# Patient Record
Sex: MALE | Race: BLACK OR AFRICAN AMERICAN | NOT HISPANIC OR LATINO | Employment: OTHER | URBAN - METROPOLITAN AREA
[De-identification: names, ages, dates, MRNs, and addresses within clinical notes are randomized per-mention and may not be internally consistent; named-entity substitution may affect disease eponyms.]

---

## 2017-03-08 ENCOUNTER — OFFICE VISIT (OUTPATIENT)
Dept: DERMATOLOGY | Facility: CLINIC | Age: 62
End: 2017-03-08
Payer: COMMERCIAL

## 2017-03-08 DIAGNOSIS — L81.9 SKIN DEPIGMENTATION: ICD-10-CM

## 2017-03-08 DIAGNOSIS — L65.9 ALOPECIA: Primary | ICD-10-CM

## 2017-03-08 PROCEDURE — 1160F RVW MEDS BY RX/DR IN RCRD: CPT | Mod: S$GLB,,, | Performed by: DERMATOLOGY

## 2017-03-08 PROCEDURE — 99202 OFFICE O/P NEW SF 15 MIN: CPT | Mod: S$GLB,,, | Performed by: DERMATOLOGY

## 2017-03-08 PROCEDURE — 99999 PR PBB SHADOW E&M-EST. PATIENT-LVL II: CPT | Mod: PBBFAC,,, | Performed by: DERMATOLOGY

## 2017-03-08 RX ORDER — FLUOCINONIDE TOPICAL SOLUTION USP, 0.05% 0.5 MG/ML
SOLUTION TOPICAL 2 TIMES DAILY
Qty: 60 ML | Refills: 2 | Status: SHIPPED | OUTPATIENT
Start: 2017-03-08 | End: 2018-04-13

## 2017-03-08 NOTE — PROGRESS NOTES
"  Subjective:       Patient ID:  Luis Wong is a 61 y.o. male who presents for   Chief Complaint   Patient presents with    Hair/Scalp Problem     scalp, x yrs, itchy, thinning, dry, tx unk prescibed cream     Hair/Scalp Problem  - Initial  Affected locations: scalp  Duration: 10+ years.  Signs / symptoms: itching and dryness (associated hair loss)  Relieving factors/Treatments tried: nothing    Biopsy of R hand 10+ years ago "may have shown vitiligo"    Past Medical History:   Diagnosis Date    Aneurysm 10/30/2012    Fever blister     Herpes infection     Hypertension     ICH (intracerebral hemorrhage)     Mixed hyperlipidemia 9/5/2013    Nontraumatic thalamic hemorrhage 8/31/2016    JAQUAN (obstructive sleep apnea)     Right-sided lacunar stroke 9/11/2014    SDH (subdural hematoma)     Special screening for malignant neoplasms, colon     Stroke      Review of Systems   Constitutional: Negative for fever, chills, fatigue and malaise.   Skin: Positive for activity-related sunscreen use. Negative for daily sunscreen use and recent sunburn.   Hematologic/Lymphatic: Does not bruise/bleed easily.        Objective:    Physical Exam   Constitutional: He appears well-developed and well-nourished. No distress.   Neurological: He is alert and oriented to person, place, and time. He is not disoriented.   Psychiatric: He has a normal mood and affect.   Skin:   Areas Examined (abnormalities noted in diagram):   Scalp / Hair Palpated and Inspected  Head / Face Inspection Performed  Neck Inspection Performed  Chest / Axilla Inspection Performed  Back Inspection Performed  RUE Inspected  LUE Inspection Performed                   Diagram Legend     Erythematous scaling macule/papule c/w actinic keratosis       Vascular papule c/w angioma      Pigmented verrucoid papule/plaque c/w seborrheic keratosis      Yellow umbilicated papule c/w sebaceous hyperplasia      Irregularly shaped tan macule c/w lentigo     1-2 mm " smooth white papules consistent with Milia      Movable subcutaneous cyst with punctum c/w epidermal inclusion cyst      Subcutaneous movable cyst c/w pilar cyst      Firm pink to brown papule c/w dermatofibroma      Pedunculated fleshy papule(s) c/w skin tag(s)      Evenly pigmented macule c/w junctional nevus     Mildly variegated pigmented, slightly irregular-bordered macule c/w mildly atypical nevus      Flesh colored to evenly pigmented papule c/w intradermal nevus       Pink pearly papule/plaque c/w basal cell carcinoma      Erythematous hyperkeratotic cursted plaque c/w SCC      Surgical scar with no sign of skin cancer recurrence      Open and closed comedones      Inflammatory papules and pustules      Verrucoid papule consistent consistent with wart     Erythematous eczematous patches and plaques     Dystrophic onycholytic nail with subungual debris c/w onychomycosis     Umbilicated papule    Erythematous-base heme-crusted tan verrucoid plaque consistent with inflamed seborrheic keratosis     Erythematous Silvery Scaling Plaque c/w Psoriasis     See annotation      Assessment / Plan:        Skin depigmentation/Scarring Alopecia-likely DLE on scalp although there are 2 areas of depigmentation on R hand and groin where the skin is only depigmented with no scarring or atrophy present (past Bx suggestive of vitiligo)  Discussed biopsy but pt is not interested  Patient is not concerned about the hair loss but is interested in trying to gain repigmentation  Encouraged patient to get 5-10 min sun exposure to help repigment the areas of suspected vitiligo after which he should apply sunscreen q2h while outdoors    -     fluocinonide (LIDEX) 0.05 % external solution; Apply topically 2 (two) times daily.  Dispense: 60 mL; Refill: 2         Return in about 2 months (around 5/8/2017).

## 2017-03-08 NOTE — MR AVS SNAPSHOT
[facility]  [address]  [facility] [address]  Phone: [phone]  Fax: [phone]                  Luis Wong   3/8/2017 10:00 AM   Office Visit    Description:  Male : 1955   Provider:  Shanel Goodman MD   Department:  [facility]           Reason for Visit     Hair/Scalp Problem           Diagnoses this Visit        Comments    Alopecia    -  Primary     Skin depigmentation                To Do List           Future Appointments        Provider Department Dept Phone    2017 8:00 AM MD [facility] [phone]      Goals (5 Years of Data)     None      Follow-Up and Disposition     Return in about 2 months (around 2017).       These Medications        Disp Refills Start End    fluocinonide (LIDEX) 0.05 % external solution 60 mL 2 3/8/2017     Apply topically 2 (two) times daily. - Topical (Top)    Pharmacy: [facility]/pharmacy #[id number] - [place] - [address] [address] Ph #: [phone]         [facility] On [facility]     [facility] On Call Nurse Care Line -  Assistance  Registered nurses in the [facility] On Call Center provide clinical advisement, health education, appointment booking, and other advisory services.  Call for this free service at [phone].             Medications           Message regarding Medications     Verify the changes and/or additions to your medication regime listed below are the same as discussed with your clinician today.  If any of these changes or additions are incorrect, please notify your healthcare provider.        START taking these NEW medications        Refills    fluocinonide (LIDEX) 0.05 % external solution 2    Sig: Apply topically 2 (two) times daily.    Class: Normal    Route: Topical (Top)      STOP taking these medications     hydrocodone-ibuprofen 7.5-200 mg (VICOPROFEN) 7.5-200 mg per tablet Take 1 tablet by mouth every 8 (eight) hours as needed for Pain.           Verify that the  below list of medications is an accurate representation of the medications you are currently taking.  If none reported, the list may be blank. If incorrect, please contact your healthcare provider. Carry this list with you in case of emergency.           Current Medications     amlodipine (NORVASC) 5 MG tablet TAKE 1 TABLET BY MOUTH EVERY DAY    lisinopril (PRINIVIL,ZESTRIL) 20 MG tablet TAKE 1 TABLET DAILY    aspirin (ECOTRIN) 81 MG EC tablet Take 1 tablet (81 mg total) by mouth once daily.    fluocinonide (LIDEX) 0.05 % external solution Apply topically 2 (two) times daily.           Clinical Reference Information           Allergies as of 3/8/2017     No Known Allergies      Immunizations Administered on Date of Encounter - 3/8/2017     None      Language Assistance Services     ATTENTION: Language assistance services are available, free of charge. Please call [phone].      ATENCIÓN: Si courtney kate, tiene a anderson disposición servicios gratuitos de asistencia lingüística. Llame al [phone].     CHA Ý: N?u b?n nói Ti?ng Vi?t, có các d?ch v? h? tr? ngôn ng? mi?n phí dành cho b?n. G?i s? [phone].         [facility] complies with applicable Federal civil rights laws and does not discriminate on the basis of race, color, national origin, age, disability, or sex.

## 2017-03-08 NOTE — LETTER
March 8, 2017      Juan Eduardo MD  [address] [facility] [address]           [facility]  [address]  [place] [address]  Phone: [phone]  Fax: [phone]          Patient: Luis Wong   MR Number: [id number]   YOB: 1955   Date of Visit: 3/8/2017       Dear Dr. Juan Eduardo:    Thank you for referring Luis Wong to me for evaluation. Attached you will find relevant portions of my assessment and plan of care.    If you have questions, please do not hesitate to call me. I look forward to following Luis Wong along with you.    Sincerely,    Shnael Goodman MD    Enclosure  CC:  No Recipients    If you would like to receive this communication electronically, please contact [email or web address] or [phone] to request more information on [facility] Link access.    For providers and/or their staff who would like to refer a patient to [facility], please contact us through our one-stop-shop provider referral line, [facility], at [phone].    If you feel you have received this communication in error or would no longer like to receive these types of communications, please e-mail [email or web address]

## 2017-03-13 RX ORDER — AMLODIPINE BESYLATE 5 MG/1
TABLET ORAL
Qty: 30 TABLET | Refills: 2 | Status: SHIPPED | OUTPATIENT
Start: 2017-03-13 | End: 2017-06-06 | Stop reason: SDUPTHER

## 2017-03-13 RX ORDER — LISINOPRIL 20 MG/1
TABLET ORAL
Qty: 30 TABLET | Refills: 2 | Status: SHIPPED | OUTPATIENT
Start: 2017-03-13 | End: 2017-06-06 | Stop reason: SDUPTHER

## 2017-05-08 ENCOUNTER — OFFICE VISIT (OUTPATIENT)
Dept: DERMATOLOGY | Facility: CLINIC | Age: 62
End: 2017-05-08
Payer: COMMERCIAL

## 2017-05-08 DIAGNOSIS — L81.9 SKIN DEPIGMENTATION: ICD-10-CM

## 2017-05-08 DIAGNOSIS — L65.9 ALOPECIA: Primary | ICD-10-CM

## 2017-05-08 PROCEDURE — 99999 PR PBB SHADOW E&M-EST. PATIENT-LVL III: CPT | Mod: PBBFAC,,, | Performed by: DERMATOLOGY

## 2017-05-08 PROCEDURE — 1160F RVW MEDS BY RX/DR IN RCRD: CPT | Mod: S$GLB,,, | Performed by: DERMATOLOGY

## 2017-05-08 PROCEDURE — 99213 OFFICE O/P EST LOW 20 MIN: CPT | Mod: S$GLB,,, | Performed by: DERMATOLOGY

## 2017-05-08 NOTE — PROGRESS NOTES
"  Subjective:       Patient ID:  Luis Wong is a 61 y.o. male who presents for   Chief Complaint   Patient presents with    Alopecia     f/u     Alopecia  - Follow-up  Symptom course: improving (per wife)  Currently using: lidex solution daily-bid; getting sun exposure few days per week.  Affected locations: scalp  Signs / symptoms: asymptomatic    Biopsy of R hand 10+ years ago "may have shown vitiligo"  Past Medical History:   Diagnosis Date    Aneurysm 10/30/2012    Fever blister     Herpes infection     Hypertension     ICH (intracerebral hemorrhage)     Mixed hyperlipidemia 9/5/2013    Nontraumatic thalamic hemorrhage 8/31/2016    JAQUAN (obstructive sleep apnea)     Right-sided lacunar stroke 9/11/2014    SDH (subdural hematoma)     Special screening for malignant neoplasms, colon     Stroke      Review of Systems   Constitutional: Negative for fatigue.   Skin: Positive for dry skin. Negative for itching, rash, daily sunscreen use and activity-related sunscreen use.        Objective:    Physical Exam   Constitutional: He appears well-developed and well-nourished. No distress.   Neurological: He is alert and oriented to person, place, and time. He is not disoriented.   Psychiatric: He has a normal mood and affect.   Skin:   Areas Examined (abnormalities noted in diagram):   Scalp / Hair Palpated and Inspected  Head / Face Inspection Performed  Neck Inspection Performed  RUE Inspected                               Diagram Legend     Erythematous scaling macule/papule c/w actinic keratosis       Vascular papule c/w angioma      Pigmented verrucoid papule/plaque c/w seborrheic keratosis      Yellow umbilicated papule c/w sebaceous hyperplasia      Irregularly shaped tan macule c/w lentigo     1-2 mm smooth white papules consistent with Milia      Movable subcutaneous cyst with punctum c/w epidermal inclusion cyst      Subcutaneous movable cyst c/w pilar cyst      Firm pink to brown papule c/w " dermatofibroma      Pedunculated fleshy papule(s) c/w skin tag(s)      Evenly pigmented macule c/w junctional nevus     Mildly variegated pigmented, slightly irregular-bordered macule c/w mildly atypical nevus      Flesh colored to evenly pigmented papule c/w intradermal nevus       Pink pearly papule/plaque c/w basal cell carcinoma      Erythematous hyperkeratotic cursted plaque c/w SCC      Surgical scar with no sign of skin cancer recurrence      Open and closed comedones      Inflammatory papules and pustules      Verrucoid papule consistent consistent with wart     Erythematous eczematous patches and plaques     Dystrophic onycholytic nail with subungual debris c/w onychomycosis     Umbilicated papule    Erythematous-base heme-crusted tan verrucoid plaque consistent with inflamed seborrheic keratosis     Erythematous Silvery Scaling Plaque c/w Psoriasis     See annotation      Assessment / Plan:        Alopecia/Skin depigmentation:-DLE on scalp (vs vitiligo) although there are 2 areas of depigmentation on R hand and groin where the skin is only depigmented with no scarring or atrophy present (past Bx suggestive of vitiligo)  Discussed biopsy at last OV but pt is not interested  Patient is not concerned about the hair loss but is interested in trying to gain repigmentation  Encouraged patient to get 5-10 min sun exposure to help repigment the areas of suspected vitiligo after which he should apply sunscreen q2h while outdoors  Continue fluocinonide (LIDEX) 0.05 % external solution; Apply topically 2 (two) times daily. Dispense: 60 mL; Refill: 2         Return in about 3 months (around 8/8/2017).

## 2017-06-06 RX ORDER — LISINOPRIL 20 MG/1
TABLET ORAL
Qty: 30 TABLET | Refills: 2 | Status: SHIPPED | OUTPATIENT
Start: 2017-06-06 | End: 2017-09-07 | Stop reason: SDUPTHER

## 2017-06-06 RX ORDER — AMLODIPINE BESYLATE 5 MG/1
TABLET ORAL
Qty: 30 TABLET | Refills: 2 | Status: SHIPPED | OUTPATIENT
Start: 2017-06-06 | End: 2017-09-07 | Stop reason: SDUPTHER

## 2017-07-19 DIAGNOSIS — Z00.00 ROUTINE GENERAL MEDICAL EXAMINATION AT A HEALTH CARE FACILITY: Primary | ICD-10-CM

## 2017-08-08 ENCOUNTER — OFFICE VISIT (OUTPATIENT)
Dept: DERMATOLOGY | Facility: CLINIC | Age: 62
End: 2017-08-08
Payer: COMMERCIAL

## 2017-08-08 DIAGNOSIS — L81.9 SKIN DEPIGMENTATION: Primary | ICD-10-CM

## 2017-08-08 DIAGNOSIS — L65.9 ALOPECIA: ICD-10-CM

## 2017-08-08 PROCEDURE — 99213 OFFICE O/P EST LOW 20 MIN: CPT | Mod: S$GLB,,, | Performed by: DERMATOLOGY

## 2017-08-08 PROCEDURE — 99999 PR PBB SHADOW E&M-EST. PATIENT-LVL II: CPT | Mod: PBBFAC,,, | Performed by: DERMATOLOGY

## 2017-08-08 NOTE — PROGRESS NOTES
"  Subjective:       Patient ID:  Luis Wong is a 61 y.o. male who presents for   Chief Complaint   Patient presents with    Follow-up     Alopecia     HPI 60 yo AAM with a history of alopecia/skin depigmentation (DLE vs vitiligo) of the scalp here for f/u. Last seen 5/8/17 and started on lidex 0.05% solution daily and instructed to get ~5 min of sun exposure daily.  He says the solution has helped with itching but he has not noticed any improvement in pigmentation or alopecia.    Biopsy of R hand 10+ years ago "may have shown vitiligo"  Past Medical History:   Diagnosis Date    Aneurysm 10/30/2012    Fever blister     Herpes infection     Hypertension     ICH (intracerebral hemorrhage)     Mixed hyperlipidemia 9/5/2013    Nontraumatic thalamic hemorrhage 8/31/2016    JAQUAN (obstructive sleep apnea)     Right-sided lacunar stroke 9/11/2014    SDH (subdural hematoma)     Special screening for malignant neoplasms, colon     Stroke      Review of Systems   Constitutional: Negative for fever, chills, weight loss, weight gain, fatigue, night sweats and malaise.   Skin: Negative for daily sunscreen use and recent sunburn.   Hematologic/Lymphatic: Does not bruise/bleed easily.        Objective:    Physical Exam   Constitutional: He appears well-developed and well-nourished. No distress.   Neurological: He is alert and oriented to person, place, and time. He is not disoriented.   Psychiatric: He has a normal mood and affect.   Skin:   Areas Examined (abnormalities noted in diagram):   Scalp / Hair Palpated and Inspected  Head / Face Inspection Performed  Neck Inspection Performed  RUE Inspected  LUE Inspection Performed                   Diagram Legend     Erythematous scaling macule/papule c/w actinic keratosis       Vascular papule c/w angioma      Pigmented verrucoid papule/plaque c/w seborrheic keratosis      Yellow umbilicated papule c/w sebaceous hyperplasia      Irregularly shaped tan macule c/w " lentigo     1-2 mm smooth white papules consistent with Milia      Movable subcutaneous cyst with punctum c/w epidermal inclusion cyst      Subcutaneous movable cyst c/w pilar cyst      Firm pink to brown papule c/w dermatofibroma      Pedunculated fleshy papule(s) c/w skin tag(s)      Evenly pigmented macule c/w junctional nevus     Mildly variegated pigmented, slightly irregular-bordered macule c/w mildly atypical nevus      Flesh colored to evenly pigmented papule c/w intradermal nevus       Pink pearly papule/plaque c/w basal cell carcinoma      Erythematous hyperkeratotic cursted plaque c/w SCC      Surgical scar with no sign of skin cancer recurrence      Open and closed comedones      Inflammatory papules and pustules      Verrucoid papule consistent consistent with wart     Erythematous eczematous patches and plaques     Dystrophic onycholytic nail with subungual debris c/w onychomycosis     Umbilicated papule    Erythematous-base heme-crusted tan verrucoid plaque consistent with inflamed seborrheic keratosis     Erythematous Silvery Scaling Plaque c/w Psoriasis     See annotation      Assessment / Plan:        Skin depigmentation/Alopecia  DLE on scalp (vs vitiligo) although there are 2 areas of depigmentation on R hand and groin where the skin is only depigmented with no scarring or atrophy present (past Bx suggestive of vitiligo)  Discussed biopsy at last OV.  Pt is now interested in biopsy  Encouraged patient to get 5-10 min sun exposure to help repigment the areas of suspected vitiligo after which he should apply sunscreen q2h while outdoors  Continue fluocinonide (LIDEX) 0.05 % external solution; Apply topically 2 (two) times daily. Dispense: 60 mL; Refill: 2       Return for as scheduled for scalp biopsy.

## 2017-09-07 RX ORDER — AMLODIPINE BESYLATE 5 MG/1
TABLET ORAL
Qty: 30 TABLET | Refills: 2 | Status: SHIPPED | OUTPATIENT
Start: 2017-09-07 | End: 2017-10-02

## 2017-09-07 RX ORDER — LISINOPRIL 20 MG/1
TABLET ORAL
Qty: 30 TABLET | Refills: 2 | Status: SHIPPED | OUTPATIENT
Start: 2017-09-07 | End: 2017-12-06 | Stop reason: SDUPTHER

## 2017-09-08 ENCOUNTER — PROCEDURE VISIT (OUTPATIENT)
Dept: DERMATOLOGY | Facility: CLINIC | Age: 62
End: 2017-09-08
Payer: COMMERCIAL

## 2017-09-08 DIAGNOSIS — L81.9 SKIN DEPIGMENTATION: ICD-10-CM

## 2017-09-08 DIAGNOSIS — L65.9 ALOPECIA: Primary | ICD-10-CM

## 2017-09-08 PROCEDURE — 88305 TISSUE EXAM BY PATHOLOGIST: CPT | Performed by: PATHOLOGY

## 2017-09-08 PROCEDURE — 99499 UNLISTED E&M SERVICE: CPT | Mod: S$GLB,,, | Performed by: DERMATOLOGY

## 2017-09-08 PROCEDURE — 88342 IMHCHEM/IMCYTCHM 1ST ANTB: CPT | Mod: 26,,, | Performed by: PATHOLOGY

## 2017-09-08 PROCEDURE — 11100 PR BIOPSY OF SKIN LESION: CPT | Mod: S$GLB,,, | Performed by: DERMATOLOGY

## 2017-09-08 NOTE — PROGRESS NOTES
"  Subjective:       Patient ID:  Luis Wong is a 61 y.o. male who presents for   Chief Complaint   Patient presents with    Biopsy     scalp     HPI History of alopecia/depigmentation (DLE vs vitiligo) here for scalp biopsy. Using lidex solution and instructed to get 5 minutes of sun daily.     Biopsy of R hand 10 years ago "may have shown vitiligo."    Past Medical History:   Diagnosis Date    Aneurysm 10/30/2012    Fever blister     Herpes infection     Hypertension     ICH (intracerebral hemorrhage)     Mixed hyperlipidemia 9/5/2013    Nontraumatic thalamic hemorrhage 8/31/2016    JAQUAN (obstructive sleep apnea)     Right-sided lacunar stroke 9/11/2014    SDH (subdural hematoma)     Special screening for malignant neoplasms, colon     Stroke      Review of Systems   Constitutional: Negative for fever.   Skin: Negative for daily sunscreen use and recent sunburn.   Hematologic/Lymphatic: Does not bruise/bleed easily.        Objective:    Physical Exam   Constitutional: He appears well-developed and well-nourished. No distress.   Neurological: He is alert and oriented to person, place, and time. He is not disoriented.   Psychiatric: He has a normal mood and affect.   Skin:   Areas Examined (abnormalities noted in diagram):   Scalp / Hair Palpated and Inspected  Head / Face Inspection Performed              Diagram Legend     Erythematous scaling macule/papule c/w actinic keratosis       Vascular papule c/w angioma      Pigmented verrucoid papule/plaque c/w seborrheic keratosis      Yellow umbilicated papule c/w sebaceous hyperplasia      Irregularly shaped tan macule c/w lentigo     1-2 mm smooth white papules consistent with Milia      Movable subcutaneous cyst with punctum c/w epidermal inclusion cyst      Subcutaneous movable cyst c/w pilar cyst      Firm pink to brown papule c/w dermatofibroma      Pedunculated fleshy papule(s) c/w skin tag(s)      Evenly pigmented macule c/w junctional nevus     " Mildly variegated pigmented, slightly irregular-bordered macule c/w mildly atypical nevus      Flesh colored to evenly pigmented papule c/w intradermal nevus       Pink pearly papule/plaque c/w basal cell carcinoma      Erythematous hyperkeratotic cursted plaque c/w SCC      Surgical scar with no sign of skin cancer recurrence      Open and closed comedones      Inflammatory papules and pustules      Verrucoid papule consistent consistent with wart     Erythematous eczematous patches and plaques     Dystrophic onycholytic nail with subungual debris c/w onychomycosis     Umbilicated papule    Erythematous-base heme-crusted tan verrucoid plaque consistent with inflamed seborrheic keratosis     Erythematous Silvery Scaling Plaque c/w Psoriasis     See annotation      Assessment / Plan:      Pathology Orders:      Normal Orders This Visit    Tissue Specimen To Pathology, Dermatology     Questions:    Directional Terms:  Other(comment)    Clinical information:  DLE vs vitiligo    Specific Site:  Left scalp        Alopecia/Skin depigmentation  - DLE on scalp (vs vitiligo) although there are 2 areas of depigmentation on R hand and groin where the skin is only depigmented with no scarring or atrophy present (past Bx suggestive of vitiligo)  - Scalp biopsy performed as below  -Continue 5-10 min sun exposure daily to help repigment the areas of suspected vitiligo after which he should apply sunscreen q2h while outdoors  - Continue fluocinonide 0.05% solution    Punch biopsy procedure note: scalp  Punch biopsy performed after verbal consent obtained. Area marked and prepped with alcohol. Approximately 1cc of 1% lidocaine with epinephrine injected. 4 mm disposable punch used to remove lesion. Hemostasis obtained and biopsy site closed with 2 Prolene sutures. Wound care instructions reviewed with patient and handout given.         Return in about 2 weeks (around 9/22/2017).

## 2017-09-12 ENCOUNTER — OFFICE VISIT (OUTPATIENT)
Dept: INTERNAL MEDICINE | Facility: CLINIC | Age: 62
End: 2017-09-12

## 2017-09-12 ENCOUNTER — HOSPITAL ENCOUNTER (OUTPATIENT)
Dept: CARDIOLOGY | Facility: CLINIC | Age: 62
Discharge: HOME | End: 2017-09-12

## 2017-09-12 ENCOUNTER — CLINICAL SUPPORT (OUTPATIENT)
Dept: INTERNAL MEDICINE | Facility: CLINIC | Age: 62
End: 2017-09-12

## 2017-09-12 ENCOUNTER — CLINICAL SUPPORT (OUTPATIENT)
Dept: INTERNAL MEDICINE | Facility: CLINIC | Age: 62
End: 2017-09-12
Payer: COMMERCIAL

## 2017-09-12 VITALS
TEMPERATURE: 98 F | BODY MASS INDEX: 25.64 KG/M2 | WEIGHT: 173.63 LBS | HEART RATE: 64 BPM | SYSTOLIC BLOOD PRESSURE: 158 MMHG | DIASTOLIC BLOOD PRESSURE: 90 MMHG

## 2017-09-12 DIAGNOSIS — I61.9 NONTRAUMATIC THALAMIC HEMORRHAGE: ICD-10-CM

## 2017-09-12 DIAGNOSIS — Z00.00 ROUTINE GENERAL MEDICAL EXAMINATION AT A HEALTH CARE FACILITY: Primary | ICD-10-CM

## 2017-09-12 DIAGNOSIS — E78.2 MIXED HYPERLIPIDEMIA: ICD-10-CM

## 2017-09-12 DIAGNOSIS — R73.03 PRE-DIABETES: ICD-10-CM

## 2017-09-12 DIAGNOSIS — I10 ESSENTIAL HYPERTENSION: ICD-10-CM

## 2017-09-12 DIAGNOSIS — Z00.00 ROUTINE GENERAL MEDICAL EXAMINATION AT A HEALTH CARE FACILITY: ICD-10-CM

## 2017-09-12 LAB
ALBUMIN SERPL BCP-MCNC: 3.6 G/DL
ALP SERPL-CCNC: 91 U/L
ALT SERPL W/O P-5'-P-CCNC: 14 U/L
ANION GAP SERPL CALC-SCNC: 6 MMOL/L
AST SERPL-CCNC: 17 U/L
BILIRUB SERPL-MCNC: 0.8 MG/DL
BUN SERPL-MCNC: 14 MG/DL
CALCIUM SERPL-MCNC: 9.5 MG/DL
CHLORIDE SERPL-SCNC: 105 MMOL/L
CHOLEST SERPL-MCNC: 229 MG/DL
CHOLEST/HDLC SERPL: 3.1 {RATIO}
CO2 SERPL-SCNC: 30 MMOL/L
COMPLEXED PSA SERPL-MCNC: 0.55 NG/ML
CREAT SERPL-MCNC: 1.2 MG/DL
ERYTHROCYTE [DISTWIDTH] IN BLOOD BY AUTOMATED COUNT: 13.1 %
EST. GFR  (AFRICAN AMERICAN): >60 ML/MIN/1.73 M^2
EST. GFR  (NON AFRICAN AMERICAN): >60 ML/MIN/1.73 M^2
ESTIMATED AVG GLUCOSE: 148 MG/DL
GLUCOSE SERPL-MCNC: 132 MG/DL
HBA1C MFR BLD HPLC: 6.8 %
HCT VFR BLD AUTO: 39.9 %
HDLC SERPL-MCNC: 73 MG/DL
HDLC SERPL: 31.9 %
HGB BLD-MCNC: 13.7 G/DL
LDLC SERPL CALC-MCNC: 138.8 MG/DL
MCH RBC QN AUTO: 27.2 PG
MCHC RBC AUTO-ENTMCNC: 34.3 G/DL
MCV RBC AUTO: 79 FL
NONHDLC SERPL-MCNC: 156 MG/DL
PLATELET # BLD AUTO: 171 K/UL
PMV BLD AUTO: ABNORMAL FL
POTASSIUM SERPL-SCNC: 3.7 MMOL/L
PROT SERPL-MCNC: 7.1 G/DL
RBC # BLD AUTO: 5.03 M/UL
SODIUM SERPL-SCNC: 141 MMOL/L
TRIGL SERPL-MCNC: 86 MG/DL
TSH SERPL DL<=0.005 MIU/L-ACNC: 0.42 UIU/ML
WBC # BLD AUTO: 4.43 K/UL

## 2017-09-12 PROCEDURE — 80061 LIPID PANEL: CPT

## 2017-09-12 PROCEDURE — 99386 PREV VISIT NEW AGE 40-64: CPT | Mod: ,,, | Performed by: INTERNAL MEDICINE

## 2017-09-12 PROCEDURE — 93000 ELECTROCARDIOGRAM COMPLETE: CPT | Mod: S$GLB,,, | Performed by: INTERNAL MEDICINE

## 2017-09-12 PROCEDURE — 99999 PR PBB SHADOW E&M-EST. PATIENT-LVL III: CPT | Mod: PBBFAC,,, | Performed by: INTERNAL MEDICINE

## 2017-09-12 PROCEDURE — 97802 MEDICAL NUTRITION INDIV IN: CPT | Mod: S$GLB,,, | Performed by: INTERNAL MEDICINE

## 2017-09-12 PROCEDURE — 97750 PHYSICAL PERFORMANCE TEST: CPT | Mod: S$GLB,,, | Performed by: INTERNAL MEDICINE

## 2017-09-12 PROCEDURE — 84443 ASSAY THYROID STIM HORMONE: CPT

## 2017-09-12 PROCEDURE — 85027 COMPLETE CBC AUTOMATED: CPT

## 2017-09-12 PROCEDURE — 80053 COMPREHEN METABOLIC PANEL: CPT

## 2017-09-12 PROCEDURE — 83036 HEMOGLOBIN GLYCOSYLATED A1C: CPT

## 2017-09-12 PROCEDURE — 84153 ASSAY OF PSA TOTAL: CPT

## 2017-09-14 NOTE — PROGRESS NOTES
Subjective:       Patient ID: Luis Wong is a 61 y.o. male.    Chief Complaint: No chief complaint on file.    HPI   Patient has reported a stroke in 2012, hypertension and hyperlipidemia.     Prescription Medication:   - Amlodipine    - Lisinopril     No physical limitations to exercise have been reported.     Current Exercise Routine:   -None    Currently trying to get back into a normal routine of exercising. He has reported being very active around the house since he has retired.    Review of Systems    Objective:      The fitness evaluation results are as follows:    D.O.S. 9/12/2017 8/26/2015   Height (in): 69 68.5   Weight (lbs): 171 164   BMI: 25.25693 24.136079   Body Fat (%): 25.70 22.90   Waist (cm): 90 83   Hip (cm): 101 94.5   WHR: 0.89 0.88   RBP (mmHg): 138/78 150/90   RHR (bpm): 60 52    Strength R (lbs)t: 101.3333 106.83776    Strength Lt (lbs): 97.24945 97.315490   Push-up Assessment: 30 31   Curl-up Assessment: 24 46   Flexibility Testing (cm): 21 20   REE (kcals): 1390 1330     Physical Exam    Assessment:      Age/Gender Stratified Assessment:    Resting BP: Within Testing Limits   Body Fat %: Good   WHR Risk Factor: Low Risk    Strength R: Average    Strength L: Above Average   Upper Body Endurance: Excellent   Abdominal Endurance: Above Average   Lower body Flexibility: Good      1. Routine general medical examination at a health care facility        Plan:    Patient should continue focusing on building a balanced exercise routine over the next year. This will help improve heart health and fitness level.      Below guidelines should be met.    Recommended Fitness Guidelines:   - 150 minutes of moderate intensity aerobic exercise each week OR 75 minutes of vigorous    - 2-4 days per week of resistance training for each muscle group   - Daily stretching

## 2017-09-18 NOTE — PROGRESS NOTES
"Nutrition Assessment  Client name:  Luis Wong  :  1955  Age:  61 y.o.  Gender:  male    Client states:  Very pleasant [employer] retiree here for his annual [facility] physical.  Suffered a stroke in  and has a strong family history of DM, including his father, paternal grandparents, and multiple siblings.  Wishes to be proactive in DM risk management as he prefers to avoid anti-DM medication if possible.  Takes various vitamin/mineral supplements as encouraged by his wife although admits that he has been consuming more soft drinks, candy, and cookies in contrast to last year.  His wife recently purchased a candy machine for the house, and he finds himself grabbing a few pieces periodically throughout the day.  Maintains a physically active lifestyle, completing household duties and responsibilities daily.  Does not stop to eat a "big meal" for lunch, frequently snacking on chips, salsa, and avocados.  Admits that although he owns home exercise equipment, including a stationary bike, treadmill, and elliptical, he does not utilize such, noting that it is hard to "get back in the routine."  Nonetheless, adheres to a vegan diet every  as his daughter is vegan and provides their meals then.  Enjoys making them "green smoothies" comprised of various fruits and vegetables.  Overall, was receptive toward nutrition education and recommendations received as he wishes to reduce his risk of DM.  Plans to recheck labs with PCP in 3-6 months.    Past Medical History:   Diagnosis Date    Aneurysm 10/30/2012    Fever blister     Herpes infection     Hypertension     ICH (intracerebral hemorrhage)     Mixed hyperlipidemia 2013    Nontraumatic thalamic hemorrhage 2016    JAQUAN (obstructive sleep apnea)     Right-sided lacunar stroke 2014    SDH (subdural hematoma)     Special screening for malignant neoplasms, colon     Stroke        Social History    Marital status:  " "  Employment:  Retired - [employer]  Social History   Substance Use Topics    Smoking status: Never Smoker    Smokeless tobacco: Never Used    Alcohol use No        Current medications:  has a current medication list which includes the following prescription(s): amlodipine, fluocinonide, and lisinopril.  Vitamins, minerals, and/or supplements:  Fish oil, Mg, Vitamin D3, ALA, reservertol, Vitamin B complex, acai berry, CoQ10, Gingko biloba, L-carnitine   Food allergies or intolerances:  Unable to assess     Food History  Breakfast:  1 cup green tea + 5-6 Ritz crackers + peanut butter  Lunch:  Avocado + salsa + chips  Dinner:  White beans + chicken + Coke  H.S. Snack:  Cookie/assorted candies    Exercise History:  None    Lab Reports   Total Cholesterol:  229    Triglycerides:  86  HDL:  73  LDL:  138.8   Glucose:  132  HbA1c:  6.8%  BP:  158/90     Weight History  Height:  5' 9"     Weight:  171#  BMI:  25.3  % Body Fat:  25.7%    Diagnosis  RMR (Method:  Body [place]):  1390 kcal  Activity Factor:  1.3  MARTIN:  1807 kcal    Altered nutrition-related laboratory values related to improper food choices and inadequate physical activity as evidenced by glucose:  132; HbA1c:  6.8%; BP:  158/90; TC:  229; LDL:  138.8.    Intervention    Goals:  1.  Refer to MD re: DM education/management  2.  HbA1c < 6.5% or per MD/PCP guidelines  3.  Optimize lipid panel and BP  4.  Begin exercising 30-45 minutes 4x/week as tolerated  5.  Replace Coke and other soft drinks with healthier beverage alternatives, such as water, Ice drink, diet green tea, etc.  6.  Incorporate a source of lean protein with every meal, specifically lunch  7.  Incorporate a salad with grilled chicken for lunch  8.  When making green smoothies, incorporate 6-8 ounces nonfat Greek yogurt   9.  Limit intake of sugary beverages and foods  10. Consider replacing white crackers with whole grain crackers, such as Trisquits or Reduced-Fat Wheat Thins  11. Recheck CMP, " lipid panel, and HbA1c in 3 months via MD/PCP    Reviewed CMP, lipid panel, and HbA1c.  Although TC and LDL remain borderline high, HDL remains > 70, resulting in TC/HDL ratio < 4.5.  However, elevated glucose and HbA1c noted, indicative of new-onset DM.  Had a lengthy discussion with patient regarding DM, including potential complications, risk factors, importance of glucose control, process of CHO digestion in relation to glucose, and ADA's reference ranges for fasting glucose and HbA1c.  Encouraged patient to modify his eating and exercise behaviors, specifically reducing intake of sugary foods and beverages.  Provided patient with examples of healthy beverage alternatives as well as healthy meal choices.  Advised patient to include a source of lean protein with all meals, discussing the role of protein in relation to satiety and glucose stabilization.  Also, encouraged him to incorporate additional non-starchy vegetables with meals, reviewing starchy versus non-starchy vegetables, as well as discussing the difference between refined and whole grains.  Reviewed a heart-healthy diet and ways to improve BP via low sodium diet and physical activity.  Stressed the overall importance of proper nutrition and physical activity so as to promote improved health maintenance and chronic disease risk management.    *To note, provided patient with detailed notes outlining goals discussed above and encouraged him to follow up with MD/PCP.    Handouts provided:  Meal Planning Guide  Restaurant Guide  Eat Fit Shopping List  Eat Fit Sheryl  Fast Food Guide  Vitamin/Mineral Guide  CHO Counting  Heart-Healthy Eating Nutrition Therapy  Individual Nutrition Notes    Monitoring/Evaluation    Monitor the following:  Weight  BMI  % Body Fat  Caloric intake  Lipid panel  Blood pressure  Glucose  HbA1c    Follow Up Plan:  Follow up with client in 1-2 years

## 2017-09-22 ENCOUNTER — CLINICAL SUPPORT (OUTPATIENT)
Dept: DERMATOLOGY | Facility: CLINIC | Age: 62
End: 2017-09-22
Payer: COMMERCIAL

## 2017-09-22 DIAGNOSIS — Z48.02 VISIT FOR SUTURE REMOVAL: Primary | ICD-10-CM

## 2017-09-22 PROCEDURE — 99024 POSTOP FOLLOW-UP VISIT: CPT | Mod: S$GLB,,, | Performed by: DERMATOLOGY

## 2017-09-22 PROCEDURE — 99999 PR PBB SHADOW E&M-EST. PATIENT-LVL I: CPT | Mod: PBBFAC,,,

## 2017-09-25 PROBLEM — R73.03 PRE-DIABETES: Status: ACTIVE | Noted: 2017-09-25

## 2017-09-25 NOTE — PROGRESS NOTES
Subjective:       Patient ID: Luis Wong is a 61 y.o. male.    Chief Complaint: Executive Health    HPIPleasant gentleman from [place], [place] here for his Executive Health exam. Overall doing well and had no specific complaints. He has a history of a hypertensive hemorrhagic stroke several years ago, but fortunately has no residual deficits except noting occassional weakness of his left leg. This has no pattern and has not impeded his activities. He states that he is compliant with his medications including blood pressure meds.  I am sending copies of his studies including blood work that showed elevated fastin glucose level at 132 with elevated A1-C at 6.8. The implications of this finding were discussed with him at the time of our visit vis a vis future health issues. Emphasized as well the importance of proper nutrition by decreasing his intake of carbohydrates/starches in his diet, exercise and weight maintenance. I will repeat this blood work in 3-4 months and treat if indicated. CBC again showed mild anemia - chronic, stable. Cholesterol again elevated at 229 with excellent HDL fraction. Nevertheless, will repeat this blood work as above and consider treatment if not improved. All other parameters were unremarkable including EKG.  Review of Systems   All other systems reviewed and are negative.      Objective:      Physical Exam   Constitutional: He is oriented to person, place, and time. He appears well-developed and well-nourished. No distress.   HENT:   Head: Normocephalic and atraumatic.   Right Ear: External ear normal.   Left Ear: External ear normal.   Mouth/Throat: Oropharynx is clear and moist. No oropharyngeal exudate.   Eyes: Conjunctivae and EOM are normal. Pupils are equal, round, and reactive to light. Left eye exhibits no discharge. No scleral icterus.   Neck: Normal range of motion. Neck supple. No JVD present. No thyromegaly present.   Cardiovascular: Normal rate, regular rhythm, normal  heart sounds and intact distal pulses.    No murmur heard.  Pulmonary/Chest: Effort normal and breath sounds normal. No respiratory distress. He has no wheezes. He exhibits no tenderness.   Abdominal: Soft. Bowel sounds are normal. He exhibits no distension and no mass. There is no tenderness.   Musculoskeletal: Normal range of motion. He exhibits no edema or tenderness.   Lymphadenopathy:     He has no cervical adenopathy.   Neurological: He is alert and oriented to person, place, and time. No cranial nerve deficit. Coordination normal.   Skin: Skin is warm and dry. He is not diaphoretic. No erythema.   Psychiatric: He has a normal mood and affect. His behavior is normal. Judgment and thought content normal.   Nursing note and vitals reviewed.      Assessment:       1. Routine general medical examination at a health care facility    2. Pre-diabetes    3. Mixed hyperlipidemia    4. Essential hypertension    5. Nontraumatic thalamic hemorrhage        Plan:    1. As above.         2. Continue with current medications.         3. Return to clinic in 3 months with blood work prior to visit.

## 2017-10-02 RX ORDER — AMLODIPINE BESYLATE 5 MG/1
TABLET ORAL
Qty: 30 TABLET | Refills: 2 | Status: SHIPPED | OUTPATIENT
Start: 2017-10-02 | End: 2018-03-10 | Stop reason: SDUPTHER

## 2017-10-19 ENCOUNTER — TELEPHONE (OUTPATIENT)
Dept: DERMATOLOGY | Facility: CLINIC | Age: 62
End: 2017-10-19

## 2017-12-06 RX ORDER — LISINOPRIL 20 MG/1
TABLET ORAL
Qty: 30 TABLET | Refills: 2 | Status: SHIPPED | OUTPATIENT
Start: 2017-12-06 | End: 2018-03-10 | Stop reason: SDUPTHER

## 2018-01-09 ENCOUNTER — LAB VISIT (OUTPATIENT)
Dept: LAB | Facility: HOSPITAL | Age: 63
End: 2018-01-09
Attending: FAMILY MEDICINE
Payer: COMMERCIAL

## 2018-01-09 ENCOUNTER — OFFICE VISIT (OUTPATIENT)
Dept: FAMILY MEDICINE | Facility: CLINIC | Age: 63
End: 2018-01-09
Payer: COMMERCIAL

## 2018-01-09 VITALS
BODY MASS INDEX: 25.48 KG/M2 | DIASTOLIC BLOOD PRESSURE: 90 MMHG | HEIGHT: 69 IN | OXYGEN SATURATION: 98 % | HEART RATE: 64 BPM | SYSTOLIC BLOOD PRESSURE: 150 MMHG | WEIGHT: 172 LBS

## 2018-01-09 DIAGNOSIS — B35.6 TINEA CRURIS: ICD-10-CM

## 2018-01-09 DIAGNOSIS — R73.03 PRE-DIABETES: ICD-10-CM

## 2018-01-09 DIAGNOSIS — Z23 NEED FOR SHINGLES VACCINE: ICD-10-CM

## 2018-01-09 DIAGNOSIS — Z00.00 ANNUAL PHYSICAL EXAM: Primary | ICD-10-CM

## 2018-01-09 DIAGNOSIS — Z23 NEED FOR DIPHTHERIA-TETANUS-PERTUSSIS (TDAP) VACCINE: ICD-10-CM

## 2018-01-09 DIAGNOSIS — I10 ESSENTIAL HYPERTENSION: ICD-10-CM

## 2018-01-09 DIAGNOSIS — Z00.00 ANNUAL PHYSICAL EXAM: ICD-10-CM

## 2018-01-09 LAB
ALBUMIN SERPL BCP-MCNC: 3.6 G/DL
ALP SERPL-CCNC: 101 U/L
ALT SERPL W/O P-5'-P-CCNC: 27 U/L
ANION GAP SERPL CALC-SCNC: 7 MMOL/L
AST SERPL-CCNC: 26 U/L
BASOPHILS # BLD AUTO: 0.03 K/UL
BASOPHILS NFR BLD: 0.7 %
BILIRUB SERPL-MCNC: 0.5 MG/DL
BUN SERPL-MCNC: 15 MG/DL
CALCIUM SERPL-MCNC: 9.2 MG/DL
CHLORIDE SERPL-SCNC: 105 MMOL/L
CHOLEST SERPL-MCNC: 225 MG/DL
CHOLEST/HDLC SERPL: 2.8 {RATIO}
CO2 SERPL-SCNC: 29 MMOL/L
CREAT SERPL-MCNC: 1 MG/DL
CREAT UR-MCNC: 206 MG/DL
DIFFERENTIAL METHOD: ABNORMAL
EOSINOPHIL # BLD AUTO: 0.3 K/UL
EOSINOPHIL NFR BLD: 6.9 %
ERYTHROCYTE [DISTWIDTH] IN BLOOD BY AUTOMATED COUNT: 13 %
EST. GFR  (AFRICAN AMERICAN): >60 ML/MIN/1.73 M^2
EST. GFR  (NON AFRICAN AMERICAN): >60 ML/MIN/1.73 M^2
ESTIMATED AVG GLUCOSE: 137 MG/DL
GLUCOSE SERPL-MCNC: 130 MG/DL
HBA1C MFR BLD HPLC: 6.4 %
HCT VFR BLD AUTO: 40.3 %
HDLC SERPL-MCNC: 80 MG/DL
HDLC SERPL: 35.6 %
HGB BLD-MCNC: 13.7 G/DL
IMM GRANULOCYTES # BLD AUTO: 0.01 K/UL
IMM GRANULOCYTES NFR BLD AUTO: 0.2 %
LDLC SERPL CALC-MCNC: 135.6 MG/DL
LYMPHOCYTES # BLD AUTO: 1.2 K/UL
LYMPHOCYTES NFR BLD: 28 %
MCH RBC QN AUTO: 27.5 PG
MCHC RBC AUTO-ENTMCNC: 34 G/DL
MCV RBC AUTO: 81 FL
MICROALBUMIN UR DL<=1MG/L-MCNC: 15 UG/ML
MICROALBUMIN/CREATININE RATIO: 7.3 UG/MG
MONOCYTES # BLD AUTO: 0.6 K/UL
MONOCYTES NFR BLD: 14.1 %
NEUTROPHILS # BLD AUTO: 2.1 K/UL
NEUTROPHILS NFR BLD: 50.1 %
NONHDLC SERPL-MCNC: 145 MG/DL
NRBC BLD-RTO: 0 /100 WBC
PLATELET # BLD AUTO: 183 K/UL
PMV BLD AUTO: 14.3 FL
POTASSIUM SERPL-SCNC: 3.8 MMOL/L
PROT SERPL-MCNC: 7.3 G/DL
RBC # BLD AUTO: 4.99 M/UL
SODIUM SERPL-SCNC: 141 MMOL/L
TRIGL SERPL-MCNC: 47 MG/DL
TSH SERPL DL<=0.005 MIU/L-ACNC: 0.42 UIU/ML
WBC # BLD AUTO: 4.18 K/UL

## 2018-01-09 PROCEDURE — 83036 HEMOGLOBIN GLYCOSYLATED A1C: CPT

## 2018-01-09 PROCEDURE — 90471 IMMUNIZATION ADMIN: CPT | Mod: S$GLB,,, | Performed by: FAMILY MEDICINE

## 2018-01-09 PROCEDURE — 85025 COMPLETE CBC W/AUTO DIFF WBC: CPT

## 2018-01-09 PROCEDURE — 82043 UR ALBUMIN QUANTITATIVE: CPT

## 2018-01-09 PROCEDURE — 99396 PREV VISIT EST AGE 40-64: CPT | Mod: 25,S$GLB,, | Performed by: FAMILY MEDICINE

## 2018-01-09 PROCEDURE — 99999 PR PBB SHADOW E&M-EST. PATIENT-LVL III: CPT | Mod: PBBFAC,,, | Performed by: FAMILY MEDICINE

## 2018-01-09 PROCEDURE — 36415 COLL VENOUS BLD VENIPUNCTURE: CPT | Mod: PO

## 2018-01-09 PROCEDURE — 80053 COMPREHEN METABOLIC PANEL: CPT

## 2018-01-09 PROCEDURE — 90715 TDAP VACCINE 7 YRS/> IM: CPT | Mod: S$GLB,,, | Performed by: FAMILY MEDICINE

## 2018-01-09 PROCEDURE — 84443 ASSAY THYROID STIM HORMONE: CPT

## 2018-01-09 PROCEDURE — 80061 LIPID PANEL: CPT

## 2018-01-09 RX ORDER — ECONAZOLE NITRATE 10 MG/G
CREAM TOPICAL 2 TIMES DAILY
Qty: 85 G | Refills: 0 | Status: SHIPPED | OUTPATIENT
Start: 2018-01-09 | End: 2018-04-13

## 2018-01-09 NOTE — PATIENT INSTRUCTIONS
Eating Heart-Healthy Foods  Eating has a big impact on your heart health. In fact, eating healthier can improve several of your heart risks at once. For instance, it helps you manage weight, cholesterol, and blood pressure. Here are ideas to help you make heart-healthy changes without giving up all the foods and flavors you love.  Getting started  · Talk with your health care provider about eating plans, such as the DASH or Mediterranean diet. You may also be referred to a dietitian.  · Change a few things at a time. Give yourself time to get used to a few eating changes before adding more.  · Work to create a tasty, healthy eating plan that you can stick to for the rest of your life.    Goals for healthy eating  Below are some tips to improve your eating habits:  · Limit saturated fats and trans fats. Saturated fats raise your levels of cholesterol, so keep these fats to a minimum. They are found in foods such as fatty meats, whole milk, cheese, and palm and coconut oils. Avoid trans fats because they lower good cholesterol as well as raise bad cholesterol. Trans fats are most often found in processed foods.  · Reduce sodium (salt) intake. Eating too much salt may increase your blood pressure. Limit your sodium intake to 2,300 milligrams (mg) per day, or less if your health care provider recommends it. Dining out less often and eating fewer processed foods are two great ways to decrease the amount of salt you consume.  · Managing calories. A calorie is a unit of energy. Your body burns calories for fuel, but if you eat more calories than your body burns, the extras are stored as fat. Your health care provider can help you create a diet plan to manage your calories. This will likely include eating healthier foods as well as exercising regularly. To help you track your progress, keep a diary to record what you eat and how often you exercise.  Choose the right foods  Aim to make these foods staples of your diet. If  you have diabetes, you may have different recommendations than what is listed here:  · Fruits and vegetable provide plenty of nutrients without a lot of calories. At meals, fill half your plate with these foods. Split the other half of your plate between whole grains and lean protein.  · Whole grains are high in fiber and rich in vitamins and nutrients. Good choices include whole-wheat bread, pasta, and brown rice.  · Lean proteins give you nutrition with less fat. Good choices include fish, skinless chicken, and beans.  · Low-fat or nonfat dairy provides nutrients without a lot of fat. Try low-fat or nonfat milk, cheese, or yogurt.  · Healthy fats can be good for you in small amounts. These are unsaturated fats, such as olive oil, nuts, and fish. Try to have at least 2 servings per week of fatty fish such as salmon, sardines, mackerel, rainbow trout, and albacore tuna. These contain omega-3 fatty acids, which are good for your heart. Flaxseed is another source of a heart-healthy fat.  More on heart healthy eating    Read food labels  Healthy eating starts at the grocery store. Be sure to pay attention to food labels on packaged foods. Look for products that are high in fiber and protein, and low in saturated fat, cholesterol, and sodium. Avoid products that contain trans fat. And pay close attention to serving size. For instance, if you plan to eat two servings, double all the numbers on the label.  Prepare food right  A key part of healthy cooking is cutting down on added fat and salt. Look on the internet for lower-fat, lower-sodium recipes. Also, try these tips:  · Remove fat from meat and skin from poultry before cooking.  · Skim fat from the surface of soups and sauces.  · Broil, boil, bake, steam, grill, and microwave food without added fats.  · Choose ingredients that spice up your food without adding calories, fat, or sodium. Try these items: horseradish, hot sauce, lemon, mustard, nonfat salad dressings,  and vinegar. For salt-free herbs and spices, try basil, cilantro, cinnamon, pepper, and rosemary.  Date Last Reviewed: 6/25/2015  © 9042-7495 [employer]. [address] [place] [address]. All rights reserved. This information is not intended as a substitute for professional medical care. Always follow your healthcare professional's instructions.        Aerobic Exercise for a Healthy Heart  Exercise is a lot more than an energy booster and a stress reliever. It also strengthens your heart muscle, lowers your blood pressure and cholesterol, and burns calories. It can also improve your resting muscle tone, and your mood.     Remember, some activity is better than none.    Choose an aerobic activity  Choose an activity that makes your heart and lungs work harder than they do when you rest or walk normally. This aerobic exercise can improve the way your heart and other muscles use oxygen. Make it fun by exercising with a friend and choosing an activity you enjoy. Here are some ideas:  · Walking  · Swimming  · Bicycling  · Stair climbing  · Dancing  · Jogging  · Gardening  Exercise regularly  If you havent been exercising regularly,  get your doctors OK first. Then start slowly.  Here are some tips:  · Begin exercising 3 times a week for 5 to 10 minutes at a time.  · When you feel comfortable, add a few minutes each session.  · Slowly build up to exercising 3 to 4 times each week. Each session should last for 40 minutes, on average, and involve moderate- to vigorous-intensity physical activity.  · If you have been given nitroglycerin, be sure to carry it when you exercise.  · If you get chest pain (angina) when youre exercising, stop what youre doing, take your nitroglycerin, and call your doctor.  Date Last Reviewed: 6/2/2016  © 8822-6074 [employer]. [address] [place] [address]. All rights reserved. This information is not intended as a substitute for professional  medical care. Always follow your healthcare professional's instructions.        Jock Itch (Tinea Cruris, General)  Jock itch (tinea cruris) is a red, itchy rash in the groin caused by a fungal infection. It occurs in skin folds where it is warm and moist. It commonly starts as a small, red, itchy patch that grows larger. The patch is usually in the shape of a round ring, 1 to 2 inches wide. It may cause the skin to flake. It may also spread to the scrotum or the skin that covers your testicles. This infection is treated with skin creams or oral medicine.  Home care  · If you were prescribed a cream, use it exactly as directed. You can buy some antifungal creams without a prescription.  · It may take a week before the fungus starts to go away. It can take about 2 to 3 weeks to completely clear. To stop the rash from coming back, keep using the medicine until the rash is all gone.  · Wash the area at least once a day with soap and water. Pat dry and apply medicine.   · Wear loose-fitting underwear to let your skin breathe. Change your underwear daily.  · Once the rash is gone, keep the area clean and dry to prevent reinfection. If recurrence is a problem, use a medicated antifungal powder daily. This is available over the counter.  Prevention  The following tips may help prevent jock itch:  · Don't share clothes, towels, or sports gear with others unless they have been washed.  · Change your underwear daily.  · Keep skin clean and dry, especially after showering or swimming.  · Lose weight.  · Do not wear tight underwear.  · Treat athlete's foot if it occurs.  Follow-up care  Follow up with your healthcare provider, or as advised. Call your provider if the rash is not starting to improve after 10 days of treatment, or if the rash continues to spread.  When to seek medical advice  Call your healthcare provider right away if any of these occur:  · Increasing pain in the rash area  · Redness that spreads around the  rash  · Fluid draining from the rash  · Rash returns soon after treatment  · Fever of 100.4°F (38°C) or higher, or as directed by your provider  Date Last Reviewed: 8/1/2016  © 5074-0866 [employer]. [address] [place] [address], [place] [address]. All rights reserved. This information is not intended as a substitute for professional medical care. Always follow your healthcare professional's instructions.

## 2018-01-09 NOTE — PROGRESS NOTES
Subjective:       Patient ID: Luis Wong is a 62 y.o. male.    Chief Complaint: No chief complaint on file.    62 years old male came to the clinic for his physical examination.  Blood pressure today is elevated.  Patient reports elevated blood pressure when he comes for medical appointments.  No chest pain palpitations orthopnea or PND.  Patient previously diagnosed with prediabetes.  No polyuria polydipsia polyphagia.  Patient with rash over the inguinal area associated with itching.  Patient with a rash on and off for the last 6 months.      Review of Systems   Constitutional: Negative.    HENT: Negative.    Eyes: Negative.    Respiratory: Negative.    Cardiovascular: Negative.  Negative for chest pain, palpitations and leg swelling.   Gastrointestinal: Negative.    Endocrine: Negative for cold intolerance, heat intolerance, polydipsia, polyphagia and polyuria.   Genitourinary: Negative.    Musculoskeletal: Negative.    Skin: Positive for rash.   Neurological: Negative.    Psychiatric/Behavioral: Negative.        Objective:      Physical Exam   Constitutional: He is oriented to person, place, and time. He appears well-developed and well-nourished. No distress.   HENT:   Head: Normocephalic and atraumatic.   Right Ear: External ear normal.   Left Ear: External ear normal.   Nose: Nose normal.   Mouth/Throat: Oropharynx is clear and moist. No oropharyngeal exudate.   Eyes: Conjunctivae and EOM are normal. Pupils are equal, round, and reactive to light. Right eye exhibits no discharge. Left eye exhibits no discharge. No scleral icterus.   Neck: Normal range of motion. Neck supple. No JVD present. No tracheal deviation present. No thyromegaly present.   Cardiovascular: Normal rate, regular rhythm, normal heart sounds and intact distal pulses.  Exam reveals no gallop and no friction rub.    No murmur heard.  Pulmonary/Chest: Effort normal and breath sounds normal. No stridor. No respiratory distress. He has no  wheezes. He has no rales. He exhibits no tenderness.   Abdominal: Soft. Bowel sounds are normal. He exhibits no distension and no mass. There is no tenderness. There is no rebound and no guarding.   Musculoskeletal: Normal range of motion. He exhibits no edema or tenderness.   Lymphadenopathy:     He has no cervical adenopathy.   Neurological: He is alert and oriented to person, place, and time. He has normal reflexes. He displays normal reflexes. No cranial nerve deficit. He exhibits normal muscle tone. Coordination normal.   Skin: Skin is warm and dry. Rash (inguinal area) noted. He is not diaphoretic. No erythema. No pallor.   Psychiatric: He has a normal mood and affect. His behavior is normal. Judgment and thought content normal.   Nursing note and vitals reviewed.      Assessment:       1. Annual physical exam    2. Essential hypertension    3. Pre-diabetes    4. Need for diphtheria-tetanus-pertussis (Tdap) vaccine    5. Need for shingles vaccine    6. Tinea cruris        Plan:         Diagnoses and all orders for this visit:    Annual physical exam  -     Comprehensive metabolic panel; Future  -     Lipid panel; Future  -     TSH; Future  -     CBC auto differential; Future  -     Hemoglobin A1c; Future  -     Microalbumin/creatinine urine ratio    Essential hypertension  -     Comprehensive metabolic panel; Future  -     Lipid panel; Future  -     TSH; Future  -     CBC auto differential; Future    Pre-diabetes  -     Hemoglobin A1c; Future  -     Microalbumin/creatinine urine ratio    Need for diphtheria-tetanus-pertussis (Tdap) vaccine  -     Tdap Vaccine    Need for shingles vaccine    Tinea cruris  -     econazole nitrate 1 % cream; Apply topically 2 (two) times daily.    Continue monitoring blood pressure at home, low sodium diet.

## 2018-03-12 RX ORDER — AMLODIPINE BESYLATE 5 MG/1
TABLET ORAL
Qty: 30 TABLET | Refills: 2 | Status: SHIPPED | OUTPATIENT
Start: 2018-03-12 | End: 2018-06-23 | Stop reason: SDUPTHER

## 2018-03-12 RX ORDER — LISINOPRIL 20 MG/1
TABLET ORAL
Qty: 30 TABLET | Refills: 2 | Status: SHIPPED | OUTPATIENT
Start: 2018-03-12 | End: 2018-06-23 | Stop reason: SDUPTHER

## 2018-04-13 ENCOUNTER — OFFICE VISIT (OUTPATIENT)
Dept: FAMILY MEDICINE | Facility: CLINIC | Age: 63
End: 2018-04-13
Payer: COMMERCIAL

## 2018-04-13 VITALS
BODY MASS INDEX: 24.69 KG/M2 | DIASTOLIC BLOOD PRESSURE: 80 MMHG | WEIGHT: 166.69 LBS | HEIGHT: 69 IN | HEART RATE: 68 BPM | SYSTOLIC BLOOD PRESSURE: 138 MMHG | OXYGEN SATURATION: 98 %

## 2018-04-13 DIAGNOSIS — M79.604 RIGHT LEG PAIN: Primary | ICD-10-CM

## 2018-04-13 DIAGNOSIS — I10 ESSENTIAL HYPERTENSION: ICD-10-CM

## 2018-04-13 PROCEDURE — 99999 PR PBB SHADOW E&M-EST. PATIENT-LVL III: CPT | Mod: PBBFAC,,, | Performed by: FAMILY MEDICINE

## 2018-04-13 PROCEDURE — 99214 OFFICE O/P EST MOD 30 MIN: CPT | Mod: S$GLB,,, | Performed by: FAMILY MEDICINE

## 2018-04-13 RX ORDER — GABAPENTIN 300 MG/1
300 CAPSULE ORAL NIGHTLY
Qty: 30 CAPSULE | Refills: 1 | Status: SHIPPED | OUTPATIENT
Start: 2018-04-13 | End: 2018-06-12 | Stop reason: SDUPTHER

## 2018-04-13 NOTE — MEDICAL/APP STUDENT
Subjective:       Patient ID: Luis Wong is a 62 y.o. male.    Chief Complaint: Leg Pain (right lower side x 2 wks on/off more when laying in bed)    HPI   Mr. Wong is a 61 yo male with hx of HTN and hemorrhagic stroke presenting today for right leg pain. He describes the pain as a shooting intermittent pain which began 2-3 weeks ago. The pain is located on the lateral knee and radiates down to mid calf. The episodes of pain usually last anywhere from 2-10 min on average and mostly occur during the night. At its worst the pain is a 7/10 and at its best a 2/10. The last time he experienced the pain was on 4/7. He denies an numbness or generalized weakness. No history of blood clots or recent immobilization. Patient usually walks 4 miles 5 days a week. He denies any history of MSK pain in the past. He denies trauma.     Review of Systems   Constitutional: Negative for appetite change, chills and fever.   HENT: Negative for congestion, rhinorrhea and sore throat.    Respiratory: Negative for cough, chest tightness and shortness of breath.    Cardiovascular: Negative for chest pain, palpitations and leg swelling.   Gastrointestinal: Negative for abdominal pain, diarrhea, nausea and vomiting.   Genitourinary: Negative for difficulty urinating, dysuria and frequency.   Musculoskeletal: Positive for arthralgias. Negative for back pain, gait problem, joint swelling and myalgias.   Skin: Negative for color change and rash.   Neurological: Positive for weakness. Negative for dizziness, numbness and headaches.        Mild weakness in right leg only during pain episode.    Psychiatric/Behavioral: Negative for agitation and behavioral problems.       Objective:      Physical Exam   Constitutional: He is oriented to person, place, and time. He appears well-developed and well-nourished.   HENT:   Head: Normocephalic and atraumatic.   Eyes: Conjunctivae and EOM are normal. Pupils are equal, round, and reactive to light.    Neck: Normal range of motion. Neck supple.   Cardiovascular: Normal rate, regular rhythm, normal heart sounds and intact distal pulses.    Pulmonary/Chest: Effort normal and breath sounds normal.   Abdominal: Soft. Bowel sounds are normal. He exhibits no distension. There is no tenderness.   Musculoskeletal: Normal range of motion. He exhibits no edema, tenderness or deformity.   Neurological: He is alert and oriented to person, place, and time. He has normal strength and normal reflexes. No sensory deficit.   Skin: Skin is warm and dry. Capillary refill takes less than 2 seconds.   Psychiatric: He has a normal mood and affect. His behavior is normal.       Assessment:         Intermittent neuropathy of right leg  Plan:     Luis was seen today for leg pain.    Diagnoses and all orders for this visit:    Right leg pain  -     gabapentin (NEURONTIN) 300 MG capsule; Take 1 capsule (300 mg total) by mouth every evening.    Essential hypertension        - controlled with current medications      Follow-up with Dr. Elam.

## 2018-04-13 NOTE — PROGRESS NOTES
"Subjective:       Patient ID: Luis Wong is a 62 y.o. male.    Chief Complaint: Leg Pain (right lower side x 2 wks on/off more when laying in bed)    HPI   63 yo male pt of Dr. Elam with HTN, h/o CVA and hyperlipidemia presents for an urgent care visit c/o leg pain x 2-3 weeks. Pain is in the right knee to the right calf. Pain is intermittent and lasts for 2-10 minutes. Pain is worse at night and wakes pt up from sleep. No history of similar pain. Rates pain as 7/10 at its worst and 2/10 at best. No recent trauma to the leg. No numbness or prolonged immobility. No h/o of DVT. Describes pain as a "shooting type pain". Walks 4 miles per day 5 days per week. Pain does not interfere with walking. Last episode 1 week ago and it lasted for 1 hour.  Review of Systems   Eyes: Negative for visual disturbance.   Respiratory: Negative for shortness of breath.    Cardiovascular: Negative for chest pain.   Gastrointestinal: Negative for abdominal pain, nausea and vomiting.   Musculoskeletal: Positive for arthralgias. Negative for back pain and myalgias.   Neurological: Negative for headaches.       Objective:      Physical Exam   Constitutional: He appears well-developed and well-nourished. No distress.   HENT:   Head: Normocephalic and atraumatic.   Neck: Normal range of motion. Neck supple.   Cardiovascular: Normal rate, regular rhythm, normal heart sounds and intact distal pulses.    Pulmonary/Chest: Effort normal and breath sounds normal. He has no wheezes. He has no rales.   Abdominal: Soft. Bowel sounds are normal.   Musculoskeletal:        Right knee: He exhibits normal range of motion and no deformity. No tenderness found.        Left knee: He exhibits normal range of motion and no deformity. No tenderness found.        Cervical back: He exhibits normal range of motion, no tenderness and no bony tenderness.        Thoracic back: He exhibits normal range of motion, no tenderness and no bony tenderness.        Lumbar " back: He exhibits normal range of motion, no tenderness and no bony tenderness.        Right upper leg: He exhibits no tenderness, no bony tenderness and no swelling.        Left upper leg: He exhibits no tenderness, no bony tenderness and no swelling.   Neurological: He has normal reflexes. No sensory deficit.   Straight leg raise negative bilaterally.   Skin: Skin is warm and dry. No rash noted. He is not diaphoretic. No erythema. No pallor.   Vitals reviewed.      Assessment:       See plan  Plan:       Luis was seen today for leg pain.    Diagnoses and all orders for this visit:    Right leg pain  -  Probable neuropathy  -     gabapentin (NEURONTIN) 300 MG capsule; Take 1 capsule (300 mg total) by mouth every evening. Pt will start this medication if pain returns.    Essential hypertension: Stable    F/U in 4 weeks with Dr. Granados.

## 2018-06-12 DIAGNOSIS — M79.604 RIGHT LEG PAIN: ICD-10-CM

## 2018-06-12 RX ORDER — GABAPENTIN 300 MG/1
300 CAPSULE ORAL NIGHTLY
Qty: 30 CAPSULE | Refills: 1 | Status: SHIPPED | OUTPATIENT
Start: 2018-06-12 | End: 2018-06-28

## 2018-06-25 RX ORDER — LISINOPRIL 20 MG/1
TABLET ORAL
Qty: 30 TABLET | Refills: 2 | Status: SHIPPED | OUTPATIENT
Start: 2018-06-25 | End: 2018-10-13 | Stop reason: SDUPTHER

## 2018-06-25 RX ORDER — AMLODIPINE BESYLATE 5 MG/1
TABLET ORAL
Qty: 30 TABLET | Refills: 2 | Status: ON HOLD | OUTPATIENT
Start: 2018-06-25 | End: 2018-06-27 | Stop reason: HOSPADM

## 2018-06-26 ENCOUNTER — HOSPITAL ENCOUNTER (INPATIENT)
Facility: HOSPITAL | Age: 63
LOS: 1 days | Discharge: HOME | DRG: 065 | End: 2018-06-27
Attending: EMERGENCY MEDICINE | Admitting: INTERNAL MEDICINE
Payer: COMMERCIAL

## 2018-06-26 DIAGNOSIS — I63.81 RIGHT-SIDED LACUNAR STROKE: ICD-10-CM

## 2018-06-26 DIAGNOSIS — I63.9 STROKE: Primary | ICD-10-CM

## 2018-06-26 DIAGNOSIS — E78.2 MIXED HYPERLIPIDEMIA: ICD-10-CM

## 2018-06-26 DIAGNOSIS — R47.1 DYSARTHRIA: ICD-10-CM

## 2018-06-26 DIAGNOSIS — I10 ESSENTIAL HYPERTENSION: ICD-10-CM

## 2018-06-26 DIAGNOSIS — D64.9 NORMOCYTIC ANEMIA: ICD-10-CM

## 2018-06-26 DIAGNOSIS — R73.03 PRE-DIABETES: ICD-10-CM

## 2018-06-26 LAB
ALBUMIN SERPL BCP-MCNC: 3.9 G/DL
ALP SERPL-CCNC: 81 U/L
ALT SERPL W/O P-5'-P-CCNC: 12 U/L
AMPHET+METHAMPHET UR QL: NEGATIVE
ANION GAP SERPL CALC-SCNC: 7 MMOL/L
APTT BLDCRRT: 28.6 SEC
AST SERPL-CCNC: 16 U/L
BARBITURATES UR QL SCN>200 NG/ML: NEGATIVE
BASOPHILS # BLD AUTO: 0.01 K/UL
BASOPHILS NFR BLD: 0.3 %
BENZODIAZ UR QL SCN>200 NG/ML: NEGATIVE
BILIRUB SERPL-MCNC: 0.9 MG/DL
BILIRUB UR QL STRIP: NEGATIVE
BUN SERPL-MCNC: 16 MG/DL
BZE UR QL SCN: NEGATIVE
CALCIUM SERPL-MCNC: 9.5 MG/DL
CANNABINOIDS UR QL SCN: NEGATIVE
CHLORIDE SERPL-SCNC: 106 MMOL/L
CHOLEST SERPL-MCNC: 246 MG/DL
CHOLEST/HDLC SERPL: 2.8 {RATIO}
CLARITY UR: CLEAR
CO2 SERPL-SCNC: 28 MMOL/L
COLOR UR: YELLOW
CREAT SERPL-MCNC: 1.1 MG/DL (ref 0.5–1.4)
CREAT SERPL-MCNC: 1.2 MG/DL
CREAT UR-MCNC: 127.5 MG/DL
DIFFERENTIAL METHOD: ABNORMAL
EOSINOPHIL # BLD AUTO: 0.2 K/UL
EOSINOPHIL NFR BLD: 5.3 %
ERYTHROCYTE [DISTWIDTH] IN BLOOD BY AUTOMATED COUNT: 12.9 %
EST. GFR  (AFRICAN AMERICAN): >60 ML/MIN/1.73 M^2
EST. GFR  (NON AFRICAN AMERICAN): >60 ML/MIN/1.73 M^2
ESTIMATED AVG GLUCOSE: 120 MG/DL
FERRITIN SERPL-MCNC: 48 NG/ML
FOLATE SERPL-MCNC: 13.6 NG/ML
GLUCOSE SERPL-MCNC: 134 MG/DL
GLUCOSE UR QL STRIP: NEGATIVE
HBA1C MFR BLD HPLC: 5.8 %
HCT VFR BLD AUTO: 38.4 %
HDLC SERPL-MCNC: 88 MG/DL
HDLC SERPL: 35.8 %
HGB BLD-MCNC: 12.7 G/DL
HGB UR QL STRIP: NEGATIVE
INR PPP: 1
INR PPP: 1
IRON SERPL-MCNC: 101 UG/DL
KETONES UR QL STRIP: NEGATIVE
LDLC SERPL CALC-MCNC: 147.6 MG/DL
LEUKOCYTE ESTERASE UR QL STRIP: NEGATIVE
LYMPHOCYTES # BLD AUTO: 1.4 K/UL
LYMPHOCYTES NFR BLD: 38.3 %
MCH RBC QN AUTO: 26.8 PG
MCHC RBC AUTO-ENTMCNC: 33.1 G/DL
MCV RBC AUTO: 81 FL
METHADONE UR QL SCN>300 NG/ML: NEGATIVE
MONOCYTES # BLD AUTO: 0.4 K/UL
MONOCYTES NFR BLD: 10.9 %
NEUTROPHILS # BLD AUTO: 1.6 K/UL
NEUTROPHILS NFR BLD: 45.2 %
NITRITE UR QL STRIP: NEGATIVE
NONHDLC SERPL-MCNC: 158 MG/DL
OPIATES UR QL SCN: NEGATIVE
PCP UR QL SCN>25 NG/ML: NEGATIVE
PH UR STRIP: 8 [PH] (ref 5–8)
PLATELET # BLD AUTO: 163 K/UL
PMV BLD AUTO: 13.2 FL
POC PTINR: 1 (ref 0.9–1.2)
POC PTWBT: 11.5 SEC (ref 9.7–14.3)
POCT GLUCOSE: 112 MG/DL (ref 70–110)
POCT GLUCOSE: 131 MG/DL (ref 70–110)
POCT GLUCOSE: 86 MG/DL (ref 70–110)
POTASSIUM SERPL-SCNC: 3.9 MMOL/L
PROT SERPL-MCNC: 7.1 G/DL
PROT UR QL STRIP: NEGATIVE
PROTHROMBIN TIME: 10.7 SEC
PROTHROMBIN TIME: 10.9 SEC
RBC # BLD AUTO: 4.74 M/UL
SAMPLE: NORMAL
SAMPLE: NORMAL
SATURATED IRON: 27 %
SODIUM SERPL-SCNC: 141 MMOL/L
SP GR UR STRIP: 1.02 (ref 1–1.03)
TOTAL IRON BINDING CAPACITY: 373 UG/DL
TOXICOLOGY INFORMATION: NORMAL
TRANSFERRIN SERPL-MCNC: 252 MG/DL
TRIGL SERPL-MCNC: 52 MG/DL
TROPONIN I SERPL DL<=0.01 NG/ML-MCNC: 0.01 NG/ML
TSH SERPL DL<=0.005 MIU/L-ACNC: 0.45 UIU/ML
URN SPEC COLLECT METH UR: NORMAL
UROBILINOGEN UR STRIP-ACNC: NEGATIVE EU/DL
VIT B12 SERPL-MCNC: >2000 PG/ML
WBC # BLD AUTO: 3.58 K/UL

## 2018-06-26 PROCEDURE — 21400001 HC TELEMETRY ROOM

## 2018-06-26 PROCEDURE — 80061 LIPID PANEL: CPT

## 2018-06-26 PROCEDURE — 99245 OFF/OP CONSLTJ NEW/EST HI 55: CPT | Mod: GT,,, | Performed by: PSYCHIATRY & NEUROLOGY

## 2018-06-26 PROCEDURE — G8997 SWALLOW GOAL STATUS: HCPCS | Mod: CH

## 2018-06-26 PROCEDURE — 93005 ELECTROCARDIOGRAM TRACING: CPT

## 2018-06-26 PROCEDURE — 82962 GLUCOSE BLOOD TEST: CPT

## 2018-06-26 PROCEDURE — 84443 ASSAY THYROID STIM HORMONE: CPT

## 2018-06-26 PROCEDURE — 83036 HEMOGLOBIN GLYCOSYLATED A1C: CPT

## 2018-06-26 PROCEDURE — 82728 ASSAY OF FERRITIN: CPT

## 2018-06-26 PROCEDURE — 82607 VITAMIN B-12: CPT

## 2018-06-26 PROCEDURE — 92610 EVALUATE SWALLOWING FUNCTION: CPT

## 2018-06-26 PROCEDURE — 85610 PROTHROMBIN TIME: CPT

## 2018-06-26 PROCEDURE — 25500020 PHARM REV CODE 255: Performed by: INTERNAL MEDICINE

## 2018-06-26 PROCEDURE — 63600175 PHARM REV CODE 636 W HCPCS: Performed by: STUDENT IN AN ORGANIZED HEALTH CARE EDUCATION/TRAINING PROGRAM

## 2018-06-26 PROCEDURE — 25000003 PHARM REV CODE 250: Performed by: STUDENT IN AN ORGANIZED HEALTH CARE EDUCATION/TRAINING PROGRAM

## 2018-06-26 PROCEDURE — 81003 URINALYSIS AUTO W/O SCOPE: CPT | Mod: 59

## 2018-06-26 PROCEDURE — 80053 COMPREHEN METABOLIC PANEL: CPT

## 2018-06-26 PROCEDURE — 93306 TTE W/DOPPLER COMPLETE: CPT | Mod: 26,,, | Performed by: INTERNAL MEDICINE

## 2018-06-26 PROCEDURE — A9585 GADOBUTROL INJECTION: HCPCS | Performed by: INTERNAL MEDICINE

## 2018-06-26 PROCEDURE — 83540 ASSAY OF IRON: CPT

## 2018-06-26 PROCEDURE — 84484 ASSAY OF TROPONIN QUANT: CPT

## 2018-06-26 PROCEDURE — 85610 PROTHROMBIN TIME: CPT | Mod: 91

## 2018-06-26 PROCEDURE — 80307 DRUG TEST PRSMV CHEM ANLYZR: CPT

## 2018-06-26 PROCEDURE — 85025 COMPLETE CBC W/AUTO DIFF WBC: CPT

## 2018-06-26 PROCEDURE — G8996 SWALLOW CURRENT STATUS: HCPCS | Mod: CI

## 2018-06-26 PROCEDURE — 99291 CRITICAL CARE FIRST HOUR: CPT | Mod: 25

## 2018-06-26 PROCEDURE — 82746 ASSAY OF FOLIC ACID SERUM: CPT

## 2018-06-26 PROCEDURE — 82565 ASSAY OF CREATININE: CPT

## 2018-06-26 PROCEDURE — 99900039 HC SLP GENERIC THERAPY SCREENING (STAT)

## 2018-06-26 PROCEDURE — 96374 THER/PROPH/DIAG INJ IV PUSH: CPT

## 2018-06-26 PROCEDURE — 85730 THROMBOPLASTIN TIME PARTIAL: CPT

## 2018-06-26 RX ORDER — ATORVASTATIN CALCIUM 40 MG/1
40 TABLET, FILM COATED ORAL DAILY
Status: DISCONTINUED | OUTPATIENT
Start: 2018-06-26 | End: 2018-06-27

## 2018-06-26 RX ORDER — GLUCAGON 1 MG
1 KIT INJECTION
Status: DISCONTINUED | OUTPATIENT
Start: 2018-06-26 | End: 2018-06-27 | Stop reason: HOSPADM

## 2018-06-26 RX ORDER — ASPIRIN 81 MG/1
81 TABLET ORAL DAILY
Status: DISCONTINUED | OUTPATIENT
Start: 2018-06-27 | End: 2018-06-27 | Stop reason: HOSPADM

## 2018-06-26 RX ORDER — INSULIN ASPART 100 [IU]/ML
0-5 INJECTION, SOLUTION INTRAVENOUS; SUBCUTANEOUS EVERY 6 HOURS PRN
Status: DISCONTINUED | OUTPATIENT
Start: 2018-06-26 | End: 2018-06-27 | Stop reason: HOSPADM

## 2018-06-26 RX ORDER — HEPARIN SODIUM 5000 [USP'U]/ML
5000 INJECTION, SOLUTION INTRAVENOUS; SUBCUTANEOUS EVERY 12 HOURS
Status: DISCONTINUED | OUTPATIENT
Start: 2018-06-26 | End: 2018-06-27 | Stop reason: HOSPADM

## 2018-06-26 RX ORDER — ASPIRIN 325 MG
325 TABLET, DELAYED RELEASE (ENTERIC COATED) ORAL ONCE
Status: COMPLETED | OUTPATIENT
Start: 2018-06-26 | End: 2018-06-26

## 2018-06-26 RX ORDER — GADOBUTROL 604.72 MG/ML
10 INJECTION INTRAVENOUS
Status: COMPLETED | OUTPATIENT
Start: 2018-06-26 | End: 2018-06-26

## 2018-06-26 RX ADMIN — HEPARIN SODIUM 5000 UNITS: 5000 INJECTION, SOLUTION INTRAVENOUS; SUBCUTANEOUS at 08:06

## 2018-06-26 RX ADMIN — ASPIRIN 325 MG: 325 TABLET, DELAYED RELEASE ORAL at 02:06

## 2018-06-26 RX ADMIN — GADOBUTROL 10 ML: 604.72 INJECTION INTRAVENOUS at 04:06

## 2018-06-26 NOTE — H&P
U Internal Medicine History and Physical - Resident Note    Admitting Team: Medicine Team A  Attending Physician: Cristina  Resident: Karen  Interns: Norberto    Date of Admit: 6/26/2018    Chief Complaint     Left leg weakness and slurred speech, L facial droop x 1 day    Subjective:      History of Present Illness:  Pt is a 63 yo male, PMH of HTN, HLD, ICH 2012, DMII presenting with Left leg weakness and slurred speech, L facial droop x 1 day. Pt reports yesterday around 6 pm he developed left leg weakness that persisted for 2 hrs. Pt reports he was dragging his leg.  Pt reports he had no LE numbness. When he stood up it was resolved. On day of admit he reports he woke up with left lower facial droop, slurred speech and numbness around his left lip. Also reported numbness in his left arm. Reports slight vertigo as well. Pt taken to [place] [facility]. [facility] chronic changes but no acute bleed. Of note, pt reports ICH 2012 though at that time had full unilateral L sided facial numbness. He reports compliance with his BP meds and does not take ASA or statin. No cp, palpitations, shortness of breath, fevers, Ur sx.     Past Medical History:  Past Medical History:   Diagnosis Date    Aneurysm 10/30/2012    Fever blister     Herpes infection     Hypertension     ICH (intracerebral hemorrhage)     Mixed hyperlipidemia 9/5/2013    Nontraumatic thalamic hemorrhage 8/31/2016    JAQUAN (obstructive sleep apnea)     Right-sided lacunar stroke 9/11/2014    SDH (subdural hematoma)     Special screening for malignant neoplasms, colon     Stroke        Past Surgical History:  Past Surgical History:   Procedure Laterality Date    Knee arthroscopic surgery         Allergies:  Review of patient's allergies indicates:  No Known Allergies    Home Medications:  Prior to Admission medications    Medication Sig Start Date End Date Taking? Authorizing Provider   amLODIPine (NORVASC) 5 MG tablet  TAKE 1 TABLET BY MOUTH EVERY DAY 18   Juan Eduardo MD   gabapentin (NEURONTIN) 300 MG capsule TAKE 1 CAPSULE (300 MG TOTAL) BY MOUTH EVERY EVENING. 18  Juan Eduardo MD   lisinopril (PRINIVIL,ZESTRIL) 20 MG tablet TAKE 1 TABLET BY MOUTH DAILY 18   Juan Eduardo MD       Family History:  Family History   Problem Relation Age of Onset    Heart disease Mother     Diabetes Father     Cancer Sister         breast    Diabetes Paternal Grandmother     Diabetes Paternal Grandfather     Melanoma Neg Hx        Social History:  Social History   Substance Use Topics    Smoking status: Never Smoker    Smokeless tobacco: Never Used    Alcohol use No       Review of Systems:  Pertinent positives and negatives are listed in HPI.  All other systems are reviewed and are negative.    Health Maintaince :   Primary Care Physician: Papa Howard  Immunizations:   Refuses flu, tdap UTD, CSC UTD     Objective:   Last 24 Hour Vital Signs:  BP  Min: 132/67  Max: 216/96  Temp  Av.4 °F (36.9 °C)  Min: 98.4 °F (36.9 °C)  Max: 98.4 °F (36.9 °C)  Pulse  Av.7  Min: 54  Max: 69  Resp  Av  Min: 10  Max: 20  SpO2  Av %  Min: 99 %  Max: 99 %  There is no height or weight on file to calculate BMI.  No intake/output data recorded.    Physical Examination:  General: Alert and awake in NAD  Head:  Normocephalic and atraumatic  Eyes:  PERRL; EOMi with anicteric sclera and clear conjunctivae  Mouth:  Oropharynx clear and without exudate; moist mucous membranes  Cardio:  Regular rate and rhythm with normal S1 and S2; no murmurs or rubs, no bruits notes  Resp:  CTAB and unlabored; no wheezes, crackles or rhonchi  Abdom: Soft, NTND with normoactive bowel sounds  Extrem: WWP with no clubbing, cyanosis or edema  Skin:  No rashes, lesions, or color changes  Pulses: 2+ and symmetric distally  Neuro:  Left lower facial droop, dysarthria present, motor 5/5 UE/LE, sensation intact all  ext/face, PERRL, EOMI, no tounge deviation, nml shoulder shrug, normal graphasteisa, sterognosis intact, no babinski, normal finger to nose, visual fields intact, no pronator drift  Laboratory:  Most Recent Data:  CBC: Lab Results   Component Value Date    WBC 3.58 (L) 06/26/2018    HGB 12.7 (L) 06/26/2018    HCT 38.4 (L) 06/26/2018     06/26/2018    MCV 81 (L) 06/26/2018    RDW 12.9 06/26/2018     BMP: Lab Results   Component Value Date     06/26/2018    K 3.9 06/26/2018     06/26/2018    CO2 28 06/26/2018    BUN 16 06/26/2018    CREATININE 1.2 06/26/2018     (H) 06/26/2018    CALCIUM 9.5 06/26/2018     LFTs: Lab Results   Component Value Date    PROT 7.1 06/26/2018    ALBUMIN 3.9 06/26/2018    BILITOT 0.9 06/26/2018    AST 16 06/26/2018    ALKPHOS 81 06/26/2018    ALT 12 06/26/2018     Coags:   Lab Results   Component Value Date    INR 1.0 06/26/2018     Urinalysis: Lab Results   Component Value Date    COLORU Yellow 08/26/2015    SPECGRAV 1.030 08/26/2015    NITRITE Negative 08/26/2015    KETONESU Negative 08/26/2015    UROBILINOGEN Negative 08/26/2015    WBCUA <1 08/28/2013       Trended Lab Data:    Recent Labs  Lab 06/26/18  0943   WBC 3.58*   HGB 12.7*   HCT 38.4*      MCV 81*   RDW 12.9      K 3.9      CO2 28   BUN 16   CREATININE 1.2   *   PROT 7.1   ALBUMIN 3.9   BILITOT 0.9   AST 16   ALKPHOS 81   ALT 12           Other Results:  EKG (my interpretation):   Sinus karen    Radiology:  Imaging Results          X-Ray Chest AP Portable (Final result)  Result time 06/26/18 10:08:53    Final result by Tim Monsivais MD (06/26/18 10:08:53)                 Impression:      No focal consolidation      Electronically signed by: Tim Monsivais MD  Date:    06/26/2018  Time:    10:08             Narrative:    EXAMINATION:  XR CHEST AP PORTABLE    CLINICAL HISTORY:  Stroke;    TECHNIQUE:  Single frontal view of the chest was performed.    COMPARISON:  Chest  radiograph 08/28/2013    FINDINGS:  The lungs are grossly clear.  The cardiac silhouette is unremarkable.  The osseous and soft tissue structures demonstrate no acute abnormality.                               CT Head Without Contrast (Final result)  Result time 06/26/18 09:58:39    Final result by Emeka Slade MD (06/26/18 09:58:39)                 Impression:      Multiple chronic lacunar infarcts.  No evidence for acute intracranial process.      Electronically signed by: Emeka Slade MD  Date:    06/26/2018  Time:    09:58             Narrative:    EXAMINATION:  CT HEAD WITHOUT CONTRAST    CLINICAL HISTORY:  Stroke;    TECHNIQUE:  Low dose axial CT images obtained throughout the head without intravenous contrast. Sagittal and coronal reconstructions were performed.    COMPARISON:  09/02/2014    FINDINGS:  Intracranial compartment:    Ventricles and sulci are normal in size for age without evidence of hydrocephalus. No extra-axial blood or fluid collections.    Periventricular white matter hypoattenuation in keeping with chronic microvascular disease.  There are multiple lacunar infarcts involving the bilateral corona radiata, right caudate head, left basal ganglia and thalamus.  No parenchymal mass, hemorrhage, edema or major vascular distribution infarct.    Skull/extracranial contents (limited evaluation): No fracture. Mucous retention cyst noted in the left maxillary antrum.                                   Assessment:     Luis Wong is a 62 y.o. male with CVA     Plan:     CVA  -1 day of LE weakness, dysarthria, facial/L UE numbness  -Initially hypertensive, 's  which resolved without meds  -CT head with chronic changes, no acute bleed, EKG sinus karen  -Vasc neuro recs include ASA 81 and load plavix if + stroke however NIHSS 5  -Will get MRI brain, MRA head/neck, TTE, PT/OT/ST, allow for permissive HTN (hold Bp meds), ASA/statin    Benign Essential HTN  -On amlodipine 5mg and lisinopril 20  mg qd  -Holding as above    Controlled DM II with no complications  -A1C 6.4 (1/9)  -Repeat A1C, SSI/accuchecks    HLD  -Per lipid profile  -Start high intensity statin    Normocytic anemia  -Get Fe, B12, folate    Leukopenia  -WBC 3.58, will monitor, consider hep/HIV if not improved    HM  -Refuses flu, Tdap UTD, CSC UTD    Diet: NPO until ST  DVT: heparin    Dispo: pending stroke workup, PT/OT/ST recs      Code Status:     Full    Arron Orbien  U Internal Medicine HO-II  [facility] Medicine Service    \[facility]\"" Medicine Hospitalist Pager numbers:   [facility] Hospitalist Medicine Team A (Ady/Cristina): [phone]  [facility] Hospitalist Medicine Team B (Padmini/Kamari):  [phone]

## 2018-06-26 NOTE — CONSULTS
[facility]  Vascular Neurology  Comprehensive Stroke Center  Tele-Consultation Note      Inpatient consult to Telemedicine-Stroke  Consult performed by: CASSIDY PHILIPPE  Consult ordered by: KAITLIN GRAY          Consulting Provider: Spoke Physician:: kaitlin gray  Current Providers  No providers found    Patient Location: [facility]  Spoke hospital nurse at bedside with patient assisting consultant.     Patient information was obtained from patient and spouse/SO.       Assessment/Plan:     STROKE DOCUMENTATION     Acute Stroke Times:   Acute Stroke Times   Stroke Team Arrival Time: 0954  CT Interpretation Time: 0957    NIH Scale:  1a. Level Of Consciousness: 0-->Alert: keenly responsive  1b. LOC Questions: 0-->Answers both questions correctly  1c. LOC Commands: 0-->Performs both tasks correctly  2. Best Gaze: 0-->Normal  3. Visual: 0-->No visual loss  4. Facial Palsy: 0-->Normal symmetrical movements  5a. Motor Arm, Left: 1-->Drift: limb holds 90 (or 45) degrees, but drifts down before full 10 seconds: does not hit bed or other support  5b. Motor Arm, Right: 0-->No drift: limb holds 90 (or 45) degrees for full 10 secs  6a. Motor Leg, Left: 0-->No drift: leg holds 30 degree position for full 5 secs  6b. Motor Leg, Right: 0-->No drift: leg holds 30 degree position for full 5 secs  7. Limb Ataxia: 2-->Present in two limbs  8. Sensory: 1-->Mild-to-moderate sensory loss: patient feels pinprick is less sharp or is dull on the affected side: or there is a loss of superficial pain with pinprick, but patient is aware of being touched  9. Best Language: 0-->No aphasia: normal  10. Dysarthria: 1-->Mild-to-moderate dysarthria: patient slurs at least some words and, at worst, can be understood with some difficulty  11. Extinction and Inattention (formerly Neglect): 0-->No abnormality  Total (NIH Stroke Scale): 5     Modified [place]    Silvano Coma Scale:     ABCD2 Score:    [id number] Score:   HAS -BLED Score:   ICH Score:   Hunt & Ferguson Classification:       Diagnoses:   Right-sided lacunar stroke    Symptoms of left hemiparesis/ataxia on exam c/w lacunar syndrome and history of significant small vessel disease with both infarct and hemorrhage in the past (2012, 2014).    Antithrombotics for secondary stroke prevention: Antiplatelets: Aspirin: 81 mg daily; if + infarct, load plavix 300 and then daily 75 mg x 30 days, then discontinue aspirin    Statins for secondary stroke prevention and hyperlipidemia, if present:   Statins: Atorvastatin- 80 mg daily    Aggressive risk factor modification: HTN, DM, HLD     Rehab efforts: PT/OT/SLP to evaluate and treat    Diagnostics ordered/pending: HgbA1C to assess blood glucose levels, Lipid Profile to assess cholesterol levels, MRA head to assess vasculature, MRA neck/arch to assess vasculature, MRI head without contrast to assess brain parenchyma    VTE prophylaxis: Heparin 5000 units SQ every 8 hours    BP parameters: Infarct: No intervention, SBP <220                Blood pressure (!) 167/105, pulse 65, temperature 98.4 °F (36.9 °C), temperature source Oral, resp. rate 10, SpO2 99 %.  Alteplase Eligible?: No  Alteplase Recommendation: Alteplase not recommended due to Outside of treatment window  and History of past ICH   Possible Interventional Revascularization Candidate? No; No significant neurological deficit and VAN-    Disposition Recommendation: admit to inpatient      Subjective:     History of Present Illness:  62M w/ left leg weakness starting yesterday, dragging his leg, thought he was just tired, but then when he woke up thi AM he had left sided arm weakness and slurred speech and facial weakness.   He had a bleeding stroke in 2012 (R thalamic hemorrhage) , occasionally he gets left sided weakness, but at baseline he does not have left sided weakness. In 2014 he had another small artery infarct in thalamus. The  aforementioned symptoms have never happened before. There are no identified triggers or modifying factors. There have been no recurrent events. There are no other associated symptoms.          Woke up with symptoms?: yes  Last known normal:    yesterday 6 pm    Recent bleeding noted: no  Does the patient take any Blood Thinners? no  Medications: No relevant medications      Past Medical History: hypertension, diabetes and hyperlipidemia    Past Surgical History: no relevant surgical history    Family History: no relevant history    Social History: no smoking, no drinking, no drugs    Allergies:   No relevant allergies    Review of Systems   Constitutional: Negative for appetite change, chills and fever.   HENT: Negative for congestion and sore throat.    Eyes: Negative for discharge and itching.   Respiratory: Negative for apnea and shortness of breath.    Cardiovascular: Negative for chest pain and palpitations.   Gastrointestinal: Negative for abdominal pain and anal bleeding.   Endocrine: Negative for cold intolerance and polydipsia.   Genitourinary: Negative for dysuria and hematuria.   Musculoskeletal: Negative for joint swelling and myalgias.   Skin: Negative for color change and rash.   Neurological: Negative for tremors.   Psychiatric/Behavioral: Negative for hallucinations and self-injury.     Objective:   Vitals: Blood pressure (!) 167/105, pulse 65, temperature 98.4 °F (36.9 °C), temperature source Oral, resp. rate 10, SpO2 99 %.     CT READ: Yes  Abnormal CT remote bilateral subcortical infarcts predominanlty lacunae. ; area of hyperdense vessel seen in R sylvian fissure although bilateral vasculature appears slightly hyperdense    Physical Exam   Constitutional: He appears well-nourished. No distress.   HENT:   Head: Atraumatic.   Right Ear: External ear normal.   Left Ear: External ear normal.   Eyes: Conjunctivae are normal. No scleral icterus.   Neck: Normal range of motion.   Pulmonary/Chest: Effort  normal.   Abdominal: He exhibits no distension. There is no guarding.   Musculoskeletal: Normal range of motion. He exhibits no deformity.   Neurological: He is alert.   Skin: Skin is warm and dry.   Psychiatric: He has a normal mood and affect.             Recommended the emergency room physician to have a brief discussion with the patient and/or family if available regarding the risks and benefits of treatment, and to briefly document the occurrence of that discussion in his clinical encounter note.     The attending portion of this evaluation, treatment, and documentation was performed per Ted Yost MD via audiovisual.    Billing code:  (non-intervention mild to moderate stroke, TIA, some mimics)    · This patient has a critical neurological condition/illness, with some potential for high morbidity and mortality.  · There is a moderate probability for acute neurological change leading to clinical and possibly life-threatening deterioration requiring highest level of physician preparedness for urgent intervention.  · Care was coordinated with other physicians involved in the patient's care.  · Radiologic studies and laboratory data were reviewed and interpreted, and plan of care was re-assessed based on the results.  · Diagnosis, treatment options and prognosis may have been discussed with the patient and/or family members or caregiver.      Consult End Time: 10:12 AM     Ted Yost MD  Comprehensive Stroke Center  Vascular Neurology   [facility]

## 2018-06-26 NOTE — PT/OT/SLP PROGRESS
Occupational Therapy  Missed Visit    Patient Name:  Luis Wong   MRN:  [id number]    Patient not seen today secondary to  JORDAN in testing. Will follow-up .    Manuel Cruz OT  6/26/2018

## 2018-06-26 NOTE — PT/OT/SLP PROGRESS
Physical Therapy      Patient Name:  Luis Wong   MRN:  [id number]    Patient not seen today secondary to pt JORDAN for testing. Will follow-up as available.    Pretty Smith, PT   6/26/2018

## 2018-06-26 NOTE — HPI
62M w/ left leg weakness starting yesterday, dragging his leg, thought he was just tired, but then when he woke up thi AM he had left sided arm weakness and slurred speech and facial weakness.   He had a bleeding stroke in 2012 (R thalamic hemorrhage) , occasionally he gets left sided weakness, but at baseline he does not have left sided weakness. In 2014 he had another small artery infarct in thalamus. The aforementioned symptoms have never happened before. There are no identified triggers or modifying factors. There have been no recurrent events. There are no other associated symptoms.

## 2018-06-26 NOTE — PLAN OF CARE
Problem: Patient Care Overview  Goal: Plan of Care Review  Outcome: Ongoing (interventions implemented as appropriate)  Plan of care reviewed with patient. Call light within reach, fall precautions maintained, bed alarm set. Pt provided with stroke packet education. Patient aware. Nurse instructed patient to call if needs assistance. Patient verbalized complete understanding. Telemetry monitor SB-SR throughout shift. Accuchecks done achs. Pt denies any pain or discomfort.  NAD noted. Will continue to monitor and continue plan of care.

## 2018-06-26 NOTE — PT/OT/SLP EVAL
Speech Language Pathology Evaluation  Bedside Swallow  Speech Screen     Patient Name:  Luis Wong   MRN:  [id number]  Admitting Diagnosis: <principal problem not specified> r/o CVA    Recommendations:                 General Recommendations:  Speech language evaluation  Diet recommendations:  Regular, Thin   Aspiration Precautions: upright for meals,small bites/sips, straws ok, whole meds   General Precautions: Standard, fall  Communication strategies:  none    History:     Past Medical History:   Diagnosis Date    Aneurysm 10/30/2012    Fever blister     Herpes infection     Hypertension     ICH (intracerebral hemorrhage)     Mixed hyperlipidemia 9/5/2013    Nontraumatic thalamic hemorrhage 8/31/2016    JAQUAN (obstructive sleep apnea)     Right-sided lacunar stroke 9/11/2014    SDH (subdural hematoma)     Special screening for malignant neoplasms, colon     Stroke        Past Surgical History:   Procedure Laterality Date    Knee arthroscopic surgery       Chief Complaint      Left leg weakness and slurred speech, L facial droop x 1 day     Subjective:      History of Present Illness:  Pt is a 61 yo male, PMH of HTN, HLD, ICH 2012, DMII presenting with Left leg weakness and slurred speech, L facial droop x 1 day. Pt reports yesterday around 6 pm he developed left leg weakness that persisted for 2 hrs. Pt reports he was dragging his leg.  Pt reports he had no LE numbness. When he stood up it was resolved. On day of admit he reports he woke up with left lower facial droop, slurred speech and numbness around his left lip. Also reported numbness in his left arm. Reports slight vertigo as well. Pt taken to [facility] ED. Stroke activated, [facility] [place] neuro consulted. CT head chronic changes but no acute bleed. Of note, pt reports ICH 2012 though at that time had full unilateral L sided facial numbness. He reports compliance with his BP meds and does not take ASA or statin. No cp, palpitations, shortness of breath,  "fevers, Ur sx.       Social History: Patient lives with spouse at home.    Prior Intubation HX:  none    Modified Barium Swallow: none on file     Chest X-Rays: The lungs are grossly clear.  The cardiac silhouette is unremarkable.  The osseous and soft tissue structures demonstrate no acute abnormality.    CT: Multiple chronic lacunar infarcts.  No evidence for acute intracranial process.    Prior diet: regular diet and thin liquids.    Subjective     Pt seen in ED stretcher.   Patient goals: "I am hungry."     Pain/Comfort:  · Pain Rating 1: 0/10  · Pain Rating Post-Intervention 2: 0/10    Objective:     Pt found in room, daughter and wife in room.   RN did clear SLP for eval.     Speech production is 90% clear, L facial droop is noted.   Pt with fair strength noted. Pt completed diadochokinetic rates with slurring noted and low volume.   Cognition/Communication: Pt is alert, oriented x4, follows commands with no difficulty.   Pt with verbal fluent expression. Pt able to name items in room with no word finding deficits.   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 Wife reports "he never stuttered."   SLP will further eval.     Oral Musculature Evaluation  · Oral Musculature: general weakness, left weakness  · Dentition: present and adequate  · Mucosal Quality: good, adequate  · Mandibular Strength and Mobility: WFL  · Oral Labial Strength and Mobility: WFL  · Lingual Strength and Mobility: WFL  · Velar Elevation: WFL  · Buccal Strength and Mobility: WFL  · Volitional Cough: elicited  · Volitional Swallow: timely swallow   · Voice Prior to PO Intake: clear voice    Bedside Swallow Eval:   Consistencies Assessed:  · Thin liquids water by tsp, cup and straw  · Puree applesauce by tsp, self fed  · Solids cracker self fed     Oral Phase:   · WFL    Pharyngeal Phase:   · timely pharyngeal swallow upon palpation  · no overt clinical signs/symptoms of aspiration  · no overt clinical signs/symptoms of pharyngeal " dysphagia   · No coughing or change in voice after multiple swallows    Treatment: pt will benefit from further SLC eval next date to r/o for cognitive-communication deficits. Pt with mild dysarthric speech upon informal observations.     Assessment:     Luis Wong is a 62 y.o. male admitted with suspected CVA, L facial droop. He presents with functional and timely oral and pharyngeal phase of the swallow. He presents with mild dysarthric speech with further assessment to be completed.     Goals:    SLP Goals        Problem: SLP Goal    Goal Priority Disciplines Outcome   SLP Goal     SLP Ongoing (interventions implemented as appropriate)   Description:  Short Term Goals:  1. Pt will participate in ongoing swallow assessment to determine least restrictive diet.   2. Pt will tolerate regular tray/thin liquids with no audible s/s of dysphagia and good oral clearance.   3. Pt will implement safe swallowing strategies 100% of the time given min assist.   4. Patient will successfully participate in [id number]-[id number] evaluation to further assess for any communication impairments s/p stroke                         Plan:     · Patient to be seen:  3 x/week   · Plan of Care expires:  07/25/18  · Plan of Care reviewed with:  patient, spouse   · SLP Follow-Up:  Yes       Discharge recommendations:  outpatient speech therapy   Barriers to Discharge:      Time Tracking:     SLP Treatment Date:   06/26/18  Speech Start Time:  1300  Speech Stop Time:  1320     Speech Total Time (min):  20 min    Billable Minutes: Eval Swallow and Oral Function 20   Speech Screen- n/c    GERDA Bobby, CCC-SLP  06/26/2018

## 2018-06-26 NOTE — ASSESSMENT & PLAN NOTE
Symptoms of left hemiparesis/ataxia on exam c/w lacunar syndrome and history of significant small vessel disease with both infarct and hemorrhage in the past (2012, 2014).    Antithrombotics for secondary stroke prevention: Antiplatelets: Aspirin: 81 mg daily; if + infarct, load plavix 300 and then daily 75 mg x 30 days, then discontinue aspirin    Statins for secondary stroke prevention and hyperlipidemia, if present:   Statins: Atorvastatin- 80 mg daily    Aggressive risk factor modification: HTN, DM, HLD     Rehab efforts: PT/OT/SLP to evaluate and treat    Diagnostics ordered/pending: HgbA1C to assess blood glucose levels, Lipid Profile to assess cholesterol levels, MRA head to assess vasculature, MRA neck/arch to assess vasculature, MRI head without contrast to assess brain parenchyma    VTE prophylaxis: Heparin 5000 units SQ every 8 hours    BP parameters: Infarct: No intervention, SBP <220

## 2018-06-26 NOTE — NURSING
Pt off unit for 2D echo and MRI transported via wheelchair by transporter Saulo. Telemetry monitor on box # 7811. NAD noted.

## 2018-06-26 NOTE — NURSING
Pt arrived to unit from ED. AAOx4. VSS. Pt admitted with stroke, Plan of care reviewed with patient. Call light within reach, fall precautions initiated, bed alarm set; telemetry monitor applied. Stroke education packet provided to pt. Patient aware. Nurse instructed patient to call if needs assistance. Patient verbalized complete understanding. NAD noted. Will continue to monitor and continue plan of care.

## 2018-06-26 NOTE — ED NOTES
APPEARANCE: Alert, oriented and in no acute distress. Noted slight left sided facial drooping  CARDIAC: Normal rate and rhythm, no murmur heard. Elevated BP  PERIPHERAL VASCULAR: peripheral pulses present. Normal cap refill. No edema. Warm to touch.    RESPIRATORY:Normal rate and effort, breath sounds clear bilaterally throughout chest. Respirations are equal and unlabored no obvious signs of distress.  GASTRO: soft, bowel sounds normal, no tenderness, no abdominal distention.  MUSC: Limited ROM due to slight left sided weakness. No bony tenderness or soft tissue tenderness.  SKIN: Skin is warm and dry, normal skin turgor, mucous membranes moist.  NEURO: 5/5 strength major flexors/extensors bilaterally. Sensory intact to light touch bilaterally. Silvano coma scale: eyes open spontaneously-4, oriented & converses-5, obeys commands-6. No neurological abnormalities.   MENTAL STATUS: awake, alert and aware of environment.  EYE: PERRL, both eyes: pupils brisk and reactive to light. Normal size.  ENT: EARS: no obvious drainage. NOSE: no active bleeding.   Pt complains of left sided weakness and left sided facial drooping.

## 2018-06-26 NOTE — PLAN OF CARE
Problem: SLP Goal  Goal: SLP Goal  Short Term Goals:  1. Pt will participate in ongoing swallow assessment to determine least restrictive diet.   2. Pt will tolerate regular tray/thin liquids with no audible s/s of dysphagia and good oral clearance.   3. Pt will implement safe swallowing strategies 100% of the time given min assist.   4. Patient will successfully participate in jjnqqy-[id number]-gdagrywzy evaluation to further assess for any communication impairments s/p stroke       Outcome: Ongoing (interventions implemented as appropriate)  Clinical swallow eval completed, recommend regular diet and thin liquids. Full report to follow.

## 2018-06-26 NOTE — ED PROVIDER NOTES
Encounter Date: 6/26/2018    SCRIBE #1 NOTE: I, Alexia Charles, am scribing for, and in the presence of,  Dr. Perez. I have scribed the entire note.     I, Dr. Carmen Perez MD, personally performed the services described in this documentation. All medical record entries made by the scribe were at my direction and in my presence.  I have reviewed the chart and agree that the record reflects my personal performance and is accurate and complete. Carmen Perez MD.    History     Chief Complaint   Patient presents with    Extremity Weakness     left sided weakness, slurred speech, and left drooping of face since this morning at 8.     CHIEF COMPLAINT: left sided weakness, facial droop       HISTORY OF PRESENT ILLNESS: Luis Wong who is a 62 y.o. presents to the emergency department today with complaint of facial droop, slurred speech, left sided sensation changes and weakness. The patient reports onset of symptoms 8 am when he woke up, but had weakness to the left LE the previous night, unknown time of onset. He notes he woke from sleep with the droop. The patient also notes he has slurred speech and left arm weakness which is new. As per spouse the patient was also dragging his left foot more than normal yesterday. The patient reports he occasionally drags his foot when he is tired but admits to dragging his foot more yesterday. Per spouse the patient was last seen normal around 11 PM last night. The patient had a stroke in 2012 with residual left leg weakness. As per spouse the patient had hemorrhagic stroke. She states the patient sees Neurology here at [place]. The spouse states the patient also had a small similar episode a few years ago.      ALLERGIES REVIEWED  MEDICATIONS REVIEWED  PMH/PSH/SOC/FH REVIEWED     The history is provided by the patient.    Nursing/Ancillary staff note reviewed.          The history is provided by the patient and the spouse.     Review of patient's allergies indicates:  No  Known Allergies  Past Medical History:   Diagnosis Date    Aneurysm 10/30/2012    Fever blister     Herpes infection     Hypertension     ICH (intracerebral hemorrhage)     Mixed hyperlipidemia 9/5/2013    Nontraumatic thalamic hemorrhage 8/31/2016    JAQUAN (obstructive sleep apnea)     Right-sided lacunar stroke 9/11/2014    SDH (subdural hematoma)     Special screening for malignant neoplasms, colon     Stroke      Past Surgical History:   Procedure Laterality Date    Knee arthroscopic surgery       Family History   Problem Relation Age of Onset    Heart disease Mother     Diabetes Father     Cancer Sister         breast    Diabetes Paternal Grandmother     Diabetes Paternal Grandfather     Melanoma Neg Hx      Social History   Substance Use Topics    Smoking status: Never Smoker    Smokeless tobacco: Never Used    Alcohol use No     Review of Systems   Constitutional: Negative for activity change, chills, diaphoresis and fever.   HENT: Negative for congestion, drooling, ear pain, rhinorrhea, sneezing, sore throat and trouble swallowing.    Eyes: Negative for pain.   Respiratory: Negative for cough, chest tightness, shortness of breath, wheezing and stridor.    Cardiovascular: Negative for chest pain, palpitations and leg swelling.   Gastrointestinal: Negative for abdominal distention, abdominal pain, constipation, diarrhea, nausea and vomiting.   Genitourinary: Negative for difficulty urinating, dysuria, frequency and urgency.   Musculoskeletal: Negative for arthralgias, back pain, myalgias, neck pain and neck stiffness.   Skin: Negative for pallor, rash and wound.   Neurological: Positive for facial asymmetry, speech difficulty and weakness (left arm). Negative for dizziness, syncope, light-headedness, numbness and headaches.   All other systems reviewed and are negative.        Physical Exam     Initial Vitals   BP Pulse Resp Temp SpO2   06/26/18 0935 06/26/18 0935 06/26/18 0948 06/26/18  0935 06/26/18 0935   (!) 216/96 64 16 98.4 °F (36.9 °C) 99 %      MAP       --                Physical Exam    Nursing note and vitals reviewed.  Constitutional: He appears well-developed and well-nourished. He is not diaphoretic. No distress.   HENT:   Head: Normocephalic and atraumatic.   Nose: Nose normal.   Mouth/Throat: Oropharynx is clear and moist.   Eyes: Conjunctivae and EOM are normal. Pupils are equal, round, and reactive to light. No scleral icterus.   Neck: Normal range of motion. Neck supple. No JVD present.   Cardiovascular: Normal rate, regular rhythm and normal heart sounds. Exam reveals no gallop and no friction rub.    No murmur heard.  Pulmonary/Chest: Breath sounds normal. No stridor. No respiratory distress. He has no wheezes. He exhibits no tenderness.   Abdominal: Soft. Bowel sounds are normal. He exhibits no distension and no mass. There is no tenderness. There is no rebound and no guarding.   Musculoskeletal: Normal range of motion. He exhibits no edema or tenderness.        Cervical back: Normal.        Thoracic back: Normal.        Lumbar back: Normal.   Lymphadenopathy:     He has no cervical adenopathy.   Neurological: He is alert and oriented to person, place, and time. No cranial nerve deficit.   Slight droop of lip on the left. Slight slurred speech. 5/5  strength, 5/5 plantar and dorsal flexion. Slight UE drift, does not hit bed. Decreased sensation to light touch on left.    Skin: Skin is warm and dry. No rash noted. No pallor.   Psychiatric: He has a normal mood and affect. Thought content normal.       Total (NIH Stroke Scale): 5         ED Course   Critical Care  Date/Time: 6/26/2018 11:01 AM  Performed by: TITUS GONZALEZ  Authorized by: TITUS GONZALEZ   Total critical care time (exclusive of procedural time) : 32 minutes  Critical care time was exclusive of separately billable procedures and treating other patients and teaching time.  Critical care was necessary to  treat or prevent imminent or life-threatening deterioration of the following conditions: CNS failure or compromise.  Critical care was time spent personally by me on the following activities: development of treatment plan with patient or surrogate, discussions with consultants, examination of patient, obtaining history from patient or surrogate, ordering and review of laboratory studies, ordering and review of radiographic studies, re-evaluation of patient's condition, review of old charts and discussions with primary provider.        Labs Reviewed   CBC W/ AUTO DIFFERENTIAL - Abnormal; Notable for the following:        Result Value    WBC 3.58 (*)     Hemoglobin 12.7 (*)     Hematocrit 38.4 (*)     MCV 81 (*)     MCH 26.8 (*)     MPV 13.2 (*)     Gran # (ANC) 1.6 (*)     All other components within normal limits   COMPREHENSIVE METABOLIC PANEL - Abnormal; Notable for the following:     Glucose 134 (*)     Anion Gap 7 (*)     All other components within normal limits   LIPID PANEL - Abnormal; Notable for the following:     Cholesterol 246 (*)     HDL 88 (*)     All other components within normal limits   HEMOGLOBIN A1C - Abnormal; Notable for the following:     Hemoglobin A1C 5.8 (*)     All other components within normal limits   POCT GLUCOSE - Abnormal; Notable for the following:     POCT Glucose 131 (*)     All other components within normal limits   PROTIME-INR   TSH   URINALYSIS, REFLEX TO URINE CULTURE    Narrative:     Preferred Collection Type->Urine, Clean Catch   DRUG SCREEN PANEL, URINE EMERGENCY    Narrative:     Preferred Collection Type->Urine, Clean Catch   FERRITIN   FOLATE   HEMOGLOBIN A1C   IRON AND TIBC   FERRITIN   POCT GLUCOSE   ISTAT PROCEDURE   ISTAT CREATININE   POCT GLUCOSE MONITORING CONTINUOUS   POCT GLUCOSE MONITORING CONTINUOUS     EKG Readings: (Independently Interpreted)   Sinus bradycardia at 57 bpm. No ST elevations. No T wave inversions. Similar to previous tracing 9/12/2017.         Imaging Results          MRA Neck with contrast (Final result)  Result time 06/26/18 16:34:14    Final result by Max Sinha MD (06/26/18 16:34:14)                 Impression:      1. Acute infarct involving the right parietal lobe/right corona radiata.  2. Sequela of multifocal infarcts with remote hemosiderin deposition, no acute blood products at this time.  3. Stable narrowing of the P1 segment of the left PCA, otherwise, no hemodynamically significant stenosis, occlusion, av malformation, or aneurysm of the anterior or posterior intracranial circulation.  4. No significant hemodynamically significant stenosis of the major arterial vasculature of the neck noting overall limited evaluation given bolus timing.  5. Bilateral maxillary mucous retention cysts or polyps.      Electronically signed by: Max Sinha MD  Date:    06/26/2018  Time:    16:34             Narrative:    EXAMINATION:  MRI BRAIN W WO CONTRAST; MRA NECK WITH CONTRAST; MRA BRAIN WITHOUT CONTRAST    CLINICAL HISTORY:  Stroke;    TECHNIQUE:  Multiplanar multisequence MR imaging of the brain was performed before and after the administration of 10 mL Gadavist intravenous contrast.  MRI arteriography was performed of the [id number] using a time-of-flight technique.  MRA of the neck was performed following contrast administration.    COMPARISON:  CT 06/26/2018, MRI brain 09/02/2014    FINDINGS:  There is a focus of restricted diffusion within the right parietal lobe, extending to involve the corona radiata consistent with acute infarct.  There are multifocal remote infarcts involving the left corona radiata, and bilateral basal ganglia.  Additional encephalomalacia is noted within the mona.  There are multifocal regions of gradient susceptibility involving several these regions of infarcts consistent with remote blood product deposition.  No findings to suggest acute hemorrhage.  There are multiple punctate foci of T2/flair signal  abnormality within the white matter likely related to chronic microvascular ischemic change.  There is no hydrocephalus. There are no significant extra-axial or extracranial abnormalities.    The globes, orbits, pituitary gland, pineal gland and craniocervical junction are normal in configuration.  There is a subcentimeter focus of extra-axial enhancement measuring 0.4 cm abutting the left parietal lobe, unchanged since the previous examination without surrounding edema, suggests meningioma.  The major vascular flow voids are patent.  There are bilateral maxillary mucous retention cysts or polyps.    There is a short focus of narrowing involving the P1 segment of the left PCA, noting no bg occlusion.  This is likely on the basis of atherosclerotic narrowing, not significantly changed since the previous examination.  Otherwise, no hemodynamically significant stenosis, occlusion, av malformation, or aneurysm of the anterior or posterior intracranial circulation.    Secondary to bolus timing, evaluation of the arterial vasculature of the neck is limited as there is significant venous contamination.  Allowing for this, the origins of the bilateral common carotid arteries and internal carotid arteries are widely patent as are the vessels themselves.  There is suboptimal evaluation of the vertebral arteries secondary to venous contamination however the vessels appear grossly patent as do there origins noting there may be mild narrowing just distal to the origin of the left vertebral artery however the vessel remains patent along its cervical and intracranial segments.                               X-Ray Chest AP Portable (Final result)  Result time 06/26/18 10:08:53    Final result by Tim Monsivais MD (06/26/18 10:08:53)                 Impression:      No focal consolidation      Electronically signed by: Tim Monsivais MD  Date:    06/26/2018  Time:    10:08             Narrative:    EXAMINATION:  XR CHEST AP  PORTABLE    CLINICAL HISTORY:  Stroke;    TECHNIQUE:  Single frontal view of the chest was performed.    COMPARISON:  Chest radiograph 08/28/2013    FINDINGS:  The lungs are grossly clear.  The cardiac silhouette is unremarkable.  The osseous and soft tissue structures demonstrate no acute abnormality.                               CT Head Without Contrast (Final result)  Result time 06/26/18 09:58:39    Final result by Emeka Slade MD (06/26/18 09:58:39)                 Impression:      Multiple chronic lacunar infarcts.  No evidence for acute intracranial process.      Electronically signed by: Emeka Slade MD  Date:    06/26/2018  Time:    09:58             Narrative:    EXAMINATION:  CT HEAD WITHOUT CONTRAST    CLINICAL HISTORY:  Stroke;    TECHNIQUE:  Low dose axial CT images obtained throughout the head without intravenous contrast. Sagittal and coronal reconstructions were performed.    COMPARISON:  09/02/2014    FINDINGS:  Intracranial compartment:    Ventricles and sulci are normal in size for age without evidence of hydrocephalus. No extra-axial blood or fluid collections.    Periventricular white matter hypoattenuation in keeping with chronic microvascular disease.  There are multiple lacunar infarcts involving the bilateral corona radiata, right caudate head, left basal ganglia and thalamus.  No parenchymal mass, hemorrhage, edema or major vascular distribution infarct.    Skull/extracranial contents (limited evaluation): No fracture. Mucous retention cyst noted in the left maxillary antrum.                                 Medical Decision Making:   History:   Old Medical Records: I decided to obtain old medical records.  Initial Assessment:   This is a 62 y.o male with history of hemorrhagic stroke who presents with facial droop, slurred speech and left arm weakness. On exam the patient has slight slurred speech and left facial droop with 5/5  strength.  Last seen normal unclear, perhaps around  11 PM last night. Will obtain head CT, labs, EKG and consult Telestroke  Differential Diagnosis:   Electrolyte abnormality, hypoglycemia, CVA, spinal cord abnormality, infectious causes, Guillain Mead, neuromuscular junction disease, muscle disease, endocrine abnormalities, sepsis.    Independently Interpreted Test(s):   I have ordered and independently interpreted EKG Reading(s) - see prior notes  Clinical Tests:   Lab Tests: Ordered and Reviewed  Radiological Study: Ordered and Reviewed  Medical Tests: Ordered and Reviewed  ED Management:  9:35 AM Stroke code activated    9:45 AM Pts BP improved without interventions.     10:10 AM Spoke with Dr. Priyank Jones,  recommends admission but states the patient is not a candidate for TPA given unclear onset and h/o hemorrhagic stroke.     10:55 AM Case discussed with , of [facility], will come to evaluate and admit the patient.                       Clinical Impression:     1. Stroke    2. Right-sided lacunar stroke    3. Dysarthria    4. Essential hypertension    5. Mixed hyperlipidemia    6. Normocytic anemia    7. Pre-diabetes                               Carmen Perez MD  06/26/18 [id number]

## 2018-06-26 NOTE — SUBJECTIVE & OBJECTIVE
Woke up with symptoms?: yes  Last known normal:    yesterday 6 pm    Recent bleeding noted: no  Does the patient take any Blood Thinners? no  Medications: No relevant medications      Past Medical History: hypertension, diabetes and hyperlipidemia    Past Surgical History: no relevant surgical history    Family History: no relevant history    Social History: no smoking, no drinking, no drugs    Allergies:   No relevant allergies    Review of Systems   Constitutional: Negative for appetite change, chills and fever.   HENT: Negative for congestion and sore throat.    Eyes: Negative for discharge and itching.   Respiratory: Negative for apnea and shortness of breath.    Cardiovascular: Negative for chest pain and palpitations.   Gastrointestinal: Negative for abdominal pain and anal bleeding.   Endocrine: Negative for cold intolerance and polydipsia.   Genitourinary: Negative for dysuria and hematuria.   Musculoskeletal: Negative for joint swelling and myalgias.   Skin: Negative for color change and rash.   Neurological: Negative for tremors.   Psychiatric/Behavioral: Negative for hallucinations and self-injury.     Objective:   Vitals: Blood pressure (!) 167/105, pulse 65, temperature 98.4 °F (36.9 °C), temperature source Oral, resp. rate 10, SpO2 99 %.     CT READ: Yes  Abnormal CT remote bilateral subcortical infarcts predominanlty lacunae. ; area of hyperdense vessel seen in R sylvian fissure although bilateral vasculature appears slightly hyperdense    Physical Exam   Constitutional: He appears well-nourished. No distress.   HENT:   Head: Atraumatic.   Right Ear: External ear normal.   Left Ear: External ear normal.   Eyes: Conjunctivae are normal. No scleral icterus.   Neck: Normal range of motion.   Pulmonary/Chest: Effort normal.   Abdominal: He exhibits no distension. There is no guarding.   Musculoskeletal: Normal range of motion. He exhibits no deformity.   Neurological: He is alert.   Skin: Skin is warm  and dry.   Psychiatric: He has a normal mood and affect.

## 2018-06-27 ENCOUNTER — TELEPHONE (OUTPATIENT)
Dept: NEUROLOGY | Facility: CLINIC | Age: 63
End: 2018-06-27

## 2018-06-27 ENCOUNTER — TELEPHONE (OUTPATIENT)
Dept: PRIMARY CARE CLINIC | Facility: CLINIC | Age: 63
End: 2018-06-27

## 2018-06-27 LAB
ALBUMIN SERPL BCP-MCNC: 3.8 G/DL
ALP SERPL-CCNC: 79 U/L
ALT SERPL W/O P-5'-P-CCNC: 12 U/L
ANION GAP SERPL CALC-SCNC: 10 MMOL/L
AST SERPL-CCNC: 16 U/L
BASOPHILS # BLD AUTO: 0.03 K/UL
BASOPHILS NFR BLD: 0.9 %
BILIRUB SERPL-MCNC: 0.5 MG/DL
BUN SERPL-MCNC: 17 MG/DL
CALCIUM SERPL-MCNC: 9.4 MG/DL
CHLORIDE SERPL-SCNC: 104 MMOL/L
CO2 SERPL-SCNC: 27 MMOL/L
CREAT SERPL-MCNC: 1.1 MG/DL
DIASTOLIC DYSFUNCTION: YES
DIFFERENTIAL METHOD: ABNORMAL
EOSINOPHIL # BLD AUTO: 0.2 K/UL
EOSINOPHIL NFR BLD: 6.9 %
ERYTHROCYTE [DISTWIDTH] IN BLOOD BY AUTOMATED COUNT: 12.9 %
EST. GFR  (AFRICAN AMERICAN): >60 ML/MIN/1.73 M^2
EST. GFR  (NON AFRICAN AMERICAN): >60 ML/MIN/1.73 M^2
ESTIMATED PA SYSTOLIC PRESSURE: 29.21
GLUCOSE SERPL-MCNC: 120 MG/DL
HCT VFR BLD AUTO: 41.8 %
HGB BLD-MCNC: 14 G/DL
LYMPHOCYTES # BLD AUTO: 1.2 K/UL
LYMPHOCYTES NFR BLD: 35.6 %
MCH RBC QN AUTO: 27.1 PG
MCHC RBC AUTO-ENTMCNC: 33.5 G/DL
MCV RBC AUTO: 81 FL
MITRAL VALVE MOBILITY: NORMAL
MONOCYTES # BLD AUTO: 0.4 K/UL
MONOCYTES NFR BLD: 10.9 %
NEUTROPHILS # BLD AUTO: 1.5 K/UL
NEUTROPHILS NFR BLD: 45.7 %
PLATELET # BLD AUTO: 181 K/UL
PLATELET BLD QL SMEAR: ABNORMAL
PMV BLD AUTO: 13.9 FL
POCT GLUCOSE: 131 MG/DL (ref 70–110)
POCT GLUCOSE: 89 MG/DL (ref 70–110)
POTASSIUM SERPL-SCNC: 3.8 MMOL/L
PROT SERPL-MCNC: 6.9 G/DL
RBC # BLD AUTO: 5.17 M/UL
RETIRED EF AND QEF - SEE NOTES: 65 (ref 55–65)
SODIUM SERPL-SCNC: 141 MMOL/L
TRICUSPID VALVE REGURGITATION: ABNORMAL
WBC # BLD AUTO: 3.31 K/UL

## 2018-06-27 PROCEDURE — 97161 PT EVAL LOW COMPLEX 20 MIN: CPT

## 2018-06-27 PROCEDURE — G8980 MOBILITY D/C STATUS: HCPCS | Mod: CH

## 2018-06-27 PROCEDURE — 97166 OT EVAL MOD COMPLEX 45 MIN: CPT

## 2018-06-27 PROCEDURE — 25000003 PHARM REV CODE 250: Performed by: STUDENT IN AN ORGANIZED HEALTH CARE EDUCATION/TRAINING PROGRAM

## 2018-06-27 PROCEDURE — 63600175 PHARM REV CODE 636 W HCPCS: Performed by: STUDENT IN AN ORGANIZED HEALTH CARE EDUCATION/TRAINING PROGRAM

## 2018-06-27 PROCEDURE — 80053 COMPREHEN METABOLIC PANEL: CPT

## 2018-06-27 PROCEDURE — 25000003 PHARM REV CODE 250: Performed by: INTERNAL MEDICINE

## 2018-06-27 PROCEDURE — G8978 MOBILITY CURRENT STATUS: HCPCS | Mod: CH

## 2018-06-27 PROCEDURE — 85025 COMPLETE CBC W/AUTO DIFF WBC: CPT

## 2018-06-27 PROCEDURE — G8979 MOBILITY GOAL STATUS: HCPCS | Mod: CH

## 2018-06-27 PROCEDURE — 97530 THERAPEUTIC ACTIVITIES: CPT

## 2018-06-27 PROCEDURE — 36415 COLL VENOUS BLD VENIPUNCTURE: CPT

## 2018-06-27 RX ORDER — ASPIRIN 81 MG/1
81 TABLET ORAL DAILY
Qty: 90 TABLET | Refills: 3 | Status: SHIPPED | OUTPATIENT
Start: 2018-06-28 | End: 2019-07-09 | Stop reason: SDUPTHER

## 2018-06-27 RX ORDER — ATORVASTATIN CALCIUM 80 MG/1
80 TABLET, FILM COATED ORAL DAILY
Qty: 90 TABLET | Refills: 3 | Status: SHIPPED | OUTPATIENT
Start: 2018-06-28 | End: 2018-08-13 | Stop reason: SDUPTHER

## 2018-06-27 RX ORDER — HYDROCHLOROTHIAZIDE 12.5 MG/1
12.5 CAPSULE ORAL DAILY
Qty: 90 CAPSULE | Refills: 3 | Status: SHIPPED | OUTPATIENT
Start: 2018-06-27 | End: 2019-07-03 | Stop reason: SDUPTHER

## 2018-06-27 RX ORDER — ATORVASTATIN CALCIUM 40 MG/1
80 TABLET, FILM COATED ORAL DAILY
Status: DISCONTINUED | OUTPATIENT
Start: 2018-06-27 | End: 2018-06-27 | Stop reason: HOSPADM

## 2018-06-27 RX ADMIN — ASPIRIN 81 MG: 81 TABLET, COATED ORAL at 08:06

## 2018-06-27 RX ADMIN — HEPARIN SODIUM 5000 UNITS: 5000 INJECTION, SOLUTION INTRAVENOUS; SUBCUTANEOUS at 08:06

## 2018-06-27 RX ADMIN — ATORVASTATIN CALCIUM 80 MG: 40 TABLET, FILM COATED ORAL at 08:06

## 2018-06-27 NOTE — NURSING
Discharge instructions and education reviewed with pt and spouse. Reviewed follow up appts, new medications, diet, and importance of medication compliance. Reviewed stroke and HF education. Successful teach back.  Allowed time for questions. Patient verbalized complete understanding.    PIV discontinued. Catheter tip intact. Secured with gauze and coban. Patient tolerated well. Telemetry monitor discontinued. No acute distress noted.

## 2018-06-27 NOTE — TELEPHONE ENCOUNTER
----- Message from Brien Morrow sent at 6/27/2018 11:10 AM CDT -----  Contact: Patient @ [phone]  Caller ( [facility] )  is calling to schedule a hospital f/u, pls contact pt

## 2018-06-27 NOTE — PT/OT/SLP EVAL
Physical Therapy Evaluation and Discharge Note    Patient Name:  Luis Wong   MRN:  [id number]    Recommendations:     Discharge Recommendations:  outpatient PT   Discharge Equipment Recommendations: none   Barriers to discharge: None    Assessment:     Luis Wong is a 62 y.o. male admitted with a medical diagnosis of Right-sided lacunar stroke. Pt reporting only facial and LUE slight numbness and that LLE feeling dragging slightly more than usual but completes mobility at mod I level.    Recent Surgery: * No surgery found *      Plan:     During this hospitalization, patient does not require further acute PT services.  Please re-consult if situation changes.     Plan of Care Reviewed with: patient, spouse    Subjective     Communicated with JOSEPH Bennett prior to session.  Patient found supine w/ HOB elevated upon PT entry to room, agreeable to evaluation.      Chief Complaint: L face and hand still feel numb  Patient comments/goals: none  Pain/Comfort:  · Pain Rating 1: 0/10  · Pain Addressed 1: Reposition, Distraction, Cessation of Activity    Patients cultural, spiritual, Confucianist conflicts given the current situation: none reported    Living Environment:  Pt lives w/ his wife and daughter in a 2SH w/ THE and bedroom upstairs. Pt has a WIS downstairs and a tub/shower upstairs w/ a tub transfer bench.  Prior to admission, patients level of function was I w/ all ADLs including driving without use of AD.  Patient has the following equipment: none.  DME owned (not currently used): transfer tub bench.  Upon discharge, patient will have assistance from his wife.    Objective:     Patient found with: telemetry     General Precautions: Standard, fall   Orthopedic Precautions:N/A   Braces: N/A     Exams:  · Cognitive Exam:  Patient is oriented to Person, Place, Time and Situation and follows 100% of 1 step commands   · Fine Motor Coordination:    · -       Intact  Right hand, finger to nose, Left hand thumb/finger  opposition skills, Right hand thumb/finger opposition skills, Left hand, diadochokinesis skill , Right hand, diadochokinesis skill , RLE heel shin, LLE heel shin and Rapid alternating ankle DF/PF  · -       Impaired  Left hand, finger to nose mildly impaired dysmetria  · Sensation:    · -       Impaired  reports numbness in L hand and forearm and L face  · L facial droop  · RLE ROM: WFL  · RLE Strength: WFL  · LLE ROM: WFL  · LLE Strength: WFL    Functional Mobility:  · Bed Mobility:     · Supine to Sit: modified independence  · Sit to Supine: modified independence  · Transfers:     · Sit to Stand:  modified independence with no AD  · Gait: 350 ft w/ no AD Mod I. Pt had no complaints of dizziness or SOB throughout session. Pt exhibited decreased L hip/knee flexion and foot flat throughout ambulation and could not correct w/ cues for proper gait pattern but present with no instability  · Stairs: 6 steps mod I without rails    AM-PAC 6 CLICK MOBILITY  Total Score:24     Therapeutic Activities and Exercises:  Pt ambulated as above w/ no complaints of pain or SOB.     Patient left HOB elevated with all lines intact, call button in reach, bed alarm on and RN notified.    GOALS:    Physical Therapy Goals        Problem: Physical Therapy Goal    Goal Priority Disciplines Outcome Goal Variances Interventions   Physical Therapy Goal     PT/OT, PT Ongoing (interventions implemented as appropriate)                     History:     Past Medical History:   Diagnosis Date    Aneurysm 10/30/2012    Fever blister     Herpes infection     Hypertension     ICH (intracerebral hemorrhage)     Mixed hyperlipidemia 9/5/2013    Nontraumatic thalamic hemorrhage 8/31/2016    JAQUAN (obstructive sleep apnea)     Right-sided lacunar stroke 9/11/2014    SDH (subdural hematoma)     Special screening for malignant neoplasms, colon     Stroke        Past Surgical History:   Procedure Laterality Date    Knee arthroscopic surgery          Clinical Decision Making:     History  Co-morbidities and personal factors that may impact the plan of care Examination  Body Structures and Functions, activity limitations and participation restrictions that may impact the plan of care Clinical Presentation   Decision Making/ Complexity Score   Co-morbidities:   [] Time since onset of injury / illness / exacerbation  [] Status of current condition  []Patient's cognitive status and safety concerns    [x] Multiple Medical Problems (see med hx)  Personal Factors:   [] Patient's age  [] Prior Level of function   [] Patient's home situation (environment and family support)  [] Patient's level of motivation  [] Expected progression of patient      HISTORY:(criteria)    [] 25756 - no personal factors/history    [x] 16486 - has 1-2 personal factor/comorbidity     [] 14350 - has >3 personal factor/comorbidity     Body Regions:  [] Objective examination findings  [] Head     []  Neck  [] Trunk   [] Upper Extremity  [] Lower Extremity    Body Systems:  [] For communication ability, affect, cognition, language, and learning style: the assessment of the ability to make needs known, consciousness, orientation (person, place, and time), expected emotional /behavioral responses, and learning preferences (eg, learning barriers, education  needs)  [x] For the neuromuscular system: a general assessment of gross coordinated movement (eg, balance, gait, locomotion, transfers, and transitions) and motor function  (motor control and motor learning)  [] For the musculoskeletal system: the assessment of gross symmetry, gross range of motion, gross strength, height, and weight  [] For the integumentary system: the assessment of pliability(texture), presence of scar formation, skin color, and skin integrity  [] For cardiovascular/pulmonary system: the assessment of heart rate, respiratory rate, blood pressure, and edema     Activity limitations:    [] Patient's cognitive status and saf  ety concerns          [] Status of current condition      [] Weight bearing restriction  [] Cardiopulmunary Restriction    Participation Restrictions:   [] Goals and goal agreement with the patient     [] Rehab potential (prognosis) and probable outcome      Examination of Body System: (criteria)    [x] 21098 - addressing 1-2 elements    [] 33167 - addressing a total of 3 or more elements     [] 11088 -  Addressing a total of 4 or more elements         Clinical Presentation: (criteria)  Stable - 82458     On examination of body system using standardized tests and measures patient presents with 1-2 elements from any of the following: body structures and functions, activity limitations, and/or participation restrictions.  Leading to a clinical presentation that is considered stable and/or uncomplicated                              Clinical Decision Making  (Eval Complexity):  Low- 53439     Time Tracking:     PT Received On: 06/27/18  PT Start Time: 0952     PT Stop Time: 1007  PT Total Time (min): 15 min     Billable Minutes: Evaluation 15      Scotty Phan, SPT  06/27/2018     I certify that I was present in the room directing the student in service delivery and guiding them using my skilled judgment. As the co-signing therapist I have reviewed the students documentation and am responsible for the treatment, assessment, and plan.   Pretty Smith, PT  6/27/2018

## 2018-06-27 NOTE — PLAN OF CARE
Problem: Physical Therapy Goal  Goal: Physical Therapy Goal  Outcome: Ongoing (interventions implemented as appropriate)  PT evaluation completed, note to follow. Pt ambulated 350 ft with no AD at mod I level and negotiated steps with supervision. Recommending OP PT/OT to address high level balance.

## 2018-06-27 NOTE — PLAN OF CARE
06/27/18 1041   Discharge Assessment   Assessment Type Discharge Planning Assessment   Confirmed/corrected address and phone number on facesheet? Yes   Assessment information obtained from? Patient   Prior to hospitilization cognitive status: Alert/Oriented   Prior to hospitalization functional status: Independent   Current cognitive status: Alert/Oriented   Current Functional Status: Independent   Lives With spouse   Able to Return to Prior Arrangements yes   Is patient able to care for self after discharge? Yes   Patient's perception of discharge disposition home or selfcare   Readmission Within The Last 30 Days no previous admission in last 30 days   Patient currently being followed by outpatient case management? No   Patient currently receives any other outside agency services? Yes   Is it the patient/care giver preference to resume care with the current outside agency? No   Equipment Currently Used at Home none   Do you have any problems affording any of your prescribed medications? No   Is the patient taking medications as prescribed? yes   Does the patient have transportation home? No   Does the patient receive services at [facility]? Yes   Discharge Plan A Home;Home with family   Discharge Plan B Home;Home with family   Patient/Family In Agreement With Plan yes     Ayah Jeffrey RN, CCM, CMSRN  RN Transition Navigator  [phone]

## 2018-06-27 NOTE — PLAN OF CARE
Called Dr. Yost office for neuro followup- they will call patient and schedule  Sent epic message to Dr. Eduardo for hospital followup    Spoke with patient and wife- they have [facility] and will check for followup appts    Discharge rounds on patient. Discussed followup appointments, blue discharge folder, discharge nurse will go over home medications and reasons for medications and final discharge instructions. All patient/caregiver questions answered. Patient verbalized understanding.    Ambulatory referral to OT placed by MD.  No dme needed.     06/27/18 1202   Final Note   Assessment Type Final Discharge Note   Discharge Disposition Home   Hospital Follow Up  Appt(s) scheduled? Yes   Discharge plans and expectations educations in teach back method with documentation complete? Yes   Right Care Referral Info   Post Acute Recommendation No Care     Ayah Jeffrey, RN, CCM, CMSRN  RN Transition Navigator  [phone]

## 2018-06-27 NOTE — TELEPHONE ENCOUNTER
----- Message from Lorna Schwartz LPN sent at 6/27/2018 11:06 AM CDT -----      ----- Message -----  From: Ayah Jeffrey RN  Sent: 6/27/2018  10:44 AM  To: Jayce LEMON Staff    Patient being discharged today from [facility]- please schedule hospital followup 1-2 weeks and place in [facility].    Thanks,  Ayah Jeffrey, RN, CCM, CMSRN  RN Transition Navigator  [phone]

## 2018-06-27 NOTE — PROGRESS NOTES
"[facility] Resident HO-II Progress Note    Subjective:      Luis Wong is a 62 y.o. AA male who is being followed by the [facility] service at [facility] for CVA.     Patient was seen and examined at the bedside. States he was feeling well this morning. Informed patient of new infarct on MRI. Wife at bedside. Patient states speech is just about at baseline. Denies any new weakness this AM.      Objective:   Last 24 Hour Vital Signs:  BP  Min: 128/71  Max: 216/96  Temp  Av.3 °F (36.8 °C)  Min: 97.7 °F (36.5 °C)  Max: 98.7 °F (37.1 °C)  Pulse  Av  Min: 47  Max: 71  Resp  Av.7  Min: 10  Max: 20  SpO2  Av.7 %  Min: 97 %  Max: 99 %  Height  Av' 9" (175.3 cm)  Min: 5' 9" (175.3 cm)  Max: 5' 9" (175.3 cm)  Weight  Av.1 kg (154 lb 8.7 oz)  Min: 70.1 kg (154 lb 8.7 oz)  Max: 70.1 kg (154 lb 8.7 oz)  I/O last 3 completed shifts:  In: 250 [P.O.:250]  Out: 550 [Urine:550]    Physical Examination:  General:          Alert and awake in NAD  Head:               Normocephalic and atraumatic  Eyes:               PERRL; EOMi with anicteric sclera and clear conjunctivae  Mouth:             Oropharynx clear and without exudate; moist mucous membranes  Cardio:             Regular rate and rhythm with normal S1 and S2; no murmurs or rubs, no bruits notes  Resp:               CTAB and unlabored; no wheezes, crackles or rhonchi  Abdom:            Soft, NTND with normoactive bowel sounds  Extrem:            WWP with no clubbing, cyanosis or edema  Skin:                No rashes, lesions, or color changes  Pulses:            2+ and symmetric distally  Neuro:             motor 5/5 UE/LE, sensation intact all ext/face, PERRL, EOMI, no tounge deviation, nml shoulder shrug    Laboratory:  Laboratory Data Reviewed: yes  Results for LUIS WONG (MRN [id number]) as of 2018 08:10   Ref. Range 2018 05:21   WBC Latest Ref Range: 3.90 - 12.70 K/uL 3.31 (L)   RBC Latest Ref Range: 4.60 - " 6.20 M/uL 5.17   Hemoglobin Latest Ref Range: 14.0 - 18.0 g/dL 14.0   Hematocrit Latest Ref Range: 40.0 - 54.0 % 41.8   MCV Latest Ref Range: 82 - 98 fL 81 (L)   MCH Latest Ref Range: 27.0 - 31.0 pg 27.1   MCHC Latest Ref Range: 32.0 - 36.0 g/dL 33.5   RDW Latest Ref Range: 11.5 - 14.5 % 12.9   Results for PRADEEP WONG (MRN [id number]) as of 6/27/2018 08:10   Ref. Range 6/27/2018 05:21   Sodium Latest Ref Range: 136 - 145 mmol/L 141   Potassium Latest Ref Range: 3.5 - 5.1 mmol/L 3.8   Chloride Latest Ref Range: 95 - 110 mmol/L 104   CO2 Latest Ref Range: 23 - 29 mmol/L 27   Anion Gap Latest Ref Range: 8 - 16 mmol/L 10   BUN, Bld Latest Ref Range: 8 - 23 mg/dL 17   Creatinine Latest Ref Range: 0.5 - 1.4 mg/dL 1.1   eGFR if non African American Latest Ref Range: >60 mL/min/1.73 m^2 >60   eGFR if  Latest Ref Range: >60 mL/min/1.73 m^2 >60   Glucose Latest Ref Range: 70 - 110 mg/dL 120 (H)   Calcium Latest Ref Range: 8.7 - 10.5 mg/dL 9.4   Alkaline Phosphatase Latest Ref Range: 55 - 135 U/L 79   Total Protein Latest Ref Range: 6.0 - 8.4 g/dL 6.9   Albumin Latest Ref Range: 3.5 - 5.2 g/dL 3.8   Total Bilirubin Latest Ref Range: 0.1 - 1.0 mg/dL 0.5   AST Latest Ref Range: 10 - 40 U/L 16   ALT Latest Ref Range: 10 - 44 U/L 12     Microbiology Data Reviewed: yes    Radiology Data Reviewed: yes    Current Medications:     Infusions:       Scheduled:   aspirin  81 mg Oral Daily    atorvastatin  40 mg Oral Daily    heparin (porcine)  5,000 Units Subcutaneous Q12H        PRN:  dextrose 50%, glucagon (human recombinant), insulin aspart U-100    Antibiotics and Day Number of Therapy:  None    Lines and Day Number of Therapy:  PIV    Assessment:     Pradeep Wong is a 62 y.o.male with  Patient Active Problem List    Diagnosis Date Noted    Stroke 06/26/2018    Dysarthria 06/26/2018    Normocytic anemia 06/26/2018    Pre-diabetes 09/25/2017    Nontraumatic thalamic hemorrhage 08/31/2016    Right-sided  lacunar stroke 09/11/2014    Left leg weakness 08/29/2014    Special screening for malignant neoplasms, colon 12/14/2013    Hyperglycemia 09/05/2013    Mixed hyperlipidemia 09/05/2013    Cataracts, bilateral 09/05/2013    Abnormal gait 11/16/2012    Nuclear sclerosis 11/13/2012    HTN (hypertension) 11/07/2012        Plan:     Acute CVA of R Parietal Lobe  -Upon admission, 1 day of LE weakness, dysarthria, facial/L UE numbness  -Initially hypertensive, 's  which resolved without meds  -CT head with chronic changes, no acute bleed, EKG sinus karen  -Vasc neuro recs include ASA 81 and load plavix if + stroke however NIHSS 5  -MRI brain revealing acute infarct of R parietal lobe/right corona radiata, sequela of multifocal infarcts with remote hemosiderin deposition.  -MRA of Head/Neck revealing stable narrowing of the P1 segment of the left PCA   -TTE in process this AM  -PT/OT recs pending  -Allowing for permissive HTN, will likely start BP meds upon discharge  -Continuing ASA 81mg qD  -Increasing atorvastatin to 80mg qD     Benign Essential HTN  -On amlodipine 5mg and lisinopril 20 mg qd  -Holding as above  -BP this /79     Controlled DM II with no complications  -A1C 6.4 (1/9)  -Repeat A1C 5.8%  -SSI/accuchecks     HLD  -Per lipid profile  -Started high intensity statin as above     Normocytic anemia  -Iron Profile WNL  -Folate WNL  -B12 >2000     Leukopenia  -WBC 3.58  -Will monitor     HM  -Refuses flu, Tdap UTD, CSC UTD     Diet: Diabetic  DVT: heparin  Dispo: Pending ECHO, PT/OT Recs, Likely Discharge home today        Code Status:      Full    Harley Tinoco DO  [facility] HO-II  [facility] Service Team    [facility] Hospitalist Pager numbers:   [facility] Hospitalist Medicine Team A (Ady/Cristina): [phone]  [facility] Hospitalist Medicine Team B (Padmini/Kamari):  [phone]

## 2018-06-27 NOTE — PT/OT/SLP EVAL
Occupational Therapy   Evaluation and Discharge Note    Name: Luis Wong  MRN: [id number]  Admitting Diagnosis:  Right-sided lacunar stroke      Recommendations:     Discharge Recommendations: outpatient OT, outpatient PT  Discharge Equipment Recommendations:  none  Barriers to discharge:  None    History:     Occupational Profile:  Living Environment: Lives w/wife & dtr in 2SH w/THE; bedroom upstairs. Pt has tub/shower up and WIS down.Has TTB  Previous level of function: indep  Roles and Routines:   Equipment Owned:  none  Assistance upon Discharge: family    Past Medical History:   Diagnosis Date    Aneurysm 10/30/2012    Fever blister     Herpes infection     Hypertension     ICH (intracerebral hemorrhage)     Mixed hyperlipidemia 9/5/2013    Nontraumatic thalamic hemorrhage 8/31/2016    JAQUAN (obstructive sleep apnea)     Right-sided lacunar stroke 9/11/2014    SDH (subdural hematoma)     Special screening for malignant neoplasms, colon     Stroke        Past Surgical History:   Procedure Laterality Date    Knee arthroscopic surgery         Subjective     Chief Complaint: L hand coordination problem  Patient/Family stated goals: rturn to PLOF  Communicated with: nurse prior to session.  Pain/Comfort:  · Pain Rating 1: 0/10  · Pain Rating Post-Intervention 1: 0/10    Patients cultural, spiritual, Buddhist conflicts given the current situation:      Objective:     Patient found with:      General Precautions: Standard, fall   Orthopedic Precautions:    Braces:       Occupational Performance:    Bed Mobility:    · indep    Functional Mobility/Transfers:  · indep  · Functional Mobility: indep no AD    Activities of Daily Living:  · Grooming: modified independence    · UB Dressing: modified independence    · LB Dressing: modified independence    · Toileting: modified independence      Cognitive/Visual Perceptual:  AO4, no deficits of cognition or  noted    Physical Exam:  BUE AROM/strength WFL,  "decreased sensation L hand-palmar surface, and min difficulty w/manipulation    Patient left seated EOB with nurse notified    [facility] 6 Click:  AMPA Total Score: 24    Treatment & Education:  Pt educated on role of OT/POC, fm coordination ex and handout given  Education:    Assessment:     Luis Wong is a 62 y.o. male with a medical diagnosis of Right-sided lacunar stroke. At this time, patient is functioning at their prior level of function and does not require further acute OT services.     Clinical Decision Makin.  OT Mod:  "Pt evaluation falls under moderate complexity for evaluation coding due to identification of 3-5 performance deficits noted as stated above. Eval required Min/Mod assistance to complete on this date and detailed assessment(s) were utilized. Moreover, an expanded review of history and occupational profile obtained with additional review of cognitive, physical and psychosocial hx."     Plan:     During this hospitalization, patient does not require further acute OT services.  Please re-consult if situation changes.    · Plan of Care Reviewed with: patient    This Plan of care has been discussed with the patient who was involved in its development and understands and is in agreement with the identified goals and treatment plan    GOALS:    Occupational Therapy Goals     Not on file          Multidisciplinary Problems (Resolved)        Problem: Occupational Therapy Goal    Goal Priority Disciplines Outcome Interventions   Occupational Therapy Goal   (Resolved)     OT, PT/OT Outcome(s) achieved                    Time Tracking:     OT Date of Treatment: 18  OT Start Time: 1136  OT Stop Time: 1200  OT Total Time (min): 24 min    Billable Minutes:Evaluation 15  Therapeutic Activity 9    Manuel Cruz OT  2018    "

## 2018-06-27 NOTE — PLAN OF CARE
Problem: Occupational Therapy Goal  Goal: Occupational Therapy Goal  Outcome: Outcome(s) achieved Date Met: 06/27/18  OT jossy performed, OP OT

## 2018-06-28 ENCOUNTER — PATIENT OUTREACH (OUTPATIENT)
Dept: ADMINISTRATIVE | Facility: CLINIC | Age: 63
End: 2018-06-28

## 2018-06-28 VITALS
HEIGHT: 69 IN | OXYGEN SATURATION: 97 % | RESPIRATION RATE: 16 BRPM | HEART RATE: 73 BPM | TEMPERATURE: 98 F | SYSTOLIC BLOOD PRESSURE: 153 MMHG | DIASTOLIC BLOOD PRESSURE: 79 MMHG | BODY MASS INDEX: 22.89 KG/M2 | WEIGHT: 154.56 LBS

## 2018-06-28 NOTE — PATIENT INSTRUCTIONS
Stroke (Completed)    You have had a mild stroke, or cerebrovascular accident (CVA). This is caused by a loss of blood flow to part of your brain. This can occur when a blood clot forms inside the carotid artery (main artery from the heart to the brain) or inside the heart. When the clot travels to the brain, it can lodge in a blood vessel and block blood flow. The other common cause of stroke is a gradual narrowing of the arteries in the brain due to buildup of fatty deposits (plaque).  Symptoms  Blocked blood flow in different areas of the brain can cause different symptoms. If you have had a stroke before, a new one may be different. A memory aid for the basic signs of a stroke is F.A.S.T.  F.A.S.T.  · F: Face drooping, or numbness on one side. This may be more noticeable when you ask the affected person to smile.  · A: Arm weakness or numbness. The affected person may have trouble using or lifting one side.  · S: Speech difficulty. Speech may be slurred or hard to understand. The affected person may also use the wrong words.  · T: Time to call 911. Time is critical in treating a stroke. Call 911 as soon as you suspect a stroke has happened--even a small one. The sooner treatment is started the better, even if the symptoms go away.  Other common symptoms of a stroke include:  · Having difficulty getting the right words to come out  · Weakness in one leg  · Numbness on one side  · Difficulty walking  · Trouble with coordination  · Trouble with vision  · Headache  · Confusion  · Dizziness  Treatment  After you have had a stroke, you are at risk of having another. Be sure to follow up with your healthcare provider for further evaluation and treatment. If problems are found, your healthcare provider will recommend treatment with medicines and/or procedures.  To reduce your chance of having another stroke, you may be prescribed medicines. These include medicines to prevent blood clots, such as antiplatelet or  anticoagulant medicines.  Home care  · Rest at home and avoid exertion for the next few days.  · If your healthcare provider has prescribed medicines, take them as directed.  Follow-up care  Follow up with your healthcare provider, or as advised. Additional tests may be needed. If you had an X-ray, CT scan, MRI, or ECG (electrocardiogram), it will be reviewed by a specialist. You will be notified of any new findings that will affect your care.  Call 911  Contact emergency services right away if any of these occur:  · Any of your stroke symptoms worsen  · New problems with speech, confusion, vision, walking, coordination, facial droop, or weakness or numbness on one side of your body  · Severe headache, fainting spell, dizziness, or seizure  · Chest pain or shortness of breath  Remember F.A.S.T. (described above). If you notice warning signs and symptoms of stroke, CALL 911 without delay.  Date Last Reviewed: 9/21/2015  © 6078-3599 [employer]. [address] [place] [address], [place] [address]. All rights reserved. This information is not intended as a substitute for professional medical care. Always follow your healthcare professional's instructions.

## 2018-07-03 ENCOUNTER — TELEPHONE (OUTPATIENT)
Dept: NEUROLOGY | Facility: CLINIC | Age: 63
End: 2018-07-03

## 2018-07-11 ENCOUNTER — CLINICAL SUPPORT (OUTPATIENT)
Dept: REHABILITATION | Facility: HOSPITAL | Age: 63
End: 2018-07-11
Payer: COMMERCIAL

## 2018-07-11 DIAGNOSIS — M62.81 MUSCLE WEAKNESS: ICD-10-CM

## 2018-07-11 DIAGNOSIS — M25.60 STIFFNESS IN JOINT: ICD-10-CM

## 2018-07-11 DIAGNOSIS — R27.8 DECREASED COORDINATION: ICD-10-CM

## 2018-07-11 DIAGNOSIS — Z74.09 DECREASED FUNCTIONAL MOBILITY AND ENDURANCE: ICD-10-CM

## 2018-07-11 PROCEDURE — 97162 PT EVAL MOD COMPLEX 30 MIN: CPT | Mod: PN

## 2018-07-11 PROCEDURE — 97530 THERAPEUTIC ACTIVITIES: CPT | Mod: PN

## 2018-07-11 PROCEDURE — 97165 OT EVAL LOW COMPLEX 30 MIN: CPT | Mod: PN

## 2018-07-11 NOTE — PLAN OF CARE
TIME RECORD    Date: 07/13/2018    Start Time:  0930  Stop Time:  1015    PROCEDURES:    TIMED  Procedure Min.                         UNTIMED  Procedure Min.   1 mod eval 45         Total Timed Minutes:  0  Total Timed Units:  0  Total Untimed Units:  1   Charges Billed/# of units:  1 mod eval      OUTPATIENT NEUROLOGICAL REHABILITATION  PHYSICAL THERAPY EVALUATION    Onset Date: 6/29/18  Primary Diagnosis:   1. Decreased functional mobility and endurance       Treatment Diagnosis:   Past Medical History:   Diagnosis Date    Aneurysm 10/30/2012    Fever blister     Herpes infection     Hypertension     ICH (intracerebral hemorrhage)     Mixed hyperlipidemia 9/5/2013    Nontraumatic thalamic hemorrhage 8/31/2016    JAQUAN (obstructive sleep apnea)     Right-sided lacunar stroke 9/11/2014    SDH (subdural hematoma)     Special screening for malignant neoplasms, colon     Stroke      Precautions: standard  Prior Therapy: 2012  Medications: Luis Wong has a current medication list which includes the following prescription(s): aspirin, atorvastatin, hydrochlorothiazide, and lisinopril.  Nutrition:  Normal  History of Present Illness: previous CVA with mild L sided weakness  Prior Level of Function: Independent  Social History: lives with wife, adult children that work  Place of Residence (Steps/Adaptations): 2 [place] home with BR on top level  Functional Deficits Leading to Referral/Nature of Injury: L sided weakness  Current functional status:  Indep   DME owned: 0   Work/Job description:  Retired from [employer]  Fall Incidence:  0 fall in the last 12 months   Patient Therapy Goals: to strengthen my L arm and leg      Subjective:      Pt stated: I had more problems after my 1st CVA but that started up a little this stroke but not as better and it is already better.    Family present/states: he is a little delayed with word finding but his thinking, memory and problem solving is not a bad as after  the 1st stroke b/c it was really bad then.    Pain: 0/10    Objective:      - Command followin%   - Speech: no deficits    Mental status: alert, oriented to person, place, and time, normal mood, behavior, speech, dress, motor activity, and thought processes  Behavior:  calm  Attention Span and Concentration:  Normal    Dominant hand:  right     Posture Alignment :slouched posture    Sensation:  Light Touch: L face impaired             Tone: 0 - No increase in muscle tone    Visual/Auditory: denies changes     Coordination:   - fine motor: mild dexterity impairments on the L, typing is difficult  - UE coordination: intact    - LE coordination:  intact    ROM:   UPPER EXTREMITY--AROM/PROM  (R) UE: WNLs  (L) UE: WNLs         RANGE OF MOTION--LOWER EXTREMITIES  (R) LE Hip: normal   Knee: normal   Ankle: normal    (L) LE: Hip: normal   Knee: normal   Ankle: normal    Upper Extremity Strength   RUE LUE   Shoulder Flexion: 5/5 4+/5   Shoulder Abduction: 5/5 4+/5   Elbow Flexion:     5/5 4+/5   Elbow Extension: 5/5 4+/5   Wrist Flexion: 5/5 4+/5   Wrist Extension: 5/5 4+/5   : 5/5 4+/5     Lower Extremity Strength   RLE LLE   Hip Flexion: 5/5 4+/5   Hip Extension:  5/5 4/5   Hip Abduction: 5/5 4+/5   Knee Extension: 5/5 4+/5   Knee Flexion: 5/5 4/5   Ankle Dorsiflexion: 5/5 4+/5   Ankle Plantarflexion: 5/5 5/5       Gait Assessment:   - AD used: none  - Assistance: none  - Distance: >1300 ft 6 minutes walk test  - Stairs:     Functional Gait Assessment:   1. Gait on level surface =  3   (3) Normal: less than 5.5 sec, no A.D., no imbalance, normal gait pattern, deviates< 6in   (2) Mild impairment: 7-5.6 sec, uses A.D., mild gait deviations, or deviates 6-10 in   (1) Moderate impairment: > 7 sec, slow speed, imbalance, deviates 10-15 in.   (0) Severe impairment: needs assist, deviates >15 in, reach/touch wall  2. Change in Gait Speed = 3   (3) Normal: smooth change w/o loss of balance or gait deviation, deviates < 6  in, significant difference between speeds   (2) Mild impairment: changes speed, but demonstrates mild gait deviations, deviates 6-10 in, OR no deviations but unable to significantly speed, OR uses A.D.   (1) Moderate impairment: minor changes to speed, OR changes speed w/ significant deviations, deviates 10-15 in, OR  Changes speed , but loses balance & recovers   (0) Severe impairment: cannot change speed, deviates >15 in, or loses balance & needs assist  3. Gait with horizontal head turns  = 2   (3) Normal: no change in gait, deviates <6 in   (2) Mild impairment: slight change in speed, deviates 6-10 in, OR uses A.D.   (1) Moderate impairment: moderate change in speed, deviates 10-15 in   (0) Severe impairment: severe disruption of gait, deviates >15in  4. Gait with vertical head turns = 3   (3) Normal: no change in gait, deviates <6 in   (2) Mild impairment: slight change in speed, deviates 6-10 in OR uses A.D.   (1) Moderate impairment: moderate change in speed, deviates 10-15 in   (0) Severe impairment: severe disruption of gait, deviates >15 in  5. Gait with pivot turns = 3   (3) Normal: performs safely in 3 sec, no LOB   (2) Mild impairment: performs in >3 sec & no LOB, OR turns safely & requires several steps to regain LOB   (1) Moderate impairment: turns slow, OR requires several small steps for balance following turn & stop   (0) Severe impairment: cannot turn safely, needs assist  6. Step over obstacle = 3   (3) Normal: steps over 2 stacked boxes w/o change in speed or LOB   (2) Mild impairment: able to step over 1 box w/o change in speed or LOB   (1) Moderate impairment: steps over 1 box but must slow down, may require VC   (0) Severe impairment: cannot perform w/o assist  7. Gait with Narrow SUMEET = 2   (3) Normal: 10 steps no staggering   (2) Mild impairment: 7-9 steps   (1) Moderate impairment: 4-7 steps   (0) Severe impairment: < 4 steps or cannot perform w/o assist  8. Gait with eyes closed = 2   (3)  Normal: < 7 sec, no A.D., no LOB, normal gait pattern, deviates <6 in   (2) Mild impairment: 7.1-9 sec, mild gait deviations, deviates 6-10 in   (1) Moderate impairment: > 9 sec, abnormal pattern, LOB, deviates 10-15 in   (0) Severe impairment: cannot perform w/o assist, LOB, deviates >15in  9. Ambulating Backwards = 2   (3) Normal: no A.D., no LOB, normal gait pattern, deviates <6in   (2) Mild impairment: uses A.D., slower speed, mild gait deviations, deviates 6-10 in   (1) Moderate impairment: slow speed, abnormal gait pattern, LOB, deviates 10-15 in   (0) Severe impairment: severe gait deviations or LOB, deviates >15in  10. Steps = 2   (3) Normal: alternating feet, no rail   (2) Mild Impairment: alternating feet, uses rail   (1) Moderate impairment: step-to, uses rail   (0) Severe impairment: cannot perform safely    Score 25/30       Gait Analysis:  Deviations noted:     Impairments contributing to deviations:  Decreased trunk extension, decreased upright trunk    Endurance Deficit: none noted but pt had increase back pain after brisk 6 minutes walk     Balance Assessment:    Sitting balance (static,dynamic):WFL  Standing balance (static, dynamic):see FGA    Functional Mobility (Bed mobility, transfers)  Bed mobility: I  Supine to sit: I  Sit to supine: I  Rolling: I  Transfers to bed: I  Sit to stand:  I  Stand pivot:  I  Stairs: I  Wheelchair mobility: NT      Patient Education/Response:   Education provided re:role of PT, goals for PT, scheduling - pt verbalized good understanding.     Assessment:   Initial Assessment (Pertinent finding, problem list and factors affecting outcome):This is a 62 y.o. male referred to outpatient physical therapy and presents with a medical diagnosis of CVA and demonstrates limitations as described in the problem list. Due to pt's deficits, he has mild L sided weakness and decreased endurance.     History  Co-morbidities and personal factors that may impact the plan of care  Examination  Body Structures and Functions, activity limitations and participation restrictions that may impact the plan of care Clinical Presentation   Decision Making/ Complexity Score   Co-morbidities:   Aneurysm   Fever blister   Herpes infection   Hypertension   ICH (intracerebral hemorrhage)   Mixed hyperlipidemia   Nontraumatic thalamic hemorrhage   JAQUAN (obstructive sleep apnea)   Right-sided lacunar stroke   SDH (subdural hematoma)   Special screening for malignant neoplasms, colon   Stroke       Personal Factors:     high Body Regions:  UE, trunk, back, LE    Body Systems: musculoskeletal - posture, ROM, strength; neuromuscular - sensation, balance, gait      Activity limitations: driving, standing long periods, walking a mile      Participation Restrictions: community, work    high     FOTO NMR Survey: 13% limitation  This is pt's 3rd CVA  mod   mod       Rehab Potiential: good  Pt will benefit from continuing skilled outpatient physical therapy to address the deficits listed below in the problem list, provide pt/family education and to maximize pt's level of independence in the home and community environment.     Medical necessity is demonstrated by the following IMPAIRMENTS/PROMBLEM LIST:   1. Fall Risk - impaired balance   2. Weakness   3. Gait deviations   4. Decreased community ambulation   5. Decreased activity tolerance   6. Difficulty to participate in daily activities   7. Continued inability to participate in vocational pursuits   8. Requires skilled supervision to complete and progress HEP     Anticipated barriers to physical therapy: none      Plans and Goals:   Short Term Goals = Long Term Goals (8 Weeks):   1.  Independent with initial HEP.  2.  Pt will perform nu-step at level 2 x 10 minutes without rests breaks going at least 50 step/min or greater.  3.  Pt will score greater than or equal to 27/30 on the DGI test/assessment without use of AD placing patient in 1-19% impaired, limited, or  restricted category demonstrating overall improved functional mobility and balance.   4.  Pt will be able to walk in neighborhood ~1/2 to 1 mile at safe and coordinated speed.  6.  Pt will be able to safely perform and tolerate high level ADL's without LOB.   7. Pt will have 0 falls from start of PT sessions.   8. Pt will have MMT score of 5/5 in all major ms groups in B .  9. Pt will ambulate on TM x 10 minutes with use of UE support with 0 LOB at 2.0 mph or greater for gym fitness safely.  10. Pt will report 10% on the FOTO Functional Assessment placing the patient in the 40-60% impaired, limited, or restricted category indicating increased functional balance and  mobility.     Certification Period: 7/13/18 to 9/13/18  Recommended Treatment Plan: 1 times per week for 8 weeks:  To recieve Education, HEP, therapeutic exercises, neuromuscular re-education, therapeutic activities, manual therapy, joint mobilizations, and modalities modalities prn, ASTYM prn, kinesiotape prn, Functional Dry Needling prn modalities, ASTYM prn, kinesiotape prn, Functional Dry Needling prn to achieve established goals. Pt may be seen by PTA as part of the rehabilitation team.     Other Recommendations: none      Therapist: Adia Diaz, PT    I CERTIFY THE NEED FOR THESE SERVICES FURNISHED UNDER THIS PLAN OF TREATMENT AND WHILE UNDER MY CARE    Physician's comments: ____________________________________________________________________________________________________________________________________________    Physician's Name: ___________________________________
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
## 2018-07-11 NOTE — PATIENT INSTRUCTIONS
"Occupational Therapy      Opposition (Active)        Touch tip of thumb to nail tip of each finger in turn, making an "O" shape.  Repeat __30__ times. Do _2-3___ sessions per day.    Copyright © I. All rights reserved.   Dexterity        Roll pen between thumb and all fingertips.  Repeat __30__ times. Do __2-3__ sessions per day.    Copyright © I. All rights reserved.   Dexterity: In-Hand Manipulation        Turn a block around in one hand. Can also use a plastic bottle cap.  For greater challenge, roll 2 blocks around in one hand.  Gather pennies, marbles, etc., and hold in hand. Transfer one at a time out of hand. Can be used in games.    Copyright © I. All rights reserved.   Handwriting: Pencil Warm-Ups        Twirl pencil like baton _10_ times. Reverse direction and twirl _10_ more times.  Roll pencil between thumb and all fingertips. Repeat _3_ times.  Hold pencil in tripod grasp. Slide fingers along to eraser by pushing with fingertips. Then slide back with same movement.    Copyright © I. All rights reserved.     Copyright © I. All rights reserved.          Electronically signed by Christian Teran OT at 4/20/2018 10:20 AM    Dexterity        Place paper towel flat on table. Use fingertips of one hand to crumple into ball.  Repeat __30__ times. Do __2-3__ sessions per day.    Copyright © [facility]. All rights reserved.   Dexterity        A. Roll a block around in one hand. B. For greater challenge, roll 2 blocks in one hand. Can also use plastic bottle caps.  Repeat __30__ times. Do __2-3__ sessions per day.    Copyright © I. All rights reserved.    Radial Finger Walk (Active to Counteract Ulnar Deviation        With palm flat on table and held steady, lift or slide fingers one by one toward thumb. Hold fingers in position and lift entire hand up from table to reposition for next repetition.  Repeat __30__ times. Do ___2-3_ sessions per day.    Copyright © I. All rights reserved.      " Strengthening (Resistive Putty)        Squeeze putty using thumb and all fingers.  Repeat __30__ times. Do ___2-3_ sessions per day.      Copyright © I. All rights reserved.       Roll putty back and forth, being sure to use all fingertips.  Repeat __10__ times. Do ___2-3_ sessions per day.    Lateral Pinch Strengthening (Resistive Putty)        Squeeze between thumb and side of each finger in turn.  Repeat __30__ times. Do __2-3 sessions per day.    Copyright © I. All rights reserved.   MP Flexion (Resistive Putty)        Bending only at large knuckles, press putty down against thumb. Keep fingertips straight.  Repeat ___30_ times. Do ___2-3_ sessions per day.    Copyright © I. All rights reserved.   Palmar Pinch Strengthening (Resistive Putty)      Pinch putty between thumb and each fingertip in turn.  Repeat __30__ times. Do __2-3__ sessions per day.  Extension (Assistive Putty)      Pinch: Three Jaw Manuel        Pull using left thumb, index and middle fingers.  Repeat __30__ times. Do __2-3__ sessions per day.  Activity: Use tongs for picking up. Spin or wind up toys.*

## 2018-07-13 PROBLEM — Z74.09 DECREASED FUNCTIONAL MOBILITY AND ENDURANCE: Status: ACTIVE | Noted: 2018-07-13

## 2018-07-16 ENCOUNTER — TELEPHONE (OUTPATIENT)
Dept: FAMILY MEDICINE | Facility: CLINIC | Age: 63
End: 2018-07-16

## 2018-07-16 ENCOUNTER — LAB VISIT (OUTPATIENT)
Dept: LAB | Facility: HOSPITAL | Age: 63
End: 2018-07-16
Attending: FAMILY MEDICINE
Payer: COMMERCIAL

## 2018-07-16 ENCOUNTER — OFFICE VISIT (OUTPATIENT)
Dept: FAMILY MEDICINE | Facility: CLINIC | Age: 63
End: 2018-07-16
Payer: COMMERCIAL

## 2018-07-16 VITALS
SYSTOLIC BLOOD PRESSURE: 120 MMHG | BODY MASS INDEX: 23.84 KG/M2 | DIASTOLIC BLOOD PRESSURE: 80 MMHG | HEART RATE: 78 BPM | HEIGHT: 69 IN | WEIGHT: 160.94 LBS

## 2018-07-16 DIAGNOSIS — I63.9 CEREBROVASCULAR ACCIDENT (CVA), UNSPECIFIED MECHANISM: ICD-10-CM

## 2018-07-16 DIAGNOSIS — I10 ESSENTIAL HYPERTENSION: Primary | ICD-10-CM

## 2018-07-16 PROBLEM — M62.81 MUSCLE WEAKNESS: Status: RESOLVED | Noted: 2018-07-11 | Resolved: 2018-07-16

## 2018-07-16 PROBLEM — M25.60 STIFFNESS IN JOINT: Status: RESOLVED | Noted: 2018-07-11 | Resolved: 2018-07-16

## 2018-07-16 PROBLEM — Z74.09 DECREASED FUNCTIONAL MOBILITY AND ENDURANCE: Status: RESOLVED | Noted: 2018-07-13 | Resolved: 2018-07-16

## 2018-07-16 PROBLEM — R27.8 DECREASED COORDINATION: Status: RESOLVED | Noted: 2018-07-11 | Resolved: 2018-07-16

## 2018-07-16 LAB — 25(OH)D3+25(OH)D2 SERPL-MCNC: 30 NG/ML

## 2018-07-16 PROCEDURE — 82306 VITAMIN D 25 HYDROXY: CPT

## 2018-07-16 PROCEDURE — 99214 OFFICE O/P EST MOD 30 MIN: CPT | Mod: S$GLB,,, | Performed by: FAMILY MEDICINE

## 2018-07-16 PROCEDURE — 99999 PR PBB SHADOW E&M-EST. PATIENT-LVL III: CPT | Mod: PBBFAC,,, | Performed by: FAMILY MEDICINE

## 2018-07-16 PROCEDURE — 36415 COLL VENOUS BLD VENIPUNCTURE: CPT | Mod: PO

## 2018-07-16 RX ORDER — POTASSIUM CHLORIDE 600 MG/1
8 CAPSULE, EXTENDED RELEASE ORAL DAILY
Qty: 90 CAPSULE | Refills: 3 | Status: SHIPPED | OUTPATIENT
Start: 2018-07-16 | End: 2019-05-07 | Stop reason: SDUPTHER

## 2018-07-16 NOTE — TELEPHONE ENCOUNTER
Please tell the patient that it is safe to continue with hydrochlorothiazide.  The recall was for valsartan .

## 2018-07-16 NOTE — PROGRESS NOTES
Subjective:       Patient ID: Luis Wong is a 62 y.o. male.    Chief Complaint: Hospital Follow Up and Hypertension    62 years old male came to the clinic for blood pressure check.  Blood pressure today stable.  No chest pain, palpitation, orthopnea or PND.  Patient after recent hospitalization secondary to stroke.  Patient is doing significantly better.  Patient doing some lifestyle changes to improve his weight and blood pressure control.      Hypertension   Pertinent negatives include no chest pain or palpitations.     Review of Systems   Constitutional: Negative.    HENT: Negative.    Eyes: Negative.    Respiratory: Negative.    Cardiovascular: Negative.  Negative for chest pain, palpitations and leg swelling.   Gastrointestinal: Negative.    Genitourinary: Negative.    Musculoskeletal: Negative.    Skin: Negative.    Neurological: Negative.    Psychiatric/Behavioral: Negative.        Objective:      Physical Exam   Constitutional: He is oriented to person, place, and time. He appears well-developed and well-nourished. No distress.   HENT:   Head: Normocephalic and atraumatic.   Right Ear: External ear normal.   Left Ear: External ear normal.   Nose: Nose normal.   Mouth/Throat: Oropharynx is clear and moist. No oropharyngeal exudate.   Eyes: Conjunctivae and EOM are normal. Pupils are equal, round, and reactive to light. Right eye exhibits no discharge. Left eye exhibits no discharge. No scleral icterus.   Neck: Normal range of motion. Neck supple. No JVD present. No tracheal deviation present. No thyromegaly present.   Cardiovascular: Normal rate, regular rhythm, normal heart sounds and intact distal pulses.  Exam reveals no gallop and no friction rub.    No murmur heard.  Pulmonary/Chest: Effort normal and breath sounds normal. No stridor. No respiratory distress. He has no wheezes. He has no rales. He exhibits no tenderness.   Abdominal: Soft. Bowel sounds are normal. He exhibits no distension and no  mass. There is no tenderness. There is no rebound and no guarding.   Musculoskeletal: Normal range of motion. He exhibits no edema or tenderness.   Lymphadenopathy:     He has no cervical adenopathy.   Neurological: He is alert and oriented to person, place, and time. He has normal reflexes. He displays normal reflexes. No cranial nerve deficit. He exhibits normal muscle tone. Coordination normal.   Skin: Skin is warm and dry. No rash noted. He is not diaphoretic. No erythema. No pallor.   Psychiatric: He has a normal mood and affect. His behavior is normal. Judgment and thought content normal.   Nursing note and vitals reviewed.      Assessment:       1. Essential hypertension    2. Cerebrovascular accident (CVA), unspecified mechanism        Plan:         Luis was seen today for hospital follow up and hypertension.    Diagnoses and all orders for this visit:    Essential hypertension  -     potassium chloride (MICRO-K) 8 mEq CpSR; Take 1 capsule (8 mEq total) by mouth once daily.  -     Comprehensive metabolic panel; Future  -     Lipid panel; Future  -     CBC auto differential; Future  -     TSH; Future    Cerebrovascular accident (CVA), unspecified mechanism  -     Comprehensive metabolic panel; Future  -     Lipid panel; Future  -     Vitamin D; Future    Continue monitoring blood pressure at home, low sodium diet.

## 2018-07-16 NOTE — TELEPHONE ENCOUNTER
Pt informed to continue with hydrochlorothiazide, the recall was for valsartan, verb understanding

## 2018-07-18 ENCOUNTER — CLINICAL SUPPORT (OUTPATIENT)
Dept: REHABILITATION | Facility: HOSPITAL | Age: 63
End: 2018-07-18
Payer: COMMERCIAL

## 2018-07-18 DIAGNOSIS — R53.1 LEFT-SIDED WEAKNESS: ICD-10-CM

## 2018-07-18 PROCEDURE — 97110 THERAPEUTIC EXERCISES: CPT | Mod: PN

## 2018-07-18 PROCEDURE — 97112 NEUROMUSCULAR REEDUCATION: CPT | Mod: PN

## 2018-07-18 PROCEDURE — 97116 GAIT TRAINING THERAPY: CPT | Mod: PN

## 2018-07-18 NOTE — PROGRESS NOTES
DAILY TREATMENT NOTE    DATE: 7/18/2018    Start Time:  0930  Stop Time:  1015    PROCEDURES:    TIMED  Procedure Min.   Gait 10   NMR 20   TE 15             UNTIMED  Procedure Min.             Total Timed Minutes:  45  Total Timed Units:  3  Total Untimed Units:  0  Charges Billed/# of units:  3 (1gait, 1TE, 1NMR)      Progress/Current Status    Subjective:     Patient ID: Luis Wong is a 62 y.o. male.  Diagnosis:   1. Left-sided weakness       Pain: 0 /10  Pt stated that he was feeling great today with no complaints    Objective:     Session initiated with  gait training on treadmill x 8 total consisting of forward gait x 8' at 2.5 mph with B UE support and 0 LOB with cues as needed and PT 1:1. Pt performed standing stretches f/b NMR balance training per log on orange track with PT 1:1 x 20' f/b TE per log x 15' total.       DATE 7/18/18   Visit 2   Ins auth exp 12/31/18   FOTO 2/5   SLS R --   SLS L --   Side shuffle  2 laps in gym w/ VB toss   Cross overs next   Karoke next   Wii fit balance --   dribbling 2 laps PB alt UE  2 laps VB alt UE   High knee march 2 laps w/ beach ball   Dev sequencing --   clamshells --   bridges --   Adduction isometric --   LAQ --   HS curls --   HS stretch with strap 3 x 30'' B on stairs   Gastroc stretch 3 x 30'' on fitter    Leg press 8.0 3 x 10 B cybex   Hip extension 2 x 10 B RTB on foam   Hip flexion 2 x 10 B RTB on foam   Hip Abduction 2 x 10 B RTB on foam   Hip adduction --   Knee flexion --   Toe raises --   Heel raises --   Nu-step --   Step ups --   Lateral step ups --   Gait See note TM   INITIALS FS       Assessment:     Pt's main deficits are in L swing and L sided coordination.    Patient Education/Response:     CONT HEP    Plans and Goals:     Short Term Goals = Long Term Goals (8 Weeks):   1.  Independent with initial HEP.  2.  Pt will perform nu-step at level 2 x 10 minutes without rests breaks going at least 50 step/min or greater.  3.  Pt will score greater  than or equal to 27/30 on the DGI test/assessment without use of AD placing patient in 1-19% impaired, limited, or restricted category demonstrating overall improved functional mobility and balance.   4.  Pt will be able to walk in neighborhood ~1/2 to 1 mile at safe and coordinated speed.  6.  Pt will be able to safely perform and tolerate high level ADL's without LOB.   7. Pt will have 0 falls from start of PT sessions.   8. Pt will have MMT score of 5/5 in all major ms groups in B LE.  9. Pt will ambulate on TM x 10 minutes with use of UE support with 0 LOB at 2.0 mph or greater for gym fitness safely.  10. Pt will report 10% on the FOTO Functional Assessment placing the patient in the 40-60% impaired, limited, or restricted category indicating increased functional balance and  mobility.

## 2018-07-19 ENCOUNTER — OFFICE VISIT (OUTPATIENT)
Dept: NEUROLOGY | Facility: CLINIC | Age: 63
End: 2018-07-19
Payer: COMMERCIAL

## 2018-07-19 VITALS
DIASTOLIC BLOOD PRESSURE: 75 MMHG | WEIGHT: 163.38 LBS | SYSTOLIC BLOOD PRESSURE: 161 MMHG | HEART RATE: 66 BPM | HEIGHT: 69 IN | BODY MASS INDEX: 24.2 KG/M2

## 2018-07-19 DIAGNOSIS — Z86.73 HISTORY OF STROKE: ICD-10-CM

## 2018-07-19 DIAGNOSIS — E78.2 MIXED HYPERLIPIDEMIA: ICD-10-CM

## 2018-07-19 DIAGNOSIS — Z86.79 HISTORY OF CEREBRAL PARENCHYMAL HEMORRHAGE: ICD-10-CM

## 2018-07-19 DIAGNOSIS — I10 ESSENTIAL HYPERTENSION: Primary | ICD-10-CM

## 2018-07-19 PROCEDURE — 99999 PR PBB SHADOW E&M-EST. PATIENT-LVL III: CPT | Mod: PBBFAC,,, | Performed by: PSYCHIATRY & NEUROLOGY

## 2018-07-19 PROCEDURE — 99214 OFFICE O/P EST MOD 30 MIN: CPT | Mod: S$GLB,,, | Performed by: PSYCHIATRY & NEUROLOGY

## 2018-07-19 NOTE — LETTER
July 20, 2018      Harley Jones, DO  [address]  [place] [address]           [facility]  [facility]  [place] [address]  Phone: [phone]          Patient: Luis Wong   MR Number: [id number]   YOB: 1955   Date of Visit: 7/19/2018       Dear Dr. Harley Jones:    Thank you for referring Luis Wong to me for evaluation. Attached you will find relevant portions of my assessment and plan of care.    If you have questions, please do not hesitate to call me. I look forward to following Luis Wong along with you.    Sincerely,    Ted Yost MD    Enclosure  CC:  No Recipients    If you would like to receive this communication electronically, please contact [email or web address] or [phone] to request more information on [employer] access.    For providers and/or their staff who would like to refer a patient to [facility], please contact us through our one-stop-shop provider referral line, Irene Craft, at [phone].    If you feel you have received this communication in error or would no longer like to receive these types of communications, please e-mail [email or web address]

## 2018-07-19 NOTE — PROGRESS NOTES
Vascular Neurology  Clinic Note    Reason For Visit (Chief Complaint): R lacunar infarct 6/26/18    HPI: 62 y.o. right handed male with sudden onset of left hemiparesis ataxia syndrome on 6/26, seen as telestroke, not given IVtPA d/t out of the window. Admitted for a day and symptoms improved, now with only some residual facial weakness and arm weakness.  The aforementioned symptoms have never happened prior to the event. There are no identified triggers or modifying factors. There have been no recurrent events. There are no other associated symptoms. States they switched from amlodipine to HCTZ and this has made a major improvement in his daily home Bp regimen, states 120's every AM. Also started on Asa 81    Patient's wife is present and informed me that he is doing well, much improved. He just started 2 day / week PT/OT.    Brain Imaging:  MRI 6/26 - R subcortical small infarct in corona radiata; The patient has imaging findings consistent with cerebrovascular small vessel disease including lacune(s) of presumed vascular origin, perivascular space, cerebral microbleeds subcortical, periventricular white matter hyperintensities of presumed vascular origin (Fazekas 3) and subcortical white matter hyperintensities of presumed vascular origin (Fazekas 3)         Vessel Imaging:  MRA -  no signifcant intra/extracranial atherosclerosis  Cardiac Evaluation:  TTE -     CONCLUSIONS     1 - Normal left ventricular systolic function (EF 60-65%).     2 - Impaired LV relaxation, normal LAP (grade 1 diastolic dysfunction).     3 - Normal right ventricular systolic function .     4 - The estimated PA systolic pressure is 29 mmHg.     5 - No evidence of intracardiac shunt.   Other:   None  Relevant Labwork:    Recent Labs  Lab 06/26/18  0943 06/26/18  1200   HEMOGLOBIN A1C  --  5.8 H   LDL CHOLESTEROL 147.6  --    HDL 88 H  --    TRIGLYCERIDES 52  --    CHOLESTEROL 246 H  --        I independently viewed the above imaging  "studies in addition to reviewing the report.  I reviewed the above labwork.    Review of Systems  Msk: negative for muscle pain  Skin: negative for pruritis  Neuro: negative for headache  All others negative    Past Medical History  Past Medical History:   Diagnosis Date    Aneurysm 10/30/2012    Fever blister     Herpes infection     Hypertension     ICH (intracerebral hemorrhage)     Mixed hyperlipidemia 9/5/2013    Nontraumatic thalamic hemorrhage 8/31/2016    JAQUAN (obstructive sleep apnea)     Right-sided lacunar stroke 9/11/2014    SDH (subdural hematoma)     Special screening for malignant neoplasms, colon     Stroke      Family History  No relevant history   Social History  former marijuana; never tobacco; recentl switched to plant based     Medication List with Changes/Refills   Current Medications    ASPIRIN (ECOTRIN) 81 MG EC TABLET    Take 1 tablet (81 mg total) by mouth once daily.    ATORVASTATIN (LIPITOR) 80 MG TABLET    Take 1 tablet (80 mg total) by mouth once daily.    HYDROCHLOROTHIAZIDE (MICROZIDE) 12.5 MG CAPSULE    Take 1 capsule (12.5 mg total) by mouth once daily.    LISINOPRIL (PRINIVIL,ZESTRIL) 20 MG TABLET    TAKE 1 TABLET BY MOUTH DAILY    POTASSIUM CHLORIDE (MICRO-K) 8 MEQ CPSR    Take 1 capsule (8 mEq total) by mouth once daily.       EXAM  Vital Signs:Blood pressure (!) 161/75, pulse 66, height 5' 9" (1.753 m), weight 74.1 kg (163 lb 5.8 oz).  General: well appearing without discomfort   Mental Status:alert, oriented to person - place - age - month   Language: able to name, repeat, comprehend commands   Cranial Nerves: EOMI, PERRL, left decreased NLF, tongue to midline, palate midline  Motor: 5/5 power in all extremities, normal tone  Sensory: intact light touch bilaterally, intact proprioception bilaterally  Coordination: L UE ftn w/ mild dysmetria  Gait & Stance: normal    NIH Stroke Scale:    Level of Consciousness: 0 - alert  LOC Questions: 0 - answers both correctly  LOC " Commands: 0 - performs both correctly  Best Gaze: 0 - normal  Visual: 0 - no visual loss  Facial Palsy: 1 - minor (left)  Motor Left Arm: 0 - no drift  Motor Right Arm: 0 - no drift  Motor Left Le - no drift  Motor Right Le - no drift  Limb Ataxia: 1 - present in one limb  Sensory: 0 - normal  Best Language: 0 - no aphasia  Dysarthria: 0 - normal articulation  Extinction and Inattention: 0 - no neglect  NIH Stroke Scale Total: 2  Modified Sallis Scale:   Timeline:  Modified Marvin Score: 1 - no significant disability          ___________________  ASSESSMENT & PLAN    Problem List Items Addressed This Visit        1 - High    History of stroke    Overview     R corona radiata, small artery infarct 2018         Current Assessment & Plan     Etiology: Small Vessel Occlusion Evident  LUCIANA Absent -- CE Absent --  Major: SSI -- Other Absent   · Diagnostic Orders: none  · Secondary stroke prevention: Continue aspirin 81 mg  · Continue current statin therapy lipitor 80  · Blood pressure goal < 130/80 mmHg   · Stroke Risk Factors Addressed: HTN, HLD, small vessel disease  · Stroke education administered              Unprioritized    HTN (hypertension) - Primary    Mixed hyperlipidemia    History of cerebral parenchymal hemorrhage    Overview     R thalamus,                MD Rashaad  Vascular Neurology  Office [phone]  Fax [phone]

## 2018-07-20 ENCOUNTER — CLINICAL SUPPORT (OUTPATIENT)
Dept: REHABILITATION | Facility: HOSPITAL | Age: 63
End: 2018-07-20
Payer: COMMERCIAL

## 2018-07-20 DIAGNOSIS — R27.8 DECREASED COORDINATION: ICD-10-CM

## 2018-07-20 DIAGNOSIS — M25.60 STIFFNESS IN JOINT: ICD-10-CM

## 2018-07-20 DIAGNOSIS — M62.81 MUSCLE WEAKNESS: ICD-10-CM

## 2018-07-20 PROCEDURE — 97110 THERAPEUTIC EXERCISES: CPT | Mod: PN

## 2018-07-20 PROCEDURE — 97112 NEUROMUSCULAR REEDUCATION: CPT | Mod: PN

## 2018-07-20 PROCEDURE — 97530 THERAPEUTIC ACTIVITIES: CPT | Mod: PN
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
## 2018-07-20 NOTE — ASSESSMENT & PLAN NOTE
Etiology: Small Vessel Occlusion Evident  LUCIANA Absent -- CE Absent --  Major: SSI -- Other Absent   · Diagnostic Orders: none  · Secondary stroke prevention: Continue aspirin 81 mg  · Continue current statin therapy lipitor 80  · Blood pressure goal < 130/80 mmHg   · Stroke Risk Factors Addressed: HTN, HLD, small vessel disease  · Stroke education administered

## 2018-07-24 ENCOUNTER — CLINICAL SUPPORT (OUTPATIENT)
Dept: REHABILITATION | Facility: HOSPITAL | Age: 63
End: 2018-07-24
Payer: COMMERCIAL

## 2018-07-24 DIAGNOSIS — M62.81 MUSCLE WEAKNESS: ICD-10-CM

## 2018-07-24 DIAGNOSIS — R27.8 DECREASED COORDINATION: ICD-10-CM

## 2018-07-24 DIAGNOSIS — M25.60 STIFFNESS IN JOINT: ICD-10-CM

## 2018-07-24 DIAGNOSIS — Z00.00 ROUTINE GENERAL MEDICAL EXAMINATION AT A HEALTH CARE FACILITY: Primary | ICD-10-CM

## 2018-07-24 PROCEDURE — 97110 THERAPEUTIC EXERCISES: CPT | Mod: PN

## 2018-07-24 PROCEDURE — 97112 NEUROMUSCULAR REEDUCATION: CPT | Mod: PN

## 2018-07-24 PROCEDURE — 97530 THERAPEUTIC ACTIVITIES: CPT | Mod: PN
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
## 2018-07-26 ENCOUNTER — TELEPHONE (OUTPATIENT)
Dept: REHABILITATION | Facility: HOSPITAL | Age: 63
End: 2018-07-26

## 2018-07-26 ENCOUNTER — CLINICAL SUPPORT (OUTPATIENT)
Dept: REHABILITATION | Facility: HOSPITAL | Age: 63
End: 2018-07-26
Payer: COMMERCIAL

## 2018-07-26 DIAGNOSIS — R53.1 LEFT-SIDED WEAKNESS: ICD-10-CM

## 2018-07-26 DIAGNOSIS — M62.81 MUSCLE WEAKNESS: ICD-10-CM

## 2018-07-26 DIAGNOSIS — M25.60 STIFFNESS IN JOINT: ICD-10-CM

## 2018-07-26 DIAGNOSIS — R27.8 DECREASED COORDINATION: ICD-10-CM

## 2018-07-26 PROCEDURE — 97530 THERAPEUTIC ACTIVITIES: CPT | Mod: PN

## 2018-07-26 PROCEDURE — 97110 THERAPEUTIC EXERCISES: CPT | Mod: PN

## 2018-07-26 PROCEDURE — 97116 GAIT TRAINING THERAPY: CPT | Mod: PN

## 2018-07-26 PROCEDURE — 97112 NEUROMUSCULAR REEDUCATION: CPT | Mod: PN
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
## 2018-07-26 NOTE — PROGRESS NOTES
DAILY TREATMENT NOTE    DATE: 7/26/2018    Start Time:  08:50  Stop Time:  9:35    PROCEDURES:    TIMED  Procedure Min.   Gait 10   NMR 20   TE 15             UNTIMED  Procedure Min.             Total Timed Minutes:  45  Total Timed Units:  3  Total Untimed Units:  0  Charges Billed/# of units:  3 (1gait, 1TE, 1NMR)      Progress/Current Status    Subjective:     Patient ID: Luis Wong is a 62 y.o. male.  Diagnosis:   1. Left-sided weakness       Pain: 0 /10  Pt reports no complaints of pain today. He is agreeable to PT session.     Objective:     Session initiated with  gait training on treadmill x 8 total consisting of forward gait x 8' at 2.5 mph with B UE support and 0 LOB with cues as needed and PTA 1:1. Pt performed standing stretches then completed NMR balance training per log on orange track with PTA 1:1, BOSU balance: 3000g med ball trunk rotations, chops and fwd reaching x1.5 min each x 2 trials, completed therex 20' 1;1 w/ PTAl.     DATE 7/26/18 7/18/18   Visit 3 2   Ins auth exp 12/31/18 12/31/18   FOTO 3/5 2/5   SLS R - --   SLS L - --   Side shuffle  2 laps in gym w/ VB toss 2 laps in gym w/ VB toss   Cross overs next next   [place]oke next next   Wii fit balance  --   dribbling 2 laps PB alt UE  2 laps VB alt UE 2 laps PB alt UE  2 laps VB alt UE   High knee march  2 laps w/ beach ball   Dev sequencing - --   clamshells - --   bridges - --   Adduction isometric - --   LAQ - --   HS curls - --   HS stretch   3 x 30'' B on stairs 3 x 30'' B on stairs   Gastroc stretch 3 x 30'' on fitter 3 x 30'' on fitter    Leg press  8.0 3 x 10 B cybex   Hip extension 2 x 10 B RTB on foam 2 x 10 B RTB on foam   Hip flexion 2 x 10 B RTB on foam 2 x 10 B RTB on foam   Hip Abduction 2 x 10 B RTB on foam 2 x 10 B RTB on foam   Hip adduction - --   Knee flexion - --   Toe raises - --   Heel raises - --   Nu-step - --   Step ups - --   Lateral step ups - --   Gait NOTE See note TM   INITIALS JA 1/6 FS       Assessment:      Pt able to completed session with balance deficits with trunk rotation and lateral challenged activities. Pt cooperative, able to follow verbal instructions with no difficulty.     Patient Education/Response:     CONT HEP    Plans and Goals:   Cont to advance PT as per POC    Short Term Goals = Long Term Goals (8 Weeks):   1.  Independent with initial HEP.  2.  Pt will perform nu-step at level 2 x 10 minutes without rests breaks going at least 50 step/min or greater.  3.  Pt will score greater than or equal to 27/30 on the DGI test/assessment without use of AD placing patient in 1-19% impaired, limited, or restricted category demonstrating overall improved functional mobility and balance.   4.  Pt will be able to walk in neighborhood ~1/2 to 1 mile at safe and coordinated speed.  6.  Pt will be able to safely perform and tolerate high level ADL's without LOB.   7. Pt will have 0 falls from start of PT sessions.   8. Pt will have MMT score of 5/5 in all major ms groups in B LE.  9. Pt will ambulate on TM x 10 minutes with use of UE support with 0 LOB at 2.0 mph or greater for gym fitness safely.  10. Pt will report 10% on the FOTO Functional Assessment placing the patient in the 40-60% impaired, limited, or restricted category indicating increased functional balance and  mobility.

## 2018-07-26 NOTE — PATIENT INSTRUCTIONS
Copyright © [employer]. All rights reserved.   Lat Pull Down        Face anchor with knees slightly flexed. Palms down, pull arms down to sides.  Repeat _15_ times per set. Do _2_ sets per session. Do _10_ sessions per week.  [place] Height: Over Head     [email or web address]     Copyright © [employer]. All rights reserved.       Copyright © [employer]. All rights reserved.  Low Row: Thumbs Up        Face anchor, medium to wide stance. Thumbs up, pull arms back, squeezing shoulder blades together.   Repeat 15 times per set. Do 2 sets per session. Do 3-5 sessions per week.  [place] Height: Waist     [email or web address]     Copyright © [employer]. All rights reserved.       ABDUCTION: Standing - Stable: Exercise Band (Active)        Stand, right arm down. Against yellow resistance band, lift arm out to side and up as high as possible, keeping elbow straight.  Complete _2__ sets of __15_ repetitions. Perform __2_ sessions per day.    Copyright © [employer]. All rights reserved.   EXTERNAL ROTATION: Standing - Stable: Exercise Band (Active)        Stand, right arm bent to 90°, elbow against side, hand forward. Against yellow resistance band, rotate forearm outward, keeping elbow at side. Rotate forearm outward as far as possible.  Complete __2_ sets of _15__ repetitions. Perform __2_ sessions per day.    Copyright © [employer]. All rights reserved.   FLEXION: Standing - Stable: Resistance Band (Active)        Stand with right arm at side. Against yellow resistance band, lift arm forward and up as high as possible, keeping elbow straight.  Complete _2__ sets of _15__ repetitions. Perform __2_ sessions per day.    Copyright © [employer]. All rights reserved.   Internal Rotation (Resistive Band)        With elbow bent at right angle, hold firmly against side. Using doorknob to anchor band, pull inward. .  Repeat ___2x 15_ times. Do __2__ sessions per day.    Copyright © [employer]. All rights reserved.

## 2018-07-30 ENCOUNTER — CLINICAL SUPPORT (OUTPATIENT)
Dept: REHABILITATION | Facility: HOSPITAL | Age: 63
End: 2018-07-30
Payer: COMMERCIAL

## 2018-07-30 DIAGNOSIS — M25.60 STIFFNESS IN JOINT: ICD-10-CM

## 2018-07-30 DIAGNOSIS — M62.81 MUSCLE WEAKNESS: ICD-10-CM

## 2018-07-30 DIAGNOSIS — R27.8 DECREASED COORDINATION: ICD-10-CM

## 2018-07-30 PROCEDURE — 97110 THERAPEUTIC EXERCISES: CPT | Mod: PN

## 2018-07-30 PROCEDURE — 97530 THERAPEUTIC ACTIVITIES: CPT | Mod: PN
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
## 2018-08-01 ENCOUNTER — CLINICAL SUPPORT (OUTPATIENT)
Dept: REHABILITATION | Facility: HOSPITAL | Age: 63
End: 2018-08-01
Payer: COMMERCIAL

## 2018-08-01 DIAGNOSIS — M25.60 STIFFNESS IN JOINT: ICD-10-CM

## 2018-08-01 DIAGNOSIS — R27.8 DECREASED COORDINATION: ICD-10-CM

## 2018-08-01 DIAGNOSIS — M62.81 MUSCLE WEAKNESS: ICD-10-CM

## 2018-08-01 DIAGNOSIS — R53.1 LEFT-SIDED WEAKNESS: ICD-10-CM

## 2018-08-01 PROCEDURE — 97112 NEUROMUSCULAR REEDUCATION: CPT | Mod: PN

## 2018-08-01 PROCEDURE — 97110 THERAPEUTIC EXERCISES: CPT | Mod: PN

## 2018-08-01 PROCEDURE — 97116 GAIT TRAINING THERAPY: CPT | Mod: PN

## 2018-08-01 PROCEDURE — 97530 THERAPEUTIC ACTIVITIES: CPT | Mod: PN
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
## 2018-08-01 NOTE — PROGRESS NOTES
DAILY TREATMENT NOTE    DATE: 8/1/2018    Start Time:  8:45 AM  Stop Time:  9:35 AM    PROCEDURES:    TIMED  Procedure Min.   Gait 10   NMR 20   TE 15             UNTIMED  Procedure Min.             Total Timed Minutes:  45  Total Timed Units:  3  Total Untimed Units:  0  Charges Billed/# of units:  3 (1gait, 1TE, 1NMR)      Progress/Current Status    Subjective:     Patient ID: Luis Wong is a 62 y.o. male.  Diagnosis:   1. Left-sided weakness       Pain: Pt agreeable to PT session and reports on complaints of pain.     Objective:     Session initiated with  gait training on treadmill x 8 total consisting of forward gait at 3.0mph with B UE support and 0 LOB with cues as needed and PTA 1:1. Pt performed standing stretches then completed NMR balance training per log on orange track with PTA 1:1, BOSU balance: mini squats 2x10 in //, 3000g med ball trunk rotations, chops and fwd reaching x1.5 min each x 2 trials.    DATE 8/1/18 7/26/18 7/18/18   Visit 4 3 2   Ins auth exp 12/31/18 12/31/18 12/31/18   FOTO 4/5 3/5 2/5   SLS R - - --   SLS L - - --   Side shuffle  2 laps in gym w/ VB toss 2 laps in gym w/ VB toss 2 laps in gym w/ VB toss   Cross overs  next next   Karoke  next next   Wii fit balance   --   dribbling 2 laps PB alt UE  2 laps VB alt UE 2 laps PB alt UE  2 laps VB alt UE 2 laps PB alt UE  2 laps VB alt UE   High knee march   2 laps w/ beach ball   Dev sequencing - - --   clamshells - - --   bridges - - --   Adduction isometric - - --   LAQ - - --   HS curls - - --   HS stretch   3 x 30'' B on stairs 3 x 30'' B on stairs 3 x 30'' B on stairs   Gastroc stretch 3 x 30'' on fitter 3 x 30'' on fitter 3 x 30'' on fitter    Leg press   8.0 3 x 10 B cybex   Hip extension 2 x 15 B RTB on foam 2 x 10 B RTB on foam 2 x 10 B RTB on foam   Hip flexion 2 x 15 B RTB on foam 2 x 10 B RTB on foam 2 x 10 B RTB on foam   Hip Abduction 2 x 15 B RTB on foam 2 x 10 B RTB on foam 2 x 10 B RTB on foam   Hip adduction - - --    Knee flexion - - --   Toe raises - - --   Heel raises - - --   Nu-step - - --   Step ups - - --   Lateral step ups - - --   Gait  NOTE See note TM   INITIALS JA 2/6 JA 1/6 FS       Assessment:     Pt able to completed session with balance deficits with trunk rotation and lateral challenged activities. Pt cooperative, able to follow verbal instructions with no difficulty.     Patient Education/Response:     CONT HEP    Plans and Goals:   Cont to advance PT as per POC    Short Term Goals = Long Term Goals (8 Weeks):   1.  Independent with initial HEP.  2.  Pt will perform nu-step at level 2 x 10 minutes without rests breaks going at least 50 step/min or greater.  3.  Pt will score greater than or equal to 27/30 on the DGI test/assessment without use of AD placing patient in 1-19% impaired, limited, or restricted category demonstrating overall improved functional mobility and balance.   4.  Pt will be able to walk in neighborhood ~1/2 to 1 mile at safe and coordinated speed.  6.  Pt will be able to safely perform and tolerate high level ADL's without LOB.   7. Pt will have 0 falls from start of PT sessions.   8. Pt will have MMT score of 5/5 in all major ms groups in B LE.  9. Pt will ambulate on TM x 10 minutes with use of UE support with 0 LOB at 2.0 mph or greater for gym fitness safely.  10. Pt will report 10% on the FOTO Functional Assessment placing the patient in the 40-60% impaired, limited, or restricted category indicating increased functional balance and  mobility.

## 2018-08-07 ENCOUNTER — CLINICAL SUPPORT (OUTPATIENT)
Dept: REHABILITATION | Facility: HOSPITAL | Age: 63
End: 2018-08-07
Payer: COMMERCIAL

## 2018-08-07 DIAGNOSIS — M25.60 STIFFNESS IN JOINT: ICD-10-CM

## 2018-08-07 DIAGNOSIS — M62.81 MUSCLE WEAKNESS: ICD-10-CM

## 2018-08-07 DIAGNOSIS — R53.1 LEFT-SIDED WEAKNESS: ICD-10-CM

## 2018-08-07 DIAGNOSIS — R27.8 DECREASED COORDINATION: ICD-10-CM

## 2018-08-07 PROCEDURE — 97530 THERAPEUTIC ACTIVITIES: CPT | Mod: PN

## 2018-08-07 PROCEDURE — 97110 THERAPEUTIC EXERCISES: CPT | Mod: PN

## 2018-08-07 PROCEDURE — 97112 NEUROMUSCULAR REEDUCATION: CPT | Mod: PN

## 2018-08-07 PROCEDURE — 97116 GAIT TRAINING THERAPY: CPT | Mod: PN
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
## 2018-08-07 NOTE — PROGRESS NOTES
DAILY TREATMENT NOTE    DATE: 8/7/2018    Start Time:  0800  Stop Time:  0845    PROCEDURES:    TIMED  Procedure Min.   gait 15   NMR 15   TE 15             UNTIMED  Procedure Min.             Total Timed Minutes:  45  Total Timed Units:  3  Total Untimed Units:  0  Charges Billed/# of units:  3 (1 gait, 1TE, 1NMR)      Progress/Current Status    Subjective:     Patient ID: Luis Wong is a 62 y.o. male.  Diagnosis:   1. Left-sided weakness       Pain: 0 /10  Pt stated that he feel stronger but he get some weakness on the L that comes and goes.    Objective:     Session initiated with gait training on treadmill x 10 total consisting of forward gait x 10' at 2.7 mph with 1 UE support and 0 LOB with cues as needed and PT 1:1. Pt performed standing TE f/b standing balance in gym per log f/b developmental sequencing consisting of tall kneel overhead med ball lifts, 1/2 kneel reverse chops B all with cely med ball 2 x 10 f/b tall to 1/2 kneel transitions holding med ball x 10 B f/b tall to stand transitions x 5 B hold red med ball all on soft floor mat.  Session ended with TE per log x 10'.       DATE 8/7/18 8/1/18 7/26/18 7/18/18   Visit 5/30 4 3 2   Ins auth exp 12/31/18 12/31/18 12/31/18 12/31/18   FOTO 5/5 done 4/5 3/5 2/5   SLS R -- - - --   SLS L -- - - --   Side shuffle  -- 2 laps in gym w/ VB toss 2 laps in gym w/ VB toss 2 laps in gym w/ VB toss   Cross overs 2 laps   next next   Karoke 2 laps  next next   Wii fit balance --   --   dribbling 2 laps PB alt UE  2 laps VB alt UE 2 laps PB alt UE  2 laps VB alt UE 2 laps PB alt UE  2 laps VB alt UE 2 laps PB alt UE  2 laps VB alt UE   High knee march --   2 laps w/ beach ball   Dev sequencing See note - - --   clamshells -- - - --   bridges -- - - --   Adduction isometric -- - - --   LAQ -- - - --   HS curls -- - - --   HS stretch   3 x 30'' B on stairs 3 x 30'' B on stairs 3 x 30'' B on stairs 3 x 30'' B on stairs   Gastroc stretch 2 x 30'' on fitter 3 x 30'' on  fitter 3 x 30'' on fitter 3 x 30'' on fitter    Leg press 8.0 3 x 10 B cybex   8.0 3 x 10 B cybex   Hip extension 2 x 15 B RTB on blue foam 2 x 15 B RTB on foam 2 x 10 B RTB on foam 2 x 10 B RTB on foam   Hip flexion 2 x 15 B RTB on blue foam 2 x 15 B RTB on foam 2 x 10 B RTB on foam 2 x 10 B RTB on foam   Hip Abduction 2 x 15 B RTB on blue  foam 2 x 15 B RTB on foam 2 x 10 B RTB on foam 2 x 10 B RTB on foam   Hip adduction -- - - --   Knee flexion -- - - --   Toe raises -- - - --   Heel raises -- - - --   Nu-step -- - - --   Step ups -- - - --   Lateral step ups -- - - --   Gait See note on TM  NOTE See note TM   INITIALS FS JA 2/6 JA 1/6 FS       Assessment:     Pt overall coordination is improved and PT will cont to progress higher level core, balance and strengthening.      Patient Education/Response:     CONT HEP    Plans and Goals:     Cont to advance PT as per POC    Short Term Goals = Long Term Goals (8 Weeks):   1.  Independent with initial HEP.  2.  Pt will perform nu-step at level 2 x 10 minutes without rests breaks going at least 50 step/min or greater.  3.  Pt will score greater than or equal to 27/30 on the DGI test/assessment without use of AD placing patient in 1-19% impaired, limited, or restricted category demonstrating overall improved functional mobility and balance.   4.  Pt will be able to walk in neighborhood ~1/2 to 1 mile at safe and coordinated speed.  6.  Pt will be able to safely perform and tolerate high level ADL's without LOB.   7. Pt will have 0 falls from start of PT sessions.   8. Pt will have MMT score of 5/5 in all major ms groups in B LE.  9. Pt will ambulate on TM x 10 minutes with use of UE support with 0 LOB at 2.0 mph or greater for gym fitness safely. MET  10. Pt will report 10% on the FOTO Functional Assessment placing the patient in the 40-60% impaired, limited, or restricted category indicating increased functional balance and  mobility.

## 2018-08-09 ENCOUNTER — CLINICAL SUPPORT (OUTPATIENT)
Dept: REHABILITATION | Facility: HOSPITAL | Age: 63
End: 2018-08-09
Payer: COMMERCIAL

## 2018-08-09 DIAGNOSIS — M62.81 MUSCLE WEAKNESS: ICD-10-CM

## 2018-08-09 DIAGNOSIS — M25.60 STIFFNESS IN JOINT: ICD-10-CM

## 2018-08-09 DIAGNOSIS — R27.8 DECREASED COORDINATION: ICD-10-CM

## 2018-08-09 PROCEDURE — 97530 THERAPEUTIC ACTIVITIES: CPT | Mod: PN

## 2018-08-09 PROCEDURE — 97110 THERAPEUTIC EXERCISES: CPT | Mod: PN
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
## 2018-08-13 ENCOUNTER — OFFICE VISIT (OUTPATIENT)
Dept: FAMILY MEDICINE | Facility: CLINIC | Age: 63
End: 2018-08-13
Payer: COMMERCIAL

## 2018-08-13 ENCOUNTER — HOSPITAL ENCOUNTER (OUTPATIENT)
Dept: RADIOLOGY | Facility: HOSPITAL | Age: 63
Discharge: HOME | End: 2018-08-13
Attending: FAMILY MEDICINE
Payer: COMMERCIAL

## 2018-08-13 VITALS
SYSTOLIC BLOOD PRESSURE: 140 MMHG | WEIGHT: 160.94 LBS | BODY MASS INDEX: 23.84 KG/M2 | HEIGHT: 69 IN | DIASTOLIC BLOOD PRESSURE: 80 MMHG | HEART RATE: 56 BPM

## 2018-08-13 DIAGNOSIS — M79.604 RIGHT LEG PAIN: ICD-10-CM

## 2018-08-13 DIAGNOSIS — I10 ESSENTIAL HYPERTENSION: Primary | ICD-10-CM

## 2018-08-13 DIAGNOSIS — E78.5 DYSLIPIDEMIA: ICD-10-CM

## 2018-08-13 PROCEDURE — 93926 LOWER EXTREMITY STUDY: CPT | Mod: TC

## 2018-08-13 PROCEDURE — 93926 LOWER EXTREMITY STUDY: CPT | Mod: 26,,, | Performed by: RADIOLOGY

## 2018-08-13 PROCEDURE — 99214 OFFICE O/P EST MOD 30 MIN: CPT | Mod: S$GLB,,, | Performed by: FAMILY MEDICINE

## 2018-08-13 PROCEDURE — 99999 PR PBB SHADOW E&M-EST. PATIENT-LVL IV: CPT | Mod: PBBFAC,,, | Performed by: FAMILY MEDICINE

## 2018-08-13 RX ORDER — ATORVASTATIN CALCIUM 10 MG/1
10 TABLET, FILM COATED ORAL DAILY
Qty: 90 TABLET | Refills: 3 | Status: SHIPPED | OUTPATIENT
Start: 2018-08-13 | End: 2019-06-19 | Stop reason: SDUPTHER

## 2018-08-13 NOTE — PATIENT INSTRUCTIONS

## 2018-08-13 NOTE — PROGRESS NOTES
Subjective:       Patient ID: Luis Wong is a 62 y.o. male.    Chief Complaint: Leg Pain    62 years old male came to the clinic with right leg pain for the last  weeks.  The pain is 11/10 of intensity on and off aggravated with activity and better with rest .  Patient is concerned about possible nerve damage or side effect of the medicine .  Patient with similar pain before.  No tingling or burning.  Blood pressure today is borderline .  No chest pain, palpitation orthopnea or PND .      Leg Pain        Review of Systems   Constitutional: Negative.    HENT: Negative.    Eyes: Negative.    Respiratory: Negative.    Cardiovascular: Negative.  Negative for chest pain, palpitations and leg swelling.   Gastrointestinal: Negative.    Genitourinary: Negative.    Musculoskeletal: Negative.    Skin: Negative.    Neurological: Negative.    Psychiatric/Behavioral: Negative.        Objective:      Physical Exam   Constitutional: He is oriented to person, place, and time. He appears well-developed and well-nourished. No distress.   HENT:   Head: Normocephalic and atraumatic.   Right Ear: External ear normal.   Left Ear: External ear normal.   Nose: Nose normal.   Mouth/Throat: Oropharynx is clear and moist. No oropharyngeal exudate.   Eyes: Conjunctivae and EOM are normal. Pupils are equal, round, and reactive to light. Right eye exhibits no discharge. Left eye exhibits no discharge. No scleral icterus.   Neck: Normal range of motion. Neck supple. No JVD present. No tracheal deviation present. No thyromegaly present.   Cardiovascular: Normal rate, regular rhythm, normal heart sounds and intact distal pulses. Exam reveals no gallop and no friction rub.   No murmur heard.  Pulmonary/Chest: Effort normal and breath sounds normal. No stridor. No respiratory distress. He has no wheezes. He has no rales. He exhibits no tenderness.   Abdominal: Soft. Bowel sounds are normal. He exhibits no distension and no mass. There is no  tenderness. There is no rebound and no guarding.   Musculoskeletal: Normal range of motion. He exhibits no edema or tenderness.   Lymphadenopathy:     He has no cervical adenopathy.   Neurological: He is alert and oriented to person, place, and time. He has normal reflexes. He displays normal reflexes. No cranial nerve deficit. He exhibits normal muscle tone. Coordination normal.   Skin: Skin is warm and dry. No rash noted. He is not diaphoretic. No erythema. No pallor.   Psychiatric: He has a normal mood and affect. His behavior is normal. Judgment and thought content normal.   Nursing note and vitals reviewed.      Assessment:       1. Essential hypertension    2. Right leg pain    3. Dyslipidemia        Plan:         Luis was seen today for leg pain.    Diagnoses and all orders for this visit:    Essential hypertension    Right leg pain  -     Ambulatory referral to Neurology  -      Lower Extremity Arteries Right; Future    Dyslipidemia  -     atorvastatin (LIPITOR) 10 MG tablet; Take 1 tablet (10 mg total) by mouth once daily.    Continue monitoring blood pressure at home, low sodium diet.

## 2018-08-14 ENCOUNTER — CLINICAL SUPPORT (OUTPATIENT)
Dept: REHABILITATION | Facility: HOSPITAL | Age: 63
End: 2018-08-14
Payer: COMMERCIAL

## 2018-08-14 DIAGNOSIS — R53.1 LEFT-SIDED WEAKNESS: ICD-10-CM

## 2018-08-14 DIAGNOSIS — R27.8 DECREASED COORDINATION: ICD-10-CM

## 2018-08-14 DIAGNOSIS — M62.81 MUSCLE WEAKNESS: ICD-10-CM

## 2018-08-14 DIAGNOSIS — M25.60 STIFFNESS IN JOINT: ICD-10-CM

## 2018-08-14 PROCEDURE — 97110 THERAPEUTIC EXERCISES: CPT | Mod: PN

## 2018-08-14 PROCEDURE — 97116 GAIT TRAINING THERAPY: CPT | Mod: PN

## 2018-08-14 PROCEDURE — 97530 THERAPEUTIC ACTIVITIES: CPT | Mod: PN

## 2018-08-14 PROCEDURE — 97112 NEUROMUSCULAR REEDUCATION: CPT | Mod: PN
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
## 2018-08-14 NOTE — PROGRESS NOTES
DAILY TREATMENT NOTE    DATE: 8/14/2018    Start Time:  0845  Stop Time:  09:35    PROCEDURES:    TIMED  Procedure Min.   gait 15   NMR 15   TE 15             UNTIMED  Procedure Min.             Total Timed Minutes:  45  Total Timed Units:  3  Total Untimed Units:  0  Charges Billed/# of units:  3 (1 gait, 1TE, 1NMR)      Progress/Current Status    Subjective:     Patient ID: Luis Wong is a 62 y.o. male.  Diagnosis:   1. Left-sided weakness       Pain: Pt reports no complaints of pain today. He states he had an ultrasound performed yesterday because of pain in R LE over weekend. Pt agreeable to PT session.     Objective:     Session initiated with gait training on treadmill x 10 total consisting of forward gait x 10' at 2.7 mph with 1 UE support and 0 LOB w/ 1:1 PTA.  Pt performed standing therex and balance in gym per log f/b developmental sequencing consisting of tall kneel overhead med ball lifts, 1/2 kneel reverse chops B all with cely med ball 2 x 10 f/b tall to 1/2 kneel transitions holding med ball x 10 B.     DATE 8/14/18 8/7/18 8/1/18 7/26/18 7/18/18   Visit 6/30 5/30 4 3 2   Ins auth exp 12/31/18 12/31/18 12/31/18 12/31/18 12/31/18   FOTO 6/10 5/5 done 4/5 3/5 2/5   SLS R - -- - - --   SLS L - -- - - --   Side shuffle  - -- 2 laps in gym w/ VB toss 2 laps in gym w/ VB toss 2 laps in gym w/ VB toss   Cross overs NT 2 laps   next next   Karoke NT 2 laps  next next   Wii fit balance - --   --   dribbling OOT 2 laps PB alt UE  2 laps VB alt UE 2 laps PB alt UE  2 laps VB alt UE 2 laps PB alt UE  2 laps VB alt UE 2 laps PB alt UE  2 laps VB alt UE   High knee march - --   2 laps w/ beach ball   Dev sequencing SEE NOTE See note - - --   clamshells - -- - - --   bridges - -- - - --   Adduction isometric - -- - - --   LAQ cybex 35# 3x10 -- - - --   HS curls - -- - - --   HS stretch   3 x 30'' B on stairs 3 x 30'' B on stairs 3 x 30'' B on stairs 3 x 30'' B on stairs 3 x 30'' B on stairs   Gastroc stretch 2 x  30'' on fitter 2 x 30'' on fitter 3 x 30'' on fitter 3 x 30'' on fitter 3 x 30'' on fitter    Leg press 8.0 3 x 10 B cybex 8.0 3 x 10 B cybex   8.0 3 x 10 B cybex   Hip extension Cybex 2.0   2x10 B 2 x 15 B RTB on blue foam 2 x 15 B RTB on foam 2 x 10 B RTB on foam 2 x 10 B RTB on foam   Hip flexion Cybex 2.0  2x10 B 2 x 15 B RTB on blue foam 2 x 15 B RTB on foam 2 x 10 B RTB on foam 2 x 10 B RTB on foam   Hip Abduction Cybex 2.0  2x10 B 2 x 15 B RTB on blue  foam 2 x 15 B RTB on foam 2 x 10 B RTB on foam 2 x 10 B RTB on foam   Hip adduction - -- - - --   Knee flexion - -- - - --   Toe raises - -- - - --   Heel raises - -- - - --   Nu-step - -- - - --   Step ups - -- - - --   Lateral step ups - -- - - --   Gait NOTE See note on TM  NOTE See note TM   INITIALS JA 1/6 FS JA 2/6 JA 1/6 FS       Assessment:     Pt able to complete session with no reports of pain. He experienced no LOB/ SOB with standing activities. Pt responded well additional activities with use of resisted weights (Cybex).     Patient Education/Response:     CONT HEP    Plans and Goals:     Cont to advance PT as per POC    Short Term Goals = Long Term Goals (8 Weeks):   1.  Independent with initial HEP.  2.  Pt will perform nu-step at level 2 x 10 minutes without rests breaks going at least 50 step/min or greater.  3.  Pt will score greater than or equal to 27/30 on the DGI test/assessment without use of AD placing patient in 1-19% impaired, limited, or restricted category demonstrating overall improved functional mobility and balance.   4.  Pt will be able to walk in neighborhood ~1/2 to 1 mile at safe and coordinated speed.  6.  Pt will be able to safely perform and tolerate high level ADL's without LOB.   7. Pt will have 0 falls from start of PT sessions.   8. Pt will have MMT score of 5/5 in all major ms groups in B LE.  9. Pt will ambulate on TM x 10 minutes with use of UE support with 0 LOB at 2.0 mph or greater for gym fitness safely. MET  10.  Pt will report 10% on the FOTO Functional Assessment placing the patient in the 40-60% impaired, limited, or restricted category indicating increased functional balance and  mobility.

## 2018-08-21 ENCOUNTER — CLINICAL SUPPORT (OUTPATIENT)
Dept: REHABILITATION | Facility: HOSPITAL | Age: 63
End: 2018-08-21
Payer: COMMERCIAL

## 2018-08-21 DIAGNOSIS — R53.1 LEFT-SIDED WEAKNESS: ICD-10-CM

## 2018-08-21 DIAGNOSIS — M25.60 STIFFNESS IN JOINT: ICD-10-CM

## 2018-08-21 DIAGNOSIS — R27.8 DECREASED COORDINATION: ICD-10-CM

## 2018-08-21 DIAGNOSIS — M62.81 MUSCLE WEAKNESS: ICD-10-CM

## 2018-08-21 PROCEDURE — 97110 THERAPEUTIC EXERCISES: CPT | Mod: PN

## 2018-08-21 PROCEDURE — 97112 NEUROMUSCULAR REEDUCATION: CPT | Mod: PN

## 2018-08-21 PROCEDURE — 97530 THERAPEUTIC ACTIVITIES: CPT | Mod: PN

## 2018-08-21 NOTE — PROGRESS NOTES
DAILY TREATMENT NOTE    DATE: 8/21/2018    Start Time:  0845  Stop Time:  0930    PROCEDURES:    TIMED  Procedure Min.   NMR 10   TE 10   Gait 10             UNTIMED  Procedure Min.             Total Timed Minutes:  10  Total Timed Units:  2  Total Untimed Units:  0  Charges Billed/# of units:  3 (1 NMR, 1TE)      Progress/Current Status    Subjective:     Patient ID: Luis Wong is a 62 y.o. male.  Diagnosis:   1. Left-sided weakness       Pain: 0 /10  Pt stated that he played golf this weekend and he was feeling a lot stronger.    Objective:     Session initiated with developmental sequencing consisting of tall kneel overhead med ball lifts 2 x 10 , 1/2 kneel reverse chops B all with cely med ball 2 x 10 f/b tall to 1/2 kneel transitions holding med ball x 10 B f/b tall to stand transitions x 5 B hold red med ball all on soft floor mat.  Session ended with TE per log x 10'. PT performed objective measures x 10'.    DATE 8/21/18 8/14/18 8/7/18 8/1/18 7/26/18 7/18/18   Visit 7/30 6/30 5/30 4 3 2   Ins auth exp 12/31/18 12/31/18 12/31/18 12/31/18 12/31/18 12/31/18   FOTO 7/10 6/10 5/5 done 4/5 3/5 2/5   SLS R -- - -- - - --   SLS L -- - -- - - --   Side shuffle  -- - -- 2 laps in gym w/ VB toss 2 laps in gym w/ VB toss 2 laps in gym w/ VB toss   Cross overs --- NT 2 laps   next next   Karoke 2 laps  NT 2 laps  next next   Wii fit balance -- - --   --   dribbling 2 laps PB alt UE  2 laps VB alt UE OOT 2 laps PB alt UE  2 laps VB alt UE 2 laps PB alt UE  2 laps VB alt UE 2 laps PB alt UE  2 laps VB alt UE 2 laps PB alt UE  2 laps VB alt UE   High knee march -- - --   2 laps w/ beach ball   Dev sequencing SEE NOTE SEE NOTE See note - - --   clarenae -- - -- - - --   bridges -- - -- - - --   Adduction isometric -- - -- - - --   ANNIE NT cybex 35# 3x10 -- - - --   HS curls -- - -- - - --   HS stretch   3 x 30'' B on stairs 3 x 30'' B on stairs 3 x 30'' B on stairs 3 x 30'' B on stairs 3 x 30'' B on stairs 3 x 30'' B on  stairs   Gastroc stretch 2 x 30'' on fitter 2 x 30'' on fitter 2 x 30'' on fitter 3 x 30'' on fitter 3 x 30'' on fitter 3 x 30'' on fitter    Leg press NT 8.0 3 x 10 B cybex 8.0 3 x 10 B cybex   8.0 3 x 10 B cybex   Hip extension Cybex 2.0  2x10 B Cybex 2.0   2x10 B 2 x 15 B RTB on blue foam 2 x 15 B RTB on foam 2 x 10 B RTB on foam 2 x 10 B RTB on foam   Hip flexion Cybex 2.0  2x10 B Cybex 2.0  2x10 B 2 x 15 B RTB on blue foam 2 x 15 B RTB on foam 2 x 10 B RTB on foam 2 x 10 B RTB on foam   Hip Abduction Cybex 2.0  2x10 B Cybex 2.0  2x10 B 2 x 15 B RTB on blue  foam 2 x 15 B RTB on foam 2 x 10 B RTB on foam 2 x 10 B RTB on foam   Hip adduction -- - -- - - --   Knee flexion -- - -- - - --   Toe raises -- - -- - - --   Heel raises -- - -- - - --   Nu-step -- - -- - - --   Step ups -- - -- - - --   Lateral step ups -- - -- - - --   Gait NOTE NOTE See note on TM  NOTE See note TM   INITIALS FS SUYAPA 1/6 FS SUYAPA 2/6 SUYAPA 1/6 FS     Lower Extremity Strength    RLE LLE   Hip Flexion: 5/5 5/5   Hip Extension:  5/5 5/5   Hip Abduction: 5/5 5/5   Knee Extension: 5/5 5/5   Knee Flexion: 5/5 5/5   Ankle Dorsiflexion: 5/5 5/5   Ankle Plantarflexion: 5/5 5/5       Functional Gait Assessment:   1. Gait on level surface =  3              (3) Normal: less than 5.5 sec, no A.D., no imbalance, normal gait pattern, deviates< 6in              (2) Mild impairment: 7-5.6 sec, uses A.D., mild gait deviations, or deviates 6-10 in              (1) Moderate impairment: > 7 sec, slow speed, imbalance, deviates 10-15 in.              (0) Severe impairment: needs assist, deviates >15 in, reach/touch wall  2. Change in Gait Speed = 3              (3) Normal: smooth change w/o loss of balance or gait deviation, deviates < 6 in, significant difference between speeds              (2) Mild impairment: changes speed, but demonstrates mild gait deviations, deviates 6-10 in, OR no deviations but unable to significantly speed, OR uses A.D.              (1)  Moderate impairment: minor changes to speed, OR changes speed w/ significant deviations, deviates 10-15 in, OR  Changes speed , but loses balance & recovers              (0) Severe impairment: cannot change speed, deviates >15 in, or loses balance & needs assist  3. Gait with horizontal head turns  = 3              (3) Normal: no change in gait, deviates <6 in              (2) Mild impairment: slight change in speed, deviates 6-10 in, OR uses A.D.              (1) Moderate impairment: moderate change in speed, deviates 10-15 in              (0) Severe impairment: severe disruption of gait, deviates >15in  4. Gait with vertical head turns = 3              (3) Normal: no change in gait, deviates <6 in              (2) Mild impairment: slight change in speed, deviates 6-10 in OR uses A.D.              (1) Moderate impairment: moderate change in speed, deviates 10-15 in              (0) Severe impairment: severe disruption of gait, deviates >15 in  5. Gait with pivot turns = 3              (3) Normal: performs safely in 3 sec, no LOB              (2) Mild impairment: performs in >3 sec & no LOB, OR turns safely & requires several steps to regain LOB              (1) Moderate impairment: turns slow, OR requires several small steps for balance following turn & stop              (0) Severe impairment: cannot turn safely, needs assist  6. Step over obstacle = 3              (3) Normal: steps over 2 stacked boxes w/o change in speed or LOB              (2) Mild impairment: able to step over 1 box w/o change in speed or LOB              (1) Moderate impairment: steps over 1 box but must slow down, may require VC              (0) Severe impairment: cannot perform w/o assist  7. Gait with Narrow SUMEET = 2              (3) Normal: 10 steps no staggering              (2) Mild impairment: 7-9 steps              (1) Moderate impairment: 4-7 steps              (0) Severe impairment: < 4 steps or cannot perform w/o assist  8. Gait with  eyes closed = 3              (3) Normal: < 7 sec, no A.D., no LOB, normal gait pattern, deviates <6 in              (2) Mild impairment: 7.1-9 sec, mild gait deviations, deviates 6-10 in              (1) Moderate impairment: > 9 sec, abnormal pattern, LOB, deviates 10-15 in              (0) Severe impairment: c3nnot perform w/o assist, LOB, deviates >15in  9. Ambulating Backwards = 2              (3) Normal: no A.D., no LOB, normal gait pattern, deviates <6in              (2) Mild impairment: uses A.D., slower speed, mild gait deviations, deviates 6-10 in              (1) Moderate impairment: slow speed, abnormal gait pattern, LOB, deviates 10-15 in              (0) Severe impairment: severe gait deviations or LOB, deviates >15in  10. Steps = 3              (3) Normal: alternating feet, no rail              (2) Mild Impairment: alternating feet, uses rail              (1) Moderate impairment: step-to, uses rail              (0) Severe impairment: cannot perform safely     Score 29/30     Assessment:     See d/c summary    Patient Education/Response:     CONT HEP    Plans and Goals:     Cont to advance PT as per POC    Short Term Goals = Long Term Goals (8 Weeks):   1.  Independent with initial HEP.  2.  Pt will perform nu-step at level 2 x 10 minutes without rests breaks going at least 50 step/min or greater. MET  3.  Pt will score greater than or equal to 27/30 on the FGA test/assessment without use of AD placing patient in 1-19% impaired, limited, or restricted category demonstrating overall improved functional mobility and balance. MET 29/30  4.  Pt will be able to walk in neighborhood ~1/2 to 1 mile at safe and coordinated speed. MET  6.  Pt will be able to safely perform and tolerate high level ADL's without LOB.  MET  7. Pt will have 0 falls from start of PT sessions.  MET  8. Pt will have MMT score of 5/5 in all major ms groups in B LE. MET  9. Pt will ambulate on TM x 10 minutes with use of UE support with 0  LOB at 2.0 mph or greater for gym fitness safely. MET  10. Pt will report 10% on the FOTO Functional Assessment placing the patient in the 40-60% impaired, limited, or restricted category indicating increased functional balance and  mobility.  7% impaired    REHAB SERVICES OUTPATIENT DISCHARGE SUMMARY  Physical Therapy      Name:  Luis Wong  Date:  8/21/18  Date of Evaluation:  6/11/18  Physician:  Karen  Total # Of Visits:  7  Cancelled:  0  No Shows:  0  Diagnosis:    1. Left-sided weakness         Physical/Functional Status:  At time of discharge, patient was able to increase overall household and community mobility as well as recreational mobility.  Pt is now back to playing golf and he has met all LTGs.     The patient is to be discharged from our Therapy service for the following reason(s):  Patient has met all of his/her goals    Degree of Goal Achievement:  Patient has met all goals, see above    Patient Education:  CONT HEP and community fitness and recreation.    Discharge Plan:  Home Program and recreational fitness and cont OT for UE impairments.
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
## 2018-08-28 ENCOUNTER — CLINICAL SUPPORT (OUTPATIENT)
Dept: REHABILITATION | Facility: HOSPITAL | Age: 63
End: 2018-08-28
Payer: COMMERCIAL

## 2018-08-28 DIAGNOSIS — M62.81 MUSCLE WEAKNESS: ICD-10-CM

## 2018-08-28 DIAGNOSIS — R27.8 DECREASED COORDINATION: ICD-10-CM

## 2018-08-28 DIAGNOSIS — M25.60 STIFFNESS IN JOINT: ICD-10-CM

## 2018-08-28 PROCEDURE — 97110 THERAPEUTIC EXERCISES: CPT | Mod: PN

## 2018-08-28 PROCEDURE — 97530 THERAPEUTIC ACTIVITIES: CPT | Mod: PN
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
## 2018-09-04 ENCOUNTER — CLINICAL SUPPORT (OUTPATIENT)
Dept: REHABILITATION | Facility: HOSPITAL | Age: 63
End: 2018-09-04
Payer: COMMERCIAL

## 2018-09-04 DIAGNOSIS — R27.8 DECREASED COORDINATION: ICD-10-CM

## 2018-09-04 DIAGNOSIS — M62.81 MUSCLE WEAKNESS: ICD-10-CM

## 2018-09-04 DIAGNOSIS — M25.60 STIFFNESS IN JOINT: ICD-10-CM

## 2018-09-04 PROCEDURE — 97110 THERAPEUTIC EXERCISES: CPT | Mod: PN

## 2018-09-04 PROCEDURE — 97530 THERAPEUTIC ACTIVITIES: CPT | Mod: PN
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
## 2018-09-04 NOTE — PATIENT INSTRUCTIONS
ROM: Flexion - Wand (Supine)        Lie on back holding wand. Raise arms over head.   Repeat 15 times per set. Do 2 sets per session. Do 3-5 sessions per day.     [email or web address]     Abduction (Side-Lying)        Lie on left side. Raise arm above head. Keep palm forward.  Repeat 15 times per set. Do 2 sets per session. Do 3-5 sessions per day.     [email or web address]     Copyright © [employer]. All rights reserved.    External Rotation: Side-Lying (Dumbbell)        Lie with neck supported, left elbow bent to 90°, forearm across stomach. Raise forearm, keeping elbow at side.  Repeat ____ times per set. Do ____ sets per session. Do ____ sessions per week. Use ____ lb weight.      Copyright © [employer]. All rights reserved.         Pull Down: Standing        Face anchor in stride stance. Grasp bar, palms down, at shoulder height. Pull bar in to chest.  Repeat 15 times per set. Do 2 sets per session. Do 3-5 sessions per week.  [place] Height: Over Head     [email or web address]     Copyright © [employer]. All rights reserved.  Low Row: Thumbs Up        Face anchor, medium to wide stance. Thumbs up, pull arms back, squeezing shoulder blades together.   Repeat 15 times per set. Do 2 sets per session. Do 3-5 sessions per week.  [place] Height: Waist     [email or web address]     Copyright © [employer]. All rights reserved.      Rotation: External (Single Arm)      Side toward anchor in shoulder width stance with elbow bent to 90°, arm across mid-section. Thumb up, pull arm away from body, keeping elbow bent.  Repeat 15 times per set. Repeat with other arm. Do 2 sets per session. Do 3-5 sessions per week.  [place] Height: Waist     [email or web address]     Copyright © [employer]. All rights reserved.   Rotation: Internal (Single Arm)        Side toward anchor in shoulder width stance with elbow bent to 90°, forearm away from body. Thumb up, pull arm across body keeping elbow bent.  Repeat 15 times per set. Repeat with other arm. Do 2 sets  per session. Do 3-5 sessions per week.  [place] Height: Waist     [email or web address]     Copyright © [employer]. All rights reserved.   Rotation: External in Abduction (Single Arm)      Face anchor in shoulder width stance with elbow bent at 90°, forearm in front. Palm down, pull forearm up.  Repeat 15 times per set. Repeat with other arm. Do 2 sets per session. Do 3-5 sessions per week.  [place] Height: Waist     [email or web address]     Copyright © [employer]. All rights reserved.   Fly: Reverse        Face anchor in stride stance, reaching forward, thumbs up. Pull arms apart and back, squeezing shoulder blades together at end position.  Repeat 15 times per set. Do 2 sets per session. Do 3-5 sessions per week.  [place] Height: Chest     [email or web address]     Copyright © [employer]. All rights reserved.            Copyright © [employer]. All rights reserved.   Abduction: Horizontal - Prone (Dumbbell)        Lie with right arm hanging down. Lift arm out to side, thumb up.  Repeat __10__ times per set. Do __3__ sets per session. Do _5___ sessions per week. Use __3-5__ lb weight.

## 2018-09-05 ENCOUNTER — OFFICE VISIT (OUTPATIENT)
Dept: INTERNAL MEDICINE | Facility: CLINIC | Age: 63
End: 2018-09-05

## 2018-09-05 ENCOUNTER — CLINICAL SUPPORT (OUTPATIENT)
Dept: INTERNAL MEDICINE | Facility: CLINIC | Age: 63
End: 2018-09-05

## 2018-09-05 ENCOUNTER — HOSPITAL ENCOUNTER (OUTPATIENT)
Dept: CARDIOLOGY | Facility: CLINIC | Age: 63
Discharge: HOME | End: 2018-09-05

## 2018-09-05 DIAGNOSIS — Z00.00 ROUTINE GENERAL MEDICAL EXAMINATION AT A HEALTH CARE FACILITY: Primary | ICD-10-CM

## 2018-09-05 DIAGNOSIS — Z00.00 ROUTINE GENERAL MEDICAL EXAMINATION AT A HEALTH CARE FACILITY: ICD-10-CM

## 2018-09-05 LAB
25(OH)D3+25(OH)D2 SERPL-MCNC: 31 NG/ML
ALBUMIN SERPL BCP-MCNC: 3.7 G/DL
ALP SERPL-CCNC: 80 U/L
ALT SERPL W/O P-5'-P-CCNC: 16 U/L
ANION GAP SERPL CALC-SCNC: 5 MMOL/L
AST SERPL-CCNC: 19 U/L
BILIRUB SERPL-MCNC: 1 MG/DL
BUN SERPL-MCNC: 16 MG/DL
CALCIUM SERPL-MCNC: 9.6 MG/DL
CHLORIDE SERPL-SCNC: 104 MMOL/L
CHOLEST SERPL-MCNC: 156 MG/DL
CHOLEST/HDLC SERPL: 1.9 {RATIO}
CO2 SERPL-SCNC: 32 MMOL/L
COMPLEXED PSA SERPL-MCNC: 0.34 NG/ML
CREAT SERPL-MCNC: 1.1 MG/DL
DIASTOLIC DYSFUNCTION: NO
ERYTHROCYTE [DISTWIDTH] IN BLOOD BY AUTOMATED COUNT: 13.2 %
EST. GFR  (AFRICAN AMERICAN): >60 ML/MIN/1.73 M^2
EST. GFR  (NON AFRICAN AMERICAN): >60 ML/MIN/1.73 M^2
ESTIMATED AVG GLUCOSE: 123 MG/DL
GLUCOSE SERPL-MCNC: 130 MG/DL
HBA1C MFR BLD HPLC: 5.9 %
HCT VFR BLD AUTO: 39.8 %
HDLC SERPL-MCNC: 82 MG/DL
HDLC SERPL: 52.6 %
HGB BLD-MCNC: 13.1 G/DL
LDLC SERPL CALC-MCNC: 63.2 MG/DL
MCH RBC QN AUTO: 27.7 PG
MCHC RBC AUTO-ENTMCNC: 32.9 G/DL
MCV RBC AUTO: 84 FL
NONHDLC SERPL-MCNC: 74 MG/DL
PLATELET # BLD AUTO: 153 K/UL
PMV BLD AUTO: 14 FL
POTASSIUM SERPL-SCNC: 4.1 MMOL/L
PROT SERPL-MCNC: 6.7 G/DL
RBC # BLD AUTO: 4.73 M/UL
SODIUM SERPL-SCNC: 141 MMOL/L
TRIGL SERPL-MCNC: 54 MG/DL
TSH SERPL DL<=0.005 MIU/L-ACNC: 0.51 UIU/ML
WBC # BLD AUTO: 3.8 K/UL

## 2018-09-05 PROCEDURE — 80053 COMPREHEN METABOLIC PANEL: CPT

## 2018-09-05 PROCEDURE — 84153 ASSAY OF PSA TOTAL: CPT

## 2018-09-05 PROCEDURE — 83036 HEMOGLOBIN GLYCOSYLATED A1C: CPT

## 2018-09-05 PROCEDURE — 82306 VITAMIN D 25 HYDROXY: CPT

## 2018-09-05 PROCEDURE — 99386 PREV VISIT NEW AGE 40-64: CPT | Mod: S$GLB,,, | Performed by: INTERNAL MEDICINE

## 2018-09-05 PROCEDURE — 85027 COMPLETE CBC AUTOMATED: CPT

## 2018-09-05 PROCEDURE — 80061 LIPID PANEL: CPT

## 2018-09-05 PROCEDURE — 93015 CV STRESS TEST SUPVJ I&R: CPT | Mod: S$GLB,,, | Performed by: INTERNAL MEDICINE

## 2018-09-05 PROCEDURE — 99999 PR PBB SHADOW E&M-EST. PATIENT-LVL III: CPT | Mod: PBBFAC,,, | Performed by: INTERNAL MEDICINE

## 2018-09-05 PROCEDURE — 84443 ASSAY THYROID STIM HORMONE: CPT

## 2018-09-10 VITALS
DIASTOLIC BLOOD PRESSURE: 84 MMHG | HEART RATE: 68 BPM | WEIGHT: 160.63 LBS | BODY MASS INDEX: 23.72 KG/M2 | SYSTOLIC BLOOD PRESSURE: 140 MMHG

## 2018-09-10 NOTE — PROGRESS NOTES
Subjective:       Patient ID: Luis Wong is a 62 y.o. male.    Chief Complaint: Executive Health    HPIPleasant gentleman from [place], [place] here for his Executive Health exam. Overall doing well, but reports having been hospitalized in June of this year after presenting to the ER with L hemiparesis ataxia. He was admitted and imaging studies showed a small vessl infarct in the R corona radiata distribution. He did not receive tPA as he was felt to be outside the window of treatment, but has done well since then. He was discharged on aspirin daily as well as blood pressure medications adjusted. He has been tolerating these well and has no complaints. He has resumed his normal activities and feels well. He has changed his diet significantly and overall doing well.  I am sending copies of his studies including blood work that showed fasting glucose level at 130 with A1-C at 5.9. CBC again showed mld, normocytic anemia - chronic and stable. Lipids had improved with cholesterol at 156 with excellent HDL fraction. All other parameters were within normal limits including negative stress test.  Review of Systems   Constitutional: Negative for activity change, appetite change, fatigue and unexpected weight change.   HENT: Negative.    Eyes: Negative for visual disturbance.   Respiratory: Negative for cough, shortness of breath and wheezing.    Cardiovascular: Negative for chest pain, palpitations and leg swelling.   Gastrointestinal: Negative for abdominal distention, abdominal pain and blood in stool.   Genitourinary: Negative.  Negative for difficulty urinating.   Musculoskeletal: Negative for arthralgias, back pain and joint swelling.   Skin: Negative.    Neurological: Negative for dizziness, facial asymmetry, weakness, light-headedness, numbness and headaches.   Hematological: Negative.        Objective:      Physical Exam   Constitutional: He is oriented to person, place, and time. He appears well-developed and  well-nourished. No distress.   HENT:   Head: Normocephalic and atraumatic.   Right Ear: External ear normal.   Left Ear: External ear normal.   Mouth/Throat: Oropharynx is clear and moist. No oropharyngeal exudate.   Eyes: Conjunctivae and EOM are normal. Pupils are equal, round, and reactive to light. Right eye exhibits no discharge. Left eye exhibits no discharge. No scleral icterus.   Neck: Normal range of motion. Neck supple. No JVD present. No thyromegaly present.   Cardiovascular: Normal rate, regular rhythm, normal heart sounds and intact distal pulses.   No murmur heard.  Pulmonary/Chest: Effort normal and breath sounds normal. No respiratory distress. He has no wheezes. He exhibits no tenderness.   Abdominal: Soft. Bowel sounds are normal. He exhibits no distension and no mass. There is no tenderness.   Musculoskeletal: Normal range of motion. He exhibits no edema or tenderness.   Lymphadenopathy:     He has no cervical adenopathy.   Neurological: He is alert and oriented to person, place, and time. A cranial nerve deficit is present. Coordination normal.   Mild L facial droop   Skin: Skin is warm and dry. No rash noted. He is not diaphoretic. No erythema.   Psychiatric: He has a normal mood and affect. His behavior is normal. Judgment and thought content normal.   Nursing note and vitals reviewed.      Assessment:       1. Routine general medical examination at a health care facility     2.    S/P R corona radiata small vessel stroke.   3.    Hypertension - on treatment.   4.    Normal lipid profile.  Plan:    1. Continue with current medications.         2. Monitor blood pressure; keep diary.         3. Continue with exercise as before.         4. Return to clinic in 1 year or sooner if needed.

## 2018-09-25 ENCOUNTER — TELEPHONE (OUTPATIENT)
Dept: FAMILY MEDICINE | Facility: CLINIC | Age: 63
End: 2018-09-25

## 2018-09-25 NOTE — TELEPHONE ENCOUNTER
----- Message from Mayra Perez sent at 9/25/2018 10:05 AM CDT -----  Contact: Sarah Bradley from Dr[facility] dental [facility]/[phone]  Patient had a stroke in June and is scheduled for a regular dental cleaning at 10:30 am so they need medical clearance ASAP to make sure if he is ok to have this done.     Please call and advise if this will be faxed to their office, just a simple note that says he is fit for this.     Fax number is [phone]

## 2018-10-01 ENCOUNTER — OFFICE VISIT (OUTPATIENT)
Dept: NEUROLOGY | Facility: CLINIC | Age: 63
End: 2018-10-01
Payer: COMMERCIAL

## 2018-10-01 VITALS
HEART RATE: 69 BPM | SYSTOLIC BLOOD PRESSURE: 149 MMHG | BODY MASS INDEX: 24.03 KG/M2 | HEIGHT: 69 IN | DIASTOLIC BLOOD PRESSURE: 75 MMHG | WEIGHT: 162.25 LBS

## 2018-10-01 DIAGNOSIS — M79.604 ACUTE LEG PAIN, RIGHT: ICD-10-CM

## 2018-10-01 DIAGNOSIS — Z86.73 HISTORY OF STROKE: ICD-10-CM

## 2018-10-01 DIAGNOSIS — R29.898 RIGHT LEG WEAKNESS: Primary | ICD-10-CM

## 2018-10-01 DIAGNOSIS — G56.01 CARPAL TUNNEL SYNDROME, RIGHT: ICD-10-CM

## 2018-10-01 PROBLEM — R53.1 LEFT-SIDED WEAKNESS: Status: RESOLVED | Noted: 2018-07-18 | Resolved: 2018-10-01

## 2018-10-01 PROCEDURE — 99214 OFFICE O/P EST MOD 30 MIN: CPT | Mod: S$GLB,,, | Performed by: PSYCHIATRY & NEUROLOGY

## 2018-10-01 PROCEDURE — 99999 PR PBB SHADOW E&M-EST. PATIENT-LVL III: CPT | Mod: PBBFAC,,, | Performed by: PSYCHIATRY & NEUROLOGY

## 2018-10-01 NOTE — LETTER
October 1, 2018      Juan Eduardo MD  [address]  [place] [address]           [facility] Neurology  [address]  [place] [address]  Phone: [phone]  Fax: [phone]          Patient: Luis Wong   MR Number: [id number]   YOB: 1955   Date of Visit: 10/1/2018       Dear Dr. Juan Eduardo:    Thank you for referring Luis Wong to me for evaluation. Attached you will find relevant portions of my assessment and plan of care.    If you have questions, please do not hesitate to call me. I look forward to following Luis Wong along with you.    Sincerely,    Brien Lopez MD    Enclosure  CC:  No Recipients    If you would like to receive this communication electronically, please contact [email or web address] or [phone] to request more information on HelpHub Link access.    For providers and/or their staff who would like to refer a patient to [facility], please contact us through our one-stop-shop provider referral line, [facility], at [phone].    If you feel you have received this communication in error or would no longer like to receive these types of communications, please e-mail [email or web address]

## 2018-10-01 NOTE — PATIENT INSTRUCTIONS
Having EMG and NCS Tests  You will be having electromyography (EMG) and nerve conduction studies (NCS) to measure muscle and nerve function. In most cases, both tests are done. NCS is most often done first. You will be asked to lie on an exam table with a blanket over you. You may have one or both of the following:    Nerve conduction study (NCS)  During NCS, mild electrical currents are used to test how fast impulses move along your nerves. The healthcare provider will put small metal disks (electrodes) on your skin on the area of your body being tested. This will be done using water-based gel or paste. A doctor or technologist will apply mild electrical currents to your skin. Your muscles will twitch, but the test wont harm you. Currents are usually applied to the same area several times. Usually the intensity of the electrical stimulation is increased on each body part. Despite some increasing discomfort that varies from person to person, the electrical shock is not dangerous. The test may continue on other parts of your body unless the reason for doing the test is limited to a small part of the body.  Electromyography (EMG)  Most of the electrodes will be removed for EMG. The doctor will clean the area being tested with alcohol. A very fine needle will be put into the muscles in this region. When the needle is inserted, you may feel as if your skin is being pinched. Try to relax and do as instructed, since you will be asked to relax and contract the muscle being tested. Following instructions will allow your doctor to interpret the test results.  Let the technologist know  For your safety and for the success of your test, tell the technologist if you:  · Have any bleeding problems.  · Take blood thinners (anticoagulants) or other medications, including aspirin.  · Have any immune system problems.  · Have had neck or back surgery.  You may also be asked questions about your overall health.  Before the  test  Prepare for your test as instructed. Shower or bathe, but don't use powder, oil, or lotion. Your skin should be clean and free of excess oil. Wear loose clothes. But know that you may be asked to change into a hospital gown. The entire test will take about 60 minutes. Be sure to allow extra time to check in.  After your test  Before you leave, all electrodes will be removed. You can then get right back to your normal routine. If you feel tired or have some discomfort, take it easy. If you were told to stop taking any medicines for your test, ask when you can start taking them again. Your doctor will let you know when your test results are ready.  Date Last Reviewed: 9/12/2015 © 2000-2017 [employer]. [address] [place] [address], [place] [address]. All rights reserved. This information is not intended as a substitute for professional medical care. Always follow your healthcare professional's instructions.

## 2018-10-01 NOTE — PROGRESS NOTES
[facility] NEUROLOGY  [facility] [place] [facility]    Date: 10/1/18  Patient Name: Luis Wong   MRN: [id number]   PCP: Juan Eduardo  Referring Provider: Juan Eduardo*    Assessment:   Luis Wong is a 62 y.o. male presenting in sedating, neuropathic right lower extremity pain.  Have reviewed patient's lower extremity Doppler which is normal. I have also personally reviewed his MRI brain which reveals right parietal lobe and corona radiata infarct.  Will evaluate lower extremity painful paresthesias with EMG.  Given upper extremity symptoms will also evaluate during this study for carpal tunnel.  Further workup pending results.  Patient states he has gabapentin home to use as needed for breakthrough pain but denies pain currently.  Plan:     Problem List Items Addressed This Visit        Neuro    History of stroke    Overview     R corona radiata, small artery infarct 6/2018         Current Assessment & Plan     -- on ASA and statin therapy           Other Visit Diagnoses     Right leg weakness    -  Primary    Relevant Orders    EMG W/ ULTRASOUND AND NERVE CONDUCTION TEST 4 Extremities    Acute leg pain, right        Relevant Orders    EMG W/ ULTRASOUND AND NERVE CONDUCTION TEST 4 Extremities    Carpal tunnel syndrome, right        Relevant Orders    EMG W/ ULTRASOUND AND NERVE CONDUCTION TEST 4 Extremities          Brien Lopez MD  [facility]   Department of Neurology    Patient note was created using MModal Dictation.  Any errors in syntax or even information may not have been identified and edited on initial review prior to signing this note.  Subjective:   Patient seen in consultation at the request of Juan Eduardo* for the evaluation of right leg pain. A copy of this note will be sent to the referring physician.        HPI:   Mr. Luis Wong is a 62 y.o. male presenting for evaluation of right arm and leg pain.  Patient presents with his wife  contributes to the history.  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  He reports over the past year he has had 2 episodes of paroxysmal, shooting, burning pain radiating from his right knee downward into his right ankle.  He denies associated sciatic pain or weakness however right lower extremity weakness is noted on exam.  He also notes concomitant right upper extremity pain radiating from his forearm into a median distribution in his right hand with associated numbness and hand weakness.  He states he often finds himself shaking his hands when he wakes due to the numbness and discomfort.  He denies any other focal neurologic deficits.      PAST MEDICAL HISTORY:  Past Medical History:   Diagnosis Date    Aneurysm 10/30/2012    Fever blister     Herpes infection     Hypertension     ICH (intracerebral hemorrhage)     Mixed hyperlipidemia 9/5/2013    Nontraumatic thalamic hemorrhage 8/31/2016    JAQUAN (obstructive sleep apnea)     Right-sided lacunar stroke 9/11/2014    SDH (subdural hematoma)     Special screening for malignant neoplasms, colon     Stroke        PAST SURGICAL HISTORY:  Past Surgical History:   Procedure Laterality Date    COLONOSCOPY N/A 12/14/2013    Performed by Beto Douglas MD at [facility] ([address] [place] [address])    Knee arthroscopic surgery         CURRENT MEDS:  Current Outpatient Medications   Medication Sig Dispense Refill    aspirin (ECOTRIN) 81 MG EC tablet Take 1 tablet (81 mg total) by mouth once daily. 90 tablet 3    atorvastatin (LIPITOR) 10 MG tablet Take 1 tablet (10 mg total) by mouth once daily. 90 tablet 3    hydroCHLOROthiazide (MICROZIDE) 12.5 mg capsule Take 1 capsule (12.5 mg total) by mouth once daily. 90 capsule 3    lisinopril (PRINIVIL,ZESTRIL) 20 MG tablet TAKE 1 TABLET BY MOUTH DAILY 30 tablet 2    potassium chloride (MICRO-K) 8 mEq CpSR Take 1 capsule (8 mEq total) by mouth once daily. 90 capsule 3     No  "current facility-administered medications for this visit.        ALLERGIES:  Review of patient's allergies indicates:  No Known Allergies    FAMILY HISTORY:  Family History   Problem Relation Age of Onset    Heart disease Mother     Diabetes Father     Cancer Sister         breast    Diabetes Paternal Grandmother     Diabetes Paternal Grandfather     Melanoma Neg Hx        SOCIAL HISTORY:  Social History     Tobacco Use    Smoking status: Never Smoker    Smokeless tobacco: Never Used   Substance Use Topics    Alcohol use: No    Drug use: No       Review of Systems:  12 review of systems is negative except for the symptoms mentioned in HPI.      Objective:     Vitals:    10/01/18 1527   BP: (!) 149/75   Pulse: 69   Weight: 73.6 kg (162 lb 4.1 oz)   Height: 5' 9" (1.753 m)     General: NAD, well nourished   Eyes: no tearing, discharge, no erythema   ENT: moist mucous membranes of the oral cavity, nares patent    Neck: Supple, full range of motion  Cardiovascular: Warm and well perfused, pulses equal and symmetrical  Lungs: Normal work of breathing, normal chest wall excursions  Skin: No rash, lesions, or breakdown on exposed skin  Psychiatry: Mood and affect are appropriate   Abdomen: soft, non tender, non distended  Extremeties: No cyanosis, clubbing or edema.    Neurological   MENTAL STATUS: Alert and oriented to person, place, and time. Attention and concentration within normal limits. Speech without dysarthria, able to name and repeat without difficulty. Recent and remote memory within normal limits   CRANIAL NERVES: Visual fields intact. PERRL. EOMI. Facial sensation intact. Face symmetrical. Hearing grossly intact. Full shoulder shrug bilaterally. Tongue protrudes midline   SENSORY: Sensation is intact to pin, light touch, vibration, proprioception and temperature throughout.    MOTOR: Normal bulk and tone.   5/5 deltoid, biceps, triceps, interosseous, hand  bilaterally. 4/5 R and 5/5 L iliopsoas, " 5/5 knee extension/flexion, foot dorsi/plantarflexion bilaterally.    REFLEXES: Symmetric and 2+ and brisk throughout.   CEREBELLAR/COORDINATION/GAIT: Gait steady .Finger to nose intact.

## 2018-10-15 RX ORDER — LISINOPRIL 20 MG/1
TABLET ORAL
Qty: 30 TABLET | Refills: 2 | Status: SHIPPED | OUTPATIENT
Start: 2018-10-15 | End: 2018-12-20

## 2018-11-12 ENCOUNTER — LAB VISIT (OUTPATIENT)
Dept: LAB | Facility: HOSPITAL | Age: 63
End: 2018-11-12
Attending: FAMILY MEDICINE
Payer: COMMERCIAL

## 2018-11-12 DIAGNOSIS — I63.9 CEREBROVASCULAR ACCIDENT (CVA), UNSPECIFIED MECHANISM: ICD-10-CM

## 2018-11-12 DIAGNOSIS — I10 ESSENTIAL HYPERTENSION: ICD-10-CM

## 2018-11-12 LAB
ALBUMIN SERPL BCP-MCNC: 3.6 G/DL
ALP SERPL-CCNC: 75 U/L
ALT SERPL W/O P-5'-P-CCNC: 14 U/L
ANION GAP SERPL CALC-SCNC: 9 MMOL/L
AST SERPL-CCNC: 18 U/L
BASOPHILS # BLD AUTO: 0.03 K/UL
BASOPHILS NFR BLD: 0.8 %
BILIRUB SERPL-MCNC: 0.4 MG/DL
BUN SERPL-MCNC: 13 MG/DL
CALCIUM SERPL-MCNC: 9.4 MG/DL
CHLORIDE SERPL-SCNC: 104 MMOL/L
CHOLEST SERPL-MCNC: 194 MG/DL
CHOLEST/HDLC SERPL: 2.7 {RATIO}
CO2 SERPL-SCNC: 29 MMOL/L
CREAT SERPL-MCNC: 1 MG/DL
DIFFERENTIAL METHOD: ABNORMAL
EOSINOPHIL # BLD AUTO: 0.3 K/UL
EOSINOPHIL NFR BLD: 6.9 %
ERYTHROCYTE [DISTWIDTH] IN BLOOD BY AUTOMATED COUNT: 12.6 %
EST. GFR  (AFRICAN AMERICAN): >60 ML/MIN/1.73 M^2
EST. GFR  (NON AFRICAN AMERICAN): >60 ML/MIN/1.73 M^2
GLUCOSE SERPL-MCNC: 133 MG/DL
HCT VFR BLD AUTO: 41.2 %
HDLC SERPL-MCNC: 72 MG/DL
HDLC SERPL: 37.1 %
HGB BLD-MCNC: 13.8 G/DL
IMM GRANULOCYTES # BLD AUTO: 0.01 K/UL
IMM GRANULOCYTES NFR BLD AUTO: 0.3 %
LDLC SERPL CALC-MCNC: 109.4 MG/DL
LYMPHOCYTES # BLD AUTO: 1.6 K/UL
LYMPHOCYTES NFR BLD: 42.4 %
MCH RBC QN AUTO: 28.2 PG
MCHC RBC AUTO-ENTMCNC: 33.5 G/DL
MCV RBC AUTO: 84 FL
MONOCYTES # BLD AUTO: 0.5 K/UL
MONOCYTES NFR BLD: 13.5 %
NEUTROPHILS # BLD AUTO: 1.4 K/UL
NEUTROPHILS NFR BLD: 36.1 %
NONHDLC SERPL-MCNC: 122 MG/DL
NRBC BLD-RTO: 0 /100 WBC
PLATELET # BLD AUTO: 153 K/UL
PMV BLD AUTO: ABNORMAL FL
POTASSIUM SERPL-SCNC: 4.2 MMOL/L
PROT SERPL-MCNC: 6.9 G/DL
RBC # BLD AUTO: 4.89 M/UL
SODIUM SERPL-SCNC: 142 MMOL/L
TRIGL SERPL-MCNC: 63 MG/DL
TSH SERPL DL<=0.005 MIU/L-ACNC: 0.55 UIU/ML
WBC # BLD AUTO: 3.77 K/UL

## 2018-11-12 PROCEDURE — 80053 COMPREHEN METABOLIC PANEL: CPT

## 2018-11-12 PROCEDURE — 36415 COLL VENOUS BLD VENIPUNCTURE: CPT | Mod: PO

## 2018-11-12 PROCEDURE — 84443 ASSAY THYROID STIM HORMONE: CPT

## 2018-11-12 PROCEDURE — 85025 COMPLETE CBC W/AUTO DIFF WBC: CPT

## 2018-11-12 PROCEDURE — 80061 LIPID PANEL: CPT

## 2018-12-04 ENCOUNTER — PROCEDURE VISIT (OUTPATIENT)
Dept: NEUROLOGY | Facility: CLINIC | Age: 63
End: 2018-12-04
Payer: COMMERCIAL

## 2018-12-04 DIAGNOSIS — G56.01 CARPAL TUNNEL SYNDROME, RIGHT: ICD-10-CM

## 2018-12-04 DIAGNOSIS — R29.898 RIGHT LEG WEAKNESS: ICD-10-CM

## 2018-12-04 DIAGNOSIS — M79.604 ACUTE LEG PAIN, RIGHT: ICD-10-CM

## 2018-12-04 DIAGNOSIS — G56.03 BILATERAL CARPAL TUNNEL SYNDROME: Primary | ICD-10-CM

## 2018-12-04 PROCEDURE — 95886 MUSC TEST DONE W/N TEST COMP: CPT | Mod: S$GLB,,, | Performed by: PSYCHIATRY & NEUROLOGY

## 2018-12-04 PROCEDURE — 95913 NRV CNDJ TEST 13/> STUDIES: CPT | Mod: S$GLB,,, | Performed by: PSYCHIATRY & NEUROLOGY

## 2018-12-10 ENCOUNTER — OFFICE VISIT (OUTPATIENT)
Dept: ORTHOPEDICS | Facility: CLINIC | Age: 63
End: 2018-12-10
Payer: COMMERCIAL

## 2018-12-10 VITALS — BODY MASS INDEX: 23.99 KG/M2 | WEIGHT: 162 LBS | HEIGHT: 69 IN

## 2018-12-10 DIAGNOSIS — G56.03 BILATERAL CARPAL TUNNEL SYNDROME: ICD-10-CM

## 2018-12-10 PROCEDURE — 99203 OFFICE O/P NEW LOW 30 MIN: CPT | Mod: 25,S$GLB,, | Performed by: ORTHOPAEDIC SURGERY

## 2018-12-10 PROCEDURE — 99999 PR PBB SHADOW E&M-EST. PATIENT-LVL III: CPT | Mod: PBBFAC,,, | Performed by: ORTHOPAEDIC SURGERY

## 2018-12-10 PROCEDURE — 20526 THER INJECTION CARP TUNNEL: CPT | Mod: RT,S$GLB,, | Performed by: ORTHOPAEDIC SURGERY

## 2018-12-10 RX ORDER — TRIAMCINOLONE ACETONIDE 40 MG/ML
20 INJECTION, SUSPENSION INTRA-ARTICULAR; INTRAMUSCULAR
Status: COMPLETED | OUTPATIENT
Start: 2018-12-10 | End: 2018-12-10

## 2018-12-10 RX ORDER — TRIAMCINOLONE ACETONIDE 40 MG/ML
40 INJECTION, SUSPENSION INTRA-ARTICULAR; INTRAMUSCULAR
Status: COMPLETED | OUTPATIENT
Start: 2018-12-10 | End: 2018-12-10

## 2018-12-10 RX ADMIN — TRIAMCINOLONE ACETONIDE 20 MG: 40 INJECTION, SUSPENSION INTRA-ARTICULAR; INTRAMUSCULAR at 03:12

## 2018-12-10 RX ADMIN — TRIAMCINOLONE ACETONIDE 40 MG: 40 INJECTION, SUSPENSION INTRA-ARTICULAR; INTRAMUSCULAR at 03:12

## 2018-12-10 NOTE — PROGRESS NOTES
INITIAL VISIT HISTORY:  This 63-year-old male presents for evaluation of   bilateral hand symptoms for the past several months.  He reports numbness and   tingling in both hands, right worse than left.  Symptoms are usually worse at   night and in the morning.  He has tried using some wrist braces, mainly during   the day.  It does not sound like he has really been using them at night.  He did   have a nerve test done recently, which showed evidence of bilateral carpal   tunnel syndrome, right worse than left.    PAST MEDICAL HISTORY:  Significant for aneurysm, hypertension, stroke and   subdural hematoma.    PAST SURGICAL HISTORY:  Includes knee arthroscopy.    FAMILY HISTORY:  Positive for diabetes and heart disease.    SOCIAL HISTORY:  The patient does not smoke or drink.    REVIEW OF SYSTEMS:  Negative for fever, chills and rashes.    CURRENT MEDICATIONS:  Reviewed on chart.    ALLERGIES:  NONE.    PHYSICAL EXAMINATION:  GENERAL:  A well-developed, well-nourished male, in no acute distress, alert and   oriented x3.  MUSCULOSKELETAL:  Examination of upper extremities is significant for the hands   demonstrating mild swelling, volar compartment, wrist bilaterally, right worse   than left.  Positive Tinel's sign bilaterally.  Phalen's test is positive on the   right and negative on the left.  Range of motion of wrists and fingers is full.     strength is slightly decreased on the right.  No atrophy in either hand.    IMPRESSION:  Bilateral carpal tunnel syndrome, right worse than left.    PLAN:  I explained the nature of the problem to the patient.  We discussed   injection versus surgery.  He would like to try an injection today, but only for   the right hand, which is the more symptomatic side.  After a pause for timeout,   he identified the right carpal tunnel, injected with Kenalog 20 mg, 0.5 mL   Xylocaine, sterile technique.  He tolerated the procedure well without   complication.  I have also recommended  that he go back to using his wrist   splints mainly at night for both hands and follow up in 1 month for recheck.  If   symptoms persist then we may recommend surgical treatment.      PONCE/IN  dd: 12/10/2018 15:32:43 (CST)  td: 12/11/2018 13:29:45 (CST)  Doc ID   #[id number]  Job ID #[id number]    CC:

## 2018-12-20 ENCOUNTER — OFFICE VISIT (OUTPATIENT)
Dept: FAMILY MEDICINE | Facility: CLINIC | Age: 63
End: 2018-12-20
Payer: COMMERCIAL

## 2018-12-20 VITALS
DIASTOLIC BLOOD PRESSURE: 80 MMHG | HEIGHT: 69 IN | OXYGEN SATURATION: 97 % | SYSTOLIC BLOOD PRESSURE: 150 MMHG | HEART RATE: 86 BPM | WEIGHT: 169.31 LBS | BODY MASS INDEX: 25.08 KG/M2

## 2018-12-20 DIAGNOSIS — I10 ESSENTIAL HYPERTENSION: Primary | ICD-10-CM

## 2018-12-20 DIAGNOSIS — E78.5 DYSLIPIDEMIA: ICD-10-CM

## 2018-12-20 DIAGNOSIS — R73.03 PRE-DIABETES: ICD-10-CM

## 2018-12-20 PROCEDURE — 99214 OFFICE O/P EST MOD 30 MIN: CPT | Mod: S$GLB,,, | Performed by: FAMILY MEDICINE

## 2018-12-20 PROCEDURE — 99999 PR PBB SHADOW E&M-EST. PATIENT-LVL III: CPT | Mod: PBBFAC,,, | Performed by: FAMILY MEDICINE

## 2018-12-20 RX ORDER — NIFEDIPINE 30 MG/1
30 TABLET, EXTENDED RELEASE ORAL DAILY
Qty: 90 TABLET | Refills: 3 | Status: SHIPPED | OUTPATIENT
Start: 2018-12-20 | End: 2019-12-12 | Stop reason: SDUPTHER

## 2018-12-20 NOTE — PROGRESS NOTES
Subjective:       Patient ID: Luis Wong is a 63 y.o. male.    Chief Complaint: Follow-up (on HTN) and Hypertension    63 years old male came to the clinic for blood pressure check.  Blood pressure today is slightly elevated.  No chest pain, palpitation, orthopnea or PND.  Patient previously diagnosed with prediabetes.  No polyuria, polydipsia or polyphagia.      Review of Systems   Constitutional: Negative.    HENT: Negative.    Eyes: Negative.    Respiratory: Negative.    Cardiovascular: Negative.    Gastrointestinal: Negative.    Genitourinary: Negative.    Musculoskeletal: Negative.    Skin: Negative.    Neurological: Negative.    Psychiatric/Behavioral: Negative.        Objective:      Physical Exam   Constitutional: He is oriented to person, place, and time. He appears well-developed and well-nourished. No distress.   HENT:   Head: Normocephalic and atraumatic.   Right Ear: External ear normal.   Left Ear: External ear normal.   Nose: Nose normal.   Mouth/Throat: Oropharynx is clear and moist. No oropharyngeal exudate.   Eyes: Conjunctivae and EOM are normal. Pupils are equal, round, and reactive to light. Right eye exhibits no discharge. Left eye exhibits no discharge. No scleral icterus.   Neck: Normal range of motion. Neck supple. No JVD present. No tracheal deviation present. No thyromegaly present.   Cardiovascular: Normal rate, regular rhythm, normal heart sounds and intact distal pulses. Exam reveals no gallop and no friction rub.   No murmur heard.  Pulmonary/Chest: Effort normal and breath sounds normal. No stridor. No respiratory distress. He has no wheezes. He has no rales. He exhibits no tenderness.   Abdominal: Soft. Bowel sounds are normal. He exhibits no distension and no mass. There is no tenderness. There is no rebound and no guarding.   Musculoskeletal: Normal range of motion. He exhibits no edema or tenderness.   Lymphadenopathy:     He has no cervical adenopathy.   Neurological: He is  alert and oriented to person, place, and time. He has normal reflexes. He displays normal reflexes. No cranial nerve deficit. He exhibits normal muscle tone. Coordination normal.   Skin: Skin is warm and dry. No rash noted. He is not diaphoretic. No erythema. No pallor.   Psychiatric: He has a normal mood and affect. His behavior is normal. Judgment and thought content normal.   Nursing note and vitals reviewed.      Assessment:       1. Essential hypertension    2. Dyslipidemia    3. Pre-diabetes        Plan:         Luis was seen today for follow-up and hypertension.    Diagnoses and all orders for this visit:    Essential hypertension  -     [facility] Enrollment Order  -     Hypertension Digital Medicine ([facility]): Assign Onboarding Questionnaires  -     NIFEdipine (PROCARDIA-XL) 30 MG (OSM) 24 hr tablet; Take 1 tablet (30 mg total) by mouth once daily.  -     Comprehensive metabolic panel; Future  -     Lipid panel; Future    Dyslipidemia  -     Comprehensive metabolic panel; Future  -     Lipid panel; Future    Pre-diabetes  -     Hemoglobin A1c; Future    Continue monitoring blood sugar at home,ADA diet.

## 2018-12-23 ENCOUNTER — PATIENT MESSAGE (OUTPATIENT)
Dept: ADMINISTRATIVE | Facility: OTHER | Age: 63
End: 2018-12-23

## 2019-01-08 ENCOUNTER — OFFICE VISIT (OUTPATIENT)
Dept: ORTHOPEDICS | Facility: CLINIC | Age: 64
End: 2019-01-08
Payer: COMMERCIAL

## 2019-01-08 VITALS — BODY MASS INDEX: 25.03 KG/M2 | WEIGHT: 169 LBS | HEIGHT: 69 IN

## 2019-01-08 DIAGNOSIS — G56.03 BILATERAL CARPAL TUNNEL SYNDROME: ICD-10-CM

## 2019-01-08 PROCEDURE — 99999 PR PBB SHADOW E&M-EST. PATIENT-LVL III: CPT | Mod: PBBFAC,,, | Performed by: ORTHOPAEDIC SURGERY

## 2019-01-08 PROCEDURE — 99999 PR PBB SHADOW E&M-EST. PATIENT-LVL III: ICD-10-PCS | Mod: PBBFAC,,, | Performed by: ORTHOPAEDIC SURGERY

## 2019-01-08 PROCEDURE — 99213 OFFICE O/P EST LOW 20 MIN: CPT | Mod: S$GLB,,, | Performed by: ORTHOPAEDIC SURGERY

## 2019-01-08 PROCEDURE — 99213 PR OFFICE/OUTPT VISIT, EST, LEVL III, 20-29 MIN: ICD-10-PCS | Mod: S$GLB,,, | Performed by: ORTHOPAEDIC SURGERY

## 2019-01-08 NOTE — PROGRESS NOTES
HISTORY OF PRESENT ILLNESS:  Mr. Wong is in followup of bilateral carpal   tunnel syndrome, had good results after the injection last visit.  Symptoms have   resolved about 90%.  There is occasional numbness in the right hand and   fingers.    PHYSICAL EXAMINATION:  Both hands look good.  There is a mildly positive Tinel   sign on the right, negative on the left.  Range of motion of fingers is full.    Sensation is intact.  Good  strength.    PLAN:  He is doing well, so we will just continue to observe symptoms for now,   but I would like him to make an appointment for a month or two, just in case   symptoms recur.      LOULOU  dd: 01/08/2019 08:26:12 (CST)  td: 01/08/2019 11:56:00 (CST)  Doc ID   #[id number]  Job ID #[id number]    CC:

## 2019-01-13 DIAGNOSIS — I10 ESSENTIAL HYPERTENSION: Primary | ICD-10-CM

## 2019-01-13 RX ORDER — IRBESARTAN 150 MG/1
150 TABLET ORAL NIGHTLY
Qty: 90 TABLET | Refills: 3 | Status: SHIPPED | OUTPATIENT
Start: 2019-01-13 | End: 2019-03-25

## 2019-01-13 RX ORDER — LISINOPRIL 20 MG/1
TABLET ORAL
Qty: 30 TABLET | Refills: 2 | OUTPATIENT
Start: 2019-01-13

## 2019-01-16 ENCOUNTER — PATIENT MESSAGE (OUTPATIENT)
Dept: ADMINISTRATIVE | Facility: OTHER | Age: 64
End: 2019-01-16

## 2019-01-16 ENCOUNTER — PATIENT MESSAGE (OUTPATIENT)
Dept: FAMILY MEDICINE | Facility: CLINIC | Age: 64
End: 2019-01-16

## 2019-01-30 ENCOUNTER — PATIENT OUTREACH (OUTPATIENT)
Dept: OTHER | Facility: OTHER | Age: 64
End: 2019-01-30

## 2019-01-30 NOTE — LETTER
February 7, 2019     Luis Wong  [address]  [place] [address]       Dear Luis,    Welcome to [facility]! Our goal is to make care effective, proactive and convenient by using data you send us from home to better treat your chronic conditions.          My name is Yulia Escamilla, and I am your dedicated Digital Medicine clinician. As an expert in medication management, I will help ensure that the medications you are taking continue to provide the intended benefits and help you reach your goals. You can reach me directly at [phone] or by sending me a message directly through your [facility] account.        I am Taylor Mckeon and I will be your health . My job is to help you identify lifestyle changes to improve your disease control. We will talk about nutrition, exercise, and other ways you may be able to adjust your current habits to better your health. Additionally, we will help ensure you are completing the tests and screenings that are necessary to help manage your conditions. You can reach me directly at  or by sending me a message directly through your [facility] account.    Most importantly, YOU are at the center of this team. Together, we will work to improve your overall health and encourage you to meet your goals for a healthier lifestyle.     What we expect from YOU:  · Please take frequent home blood pressure measurements. We ask that you take at least 1 blood pressure reading per week, but more information will better help us get you know you. Be sure you rest for a few minutes before taking the reading in a quiet, comfortable place.     Be available to receive phone calls or [facility] messages, when appropriate, from your care team. Please let us know if there are any specific days or times that work best for us to reach you via phone.     Complete routine tests and screenings. Dont worry, we will help keep you on track!           What you should expect from  your Digital Medicine Care Team:   We will work with you to create a personalized plan of care and provide you with encouragement and education, including regarding lifestyle changes, that could help you manage your disease states.     We will adjust your current medications, if needed, and continue to monitor your long-term progress.     We will provide you and your physician with monthly progress reports after you have been in the program for more than 30 days.     We will send you reminders through [facility] and text messages to help ensure you do not miss any testing deadlines to help manage your disease states.    You will be able to reach us by phone or through your [facility] account by clicking our names under Care Team on the right side of the home screen.    I look forward to working with you to achieve your blood pressure goals!    We look forward to working with you to help manage your health,    Sincerely,    Your Digital Medicine Team    Please visit our websites to learn more:   · Hypertension: [email or web address]      Remember, we are not available for emergencies. If you have an emergency, please contact your doctors office directly or call [facility] ([phone] or [phone]) or [phone].

## 2019-01-30 NOTE — PROGRESS NOTES
1st attempt for enrollment call. Left voicemail.         Last 5 Patient Entered Readings                                      Current 30 Day Average: 131/72     Recent Readings 1/30/2019 1/30/2019 1/28/2019 1/28/2019 1/25/2019    SBP (mmHg) 127 144 115 123 143    DBP (mmHg) 69 68 71 68 73    Pulse 69 75 106 108 82

## 2019-02-07 NOTE — PROGRESS NOTES
Digital Medicine Enrollment Call    Introduced Mr. Luis Wong to [facility].     Discussed program expectations and requirements.    Introduced digital medicine care team.     Reviewed the importance of self-monitoring for digital medicine participation.     Reviewed that the Digital Medicine team is not available for emergencies and instructed the patient to call 911 or [facility] ([phone] or [phone]) if one arises.    Pt advsied that we have not received any data from him since 1/31/19. He states that he has been taking BP readings everyday. Pt advised to sign into [facility] account to have readings transmitted. He states that he spoke with IT support last week regarding the same issue. Pt request to be transferred to IT support to further assist. Once enrollment call completed, call transferred to IT support.            Last 5 Patient Entered Readings                                      Current 30 Day Average: 132/73     Recent Readings 1/31/2019 1/30/2019 1/30/2019 1/28/2019 1/28/2019    SBP (mmHg) 132 127 144 115 123    DBP (mmHg) 74 69 68 71 68    Pulse 82 69 75 106 108

## 2019-02-11 RX ORDER — LISINOPRIL 20 MG/1
TABLET ORAL
Qty: 30 TABLET | Refills: 2 | OUTPATIENT
Start: 2019-02-11

## 2019-02-11 NOTE — TELEPHONE ENCOUNTER
Lisinopril was changed because of lung cancer risk.    Patient currently on Avapro.    Please notify the patient.

## 2019-02-12 ENCOUNTER — PATIENT OUTREACH (OUTPATIENT)
Dept: OTHER | Facility: OTHER | Age: 64
End: 2019-02-12

## 2019-02-12 NOTE — PROGRESS NOTES
Last 5 Patient Entered Readings                                      Current 30 Day Average: 130/71     Recent Readings 2/11/2019 2/8/2019 2/7/2019 2/7/2019 2/6/2019    SBP (mmHg) 118 114 128 125 138    DBP (mmHg) 68 65 69 68 74    Pulse 109 91 100 80 80          Digital Medicine: Health  Introduction    Introduced Mr. Luis Wong to Digital Medicine. Discussed health  role and recommended lifestyle modifications.    Patient reports he spoke with Kirsten to fix IT problems. No questions or concerns about BP technique.     Lifestyle Assessment:  Current Dietary Habits(i.e. low sodium, food labels, dining out): Patient reports in June of 2018 he changed his eating habits to plant based. Patient reports he eats oats, vegetables, whole wheat, grains. Patient reports he eats no meat at all. Patient will eat eggs once a week.     Exercise: Patient reports since the holidays and vacation he has currently not been working out. In the past, he was doing weight training/ walking 4-5x a week. 30 minutes for walking and 30 minutes for weight training.  Patient will set goal next encounter.      Alcohol/Tobacco: None.     Medication Adherence: has been compliant with the medicaiton regimen.      Reviewed AHA/AACE recommendations:  Limit sodium intake to <2000mg/day  Perform 150 minutes of physical activity per week    Reviewed the importance of self-monitoring, medication adherence, and that the health  can be used as a resource for lifestyle modifications to help reduce or maintain a healthy lifestyle.  Reviewed that the Digital Medicine team is not available for emergencies and instructed the patient to call 911 or [facility] ([phone] or [phone]) if one arises.

## 2019-02-19 ENCOUNTER — OFFICE VISIT (OUTPATIENT)
Dept: ORTHOPEDICS | Facility: CLINIC | Age: 64
End: 2019-02-19
Payer: COMMERCIAL

## 2019-02-19 VITALS — WEIGHT: 169 LBS | HEIGHT: 69 IN | BODY MASS INDEX: 25.03 KG/M2

## 2019-02-19 DIAGNOSIS — G56.03 BILATERAL CARPAL TUNNEL SYNDROME: Primary | ICD-10-CM

## 2019-02-19 PROCEDURE — 99999 PR PBB SHADOW E&M-EST. PATIENT-LVL III: CPT | Mod: PBBFAC,,, | Performed by: ORTHOPAEDIC SURGERY

## 2019-02-19 PROCEDURE — 20526 PR INJECT CARPAL TUNNEL: ICD-10-PCS | Mod: RT,S$GLB,, | Performed by: ORTHOPAEDIC SURGERY

## 2019-02-19 PROCEDURE — 99213 OFFICE O/P EST LOW 20 MIN: CPT | Mod: 25,S$GLB,, | Performed by: ORTHOPAEDIC SURGERY

## 2019-02-19 PROCEDURE — 99999 PR PBB SHADOW E&M-EST. PATIENT-LVL III: ICD-10-PCS | Mod: PBBFAC,,, | Performed by: ORTHOPAEDIC SURGERY

## 2019-02-19 PROCEDURE — 99213 PR OFFICE/OUTPT VISIT, EST, LEVL III, 20-29 MIN: ICD-10-PCS | Mod: 25,S$GLB,, | Performed by: ORTHOPAEDIC SURGERY

## 2019-02-19 PROCEDURE — 20526 THER INJECTION CARP TUNNEL: CPT | Mod: RT,S$GLB,, | Performed by: ORTHOPAEDIC SURGERY

## 2019-02-19 RX ORDER — TRIAMCINOLONE ACETONIDE 40 MG/ML
20 INJECTION, SUSPENSION INTRA-ARTICULAR; INTRAMUSCULAR
Status: COMPLETED | OUTPATIENT
Start: 2019-02-19 | End: 2019-02-19

## 2019-02-19 RX ADMIN — TRIAMCINOLONE ACETONIDE 20 MG: 40 INJECTION, SUSPENSION INTRA-ARTICULAR; INTRAMUSCULAR at 08:02

## 2019-02-19 NOTE — PROGRESS NOTES
HISTORY OF PRESENT ILLNESS:  Mr. Wong in followup of bilateral carpal tunnel   syndrome, having a flare-up in the right hand, but not the left.  The injection   worked well a few months back.  He would like to have this repeated.  He is   considering surgery in the future.    PHYSICAL EXAMINATION:  RIGHT HAND:  Mild swelling.  Positive Tinel sign.  Negative Phalen test.  Range   of motion in wrist and fingers full.  No atrophy.    IMPRESSION:  Right carpal tunnel syndrome.    PLAN:  After pause for timeout, he identified the right wrist, injected right   carpal tunnel with combination of Kenalog 20 mg, 0.5 mL Xylocaine, sterile   technique, tolerated the procedure well without complication.    Recommended that he continue with the wrist splint at night, keep an eye on   symptoms and followup as needed.  If symptoms persist or worsen, then I would   recommend surgical release.      LOULOU  dd: 02/19/2019 08:44:10 (CST)  td: 02/20/2019 06:33:09 (CST)  Doc ID   #[id number]  Job ID #[id number]    CC:

## 2019-02-25 ENCOUNTER — PATIENT OUTREACH (OUTPATIENT)
Dept: OTHER | Facility: OTHER | Age: 64
End: 2019-02-25

## 2019-02-25 NOTE — PROGRESS NOTES
HPI:  Mr. Luis Wong is a 63 y.o. male who is newly enrolled in the [facility]. Pertinent PMH includes. Stroke. Reviewed allergies and current list of medications on file. Patient attributes fluctuation in blood pressure readings to inconsistent use of medication. Patient often forgets evening dose of irbesartan and attributes higher readings to missed doses.     Last 5 Patient Entered Readings                                      Current 30 Day Average: 126/69     Recent Readings 2/24/2019 2/23/2019 2/22/2019 2/20/2019 2/19/2019    SBP (mmHg) 112 122 138 142 136    DBP (mmHg) 66 67 62 68 66    Pulse 87 89 86 65 84          Patient denies s/s of hypotension (lightheadedness, dizziness, nausea, fatigue) associated with low readings. Instructed patient to inform me if this occurs, patient confirms understanding.    Patient denies s/s of hypertension (SOB, CP, severe headaches, changes in vision) associated with high readings. Instructed patient to go to the ED if BP >180/110 and accompanied by hypertensive s/s, patient confirms understanding.    Assessment:  Patient's current 30-day average is at goal of <130/80 mmHg.    Plan:  Continue current regimen.  Discussed setting an alarm to increase adherence to medication, patient is open to suggestion.   Patients health , Taylor Mckeon, will be following up every 3-4 weeks.   I will continue to monitor regularly and will follow-up in 4 to 5 weeks, sooner if blood pressure begins to trend upward or downward.     Current medication regimen:  Hypertension Medications             hydroCHLOROthiazide (MICROZIDE) 12.5 mg capsule Take 1 capsule (12.5 mg total) by mouth once daily.    irbesartan (AVAPRO) 150 MG tablet Take 1 tablet (150 mg total) by mouth every evening.    NIFEdipine (PROCARDIA-XL) 30 MG (OSM) 24 hr tablet Take 1 tablet (30 mg total) by mouth once daily.        Patient has my contact information and knows to call with any  concerns or clinical changes.

## 2019-03-12 ENCOUNTER — PATIENT OUTREACH (OUTPATIENT)
Dept: OTHER | Facility: OTHER | Age: 64
End: 2019-03-12

## 2019-03-12 NOTE — PROGRESS NOTES
"Last 5 Patient Entered Readings                                      Current 30 Day Average: 123/68     Recent Readings 3/12/2019 3/11/2019 3/10/2019 3/9/2019 3/9/2019    SBP (mmHg) 136 115 125 105 98    DBP (mmHg) 71 69 62 60 61    Pulse 93 92 82 92 86          Digital Medicine: Health  Follow Up    Patient reports he wants to stay the same weight. He is not trying to lose anymore. Patient is concerned about the numbers not being consistent. Patient reports no signs or symptoms with lower readings.     Lifestyle Modifications:    1.Dietary Modifications (Sodium intake <2,000mg/day, food labels, dining out): No change in dietary habits. Patient reports he does not add salt to meals. Patient had no questions or concerns about improving dietary habits. Encouraged patient to keep up the great work.     2.Physical Activity: Working out at home. Patient reports he has been working out for about month. 4-5x/wk for 30 minutes to an hour. Patient reports he is doing cardio. Plans to add weights (dumbbells). Patient declines making a SMG.     3.Medication Therapy: Patient has been compliant with the medication regimen.    4.Patient has the following medication side effects/concerns: Patient reports he is feeling "okay". Patient reports "ever so often he feels lightheaded". Patient reports when he bends over he will experience being lightheaded.       Follow up with Mr. Luis Wong completed. No further questions or concerns. Will continue to follow up to achieve health goals.    "

## 2019-03-25 ENCOUNTER — PATIENT OUTREACH (OUTPATIENT)
Dept: OTHER | Facility: OTHER | Age: 64
End: 2019-03-25

## 2019-03-25 DIAGNOSIS — I10 ESSENTIAL HYPERTENSION: ICD-10-CM

## 2019-03-25 RX ORDER — IRBESARTAN 150 MG/1
75 TABLET ORAL NIGHTLY
Qty: 45 TABLET | Refills: 3
Start: 2019-03-25 | End: 2019-06-26

## 2019-03-25 NOTE — PROGRESS NOTES
HPI:  Called Mr. Luis Wong for hypertension follow-up. Patient reports adherence to medication regimen. He has noticed some lightheadedness since taking irbesartan nightly as discussed. Symptoms occur when DBP is < 60 mmHg. Patient admits that he does not drink enough water during the winter months and tat this may be a contributing factor as well.     Last 5 Patient Entered Readings                                      Current 30 Day Average: 121/65     Recent Readings 3/25/2019 3/24/2019 3/23/2019 3/22/2019 3/21/2019    SBP (mmHg) 116 120 122 126 115    DBP (mmHg) 57 67 62 68 60    Pulse 70 80 87 70 97          Patient denies s/s of hypotension (lightheadedness, dizziness, nausea, fatigue) associated with low readings. Instructed patient to inform me if this occurs, patient confirms understanding.    Patient denies s/s of hypertension (SOB, CP, severe headaches, changes in vision) associated with high readings. Instructed patient to go to the ED if BP >180/110 and accompanied by hypertensive s/s, patient confirms understanding.    Assessment:  Patient's current 30-day average is at goal of <130/80 mmHg.     Plan:  Reduce irbesartan to 75 mg nightly and continue other medications as prescribed.  Encouraged patient to increase intake of mainly water to at least 64 oz/day.   Patients health , Taylor Mckeon, will be following up every 3-4 weeks.   I will continue to monitor regularly and will follow-up in 3 to 4 weeks, sooner if blood pressure begins to trend upward or downward.     Current medication regimen:  Hypertension Medications             hydroCHLOROthiazide (MICROZIDE) 12.5 mg capsule Take 1 capsule (12.5 mg total) by mouth once daily.    irbesartan (AVAPRO) 150 MG tablet Take 0.5 tablets (75 mg total) by mouth every evening.    NIFEdipine (PROCARDIA-XL) 30 MG (OSM) 24 hr tablet Take 1 tablet (30 mg total) by mouth once daily.        Patient has my contact information and knows to call with any  concerns or clinical changes.

## 2019-04-11 DIAGNOSIS — M79.604 RIGHT LEG PAIN: ICD-10-CM

## 2019-04-11 RX ORDER — GABAPENTIN 300 MG/1
300 CAPSULE ORAL NIGHTLY
Qty: 30 CAPSULE | Refills: 1 | Status: SHIPPED | OUTPATIENT
Start: 2019-04-11 | End: 2019-05-07 | Stop reason: SDUPTHER

## 2019-04-16 ENCOUNTER — OFFICE VISIT (OUTPATIENT)
Dept: ORTHOPEDICS | Facility: CLINIC | Age: 64
End: 2019-04-16
Payer: COMMERCIAL

## 2019-04-16 VITALS — HEIGHT: 69 IN | BODY MASS INDEX: 25.03 KG/M2 | WEIGHT: 169 LBS

## 2019-04-16 DIAGNOSIS — G56.03 BILATERAL CARPAL TUNNEL SYNDROME: Primary | ICD-10-CM

## 2019-04-16 PROCEDURE — 20526 THER INJECTION CARP TUNNEL: CPT | Mod: RT,S$GLB,, | Performed by: ORTHOPAEDIC SURGERY

## 2019-04-16 PROCEDURE — 99213 OFFICE O/P EST LOW 20 MIN: CPT | Mod: 25,S$GLB,, | Performed by: ORTHOPAEDIC SURGERY

## 2019-04-16 PROCEDURE — 99999 PR PBB SHADOW E&M-EST. PATIENT-LVL II: ICD-10-PCS | Mod: PBBFAC,,, | Performed by: ORTHOPAEDIC SURGERY

## 2019-04-16 PROCEDURE — 99999 PR PBB SHADOW E&M-EST. PATIENT-LVL II: CPT | Mod: PBBFAC,,, | Performed by: ORTHOPAEDIC SURGERY

## 2019-04-16 PROCEDURE — 20526 PR INJECT CARPAL TUNNEL: ICD-10-PCS | Mod: RT,S$GLB,, | Performed by: ORTHOPAEDIC SURGERY

## 2019-04-16 PROCEDURE — 99213 PR OFFICE/OUTPT VISIT, EST, LEVL III, 20-29 MIN: ICD-10-PCS | Mod: 25,S$GLB,, | Performed by: ORTHOPAEDIC SURGERY

## 2019-04-16 RX ORDER — TRIAMCINOLONE ACETONIDE 40 MG/ML
20 INJECTION, SUSPENSION INTRA-ARTICULAR; INTRAMUSCULAR
Status: COMPLETED | OUTPATIENT
Start: 2019-04-16 | End: 2019-04-16

## 2019-04-16 RX ADMIN — TRIAMCINOLONE ACETONIDE 20 MG: 40 INJECTION, SUSPENSION INTRA-ARTICULAR; INTRAMUSCULAR at 09:04

## 2019-04-16 NOTE — PROGRESS NOTES
Subjective:      Patient ID: Luis Wong is a 63 y.o. male.  Chief Complaint: Pain of the Right Hand      HPI  Luis Wong is a  63 y.o. male presenting today for follow up of right carpal tunnel syndrome.  He reports that he is having a flare-up again  He does well with the injections every few months and is trying to hold off on surgery at least until this summer  He would like another injection today.    Review of patient's allergies indicates:  No Known Allergies      Current Outpatient Medications   Medication Sig Dispense Refill    aspirin (ECOTRIN) 81 MG EC tablet Take 1 tablet (81 mg total) by mouth once daily. 90 tablet 3    atorvastatin (LIPITOR) 10 MG tablet Take 1 tablet (10 mg total) by mouth once daily. 90 tablet 3    hydroCHLOROthiazide (MICROZIDE) 12.5 mg capsule Take 1 capsule (12.5 mg total) by mouth once daily. 90 capsule 3    irbesartan (AVAPRO) 150 MG tablet Take 0.5 tablets (75 mg total) by mouth every evening. 45 tablet 3    potassium chloride (MICRO-K) 8 mEq CpSR Take 1 capsule (8 mEq total) by mouth once daily. 90 capsule 3    gabapentin (NEURONTIN) 300 MG capsule TAKE 1 CAPSULE (300 MG TOTAL) BY MOUTH EVERY EVENING. 30 capsule 1    NIFEdipine (PROCARDIA-XL) 30 MG (OSM) 24 hr tablet Take 1 tablet (30 mg total) by mouth once daily. 90 tablet 3     No current facility-administered medications for this visit.        Past Medical History:   Diagnosis Date    Aneurysm 10/30/2012    Fever blister     Herpes infection     Hypertension     ICH (intracerebral hemorrhage)     Mixed hyperlipidemia 9/5/2013    Nontraumatic thalamic hemorrhage 8/31/2016    JAQUAN (obstructive sleep apnea)     Right-sided lacunar stroke 9/11/2014    SDH (subdural hematoma)     Special screening for malignant neoplasms, colon     Stroke        Past Surgical History:   Procedure Laterality Date    COLONOSCOPY N/A 12/14/2013    Performed by [facility] at [facility] (4TH F[facility])    Knee  "arthroscopic surgery         OBJECTIVE:   PHYSICAL EXAM:  Height: 5' 9" (175.3 cm) Weight: 76.7 kg (169 lb)  Vitals:    04/16/19 0940   Weight: 76.7 kg (169 lb)   Height: 5' 9" (1.753 m)   PainSc:   8     Ortho/SPM Exam  Examination right hand and wrist there is some mild swelling volar compartment wrist  Positive Tinel sign  Positive Phalen's test  Range of motion wrists fingers full no atrophy    RADIOGRAPHS:  None  Comments: I have personally reviewed the imaging and I agree with the above radiologist's report.    ASSESSMENT/PLAN:     IMPRESSION:  Right carpal tunnel syndrome    PLAN:  After pause for time-out identified the right wrist injected right carpal tunnel with combination Kenalog 20 mg 0.5 cc xylocaine sterile technique  He tolerated the procedure well without complication  Continue wrist splint at night Advil or Motrin by mouth  Follow up 2-3 months to schedule surgery    FOLLOW UP:  2-3 months    Disclaimer: This note has been generated using voice-recognition software. There may be typographical errors that have been missed during proof-reading.  "

## 2019-04-23 ENCOUNTER — PATIENT OUTREACH (OUTPATIENT)
Dept: OTHER | Facility: OTHER | Age: 64
End: 2019-04-23

## 2019-04-23 NOTE — PROGRESS NOTES
Last 5 Patient Entered Readings                                      Current 30 Day Average: 129/72     Recent Readings 4/20/2019 4/18/2019 4/17/2019 4/15/2019 4/14/2019    SBP (mmHg) 136 123 126 134 129    DBP (mmHg) 72 63 79 74 81    Pulse 69 75 96 77 84        4/23/19: LVM for follow-up. BP is trending upward since reducing irbesartan.   5/7/19: LVM for follow-up. Patient is still at goal though average is slightly higher. WCB in 3 weeks.   5/31/19: 3rd failed attempt will send message and follow-up in 6 weeks as BP average is currently at goal of <130/80 mmHg.   07/12/2019: 4th failed attempt for follow-up.

## 2019-05-07 DIAGNOSIS — I10 ESSENTIAL HYPERTENSION: ICD-10-CM

## 2019-05-07 DIAGNOSIS — M79.604 RIGHT LEG PAIN: ICD-10-CM

## 2019-05-07 RX ORDER — GABAPENTIN 300 MG/1
300 CAPSULE ORAL NIGHTLY
Qty: 30 CAPSULE | Refills: 0 | Status: SHIPPED | OUTPATIENT
Start: 2019-05-07 | End: 2019-05-30 | Stop reason: SDUPTHER

## 2019-05-07 RX ORDER — POTASSIUM CHLORIDE 600 MG/1
8 CAPSULE, EXTENDED RELEASE ORAL DAILY
Qty: 90 CAPSULE | Refills: 3 | Status: SHIPPED | OUTPATIENT
Start: 2019-05-07 | End: 2020-04-14

## 2019-05-16 ENCOUNTER — PATIENT OUTREACH (OUTPATIENT)
Dept: OTHER | Facility: OTHER | Age: 64
End: 2019-05-16

## 2019-05-16 NOTE — PROGRESS NOTES
Last 5 Patient Entered Readings                                      Current 30 Day Average: 128/69     Recent Readings 5/15/2019 5/14/2019 5/13/2019 5/12/2019 5/12/2019    SBP (mmHg) 129 131 121 132 144    DBP (mmHg) 74 64 61 67 77    Pulse 70 90 94 81 79            Digital Medicine: Health  Follow Up    Left voicemail to follow up with  Luis Wong.  Current BP average 128/69 mmHg is at goal, [130/80 mmHg].

## 2019-05-25 ENCOUNTER — PATIENT MESSAGE (OUTPATIENT)
Dept: ADMINISTRATIVE | Facility: OTHER | Age: 64
End: 2019-05-25

## 2019-05-30 DIAGNOSIS — M79.604 RIGHT LEG PAIN: ICD-10-CM

## 2019-05-30 RX ORDER — GABAPENTIN 300 MG/1
300 CAPSULE ORAL NIGHTLY
Qty: 30 CAPSULE | Refills: 0 | Status: SHIPPED | OUTPATIENT
Start: 2019-05-30 | End: 2019-06-26

## 2019-05-31 PROCEDURE — 99091 PR DIGITAL MEDICINE SERVICES, HYPERTENSION, ESTABLISHED: ICD-10-PCS | Mod: ,,,

## 2019-05-31 PROCEDURE — 99091 COLLJ & INTERPJ DATA EA 30 D: CPT | Mod: ,,,

## 2019-06-10 NOTE — PROGRESS NOTES
Last 5 Patient Entered Readings                                      Current 30 Day Average: 123/68     Recent Readings 6/9/2019 6/7/2019 6/4/2019 6/3/2019 5/31/2019    SBP (mmHg) 124 116 118 113 120    DBP (mmHg) 65 62 71 64 58    Pulse 93 92 82 87 80          Digital Medicine: Health  Follow Up    Lifestyle Modifications:    1.Dietary Modifications (Sodium intake <2,000mg/day, food labels, dining out): Patient is still doing a [employer] diet. Patient reports no changes in dietary habits. Patient had no questions or concerns about improving dietary habits. Encouraged patient to keep up the great work.     2.Physical Activity: Patient has been renovating houses. Patient is not doing a set exercise routine. Patient would like to start exercise routine again once finished with renovations. Will f/u next outreach.     3.Medication Therapy: Patient has been compliant with the medication regimen. Patient reports he stopped taking irbesartan.     4.Patient has the following medication side effects/concerns: None.     Follow up with Mr. Luis Wong completed. No further questions or concerns. Will continue to follow up to achieve health goals.

## 2019-06-15 PROCEDURE — 99091 PR DIGITAL MEDICINE SERVICES, HYPERTENSION, ESTABLISHED: ICD-10-PCS | Mod: ,,,

## 2019-06-15 PROCEDURE — 99091 COLLJ & INTERPJ DATA EA 30 D: CPT | Mod: ,,,

## 2019-06-19 DIAGNOSIS — E78.5 DYSLIPIDEMIA: ICD-10-CM

## 2019-06-19 RX ORDER — ATORVASTATIN CALCIUM 10 MG/1
TABLET, FILM COATED ORAL
Qty: 90 TABLET | Refills: 3 | Status: ON HOLD | OUTPATIENT
Start: 2019-06-19 | End: 2019-08-30 | Stop reason: HOSPADM

## 2019-06-24 ENCOUNTER — LAB VISIT (OUTPATIENT)
Dept: LAB | Facility: HOSPITAL | Age: 64
End: 2019-06-24
Attending: FAMILY MEDICINE
Payer: COMMERCIAL

## 2019-06-24 DIAGNOSIS — I10 ESSENTIAL HYPERTENSION: ICD-10-CM

## 2019-06-24 DIAGNOSIS — E78.5 DYSLIPIDEMIA: ICD-10-CM

## 2019-06-24 DIAGNOSIS — R73.03 PRE-DIABETES: ICD-10-CM

## 2019-06-24 LAB
ALBUMIN SERPL BCP-MCNC: 3.7 G/DL (ref 3.5–5.2)
ALP SERPL-CCNC: 74 U/L (ref 55–135)
ALT SERPL W/O P-5'-P-CCNC: 38 U/L (ref 10–44)
ANION GAP SERPL CALC-SCNC: 7 MMOL/L (ref 8–16)
AST SERPL-CCNC: 27 U/L (ref 10–40)
BILIRUB SERPL-MCNC: 0.5 MG/DL (ref 0.1–1)
BUN SERPL-MCNC: 12 MG/DL (ref 8–23)
CALCIUM SERPL-MCNC: 9.8 MG/DL (ref 8.7–10.5)
CHLORIDE SERPL-SCNC: 103 MMOL/L (ref 95–110)
CHOLEST SERPL-MCNC: 222 MG/DL (ref 120–199)
CHOLEST/HDLC SERPL: 2.9 {RATIO} (ref 2–5)
CO2 SERPL-SCNC: 29 MMOL/L (ref 23–29)
CREAT SERPL-MCNC: 1 MG/DL (ref 0.5–1.4)
EST. GFR  (AFRICAN AMERICAN): >60 ML/MIN/1.73 M^2
EST. GFR  (NON AFRICAN AMERICAN): >60 ML/MIN/1.73 M^2
ESTIMATED AVG GLUCOSE: 160 MG/DL (ref 68–131)
GLUCOSE SERPL-MCNC: 162 MG/DL (ref 70–110)
HBA1C MFR BLD HPLC: 7.2 % (ref 4–5.6)
HDLC SERPL-MCNC: 76 MG/DL (ref 40–75)
HDLC SERPL: 34.2 % (ref 20–50)
LDLC SERPL CALC-MCNC: 128.8 MG/DL (ref 63–159)
NONHDLC SERPL-MCNC: 146 MG/DL
POTASSIUM SERPL-SCNC: 4 MMOL/L (ref 3.5–5.1)
PROT SERPL-MCNC: 6.8 G/DL (ref 6–8.4)
SODIUM SERPL-SCNC: 139 MMOL/L (ref 136–145)
TRIGL SERPL-MCNC: 86 MG/DL (ref 30–150)

## 2019-06-24 PROCEDURE — 83036 HEMOGLOBIN GLYCOSYLATED A1C: CPT

## 2019-06-24 PROCEDURE — 80053 COMPREHEN METABOLIC PANEL: CPT

## 2019-06-24 PROCEDURE — 36415 COLL VENOUS BLD VENIPUNCTURE: CPT | Mod: PO

## 2019-06-24 PROCEDURE — 80061 LIPID PANEL: CPT

## 2019-06-26 ENCOUNTER — OFFICE VISIT (OUTPATIENT)
Dept: FAMILY MEDICINE | Facility: CLINIC | Age: 64
End: 2019-06-26
Payer: COMMERCIAL

## 2019-06-26 VITALS
SYSTOLIC BLOOD PRESSURE: 128 MMHG | OXYGEN SATURATION: 99 % | WEIGHT: 163.56 LBS | BODY MASS INDEX: 24.23 KG/M2 | DIASTOLIC BLOOD PRESSURE: 80 MMHG | HEIGHT: 69 IN | HEART RATE: 79 BPM

## 2019-06-26 DIAGNOSIS — I10 ESSENTIAL HYPERTENSION: Primary | ICD-10-CM

## 2019-06-26 DIAGNOSIS — Z12.5 SCREENING FOR PROSTATE CANCER: ICD-10-CM

## 2019-06-26 DIAGNOSIS — E11.65 TYPE 2 DIABETES MELLITUS WITH HYPERGLYCEMIA, WITHOUT LONG-TERM CURRENT USE OF INSULIN: ICD-10-CM

## 2019-06-26 PROCEDURE — 99999 PR PBB SHADOW E&M-EST. PATIENT-LVL III: ICD-10-PCS | Mod: PBBFAC,,, | Performed by: FAMILY MEDICINE

## 2019-06-26 PROCEDURE — 99214 OFFICE O/P EST MOD 30 MIN: CPT | Mod: S$GLB,,, | Performed by: FAMILY MEDICINE

## 2019-06-26 PROCEDURE — 99214 PR OFFICE/OUTPT VISIT, EST, LEVL IV, 30-39 MIN: ICD-10-PCS | Mod: S$GLB,,, | Performed by: FAMILY MEDICINE

## 2019-06-26 PROCEDURE — 99999 PR PBB SHADOW E&M-EST. PATIENT-LVL III: CPT | Mod: PBBFAC,,, | Performed by: FAMILY MEDICINE

## 2019-06-26 NOTE — PROGRESS NOTES
Subjective:       Patient ID: Luis Wong is a 63 y.o. male.    Chief Complaint: Follow-up (x 6 months); Hypertension; and Hand Pain (on and off)    63 years old came to the clinic for blood pressure check.  Blood pressure today stable.  No Chest pain, palpitation, orthopnea or PND.  Last A1c was elevated.  Patient is willing to try metformin.  Patient reports eating significant amount of carbohydrates because his diet is plant based, he is eating a lot of potatoes.    Review of Systems   Constitutional: Negative.  Negative for activity change and unexpected weight change.   HENT: Negative.  Negative for hearing loss, rhinorrhea and trouble swallowing.    Eyes: Negative.  Negative for discharge and visual disturbance.   Respiratory: Negative.  Negative for chest tightness and wheezing.    Cardiovascular: Negative.  Negative for chest pain, palpitations and leg swelling.   Gastrointestinal: Negative.  Negative for blood in stool, constipation, diarrhea and vomiting.   Endocrine: Negative for polydipsia, polyphagia and polyuria.   Genitourinary: Negative.  Negative for difficulty urinating, hematuria and urgency.   Musculoskeletal: Negative.  Negative for arthralgias, joint swelling and neck pain.   Skin: Negative.    Neurological: Negative.  Negative for weakness and headaches.   Psychiatric/Behavioral: Negative.  Negative for confusion and dysphoric mood.       Objective:      Physical Exam   Constitutional: He is oriented to person, place, and time. He appears well-developed and well-nourished. No distress.   HENT:   Head: Normocephalic and atraumatic.   Right Ear: External ear normal.   Left Ear: External ear normal.   Nose: Nose normal.   Mouth/Throat: Oropharynx is clear and moist. No oropharyngeal exudate.   Eyes: Pupils are equal, round, and reactive to light. Conjunctivae and EOM are normal. Right eye exhibits no discharge. Left eye exhibits no discharge. No scleral icterus.   Neck: Normal range of motion.  Neck supple. No JVD present. No tracheal deviation present. No thyromegaly present.   Cardiovascular: Normal rate, regular rhythm, normal heart sounds and intact distal pulses. Exam reveals no gallop and no friction rub.   No murmur heard.  Pulmonary/Chest: Effort normal and breath sounds normal. No stridor. No respiratory distress. He has no wheezes. He has no rales. He exhibits no tenderness.   Abdominal: Soft. Bowel sounds are normal. He exhibits no distension and no mass. There is no tenderness. There is no rebound and no guarding.   Musculoskeletal: Normal range of motion. He exhibits no edema or tenderness.   Feet:   Right Foot:   Protective Sensation: 10 sites tested. 10 sites sensed.   Skin Integrity: Negative for ulcer, blister, skin breakdown, erythema, warmth, callus or dry skin.   Left Foot:   Protective Sensation: 10 sites tested. 10 sites sensed.   Skin Integrity: Negative for ulcer, blister, skin breakdown, erythema, warmth, callus or dry skin.   Lymphadenopathy:     He has no cervical adenopathy.   Neurological: He is alert and oriented to person, place, and time. He has normal reflexes. He displays normal reflexes. No cranial nerve deficit. He exhibits normal muscle tone. Coordination normal.   Skin: Skin is warm and dry. No rash noted. He is not diaphoretic. No erythema. No pallor.   Psychiatric: He has a normal mood and affect. His behavior is normal. Judgment and thought content normal.   Nursing note and vitals reviewed.      Assessment:       1. Essential hypertension    2. Type 2 diabetes mellitus with hyperglycemia, without long-term current use of insulin    3. Screening for prostate cancer        Plan:         Luis was seen today for follow-up, hypertension and hand pain.    Diagnoses and all orders for this visit:    Essential hypertension  -     Comprehensive metabolic panel; Future  -     Lipid panel; Future    Type 2 diabetes mellitus with hyperglycemia, without long-term current use  of insulin  -     Hemoglobin A1c; Future  -     Microalbumin/creatinine urine ratio; Future    Screening for prostate cancer  -     PSA, Screening; Future    Continue monitoring blood pressure at home, low sodium diet.  Continue monitoring blood sugar at home,ADA diet.

## 2019-06-27 ENCOUNTER — OFFICE VISIT (OUTPATIENT)
Dept: ORTHOPEDICS | Facility: CLINIC | Age: 64
End: 2019-06-27
Payer: COMMERCIAL

## 2019-06-27 VITALS — HEIGHT: 69 IN | BODY MASS INDEX: 24.16 KG/M2

## 2019-06-27 DIAGNOSIS — G56.03 BILATERAL CARPAL TUNNEL SYNDROME: Primary | ICD-10-CM

## 2019-06-27 PROCEDURE — 99213 PR OFFICE/OUTPT VISIT, EST, LEVL III, 20-29 MIN: ICD-10-PCS | Mod: 25,S$GLB,, | Performed by: ORTHOPAEDIC SURGERY

## 2019-06-27 PROCEDURE — 20526 THER INJECTION CARP TUNNEL: CPT | Mod: RT,S$GLB,, | Performed by: ORTHOPAEDIC SURGERY

## 2019-06-27 PROCEDURE — 20526 PR INJECT CARPAL TUNNEL: ICD-10-PCS | Mod: RT,S$GLB,, | Performed by: ORTHOPAEDIC SURGERY

## 2019-06-27 PROCEDURE — 99999 PR PBB SHADOW E&M-EST. PATIENT-LVL II: ICD-10-PCS | Mod: PBBFAC,,, | Performed by: ORTHOPAEDIC SURGERY

## 2019-06-27 PROCEDURE — 99213 OFFICE O/P EST LOW 20 MIN: CPT | Mod: 25,S$GLB,, | Performed by: ORTHOPAEDIC SURGERY

## 2019-06-27 PROCEDURE — 99999 PR PBB SHADOW E&M-EST. PATIENT-LVL II: CPT | Mod: PBBFAC,,, | Performed by: ORTHOPAEDIC SURGERY

## 2019-06-27 RX ORDER — TRIAMCINOLONE ACETONIDE 40 MG/ML
20 INJECTION, SUSPENSION INTRA-ARTICULAR; INTRAMUSCULAR
Status: COMPLETED | OUTPATIENT
Start: 2019-06-27 | End: 2019-06-27

## 2019-06-27 RX ADMIN — TRIAMCINOLONE ACETONIDE 20 MG: 40 INJECTION, SUSPENSION INTRA-ARTICULAR; INTRAMUSCULAR at 09:06

## 2019-06-27 NOTE — PROGRESS NOTES
"Subjective:      Patient ID: Luis Wong is a 63 y.o. male.  Chief Complaint: No chief complaint on file.      HPI  Luis Wong is a  63 y.o. male presenting today for follow up of right carpal tunnel syndrome.  He reports that he is having a flare-up  He does well with injections which last about 4 months he is trying to avoid surgery at least until the end of the year  Symptoms are about the same including nocturnal symptoms some pain and numbness.    Review of patient's allergies indicates:  No Known Allergies      Current Outpatient Medications   Medication Sig Dispense Refill    aspirin (ECOTRIN) 81 MG EC tablet Take 1 tablet (81 mg total) by mouth once daily. 90 tablet 3    atorvastatin (LIPITOR) 10 MG tablet TAKE 1 TABLET BY MOUTH EVERY DAY 90 tablet 3    hydroCHLOROthiazide (MICROZIDE) 12.5 mg capsule Take 1 capsule (12.5 mg total) by mouth once daily. 90 capsule 3    NIFEdipine (PROCARDIA-XL) 30 MG (OSM) 24 hr tablet Take 1 tablet (30 mg total) by mouth once daily. 90 tablet 3    potassium chloride (MICRO-K) 8 mEq CpSR TAKE 1 CAPSULE (8 MEQ TOTAL) BY MOUTH ONCE DAILY. 90 capsule 3     No current facility-administered medications for this visit.        Past Medical History:   Diagnosis Date    Aneurysm 10/30/2012    Fever blister     Herpes infection     Hypertension     ICH (intracerebral hemorrhage)     Mixed hyperlipidemia 9/5/2013    Nontraumatic thalamic hemorrhage 8/31/2016    JAQUAN (obstructive sleep apnea)     Right-sided lacunar stroke 9/11/2014    SDH (subdural hematoma)     Special screening for malignant neoplasms, colon     Stroke        Past Surgical History:   Procedure Laterality Date    COLONOSCOPY N/A 12/14/2013    Performed by [facility] at [facility] (4TH FLR)    Knee arthroscopic surgery         OBJECTIVE:   PHYSICAL EXAM:  Height: 5' 9" (175.3 cm)    Vitals:    06/27/19 0849 06/27/19 0850   Height: 5' 9" (1.753 m)    PainSc:   3   3   PainLoc: Hand  "     Ortho/SPM Exam  Right hand demonstrates mild swelling positive Tinel sign positive Phalen's test range of motion fingers full  Sensation slightly decreased at the tips of all digits  No atrophy    RADIOGRAPHS:  None  Comments: I have personally reviewed the imaging and I agree with the above radiologist's report.    ASSESSMENT/PLAN:     IMPRESSION:  Right carpal tunnel syndrome    PLAN:  After pause for time-out identified the right carpal tunnel injected with Kenalog 20 mg 0.5 cc xylocaine sterile technique  Tolerated the procedure well without complication  Continue wrist splint at night      FOLLOW UP:  2-3 months    Disclaimer: This note has been generated using voice-recognition software. There may be typographical errors that have been missed during proof-reading.

## 2019-06-29 ENCOUNTER — PATIENT MESSAGE (OUTPATIENT)
Dept: FAMILY MEDICINE | Facility: CLINIC | Age: 64
End: 2019-06-29

## 2019-07-01 ENCOUNTER — PATIENT MESSAGE (OUTPATIENT)
Dept: FAMILY MEDICINE | Facility: CLINIC | Age: 64
End: 2019-07-01

## 2019-07-01 ENCOUNTER — TELEPHONE (OUTPATIENT)
Dept: FAMILY MEDICINE | Facility: CLINIC | Age: 64
End: 2019-07-01

## 2019-07-01 DIAGNOSIS — E11.65 TYPE 2 DIABETES MELLITUS WITH HYPERGLYCEMIA, WITHOUT LONG-TERM CURRENT USE OF INSULIN: Primary | ICD-10-CM

## 2019-07-01 RX ORDER — METFORMIN HYDROCHLORIDE 500 MG/1
500 TABLET ORAL
Qty: 90 TABLET | Refills: 3 | Status: SHIPPED | OUTPATIENT
Start: 2019-07-01 | End: 2020-06-09

## 2019-07-02 DIAGNOSIS — M79.604 RIGHT LEG PAIN: ICD-10-CM

## 2019-07-02 RX ORDER — GABAPENTIN 300 MG/1
300 CAPSULE ORAL NIGHTLY
Qty: 30 CAPSULE | Refills: 1 | Status: ON HOLD | OUTPATIENT
Start: 2019-07-02 | End: 2019-08-29

## 2019-07-03 ENCOUNTER — PATIENT MESSAGE (OUTPATIENT)
Dept: FAMILY MEDICINE | Facility: CLINIC | Age: 64
End: 2019-07-03

## 2019-07-03 ENCOUNTER — PATIENT OUTREACH (OUTPATIENT)
Dept: ADMINISTRATIVE | Facility: OTHER | Age: 64
End: 2019-07-03

## 2019-07-05 RX ORDER — HYDROCHLOROTHIAZIDE 12.5 MG/1
12.5 CAPSULE ORAL DAILY
Qty: 90 CAPSULE | Refills: 3 | Status: SHIPPED | OUTPATIENT
Start: 2019-07-05 | End: 2019-07-09 | Stop reason: SDUPTHER

## 2019-07-07 ENCOUNTER — PATIENT MESSAGE (OUTPATIENT)
Dept: FAMILY MEDICINE | Facility: CLINIC | Age: 64
End: 2019-07-07

## 2019-07-10 RX ORDER — ASPIRIN 81 MG/1
TABLET ORAL
Qty: 90 TABLET | Refills: 3 | Status: SHIPPED | OUTPATIENT
Start: 2019-07-10 | End: 2020-06-08

## 2019-07-10 RX ORDER — HYDROCHLOROTHIAZIDE 12.5 MG/1
CAPSULE ORAL
Qty: 90 CAPSULE | Refills: 3 | Status: SHIPPED | OUTPATIENT
Start: 2019-07-10 | End: 2020-06-10

## 2019-07-30 DIAGNOSIS — Z00.00 ROUTINE GENERAL MEDICAL EXAMINATION AT A HEALTH CARE FACILITY: Primary | ICD-10-CM

## 2019-07-30 PROCEDURE — 99091 COLLJ & INTERPJ DATA EA 30 D: CPT | Mod: ,,,

## 2019-07-30 PROCEDURE — 99091 PR DIGITAL MEDICINE SERVICES, HYPERTENSION, ESTABLISHED: ICD-10-PCS | Mod: ,,,

## 2019-08-08 NOTE — PROGRESS NOTES
HPI:  Called Mr. Luis Wong for hypertension digital medicine follow-up. Currently taking hydrochlorothiazide and nifedipine and doing well. Higher readings were the result of being out of hydrochlorothiazide however, patient has resumed taking medications daily. He has no additional questions or concerns at this time.     Last 5 Patient Entered Readings                                      Current 30 Day Average: 127/66     Recent Readings 8/6/2019 8/4/2019 7/30/2019 7/29/2019 7/24/2019    SBP (mmHg) 125 132 133 132 139    DBP (mmHg) 72 77 62 64 72    Pulse 71 79 74 89 86          Patient denies s/s of hypotension (lightheadedness, dizziness, nausea, fatigue) associated with low readings. Instructed patient to inform me if this occurs, patient confirms understanding.    Patient denies s/s of hypertension (SOB, CP, severe headaches, changes in vision) associated with high readings. Instructed patient to go to the ED if BP >180/110 and accompanied by hypertensive s/s, patient confirms understanding.    Assessment:  Patient's current 30-day average is at goal of <130/80 mmHg.    Plan:  Continue current regimen.   Patients health , Taylor Mckeon, will follow-up as scheduled.    I will continue to monitor regularly and will follow-up in 3-6 months, sooner if blood pressure begins to trend upward or downward.     Current medication regimen:  Hypertension Medications             hydroCHLOROthiazide (MICROZIDE) 12.5 mg capsule TAKE 1 CAPSULE BY MOUTH EVERY DAY    NIFEdipine (PROCARDIA-XL) 30 MG (OSM) 24 hr tablet Take 1 tablet (30 mg total) by mouth once daily.        Patient has my contact information and knows to call with any concerns or clinical changes.

## 2019-08-26 ENCOUNTER — PATIENT OUTREACH (OUTPATIENT)
Dept: ADMINISTRATIVE | Facility: OTHER | Age: 64
End: 2019-08-26

## 2019-08-26 ENCOUNTER — OFFICE VISIT (OUTPATIENT)
Dept: ORTHOPEDICS | Facility: CLINIC | Age: 64
End: 2019-08-26
Payer: COMMERCIAL

## 2019-08-26 VITALS — HEIGHT: 69 IN | BODY MASS INDEX: 24.14 KG/M2 | WEIGHT: 163 LBS

## 2019-08-26 DIAGNOSIS — G56.03 BILATERAL CARPAL TUNNEL SYNDROME: Primary | ICD-10-CM

## 2019-08-26 PROCEDURE — 20526 PR INJECT CARPAL TUNNEL: ICD-10-PCS | Mod: RT,S$GLB,, | Performed by: ORTHOPAEDIC SURGERY

## 2019-08-26 PROCEDURE — 99999 PR PBB SHADOW E&M-EST. PATIENT-LVL III: ICD-10-PCS | Mod: PBBFAC,,, | Performed by: ORTHOPAEDIC SURGERY

## 2019-08-26 PROCEDURE — 20526 THER INJECTION CARP TUNNEL: CPT | Mod: RT,S$GLB,, | Performed by: ORTHOPAEDIC SURGERY

## 2019-08-26 PROCEDURE — 99213 OFFICE O/P EST LOW 20 MIN: CPT | Mod: 25,S$GLB,, | Performed by: ORTHOPAEDIC SURGERY

## 2019-08-26 PROCEDURE — 99213 PR OFFICE/OUTPT VISIT, EST, LEVL III, 20-29 MIN: ICD-10-PCS | Mod: 25,S$GLB,, | Performed by: ORTHOPAEDIC SURGERY

## 2019-08-26 PROCEDURE — 99999 PR PBB SHADOW E&M-EST. PATIENT-LVL III: CPT | Mod: PBBFAC,,, | Performed by: ORTHOPAEDIC SURGERY

## 2019-08-26 RX ORDER — TRIAMCINOLONE ACETONIDE 40 MG/ML
20 INJECTION, SUSPENSION INTRA-ARTICULAR; INTRAMUSCULAR
Status: COMPLETED | OUTPATIENT
Start: 2019-08-26 | End: 2019-08-26

## 2019-08-26 RX ADMIN — TRIAMCINOLONE ACETONIDE 20 MG: 40 INJECTION, SUSPENSION INTRA-ARTICULAR; INTRAMUSCULAR at 03:08

## 2019-08-26 NOTE — PROGRESS NOTES
Subjective:      Patient ID: Luis Wong is a 63 y.o. male.  Chief Complaint: Pain of the Right Hand      HPI  Luis Wong is a  63 y.o. male presenting today for follow up of right  carpal tunnel syndrome.  He reports that he is having a flare-up in the right hand  Numbness and tingling is reported  Particularly worse at night with lifting  He would like to consider surgery later in the year.    Review of patient's allergies indicates:  No Known Allergies      Current Outpatient Medications   Medication Sig Dispense Refill    aspirin (ECOTRIN) 81 MG EC tablet TAKE 1 TABLET BY MOUTH EVERY DAY 90 tablet 3    atorvastatin (LIPITOR) 10 MG tablet TAKE 1 TABLET BY MOUTH EVERY DAY 90 tablet 3    hydroCHLOROthiazide (MICROZIDE) 12.5 mg capsule TAKE 1 CAPSULE BY MOUTH EVERY DAY 90 capsule 3    metFORMIN (GLUCOPHAGE) 500 MG tablet Take 1 tablet (500 mg total) by mouth daily with breakfast. 90 tablet 3    NIFEdipine (PROCARDIA-XL) 30 MG (OSM) 24 hr tablet Take 1 tablet (30 mg total) by mouth once daily. 90 tablet 3    potassium chloride (MICRO-K) 8 mEq CpSR TAKE 1 CAPSULE (8 MEQ TOTAL) BY MOUTH ONCE DAILY. 90 capsule 3    gabapentin (NEURONTIN) 300 MG capsule TAKE 1 CAPSULE (300 MG TOTAL) BY MOUTH EVERY EVENING. 30 capsule 1     No current facility-administered medications for this visit.        Past Medical History:   Diagnosis Date    Aneurysm 10/30/2012    Fever blister     Herpes infection     Hypertension     ICH (intracerebral hemorrhage)     Mixed hyperlipidemia 9/5/2013    Nontraumatic thalamic hemorrhage 8/31/2016    JAQUAN (obstructive sleep apnea)     Right-sided lacunar stroke 9/11/2014    SDH (subdural hematoma)     Special screening for malignant neoplasms, colon     Stroke        Past Surgical History:   Procedure Laterality Date    COLONOSCOPY N/A 12/14/2013    Performed by [facility] at [facility] (4TH FLR)    Knee arthroscopic surgery         OBJECTIVE:   PHYSICAL  "EXAM:  Height: 5' 9" (175.3 cm) Weight: 73.9 kg (163 lb)  Vitals:    08/26/19 1514   Weight: 73.9 kg (163 lb)   Height: 5' 9" (1.753 m)   PainSc:   4     Ortho/SPM Exam  Examination right hand wrist there is some mild swelling positive Tinel sign negative Phalen's test range of motion wrist fingers full    RADIOGRAPHS:  None  Comments: I have personally reviewed the imaging and I agree with the above radiologist's report.    ASSESSMENT/PLAN:     IMPRESSION:  Right carpal tunnel syndrome    PLAN:  Patient would like injection today.  After pause for time-out identified the right carpal tunnel injected with Kenalog 20 mg 0.5 cc xylocaine sterile technique  Tolerated the procedure well without complication  Continue bracing at night      FOLLOW UP:  2 months if symptoms persist consider surgical treatment for right carpal tunnel release    Disclaimer: This note has been generated using voice-recognition software. There may be typographical errors that have been missed during proof-reading.  "

## 2019-08-28 ENCOUNTER — HOSPITAL ENCOUNTER (EMERGENCY)
Facility: HOSPITAL | Age: 64
Discharge: HOME | End: 2019-08-28
Attending: EMERGENCY MEDICINE
Payer: COMMERCIAL

## 2019-08-28 VITALS
WEIGHT: 160 LBS | TEMPERATURE: 98 F | HEART RATE: 68 BPM | SYSTOLIC BLOOD PRESSURE: 165 MMHG | OXYGEN SATURATION: 100 % | BODY MASS INDEX: 23.63 KG/M2 | RESPIRATION RATE: 20 BRPM | DIASTOLIC BLOOD PRESSURE: 73 MMHG

## 2019-08-28 DIAGNOSIS — I69.998 WEAKNESS AS LATE EFFECT OF CEREBROVASCULAR ACCIDENT (CVA): Primary | ICD-10-CM

## 2019-08-28 DIAGNOSIS — R53.1 WEAKNESS AS LATE EFFECT OF CEREBROVASCULAR ACCIDENT (CVA): Primary | ICD-10-CM

## 2019-08-28 DIAGNOSIS — R53.1 WEAKNESS: ICD-10-CM

## 2019-08-28 LAB
ALBUMIN SERPL BCP-MCNC: 4.5 G/DL (ref 3.5–5.2)
ALP SERPL-CCNC: 71 U/L (ref 55–135)
ALT SERPL W/O P-5'-P-CCNC: 25 U/L (ref 10–44)
ANION GAP SERPL CALC-SCNC: 10 MMOL/L (ref 8–16)
AST SERPL-CCNC: 23 U/L (ref 10–40)
BILIRUB SERPL-MCNC: 0.6 MG/DL (ref 0.1–1)
BUN SERPL-MCNC: 20 MG/DL (ref 8–23)
CALCIUM SERPL-MCNC: 10.2 MG/DL (ref 8.7–10.5)
CHLORIDE SERPL-SCNC: 104 MMOL/L (ref 95–110)
CO2 SERPL-SCNC: 28 MMOL/L (ref 23–29)
CREAT SERPL-MCNC: 1.2 MG/DL (ref 0.5–1.4)
ERYTHROCYTE [DISTWIDTH] IN BLOOD BY AUTOMATED COUNT: 13 % (ref 11.5–14.5)
EST. GFR  (AFRICAN AMERICAN): >60 ML/MIN/1.73 M^2
EST. GFR  (NON AFRICAN AMERICAN): >60 ML/MIN/1.73 M^2
GLUCOSE SERPL-MCNC: 136 MG/DL (ref 70–110)
HCT VFR BLD AUTO: 40.4 % (ref 40–54)
HGB BLD-MCNC: 13.7 G/DL (ref 14–18)
MCH RBC QN AUTO: 28.2 PG (ref 27–31)
MCHC RBC AUTO-ENTMCNC: 33.9 G/DL (ref 32–36)
MCV RBC AUTO: 83 FL (ref 82–98)
PLATELET # BLD AUTO: 175 K/UL (ref 150–350)
PMV BLD AUTO: 13.6 FL (ref 9.2–12.9)
POCT GLUCOSE: 106 MG/DL (ref 70–110)
POTASSIUM SERPL-SCNC: 3.6 MMOL/L (ref 3.5–5.1)
PROT SERPL-MCNC: 7.9 G/DL (ref 6–8.4)
RBC # BLD AUTO: 4.86 M/UL (ref 4.6–6.2)
SODIUM SERPL-SCNC: 142 MMOL/L (ref 136–145)
TROPONIN I SERPL DL<=0.01 NG/ML-MCNC: <0.006 NG/ML (ref 0–0.03)
WBC # BLD AUTO: 4.7 K/UL (ref 3.9–12.7)

## 2019-08-28 PROCEDURE — 80053 COMPREHEN METABOLIC PANEL: CPT

## 2019-08-28 PROCEDURE — 93005 ELECTROCARDIOGRAM TRACING: CPT

## 2019-08-28 PROCEDURE — 93010 ELECTROCARDIOGRAM REPORT: CPT | Mod: ,,, | Performed by: STUDENT IN AN ORGANIZED HEALTH CARE EDUCATION/TRAINING PROGRAM

## 2019-08-28 PROCEDURE — 85027 COMPLETE CBC AUTOMATED: CPT

## 2019-08-28 PROCEDURE — 99285 EMERGENCY DEPT VISIT HI MDM: CPT | Mod: 25

## 2019-08-28 PROCEDURE — 93010 EKG 12-LEAD: ICD-10-PCS | Mod: ,,, | Performed by: STUDENT IN AN ORGANIZED HEALTH CARE EDUCATION/TRAINING PROGRAM

## 2019-08-28 PROCEDURE — 82962 GLUCOSE BLOOD TEST: CPT

## 2019-08-28 PROCEDURE — 84484 ASSAY OF TROPONIN QUANT: CPT

## 2019-08-28 NOTE — ED NOTES
Provider informed of pt discharge vitals. Pt with known history of HTN. Confirmed patient is compliant with BP medication. No further orders at this time. Pt to be discharged.

## 2019-08-28 NOTE — ED TRIAGE NOTES
Pt presents to the ED today via personal transport with c/o of LLE weakness that began on Monday. Pt reports multiple strokes in the past. Pt states that the weakness may be getting worse but it is hard to  because pt reports baseline of residual weakness in that LLE from previous strokes. Pt observed ambulating to exam room w/o assistance.

## 2019-08-28 NOTE — ED PROVIDER NOTES
Encounter Date: 8/28/2019    SCRIBE #1 NOTE: I, Alexia Charles, am scribing for, and in the presence of,  Dr. Fofana. I have scribed the entire note.       History     Chief Complaint   Patient presents with    Extremity Weakness     reports left leg weakness since monday. reports hx of strokes. last stroke was 2018. denies chest pain or sob. no neuro deficits noted. denies numbness or tingling in extremity      Luis Wong is a 63 y.o. male who  has a past medical history of Aneurysm (10/30/2012), Fever blister, Herpes infection, Hypertension, ICH (intracerebral hemorrhage), Mixed hyperlipidemia (9/5/2013), Nontraumatic thalamic hemorrhage (8/31/2016), JAQUAN (obstructive sleep apnea), Right-sided lacunar stroke (9/11/2014), SDH (subdural hematoma), Special screening for malignant neoplasms, colon, and Stroke.    The patient presents to the ED due to left leg weakness that he noticed 2 days ago.  He has a history of stroke with residual L-sided upper and lower extremity weakness. He states the weakness gets worse after exercise, but states the weakness will gradually improve throughout the day afterward.  He worked out on a stationary bike at home on Monday, and afterward noticed his left leg appeared more weak than normal. However, over the last 2 days, he feels the weakness has not improved, and he still feels weak in his left leg around the knee area.  He denies any associated slurred speech, facial droop, vision changes, tingling or numbness in any other extremities.   He admits multiple prior strokes, most recently 06/2018.     The history is provided by the patient.     Review of patient's allergies indicates:  No Known Allergies  Past Medical History:   Diagnosis Date    Aneurysm 10/30/2012    Fever blister     Herpes infection     Hypertension     ICH (intracerebral hemorrhage)     Mixed hyperlipidemia 9/5/2013    Nontraumatic thalamic hemorrhage 8/31/2016    JAQUAN (obstructive sleep apnea)      Right-sided lacunar stroke 9/11/2014    SDH (subdural hematoma)     Special screening for malignant neoplasms, colon     Stroke      Past Surgical History:   Procedure Laterality Date    COLONOSCOPY N/A 12/14/2013    [facility]formed [facility]    Knee arthroscopic surgery       Family History   Problem Relation Age of Onset    Heart disease Mother     Diabetes Father     Cancer Sister         breast    Diabetes Paternal Grandmother     Diabetes Paternal Grandfather     Melanoma Neg Hx      Social History     Tobacco Use    Smoking status: Never Smoker    Smokeless tobacco: Never Used   Substance Use Topics    Alcohol use: No    Drug use: No     Review of Systems   Constitutional: Negative for chills and fever.   HENT: Negative for sore throat.    Respiratory: Negative for shortness of breath.    Cardiovascular: Negative for chest pain.   Gastrointestinal: Negative for constipation, diarrhea, nausea and vomiting.   Genitourinary: Negative for dysuria, frequency and urgency.   Musculoskeletal: Negative for back pain.   Skin: Negative for rash and wound.   Neurological: Positive for weakness (left leg). Negative for facial asymmetry, speech difficulty and numbness.   Hematological: Does not bruise/bleed easily.   Psychiatric/Behavioral: Negative for agitation, behavioral problems and confusion.       Physical Exam     Initial Vitals [08/28/19 0820]   BP Pulse Resp Temp SpO2   (!) 190/88 79 20 97.8 °F (36.6 °C) 99 %      MAP       --         Physical Exam    Nursing note and vitals reviewed.  Constitutional: He appears well-developed and well-nourished. He is not diaphoretic. No distress.   HENT:   Head: Normocephalic and atraumatic.   Mouth/Throat: Oropharynx is clear and moist.   Eyes: EOM are normal. Pupils are equal, round, and reactive to light.   Neck: No tracheal deviation present.   Cardiovascular: Normal rate, regular rhythm, normal heart sounds and intact distal  pulses.   Pulmonary/Chest: Breath sounds normal. No stridor. No respiratory distress.   Abdominal: Soft. He exhibits no distension and no mass. There is no tenderness.   Musculoskeletal: Normal range of motion. He exhibits no edema.   Neurological: He is alert and oriented to person, place, and time. No cranial nerve deficit or sensory deficit. He exhibits abnormal muscle tone. Coordination and gait normal. GCS eye subscore is 4. GCS verbal subscore is 5. GCS motor subscore is 6.   Cranial nerves intact, no facial droop.  Mild weakness with 4/5 strength to LLE.   5/5 strength to BUE and RLE.   Ambulatory without issue.   Skin: Skin is warm and dry. Capillary refill takes less than 2 seconds. No rash noted.   Psychiatric: He has a normal mood and affect. His behavior is normal. Thought content normal.         ED Course   Procedures  Labs Reviewed   CBC WITHOUT DIFFERENTIAL - Abnormal; Notable for the following components:       Result Value    Hemoglobin 13.7 (*)     MPV 13.6 (*)     All other components within normal limits   COMPREHENSIVE METABOLIC PANEL - Abnormal; Notable for the following components:    Glucose 136 (*)     All other components within normal limits   TROPONIN I   POCT GLUCOSE     EKG Readings: (Independently Interpreted)   Normal sinus rhythm with sinus arrhythmia at rate of 87 bpm. Non-specific ST changes. No ST elevations or ischemia. Compared from prior EKG in 06/2018, ST changes appear slightly more pronounced.      ECG Results          EKG 12-lead (In process)  Result time 08/28/19 09:35:39    In process by Interface, Lab In [facility] (08/28/19 09:35:39)                 Narrative:    Test Reason : R53.1,    Vent. Rate : 087 BPM     Atrial Rate : 087 BPM     P-R Int : 184 ms          QRS Dur : 096 ms      QT Int : 374 ms       P-R-T Axes : 081 099 048 degrees     QTc Int : 450 ms    Normal sinus rhythm with sinus arrhythmia  Rightward axis  Nonspecific ST abnormality  Abnormal ECG  When  compared with ECG of 05-SEP-2018 08:07,  Previous ECG has undetermined rhythm, needs review    Referred By: [email or web address]   SELF           Confirmed By:                             Imaging Results          CT Head Without Contrast (Final result)  Result time 08/28/19 09:26:54    Final result by Milagro Olivera MD (08/28/19 09:26:54)                 Impression:      No definite acute process seen.      Electronically signed by: Milagro Olivera MD  Date:    08/28/2019  Time:    09:26             Narrative:    EXAMINATION:  CT HEAD WITHOUT CONTRAST    CLINICAL HISTORY:  Focal neuro deficit, new, fixed or worsening, >6 hours;    TECHNIQUE:  Low dose axial images were obtained through the head.  Coronal and sagittal reformations were also performed. Contrast was not administered.    COMPARISON:  10/06/2026    FINDINGS:  The brain is normally formed.  There are several areas of abnormal hypoattenuation seen within the periventricular white matter of the bilateral cerebral hemispheres consistent with significant periventricular white matter disease and areas of prior lacunar infarctions, much greater than expected for this patient's age.  No definite new areas of abnormal attenuation seen to strongly suggest an acute infarction.  Note that MRI has much greater sensitivity to detect small acute infarction in patients with extensive periventricular white matter disease.  No intracranial mass or hemorrhage seen.  No subdural fluid collection.  The is skull is intact.  The visualized paranasal sinuses demonstrate mild mucosal thickening of the left maxillary sinus with a mucous retention cyst at the left maxillary sinus.                                 Medical Decision Making:   Differential Diagnosis:   Differential Diagnosis includes, but is not limited to:  CVA/TIA, vertigo, anemia/blood loss, cardiogenic shock, arrhythmia, orthostatic hypotension, dehydration, medication side effect, vitamin deficiency, liver  disease, hypothyroidism, drug intoxication/withdrawal, metabolic derangement.    Independently Interpreted Test(s):   I have ordered and independently interpreted EKG Reading(s) - see prior notes  Clinical Tests:   Lab Tests: Ordered and Reviewed  Radiological Study: Ordered and Reviewed  Medical Tests: Ordered and Reviewed  ED Management:  EKG without ischemia or arrhythmia.  CT head without acute findings.  Labs unremarkable.    On reassessment, patient's blood pressure has improved significantly without acute intervention in the ED.  I fee patient's mild left lower extremity weakness is likely an exacerbation of patient's old weakness from prior stroke; I do not see any evidence of new stroke or other new deficits at this time.  Patient and family were informed of findings and reassured.  Recommend close follow-up with PCP for re-evaluation if symptoms do not improve.  Patient may also benefit from Neurology evaluation for further evaluation and treatment.  Patient was instructed to return to the ER immediately for any worsening weakness or additional symptoms or neurologic deficits.  Upon re-evaluation, the patient's status has improved.  After complete ED evaluation, clinical impression is most consistent with residual weakness from prior CVA.  PCP/neurology follow-up within 2-3 days was recommended.    After taking into careful account the patient's history, physical exam findings, as well as empirical and objective data obtained throughout ED workup, I feel no emergent medical condition has been identified. No further evaluation or admission was felt to be required, and the patient is stable for discharge from the ED. The patient and any additional family present were updated with test results, overall clinical impression, and recommended further plan of care, including discharge instructions as provided and outpatient follow-up for continued evaluation and management as needed. All questions were answered.  The patient expressed understanding and agreed with current plan for discharge and follow-up plan of care. Strict ED return precautions were provided, including return/worsening of current symptoms, new symptoms, or any other concerns.                     ED Course as of Aug 28 0950   Wed Aug 28, 2019   0840 63-year-old male with history of multiple strokes in the past, most recently June 2019, with residual left upper and left lower extremity weakness, presents to the ED due to persistent left leg weakness over the last 2 days.  He reports he usually notices weakness is more pronounced after he works out or rides a bike; he states he worked down on Monday and noticed weakness afterward.  However, he states the weakness usually resolves on its own after short time, and his weakness has lasted for the last 2 days.  He denies any associated upper extremity weakness, vision changes, slurred speech, facial droop, dizziness, or difficulty walking.  Denies any recent trauma.  On arrival, blood pressure elevated, vitals otherwise unremarkable. Exam very mild left leg weakness compared to the right, otherwise no other cranial nerve or neuro deficits.  Will obtain CT head, basic labs, and continue to monitor.    [SS]      ED Course User Index  [SS] Chandan Fofana MD     Clinical Impression:     1. Weakness as late effect of cerebrovascular accident (CVA)    2. Weakness      Disposition:   Disposition: Discharged  Condition: Stable        I, Dr. Chandan Fofana, personally performed the services described in this documentation. All medical record entries made by the scribe were at my direction and in my presence.  I have reviewed the chart and agree that the record reflects my personal performance and is accurate and complete.     Chandan Fofana MD.  9:44 AM 08/28/2019                     Chandan Fofana MD  08/28/19 0929

## 2019-08-29 ENCOUNTER — HOSPITAL ENCOUNTER (OUTPATIENT)
Facility: HOSPITAL | Age: 64
Discharge: HOME | End: 2019-08-30
Attending: FAMILY MEDICINE | Admitting: FAMILY MEDICINE
Payer: COMMERCIAL

## 2019-08-29 ENCOUNTER — TELEPHONE (OUTPATIENT)
Dept: NEUROLOGY | Facility: CLINIC | Age: 64
End: 2019-08-29

## 2019-08-29 DIAGNOSIS — I63.9 CVA (CEREBRAL VASCULAR ACCIDENT): ICD-10-CM

## 2019-08-29 LAB
ESTIMATED AVG GLUCOSE: 137 MG/DL (ref 68–131)
HBA1C MFR BLD HPLC: 6.4 % (ref 4–5.6)
POCT GLUCOSE: 129 MG/DL (ref 70–110)
TSH SERPL DL<=0.005 MIU/L-ACNC: 0.88 UIU/ML (ref 0.4–4)

## 2019-08-29 PROCEDURE — G0379 DIRECT REFER HOSPITAL OBSERV: HCPCS

## 2019-08-29 PROCEDURE — 84443 ASSAY THYROID STIM HORMONE: CPT

## 2019-08-29 PROCEDURE — 83036 HEMOGLOBIN GLYCOSYLATED A1C: CPT

## 2019-08-29 PROCEDURE — 36415 COLL VENOUS BLD VENIPUNCTURE: CPT

## 2019-08-29 PROCEDURE — G0378 HOSPITAL OBSERVATION PER HR: HCPCS

## 2019-08-29 RX ORDER — IBUPROFEN 200 MG
16 TABLET ORAL
Status: DISCONTINUED | OUTPATIENT
Start: 2019-08-29 | End: 2019-08-30 | Stop reason: HOSPADM

## 2019-08-29 RX ORDER — IBUPROFEN 200 MG
24 TABLET ORAL
Status: DISCONTINUED | OUTPATIENT
Start: 2019-08-29 | End: 2019-08-30 | Stop reason: HOSPADM

## 2019-08-29 RX ORDER — ATORVASTATIN CALCIUM 10 MG/1
10 TABLET, FILM COATED ORAL DAILY
Status: DISCONTINUED | OUTPATIENT
Start: 2019-08-30 | End: 2019-08-30

## 2019-08-29 RX ORDER — SODIUM CHLORIDE 0.9 % (FLUSH) 0.9 %
10 SYRINGE (ML) INJECTION
Status: DISCONTINUED | OUTPATIENT
Start: 2019-08-29 | End: 2019-08-30 | Stop reason: HOSPADM

## 2019-08-29 RX ORDER — ONDANSETRON 2 MG/ML
4 INJECTION INTRAMUSCULAR; INTRAVENOUS EVERY 8 HOURS PRN
Status: DISCONTINUED | OUTPATIENT
Start: 2019-08-29 | End: 2019-08-30 | Stop reason: HOSPADM

## 2019-08-29 RX ORDER — ASPIRIN 81 MG/1
81 TABLET ORAL DAILY
Status: DISCONTINUED | OUTPATIENT
Start: 2019-08-30 | End: 2019-08-30 | Stop reason: HOSPADM

## 2019-08-29 RX ORDER — POTASSIUM CHLORIDE 20 MEQ/15ML
8 SOLUTION ORAL DAILY
Status: DISCONTINUED | OUTPATIENT
Start: 2019-08-30 | End: 2019-08-30 | Stop reason: HOSPADM

## 2019-08-29 RX ORDER — GLUCAGON 1 MG
1 KIT INJECTION
Status: DISCONTINUED | OUTPATIENT
Start: 2019-08-29 | End: 2019-08-30 | Stop reason: HOSPADM

## 2019-08-29 RX ORDER — GABAPENTIN 300 MG/1
300 CAPSULE ORAL NIGHTLY
Status: DISCONTINUED | OUTPATIENT
Start: 2019-08-29 | End: 2019-08-29

## 2019-08-29 RX ORDER — INSULIN ASPART 100 [IU]/ML
0-5 INJECTION, SOLUTION INTRAVENOUS; SUBCUTANEOUS
Status: DISCONTINUED | OUTPATIENT
Start: 2019-08-29 | End: 2019-08-30 | Stop reason: HOSPADM

## 2019-08-29 NOTE — TELEPHONE ENCOUNTER
I called and spoke with the patient to discuss the symptoms he is having. He is getting worse with the weakness on the left side of the body. I instructed the patient if he is getting worse he needs to go back to the emergency department and he has also been placed on the wait list for a sooner appointment.

## 2019-08-29 NOTE — TELEPHONE ENCOUNTER
----- Message from Linda Lopes sent at 8/29/2019 11:03 AM CDT -----  No. [phone]    Patient returned your call.

## 2019-08-30 ENCOUNTER — PATIENT OUTREACH (OUTPATIENT)
Dept: OTHER | Facility: OTHER | Age: 64
End: 2019-08-30

## 2019-08-30 VITALS
RESPIRATION RATE: 18 BRPM | OXYGEN SATURATION: 98 % | HEIGHT: 69 IN | BODY MASS INDEX: 23.7 KG/M2 | TEMPERATURE: 99 F | WEIGHT: 160 LBS | SYSTOLIC BLOOD PRESSURE: 137 MMHG | DIASTOLIC BLOOD PRESSURE: 72 MMHG | HEART RATE: 73 BPM

## 2019-08-30 LAB
ALBUMIN SERPL BCP-MCNC: 3.8 G/DL (ref 3.5–5.2)
ALP SERPL-CCNC: 60 U/L (ref 55–135)
ALT SERPL W/O P-5'-P-CCNC: 18 U/L (ref 10–44)
ANION GAP SERPL CALC-SCNC: 7 MMOL/L (ref 8–16)
APTT BLDCRRT: 29.9 SEC (ref 21–32)
AST SERPL-CCNC: 16 U/L (ref 10–40)
BASOPHILS # BLD AUTO: 0.01 K/UL (ref 0–0.2)
BASOPHILS NFR BLD: 0.2 % (ref 0–1.9)
BILIRUB SERPL-MCNC: 0.4 MG/DL (ref 0.1–1)
BILIRUB UR QL STRIP: NEGATIVE
BUN SERPL-MCNC: 19 MG/DL (ref 8–23)
CALCIUM SERPL-MCNC: 9.3 MG/DL (ref 8.7–10.5)
CHLORIDE SERPL-SCNC: 103 MMOL/L (ref 95–110)
CK MB SERPL-MCNC: 0.8 NG/ML (ref 0.1–6.5)
CK MB SERPL-RTO: 1.1 % (ref 0–5)
CK SERPL-CCNC: 75 U/L (ref 20–200)
CLARITY UR: CLEAR
CO2 SERPL-SCNC: 29 MMOL/L (ref 23–29)
COLOR UR: YELLOW
CREAT SERPL-MCNC: 1 MG/DL (ref 0.5–1.4)
DIFFERENTIAL METHOD: ABNORMAL
EOSINOPHIL # BLD AUTO: 0.2 K/UL (ref 0–0.5)
EOSINOPHIL NFR BLD: 4.2 % (ref 0–8)
ERYTHROCYTE [DISTWIDTH] IN BLOOD BY AUTOMATED COUNT: 13 % (ref 11.5–14.5)
EST. GFR  (AFRICAN AMERICAN): >60 ML/MIN/1.73 M^2
EST. GFR  (NON AFRICAN AMERICAN): >60 ML/MIN/1.73 M^2
GLUCOSE SERPL-MCNC: 140 MG/DL (ref 70–110)
GLUCOSE UR QL STRIP: NEGATIVE
HCT VFR BLD AUTO: 38.6 % (ref 40–54)
HGB BLD-MCNC: 13.1 G/DL (ref 14–18)
HGB UR QL STRIP: NEGATIVE
INR PPP: 1 (ref 0.8–1.2)
KETONES UR QL STRIP: NEGATIVE
LEUKOCYTE ESTERASE UR QL STRIP: NEGATIVE
LYMPHOCYTES # BLD AUTO: 1.3 K/UL (ref 1–4.8)
LYMPHOCYTES NFR BLD: 32.3 % (ref 18–48)
MAGNESIUM SERPL-MCNC: 1.8 MG/DL (ref 1.6–2.6)
MCH RBC QN AUTO: 28.2 PG (ref 27–31)
MCHC RBC AUTO-ENTMCNC: 33.9 G/DL (ref 32–36)
MCV RBC AUTO: 83 FL (ref 82–98)
MONOCYTES # BLD AUTO: 0.5 K/UL (ref 0.3–1)
MONOCYTES NFR BLD: 11.1 % (ref 4–15)
NEUTROPHILS # BLD AUTO: 2.1 K/UL (ref 1.8–7.7)
NEUTROPHILS NFR BLD: 52.2 % (ref 38–73)
NITRITE UR QL STRIP: NEGATIVE
PH UR STRIP: 7 [PH] (ref 5–8)
PHOSPHATE SERPL-MCNC: 3 MG/DL (ref 2.7–4.5)
PLATELET # BLD AUTO: 174 K/UL (ref 150–350)
PMV BLD AUTO: 13.9 FL (ref 9.2–12.9)
POCT GLUCOSE: 127 MG/DL (ref 70–110)
POCT GLUCOSE: 127 MG/DL (ref 70–110)
POCT GLUCOSE: 187 MG/DL (ref 70–110)
POTASSIUM SERPL-SCNC: 3.6 MMOL/L (ref 3.5–5.1)
PROT SERPL-MCNC: 6.8 G/DL (ref 6–8.4)
PROT UR QL STRIP: NEGATIVE
PROTHROMBIN TIME: 10.7 SEC (ref 9–12.5)
RBC # BLD AUTO: 4.65 M/UL (ref 4.6–6.2)
SODIUM SERPL-SCNC: 139 MMOL/L (ref 136–145)
SP GR UR STRIP: 1.01 (ref 1–1.03)
TROPONIN I SERPL DL<=0.01 NG/ML-MCNC: <0.006 NG/ML (ref 0–0.03)
URN SPEC COLLECT METH UR: NORMAL
UROBILINOGEN UR STRIP-ACNC: NEGATIVE EU/DL
WBC # BLD AUTO: 4.06 K/UL (ref 3.9–12.7)

## 2019-08-30 PROCEDURE — 85610 PROTHROMBIN TIME: CPT

## 2019-08-30 PROCEDURE — 82553 CREATINE MB FRACTION: CPT

## 2019-08-30 PROCEDURE — 36415 COLL VENOUS BLD VENIPUNCTURE: CPT

## 2019-08-30 PROCEDURE — 97165 OT EVAL LOW COMPLEX 30 MIN: CPT

## 2019-08-30 PROCEDURE — 94761 N-INVAS EAR/PLS OXIMETRY MLT: CPT

## 2019-08-30 PROCEDURE — 84100 ASSAY OF PHOSPHORUS: CPT

## 2019-08-30 PROCEDURE — 84484 ASSAY OF TROPONIN QUANT: CPT

## 2019-08-30 PROCEDURE — 85730 THROMBOPLASTIN TIME PARTIAL: CPT

## 2019-08-30 PROCEDURE — 85025 COMPLETE CBC W/AUTO DIFF WBC: CPT

## 2019-08-30 PROCEDURE — 97161 PT EVAL LOW COMPLEX 20 MIN: CPT

## 2019-08-30 PROCEDURE — 83735 ASSAY OF MAGNESIUM: CPT

## 2019-08-30 PROCEDURE — 92610 EVALUATE SWALLOWING FUNCTION: CPT

## 2019-08-30 PROCEDURE — 80053 COMPREHEN METABOLIC PANEL: CPT

## 2019-08-30 PROCEDURE — 81003 URINALYSIS AUTO W/O SCOPE: CPT

## 2019-08-30 PROCEDURE — 25000003 PHARM REV CODE 250: Performed by: PHYSICIAN ASSISTANT

## 2019-08-30 PROCEDURE — G0378 HOSPITAL OBSERVATION PER HR: HCPCS

## 2019-08-30 PROCEDURE — 82550 ASSAY OF CK (CPK): CPT

## 2019-08-30 PROCEDURE — 25000003 PHARM REV CODE 250: Performed by: NURSE PRACTITIONER

## 2019-08-30 RX ORDER — ATORVASTATIN CALCIUM 40 MG/1
40 TABLET, FILM COATED ORAL DAILY
Qty: 90 TABLET | Refills: 3 | Status: SHIPPED | OUTPATIENT
Start: 2019-08-31 | End: 2020-08-20 | Stop reason: SDUPTHER

## 2019-08-30 RX ORDER — ACETAMINOPHEN 325 MG/1
650 TABLET ORAL EVERY 6 HOURS PRN
Status: DISCONTINUED | OUTPATIENT
Start: 2019-08-30 | End: 2019-08-30 | Stop reason: HOSPADM

## 2019-08-30 RX ORDER — ATORVASTATIN CALCIUM 20 MG/1
40 TABLET, FILM COATED ORAL DAILY
Status: DISCONTINUED | OUTPATIENT
Start: 2019-08-31 | End: 2019-08-30 | Stop reason: HOSPADM

## 2019-08-30 RX ORDER — NIFEDIPINE 30 MG/1
30 TABLET, EXTENDED RELEASE ORAL DAILY
Status: DISCONTINUED | OUTPATIENT
Start: 2019-08-30 | End: 2019-08-30 | Stop reason: HOSPADM

## 2019-08-30 RX ORDER — HYDROCHLOROTHIAZIDE 12.5 MG/1
12.5 TABLET ORAL DAILY
Status: DISCONTINUED | OUTPATIENT
Start: 2019-08-30 | End: 2019-08-30 | Stop reason: HOSPADM

## 2019-08-30 RX ADMIN — NIFEDIPINE 30 MG: 30 TABLET, FILM COATED, EXTENDED RELEASE ORAL at 10:08

## 2019-08-30 RX ADMIN — ATORVASTATIN CALCIUM 10 MG: 10 TABLET, FILM COATED ORAL at 10:08

## 2019-08-30 RX ADMIN — Medication 81 MG: at 10:08

## 2019-08-30 RX ADMIN — HYDROCHLOROTHIAZIDE 12.5 MG: 12.5 TABLET ORAL at 10:08

## 2019-08-30 RX ADMIN — POTASSIUM CHLORIDE 8 MEQ: 20 SOLUTION ORAL at 10:08

## 2019-08-30 NOTE — HPI
Luis Wong is a 63 year old male with a past medical history of Hypertension, Intracerebral hemorrhage, Hyperlipidemia, Obstructive sleep apnea, Right side lacunar stroke, and Subdural hematoma. He lives in [place], [place] with his wife. His PCP is Dr. Juan Eduardo.    He presented to [facility] ED 8/28/2019 and 8/29/2019 with complaints of worsening left sided weakness since 2 days. Per patient he was at the gym and felt he could not lift his left upper extremity to scratch his face; per wife his left lower extremity was dragging more. He also experienced some numbness on the same side. On ED evaluation, patient's weakness was 4/5 on left side. MRI brain showed no acute intracranial event, showed sub-acute changes in right frontal parietal region. Neurology consulted in ED. Admitted to [facility]U for further evaluation and treatment.

## 2019-08-30 NOTE — ASSESSMENT & PLAN NOTE
History of Stroke  History of cerebral parenchymal hemorrhage    MRA head/neck  Neurology following  PT/OT/ST eval and tx  Asa 81 mg po daily  Continue Atorvastatin  Continuous cardiac monitoring

## 2019-08-30 NOTE — SUBJECTIVE & OBJECTIVE
Past Medical History:   Diagnosis Date    Aneurysm 10/30/2012    Fever blister     Herpes infection     Hypertension     ICH (intracerebral hemorrhage)     Mixed hyperlipidemia 9/5/2013    Nontraumatic thalamic hemorrhage 8/31/2016    JAQUAN (obstructive sleep apnea)     Right-sided lacunar stroke 9/11/2014    SDH (subdural hematoma)     Special screening for malignant neoplasms, colon     Stroke        Past Surgical History:   Procedure Laterality Date    COLONOSCOPY N/A 12/14/2013    Performed by Beto Douglas MD at [facility] ([address] [place] [address])    Knee arthroscopic surgery         Review of patient's allergies indicates:  No Known Allergies    No current facility-administered medications on file prior to encounter.      Current Outpatient Medications on File Prior to Encounter   Medication Sig    aspirin (ECOTRIN) 81 MG EC tablet TAKE 1 TABLET BY MOUTH EVERY DAY    atorvastatin (LIPITOR) 10 MG tablet TAKE 1 TABLET BY MOUTH EVERY DAY    gabapentin (NEURONTIN) 300 MG capsule TAKE 1 CAPSULE (300 MG TOTAL) BY MOUTH EVERY EVENING.    hydroCHLOROthiazide (MICROZIDE) 12.5 mg capsule TAKE 1 CAPSULE BY MOUTH EVERY DAY    metFORMIN (GLUCOPHAGE) 500 MG tablet Take 1 tablet (500 mg total) by mouth daily with breakfast.    NIFEdipine (PROCARDIA-XL) 30 MG (OSM) 24 hr tablet Take 1 tablet (30 mg total) by mouth once daily.    potassium chloride (MICRO-K) 8 mEq CpSR TAKE 1 CAPSULE (8 MEQ TOTAL) BY MOUTH ONCE DAILY.     Family History     Problem Relation (Age of Onset)    Cancer Sister    Diabetes Father, Paternal Grandmother, Paternal Grandfather    Heart disease Mother        Tobacco Use    Smoking status: Never Smoker    Smokeless tobacco: Never Used   Substance and Sexual Activity    Alcohol use: No    Drug use: No    Sexual activity: Not on file     Review of Systems   Constitutional: Positive for activity change. Negative for fever.   HENT: Negative for congestion, dental problem, facial swelling and  trouble swallowing.    Eyes: Negative for visual disturbance.   Respiratory: Negative for cough, chest tightness and shortness of breath.    Cardiovascular: Negative for chest pain and palpitations.   Gastrointestinal: Negative for abdominal distention, abdominal pain, nausea and vomiting.   Endocrine: Negative for polydipsia and polyuria.   Genitourinary: Negative for difficulty urinating, flank pain and hematuria.   Musculoskeletal: Negative for arthralgias and gait problem.   Skin: Negative for color change and rash.   Allergic/Immunologic: Negative for food allergies.   Neurological: Positive for facial asymmetry and weakness. Negative for dizziness and speech difficulty.   Psychiatric/Behavioral: Negative for agitation.     Objective:     Vital Signs (Most Recent):  Temp: 98.6 °F (37 °C) (08/29/19 2152)  Pulse: 75 (08/29/19 2152)  Resp: 18 (08/29/19 2152)  BP: (!) 140/70 (08/29/19 2152)  SpO2: 98 % (08/29/19 2152) Vital Signs (24h Range):  Temp:  [98.1 °F (36.7 °C)-98.6 °F (37 °C)] 98.6 °F (37 °C)  Pulse:  [70-78] 75  Resp:  [18] 18  SpO2:  [97 %-100 %] 98 %  BP: (140-188)/(70-88) 140/70        There is no height or weight on file to calculate BMI.    Physical Exam   Constitutional: He is oriented to person, place, and time. He appears well-developed and well-nourished. No distress.   HENT:   Head: Normocephalic and atraumatic.   Eyes: Pupils are equal, round, and reactive to light. EOM are normal.   Neck: Normal range of motion. No JVD present.   Cardiovascular: Normal rate and regular rhythm.   Pulmonary/Chest: Breath sounds normal. He is in respiratory distress.   Abdominal: Soft. Bowel sounds are normal. He exhibits no distension.   Musculoskeletal:   4/5 strength in left lower extremity   Neurological: He is alert and oriented to person, place, and time.   Slight left facial droop.   Skin: Skin is warm and dry. Capillary refill takes less than 2 seconds. He is not diaphoretic.   Psychiatric: He has a  normal mood and affect.         CRANIAL NERVES     CN III, IV, VI   Pupils are equal, round, and reactive to light.  Extraocular motions are normal.        Significant Labs:   A1C:   Recent Labs   Lab 06/24/19  0808   HGBA1C 7.2*     CBC:   Recent Labs   Lab 08/28/19  0848 08/29/19  1339   WBC 4.70 4.56   HGB 13.7* 13.1*   HCT 40.4 38.6*    183     CMP:   Recent Labs   Lab 08/28/19  0848 08/29/19  1339    141   K 3.6 3.8    105   CO2 28 28   * 105   BUN 20 20   CREATININE 1.2 1.1   CALCIUM 10.2 9.9   PROT 7.9 7.4   ALBUMIN 4.5 4.1   BILITOT 0.6 0.4   ALKPHOS 71 66   AST 23 20   ALT 25 22   ANIONGAP 10 8   EGFRNONAA >60 >60     Coagulation:   Recent Labs   Lab 08/29/19  1339   INR 1.0     Lipid Panel:   Recent Labs   Lab 08/29/19  1339   CHOL 214*   HDL 82*   LDLCALC 113.0   TRIG 95   CHOLHDL 38.3     POCT Glucose:   Recent Labs   Lab 08/28/19  0856 08/29/19  1245   POCTGLUCOSE 106 101     Troponin:   Recent Labs   Lab 08/28/19  0848   TROPONINI <0.006     All pertinent labs within the past 24 hours have been reviewed.    Significant Imaging: I have reviewed all pertinent imaging results/findings within the past 24 hours.

## 2019-08-30 NOTE — PLAN OF CARE
Problem: Physical Therapy Goal  Goal: Physical Therapy Goal  Outcome: Outcome(s) achieved Date Met: 08/30/19  PT initial evaluation completed. Plan of care and goals established and discussed with patient/wife.  Pt does not present c/ any acute PT needs at this time. Pt requires modified indpendent for functional bed mobility and supervision for transfers; and SBA<> CGA for gait for 100ft c/ no AD; Pt demonstrated persistent L foot drag and impaired LLE  motor control c/ gait.      Discharge Recommendation: Outpatient PT  DME Recommendation: none

## 2019-08-30 NOTE — H&P
[facility] Medicine  History & Physical    Patient Name: Luis Wong  MRN: [id number]  Admission Date: 8/29/2019  Attending Physician: Dee Velazco*   Primary Care Provider: Juan Eduardo MD         Patient information was obtained from patient, relative(s) and ER records.     Subjective:     Principal Problem:CVA (cerebral vascular accident)    Chief Complaint: Left side weakness.       HPI: Luis Wong is a 63 year old male with a past medical history of Hypertension, Intracerebral hemorrhage, Hyperlipidemia, Obstructive sleep apnea, Right side lacunar stroke, and Subdural hematoma. He lives in [place], [place] with his wife. His PCP is Dr. Juan Eduardo.    He presented to [facility] ED 8/28/2019 and 8/29/2019 with complaints of worsening left sided weakness since 2 days. Per patient he was at the gym and felt he could not lift his left upper extremity to scratch his face; per wife his left lower extremity was dragging more. He also experienced some numbness on the same side. On ED evaluation, patient's weakness was 4/5 on left side. MRI brain showed no acute intracranial event, showed sub-acute changes in right frontal parietal region. Neurology consulted in ED. Admitted to CDU for further evaluation and treatment.     Past Medical History:   Diagnosis Date    Aneurysm 10/30/2012    Fever blister     Herpes infection     Hypertension     ICH (intracerebral hemorrhage)     Mixed hyperlipidemia 9/5/2013    Nontraumatic thalamic hemorrhage 8/31/2016    JAQUAN (obstructive sleep apnea)     Right-sided lacunar stroke 9/11/2014    SDH (subdural hematoma)     Special screening for malignant neoplasms, colon     Stroke        Past Surgical History:   Procedure Laterality Date    COLONOSCOPY N/A 12/14/2013    Performed by Beto Douglas MD at [facility] (4TH FLR)    Knee arthroscopic surgery         Review of patient's allergies indicates:  No  Known Allergies    No current facility-administered medications on file prior to encounter.      Current Outpatient Medications on File Prior to Encounter   Medication Sig    aspirin (ECOTRIN) 81 MG EC tablet TAKE 1 TABLET BY MOUTH EVERY DAY    atorvastatin (LIPITOR) 10 MG tablet TAKE 1 TABLET BY MOUTH EVERY DAY    gabapentin (NEURONTIN) 300 MG capsule TAKE 1 CAPSULE (300 MG TOTAL) BY MOUTH EVERY EVENING.    hydroCHLOROthiazide (MICROZIDE) 12.5 mg capsule TAKE 1 CAPSULE BY MOUTH EVERY DAY    metFORMIN (GLUCOPHAGE) 500 MG tablet Take 1 tablet (500 mg total) by mouth daily with breakfast.    NIFEdipine (PROCARDIA-XL) 30 MG (OSM) 24 hr tablet Take 1 tablet (30 mg total) by mouth once daily.    potassium chloride (MICRO-K) 8 mEq CpSR TAKE 1 CAPSULE (8 MEQ TOTAL) BY MOUTH ONCE DAILY.     Family History     Problem Relation (Age of Onset)    Cancer Sister    Diabetes Father, Paternal Grandmother, Paternal Grandfather    Heart disease Mother        Tobacco Use    Smoking status: Never Smoker    Smokeless tobacco: Never Used   Substance and Sexual Activity    Alcohol use: No    Drug use: No    Sexual activity: Not on file     Review of Systems   Constitutional: Positive for activity change. Negative for fever.   HENT: Negative for congestion, dental problem, facial swelling and trouble swallowing.    Eyes: Negative for visual disturbance.   Respiratory: Negative for cough, chest tightness and shortness of breath.    Cardiovascular: Negative for chest pain and palpitations.   Gastrointestinal: Negative for abdominal distention, abdominal pain, nausea and vomiting.   Endocrine: Negative for polydipsia and polyuria.   Genitourinary: Negative for difficulty urinating, flank pain and hematuria.   Musculoskeletal: Negative for arthralgias and gait problem.   Skin: Negative for color change and rash.   Allergic/Immunologic: Negative for food allergies.   Neurological: Positive for facial asymmetry and weakness.  Negative for dizziness and speech difficulty.   Psychiatric/Behavioral: Negative for agitation.     Objective:     Vital Signs (Most Recent):  Temp: 98.6 °F (37 °C) (08/29/19 2152)  Pulse: 75 (08/29/19 2152)  Resp: 18 (08/29/19 2152)  BP: (!) 140/70 (08/29/19 2152)  SpO2: 98 % (08/29/19 2152) Vital Signs (24h Range):  Temp:  [98.1 °F (36.7 °C)-98.6 °F (37 °C)] 98.6 °F (37 °C)  Pulse:  [70-78] 75  Resp:  [18] 18  SpO2:  [97 %-100 %] 98 %  BP: (140-188)/(70-88) 140/70        There is no height or weight on file to calculate BMI.    Physical Exam   Constitutional: He is oriented to person, place, and time. He appears well-developed and well-nourished. No distress.   HENT:   Head: Normocephalic and atraumatic.   Eyes: Pupils are equal, round, and reactive to light. EOM are normal.   Neck: Normal range of motion. No JVD present.   Cardiovascular: Normal rate and regular rhythm.   Pulmonary/Chest: Breath sounds normal. He is in respiratory distress.   Abdominal: Soft. Bowel sounds are normal. He exhibits no distension.   Musculoskeletal:   4/5 strength in left lower extremity   Neurological: He is alert and oriented to person, place, and time.   Slight left facial droop.   Skin: Skin is warm and dry. Capillary refill takes less than 2 seconds. He is not diaphoretic.   Psychiatric: He has a normal mood and affect.         CRANIAL NERVES     CN III, IV, VI   Pupils are equal, round, and reactive to light.  Extraocular motions are normal.        Significant Labs:   A1C:   Recent Labs   Lab 06/24/19  0808   HGBA1C 7.2*     CBC:   Recent Labs   Lab 08/28/19  0848 08/29/19  1339   WBC 4.70 4.56   HGB 13.7* 13.1*   HCT 40.4 38.6*    183     CMP:   Recent Labs   Lab 08/28/19  0848 08/29/19  1339    141   K 3.6 3.8    105   CO2 28 28   * 105   BUN 20 20   CREATININE 1.2 1.1   CALCIUM 10.2 9.9   PROT 7.9 7.4   ALBUMIN 4.5 4.1   BILITOT 0.6 0.4   ALKPHOS 71 66   AST 23 20   ALT 25 22   ANIONGAP 10 8    EGFRNONAA >60 >60     Coagulation:   Recent Labs   Lab 08/29/19  1339   INR 1.0     Lipid Panel:   Recent Labs   Lab 08/29/19  1339   CHOL 214*   HDL 82*   LDLCALC 113.0   TRIG 95   CHOLHDL 38.3     POCT Glucose:   Recent Labs   Lab 08/28/19  0856 08/29/19  1245   POCTGLUCOSE 106 101     Troponin:   Recent Labs   Lab 08/28/19  0848   TROPONINI <0.006     All pertinent labs within the past 24 hours have been reviewed.    Significant Imaging: I have reviewed all pertinent imaging results/findings within the past 24 hours.             Assessment/Plan:     * CVA (cerebral vascular accident)  History of Stroke  History of cerebral parenchymal hemorrhage    MRA head/neck  Neurology following  PT/OT/ST eval and tx  Asa 81 mg po daily  Continue Atorvastatin  Continuous cardiac monitoring           Cataracts, bilateral  Noted      Mixed hyperlipidemia  Continue Atorvastatin      Hyperglycemia  Hold Metformin  Low dose SSI  Accu checks AC and HS  Diabetic Diet      HTN (hypertension)  Permissive Hypertension for now, hold home meds      VTE Risk Mitigation (From admission, onward)        Ordered     IP VTE LOW RISK PATIENT  Once      08/29/19 2208     Place sequential compression device  Until discontinued      08/29/19 2208             Angela NAZARIO Chairs, APRN, FNP-C  Department of Hospital Medicine   [facility]

## 2019-08-30 NOTE — PROGRESS NOTES
Last 5 Patient Entered Readings                                      Current 30 Day Average: 126/71     Recent Readings 8/29/2019 8/29/2019 8/28/2019 8/27/2019 8/18/2019    SBP (mmHg) 133 144 127 127 134    DBP (mmHg) 75 75 71 75 73    Pulse 66 75 83 72 78          Per chart review: Patient went to E.D on 8-28 and 8-29 due to weakness. Patient is scheduled to have an EKG this morning. I will follow-up with patient next week.

## 2019-08-30 NOTE — PLAN OF CARE
[facility] Plan of Care    Chart reviewed, discussed plan with patient.     MRI with no new diffusion weighted changes concerning for acute stroke.    MRA head and neck with focal narrowing of L P2 segment, unchanged from prior study in 2018 and likely 2/2 to atherosclerosis (no LVO, so significant stenosis).     Continue home aspirin 81mg.     This likely reexpression of old stroke given no new exam findings. Patient's /88 in ED.     Discussed with patient importance of BP goal < 130 systolics as outpatient, LDL goal < 70 (LDL level 113, would switch to high intensity atorvastatin 40). Diabetic control (A1C 6.4).     Continue PT, OT, ST as needed.    [facility] signing off. Please call with questions.    Marianne Conner MD  [facility]

## 2019-08-30 NOTE — PLAN OF CARE
Problem: Occupational Therapy Goal  Goal: Occupational Therapy Goal  Outcome: Outcome(s) achieved Date Met: 08/30/19  Pt found sitting EOB & agreeable OT eval this AM. Pt lives w/ spouse in 2SH w/ 0STE; bedroom & WIS w/ GB on 2nd level w/ 17STE & 0HR. 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 Currently, pt demo's min decreased strength at L U/LE & decreased L dorsiflex w/ amb, however no other signif changes from baseline. Edu/tx re: rec stair railing/shower chair, general safety techs & HEP. Pt/spouse verbalized understanding.    Per OT eval, pt w/o signif changes in phys/fxnl status & no further acute OT svcs indicated at this time. Pt appropriate to d/c home w/ out pt OT/PT & rec shower chair.

## 2019-08-30 NOTE — PLAN OF CARE
Problem: Adult Inpatient Plan of Care  Goal: Plan of Care Review  Pt AAO x 4.  VSS.  Pt remained afebrile throughout this shift.   Pt remained free of falls this shift.   Pt no c/o pain this shift.  Plan of care reviewed. Patient verbalizes understanding.   Pt moving/turing independently. Frequent weight shifting encouraged.  Patient  SR on monitor.   Bed low, side rails up x 3, wheels locked, call light in reach.   Pt family member remained at bedside most of shift.   Bed alarm maintained for safety.   Patient instructed to call for assistance.   Hourly rounding completed.   Will continue to monitor.

## 2019-08-30 NOTE — PT/OT/SLP EVAL
"Speech Language Pathology Evaluation  Bedside Swallow/Discharge     Patient Name:  Luis Wong   MRN:  [id number]  Admitting Diagnosis: CVA (cerebral vascular accident)    Recommendations:                 General Recommendations:  Follow-up not indicated  Diet recommendations:  Regular, Thin   Aspiration Precautions: follow universal swallow precautions, upright for meals, small bites/sips, whole meds   General Precautions: Standard, fall  Communication strategies:  none    History:     Past Medical History:   Diagnosis Date    Aneurysm 10/30/2012    Diabetes mellitus     Fever blister     Herpes infection     Hypertension     ICH (intracerebral hemorrhage)     Mixed hyperlipidemia 9/5/2013    Nontraumatic thalamic hemorrhage 8/31/2016    JAQUAN (obstructive sleep apnea)     Right-sided lacunar stroke 9/11/2014    SDH (subdural hematoma)     Special screening for malignant neoplasms, colon     Stroke        Past Surgical History:   Procedure Laterality Date    COLONOSCOPY N/A 12/14/2013    [facility]formed by [facility] ([facility])    Knee arthroscopic surgery         Social History: Patient lives with wife at home.    Prior Intubation HX:  n/a    Modified Barium Swallow: no hx of dysphagia   MRI: Focal area of narrowing of the left P2 segment.  No vessel occlusion.  The basilar artery and distal vertebral artery appear normal.  No aneurysm or arteriovenous malformation.  Prior areas of lacunar infarction noted in the left and right corona radiata, right thalamus, deep white matter of the left frontal lobe, unchanged    Chest X-Rays: none in EMR    Prior diet: reg/thin liquids    Subjective     Consult received for clinical swallow eval this date, SLP did communicate with RN prior to eval/treat.    Patient goals: "You'll see I am good."    Pain/Comfort:  · Pain Rating 1: 0/10    Objective:   Pt seen in room for eval, returned from MRI. He is alert, awake, talking with wife.   Pt oriented " x4, follows commands and answers questions appropriately.  He reports no issues with swallowing and speech is baseline.  Wife agreeable as well.     Oral Musculature Evaluation  · Oral Musculature: WFL  · Dentition: present and adequate  · Mucosal Quality: good, adequate  · Mandibular Strength and Mobility: WFL  · Oral Labial Strength and Mobility: WFL  · Lingual Strength and Mobility: WFL  · Velar Elevation: WFL  · Buccal Strength and Mobility: WFL  · Volitional Cough: elicited  · Volitional Swallow: timely swallow  · Voice Prior to PO Intake: clear voice    Bedside Swallow Eval:   Consistencies Assessed:  · Thin liquids water self fed by cup/straw  · Puree self fed pudding cup  · Solids cracker self fed     Oral Phase:   · WFL    Pharyngeal Phase:   · no overt clinical signs/symptoms of aspiration  · no overt clinical signs/symptoms of pharyngeal dysphagia   · WFL timely swallow trigger  · No wet voice, NO choking or coughing noted    Treatment: No further acute speech pathology services warranted at this time. Please re-consult should patient experience a change in status.  Pt and wife educated on ST role.       Assessment:     Luis Wong is a 63 y.o. male admitted for CVA vs TIA who presents with WFL swallow mechanism and baseline communication skills.       Goals:   Multidisciplinary Problems     SLP Goals     Not on file          Multidisciplinary Problems (Resolved)        Problem: SLP Goal    Goal Priority Disciplines Outcome   SLP Goal   (Resolved)     SLP Outcome(s) achieved                   Plan:     · Patient to be seen:      · Plan of Care expires:     · Plan of Care reviewed with:  patient, spouse   · SLP Follow-Up:  No       Discharge recommendations:  (no further ST needs)     Time Tracking:     SLP Treatment Date:   08/30/19  Speech Start Time:  1104  Speech Stop Time:  1121     Speech Total Time (min):  17 min    Billable Minutes: Eval Swallow and Oral Function 17    GERDA Bobby,  CCC-SLP  08/30/2019

## 2019-08-30 NOTE — PLAN OF CARE
Pt lives with spouse and was independent at home prior to hospital admission.  PCP is Jayce.  White board updated with CM name and contact information.  Discharge brochure provided.  Pt encouraged to call with any questions or concerns.  Cm will continue to follow pt through transitions of care and assist with any discharge needs.     08/30/19 1145   Discharge Assessment   Assessment Type Discharge Planning Assessment   Confirmed/corrected address and phone number on facesheet? Yes   Assessment information obtained from? Patient   Communicated expected length of stay with patient/caregiver yes   Prior to hospitilization cognitive status: Alert/Oriented   Prior to hospitalization functional status: Independent   Current cognitive status: Alert/Oriented   Current Functional Status: Independent   Lives With spouse   Able to Return to Prior Arrangements yes   Is patient able to care for self after discharge? Yes   Patient's perception of discharge disposition home or selfcare   Readmission Within the Last 30 Days no previous admission in last 30 days   Patient currently being followed by outpatient case management? No   Patient currently receives any other outside agency services? No   Equipment Currently Used at Home none   Do you have any problems affording any of your prescribed medications? No   Is the patient taking medications as prescribed? yes   Does the patient have transportation home? Yes   Transportation Anticipated family or friend will provide   Discharge Plan A Home   Discharge Plan B Home with family   DME Needed Upon Discharge  none   Patient/Family in Agreement with Plan yes     Russel Nolasco RN,   [phone]

## 2019-08-30 NOTE — HOSPITAL COURSE
Patient was admitted for observation. MRA head and neck with focal narrowing of L P2 segment, unchanged from prior study in 2018 and likely 2/2 to atherosclerosis. , A1C 6.4, TSH 0.8. Neurology recommended increasing atorvastatin to 40 mg daily and continuing on daily aspirin. PT and OT recommended outpatient PT and OT. Patient remained hemodynamically stable and was discharged to home in good condition. He will follow up with his Neurologist on 9/12/19.

## 2019-08-30 NOTE — PLAN OF CARE
Pt notified of outpt PT/OT ordered and scheduling dept will contact him to set up date and time.  He declined shower chair from [employer] and says he will get one from [employer] or [employer].  F/u appts scheduled.  No other needs identified.  Rounds completed on pt.  All questions addressed.  Bedside nurse to discuss d/c medications.  Discussed importance to attend all f/u appts and take medications as prescribed.  Verbalized understanding.  Follow up with Juan Eduardo MD   The office will call you with appointment date and time.  [address] [facility]   [place] [address]   [phone]     Sep3 Nurse Visit Short   Tuesday Sep 3, 2019 8:45 AM   Arrive at check-in approximately 15 minutes before your scheduled appointment time. Bring all outside medical records and imaging, along with a list of your current medications and insurance card.  You are required to fast from midnight on for this appointment unless otherwise instructed. You may drink water.  1st Floor  [facility]   [address]  [place] [address]   [phone]         08/30/19 1339   Final Note   Assessment Type Final Discharge Note   Anticipated Discharge Disposition Home   Hospital Follow Up  Appt(s) scheduled? Yes   Discharge plans and expectations educations in teach back method with documentation complete? Yes   Right Care Referral Info   Post Acute Recommendation Other     Russel Nolasco RN,   [phone]

## 2019-08-30 NOTE — PLAN OF CARE
Problem: SLP Goal  Goal: SLP Goal  Outcome: Outcome(s) achieved Date Met: 08/30/19  Clinical swallow eval completed, continue regular diet and thin liquids.

## 2019-08-30 NOTE — PLAN OF CARE
Problem: Adult Inpatient Plan of Care  Goal: Plan of Care Review  Patient on RA with sats as documented.

## 2019-08-30 NOTE — NURSING
Pt AAO  VSS NAD.   Tele box removed. IV removed.   Educated pt on medications, when to take, how to take, side effects  Pt verbalized understanding  Waiting for transport

## 2019-08-30 NOTE — PT/OT/SLP EVAL
Occupational Therapy   Evaluation and Discharge Note    Name: Luis Wong  MRN: [id number]  Admitting Diagnosis:  CVA (cerebral vascular accident)      Recommendations:     Discharge Recommendations: home, outpatient OT, outpatient PT  Discharge Equipment Recommendations:  shower chair  Barriers to discharge:  None    Assessment:   Per OT eval, pt w/o signif changes in phys/fxnl status & no further acute OT svcs indicated at this time. Pt appropriate to d/c home w/ out pt OT/PT & rec shower chair.    Luis Wong is a 63 y.o. male with a medical diagnosis of CVA (cerebral vascular accident). At this time, patient is functioning at their prior level of function and does not require further acute OT services.     Plan:     During this hospitalization, patient does not require further acute OT services.  Please re-consult if situation changes.    · Plan of Care Reviewed with: patient, spouse    Subjective     Chief Complaint: r/o CVA  Patient/Family Comments/goals: return home w/ out pt OT/PT    Occupational Profile:  Living Environment: w/ spouse in 2SH w/ 0STE; bedroom/WIS w/ GB on 2nd level w/ 17STE & 0 HR  Previous level of function: (I) w/o DME  Roles and Routines: IADLs, drives, home construction  Equipment Used at home:  grab bar  Assistance upon Discharge: spouse    Pain/Comfort:  · Pain Rating 1: 0/10  · Pain Rating Post-Intervention 1: 0/10    Patients cultural, spiritual, Restoration conflicts given the current situation:      Objective:     Communicated with: nsradha prior to session.  Patient found sitting EOB with bed alarm, peripheral IV upon OT entry to room.    General Precautions: Standard, fall   Orthopedic Precautions:N/A   Braces: N/A     Occupational Performance:    Bed Mobility:    ·     Functional Mobility/Transfers:  · Patient completed Sit <> Stand Transfer with supervision  with  no assistive device   · Patient completed Bed <> Chair Transfer using Step Transfer technique with supervision with no  assistive device  · Functional Mobility: w/o DME around room w/ CGA for safety    Activities of Daily Living:  · Feeding:  supervision sitting EOB  · Lower Body Dressing: supervision doff/don socks via [employer] tech at EOB    Cognitive/Visual Perceptual:  AO4  WFL per testing    Physical Exam:  RUE WFL at 5/5  LUE WFL at 4/5 prox; 4+/5 dist    Sensation: grossly intact L U/LE    Sit balance: G  Stand balance: F+    AMPAC 6 Click ADL:  AMPAC Total Score: 24    Treatment & Education:    Education:  Pt found sitting EOB & agreeable OT eval this AM. Pt lives w/ spouse in 2SH w/ 0STE; bedroom & WIS w/ GB on 2nd level w/ 17STE & 0HR. 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 Currently, pt demo's min decreased strength at L U/LE & decreased L dorsiflex w/ amb, however no other signif changes from baseline. Edu/tx re: rec stair railing/shower chair, general safety techs & HEP. Pt/spouse verbalized understanding.    Patient left in WC with transport present    GOALS:   Multidisciplinary Problems     Occupational Therapy Goals     Not on file          Multidisciplinary Problems (Resolved)        Problem: Occupational Therapy Goal    Goal Priority Disciplines Outcome Interventions   Occupational Therapy Goal   (Resolved)     OT, PT/OT Outcome(s) achieved                    History:     Past Medical History:   Diagnosis Date    Aneurysm 10/30/2012    Diabetes mellitus     Fever blister     Herpes infection     Hypertension     ICH (intracerebral hemorrhage)     Mixed hyperlipidemia 9/5/2013    Nontraumatic thalamic hemorrhage 8/31/2016    JAQUAN (obstructive sleep apnea)     Right-sided lacunar stroke 9/11/2014    SDH (subdural hematoma)     Special screening for malignant neoplasms, colon     Stroke        Past Surgical History:   Procedure Laterality Date    COLONOSCOPY N/A 12/14/2013    Performed by [facility] at [facility] (4TH FLR)    Knee arthroscopic surgery          Time Tracking:     OT Date of Treatment: 08/30/19  OT Start Time: 0842  OT Stop Time: 0914  OT Total Time (min): 32 min    Billable Minutes:Evaluation 32  Total Time 32    EFREN Parker  8/30/2019

## 2019-08-30 NOTE — PT/OT/SLP EVAL
Physical Therapy Evaluation and Discharge    Patient Name:  Luis Wong   MRN:  [id number]    Recommendations:     Discharge Recommendations:  outpatient PT, outpatient OT   Discharge Equipment Recommendations: shower chair   Barriers to discharge: None    Assessment:     Luis Wong is a 63 y.o. male admitted with a medical diagnosis of CVA (cerebral vascular accident).  He presents with the following impairments/functional limitations:  weakness, impaired coordination, decreased lower extremity function, decreased coordination. Pt does not present c/ any acute PT needs at this time. Pt requires modified indpendent for functional bed mobility and supervision for transfers; and SBA<> CGA for gait for 100ft c/ no AD; Pt demonstrated persistent L foot drag and impaired LLE  motor control c/ gait.    Rehab Prognosis: Good; patient would benefit from acute skilled PT services to address these deficits and reach maximum level of function.    Recent Surgery: * No surgery found *      Plan:     During this hospitalization, patient to be seen (PT DC) to address the identified rehab impairments via   and progress toward the following goals:    · Plan of Care Expires:  08/30/19    Subjective     Chief Complaint: L Leg drag.  Patient/Family Comments/goals: Agreed to PT, wants to get stronger to PLOF  Pain/Comfort:  · Pain Rating 1: 0/10  · Pain Rating Post-Intervention 1: 0/10    Patients cultural, spiritual, Uatsdin conflicts given the current situation: no    Living Environment:  Pt lives c/ spouse in 2SH; 17 steps to c/ reji HR to access bed and bath  on 2nd floor.  Prior to admission, patients level of function was Modified Independent; drives.  Equipment used at home: grab bar.  DME owned (not currently used): none.  Upon discharge, patient will have assistance from spouse.    Objective:     Communicated with RNKathleen prior to session.  Patient found sitting up on EOB with bed alarm, peripheral IV  upon PT entry to  room.    General Precautions: Standard, fall   Orthopedic Precautions:N/A   Braces: N/A     Exams:  · Cognitive Exam:  Patient is oriented to Person, Place, Time and Situation  · Gross Motor Coordination:  Mildly impaired c/ functional gait; heel-to-shin: 5/5 with extra time alex  · Proprioception: alex LE hip; knee;ankle- intact  · Tone: [place]; Alex LE  · Postural Exam:  Patient presented with the following abnormalities:    · -       Rounded shoulders  · -       Posterior pelvic tilt  · Sensation:    · -       Intact alex LE  · RLE ROM: WFL  · RLE Strength: hip flex 4/5; hip add/abd/ 4+/5 knee ext 4+/5 ; ankle DF 4/5  · LLE ROM: WFL   · LLE Strength:hip flex 4+/5; hip add/abd/ 4+/5 knee ext 4+/5 ; ankle DF 4/5    Functional Mobility:  · Bed Mobility:     · Scooting: modified independence  · Transfers:     · Sit to Stand:  supervision with no AD  · Bed to Chair: stand by assistance <> contact guard assistance with  no AD  using  Stand Pivot  · Gait: SBA<> CGA for gait for 100ft c/ no AD; pt demo R foot IR and reduced dorsiflexion c/ swing phase of gait; required VC for visual dependence for improved LE motor control c/ gait.   · Balance: dynamic gait: fair      Therapeutic Activities and Exercises:  PT eval completed c/ progressive mobility as detailed above.   PT does not require any additional acute PT services at this time.      AM-PAC 6 CLICK MOBILITY  Total Score:21     Patient left up in chair with all lines intact, call button in reach, RN notified and wife present.    GOALS:   Multidisciplinary Problems     Physical Therapy Goals     Not on file          Multidisciplinary Problems (Resolved)        Problem: Physical Therapy Goal    Goal Priority Disciplines Outcome Goal Variances Interventions   Physical Therapy Goal   (Resolved)     PT, PT/OT Outcome(s) achieved                     History:     Past Medical History:   Diagnosis Date    Aneurysm 10/30/2012    Diabetes mellitus     Fever blister      Herpes infection     Hypertension     ICH (intracerebral hemorrhage)     Mixed hyperlipidemia 9/5/2013    Nontraumatic thalamic hemorrhage 8/31/2016    JAQUAN (obstructive sleep apnea)     Right-sided lacunar stroke 9/11/2014    SDH (subdural hematoma)     Special screening for malignant neoplasms, colon     Stroke        Past Surgical History:   Procedure Laterality Date    COLONOSCOPY N/A 12/14/2013    Performed by Beto Douglas [facility] at [facility] ([facility] F[facility])    Knee arthroscopic surgery         Time Tracking:     PT Received On: 08/30/19  PT Start Time: 1115     PT Stop Time: 1134  PT Total Time (min): 19 min     Billable Minutes: Evaluation 19      Daniela Braxton PT, DPT  8/30/2019

## 2019-08-31 NOTE — DISCHARGE SUMMARY
[facility] Hospital Medicine  Discharge Summary      Patient Name: Luis Wong  MRN: [id number]  Admission Date: 8/29/2019  Hospital Length of Stay: 0 days  Discharge Date and Time: 8/30/2019  4:04 PM  Attending Physician: Dee Velazco MD  Discharging Provider: Carla Altman PA-C  Primary Care Provider: Juan Eduardo MD      HPI:   Luis Wong is a 63 year old male with a past medical history of Hypertension, Intracerebral hemorrhage, Hyperlipidemia, Obstructive sleep apnea, Right side lacunar stroke, and Subdural hematoma. He lives in [place], [place] with his wife. His PCP is Dr. Juan Eduardo.    He presented to [facility] ED 8/28/2019 and 8/29/2019 with complaints of worsening left sided weakness since 2 days. Per patient he was at the gym and felt he could not lift his left upper extremity to scratch his face; per wife his left lower extremity was dragging more. He also experienced some numbness on the same side. On ED evaluation, patient's weakness was 4/5 on left side. MRI brain showed no acute intracranial event, showed sub-acute changes in right frontal parietal region. Neurology consulted in ED. Admitted to CDU for further evaluation and treatment.     * No surgery found *      Hospital Course:   Patient was admitted for observation. MRA head and neck with focal narrowing of L P2 segment, unchanged from prior study in 2018 and likely 2/2 to atherosclerosis. , A1C 6.4, TSH 0.8. Neurology recommended increasing atorvastatin to 40 mg daily and continuing on daily aspirin. PT and OT recommended outpatient PT and OT. Patient remained hemodynamically stable and was discharged to home in good condition. He will follow up with his Neurologist on 9/12/19.     Consults: Neurology    No new Assessment & Plan notes have been filed under this hospital service since the last note was generated.  Service: Hospital Medicine    Final Active Diagnoses:    Diagnosis  "Date Noted POA    PRINCIPAL PROBLEM:  CVA (cerebral vascular accident) [I63.9] 08/29/2019 Yes    Hyperglycemia [R73.9] 09/05/2013 Yes    Mixed hyperlipidemia [E78.2] 09/05/2013 Yes    Cataracts, bilateral [H26.9] 09/05/2013 Yes      Problems Resolved During this Admission:       Discharged Condition: good    Disposition: Home or Self Care    Follow Up:  Follow-up Information     Juan Eduardo MD.    Specialty:  Family Medicine  Why:  The office will call you with appointment date and time.  Contact information:  [address]  [place] [address]  [phone]             Brien Lopez MD On 9/12/2019.    Specialty:  Neurology  Contact information:  [address]  [address]  [place] [address]  [phone]                 Patient Instructions:      BATH/SHOWER CHAIR FOR HOME USE     Order Specific Question Answer Comments   Height: 5' 9" (1.753 m)    Weight: 72.6 kg (160 lb)    Does patient have medical equipment at home? grab bar    Length of need (1-99 months): 99    Type: With back      Ambulatory consult to Physical Therapy   Referral Priority: Routine Referral Type: Physical Medicine   Referral Reason: Specialty Services Required   Requested Specialty: Physical Therapy   Number of Visits Requested: 1     Ambulatory consult to Occupational Therapy   Referral Priority: Routine Referral Type: Occupational Therapy   Referral Reason: Specialty Services Required   Requested Specialty: Occupational Therapy   Number of Visits Requested: 1     Diet diabetic     Notify your health care provider if you experience any of the following:  increased confusion or weakness     Notify your health care provider if you experience any of the following:  persistent dizziness, light-headedness, or visual disturbances     Notify your health care provider if you experience any of the following:  severe persistent headache     Activity as tolerated       Significant Diagnostic Studies: Labs: All labs within the " past 24 hours have been reviewed    Pending Diagnostic Studies:     None         Medications:  Reconciled Home Medications:      Medication List      CHANGE how you take these medications    atorvastatin 40 MG tablet  Commonly known as:  LIPITOR  Take 1 tablet (40 mg total) by mouth once daily.  What changed:    · medication strength  · how much to take        CONTINUE taking these medications    aspirin 81 MG EC tablet  Commonly known as:  ECOTRIN  TAKE 1 TABLET BY MOUTH EVERY DAY     hydroCHLOROthiazide 12.5 mg capsule  Commonly known as:  MICROZIDE  TAKE 1 CAPSULE BY MOUTH EVERY DAY     metFORMIN 500 MG tablet  Commonly known as:  GLUCOPHAGE  Take 1 tablet (500 mg total) by mouth daily with breakfast.     NIFEdipine 30 MG (OSM) 24 hr tablet  Commonly known as:  PROCARDIA-XL  Take 1 tablet (30 mg total) by mouth once daily.     potassium chloride 8 mEq Cpsr  Commonly known as:  MICRO-K  TAKE 1 CAPSULE (8 MEQ TOTAL) BY MOUTH ONCE DAILY.            Indwelling Lines/Drains at time of discharge:   Lines/Drains/Airways          None          Time spent on the discharge of patient: 35 minutes  Patient was seen and examined on the date of discharge and determined to be suitable for discharge.      HALLE Steve MD  [facility]  MD Nate Briggs MD Ijeoma Innocent-Ituah, MD Fadi Hawawini, DO Elizabeth Knipp, PA-C Brittany Chatman, NP Kristin Stein, PA-C Arekeva Selmon, NP  [address] [place] [address]  Office Phone: [phone]  Office Fax: [phone]

## 2019-09-03 DIAGNOSIS — Z00.00 ROUTINE GENERAL MEDICAL EXAMINATION AT A HEALTH CARE FACILITY: Primary | ICD-10-CM

## 2019-09-03 NOTE — PROGRESS NOTES
"Last 5 Patient Entered Readings                                      Current 30 Day Average: 129/76     Recent Readings 9/3/2019 9/2/2019 9/2/2019 9/2/2019 9/2/2019    SBP (mmHg) 103 130 140 114 143    DBP (mmHg) 61 74 70 76 81    Pulse 88 81 87 110 109        Patient reports he is recovering from the recent E.D visit.     Patient reports he was was riding bike and felt his leg feeling weak and wasn't recovering like it normal does. Patient states, "I thought I was having a stroke".  Patient was released and then reports the following day his arm was weak and returned to E.D.. The doctors ran more test and everything came back fine (no new findings). Patient reports a doctor (not specific) increased cholesterol medication to 40mg instead of 10mg.     Patient reports his has support from his wife. Patient has an appointment with occupational therapy tomorrow and physical therapy next week. Patient will go in for intitial visit to complete evaluations. Patient reports it is very challenging getting around.     Patient states, "I am in a good place mentally". Patient reports higher readings are due to stress (daughters new house).                "

## 2019-09-04 ENCOUNTER — PATIENT MESSAGE (OUTPATIENT)
Dept: ADMINISTRATIVE | Facility: OTHER | Age: 64
End: 2019-09-04

## 2019-09-04 ENCOUNTER — CLINICAL SUPPORT (OUTPATIENT)
Dept: REHABILITATION | Facility: HOSPITAL | Age: 64
End: 2019-09-04
Payer: COMMERCIAL

## 2019-09-04 DIAGNOSIS — Z74.09 IMPAIRED MOBILITY AND ACTIVITIES OF DAILY LIVING: ICD-10-CM

## 2019-09-04 DIAGNOSIS — M62.81 MUSCLE WEAKNESS: ICD-10-CM

## 2019-09-04 DIAGNOSIS — R27.8 DECREASED COORDINATION: Primary | ICD-10-CM

## 2019-09-04 DIAGNOSIS — Z78.9 IMPAIRED MOBILITY AND ACTIVITIES OF DAILY LIVING: ICD-10-CM

## 2019-09-04 PROCEDURE — 97165 OT EVAL LOW COMPLEX 30 MIN: CPT | Mod: PN

## 2019-09-04 PROCEDURE — 97110 THERAPEUTIC EXERCISES: CPT | Mod: PN

## 2019-09-04 NOTE — PATIENT INSTRUCTIONS
Strengthening (Resistive Putty)        Squeeze putty using thumb and all fingers.  Repeat __30__ times. Do ___2-3_ sessions per day.      Copyright © [facility]. All rights reserved.       Roll putty back and forth, being sure to use all fingertips.  Repeat __10__ times. Do ___2-3_ sessions per day.    Lateral Pinch Strengthening (Resistive Putty)        Squeeze between thumb and side of each finger in turn.  Repeat __30__ times. Do __2-3 sessions per day.    Copyright © [facility]. All rights reserved.   MP Flexion (Resistive Putty)        Bending only at large knuckles, press putty down against thumb. Keep fingertips straight.  Repeat ___30_ times. Do ___2-3_ sessions per day.    Copyright © [facility]. All rights reserved.   Palmar Pinch Strengthening (Resistive Putty)      Pinch putty between thumb and each fingertip in turn.  Repeat __30__ times. Do __2-3__ sessions per day.  Extension (Assistive Putty)      Copyright © [facility]. All rights reserved.         COIN FLIP    Place various coins or cotton balls on a table. Try and put as many in your hand that you can.       Elevation (Active)        Shrug shoulders up, breathing in. Relax, breathing out.  Repeat __30__ times. Do __2__ sessions per day.    Copyright © [employer]. All rights reserved.   Adduction (Active)        Maintaining erect posture, draw shoulders back while bringing elbows back and inward.  Repeat ___30_ times. Do _2___ sessions per day.    Copyright © [facility]. All rights reserved.   Patient Instructions       ROM: Flexion - Wand (Supine)        Lie on back holding wand. Raise arms over head.   Repeat 15 times per set. Do 2 sets per session. Do 3-5 sessions per day.     [email or web address]     Abduction (Side-Lying)        Lie on left side. Raise arm above head. Keep palm forward.  Repeat 15 times per set. Do 2 sets per session. Do 3-5 sessions per day.     [email or web address]     Copyright © [employer]. All rights reserved.    External Rotation: Side-Lying  (Dumbbell)        Lie with neck supported, left elbow bent to 90°, forearm across stomach. Raise forearm, keeping elbow at side.  Repeat ___10_ times per set. Do __3__ sets per session. Do __2__ sessions per week. Use _0___ lb weight.      Copyright © [employer]. All rights reserved.   Elevation (Active)        Shrug shoulders up, breathing in. Relax, breathing out.  Repeat __30__ times. Do __2__ sessions per day.    Copyright © I. All rights reserved.   Adduction (Active)        Maintaining erect posture, draw shoulders back while bringing elbows back and inward.  Repeat ___30_ times. Do _2___ sessions per day.    Copyright © [employer]. All rights reserved.   Patient Instructions         Lie on back, arms at side. With assistance, raise left arm up and back over head, keeping elbow straight.  Complete __3_ sets of _10__ repetitions. Perform _2-3__ sessions per day.  ELBOW: Bend and Straighten - Supported        Copyright © [employer]. All rights reserved.   ELBOW: Bicep Curl        Begin with elbow straight and palm facing forward. Bend elbow.  _10__ reps per set, _3__ sets per day, _5__ days per week          Copyright © [employer]. All rights reserved.   Arm Sweep: [place] in [place]        Get ON TARGET. Lie on flat up roller, feet on full roller. Keeping arms on floor, sweep out and up beyond head. Stop and stretch as tightness develops.  Hold __3_ seconds. Repeat _10__ times. Do _2-3__ sessions per day.         Copyright © I. All rights reserved.

## 2019-09-04 NOTE — PLAN OF CARE
[facility]  Initial Neurological Evaluation     Date: 9/4/2019  Patient: Luis Wong  Chart Number: [id number]    Therapy Diagnosis:   Encounter Diagnoses   Name Primary?    Impaired mobility and activities of daily living     Decreased coordination Yes    Muscle weakness      Physician: Carla Altman, HALLE Lopez MD.     Physician Orders: Eval and tx  Medical Diagnosis: CVA L sided weakness Recrudecesnt  Onset Date: 8/28/19 most recent CVA  Evaluation Date: 9/4/2019  Plan of Care Expiration Period: 11/1/19  Insurance Authorization period Expiration: 12/31/19  Date of Return to MD: NA  Visit # / Visits Authortized: 1 / 30  FOTO: CVA UE hand /100% limitation     Time In:2:00 pm   Time Out: 2:45 pm   Total Billable (one on one) Time: 45 minutes    Precautions: Standard and Fall    Subjective     History of Current Condition: Lacunar infarct in June 2018 with therapy services here. Recurrent stroke like episodes with pt stating MD stating recrudescence of symptoms with no signs of new CVA with MRI.     Involved Side: R  Dominant Side: Left  Date of Onset: 8/28/19  Surgical Procedure: None  Imaging: MRI studies see scans with pt stating MD said Recrudescent stroke symptoms.   Previous Therapy: yes here for OP services.     Patient's Goals for Therapy: Increase strength and ROM.     Pain:  Pain Related Behaviors Observed: no   Functional Pain Scale Rating 0-10:   0/10 on average  0/10 at best  0/10 at worst  Location: numbness and tingling in the hand.   Description: Burning and Tingling  Aggravating Factors: none  Easing Factors: rest    Occupation:  Retired from [employer]  Working presently: retired  Duties:     Functional Limitations/Social History:    Prior Level of Function: mod I  Current Level of Function:mod A    Home/Living environment : lives with their spouse  Home Access: 2 story home with 18 steps to second floor.   DME: single point cane     Leisure:  Driving     Past Medical History/Physical Systems Review:   Luis Wong  has a past medical history of Aneurysm, Diabetes mellitus, Fever blister, Herpes infection, Hypertension, ICH (intracerebral hemorrhage), Mixed hyperlipidemia, Nontraumatic thalamic hemorrhage, JAQUAN (obstructive sleep apnea), Right-sided lacunar stroke, SDH (subdural hematoma), Special screening for malignant neoplasms, colon, and Stroke.    Luis Wong  has a past surgical history that includes Knee arthroscopic surgery.    Luis has a current medication list which includes the following prescription(s): aspirin, atorvastatin, hydrochlorothiazide, metformin, nifedipine, and potassium chloride.    Review of patient's allergies indicates:  No Known Allergies     Other:     Objective     Cognitive Exam:  Oriented: Person, Place, Time and Situation  Behaviors: normal, cooperative  Follows Commands/attention: Follows multistep  commands  Communication: clear/fluent  Memory: No Deficits noted  Safety awareness/insight to disability: aware of diagnosis, treatment, and prognosis  Coping skills/emotional control: Appropriate to situation    Visual/Perceptual:  Tracking: intact  Saccades: intact  Acuity: intact  R/L discrimination: intact  Visual field: intact  Motor Planning Praxis: intact  Comments:     Physical Exam:  Postural examination/scapula alignment: Rounded shoulder and Head forward  Joint integrity: WNL  Skin integrity: WNL  Edema: WNL   Palpation: WNL      Joint Evaluation  AROM  9/4/2019 PROM   9/4/2019 AROM  9/4/2019 PROM   9/4/2019    Right Right Left Left   Shoulder flex 0-180 WL WNL 54 130   Shoulder Abd 0-180 WNL WNWL 54 125   Shoulder ER 0-90 WNL WNL Neutral WNL   Shoulder IR 0-90 WNL WNL Trace WNL   Shoulder Extension 0-80 WNL WNL 51 65   Shoulder Horizontal adduction 0-90 WNL WNL Trace WNL   Elbow flex/ext 0-150 WNL WNL WNL WNL   Wrist flex 0-80 WNL WNL WNL WNL   Wrist ext 0-70 WNL WNL Neutral WNL   Supination 0-80 WNL WNL  Trace easier WNL   Pronation 0-80 WNL WNl trace WNl   UD WNL WNL Trace WNL   RD WNL WNL Trace WNL     Fist: loose      Strength 9/4/2019 9/4/2019   **within available ROM** Right Left   Shoulder flex 4+/5 3-/5   Shoulder abd 4+/5 3-/5   Shoulder ER 4+/5 3-/5   Shoulder IR 4+/5 3-/5   Shoulder Extension 3-/5 3-/5   Shoulder Horizontal adduction 4+/5 3-/5   Elbow flex 4+/5 3-/5   Elbow ext 4+/5 3-/5   Wrist flex 4+/5 3-/5   Wrist ext 4+/5 3-/5   Supination 4+/5 3-/5   Pronation 4+/5 3-/5   UD 4+/5 3-/5   RD 4+/5 3-/5      Strength: (ANTONIO Dynamometer in lbs.) Average 3 trials, Position II:     9/4/2019 9/4/2019    Right Left   Rung # 10#     Pinch Strength (Measured in psi)     9/4/2019 9/4/2019    Right Left   Key Pinch 26 psi 3 psi   3pt Pinch 20 psi 0 psi   2pt Pinch 11 psi 0 psi     Fine Motor Coordination: 9 Hole Peg Test  9 Peg Test Right Left   Removed 9/9 9/9    Replaced 9/9 0/9   Time NT sec NT sec       Gross motor coordination:   - EVANGELINA: Unable  - Finger to Nose: Unable  - Finger Flicks: Unable       and Pinch Strengths (in pounds): Old previous measurements.   Setting 2    Right Left Left  7/26/18 Left  8/1/18 Left  8/7/18 Left  9/4/18   Elbow bent              1 90 75 84 82 95 95   2 96 75 86 83 95 92   3 96 80 83 84 85 86   Average 94 76.6 84.6 83 91.7 91                  Lateral 26 24 25 25 25 27   Tripod 20 20 19 19 18 19   Tip 11 10 13 13 14 13      Comments:         Fine motor coordination:  9 hole peg - in hand manipulation/individual pegs    Right Left Left   7/26/18 Left  8/1/18 Left  9/4/18   Seconds 23 46 58 52 55   Dropped during removal 0 2 1 1 0   Dropped during replacement 0 2 1 1 1          Tone:  Modified Mindi Scale:   1-  Slight increase in muscle tone, manifested by a catch and release or by minimal resistance at the end of the range of motino when the affected part(s) is moved in flexion or extension    Comments:     Sensation:  Luis  reports numbness and tingling  with burning sensation.   Light touch: left intact  Sharp/Dull: left intact  Kinesthesia: leftimpaired  Proprioception: left impaired  Temperature: left intact    Balance:   Static Sit - GOOD: Takes MODERATE challenges from all directions  Dynamic sit- GOOD-: Takes MODERATE challenges from all directions but inconsistently  Static Stand - POOR+: Needs MINIMAL assist to maintain  Dynamic stand - POOR: Needs MODERATE assist to maintain    Endurance Deficit: mild                    Functional Status      Functional Mobility:  Bed mobility: Mod I  Roll to left: Mod I  Roll to right: Mod I  Supine to sit: Mod I  Sit to supine: Mod I  Transfers to bed: Mod I  Transfers to toilet: Min A  Car transfers: Min A  Wheelchair mobility: Min A    ADL's:  Feeding: Min A  Grooming: Mod I  Hygiene: Mod I  UB Dressing: Min A  LB Dressing: Min A  Toileting: Min A  Bathing: Min A    IADL's:  Homecare: Max A  Cooking: Max A  Laundry: Max A  Yard work: Max A  Use of telephone: Max A  Money management: Max A  Medication management: Max A  Handwriting:Min A  Technology Use:Min A    Comments:      CMS Impairment/Limitation/Restriction for FOTO Hand Survey    Therapist reviewed FOTO scores for Luis Wong on 9/4/2019.   FOTO documents entered into [employer] - see Media section.    Limitation Score: 100%  Category: Self Care    Current : CN = 100% impaired, limited or restricted  Goal: CK = at least 40% but < 60% impaired, limited or restricted  Discharge:         Treatment     Treatment Time In: 240  Treatment Time Out: 250  Total Treatment time separate from Evaluation time:10 min     Luis received therapeutic exercises to develop strength and ROM for 5 minutes including:  HEP established and reviewed. See media  Yellow t putty  AAROM and NMR exercises.     Home Exercises and Patient Education Provided    Education provided:   -role of OT, goals for OT, scheduling/cancellations, insurance limitations with patient.  -Additional Education  provided: handout    Written Home Exercises Provided: yes.  Exercises were reviewed and Luis was able to demonstrate them prior to the end of the session.    Luis demonstrated good  understanding of the education provided.     See EMR under Patient Instructions for exercises provided 9/4/2019.    Assessment     Luis Wong is a 63 y.o. male referred to outpatient occupational therapy and presents with a medical diagnosis of CVA with L sided weakness, resulting in decreased flexibility, decreased range of motion, decreased muscle strength, impaired function and decreased work ability and demonstrates limitations as described in the chart below. Following medical record review it is determined that pt will benefit from occupational therapy services in order to maximize pain free and/or functional use of left UE.    Pt prognosis is Good due to  Motivation and participation.   Pt will benefit from skilled outpatient Occupational Therapy to address the deficits stated above and in the chart below, provide pt/family education, and to maximize pt's level of independence.     Plan of care discussed with patient: Yes  Pt's spiritual, cultural and educational needs considered and patient is agreeable to the plan of care and goals as stated below:     Anticipated Barriers for therapy: None    Medical Necessity is demonstrated by the following  Profile and History Assessment of Occupational Performance Level of Clinical Decision Making Complexity Score   Occupational Profile:   Luis Wong is a 63 y.o. male who lives with their spouse and is currently unemployed as retired. Luis Wong has difficulty with  bathing, grooming and dressing  driving/transportation management, shopping, phone/computer use, housework/household chores and medication management  affecting his/her daily functional abilities. His/her main goal for therapy is increase use of the LUE.     Comorbidities:   history of CVA    Medical and Therapy  History Review:   Brief               Performance Deficits    Physical:  Joint Mobility  Muscle Power/Strength  Muscle Endurance  Skin Integrity/Scar Formation  Control of Voluntary Movement   Strength  Pinch Strength  Gross Motor Coordination  Fine Motor Coordination  Proprioception Functions    Cognitive:  Safety Awareness/Insight to Disability    Psychosocial:    Habits  Routines  Rituals     Clinical Decision Making:  low    Assessment Process:  Problem-Focused Assessments    Modification/Need for Assistance:  Minimal-Moderate Modifications/Assistance    Intervention Selection:  Several Treatment Options       low  Based on PMHX, co morbidities , data from assessments and functional level of assistance required with task and clinical presentation directly impacting function.       The following goals were discussed with the patient and patient is in agreement with them as to be addressed in the treatment plan.     Goals:  Short Term Goals:  1) Initiate Hep   2) Pt to increase LUE  strength by 5 # in order to A in self care task of feeding by 4 weeks.   3) Pt to increase Quick dash Score by 5 points increasing self care IND by 4 weeks.   4) Pt to increase L UE AROM by 10 degrees in order to A in UB dressing by 4 weeks.  5) Patient will be able to achieve less than or equal to 75% on the FOTO, demonstrating overall improved functional ability with upper extremity. (self-care category)    Long Term Goals:  1) Pt to be IND with HEP in order to maintain ROM and strength needed for self care IND by d.c.  2) Pt to increase L UE  strength to WNL as compared to unaffected extremity in order to open items for self feeding by d.c.  3) Pt to increase Quick dash Score by 10 points increasing self care IND at home by d.c.  4) Pt to increase L UE AROM to WFL in order to A in UB dressing by d/c.  5) Patient will be able to achieve less than or equal to 50% on the FOTO, demonstrating overall improved functional  ability with upper extremity. (self-care category)      Plan   Certification Period/Plan of care expiration: 9/4/2019 to 11/1/19.    Outpatient Occupational Therapy 2 times weekly for 8 weeks to include the following interventions: Electrical Stimulation PRN, Gait Training, Manual Therapy, Moist Heat/ Ice, Neuromuscular Re-ed, Orthotic Management and Training, Patient Education, Self Care, Therapeutic Activites and Therapeutic Exercise.    Christian Teran, OT

## 2019-09-05 ENCOUNTER — PATIENT MESSAGE (OUTPATIENT)
Dept: NEUROLOGY | Facility: CLINIC | Age: 64
End: 2019-09-05

## 2019-09-05 ENCOUNTER — TELEPHONE (OUTPATIENT)
Dept: NEUROLOGY | Facility: CLINIC | Age: 64
End: 2019-09-05

## 2019-09-05 DIAGNOSIS — I63.9 CEREBROVASCULAR ACCIDENT (CVA), UNSPECIFIED MECHANISM: Primary | ICD-10-CM

## 2019-09-05 DIAGNOSIS — I63.9 ISCHEMIC STROKE: Primary | ICD-10-CM

## 2019-09-05 NOTE — TELEPHONE ENCOUNTER
I called the patient and let him know the order for the commode and wheel chair was ready for chaim holliday, he understood.

## 2019-09-06 ENCOUNTER — CLINICAL SUPPORT (OUTPATIENT)
Dept: REHABILITATION | Facility: HOSPITAL | Age: 64
End: 2019-09-06
Payer: COMMERCIAL

## 2019-09-06 DIAGNOSIS — Z78.9 IMPAIRED MOBILITY AND ACTIVITIES OF DAILY LIVING: ICD-10-CM

## 2019-09-06 DIAGNOSIS — Z74.09 IMPAIRED MOBILITY AND ACTIVITIES OF DAILY LIVING: ICD-10-CM

## 2019-09-06 PROCEDURE — 97110 THERAPEUTIC EXERCISES: CPT | Mod: PN

## 2019-09-06 PROCEDURE — 97112 NEUROMUSCULAR REEDUCATION: CPT | Mod: PN

## 2019-09-06 NOTE — PROGRESS NOTES
"  Occupational Therapy Daily Treatment Note     Name: Luis Wong  Clinic Number: [id number]    Therapy Diagnosis:   Encounter Diagnosis   Name Primary?    Impaired mobility and activities of daily living      Physician: Carla Altman PA-C    Visit Date: 9/6/2019  Physician Orders: Franco and tx  Medical Diagnosis: CVA L sided weakness Recrudecesnt  Onset Date: 8/28/19 most recent CVA  Evaluation Date: 9/4/2019  Plan of Care Expiration Period: 11/1/19  Insurance Authorization period Expiration: 12/31/19  Date of Return to MD: NA  Visit # / Visits Authortized: 2 / 30  FOTO: CVA UE hand /100% limitation      Time In:8:35 am   Time Out: 9:30 am   Total Billable (one on one) Time: 55 minutes     Precautions: Standard and Fall    Subjective     Pt reports: "No pain today I am getting stiff but sometimes its moving better than others"  he was compliant with home exercise program given last session.   Response to previous treatment:Fair  Functional change: None yet    Pain: 0/10  Location: left arms     Objective       Luis received the following direct contact modalities after being cleared for contraindications for 10 minutes:  -MHP to L shoulder x 10 minutes in order to increase extensibility of tissues prior to MT.    Luis received therapeutic exercises for 30 minutes including:  -Pt completed the following exercises below in order to increase ROM, strength and tolerance of affected UE in order to increase IND and functional use:    Exercises Date: 9/6/2019      Visit #2   Wrist ext over roll X 30   Black gripper  3 red 2 yellow rbs  X 15   Red CP  Thumb ext 2/15  2/15   Red putty PVC punch outs  X 30   Red t bar Smileys harder  Frowns  X 30            Shoulder using mirror  Shrugs  Scap squeezes  Bicep curls X 30 each      X 10           Trunk    Seated trunk twist with t ball 3/10   Flex with BUE using yellow t ball  3/10   Chest press 3/10                                     Luis participated in " neuromuscular re-education activities to improve: Coordination, Sense, Proprioception and Posture for 25 minutes. The following activities were included:  -  Mirror therapy Finger ext  X 30  Thumb abduction x 30    Attempted cotton ball retrieval using pincer grasp and R hand A fro accuracy X 5   WB L hand with neuro ifra paddle applied X 5 min          PVC Tract exercises Seated   Horzontal Abd/add  X 10   FF unable    Supine        Home Exercises and Education Provided     Education provided:   - Previous hand out   - Progress towards goals     Written Home Exercises Provided: Patient instructed to cont prior HEP.  Exercises were reviewed and Luis was able to demonstrate them prior to the end of the session.  Luis demonstrated good  understanding of the HEP provided.   .   See EMR under Patient Instructions for exercises provided prior visit.        Assessment     Pt would continue to benefit from skilled OT. Pt tolerated session well. No pain today. Still main limitation being LUE weakness and ROM. Focus on the hand and shoulder today. Fair tolerance to hand exercises, limited ext of the fingers and thumb. He did better with mirror therapy to promote ext due to flexor synergy. Tolerated shoulder and elbow exercises fairly well with more need for this next session. Pt highly motivated and compliant.      Luis is progressing well towards his goals and there are no updates to goals at this time. Pt prognosis is Fair.     Pt will continue to benefit from skilled outpatient occupational therapy to address the deficits listed in the problem list on initial evaluation provide pt/family education and to maximize pt's level of independence in the home and community environment.     Anticipated barriers to occupational therapy:     Pt's spiritual, cultural and educational needs considered and pt agreeable to plan of care and goals.    Goals:  Goals:  Short Term Goals:  1) Initiate Hep Progressing 9/6/2019  2) Pt to  increase LUE  strength by 5 # in order to A in self care task of feeding by 4 weeks. Progressing 9/6/2019  3) Pt to increase Quick dash Score by 5 points increasing self care IND by 4 weeks. Progressing 9/6/2019  4) Pt to increase L UE AROM by 10 degrees in order to A in UB dressing by 4 weeks. Progressing 9/6/2019  5) Patient will be able to achieve less than or equal to 75% on the FOTO, demonstrating overall improved functional ability with upper extremity. (self-care category) Progressing 9/6/2019       Long Term Goals:  1) Pt to be IND with HEP in order to maintain ROM and strength needed for self care IND by d.c. Progressing 9/6/2019  2) Pt to increase L UE  strength to WNL as compared to unaffected extremity in order to open items for self feeding by d.c. Progressing 9/6/2019  3) Pt to increase Quick dash Score by 10 points increasing self care IND at home by d.c. Progressing 9/6/2019  4) Pt to increase L UE AROM to WFL in order to A in UB dressing by d/c. Progressing 9/6/2019  5) Patient will be able to achieve less than or equal to 50% on the FOTO, demonstrating overall improved functional ability with upper extremity. (self-care category) Progressing 9/6/2019       Plan   Continue per plan of care.   Updates/Grading for next session: Progress as tolerated.       Christian Teran, OT

## 2019-09-11 ENCOUNTER — CLINICAL SUPPORT (OUTPATIENT)
Dept: REHABILITATION | Facility: HOSPITAL | Age: 64
End: 2019-09-11
Payer: COMMERCIAL

## 2019-09-11 DIAGNOSIS — M62.81 MUSCLE WEAKNESS: ICD-10-CM

## 2019-09-11 DIAGNOSIS — Z74.09 IMPAIRED MOBILITY AND ACTIVITIES OF DAILY LIVING: ICD-10-CM

## 2019-09-11 DIAGNOSIS — Z78.9 IMPAIRED MOBILITY AND ACTIVITIES OF DAILY LIVING: ICD-10-CM

## 2019-09-11 DIAGNOSIS — R27.8 DECREASED COORDINATION: ICD-10-CM

## 2019-09-11 PROCEDURE — 97110 THERAPEUTIC EXERCISES: CPT | Mod: PN

## 2019-09-11 PROCEDURE — 97112 NEUROMUSCULAR REEDUCATION: CPT | Mod: PN

## 2019-09-11 NOTE — PROGRESS NOTES
"  Occupational Therapy Daily Treatment Note     Name: Luis Wong  Clinic Number: [id number]    Therapy Diagnosis:   Encounter Diagnoses   Name Primary?    Impaired mobility and activities of daily living     Muscle weakness     Decreased coordination      Physician: Carla Altman PA-C    Visit Date: 9/11/2019  Physician Orders: Eval and tx  Medical Diagnosis: CVA L sided weakness Recrudecesnt  Onset Date: 8/28/19 most recent CVA  Evaluation Date: 9/4/2019  Plan of Care Expiration Period: 11/1/19  Insurance Authorization period Expiration: 12/31/19  Date of Return to MD: DAI  Visit # / Visits Authortized: 3 / 30  FOTO: CVA UE hand /100% limitation      Time In:8:45 am   Time Out: 9:30 am   Total Billable (one on one) Time: 45 minutes     Precautions: Standard and Fall    Subjective     Pt reports: "No pain today I am getting stiff but sometimes its moving better than others"  he was compliant with home exercise program given last session.   Response to previous treatment:Fair  Functional change: None yet    Pain: 0/10  Location: left arms     Objective       Luis received therapeutic exercises for 20 minutes including:  -Pt completed the following exercises below in order to increase ROM, strength and tolerance of affected UE in order to increase IND and functional use:    Exercises Date: 9/11/2019      Visit #3   Wrist ext over roll X 30   Black gripper  3 red 2 yellow rbs  X 15   Red CP  Thumb ext 2/15  2/15   Red putty PVC punch outs  X 30   Red t bar Smileys harder  Frowns  X 30            Shoulder using mirror  Shrugs  Scap squeezes  Bicep curls X 30 each      X 10               Luis participated in neuromuscular re-education activities to improve: Coordination, Sense, Proprioception and Posture for 25 minutes. The following activities were included:  -  Mirror therapy NOT PERFORMED THIS TREATMENT     Attempted cotton ball retrieval using pincer grasp and R hand A fro accuracy NOT PERFORMED THIS " "TREATMENT     NMES estim focus on extensor bundle 400 wl 150 hz on time 15 secs off 3  Focus on holding wrist into ext.          PVC Tract exercises        Supine triceps  Supine place and holds Seated   Horzontal Abd/add  X 10   FF unable  X 10   3/15"          Home Exercises and Education Provided     Education provided:   - Previous hand out   - Progress towards goals     Written Home Exercises Provided: Patient instructed to cont prior HEP.  Exercises were reviewed and Luis was able to demonstrate them prior to the end of the session.  Luis demonstrated good  understanding of the HEP provided.   .   See EMR under Patient Instructions for exercises provided prior visit.        Assessment     Pt would continue to benefit from skilled OT. Pt tolerated session well. No pain today. States having a rough morning and that everything was harder. Also states not getting a w/c after OT recommended for safety. States it will hinder him. Educated of safety concern with pt understands. Main limitation being ext of the LUE specifically at elbow and wrist in order to control movements. Increased weakness in the wrist for ext at well. Tolerated established exercises and focus on tricep ext and place and holds well in supine with education to continue this at home.  Tolerated shoulder and elbow exercises fairly well with more need for this next session. Pt highly motivated and compliant.      Luis is progressing well towards his goals and there are no updates to goals at this time. Pt prognosis is Fair.     Pt will continue to benefit from skilled outpatient occupational therapy to address the deficits listed in the problem list on initial evaluation provide pt/family education and to maximize pt's level of independence in the home and community environment.     Anticipated barriers to occupational therapy:     Pt's spiritual, cultural and educational needs considered and pt agreeable to plan of care and " goals.    Goals:  Goals:  Short Term Goals:  1) Initiate Hep Progressing 9/11/2019  2) Pt to increase LUE  strength by 5 # in order to A in self care task of feeding by 4 weeks. Progressing 9/11/2019  3) Pt to increase Quick dash Score by 5 points increasing self care IND by 4 weeks. Progressing 9/11/2019  4) Pt to increase L UE AROM by 10 degrees in order to A in UB dressing by 4 weeks. Progressing 9/11/2019  5) Patient will be able to achieve less than or equal to 75% on the FOTO, demonstrating overall improved functional ability with upper extremity. (self-care category) Progressing 9/11/2019       Long Term Goals:  1) Pt to be IND with HEP in order to maintain ROM and strength needed for self care IND by d.c. Progressing 9/11/2019  2) Pt to increase L UE  strength to WNL as compared to unaffected extremity in order to open items for self feeding by d.c. Progressing 9/11/2019  3) Pt to increase Quick dash Score by 10 points increasing self care IND at home by d.c. Progressing 9/11/2019  4) Pt to increase L UE AROM to WFL in order to A in UB dressing by d/c. Progressing 9/11/2019  5) Patient will be able to achieve less than or equal to 50% on the FOTO, demonstrating overall improved functional ability with upper extremity. (self-care category) Progressing 9/11/2019       Plan   Continue per plan of care.   Updates/Grading for next session: Progress as tolerated.       Christian Teran, OT

## 2019-09-12 ENCOUNTER — OFFICE VISIT (OUTPATIENT)
Dept: NEUROLOGY | Facility: CLINIC | Age: 64
End: 2019-09-12
Payer: COMMERCIAL

## 2019-09-12 VITALS
HEIGHT: 69 IN | WEIGHT: 160.06 LBS | DIASTOLIC BLOOD PRESSURE: 79 MMHG | SYSTOLIC BLOOD PRESSURE: 154 MMHG | BODY MASS INDEX: 23.71 KG/M2 | HEART RATE: 99 BPM

## 2019-09-12 DIAGNOSIS — I63.9 ISCHEMIC STROKE: Primary | ICD-10-CM

## 2019-09-12 PROCEDURE — 99214 OFFICE O/P EST MOD 30 MIN: CPT | Mod: S$GLB,,, | Performed by: PSYCHIATRY & NEUROLOGY

## 2019-09-12 PROCEDURE — 99214 PR OFFICE/OUTPT VISIT, EST, LEVL IV, 30-39 MIN: ICD-10-PCS | Mod: S$GLB,,, | Performed by: PSYCHIATRY & NEUROLOGY

## 2019-09-12 PROCEDURE — 99999 PR PBB SHADOW E&M-EST. PATIENT-LVL IV: CPT | Mod: PBBFAC,,, | Performed by: PSYCHIATRY & NEUROLOGY

## 2019-09-12 PROCEDURE — 99999 PR PBB SHADOW E&M-EST. PATIENT-LVL IV: ICD-10-PCS | Mod: PBBFAC,,, | Performed by: PSYCHIATRY & NEUROLOGY

## 2019-09-12 NOTE — PROGRESS NOTES
[facility] NEUROLOGY  [facility] [place]    Date: 9/12/19  Patient Name: Luis Wong   MRN: [id number]   PCP: Juan Eduardo  Referring Provider: No ref. provider found    Assessment:   Luis Wong is a 63 y.o. male Presenting in follow-up for ischemic stroke.  Continuing her aspirin turned statin therapy at this time.  Patient is awaiting initiation of physical therapy.  If concerned about patient's stroke recurrence however is notable that it did occur in the same vascular territory although MRA is unrevealing. Am concerned about dramatic progression of patient's L sided hemiplegia following his most recent admission and will re-image to eval for expansion vs. Involvement of new vascular territories.  Should patient continue to experience further TIA/infarct would favor prolonged cardiac monitoring to r/o underlying afib.   Plan:     Problem List Items Addressed This Visit     None      Visit Diagnoses     Ischemic stroke    -  Primary    Relevant Orders    MRI Brain Without Contrast        Brien Lopez MD  [facility]   Department of Neurology    Patient note was created using MModal Dictation.  Any errors in syntax or even information may not have been identified and edited on initial review prior to signing this note.  Subjective:        HPI:   Mr. Luis Wong is a 63 y.o. male presenting in follow-up for ischemic stroke.  Patient presents today with his wife who contributes to the history.  Patient was recently hospitalized after acute onset of left-sided weakness.  Patient underwent MRI brain which revealed subacute infarct in the right posterior limb of the internal capsule surrounding the patient's known prior infarct (MRI personally reviewed).  The patient was hospitalized for over 24 hr underwent MRI which did not reveal any significant stenosis.  He had risk stratification completed.  MRA neck was unremarkable, to A1c was 6.4, and LDL was 113.  The  patient was resumed on atorvastatin 40 mg he continued on aspirin.  EKG monitoring did not reveal underlying atrial fibrillation and TTE was notable for impaired LV relaxation.  Unfortunately, the patient noted progression of his left-sided hemiplegia following presentation.  He will begin working with physical therapy tomorrow.    PAST MEDICAL HISTORY:  Past Medical History:   Diagnosis Date    Aneurysm 10/30/2012    Diabetes mellitus     Fever blister     Herpes infection     Hypertension     ICH (intracerebral hemorrhage)     Mixed hyperlipidemia 9/5/2013    Nontraumatic thalamic hemorrhage 8/31/2016    JAQUAN (obstructive sleep apnea)     Right-sided lacunar stroke 9/11/2014    SDH (subdural hematoma)     Special screening for malignant neoplasms, colon     Stroke        PAST SURGICAL HISTORY:  Past Surgical History:   Procedure Laterality Date    COLONOSCOPY N/A 12/14/2013    Performed by Beto Douglas MD at [facility] ([address] [place] [address])    Knee arthroscopic surgery         CURRENT MEDS:  Current Outpatient Medications   Medication Sig Dispense Refill    aspirin (ECOTRIN) 81 MG EC tablet TAKE 1 TABLET BY MOUTH EVERY DAY 90 tablet 3    atorvastatin (LIPITOR) 40 MG tablet Take 1 tablet (40 mg total) by mouth once daily. 90 tablet 3    hydroCHLOROthiazide (MICROZIDE) 12.5 mg capsule TAKE 1 CAPSULE BY MOUTH EVERY DAY 90 capsule 3    metFORMIN (GLUCOPHAGE) 500 MG tablet Take 1 tablet (500 mg total) by mouth daily with breakfast. 90 tablet 3    NIFEdipine (PROCARDIA-XL) 30 MG (OSM) 24 hr tablet Take 1 tablet (30 mg total) by mouth once daily. 90 tablet 3    potassium chloride (MICRO-K) 8 mEq CpSR TAKE 1 CAPSULE (8 MEQ TOTAL) BY MOUTH ONCE DAILY. 90 capsule 3     No current facility-administered medications for this visit.        ALLERGIES:  Review of patient's allergies indicates:  No Known Allergies    FAMILY HISTORY:  Family History   Problem Relation Age of Onset    Heart disease Mother     Diabetes  "Father     Cancer Sister         breast    Diabetes Paternal Grandmother     Diabetes Paternal Grandfather     Melanoma Neg Hx        SOCIAL HISTORY:  Social History     Tobacco Use    Smoking status: Never Smoker    Smokeless tobacco: Never Used   Substance Use Topics    Alcohol use: No    Drug use: No       Review of Systems:  12 review of systems is negative except for the symptoms mentioned in HPI.      Objective:     Vitals:    09/12/19 0950   BP: (!) 154/79   Pulse: 99   Weight: 72.6 kg (160 lb 0.9 oz)   Height: 5' 9" (1.753 m)     General: NAD, well nourished   Eyes: no tearing, discharge, no erythema   ENT: moist mucous membranes of the oral cavity, nares patent    Neck: Supple, full range of motion  Cardiovascular: Warm and well perfused, pulses equal and symmetrical  Lungs: Normal work of breathing, normal chest wall excursions  Skin: No rash, lesions, or breakdown on exposed skin  Psychiatry: Mood and affect are appropriate   Abdomen: soft, non tender, non distended  Extremeties: No cyanosis, clubbing or edema.    Neurological   MENTAL STATUS: Alert and oriented to person, place, and time. Attention and concentration within normal limits. Speech without dysarthria, able to name and repeat without difficulty. Recent and remote memory within normal limits   CRANIAL NERVES: Visual fields intact. PERRL. EOMI. Facial sensation intact. Face symmetrical. Hearing grossly intact. Full shoulder shrug bilaterally. Tongue protrudes midline   SENSORY: Sensation is intact to pin, light touch, vibration, proprioception and temperature throughout.    MOTOR: Normal bulk and tone.   5/5 R and 3/5 L deltoid, biceps, triceps, interosseous, hand  bilaterally. 4/5 R and 4-/5  L iliopsoas, 5/5 knee extension/flexion, foot dorsi/plantarflexion bilaterally.    REFLEXES: Symmetric and 2+ and brisk throughout.   CEREBELLAR/COORDINATION/GAIT: Gait steady .Finger to nose intact on R. Limited by weakness on L.       "

## 2019-09-13 ENCOUNTER — CLINICAL SUPPORT (OUTPATIENT)
Dept: REHABILITATION | Facility: HOSPITAL | Age: 64
End: 2019-09-13
Attending: PHYSICIAN ASSISTANT
Payer: COMMERCIAL

## 2019-09-13 ENCOUNTER — CLINICAL SUPPORT (OUTPATIENT)
Dept: REHABILITATION | Facility: HOSPITAL | Age: 64
End: 2019-09-13
Payer: COMMERCIAL

## 2019-09-13 DIAGNOSIS — M62.81 MUSCLE WEAKNESS: ICD-10-CM

## 2019-09-13 DIAGNOSIS — R27.8 DECREASED COORDINATION: ICD-10-CM

## 2019-09-13 DIAGNOSIS — Z74.09 IMPAIRED FUNCTIONAL MOBILITY, BALANCE, GAIT, AND ENDURANCE: ICD-10-CM

## 2019-09-13 DIAGNOSIS — Z78.9 IMPAIRED MOBILITY AND ACTIVITIES OF DAILY LIVING: ICD-10-CM

## 2019-09-13 DIAGNOSIS — Z74.09 IMPAIRED MOBILITY AND ACTIVITIES OF DAILY LIVING: ICD-10-CM

## 2019-09-13 DIAGNOSIS — R29.898 DECREASED STRENGTH OF LOWER EXTREMITY: ICD-10-CM

## 2019-09-13 PROCEDURE — 97112 NEUROMUSCULAR REEDUCATION: CPT | Mod: PN

## 2019-09-13 PROCEDURE — 97162 PT EVAL MOD COMPLEX 30 MIN: CPT | Mod: PN

## 2019-09-13 PROCEDURE — 97110 THERAPEUTIC EXERCISES: CPT | Mod: PN

## 2019-09-13 NOTE — PROGRESS NOTES
"  Occupational Therapy Daily Treatment Note     Name: Luis Wong  Clinic Number: [id number]    Therapy Diagnosis:   Encounter Diagnoses   Name Primary?    Impaired mobility and activities of daily living     Muscle weakness     Decreased coordination      Physician: Carla Altman PA-C    Visit Date: 9/13/2019  Physician Orders: Eval and tx  Medical Diagnosis: CVA L sided weakness Recrudecesnt  Onset Date: 8/28/19 most recent CVA  Evaluation Date: 9/4/2019  Plan of Care Expiration Period: 11/1/19  Insurance Authorization period Expiration: 12/31/19  Date of Return to MD: DAI  Visit # / Visits Authortized: 4 / 30  FOTO: CVA UE hand /100% limitation      Time In:7:58 am   Time Out: 8:45 am   Total Billable (one on one) Time: 47 minutes     Precautions: Standard and Fall    Subjective     Pt reports: "Its good small gains each day"  he was compliant with home exercise program given last session.   Response to previous treatment:Fair  Functional change: None yet    Pain: 0/10  Location: left arms     Objective       Luis received therapeutic exercises for 15 minutes including:  -Pt completed the following exercises below in order to increase ROM, strength and tolerance of affected UE in order to increase IND and functional use:    Exercises Date: 9/13/2019      Visit #4   Wrist ext over roll X 30   Black gripper  3 red 2 yellow rbs  X 15   Red CP  Thumb ext NOT PERFORMED THIS TREATMENT     Red putty NOT PERFORMED THIS TREATMENT     Red t bar Smileys harder  Frowns  X 30            Shoulder using mirror  Shrugs  Scap squeezes  Bicep curls X 30 each      X 10               Luis participated in neuromuscular re-education activities to improve: Coordination, Sense, Proprioception and Posture for 30 minutes. The following activities were included:  -          NMES estim focus on extensor bundle NOT PERFORMED THIS TREATMENT           PVC Tract exercises        Supine triceps  Supine place and holds    Wrist ext in " "supine     Seated   Horzontal Abd/add  X 10   FF better with OT A  X 10   3/15"   X 30     Powder Board exercises in sidelying using airsplint for elbow ext assist    FF  Pro/retract  Gator  Abduction 2/10           Home Exercises and Education Provided     Education provided:   - Previous hand out   - Progress towards goals     Written Home Exercises Provided: Patient instructed to cont prior HEP.  Exercises were reviewed and Luis was able to demonstrate them prior to the end of the session.  Luis demonstrated good  understanding of the HEP provided.   .   See EMR under Patient Instructions for exercises provided prior visit.        Assessment     Pt would continue to benefit from skilled OT. Pt tolerated session well. No pain today. He states small gains since last session. More focus on shoulder isolated exercises with air splint use for elbow ext. He did well with these. More focus on proper muscle firing and NMR without compensatory techs with good tolerance. He did well with established HEP exercises stating he was doing them. Tolerated hand and elbow exercises fairly well with more need for this next session. Pt highly motivated and compliant.      Luis is progressing well towards his goals and there are no updates to goals at this time. Pt prognosis is Fair.     Pt will continue to benefit from skilled outpatient occupational therapy to address the deficits listed in the problem list on initial evaluation provide pt/family education and to maximize pt's level of independence in the home and community environment.     Anticipated barriers to occupational therapy:     Pt's spiritual, cultural and educational needs considered and pt agreeable to plan of care and goals.    Goals:  Goals:  Short Term Goals:  1) Initiate Hep Progressing 9/13/2019  2) Pt to increase LUE  strength by 5 # in order to A in self care task of feeding by 4 weeks. Progressing 9/13/2019  3) Pt to increase Quick dash Score by 5 " points increasing self care IND by 4 weeks. Progressing 9/13/2019  4) Pt to increase L UE AROM by 10 degrees in order to A in UB dressing by 4 weeks. Progressing 9/13/2019  5) Patient will be able to achieve less than or equal to 75% on the FOTO, demonstrating overall improved functional ability with upper extremity. (self-care category) Progressing 9/13/2019       Long Term Goals:  1) Pt to be IND with HEP in order to maintain ROM and strength needed for self care IND by d.c. Progressing 9/13/2019  2) Pt to increase L UE  strength to WNL as compared to unaffected extremity in order to open items for self feeding by d.c. Progressing 9/13/2019  3) Pt to increase Quick dash Score by 10 points increasing self care IND at home by d.cLaila Progressing 9/13/2019  4) Pt to increase L UE AROM to WFL in order to A in UB dressing by d/c. Progressing 9/13/2019  5) Patient will be able to achieve less than or equal to 50% on the FOTO, demonstrating overall improved functional ability with upper extremity. (self-care category) Progressing 9/13/2019       Plan   Continue per plan of care.   Updates/Grading for next session: Progress as tolerated.       Christian Teran, OT

## 2019-09-17 ENCOUNTER — CLINICAL SUPPORT (OUTPATIENT)
Dept: REHABILITATION | Facility: HOSPITAL | Age: 64
End: 2019-09-17
Payer: COMMERCIAL

## 2019-09-17 DIAGNOSIS — M62.81 MUSCLE WEAKNESS: ICD-10-CM

## 2019-09-17 DIAGNOSIS — Z74.09 IMPAIRED MOBILITY AND ACTIVITIES OF DAILY LIVING: ICD-10-CM

## 2019-09-17 DIAGNOSIS — R27.8 DECREASED COORDINATION: ICD-10-CM

## 2019-09-17 DIAGNOSIS — Z78.9 IMPAIRED MOBILITY AND ACTIVITIES OF DAILY LIVING: ICD-10-CM

## 2019-09-17 PROCEDURE — 97112 NEUROMUSCULAR REEDUCATION: CPT | Mod: PN

## 2019-09-17 PROCEDURE — 97110 THERAPEUTIC EXERCISES: CPT | Mod: PN

## 2019-09-17 NOTE — PROGRESS NOTES
"  Occupational Therapy Daily Treatment Note     Name: Luis Wong  Clinic Number: [id number]    Therapy Diagnosis:   Encounter Diagnoses   Name Primary?    Impaired mobility and activities of daily living     Muscle weakness     Decreased coordination      Physician: Carla Altman PA-C    Visit Date: 9/17/2019  Physician Orders: Eval and tx  Medical Diagnosis: CVA L sided weakness Recrudecesnt  Onset Date: 8/28/19 most recent CVA  Evaluation Date: 9/4/2019  Plan of Care Expiration Period: 11/1/19  Insurance Authorization period Expiration: 12/31/19  Date of Return to MD: DAI  Visit # / Visits Authortized: 5 / 30  FOTO: CVA UE hand /100% limitation      Time In:7:55 am   Time Out: 8:45 am   Total Billable (one on one) Time: 50 minutes     Precautions: Standard and Fall    Subjective     Pt reports: "I been trying to stretch it out"  he was compliant with home exercise program given last session.   Response to previous treatment:Fair  Functional change: None yet    Pain: 0/10  Location: left arms     Objective       Luis received therapeutic exercises for 15 minutes including:  -Pt completed the following exercises below in order to increase ROM, strength and tolerance of affected UE in order to increase IND and functional use:    Exercises Date: 9/17/2019      Visit #5   Wrist ext over roll NOT PERFORMED THIS TREATMENT     Black gripper  NOT PERFORMED THIS TREATMENT     Red CP  Thumb ext NOT PERFORMED THIS TREATMENT     Red putty NOT PERFORMED THIS TREATMENT     Red t bar NOT PERFORMED THIS TREATMENT             Shoulder using mirror  Shrugs  Scap squeezes  Bicep curls X 30 each      X 10   Table tslides 3#  2/15           Luis participated in neuromuscular re-education activities to improve: Coordination, Sense, Proprioception and Posture for 30 minutes. The following activities were included:  -          NMES estim focus on extensor bundle NOT PERFORMED THIS TREATMENT           PVC Tract " "exercises        Supine triceps with red band   Supine place and holds    Wrist ext in supine     Seated   Horzontal Abd/add  X 10   FF better with OT A  X 10   3/30"  X 30  7y     Supine chest press  Forward flexion 3# dowel  2/15    Powder Board exercises in sidelying using airsplint for elbow ext assist NOT PERFORMED THIS TREATMENT     FF  Pro/retract  Gator  Abduction 2/10           Home Exercises and Education Provided     Education provided:   - Previous hand out   - Progress towards goals     Written Home Exercises Provided: Patient instructed to cont prior HEP.  Exercises were reviewed and Luis was able to demonstrate them prior to the end of the session.  Luis demonstrated good  understanding of the HEP provided.   .   See EMR under Patient Instructions for exercises provided prior visit.        Assessment     Pt would continue to benefit from skilled OT. He is doing well with HEP compliance and functional ROM of the arm. He is noticeable increasing GM control and strength. Supine and gravity assisted exercises being main focus today. Introduced tricep exercises for HEP addition due to pt inability to keep the arm straight with shoulder isolated movements. More focus on shoulder isolated exercises with air splint use for elbow ext again today. He fatigued easier but states hes been doing a lot at home exercising. Concern for PT services and asked OT to check schedule for PT openings. Pt highly motivated and compliant.      Luis is progressing well towards his goals and there are no updates to goals at this time. Pt prognosis is Fair.     Pt will continue to benefit from skilled outpatient occupational therapy to address the deficits listed in the problem list on initial evaluation provide pt/family education and to maximize pt's level of independence in the home and community environment.     Anticipated barriers to occupational therapy:     Pt's spiritual, cultural and educational needs considered " and pt agreeable to plan of care and goals.    Goals:  Goals:  Short Term Goals:  1) Initiate Hep Progressing 9/17/2019  2) Pt to increase LUE  strength by 5 # in order to A in self care task of feeding by 4 weeks. Progressing 9/17/2019  3) Pt to increase Quick dash Score by 5 points increasing self care IND by 4 weeks. Progressing 9/17/2019  4) Pt to increase L UE AROM by 10 degrees in order to A in UB dressing by 4 weeks. Progressing 9/17/2019  5) Patient will be able to achieve less than or equal to 75% on the FOTO, demonstrating overall improved functional ability with upper extremity. (self-care category) Progressing 9/17/2019       Long Term Goals:  1) Pt to be IND with HEP in order to maintain ROM and strength needed for self care IND by d.c. Progressing 9/17/2019  2) Pt to increase L UE  strength to WNL as compared to unaffected extremity in order to open items for self feeding by d.c. Progressing 9/17/2019  3) Pt to increase Quick dash Score by 10 points increasing self care IND at home by d.c. Progressing 9/17/2019  4) Pt to increase L UE AROM to WFL in order to A in UB dressing by d/c. Progressing 9/17/2019  5) Patient will be able to achieve less than or equal to 50% on the FOTO, demonstrating overall improved functional ability with upper extremity. (self-care category) Progressing 9/17/2019       Plan   Continue per plan of care.   Updates/Grading for next session: Progress as tolerated.       Christian Teran, OT

## 2019-09-18 ENCOUNTER — TELEPHONE (OUTPATIENT)
Dept: FAMILY MEDICINE | Facility: CLINIC | Age: 64
End: 2019-09-18

## 2019-09-18 ENCOUNTER — OFFICE VISIT (OUTPATIENT)
Dept: FAMILY MEDICINE | Facility: CLINIC | Age: 64
End: 2019-09-18
Payer: COMMERCIAL

## 2019-09-18 ENCOUNTER — HOSPITAL ENCOUNTER (OUTPATIENT)
Dept: RADIOLOGY | Facility: HOSPITAL | Age: 64
Discharge: HOME | End: 2019-09-18
Attending: PSYCHIATRY & NEUROLOGY
Payer: COMMERCIAL

## 2019-09-18 VITALS
DIASTOLIC BLOOD PRESSURE: 60 MMHG | OXYGEN SATURATION: 98 % | WEIGHT: 158.75 LBS | HEART RATE: 98 BPM | SYSTOLIC BLOOD PRESSURE: 138 MMHG | BODY MASS INDEX: 23.51 KG/M2 | HEIGHT: 69 IN

## 2019-09-18 DIAGNOSIS — Z86.73 HISTORY OF STROKE: ICD-10-CM

## 2019-09-18 DIAGNOSIS — Z13.6 SCREENING FOR CARDIOVASCULAR CONDITION: ICD-10-CM

## 2019-09-18 DIAGNOSIS — G81.94 HEMIPLEGIA AFFECTING LEFT NONDOMINANT SIDE, UNSPECIFIED ETIOLOGY, UNSPECIFIED HEMIPLEGIA TYPE: ICD-10-CM

## 2019-09-18 DIAGNOSIS — I10 ESSENTIAL HYPERTENSION: Primary | ICD-10-CM

## 2019-09-18 DIAGNOSIS — I63.9 ISCHEMIC STROKE: ICD-10-CM

## 2019-09-18 PROCEDURE — 99214 PR OFFICE/OUTPT VISIT, EST, LEVL IV, 30-39 MIN: ICD-10-PCS | Mod: S$GLB,,, | Performed by: FAMILY MEDICINE

## 2019-09-18 PROCEDURE — 70551 MRI BRAIN STEM W/O DYE: CPT | Mod: TC

## 2019-09-18 PROCEDURE — 70551 MRI BRAIN WITHOUT CONTRAST: ICD-10-PCS | Mod: 26,,, | Performed by: RADIOLOGY

## 2019-09-18 PROCEDURE — 99999 PR PBB SHADOW E&M-EST. PATIENT-LVL IV: CPT | Mod: PBBFAC,,, | Performed by: FAMILY MEDICINE

## 2019-09-18 PROCEDURE — 99999 PR PBB SHADOW E&M-EST. PATIENT-LVL IV: ICD-10-PCS | Mod: PBBFAC,,, | Performed by: FAMILY MEDICINE

## 2019-09-18 PROCEDURE — 99214 OFFICE O/P EST MOD 30 MIN: CPT | Mod: S$GLB,,, | Performed by: FAMILY MEDICINE

## 2019-09-18 PROCEDURE — 70551 MRI BRAIN STEM W/O DYE: CPT | Mod: 26,,, | Performed by: RADIOLOGY

## 2019-09-18 NOTE — PROGRESS NOTES
Subjective:       Patient ID: Luis Wong is a 63 y.o. male.    Chief Complaint: Hospital Follow Up    [age] male came to the clinic after recent 2 hospitalizations secondary to stroke.  Patient developed left-sided hemiparesis after the last hospitalization.  Blood pressure today stable.  No chest pain, palpitation orthopnea or PND.  Neurology recommends possible cardiology evaluation to rule out underlying atrial fibrillation.    Review of Systems   Constitutional: Negative.  Negative for activity change and unexpected weight change.   HENT: Negative.  Negative for hearing loss, rhinorrhea and trouble swallowing.    Eyes: Negative.  Negative for discharge and visual disturbance.   Respiratory: Negative.  Negative for chest tightness and wheezing.    Cardiovascular: Negative.  Negative for chest pain and palpitations.   Gastrointestinal: Negative.  Negative for blood in stool, constipation, diarrhea and vomiting.   Endocrine: Negative for polydipsia and polyuria.   Genitourinary: Negative.  Negative for difficulty urinating, hematuria and urgency.   Musculoskeletal: Positive for gait problem. Negative for arthralgias, joint swelling and neck pain.   Skin: Negative.    Neurological: Positive for facial asymmetry and weakness. Negative for headaches.   Psychiatric/Behavioral: Negative.  Negative for confusion and dysphoric mood.       Objective:      Physical Exam   Constitutional: He is oriented to person, place, and time. He appears well-developed and well-nourished. No distress.   HENT:   Head: Normocephalic and atraumatic.   Right Ear: External ear normal.   Left Ear: External ear normal.   Nose: Nose normal.   Mouth/Throat: Oropharynx is clear and moist. No oropharyngeal exudate.   Eyes: Pupils are equal, round, and reactive to light. Conjunctivae and EOM are normal. Right eye exhibits no discharge. Left eye exhibits no discharge. No scleral icterus.   Neck: Normal range of motion. Neck supple. No  JVD present. No tracheal deviation present. No thyromegaly present.   Cardiovascular: Normal rate, regular rhythm, normal heart sounds and intact distal pulses. Exam reveals no gallop and no friction rub.   No murmur heard.  Pulmonary/Chest: Effort normal and breath sounds normal. No stridor. No respiratory distress. He has no wheezes. He has no rales. He exhibits no tenderness.   Abdominal: Soft. Bowel sounds are normal. He exhibits no distension and no mass. There is no tenderness. There is no rebound and no guarding.   Musculoskeletal: Normal range of motion. He exhibits no edema or tenderness.   Lymphadenopathy:     He has no cervical adenopathy.   Neurological: He is alert and oriented to person, place, and time. He has normal reflexes. He displays atrophy. He displays no tremor and normal reflexes. A sensory deficit is present. No cranial nerve deficit. He exhibits abnormal muscle tone. Coordination and gait abnormal.   Left hemiparesis   Skin: Skin is warm and dry. No rash noted. He is not diaphoretic. No erythema. No pallor.   Psychiatric: He has a normal mood and affect. His behavior is normal. Judgment and thought content normal.   Nursing note and vitals reviewed.      Assessment:       1. Essential hypertension    2. Hemiplegia affecting left nondominant side, unspecified etiology, unspecified hemiplegia type    3. History of stroke    4. Screening for cardiovascular condition        Plan:         Luis was seen today for hospital follow up.    Diagnoses and all orders for this visit:    Essential hypertension    Hemiplegia affecting left nondominant side, unspecified etiology, unspecified hemiplegia type    History of stroke  -     Ambulatory referral to Cardiology    Screening for cardiovascular condition  -     Ambulatory referral to Cardiology    Continue monitoring blood pressure at home, low sodium diet.

## 2019-09-18 NOTE — PLAN OF CARE
[facility]  Physical Therapy Initial Evaluation    Name: Luis Wong  Clinic Number: [id number]    Therapy Diagnosis:   Encounter Diagnoses   Name Primary?    Decreased strength of lower extremity     Impaired functional mobility, balance, gait, and endurance      Physician: Carla Altman PA-C    Physician Orders: PT Eval and Treat  Medical Diagnosis from Referral: I63.9 (ICD-10-CM) - CVA (cerebral vascular accident)  Evaluation Date: 9/13/2019  Authorization Period Expiration: 12/31/2019  Plan of Care Expiration: 11/8/2019  Visit # / Visits authorized: 1/ 30    Time In: 1:20 PM  Time Out: 2:05 PM  Total Billable Time: 40 minutes ( Moderate Complexity Evaluation)    Precautions: Hx of CVA's    Subjective     Date of onset: August 26th/28th    History of current condition - Luis reports: he's had multiple strokes prior to the most recent one. He has denied history of inpatient PT or homeheatlh. He states he is able to ambulate with SPC and transfer with minimal help from his wife. Currently lives in a 2-story home with 18 stairs; he is able to ascend the stairs using a step-to pattern however to descend he must sit and scoot down. The front door has a threshold to enter, however he uses the back door where there is no threshold. Uses the back door with no threshold. The 1st floor has tile priya while the second floor has wood. Luis uses  socks to walk around house to maintain balance. Previously walked with a RW, however he is now ambulating using a SPC, but is starting to ween off of it. States his balance is slowly coming back over time.     Medical History:   Past Medical History:   Diagnosis Date    Aneurysm 10/30/2012    Diabetes mellitus     Fever blister     Herpes infection     Hypertension     ICH (intracerebral hemorrhage)     Mixed hyperlipidemia 9/5/2013    Nontraumatic thalamic hemorrhage 8/31/2016    JAQUAN (obstructive sleep apnea)     Right-sided  "lacunar stroke 9/11/2014    SDH (subdural hematoma)     Special screening for malignant neoplasms, colon     Stroke        Surgical History:   Luis Wong  has a past surgical history that includes Knee arthroscopic surgery.    Medications:   Luis has a current medication list which includes the following prescription(s): aspirin, atorvastatin, hydrochlorothiazide, metformin, nifedipine, and potassium chloride.    Allergies:   Review of patient's allergies indicates:  No Known Allergies     Imaging: MRI Brain (8/30/2019): The ACAs are patent.  The MCAs are patent.  Focal area of narrowing of the left P2 segment.  No vessel occlusion.  The basilar artery and distal vertebral artery appear normal.  No aneurysm or arteriovenous malformation.  Prior areas of lacunar infarction noted in the left and right corona radiata, right thalamus, deep white matter of the left frontal lobe, unchanged.  Mucous retention cyst in the bilateral maxillary sinuses.    Prior Therapy: Previous CVA's  Social History: Lives with their spouse  Falls: None  DME: Shower bench, SPC, Quad cane   Home Environment: 2 story home with 18 stairs, bedroom on second floor  Exercise Routine / History: 3-4 days a week at home. Has rowing machine, recumbent bike, treadmill at home.  Family Present at time of Eval: Wife   Occupation: Retired  Prior Level of Function: Independent  Current Level of Function: Independent with increased time to performa ctivities.Occasionally uses wife's help for transfers or bathing, etc.    Pain:  Current 0/10, worst 0/10, best 0/10   Location: N/A  Description: N/A  Aggravating Factors: N/A  Easing Factors: N/A    Pts goals: "Strengthen leg up and function how I was able to function before."    Objective     - Command following: Intact   - Speech: no deficits    Mental status: alert, oriented to person, place, and time  Behavior:  calm and cooperative  Attention Span and Concentration:  Normal    Dominant hand:  " right     Posture Alignment :slouched posture, trunk deviated left    Sensation:  Light Touch: Intact           Proprioception:   Intact    Tone: 0 - No increase in muscle tone  Limbs/muscles affected: N/A    Coordination: Heel to shin difficult with decreased ability to flex left knee to slide upwards of shin.     Visual/Auditory: denies changes    ROM:   UPPER EXTREMITY--AROM/PROM  See OT evaluation         RANGE OF MOTION--LOWER EXTREMITIES  (R) LE Hip: full   Knee: full   Ankle: full    (L) LE: Hip: 95 degrees flexion AROM/110 degrees flexion PROM   Knee: limited   Ankle: limited    Lower Extremity Strength   RLE LLE   Hip Flexion: 4+/5 3/5   Hip Extension:  3+/5 2/5   Hip Abduction: 4+/5 3+/5   Hip Adduction: 4-/5 3/5   Knee Extension: 5/5 4/5   Knee Flexion: 5/5 2/5   Ankle Dorsiflexion: 5/5 3-/5   Ankle Plantarflexion: 5 2/5   Ankle Inversion: 5 2/5   Ankle Eversion: 5 2/5       Gait Assessment:   - AD used: SPC  - Assistance: Modified Independent with SBA  - Distance: >30 feet  - Stairs: Per pt, Mod I on ascent, Performs scooting maneuver on descent. Did not perform during evaluation due to safety.     Gait Analysis:  Deviations noted: Ambulates with SPC in right hand. Able to lift left hip with decreased knee flexion and circumduction to advance left lower extremity forward.     Impairments contributing to deviations:  Hx of CVA, weakness of left side    Endurance Deficit: Decreased, requires standing rest breaks after ambulating short distances.       Balance Assessment:    See LATHAM Balance below    5x sit to stand = 20 seconds    TU seconds with SPC    LATHAM  BALANCE ASSESSMENT TOOL  1. Sitting to Standing   3 - able to stand independely using hands  2. Standing Unsupported   4 - able to stand safely 2 minutes without hold  3. Sitting Unsupported   4 - able to sit safely and securely 2 minutes  4. Standing to Sitting   3 - controls descent by using hands  5. Pivot Transfer   3 - able to transfer  safely with definite use of hands  6. Standing with Eyes Closed   3 - able to stand 10 seconds with supervision  7. Standing with Feet Together   3 - able to place feet together independently and stand for 1 minute with supervision  8. Reaching Forward with Outstretched Arm   1 - reaches forward but needs supervision  9. Retrieving Object from Floor   3 - able to pick slipper but needs supervision  10. Turning to Look Behind   3 - looks behind one side only, other side less weight shift  11. Turning 360 Degrees   1 - needs close supervision or verbal cueing  12. Placing Alternate Foot on Step   0 - needs assist to keep from falling/unable to try  13. Standing with One Foot in Front   0 - Looses balance while stepping or standing  14. Standing on One Foot   0 - unable to try or needs assistance to prevent fall    TOTAL SCORE: 31  Maximum: 56  Score:   0-20= high fall risk   21-40 moderate fall risk   41-56 low fall risk     Fall risk cut-off scores:   Elderly and History of falls: <51/56   Elderly and No history of falls: <42/56   CVA: <45/56       Functional Mobility (Bed mobility, transfers)  Bed mobility: Mod I  Supine to sit: Mod I  Sit to supine: Mod I  Rolling: Mod I  Transfers to bed: Mod I  Sit to stand:  Mod I  Stand pivot:  Mod I  Stairs: Mod I /Scooting  Wheelchair mobility: Max A      CMS Impairment/Limitation/Restriction for FOTO CVA Survey    Therapist reviewed FOTO scores for Luis Wong on 9/13/2019.   FOTO documents entered into [employer] - see Media section.    Limitation Score: 60%  Category: Mobility    Current : CL = least 60% but < 80% impaired, limited or restricted  Goal: CJ = at least 20% but < 40% impaired, limited or restricted          TREATMENT       No treatment performed, OOT      Home Exercises and Patient Education Provided    Education provided:   - Importance and role oh physical therapy    Written Home Exercises Provided: yes.  Exercises were reviewed and Luis was able to  demonstrate them prior to the end of the session.  Luis demonstrated good  understanding of the education provided.     See EMR under Patient Instructions for exercises provided 9/13/2019.    Assessment   Luis is a 63 y.o. male referred to outpatient Physical Therapy with a medical diagnosis of Hx of CVA. Pt presents with decreased strength and range of motion of left lower extremity, no pain, decreased coordination, and decreased endurance. Functional deficits include ascending/descending stairs as patient lives in 2-story home, transfers into and out of car, bed, tub, and functional deficits with ambulation.     Pt prognosis is Good.   Pt will benefit from skilled outpatient Physical Therapy to address the deficits stated above and in the chart below, provide pt/family education, and to maximize pt's level of independence.     Plan of care discussed with patient: Yes  Pt's spiritual, cultural and educational needs considered and patient is agreeable to the plan of care and goals as stated below:     Anticipated Barriers for therapy:     Medical Necessity is demonstrated by the following  History  Co-morbidities and personal factors that may impact the plan of care Co-morbidities:   Hx of CVA's, Dysarthria, decreased coordination, Bilateral carpal tunnel syndrome, HTN, Mixed hyperlipidemia, hyperglycemia, pre-diabetes    Personal Factors:   coping style     high   Examination  Body Structures and Functions, activity limitations and participation restrictions that may impact the plan of care Body Regions:   Left Lower Extremity    Body Systems:    ROM  strength  gross coordinated movement  balance  gait  transfers  transitions    Participation Restrictions:   See co-morbidities above    Activity limitations:   Learning and applying knowledge  no deficits    General Tasks and Commands  no deficits    Communication  no deficits    Mobility  lifting and carrying objects  walking  moving around using equipment  (SPC)  driving (bike, car, motorcycle)    Self care  dressing    Domestic Life  shopping  doing house work (cleaning house, washing dishes, laundry)  assisting others    Interactions/Relationships  no deficits    Life Areas  no deficits    Community and Social Life  community life  recreation and leisure         moderate   Clinical Presentation evolving clinical presentation with changing clinical characteristics moderate   Decision Making/ Complexity Score: moderate     Goals:    Long Term Goals (8 Weeks):   1.  Independent with initial HEP.  2.  Pt will perform nu-step at level 4 x 10 minutes without rests breaks going at least 50 step/min or greater.  3. Pt will be able to perform TUG in less than or equal to 20 secs without use of AD placingdemonstrating overall improved functional mobility.   4. Pt will performed sit to stand x 5 without UE support in 15 seconds without LOB backward or posterior LE hooking for functional and safe transfers.   5.  Pt will score greater than or equal to 45/56 on the Choi Balance Assessment with use of AD demonstrating overall improved functional mobility and balance and reduce fall risk.  6. Pt will walk < than or equal to 140 ft on the 2 minute walk test indoor with AD with 0 LOB and minimal gait deviations for improved safety in home ambulation and safety.   7.  Pt will be able to safely perform and tolerate high level ADL's without LOB.   8. Pt will have 0 falls from start of PT sessions.  9. Independent with updated HEP.   10. Pt will have MMT score of 3+/5 in all major ms groups in Left LE.  11. Pt will ambulate on TM x 6 minutes with use of UE support with 0 LOB at greater than or equal to 1.0 mph.  12. Pt will begin some form of community fitness to begin regular and consistent performance of exercise to continue maintenance of gains made in PT.      Plan   Plan of care Certification: 9/13/2019 to 11/8/2019.    Outpatient Physical Therapy 3 times weekly for 8 weeks to  include the following interventions: Gait Training, Manual Therapy, Moist Heat/ Ice, Neuromuscular Re-ed, Patient Education, Therapeutic Activites and Therapeutic Exercise.     Kandy Gatica, PT, DPT

## 2019-09-19 ENCOUNTER — OFFICE VISIT (OUTPATIENT)
Dept: CARDIOLOGY | Facility: CLINIC | Age: 64
End: 2019-09-19
Payer: COMMERCIAL

## 2019-09-19 ENCOUNTER — PATIENT OUTREACH (OUTPATIENT)
Dept: ADMINISTRATIVE | Facility: OTHER | Age: 64
End: 2019-09-19

## 2019-09-19 VITALS
DIASTOLIC BLOOD PRESSURE: 78 MMHG | SYSTOLIC BLOOD PRESSURE: 136 MMHG | HEIGHT: 69 IN | BODY MASS INDEX: 23.49 KG/M2 | HEART RATE: 82 BPM | OXYGEN SATURATION: 97 % | WEIGHT: 158.63 LBS

## 2019-09-19 DIAGNOSIS — Z86.73 HISTORY OF STROKE: Primary | ICD-10-CM

## 2019-09-19 DIAGNOSIS — E78.2 MIXED HYPERLIPIDEMIA: ICD-10-CM

## 2019-09-19 DIAGNOSIS — I10 ESSENTIAL HYPERTENSION: ICD-10-CM

## 2019-09-19 PROBLEM — Z74.09 IMPAIRED FUNCTIONAL MOBILITY, BALANCE, GAIT, AND ENDURANCE: Status: ACTIVE | Noted: 2019-09-19

## 2019-09-19 PROCEDURE — 99999 PR PBB SHADOW E&M-EST. PATIENT-LVL III: ICD-10-PCS | Mod: PBBFAC,,, | Performed by: INTERNAL MEDICINE

## 2019-09-19 PROCEDURE — 99999 PR PBB SHADOW E&M-EST. PATIENT-LVL III: CPT | Mod: PBBFAC,,, | Performed by: INTERNAL MEDICINE

## 2019-09-19 PROCEDURE — 99204 OFFICE O/P NEW MOD 45 MIN: CPT | Mod: S$GLB,,, | Performed by: INTERNAL MEDICINE

## 2019-09-19 PROCEDURE — 99204 PR OFFICE/OUTPT VISIT, NEW, LEVL IV, 45-59 MIN: ICD-10-PCS | Mod: S$GLB,,, | Performed by: INTERNAL MEDICINE

## 2019-09-19 RX ORDER — CLOPIDOGREL BISULFATE 75 MG/1
75 TABLET ORAL DAILY
Qty: 90 TABLET | Refills: 3 | Status: SHIPPED | OUTPATIENT
Start: 2019-09-19 | End: 2020-09-04

## 2019-09-19 NOTE — LETTER
September 19, 2019      Juan Eduardo MD  [address] [address]           [facility]  [address] [place] [address] [address]  [facility] [address]  Phone: [phone]          Patient: Luis Wong   MR Number: [id number]   YOB: 1955   Date of Visit: 9/19/2019       Dear Dr. Juan Eduardo:    Thank you for referring Luis Wong to me for evaluation. Attached you will find relevant portions of my assessment and plan of care.    If you have questions, please do not hesitate to call me. I look forward to following Luis Wong along with you.    Sincerely,    Dixon Bahena MD    Enclosure  CC:  No Recipients    If you would like to receive this communication electronically, please contact [email or web address] or [phone] to request more information on [employer] access.    For providers and/or their staff who would like to refer a patient to [facility], please contact us through our one-stop-shop provider referral line, [facility], at [phone].    If you feel you have received this communication in error or would no longer like to receive these types of communications, please e-mail [email or web address]

## 2019-09-19 NOTE — PATIENT INSTRUCTIONS
Stroke and Heart Disease  Every part of your body, including your heart and your brain, needs oxygen to work. Oxygen is carried in the blood. Blood vessels called arteries carry oxygen-rich blood throughout the body. Both heart attack and stroke are due to problems in the arteries. The same factors that cause heart disease can make you more likely to have a stroke.  · Heart attack. A heart attack is caused by blockage in an artery that carries blood to the heart muscle. If blood is blocked, that part of the heart muscle is damaged or dies.  · Stroke. If an artery supplying the brain is blocked, a stroke may result. This is called an ischemic stroke. It is caused by a piece of plaque breaking loose from an artery (such as a carotid artery in the neck) or from the heart and lodging in the brain. A stroke caused by the rupture of a weakened blood vessel is called a hemorrhagic stroke.  Both heart attack and stroke are medical emergencies that can lead to serious health problems. They can even be fatal.      Healthy artery  A healthy artery is a tube with flexible walls and a smooth inner lining. Blood flows freely through it.  Unhealthy artery  Artery problems start when the inner lining gets damaged. This is often due to risk factors such as smoking and high blood pressure. These can make the artery walls stiff. Plaque, a fatty mix of cholesterol and other material, forms in the lining. This narrows the channel. Plaque can break, restricting blood flow further. It can also cause a blood clot to form. A blood clot may block the arterys channel completely.   Reducing your risk  Making changes that make your arteries healthier will help lower your risk for both heart attack and stroke. If you have heart disease, you may need to work on a few aspects of your lifestyle. But remember that the things that are good for your arteries, heart, and brain are also good for the rest of your body.  Your health care provider will  work with you to modify lifestyle factors as needed to help prevent progression of atherosclerotic cardiovascular disease. This can lead to heart attack or stroke. Factors you may need to work on include:  · Diet. Your health care provider will give you information on dietary changes that you may need to make based on your situation. Your provider may recommend that you see a registered dietitian for help with diet changes. Changes may include:  ¨ Reducing fat and cholesterol intake  ¨ Reducing sodium (salt) intake, especially if you have high blood pressure  ¨ Increasing your intake of fresh vegetables and fruits  ¨ Eating lean proteins, such as fish, poultry, and legumes (beans and peas) and eating less red meat and processed meats  ¨ Using low- or no-fat diary products  ¨ Using vegetable and nut oils in limited amounts  ¨ Limiting sweets and processed foods such as chips, cookies, and baked goods  · Physical activity. Your health care provider may recommend that you increase your physical activity if you have not been as active as possible. Depending on your situation, your provider may advise you to include moderate to vigorous intensity activity for at least 40 minutes each day for at least 3 to 4 days per week. Examples of moderate to vigorous activity include:  ¨ Walking at a brisk pace, about 3 to 4 miles per hour  ¨ Jogging or running  ¨ Swimming or water aerobics  ¨ Hiking  ¨ Dancing  ¨ Martial arts  ¨ Tennis  ¨ Riding a bike or a stationary bike  · Weight management. If you are overweight or obese, your health care provider will work with you to lose weight and lower your BMI (body mass image) to a normal or near-normal level. Making dietary changes and increasing physical activity can help.  · Smoking. If you smoke, break the smoking habit. Enroll in a stop-smoking program to improve your chances of success.  · Stress. Learn stress management techniques to help you deal with stress in your home and work  life.  Date Last Reviewed: 6/8/2015  © 9163-6294 [employer]. [address] [place] [address], [place] [address]. All rights reserved. This information is not intended as a substitute for professional medical care. Always follow your healthcare professional's instructions.

## 2019-09-19 NOTE — PROGRESS NOTES
Subjective:   @Patient ID:  Luis Wong is a 63 y.o. male who presents for evaluation of cardio embolic source of stroke.       HPI:   Unfortunately patient had recurrent episodes of stroke.  Last event was last month resulted in significant left side weakness. Problem started in 2012,  since there he had about 3-4 event. Last event is the strongest.  Echo done no evidence of interatrial shunt. No lV thrombus detected.     He denies any chest pain, or palpitations.       MRA 8/2019 normal carotids.  He stated that he doesn't think that he had monitor or ILR in the past.        Pertinent hx, DM, and HTN    Prior cardiovascular  Hx  --------------------------------       - ECHO 6/2018    1 - Normal left ventricular systolic function (EF 60-65%).     2 - Impaired LV relaxation, normal LAP (grade 1 diastolic dysfunction).     3 - Normal right ventricular systolic function .     4 - The estimated PA systolic pressure is 29 mmHg.     5 - No evidence of intracardiac shunt.    CAC 18 in 2014     - EKG SR, no evidence of A.fib         Patient Active Problem List    Diagnosis Date Noted    Decreased strength of lower extremity 09/13/2019    Impaired mobility and activities of daily living 09/04/2019    CVA (cerebral vascular accident) 08/29/2019    Bilateral carpal tunnel syndrome 01/08/2019    Muscle weakness 07/11/2018    Decreased coordination 07/11/2018    Dysarthria 06/26/2018    Normocytic anemia 06/26/2018    Pre-diabetes 09/25/2017    History of cerebral parenchymal hemorrhage 08/31/2016     R thalamus, 2012      History of stroke 09/11/2014     R corona radiata, small artery infarct 6/2018      Special screening for malignant neoplasms, colon 12/14/2013    Hyperglycemia 09/05/2013    Mixed hyperlipidemia 09/05/2013    Cataracts, bilateral 09/05/2013    Nuclear sclerosis 11/13/2012    HTN (hypertension) 11/07/2012        LAST HbA1c  Lab Results   Component Value Date    HGBA1C 6.4 (H) 08/29/2019        Lipid panel  Lab Results   Component Value Date    CHOL 214 (H) 08/29/2019    CHOL 222 (H) 06/24/2019    CHOL 194 11/12/2018     Lab Results   Component Value Date    HDL 82 (H) 08/29/2019    HDL 76 (H) 06/24/2019    HDL 72 11/12/2018     Lab Results   Component Value Date    LDLCALC 113.0 08/29/2019    LDLCALC 128.8 06/24/2019    LDLCALC 109.4 11/12/2018     Lab Results   Component Value Date    TRIG 95 08/29/2019    TRIG 86 06/24/2019    TRIG 63 11/12/2018     Lab Results   Component Value Date    CHOLHDL 38.3 08/29/2019    CHOLHDL 34.2 06/24/2019    CHOLHDL 37.1 11/12/2018            Review of Systems   Constitution: Negative for chills and fever.   HENT: Negative for hearing loss and nosebleeds.    Eyes: Negative for blurred vision.   Cardiovascular: Negative for chest pain and palpitations.   Respiratory: Negative for hemoptysis and shortness of breath.    Hematologic/Lymphatic: Negative for bleeding problem.   Skin: Negative for itching.   Musculoskeletal:        On wheelchair now.    Gastrointestinal: Negative for abdominal pain and hematochezia.   Genitourinary: Negative for hematuria.   Neurological: Positive for focal weakness (Left side ). Negative for dizziness.   Psychiatric/Behavioral: Negative for altered mental status and depression.       Objective:   Physical Exam   Constitutional: He is oriented to person, place, and time. He appears well-developed and well-nourished.   HENT:   Head: Normocephalic and atraumatic.   Eyes: Conjunctivae are normal.   Neck: Neck supple. No JVD present.   Cardiovascular: Normal rate, regular rhythm and normal heart sounds. Exam reveals no gallop and no friction rub.   No murmur heard.  Pulmonary/Chest: Effort normal and breath sounds normal. No stridor. No respiratory distress. He has no wheezes.   Neurological: He is alert and oriented to person, place, and time. He exhibits abnormal muscle tone (left UE and LE).   Skin: Skin is warm and dry.   Psychiatric: He  has a normal mood and affect. His behavior is normal.       Assessment:     1. History of stroke    2. Essential hypertension    3. Mixed hyperlipidemia        Plan:     - Patient with recurrent stroke, unknown source.  No evidence of PFO per echo.     - Will check 30 day EM to evaluate for arrhythmias, if EM is negative then will consider ILR.     - F/U with Neurology team.   - ASA/Statin    EKG , and ECHO reviewed independently     Continue with current medical plan and lifestyle changes.  Return sooner for concerns or questions. If symptoms persist go to the ED  I have reviewed all pertinent data including patient's medical history in detail and updated the computerized patient record.     Orders Placed This Encounter   Procedures    Cardiac event monitor     Standing Status:   Future     Standing Expiration Date:   9/19/2020     Order Specific Question:   Cardiac Event Monitor     Answer:   Auto Trigger       Follow up as scheduled.     He expressed verbal understanding and agreed with the plan    Patient's Medications   New Prescriptions    No medications on file   Previous Medications    ASPIRIN (ECOTRIN) 81 MG EC TABLET    TAKE 1 TABLET BY MOUTH EVERY DAY    ATORVASTATIN (LIPITOR) 40 MG TABLET    Take 1 tablet (40 mg total) by mouth once daily.    HYDROCHLOROTHIAZIDE (MICROZIDE) 12.5 MG CAPSULE    TAKE 1 CAPSULE BY MOUTH EVERY DAY    METFORMIN (GLUCOPHAGE) 500 MG TABLET    Take 1 tablet (500 mg total) by mouth daily with breakfast.    NIFEDIPINE (PROCARDIA-XL) 30 MG (OSM) 24 HR TABLET    Take 1 tablet (30 mg total) by mouth once daily.    POTASSIUM CHLORIDE (MICRO-K) 8 MEQ CPSR    TAKE 1 CAPSULE (8 MEQ TOTAL) BY MOUTH ONCE DAILY.   Modified Medications    No medications on file   Discontinued Medications    No medications on file

## 2019-09-19 NOTE — PROGRESS NOTES
"  Occupational Therapy Daily Treatment Note     Name: Luis Wong  Clinic Number: [id number]    Therapy Diagnosis:    Encounter Diagnoses   Name Primary?    Impaired mobility and activities of daily living     Muscle weakness     Decreased coordination      Physician: Carla Altman PA-C    Visit Date: 9/20/2019  Physician Orders: Eval and tx  Medical Diagnosis: CVA L sided weakness Recrudecesnt  Onset Date: 8/28/19 most recent CVA  Evaluation Date: 9/4/2019  Plan of Care Expiration Period: 11/1/19  Insurance Authorization period Expiration: 12/31/19  Date of Return to MD: DAI  Visit # / Visits Authortized: 6 / 30  FOTO: CVA UE hand /100% limitation      Time In:8:45 am   Time Out: 9:30 am   Total Billable (one on one) Time: 45 minutes     Precautions: Standard and Fall    Subjective     Pt reports: "I been trying to stretch it out I feel like its getting better though"  he was compliant with home exercise program given last session.   Response to previous treatment:Fair  Functional change: None yet    Pain: 0/10  Location: left arms     Objective     Mt: Pt received manual therapy consisting of PROM in all planes, joint mobilization (grades I-II) with gentle oscillations at acromioclavicular and glenohumeral joint along with myofascial release and STM to surrounding musculature (biceps, pects, deltoids, traps, triceps etc.) to decrease stiffness and pain with movements. 10 min       Luis participated in neuromuscular re-education activities to improve: Coordination, Sense, Proprioception and Posture for 35 minutes. The following activities were included:  - NU step to start session for DEANN BARRAZA coordinated movements. 8 min   Finger ext over roll x30   Wrist ext over roll X 30   Grasp release X 30   Thumb abd/ext X 30   Finger spreads X 10   Opposition all fingers X 10 difficult to IF   Grasp release pincer for cotton balls R hand A for wrist control and reaching  Synergistic grasp then wrist ext into bucket. " "  X 3/4 tub    Supine tricep exercises:   Place and holds  3/30"  Ext from chest  2/10  Ext from overhead  2/10   Short arc Circles CW/CCW  1/10         PVC Tract exercises        Supine triceps with red band   Supine place and holds    Wrist ext in supine     Seated   NOT PERFORMED THIS TREATMENT     Supine chest press  Forward flexion 3# dowel  NOT PERFORMED THIS TREATMENT     Powder Board exercises in sidelying using airsplint for elbow ext assist NOT PERFORMED THIS TREATMENT     FF  Pro/retract  Gator  Abduction NOT PERFORMED THIS TREATMENT             Home Exercises and Education Provided     Education provided:   - Previous hand out   - Progress towards goals     Written Home Exercises Provided: Patient instructed to cont prior HEP.  Exercises were reviewed and Luis was able to demonstrate them prior to the end of the session.  Luis demonstrated good  understanding of the HEP provided.   .   See EMR under Patient Instructions for exercises provided prior visit.        Assessment     Pt would continue to benefit from skilled OT. He is doing well with HEP compliance and functional ROM of the arm. Increased use of the hand and functional control specifically with finger ext and opposition which was not tolerated prior to this session. He did really well with focus on synergistic grasp and release of objects but had to have min A from RUE to control reaching. He is doing better with LUE strength at the elbow focusing on triceps and shoulder movements. More focus on grasp and release with coordinated movements of the elbow wrist and hand next session.  States MRI came back positive for small blood clot after leaving hospital which caused his deficits. Pt highly motivated and compliant.      Luis is progressing well towards his goals and there are no updates to goals at this time. Pt prognosis is Fair.     Pt will continue to benefit from skilled outpatient occupational therapy to address the deficits listed " in the problem list on initial evaluation provide pt/family education and to maximize pt's level of independence in the home and community environment.     Anticipated barriers to occupational therapy:     Pt's spiritual, cultural and educational needs considered and pt agreeable to plan of care and goals.    Goals:  Goals:  Short Term Goals:  1) Initiate Hep Progressing 9/20/2019  2) Pt to increase LUE  strength by 5 # in order to A in self care task of feeding by 4 weeks. Progressing 9/20/2019  3) Pt to increase Quick dash Score by 5 points increasing self care IND by 4 weeks. Progressing 9/20/2019  4) Pt to increase L UE AROM by 10 degrees in order to A in UB dressing by 4 weeks. Progressing 9/20/2019  5) Patient will be able to achieve less than or equal to 75% on the FOTO, demonstrating overall improved functional ability with upper extremity. (self-care category) Progressing 9/20/2019       Long Term Goals:  1) Pt to be IND with HEP in order to maintain ROM and strength needed for self care IND by d.c. Progressing 9/20/2019  2) Pt to increase L UE  strength to WNL as compared to unaffected extremity in order to open items for self feeding by d.c. Progressing 9/20/2019  3) Pt to increase Quick dash Score by 10 points increasing self care IND at home by d.c. Progressing 9/20/2019  4) Pt to increase L UE AROM to WFL in order to A in UB dressing by d/c. Progressing 9/20/2019  5) Patient will be able to achieve less than or equal to 50% on the FOTO, demonstrating overall improved functional ability with upper extremity. (self-care category) Progressing 9/20/2019       Plan   Continue per plan of care.   Updates/Grading for next session: Progress as tolerated.       Christian Teran, OT

## 2019-09-20 ENCOUNTER — CLINICAL SUPPORT (OUTPATIENT)
Dept: REHABILITATION | Facility: HOSPITAL | Age: 64
End: 2019-09-20
Payer: COMMERCIAL

## 2019-09-20 DIAGNOSIS — Z74.09 IMPAIRED MOBILITY AND ACTIVITIES OF DAILY LIVING: ICD-10-CM

## 2019-09-20 DIAGNOSIS — Z78.9 IMPAIRED MOBILITY AND ACTIVITIES OF DAILY LIVING: ICD-10-CM

## 2019-09-20 DIAGNOSIS — M62.81 MUSCLE WEAKNESS: ICD-10-CM

## 2019-09-20 DIAGNOSIS — R27.8 DECREASED COORDINATION: ICD-10-CM

## 2019-09-20 PROCEDURE — 97140 MANUAL THERAPY 1/> REGIONS: CPT | Mod: PN

## 2019-09-20 PROCEDURE — 97112 NEUROMUSCULAR REEDUCATION: CPT | Mod: PN

## 2019-09-22 NOTE — PROGRESS NOTES
"                            Physical Therapy Daily Treatment Note     Name: Luis Wong  Clinic Number: [id number]    Therapy Diagnosis:   Encounter Diagnoses   Name Primary?    Impaired functional mobility, balance, gait, and endurance     Decreased strength of lower extremity      Physician: Carla Altman PA-C    Visit Date: 9/23/2019    Physician Orders: PT Eval and Treat  Medical Diagnosis from Referral: I63.9 (ICD-10-CM) - CVA (cerebral vascular accident)  Evaluation Date: 9/13/2019  Authorization Period Expiration: 12/31/2019  Plan of Care Expiration: 11/8/2019  Visit # / Visits authorized: 2/30  FOTO: 2/10    Time In: 8:45 AM  Time Out: 9:40 AM  Total Billable Time: 55 minutes (3 TE, 1 MT)    Precautions: Hx of CVA's    Subjective     Pt reports: he is working everyday to get stronger  He received home exercise program today.  Response to previous treatment: Evaluation last session  Functional change: Evaluation last session    Pain: 0/10  Location: N/A     Objective     Luis received therapeutic exercises to develop strength, endurance, ROM and flexibility for 45 minutes including:  Supine HS Stretch:   30"x3  Quad Sets:    2x10, 5" hold  SLR:    2x10, B  Hip ABD/ADD Iso:  2x10 with ball and gait belt, 5" holds  Supine Clamshells  2x10, Red T  Sidelying ABD   2x10, LLE  Prone Hip Ext   2x10, LLE  Sit to stands:   2x10, no hands  Forward step-ups  1x10 with RLE leading, 1x10 with LLE leading, Level 1  Lateral step-up and over 1x10, Level 1    Luis received the following manual therapy techniques: Manual stretching, were applied to the: LLE for 10 minutes, including:  HS Stretching  PROM IR & ER  Dorsiflexion stretching  Splaying of left foot        Home Exercises Provided and Patient Education Provided     Education provided:   - Importance of performing HEP everyday  - Education on muscle actions, specifically quadriceps and glutes    Written Home Exercises Provided: yes.  Exercises were reviewed " "and Luiswas able to demonstrate them prior to the end of the session.  Luis demonstrated good  understanding of the education provided.     See EMR under Patient Instructions for exercises provided 9/23/2019.      Assessment     Luis presented to therapy in manual wheelchair with SPC in lap. He transferred from w/c to mat with SBA. Had excellent return of supine mat exercises with some AAROM needed for prone hip extension and assistance during supine clamshells to prevent left knee from falling. Luis was able to perform sit to stands with no hands/right hand on knee and required verbal cueing to strive for equal weight bearing when standing. He was able to ambulate from mat to single bar with SPC and SBA. During step-ups, forward and lateral, he required verbal cueing to lift left leg from hip and not circumduct leg when placing on step. Manual stretches and PROM then performed to reduce "tightness" feeling expressed by patient. HEP issued with instruction to perform twice a day. Patient has expressed that his immediate short term goal is to ambulate into therapy and not have to use manual wheelchair, as well as work on descending stairs so that he does not have to scoot down them.     Luis is progressing well towards his goals.   Pt prognosis is Good.     Pt will continue to benefit from skilled outpatient physical therapy to address the deficits listed in the problem list box on initial evaluation, provide pt/family education and to maximize pt's level of independence in the home and community environment.     Pt's spiritual, cultural and educational needs considered and pt agreeable to plan of care and goals.    Anticipated barriers to physical therapy: Co-morbidities    Goals:     Long Term Goals (8 Weeks):   1.  Independent with initial HEP.  2.  Pt will perform nu-step at level 4 x 10 minutes without rests breaks going at least 50 step/min or greater.  3. Pt will be able to perform TUG in less than " or equal to 20 secs without use of AD placingdemonstrating overall improved functional mobility.   4. Pt will performed sit to stand x 5 without UE support in 15 seconds without LOB backward or posterior LE hooking for functional and safe transfers.   5.  Pt will score greater than or equal to 45/56 on the Choi Balance Assessment with use of AD demonstrating overall improved functional mobility and balance and reduce fall risk.  6. Pt will walk < than or equal to 140 ft on the 2 minute walk test indoor with AD with 0 LOB and minimal gait deviations for improved safety in home ambulation and safety.   7.  Pt will be able to safely perform and tolerate high level ADL's without LOB.   8. Pt will have 0 falls from start of PT sessions.  9. Independent with updated HEP.   10. Pt will have MMT score of 3+/5 in all major ms groups in Left LE.  11. Pt will ambulate on TM x 6 minutes with use of UE support with 0 LOB at greater than or equal to 1.0 mph.  12. Pt will begin some form of community fitness to begin regular and consistent performance of exercise to continue maintenance of gains made in PT.    Plan     Continue mat strengthening exercises. Pre-gait and gait training.     Kandy Gatica, PT, DPT

## 2019-09-22 NOTE — PATIENT INSTRUCTIONS
Supine Hamstring Stretch    Straight right leg with belt around heel of foot. Raise leg until a stretch is felt in the back of the thigh. Keep knee straight. Hold 30 seconds.   Repeat 3 times each side per set. Do 1 sets per session. Do 2 sessions per day.      Strengthening: Quadriceps Set    Tighten muscles on top of thighs by pushing knees down into surface. Hold 5 seconds.  Repeat 10 times left leg per set. Do 2 sets per session. Do 2 sessions per day.        Straight Leg Raise     With left leg straight, other leg bent, raise straight leg until knees are even. Slowly lower. Roll on your side and repeat lifting top leg up, and on stomach kicking back (squeezing your rear end before you kick back).  Repeat 10 times each leg per set. Do 2 sets per session. Do 2 sessions per day.       Glute Squeeze, supine    Lie face up and squeeze your rear end. Do not tighten your abdominals or hold your breath. Hold 5 seconds. Relax.  Repeat 10 times per set. Do 2 sets per session. Do 2 sessions per day.      Strengthening: Hip Adduction - Isometric    Sit back or lie down with ball or folded pillow between knees, and squeeze knees together. Hold 5 seconds.  Repeat 10 times per set. Do 2 sets per session. Do 2 sessions per day.      Strengthening: Hip Abductor - Resisted    Lie flat with band looped around both legs above knees, and push thighs apart, ensuring right and left move together.  Repeat 10 times per set. Do 2 sets per session. Do 2 sessions per day.        Slow Stand to Sit    Stand up, using hands if needed. Keep chest and head upright, bend your knees, don't use hands, and slowly lower back to the chair. Move as slowly as you can.  Repeat 10 times per set. Do 2 sets per session. Do 2 sessions per day.    Step-Down / Step-Up        Stand on stair step or 4 inch stool. Slowly bend left leg, lowering other foot to floor. Return by straightening front leg.  Repeat 10 times per set. Do 2 sets per session. Do 2 sessions  per day.         Medial Step-Down     Stand with both feet on 4 inch step. Step down to the side with right foot facing forward, lightly touching heel to the floor and return. Maintain all body weight on the step the entire time.  Repeat 10 times each leg per set. Do 2 sets per session. Do 2 sessions per day.      Copyright © [employer]. All rights reserved.

## 2019-09-23 ENCOUNTER — CLINICAL SUPPORT (OUTPATIENT)
Dept: REHABILITATION | Facility: HOSPITAL | Age: 64
End: 2019-09-23
Payer: COMMERCIAL

## 2019-09-23 DIAGNOSIS — R29.898 DECREASED STRENGTH OF LOWER EXTREMITY: ICD-10-CM

## 2019-09-23 DIAGNOSIS — Z74.09 IMPAIRED FUNCTIONAL MOBILITY, BALANCE, GAIT, AND ENDURANCE: ICD-10-CM

## 2019-09-23 PROCEDURE — 97140 MANUAL THERAPY 1/> REGIONS: CPT | Mod: PN

## 2019-09-23 PROCEDURE — 97110 THERAPEUTIC EXERCISES: CPT | Mod: PN

## 2019-09-24 ENCOUNTER — DOCUMENTATION ONLY (OUTPATIENT)
Dept: REHABILITATION | Facility: HOSPITAL | Age: 64
End: 2019-09-24

## 2019-09-24 ENCOUNTER — CLINICAL SUPPORT (OUTPATIENT)
Dept: REHABILITATION | Facility: HOSPITAL | Age: 64
End: 2019-09-24
Payer: COMMERCIAL

## 2019-09-24 DIAGNOSIS — R29.898 DECREASED STRENGTH OF LOWER EXTREMITY: ICD-10-CM

## 2019-09-24 DIAGNOSIS — Z74.09 IMPAIRED FUNCTIONAL MOBILITY, BALANCE, GAIT, AND ENDURANCE: ICD-10-CM

## 2019-09-24 PROCEDURE — 97112 NEUROMUSCULAR REEDUCATION: CPT | Mod: PN

## 2019-09-24 PROCEDURE — 97110 THERAPEUTIC EXERCISES: CPT | Mod: PN

## 2019-09-24 NOTE — PROGRESS NOTES
"                            Physical Therapy Daily Treatment Note     Name: Luis Wong  Clinic Number: [id number]    Therapy Diagnosis:   Encounter Diagnoses   Name Primary?    Impaired functional mobility, balance, gait, and endurance     Decreased strength of lower extremity      Physician: Carla Altman PA-C    Visit Date: 9/24/2019    Physician Orders: PT Eval and Treat  Medical Diagnosis from Referral: I63.9 (ICD-10-CM) - CVA (cerebral vascular accident)  Evaluation Date: 9/13/2019  Authorization Period Expiration: 12/31/2019  Plan of Care Expiration: 11/8/2019  Visit # / Visits authorized: 3/30  FOTO: 3/10    Time In: 4:15 PM  Time Out: 5:00 PM  Total Billable Time:45 minutes (1NMR, 2 TE)    Precautions: Hx of CVA's    Subjective     Pt reports: Agreeable to PT session. " Im willing to do anything to get stronger".   He received home exercise program today.  Response to previous treatment: a little soreness in lower back  Functional change: none stated by Pt    Pain: 0/10  Location: N/A     Objective     Luis received therapeutic exercises to develop strength, endurance, ROM and flexibility for 25 minutes including:  Supine HS Stretch:   30"x3  Quad Sets:    2x10, 5" hold  SLR:    NOT PERFORMED TODAY  Hip ABD/ADD Iso:  2x10 with ball and gait belt, 5" holds  Supine Clamshells  NOT PERFORMED TODAY  Sidelying ABD   NOT PERFORMED TODAY  Prone Hip Ext   NOT PERFORMED TODAY  Sit to stands:   2x10, no hands  Forward step-ups  1x10 with RLE leading, 1x10 with LLE leading, Level 1  Lateral step-up and over NOT PERFORMED TODAY  -cybex Leg press  5.5 3x10 B (focused mainly on left LE)  -Cybex ham curls   1.0 pl L LE 3x10 (occasional R assist)        Luis  Not receive the following manual therapy techniques: Manual stretching, were applied to the: LLE for 0 minutes, including:      Pt received neuromuscular rehabilitation including proprioceptive and balance training. x20  -1/2 kneeling on blue step w/ foam " block x 5 trials each LE (10 sec no UE use.)  -Beep board  Foot ball chest press 2x10  -B kneeling on blue step and foam block 1 min x 2 trials   -Nu step x 7 min for B UE/ LE reciprocal AROM with no rest breaks      Home Exercises Provided and Patient Education Provided     Education provided:   - Importance of performing HEP everyday  - Education on muscle actions, specifically quadriceps and glutes    Written Home Exercises Provided: yes.  Exercises were reviewed and Luislety able to demonstrate them prior to the end of the session.  Luis demonstrated good  understanding of the education provided.     See EMR under Patient Instructions for exercises provided 9/23/2019.      Assessment     Pt advanced to balance activities in //bars includin 1/2 kneeling and beep board. Pt cooperative throughout session, he experienced minor LOB with kneeling activities today but recovered with use of B UE in //bars/     Luis is progressing well towards his goals.   Pt prognosis is Good.     Pt will continue to benefit from skilled outpatient physical therapy to address the deficits listed in the problem list box on initial evaluation, provide pt/family education and to maximize pt's level of independence in the home and community environment.     Pt's spiritual, cultural and educational needs considered and pt agreeable to plan of care and goals.    Anticipated barriers to physical therapy: Co-morbidities    Goals:     Long Term Goals (8 Weeks):   1.  Independent with initial HEP. (In progress, Not Met)  2.  Pt will perform nu-step at level 4 x 10 minutes without rests breaks going at least 50 step/min or greater.(In progress, Not Met)  3. Pt will be able to perform TUG in less than or equal to 20 secs without use of AD placingdemonstrating overall improved functional mobility. (In progress, Not Met)  4. Pt will performed sit to stand x 5 without UE support in 15 seconds without LOB backward or posterior LE hooking for  functional and safe transfers. (In progress, Not Met)  5.  Pt will score greater than or equal to 45/56 on the Choi Balance Assessment with use of AD demonstrating overall improved functional mobility and balance and reduce fall risk.(In progress, Not Met)  6. Pt will walk < than or equal to 140 ft on the 2 minute walk test indoor with AD with 0 LOB and minimal gait deviations for improved safety in home ambulation and safety. (In progress, Not Met)  7.  Pt will be able to safely perform and tolerate high level ADL's without LOB. (In progress, Not Met)  8. Pt will have 0 falls from start of PT sessions.(In progress, Not Met)  9. Independent with updated HEP. (In progress, Not Met)  10. Pt will have MMT score of 3+/5 in all major ms groups in Left LE.(In progress, Not Met)  11. Pt will ambulate on TM x 6 minutes with use of UE support with 0 LOB at greater than or equal to 1.0 mph.(In progress, Not Met)  12. Pt will begin some form of community fitness to begin regular and consistent performance of exercise to continue maintenance of gains made in PT.(In progress, Not Met)    Plan     Continue PT as per POC, progress towards established PT goals.    Robin Davis, PTA, DPT

## 2019-09-24 NOTE — PROGRESS NOTES
Face to Face PTA Conference performed with the following regarding patient's current status, overall progress, and plan of care:  Robin Davis PTA     Kandy Gatica, PT, DPT

## 2019-09-25 ENCOUNTER — CLINICAL SUPPORT (OUTPATIENT)
Dept: REHABILITATION | Facility: HOSPITAL | Age: 64
End: 2019-09-25
Payer: COMMERCIAL

## 2019-09-25 ENCOUNTER — CLINICAL SUPPORT (OUTPATIENT)
Dept: CARDIOLOGY | Facility: HOSPITAL | Age: 64
End: 2019-09-25
Attending: INTERNAL MEDICINE
Payer: COMMERCIAL

## 2019-09-25 DIAGNOSIS — Z78.9 IMPAIRED MOBILITY AND ACTIVITIES OF DAILY LIVING: ICD-10-CM

## 2019-09-25 DIAGNOSIS — Z74.09 IMPAIRED MOBILITY AND ACTIVITIES OF DAILY LIVING: ICD-10-CM

## 2019-09-25 DIAGNOSIS — Z86.73 HISTORY OF STROKE: ICD-10-CM

## 2019-09-25 DIAGNOSIS — M62.81 MUSCLE WEAKNESS: ICD-10-CM

## 2019-09-25 DIAGNOSIS — R27.8 DECREASED COORDINATION: ICD-10-CM

## 2019-09-25 PROCEDURE — 93271 ECG/MONITORING AND ANALYSIS: CPT

## 2019-09-25 PROCEDURE — 93272 ECG/REVIEW INTERPRET ONLY: CPT | Mod: ,,, | Performed by: INTERNAL MEDICINE

## 2019-09-25 PROCEDURE — 93272 CARDIAC EVENT MONITOR (CUPID ONLY): ICD-10-PCS | Mod: ,,, | Performed by: INTERNAL MEDICINE

## 2019-09-25 PROCEDURE — 97112 NEUROMUSCULAR REEDUCATION: CPT | Mod: PN

## 2019-09-25 NOTE — PROGRESS NOTES
"  Occupational Therapy Daily Treatment Note     Name: Luis Wong  Clinic Number: [id number]    Therapy Diagnosis:    Encounter Diagnoses   Name Primary?    Impaired mobility and activities of daily living     Muscle weakness     Decreased coordination      Physician: Carla Altman PA-C    Visit Date: 9/25/2019  Physician Orders: Eval and tx  Medical Diagnosis: CVA L sided weakness Recrudecesnt  Onset Date: 8/28/19 most recent CVA  Evaluation Date: 9/4/2019  Plan of Care Expiration Period: 11/1/19  Insurance Authorization period Expiration: 12/31/19  Date of Return to MD: DAI  Visit # / Visits Authortized: 7 / 30  FOTO: CVA UE hand /100% limitation      Time In:8:45 am   Time Out: 9:30 am   Total Billable (one on one) Time: 45 minutes     Precautions: Standard and Fall    Subjective     Pt reports: "I been doing good the hand still stiff but its moving "  he was compliant with home exercise program given last session.   Response to previous treatment:Fair  Functional change: None yet    Pain: 0/10  Location: left arms     Objective       Luis participated in neuromuscular re-education activities to improve: Coordination, Sense, Proprioception and Posture for 45 minutes. The following activities were included:  -  RPMs 6 min 3 forward 3 backwards   Finger ext over roll x30   Wrist ext over roll X 30   Grasp release X 30   Black Gripper 2 yellow rbs  1 red RB  X 30    Red t putty   Mold    Roll  Pinch  PVC punch out Red   X 30 each            Thumb abd/ext X 30   Finger spreads X 10   Opposition all fingers X 10 difficult to IF   Grasp release pincer for cotton balls        [place] pincer grasp R hand A for wrist control and reaching  Synergistic grasp then wrist ext into bucket.   X 3/4 tub     X 10    Supine tricep exercises:   Place and holds  3/30"  Ext from chest  2/10  Ext from overhead  2/10   Short arc Circles CW/CCW  1/10   Flexion from 90 deg overhead  Isolated full flexion L 2/10   X 5 due " to fatigue      PVC Tract exercises        Supine triceps with red band   Supine place and holds    Wrist ext in supine     Seated   NOT PERFORMED THIS TREATMENT     Supine chest press  Forward flexion 3# dowel  NOT PERFORMED THIS TREATMENT     Powder Board exercises in sidelying using airsplint for elbow ext assist NOT PERFORMED THIS TREATMENT     FF  Pro/retract  Gator  Abduction NOT PERFORMED THIS TREATMENT             Home Exercises and Education Provided     Education provided:   - Previous hand out   - Progress towards goals     Written Home Exercises Provided: Patient instructed to cont prior HEP.  Exercises were reviewed and Luis was able to demonstrate them prior to the end of the session.  Luis demonstrated good  understanding of the HEP provided.   .   See EMR under Patient Instructions for exercises provided prior visit.        Assessment     Pt would continue to benefit from skilled OT. Doing better with LUE movements and GM control. Increased FM pincer grasp today with pt able to  items like cotton balls and marbles with good accuracy. Still focusing on synergistic wrist and pinch in order to increase functional use of the hand. Still having weakness with extension of the wrist and fingers. Better control at the shoulder with less tone noted but still limited by bicpes taking over due to tricep weakness with more focus on extension exercises of the elbow. Shoulder GM movements improved and place and hold strength tolerated well. Remains highly motivated.    Luis is progressing well towards his goals and there are no updates to goals at this time. Pt prognosis is Fair.     Pt will continue to benefit from skilled outpatient occupational therapy to address the deficits listed in the problem list on initial evaluation provide pt/family education and to maximize pt's level of independence in the home and community environment.     Anticipated barriers to occupational therapy:     Pt's  spiritual, cultural and educational needs considered and pt agreeable to plan of care and goals.    Goals:  Goals:  Short Term Goals:  1) Initiate Hep Progressing 9/25/2019  2) Pt to increase LUE  strength by 5 # in order to A in self care task of feeding by 4 weeks. Progressing 9/25/2019  3) Pt to increase Quick dash Score by 5 points increasing self care IND by 4 weeks. Progressing 9/25/2019  4) Pt to increase L UE AROM by 10 degrees in order to A in UB dressing by 4 weeks. Progressing 9/25/2019  5) Patient will be able to achieve less than or equal to 75% on the FOTO, demonstrating overall improved functional ability with upper extremity. (self-care category) Progressing 9/25/2019       Long Term Goals:  1) Pt to be IND with HEP in order to maintain ROM and strength needed for self care IND by d.c. Progressing 9/25/2019  2) Pt to increase L UE  strength to WNL as compared to unaffected extremity in order to open items for self feeding by d.c. Progressing 9/25/2019  3) Pt to increase Quick dash Score by 10 points increasing self care IND at home by d.c. Progressing 9/25/2019  4) Pt to increase L UE AROM to WFL in order to A in UB dressing by d/c. Progressing 9/25/2019  5) Patient will be able to achieve less than or equal to 50% on the FOTO, demonstrating overall improved functional ability with upper extremity. (self-care category) Progressing 9/25/2019       Plan   Continue per plan of care.   Updates/Grading for next session: Progress as tolerated.       Christian Teran, OT

## 2019-09-27 ENCOUNTER — CLINICAL SUPPORT (OUTPATIENT)
Dept: REHABILITATION | Facility: HOSPITAL | Age: 64
End: 2019-09-27
Payer: COMMERCIAL

## 2019-09-27 DIAGNOSIS — Z78.9 IMPAIRED MOBILITY AND ACTIVITIES OF DAILY LIVING: ICD-10-CM

## 2019-09-27 DIAGNOSIS — M62.81 MUSCLE WEAKNESS: ICD-10-CM

## 2019-09-27 DIAGNOSIS — R29.898 DECREASED STRENGTH OF LOWER EXTREMITY: ICD-10-CM

## 2019-09-27 DIAGNOSIS — Z74.09 IMPAIRED FUNCTIONAL MOBILITY, BALANCE, GAIT, AND ENDURANCE: ICD-10-CM

## 2019-09-27 DIAGNOSIS — Z74.09 IMPAIRED MOBILITY AND ACTIVITIES OF DAILY LIVING: ICD-10-CM

## 2019-09-27 DIAGNOSIS — R27.8 DECREASED COORDINATION: ICD-10-CM

## 2019-09-27 PROCEDURE — 97140 MANUAL THERAPY 1/> REGIONS: CPT | Mod: PN

## 2019-09-27 PROCEDURE — 97112 NEUROMUSCULAR REEDUCATION: CPT | Mod: PN

## 2019-09-27 PROCEDURE — 97110 THERAPEUTIC EXERCISES: CPT | Mod: PN

## 2019-09-27 NOTE — PROGRESS NOTES
"  Occupational Therapy Daily Treatment Note     Name: Luis Wong  Clinic Number: [id number]    Therapy Diagnosis:    Encounter Diagnoses   Name Primary?    Impaired mobility and activities of daily living     Muscle weakness     Decreased coordination      Physician: Carla Altman PA-C    Visit Date: 9/27/2019  Physician Orders: Eval and tx  Medical Diagnosis: CVA L sided weakness Recrudecesnt  Onset Date: 8/28/19 most recent CVA  Evaluation Date: 9/4/2019  Plan of Care Expiration Period: 11/1/19  Insurance Authorization period Expiration: 12/31/19  Date of Return to MD: DAI  Visit # / Visits Authortized: 8 / 30  FOTO: CVA UE hand /100% limitation      Time In:11:40 am   Time Out: 12:30 am   Total Billable (one on one) Time: 50 minutes     Precautions: Standard and Fall    Subjective     Pt reports: "I been moving good but the shoulder just gets so stiff "  he was compliant with home exercise program given last session.   Response to previous treatment:Fair  Functional change: None yet    Pain: 0/10  Location: left arms     Objective     Mt: Pt received manual therapy consisting of PROM in all planes, joint mobilization (grades I-II) with gentle oscillations at acromioclavicular and glenohumeral joint along with myofascial release and STM to surrounding musculature (biceps, pects, deltoids, traps, triceps etc.) to decrease stiffness and pain with movements. 10 minutes    Luis participated in neuromuscular re-education activities to improve: Coordination, Sense, Proprioception and Posture for 35 minutes. The following activities were included:  -  RPMs 6 min 3 forward 3 backwards   LLPS using neuro ifra hand paddle 40 min        Supine shoulder exercises:     Place and holds  3/30"     Short arc Circles CW/CCW  1/10   Flexion from 90 deg overhead  Isolated full flexion L 2/10   X 5 due to fatigue          Supine chest press  Forward flexion    Supine supination and wrist ext Using yoga block for ER " cue:  2/10  2/10  2/10       Cone placement with L hand grasp and release  A with R hand @ elbow:  Placement on table 2 ways  Placement to floor stool   2/10           Home Exercises and Education Provided     Education provided:   - Previous hand out   - Progress towards goals     Written Home Exercises Provided: Patient instructed to cont prior HEP.  Exercises were reviewed and Luis was able to demonstrate them prior to the end of the session.  Luis demonstrated good  understanding of the HEP provided.   .   See EMR under Patient Instructions for exercises provided prior visit.        Assessment     Pt would continue to benefit from skilled OT. Main focus on shoulder isolated exercises with focus on ER with synergistic movements and focus on neutral position of the wrist with shoulder movements. He did well with these new exercises focusing on technique and non compensatory movements. Coordinaion at the hand and shoulder with elbow A tolerated well today with cone grasp and release. He is progressing very well. Shoulder GM movements improved and place and hold strength tolerated well. Remains highly motivated.    Luis is progressing well towards his goals and there are no updates to goals at this time. Pt prognosis is Fair.     Pt will continue to benefit from skilled outpatient occupational therapy to address the deficits listed in the problem list on initial evaluation provide pt/family education and to maximize pt's level of independence in the home and community environment.     Anticipated barriers to occupational therapy:     Pt's spiritual, cultural and educational needs considered and pt agreeable to plan of care and goals.    Goals:  Goals:  Short Term Goals:  1) Initiate Hep Progressing 9/27/2019  2) Pt to increase LUE  strength by 5 # in order to A in self care task of feeding by 4 weeks. Progressing 9/27/2019  3) Pt to increase Quick dash Score by 5 points increasing self care IND by 4 weeks.  Progressing 9/27/2019  4) Pt to increase L UE AROM by 10 degrees in order to A in UB dressing by 4 weeks. Progressing 9/27/2019  5) Patient will be able to achieve less than or equal to 75% on the FOTO, demonstrating overall improved functional ability with upper extremity. (self-care category) Progressing 9/27/2019       Long Term Goals:  1) Pt to be IND with HEP in order to maintain ROM and strength needed for self care IND by d.c. Progressing 9/27/2019  2) Pt to increase L UE  strength to WNL as compared to unaffected extremity in order to open items for self feeding by d.c. Progressing 9/27/2019  3) Pt to increase Quick dash Score by 10 points increasing self care IND at home by d.c. Progressing 9/27/2019  4) Pt to increase L UE AROM to WFL in order to A in UB dressing by d/c. Progressing 9/27/2019  5) Patient will be able to achieve less than or equal to 50% on the FOTO, demonstrating overall improved functional ability with upper extremity. (self-care category) Progressing 9/27/2019       Plan   Continue per plan of care.   Updates/Grading for next session: Progress as tolerated.       Christian Teran, OT

## 2019-09-27 NOTE — PROGRESS NOTES
"                            Physical Therapy Daily Treatment Note     Name: Luis Wong  Clinic Number: [id number]    Therapy Diagnosis:   Encounter Diagnoses   Name Primary?    Impaired functional mobility, balance, gait, and endurance     Decreased strength of lower extremity      Physician: Carla Altman PA-C    Visit Date: 9/27/2019    Physician Orders: PT Eval and Treat  Medical Diagnosis from Referral: I63.9 (ICD-10-CM) - CVA (cerebral vascular accident)  Evaluation Date: 9/13/2019  Authorization Period Expiration: 12/31/2019  Plan of Care Expiration: 11/8/2019  Visit # / Visits authorized: 4/30  FOTO: 4/10    Time In: 12:30 PM  Time Out: 1:15 PM  Total Billable Time: 45 minutes (1NMR, 2 TE)    Precautions: Hx of CVA's    Subjective     Pt reports: He'd doing well. The exercises at home are getting easier to do. Hamstring stretch is still tough at times.   He received home exercise program today.  Response to previous treatment: Slight soreness but overall felt good  Functional change: Able to descend 6 stairs without scooting    Pain: 0/10  Location: N/A     Objective     Luis received therapeutic exercises to develop strength, endurance, ROM and flexibility for 25 minutes including:  Supine HS Stretch:   30"x3 with strap  Quad Sets w/ SLR   2x10, 3" hold before SLR  Bridges w/ hip ADD  2x10, ball squeezes before bridging   Sit to stands:   2x10, no hands  -Cybex Leg press  5.5 3x10 B (focused mainly on left LE)      3.5 2x10, LLE only    -Cybex ham curls   1.0 pl L LE 3x10 (occasional R assist) - not performed - OOT  Forward step-ups  1x10 with RLE leading, 1x10 with LLE leading, Level 1 - not performed today  Lateral step-up and over NOT PERFORMED TODAY  Supine Clamshells  NOT PERFORMED TODAY  Sidelying ABD   NOT PERFORMED TODAY  Prone Hip Ext   NOT PERFORMED TODAY      Luis received neuromuscular rehabilitation including proprioceptive and balance training for 12 minutes including:  - Nu step x 8 " min for B UE/ LE reciprocal AROM with no rest breaks, Level 2  - Stair trainin stairs x 2 trials: Step-to pattern on ascent with RLE leading, step-to and reciprocal pattern practiced on descent  -1/2 kneeling on blue step w/ foam block x 5 trials each LE (10 sec no UE use.) - out of time, next session  -Beep board  Foot ball chest press 2x10, out of time, next session  -B kneeling on blue step and foam block 1 min x 2 trials - out of time, next session    Luis participated in gait training for 8 minutes including:   - Heel strike and targeted stepping practiced bilaterally outside of // bars with PT guarding stance leg, no AD used    Home Exercises Provided and Patient Education Provided     Education provided:   - Importance of performing HEP everyday  - Education on muscle actions, specifically quadriceps and glutes    Written Home Exercises Provided: yes.  Exercises were reviewed and Luiswas able to demonstrate them prior to the end of the session.  Luis demonstrated good  understanding of the education provided.     See EMR under Patient Instructions for exercises provided 2019.      Assessment     Luis performed well with gait training and implementing heel strike with LLE; continues to plant foot in slight inversion and able to correct it with verbal cueing. Performed stairs with no LOB and good control. Able to avoid hyperextending his knee when performing leg press with LLE only.     Luis is progressing well towards his goals.   Pt prognosis is Good.     Pt will continue to benefit from skilled outpatient physical therapy to address the deficits listed in the problem list box on initial evaluation, provide pt/family education and to maximize pt's level of independence in the home and community environment.     Pt's spiritual, cultural and educational needs considered and pt agreeable to plan of care and goals.    Anticipated barriers to physical therapy: Co-morbidities    Goals:     Long  Term Goals (8 Weeks):   1.  Independent with initial HEP. (In progress, Not Met)  2.  Pt will perform nu-step at level 4 x 10 minutes without rests breaks going at least 50 step/min or greater.(In progress, Not Met)  3. Pt will be able to perform TUG in less than or equal to 20 secs without use of AD placingdemonstrating overall improved functional mobility. (In progress, Not Met)  4. Pt will performed sit to stand x 5 without UE support in 15 seconds without LOB backward or posterior LE hooking for functional and safe transfers. (In progress, Not Met)  5.  Pt will score greater than or equal to 45/56 on the Choi Balance Assessment with use of AD demonstrating overall improved functional mobility and balance and reduce fall risk.(In progress, Not Met)  6. Pt will walk < than or equal to 140 ft on the 2 minute walk test indoor with AD with 0 LOB and minimal gait deviations for improved safety in home ambulation and safety. (In progress, Not Met)  7.  Pt will be able to safely perform and tolerate high level ADL's without LOB. (In progress, Not Met)  8. Pt will have 0 falls from start of PT sessions.(In progress, Not Met)  9. Independent with updated HEP. (In progress, Not Met)  10. Pt will have MMT score of 3+/5 in all major ms groups in Left LE.(In progress, Not Met)  11. Pt will ambulate on TM x 6 minutes with use of UE support with 0 LOB at greater than or equal to 1.0 mph.(In progress, Not Met)  12. Pt will begin some form of community fitness to begin regular and consistent performance of exercise to continue maintenance of gains made in PT.(In progress, Not Met)    Plan     Continue progressing towards functional goals.  Resume 1/2 kneeling, NMR and gait training next session.     Kandy Gatica, PT, DPT

## 2019-09-29 NOTE — PROGRESS NOTES
"                            Physical Therapy Daily Treatment Note     Name: Luis Wong  Clinic Number: [id number]    Therapy Diagnosis:   Encounter Diagnoses   Name Primary?    Impaired functional mobility, balance, gait, and endurance     Decreased strength of lower extremity      Physician: Carla Altman PA-C    Visit Date: 9/30/2019    Physician Orders: PT Eval and Treat  Medical Diagnosis from Referral: I63.9 (ICD-10-CM) - CVA (cerebral vascular accident)  Evaluation Date: 9/13/2019  Authorization Period Expiration: 12/31/2019  Plan of Care Expiration: 11/8/2019  Visit # / Visits authorized: 5/30  FOTO: 5/10 NEXT    Time In: 3:30 PM  Time Out: 4:15 PM  Total Billable Time: 45 minutes (1NMR, 2 TE)    Precautions: Hx of CVA's    Subjective     Pt reports: He had a calm weekend. States that Quad sets and SLR's are getting easier to perform at home and he can feel his leg getting stronger with performing those exercises.   He is compliant with home exercise program today.  Response to previous treatment: No adverse reactions  Functional change: Able to descend ~10 stairs at home without scooting    Pain: 0/10  Location: N/A     Objective     Luis received therapeutic exercises to develop strength, endurance, ROM and flexibility for 27 minutes including:    Supine HS Stretch:   30"x3 with strap  Quad Sets w/ SLR   2x10, 3" hold before SLR - not performed this session  Bridges w/ hip ADD  2x10, ball squeezes before bridging - not performed this session  Sit to stands:   2x10, no hands    -Cybex Leg press  6 plates, 3x10 B (focused mainly on left LE)      3.5 plates, 2x10, LLE only  - Cybex Leg Extension 2x10, LLE only  - Cybex Ham Curls   1.0 pl 2x10, LLE only (RLE assist as needed)     - Mini Squats:   2x10, BUE support  - Standing Hip Flex  1x15, RLE only  - Standing Hip ABD  1x15, RLE only  - Standing Hip Ext  1x15 RLE only  Forward step-ups  1x10 with RLE leading, 1x10 with LLE leading, Level 1 - not " performed today  Lateral step-up and over NOT PERFORMED TODAY      Luis received neuromuscular rehabilitation including proprioceptive and balance training for 10 minutes including:  - Nu step x 8 min for B UE/ LE reciprocal AROM with no rest breaks, Level 3    The following were not performed today, to be resumed next session:  - Stair trainin stairs x 2 trials: Step-to pattern on ascent with RLE leading, step-to and reciprocal pattern practiced on descent  -1/2 kneeling on blue step w/ foam block x 5 trials each LE (10 sec no UE use.) - out of time, next session  -Beep board  Foot ball chest press 2x10, out of time, next session  -B kneeling on blue step and foam block 1 min x 2 trials - out of time, next session    Luis participated in gait training for 8 minutes including:   - Heel strike and targeted stepping practiced bilaterally outside of // bars with PT guarding stance leg, no AD used - not performed   - Targeted heel strike practice in // bars using cones, cueing to lift from hip and not lean trunk backwards    Home Exercises Provided and Patient Education Provided     Education provided:   - Importance of performing HEP everyday  - Education on muscle actions, specifically quadriceps and glutes    Written Home Exercises Provided: yes.  Exercises were reviewed and Luis was able to demonstrate them prior to the end of the session.  Luis demonstrated good  understanding of the education provided.     See EMR under Patient Instructions for exercises provided 2019.      Assessment     Luis ambulated into clinic using SPC and performing heel strike technique learned in previous session. Displayed improved endurance with ability to perform Nu-Step and all cybex machines back to back with minimal rest in between. Targeted heel strike gait training in // bars improved from last session; required cueing not to advance leg by leaning trunk backwards.  Was able to tolerate addition of mini squats  and hip flexion, abduction, and extension on RLE only to improve left hip abductors and SLS. Pt instructed to perform supine mat exercises at home (HEP) to continue maximizing therapy benefits.     Luis is progressing well towards his goals.   Pt prognosis is Good.     Pt will continue to benefit from skilled outpatient physical therapy to address the deficits listed in the problem list box on initial evaluation, provide pt/family education and to maximize pt's level of independence in the home and community environment.     Pt's spiritual, cultural and educational needs considered and pt agreeable to plan of care and goals.    Anticipated barriers to physical therapy: Co-morbidities    Goals:     Long Term Goals (8 Weeks):   1.  Independent with initial HEP. (In progress, Not Met)  2.  Pt will perform nu-step at level 4 x 10 minutes without rests breaks going at least 50 step/min or greater.(In progress, Not Met)  3. Pt will be able to perform TUG in less than or equal to 20 secs without use of AD placingdemonstrating overall improved functional mobility. (In progress, Not Met)  4. Pt will performed sit to stand x 5 without UE support in 15 seconds without LOB backward or posterior LE hooking for functional and safe transfers. (In progress, Not Met)  5.  Pt will score greater than or equal to 45/56 on the Choi Balance Assessment with use of AD demonstrating overall improved functional mobility and balance and reduce fall risk.(In progress, Not Met)  6. Pt will walk < than or equal to 140 ft on the 2 minute walk test indoor with AD with 0 LOB and minimal gait deviations for improved safety in home ambulation and safety. (In progress, Not Met)  7.  Pt will be able to safely perform and tolerate high level ADL's without LOB. (In progress, Not Met)  8. Pt will have 0 falls from start of PT sessions.(In progress, Not Met)  9. Independent with updated HEP. (In progress, Not Met)  10. Pt will have MMT score of 3+/5 in  all major ms groups in Left LE.(In progress, Not Met)  11. Pt will ambulate on TM x 6 minutes with use of UE support with 0 LOB at greater than or equal to 1.0 mph.(In progress, Not Met)  12. Pt will begin some form of community fitness to begin regular and consistent performance of exercise to continue maintenance of gains made in PT.(In progress, Not Met)    Plan     Continue progressing towards functional goals.  Resume 1/2 kneeling, NMR and gait training next session.     Kandy Gatica, PT, DPT

## 2019-09-30 ENCOUNTER — CLINICAL SUPPORT (OUTPATIENT)
Dept: REHABILITATION | Facility: HOSPITAL | Age: 64
End: 2019-09-30
Payer: COMMERCIAL

## 2019-09-30 DIAGNOSIS — Z74.09 IMPAIRED FUNCTIONAL MOBILITY, BALANCE, GAIT, AND ENDURANCE: ICD-10-CM

## 2019-09-30 DIAGNOSIS — R29.898 DECREASED STRENGTH OF LOWER EXTREMITY: ICD-10-CM

## 2019-09-30 PROCEDURE — 97112 NEUROMUSCULAR REEDUCATION: CPT | Mod: PN

## 2019-09-30 PROCEDURE — 97110 THERAPEUTIC EXERCISES: CPT | Mod: PN

## 2019-09-30 RX ORDER — LISINOPRIL 20 MG/1
TABLET ORAL
Qty: 30 TABLET | Refills: 2 | Status: SHIPPED | OUTPATIENT
Start: 2019-09-30 | End: 2019-10-23

## 2019-10-01 ENCOUNTER — CLINICAL SUPPORT (OUTPATIENT)
Dept: REHABILITATION | Facility: HOSPITAL | Age: 64
End: 2019-10-01
Payer: MEDICARE

## 2019-10-01 ENCOUNTER — PATIENT OUTREACH (OUTPATIENT)
Dept: OTHER | Facility: OTHER | Age: 64
End: 2019-10-01

## 2019-10-01 DIAGNOSIS — R29.898 DECREASED STRENGTH OF LOWER EXTREMITY: ICD-10-CM

## 2019-10-01 DIAGNOSIS — Z74.09 IMPAIRED MOBILITY AND ACTIVITIES OF DAILY LIVING: ICD-10-CM

## 2019-10-01 DIAGNOSIS — M62.81 MUSCLE WEAKNESS: ICD-10-CM

## 2019-10-01 DIAGNOSIS — R27.8 DECREASED COORDINATION: ICD-10-CM

## 2019-10-01 DIAGNOSIS — Z74.09 IMPAIRED FUNCTIONAL MOBILITY, BALANCE, GAIT, AND ENDURANCE: ICD-10-CM

## 2019-10-01 DIAGNOSIS — Z78.9 IMPAIRED MOBILITY AND ACTIVITIES OF DAILY LIVING: ICD-10-CM

## 2019-10-01 PROCEDURE — 97112 NEUROMUSCULAR REEDUCATION: CPT | Mod: PN

## 2019-10-01 PROCEDURE — 97110 THERAPEUTIC EXERCISES: CPT | Mod: PN

## 2019-10-01 NOTE — PROGRESS NOTES
"  Occupational Therapy Daily Treatment Note     Name: Luis Wong  Clinic Number: [id number]    Therapy Diagnosis:    Encounter Diagnoses   Name Primary?    Impaired mobility and activities of daily living     Muscle weakness     Decreased coordination      Physician: Carla Altman PA-C    Visit Date: 10/1/2019  Physician Orders: Eval and tx  Medical Diagnosis: CVA L sided weakness Recrudecesnt  Onset Date: 8/28/19 most recent CVA  Evaluation Date: 9/4/2019  Plan of Care Expiration Period: 11/1/19  Insurance Authorization period Expiration: 12/31/19  Date of Return to MD: DAI  Visit # / Visits Authortized: 9 / 30  FOTO: CVA UE hand /100% limitation      Time In:8:00 am   Time Out: 8:45  am   Total Billable (one on one) Time: 45 minutes      Precautions: Standard and Fall    Subjective     Pt reports: "Its going good I feel good"  he was compliant with home exercise program given last session.   Response to previous treatment:Fair  Functional change: None yet    Pain: 0/10  Location: left arms     Objective       Luis participated in neuromuscular re-education activities to improve: Coordination, Sense, Proprioception and Posture for 35 minutes. The following activities were included:  -  RPMs 6 min 3 forward 3 backwards   LLPS using neuro ifra hand paddle 40 min    Shoulder shrugs  Scap squeezes X 30    Supine shoulder exercises:    FF isolated thumb up  Place and holds  3/30"  2/15     Short arc Circles CW/CCW  1/10   Flexion from 90 deg overhead  Isolated full flexion L 2/10   X 5 due to fatigue          Supine chest press  Forward flexion    Supine supination and wrist ext Using yoga block for ER cue:  NOT PERFORMED THIS TREATMENT     Peg removal X 8   Large pom pom retrieval  X 1 tub             Luis Wong  received therapeutic exercises to develop GM/FM coordination, balance, activity tolerance and strength in order to increase ADL/IADL independence with replicated home management/self care " "tasks, for 10 minutes including:  Wrist ext Place and holds  3/30"   Pro/sup wrist wheel 2 min                  Home Exercises and Education Provided     Education provided:   - Previous hand out   - Progress towards goals     Written Home Exercises Provided: Patient instructed to cont prior HEP.  Exercises were reviewed and Luis was able to demonstrate them prior to the end of the session.  Luis demonstrated good  understanding of the HEP provided.   .   See EMR under Patient Instructions for exercises provided prior visit.        Assessment     Pt would continue to benefit from skilled OT. He did better today with wrist ext and control of the hand. More focus on pincer grasp and strengthening today. He is doing well with this. Improved grasp and release noted. Still guiding with the unaffected R hand due to elbow and shoulder weakness. He did better with ER and shoulder place and holds today with only fatigue noted.  Continued focus on GM coordination next session. Remains highly motivated.    Luis is progressing well towards his goals and there are no updates to goals at this time. Pt prognosis is Fair.     Pt will continue to benefit from skilled outpatient occupational therapy to address the deficits listed in the problem list on initial evaluation provide pt/family education and to maximize pt's level of independence in the home and community environment.     Anticipated barriers to occupational therapy:     Pt's spiritual, cultural and educational needs considered and pt agreeable to plan of care and goals.    Goals:  Goals:  Short Term Goals:  1) Initiate Hep Progressing 10/1/2019  2) Pt to increase LUE  strength by 5 # in order to A in self care task of feeding by 4 weeks. Progressing 10/1/2019  3) Pt to increase Quick dash Score by 5 points increasing self care IND by 4 weeks. Progressing 10/1/2019  4) Pt to increase L UE AROM by 10 degrees in order to A in UB dressing by 4 weeks. Progressing " 10/1/2019  5) Patient will be able to achieve less than or equal to 75% on the FOTO, demonstrating overall improved functional ability with upper extremity. (self-care category) Progressing 10/1/2019       Long Term Goals:  1) Pt to be IND with HEP in order to maintain ROM and strength needed for self care IND by lilianecLaila Progressing 10/1/2019  2) Pt to increase L UE  strength to WNL as compared to unaffected extremity in order to open items for self feeding by barrera Progressing 10/1/2019  3) Pt to increase Quick dash Score by 10 points increasing self care IND at home by barrera Progressing 10/1/2019  4) Pt to increase L UE AROM to WFL in order to A in UB dressing by d/cLaila Progressing 10/1/2019  5) Patient will be able to achieve less than or equal to 50% on the FOTO, demonstrating overall improved functional ability with upper extremity. (self-care category) Progressing 10/1/2019       Plan   Continue per plan of care.   Updates/Grading for next session: Progress as tolerated.       Christian Teran, OT

## 2019-10-01 NOTE — PROGRESS NOTES
"                            Physical Therapy Daily Treatment Note     Name: Luis Wong  Clinic Number: [id number]    Therapy Diagnosis:   Encounter Diagnoses   Name Primary?    Impaired functional mobility, balance, gait, and endurance     Decreased strength of lower extremity      Physician: Carla Altman PA-C    Visit Date: 10/1/2019    Physician Orders: PT Eval and Treat  Medical Diagnosis from Referral: I63.9 (ICD-10-CM) - CVA (cerebral vascular accident)  Evaluation Date: 9/13/2019  Authorization Period Expiration: 12/31/2019  Plan of Care Expiration: 11/8/2019  Visit # / Visits authorized: 6/30  FOTO: 6/10 DONE    Time In: 8:4/ PM  Time Out: 9:30 PM  Total Billable Time: 43 minutes (1NMR, 2 TE)    Precautions: Hx of CVA's    Subjective     Pt reports: pt agreeable to PT session. He states he has no reports of pain upon arrival  He is compliant with home exercise program today.  Response to previous treatment: No adverse reactions  Functional change: Able to descend ~10 stairs at home without scooting    Pain: 0/10  Location: N/A     Objective     Luis received therapeutic exercises to develop strength, endurance, ROM and flexibility for 27 minutes including:    Supine HS Stretch:   30"x3 with strap  Quad Sets w/ SLR   2x10, 3" hold before SLR - not performed this session  Bridges w/ hip ADD  2x10, ball squeezes before bridging - not performed this session  Sit to stands:   2x10, no hands    -Cybex Leg press  6 plates, 3x10 B (focused mainly on left LE)      3.5 plates, 2x10, LLE only  - Cybex Leg Extension 2x10, LLE only (out of time today)  - Cybex Ham Curls   1.0 pl 2x10, LLE only (RLE assist as needed)     - Mini Squats:   2x10, BUE support  - Standing Hip Flex  2x10, B  - Standing Hip ABD  2x10, B  - Standing Hip Ext  2x10  B  Forward step-ups  1x10 with RLE leading, 1x10 with LLE leading, Level 1 - not performed today    Luis received neuromuscular rehabilitation including proprioceptive and " balance training for 10 minutes including:  - Nu step x 8 min for B UE/ LE reciprocal AROM with no rest breaks, Level 3  -1/2 kneeling on blue step w/ foam block x 5 trials each LE (10 sec no UE use.)   -Beep board  Foot ball chest press 2x10, out of time, next session  -B kneeling on blue step and foam block 1 min x 2 trials      The following were not performed today, to be resumed next session:  - Stair trainin stairs x 2 trials: Step-to pattern on ascent with RLE leading, step-to and reciprocal pattern practiced on descent  Luis to participate in gait training including:   - Heel strike and targeted stepping practiced bilaterally outside of // bars with PT guarding stance leg, no AD used - not performed   - Targeted heel strike practice in // bars using cones, cueing to lift from hip and not lean trunk backwards    Home Exercises Provided and Patient Education Provided     Education provided:   - Importance of performing HEP everyday  - safety with environment when ambulating     Written Home Exercises Provided: yes.  Exercises were reviewed and Luis was able to demonstrate them prior to the end of the session.  Luis demonstrated good  understanding of the education provided.     See EMR under Patient Instructions for exercises provided 2019.      Assessment   Pt completed all kneeling activities with no display of loss of balance. He performed standing activities with some verbal instruction on postural awareness and safety.     Luis is progressing well towards his goals.   Pt prognosis is Good.     Pt will continue to benefit from skilled outpatient physical therapy to address the deficits listed in the problem list box on initial evaluation, provide pt/family education and to maximize pt's level of independence in the home and community environment.     Pt's spiritual, cultural and educational needs considered and pt agreeable to plan of care and goals.    Anticipated barriers to physical  therapy: Co-morbidities    Goals:     Long Term Goals (8 Weeks):   1.  Independent with initial HEP. (In progress, Not Met)  2.  Pt will perform nu-step at level 4 x 10 minutes without rests breaks going at least 50 step/min or greater.(In progress, Not Met)  3. Pt will be able to perform TUG in less than or equal to 20 secs without use of AD placingdemonstrating overall improved functional mobility. (In progress, Not Met)  4. Pt will performed sit to stand x 5 without UE support in 15 seconds without LOB backward or posterior LE hooking for functional and safe transfers. (In progress, Not Met)  5.  Pt will score greater than or equal to 45/56 on the Choi Balance Assessment with use of AD demonstrating overall improved functional mobility and balance and reduce fall risk.(In progress, Not Met)  6. Pt will walk < than or equal to 140 ft on the 2 minute walk test indoor with AD with 0 LOB and minimal gait deviations for improved safety in home ambulation and safety. (In progress, Not Met)  7.  Pt will be able to safely perform and tolerate high level ADL's without LOB. (In progress, Not Met)  8. Pt will have 0 falls from start of PT sessions.(In progress, Not Met)  9. Independent with updated HEP. (In progress, Not Met)  10. Pt will have MMT score of 3+/5 in all major ms groups in Left LE.(In progress, Not Met)  11. Pt will ambulate on TM x 6 minutes with use of UE support with 0 LOB at greater than or equal to 1.0 mph.(In progress, Not Met)  12. Pt will begin some form of community fitness to begin regular and consistent performance of exercise to continue maintenance of gains made in PT.(In progress, Not Met)    Plan     Continue progressing towards functional goals.       Rboin Davis, PTA,

## 2019-10-01 NOTE — PROGRESS NOTES
[facility]: [facility]  Follow-Up    Patient reports after running more test and taking MRI's they ruled the recent E.D visit a stroke. Patient will be on a heart monitor for 1 month.     The history is provided by the patient. No  was used.     Follow Up  Follow-up reason(s): reading review and routine education      Routine Education Topics: eating patterns and physical activity          Diet:       Patient is still following [employer] diet.     Physical Activity:   When asked if exercising, patient responded: yes  Patient has limited mobility: new injury  Patient has the following chronic pain: stroke    He exercises for 90 minutes per day 3 day(s) a week.  His level of intensity when exercising is moderate.    Patient participates in the following activities: physical therapy/rehab and yoga/stretching    He identified the following barriers to physical activity: limited mobility and pain/injury/recent surgery    Patient is participating in physical and occupational therapy. Patient reports he is progressing everyday. Patient is also does stretching exercises at home.     Assigning the following patient goal(s): participate in exercise weekly    INTERVENTION(S)  encouragement/support    PLAN  patient verbalizes understanding, await resolution of current disease process and await MD intervention      There are no preventive care reminders to display for this patient.    Last 5 Patient Entered Readings                                      Current 30 Day Average: 128/74     Recent Readings 9/29/2019 9/26/2019 9/25/2019 9/24/2019 9/20/2019    SBP (mmHg) 126 139 127 125 134    DBP (mmHg) 54 74 54 71 66    Pulse 100 88 93 95 98

## 2019-10-04 ENCOUNTER — PATIENT OUTREACH (OUTPATIENT)
Dept: ADMINISTRATIVE | Facility: OTHER | Age: 64
End: 2019-10-04

## 2019-10-04 ENCOUNTER — CLINICAL SUPPORT (OUTPATIENT)
Dept: REHABILITATION | Facility: HOSPITAL | Age: 64
End: 2019-10-04
Payer: MEDICARE

## 2019-10-04 DIAGNOSIS — Z78.9 IMPAIRED MOBILITY AND ACTIVITIES OF DAILY LIVING: ICD-10-CM

## 2019-10-04 DIAGNOSIS — Z74.09 IMPAIRED FUNCTIONAL MOBILITY, BALANCE, GAIT, AND ENDURANCE: ICD-10-CM

## 2019-10-04 DIAGNOSIS — M62.81 MUSCLE WEAKNESS: ICD-10-CM

## 2019-10-04 DIAGNOSIS — R27.8 DECREASED COORDINATION: ICD-10-CM

## 2019-10-04 DIAGNOSIS — Z74.09 IMPAIRED MOBILITY AND ACTIVITIES OF DAILY LIVING: ICD-10-CM

## 2019-10-04 DIAGNOSIS — R29.898 DECREASED STRENGTH OF LOWER EXTREMITY: ICD-10-CM

## 2019-10-04 PROCEDURE — 97112 NEUROMUSCULAR REEDUCATION: CPT | Mod: PN

## 2019-10-04 PROCEDURE — 97110 THERAPEUTIC EXERCISES: CPT | Mod: PN

## 2019-10-04 NOTE — PROGRESS NOTES
"  Occupational Therapy Daily Treatment Note     Name: Luis Wong  Clinic Number: [id number]    Therapy Diagnosis:    Encounter Diagnoses   Name Primary?    Impaired mobility and activities of daily living     Muscle weakness     Decreased coordination      Physician: Carla Altman PA-C    Visit Date: 10/4/2019  Physician Orders: Eval and tx  Medical Diagnosis: CVA L sided weakness Recrudecesnt  Onset Date: 8/28/19 most recent CVA  Evaluation Date: 9/4/2019  Plan of Care Expiration Period: 11/1/19  Insurance Authorization period Expiration: 12/31/19  Date of Return to MD: DAI  Visit # / Visits Authortized: 10 / 30  FOTO: CVA UE hand /75% limitation from 100%  10/4/19       Time In:8:00 am   Time Out: 8:45  am   Total Billable (one on one) Time: 45 minutes      Precautions: Standard and Fall    Subjective     Pt reports: "I feel good I was able to turn on the light switch with my affected arm this week so that's progress"  he was compliant with home exercise program given last session.   Response to previous treatment:good   Functional change: Functional use at home to turn on light switch    Pain: 0/10  Location: left arms     Objective       Luis participated in neuromuscular re-education activities to improve: Coordination, Sense, Proprioception and Posture for 35 minutes. The following activities were included:  -  RPMs 6 min 3 forward 3 backwards       Shoulder shrugs  Scap squeezes X 30    Supine shoulder exercises:    FF isolated thumb up  Place and holds  3/30"  2/15     Short arc Circles CW/CCW  1/10   Flexion from 90 deg overhead  Isolated full flexion L 2/10   X 5 due to fatigue          Supine chest press  Forward flexion     Using yoga block for ER cue:  2/10   Large cotton ball retrieval less A from unaffected extremity at elbow   1/2 tub    Large pom pom retrieval  X 1 tub             Luis Wong  received therapeutic exercises to develop GM/FM coordination, balance, activity " "tolerance and strength in order to increase ADL/IADL independence with replicated home management/self care tasks, for 10 minutes including:  Wrist ext Place and holds  3/30"   Pro/sup wrist wheel 2 min    Thumb ext abduction X 30 each    Finger ext X 30 each        Supine    Tricep ext 2 ways overhead and from chest 2/15    Place and holds two positions 90 degrees and 60 degrees  2/30"         Home Exercises and Education Provided     Education provided:   - Previous hand out   - Progress towards goals     Written Home Exercises Provided: Patient instructed to cont prior HEP. Updated putty exercises to molding,  and pinch Red putty provided.  Exercises were reviewed and Luis was able to demonstrate them prior to the end of the session.  Luis demonstrated good  understanding of the HEP provided.   .   See EMR under Patient Instructions for exercises provided prior visit.        Assessment     Pt would continue to benefit from skilled OT. He is doing really well with controlled movements of the LUE focusing on synergistic and isolated strengthening. He is still limited with functional reach against gravity due to shoulder weakness but is progressing well. Improved hand control and functional use today tolerating better pincer grasp and finger extension with grasp and release task. Better control over wrist with extension during grasp and release tasks as well.Tricpes isolated in 2 planes today with more difficulty with exercises from chest into ext with overhead ext easier.  Continued focus on GM coordination and strengthening with NMR next session. Remains highly motivated.    Luis is progressing well towards his goals and there are no updates to goals at this time. Pt prognosis is Fair.     Pt will continue to benefit from skilled outpatient occupational therapy to address the deficits listed in the problem list on initial evaluation provide pt/family education and to maximize pt's level of independence " in the home and community environment.     Anticipated barriers to occupational therapy:     Pt's spiritual, cultural and educational needs considered and pt agreeable to plan of care and goals.    Goals:  Goals:  Short Term Goals:  1) Initiate Hep Progressing 10/4/2019  2) Pt to increase LUE  strength by 5 # in order to A in self care task of feeding by 4 weeks. Progressing 10/4/2019  3) Pt to increase Quick dash Score by 5 points increasing self care IND by 4 weeks. Progressing 10/4/2019  4) Pt to increase L UE AROM by 10 degrees in order to A in UB dressing by 4 weeks. Progressing 10/4/2019  5) Patient will be able to achieve less than or equal to 75% on the FOTO, demonstrating overall improved functional ability with upper extremity. (self-care category) Progressing 10/4/2019       Long Term Goals:  1) Pt to be IND with HEP in order to maintain ROM and strength needed for self care IND by d.c. Progressing 10/4/2019  2) Pt to increase L UE  strength to WNL as compared to unaffected extremity in order to open items for self feeding by d.c. Progressing 10/4/2019  3) Pt to increase Quick dash Score by 10 points increasing self care IND at home by d.c. Progressing 10/4/2019  4) Pt to increase L UE AROM to WFL in order to A in UB dressing by d/c. Progressing 10/4/2019  5) Patient will be able to achieve less than or equal to 50% on the FOTO, demonstrating overall improved functional ability with upper extremity. (self-care category) Progressing 10/4/2019       Plan   Continue per plan of care.   Updates/Grading for next session: Progress as tolerated.       Christian Teran, OT

## 2019-10-04 NOTE — PROGRESS NOTES
"                            Physical Therapy Daily Treatment Note     Name: Luis Wong  Clinic Number: [id number]    Therapy Diagnosis:   Encounter Diagnoses   Name Primary?    Impaired functional mobility, balance, gait, and endurance     Decreased strength of lower extremity      Physician: Carla Altman PA-C    Visit Date: 10/4/2019    Physician Orders: PT Eval and Treat  Medical Diagnosis from Referral: I63.9 (ICD-10-CM) - CVA (cerebral vascular accident)  Evaluation Date: 9/13/2019  Authorization Period Expiration: 12/31/2019  Plan of Care Expiration: 11/8/2019  Visit # / Visits authorized: 7/30  FOTO: 7/10     Time In: 12:30 PM  Time Out: 1:15 PM  Total Billable Time: 45 minutes (1NMR, 2 TE)    Precautions: Hx of CVA's    Subjective     Pt reports: pt agreeable to PT session. He states he has no reports of pain upon arrival  He is compliant with home exercise program today.  Response to previous treatment: No adverse reactions  Functional change: Able to descend ~10 stairs at home without scooting    Pain: 0/10  Location: N/A     Objective     Luis received therapeutic exercises to develop strength, endurance, ROM and flexibility for 25 minutes including:    Supine HS Stretch:   30"x3 with strap  Quad Sets w/ SLR   2x10, 3" hold before SLR - not performed this session  Bridges w/ hip ADD  2x10, ball squeezes before bridging - not performed this session  Sit to stands:   2x10, no hands    -Cybex Leg press  6 plates, 3x10 B (focused mainly on left LE)      3.5 plates, 2x10, LLE only  - Cybex Leg Extension 2x10, LLE only (out of time today)  - Cybex Ham Curls   1.0 pl 2x10, LLE only (RLE assist as needed)     - Mini Squats:   2x10, BUE support  - Standing Hip Flex  2x10, B  - Standing Hip ABD  2x10, B  - Standing Hip Ext  2x10  B  Forward step-ups  1x10 with RLE leading, 1x10 with LLE leading, Level 1 - not performed today    Luis received neuromuscular rehabilitation including proprioceptive and " balance training for 20 minutes including:  -Nu step x 8 min for B UE/ LE reciprocal AROM with no rest breaks, Level 3  -1/2 kneeling on blue step w/ foam block x 5 trials each LE (10 sec no UE use.) not performed today.   -Beep board  Foot ball trunk rotations 1 min   -Tandem stance football hold trunk rotations 1 min x2 trials alternate positioning   -B kneeling on blue step and foam block 1 min x 2 trials      The following were not performed today, to be resumed next session:  - Stair trainin stairs x 2 trials: Step-to pattern on ascent with RLE leading, step-to and reciprocal pattern practiced on descent  Luis to participate in gait training including:   - Heel strike and targeted stepping practiced bilaterally outside of // bars with PT guarding stance leg, no AD used - not performed   - Targeted heel strike practice in // bars using cones, cueing to lift from hip and not lean trunk backwards    Home Exercises Provided and Patient Education Provided     Education provided:   - Importance of performing HEP everyday  - safety with environment when ambulating     Written Home Exercises Provided: yes.  Exercises were reviewed and Luis was able to demonstrate them prior to the end of the session.  Luis demonstrated good  understanding of the education provided.     See EMR under Patient Instructions for exercises provided 2019.      Assessment   Pt able to complete session with no reports of pain, only minimal general fatigue. Pt able to follow verbal instructions on technique with balance and standing activities.  He experienced no episodes of Loss of balance that required assistance to recover.     Luis is progressing well towards his goals.   Pt prognosis is Good.     Pt will continue to benefit from skilled outpatient physical therapy to address the deficits listed in the problem list box on initial evaluation, provide pt/family education and to maximize pt's level of independence in the home  and community environment.     Pt's spiritual, cultural and educational needs considered and pt agreeable to plan of care and goals.    Anticipated barriers to physical therapy: Co-morbidities    Goals:     Long Term Goals (8 Weeks):   1.  Independent with initial HEP. (In progress, Not Met)  2.  Pt will perform nu-step at level 4 x 10 minutes without rests breaks going at least 50 step/min or greater.(In progress, Not Met)  3. Pt will be able to perform TUG in less than or equal to 20 secs without use of AD placingdemonstrating overall improved functional mobility. (In progress, Not Met)  4. Pt will performed sit to stand x 5 without UE support in 15 seconds without LOB backward or posterior LE hooking for functional and safe transfers. (In progress, Not Met)  5.  Pt will score greater than or equal to 45/56 on the Choi Balance Assessment with use of AD demonstrating overall improved functional mobility and balance and reduce fall risk.(In progress, Not Met)  6. Pt will walk < than or equal to 140 ft on the 2 minute walk test indoor with AD with 0 LOB and minimal gait deviations for improved safety in home ambulation and safety. (In progress, Not Met)  7.  Pt will be able to safely perform and tolerate high level ADL's without LOB. (In progress, Not Met)  8. Pt will have 0 falls from start of PT sessions.(In progress, Not Met)  9. Independent with updated HEP. (In progress, Not Met)  10. Pt will have MMT score of 3+/5 in all major ms groups in Left LE.(In progress, Not Met)  11. Pt will ambulate on TM x 6 minutes with use of UE support with 0 LOB at greater than or equal to 1.0 mph.(In progress, Not Met)  12. Pt will begin some form of community fitness to begin regular and consistent performance of exercise to continue maintenance of gains made in PT.(In progress, Not Met)    Plan     Continue to advance PT towards established PT goals       Robin Davis PTA,

## 2019-10-07 ENCOUNTER — CLINICAL SUPPORT (OUTPATIENT)
Dept: REHABILITATION | Facility: HOSPITAL | Age: 64
End: 2019-10-07
Payer: MEDICARE

## 2019-10-07 DIAGNOSIS — R29.898 DECREASED STRENGTH OF LOWER EXTREMITY: ICD-10-CM

## 2019-10-07 DIAGNOSIS — Z74.09 IMPAIRED FUNCTIONAL MOBILITY, BALANCE, GAIT, AND ENDURANCE: ICD-10-CM

## 2019-10-07 PROCEDURE — 97110 THERAPEUTIC EXERCISES: CPT | Mod: PN

## 2019-10-07 PROCEDURE — 97112 NEUROMUSCULAR REEDUCATION: CPT | Mod: PN

## 2019-10-07 NOTE — PROGRESS NOTES
"                            Physical Therapy Daily Treatment Note     Name: Luis Wong  Clinic Number: [id number]    Therapy Diagnosis:   Encounter Diagnoses   Name Primary?    Impaired functional mobility, balance, gait, and endurance     Decreased strength of lower extremity      Physician: Carla Altman PA-C    Visit Date: 10/7/2019    Physician Orders: PT Eval and Treat  Medical Diagnosis from Referral: I63.9 (ICD-10-CM) - CVA (cerebral vascular accident)  Evaluation Date: 9/13/2019  Authorization Period Expiration: 12/31/2019  Plan of Care Expiration: 11/8/2019  Visit # / Visits authorized: 8/30  FOTO: 8/10     Time In: 9:23 AM  Time Out: 10:17 AM  Total Billable Time: 54 minutes (2NMR, 2 TE)    Precautions: Hx of CVA's    Subjective     Pt reports: he had a quiet weekend, but was able to perform his exercises; expressed that prone hip extension is still difficult to perform  He is compliant with home exercise program today.  Response to previous treatment: No adverse reactions  Functional change: Able to descend his entire home staircase, standing and not scooting with LLE leading    Pain: 0/10  Location: N/A     Objective     Luis received therapeutic exercises to develop strength, endurance, ROM and flexibility for 20 minutes including:    HS Stretch:    30"x3 at stairs  Sit to stands:   2x10, no hands, with overpressure onto left knee by PT, slow eccentric descent into chair    -Cybex Leg press  6 plates, 3x10 B (focused mainly on left LE) - not today      3.5 plates, 2x10, LLE only  - Cybex Leg Extension 2x10, LLE only - not today  - Cybex Ham Curls   1.0 pl 2x10, LLE only (RLE assist as needed) - not today    - Mini Squats:    3x10, BUE support  - Standing Hip Flex  2x10, B  - Standing Hip ABD  2x10, B  - Standing Hip Ext  2x10  B  - Forward step-ups  1x10 with RLE leading, 1x10 with LLE leading, Level 1    Luis received neuromuscular rehabilitation including proprioceptive and balance training " for 34 minutes including:  -Nu step x 8 min for B UE/ LE reciprocal AROM with no rest breaks, [place] program, Level 4  - Beep board  Foot ball trunk rotations 1 min, Football arm raises into flexion, 1 min  - Tandem stance football hold trunk rotations 1 min x 2 trials alternate positioning   - B kneeling on blue step and foam block 1 min x 2 trials  - Hip thrusts in kneeling position on blue step with foam block 2x10  - Stair trainin stairs x 2 trials: Step-to pattern on ascent with RLE leading, step-to and reciprocal pattern practiced on descent   -Additional trial with reciprocal pattern on ascent    The following were not performed today, to be resumed next session:  Luis to participate in gait training including:   - Heel strike and targeted stepping practiced bilaterally outside of // bars with PT guarding stance leg, no AD used - not performed   - Targeted heel strike practice in // bars using cones, cueing to lift from hip and not lean trunk backwards    Home Exercises Provided and Patient Education Provided     Education provided:   - Importance of performing HEP everyday  - safety with environment when ambulating     Written Home Exercises Provided: yes.  Exercises were reviewed and Luis was able to demonstrate them prior to the end of the session.  Luis demonstrated good  understanding of the education provided.     See EMR under Patient Instructions for exercises provided 2019.      Assessment   Luis encouraged to continue performing HEP to maximize therapy benefits. He is showing increased control of left heel strike during ambulation. Able to navigate stairs with reciprocal pattern on ascent. Tolerated the addition of hip thrusts in kneeling position without adverse effects. Tandem stance with trunk rotations provided some LOB however patient was able to recover every time.     Luis is progressing well towards his goals.   Pt prognosis is Good.     Pt will continue to benefit from  skilled outpatient physical therapy to address the deficits listed in the problem list box on initial evaluation, provide pt/family education and to maximize pt's level of independence in the home and community environment.     Pt's spiritual, cultural and educational needs considered and pt agreeable to plan of care and goals.    Anticipated barriers to physical therapy: Co-morbidities    Goals:     Long Term Goals (8 Weeks):   1.  Independent with initial HEP. (In progress, Not Met)  2.  Pt will perform nu-step at level 4 x 10 minutes without rests breaks going at least 50 step/min or greater.(In progress, Not Met)  3. Pt will be able to perform TUG in less than or equal to 20 secs without use of AD placingdemonstrating overall improved functional mobility. (In progress, Not Met)  4. Pt will performed sit to stand x 5 without UE support in 15 seconds without LOB backward or posterior LE hooking for functional and safe transfers. (In progress, Not Met)  5.  Pt will score greater than or equal to 45/56 on the Choi Balance Assessment with use of AD demonstrating overall improved functional mobility and balance and reduce fall risk.(In progress, Not Met)  6. Pt will walk < than or equal to 140 ft on the 2 minute walk test indoor with AD with 0 LOB and minimal gait deviations for improved safety in home ambulation and safety. (In progress, Not Met)  7.  Pt will be able to safely perform and tolerate high level ADL's without LOB. (In progress, Not Met)  8. Pt will have 0 falls from start of PT sessions.(In progress, Not Met)  9. Independent with updated HEP. (In progress, Not Met)  10. Pt will have MMT score of 3+/5 in all major ms groups in Left LE.(In progress, Not Met)  11. Pt will ambulate on TM x 6 minutes with use of UE support with 0 LOB at greater than or equal to 1.0 mph.(In progress, Not Met)  12. Pt will begin some form of community fitness to begin regular and consistent performance of exercise to continue  maintenance of gains made in PT.(In progress, Not Met)    Plan     Continue to advance PT towards established PT goals.  Progress balance and SLS activities as able.    Kandy Gatica, PT, DPT

## 2019-10-08 ENCOUNTER — CLINICAL SUPPORT (OUTPATIENT)
Dept: REHABILITATION | Facility: HOSPITAL | Age: 64
End: 2019-10-08
Payer: MEDICARE

## 2019-10-08 DIAGNOSIS — M62.81 MUSCLE WEAKNESS: ICD-10-CM

## 2019-10-08 DIAGNOSIS — Z78.9 IMPAIRED MOBILITY AND ACTIVITIES OF DAILY LIVING: ICD-10-CM

## 2019-10-08 DIAGNOSIS — Z74.09 IMPAIRED MOBILITY AND ACTIVITIES OF DAILY LIVING: ICD-10-CM

## 2019-10-08 DIAGNOSIS — R29.898 DECREASED STRENGTH OF LOWER EXTREMITY: ICD-10-CM

## 2019-10-08 DIAGNOSIS — R27.8 DECREASED COORDINATION: ICD-10-CM

## 2019-10-08 DIAGNOSIS — Z74.09 IMPAIRED FUNCTIONAL MOBILITY, BALANCE, GAIT, AND ENDURANCE: ICD-10-CM

## 2019-10-08 PROCEDURE — 97110 THERAPEUTIC EXERCISES: CPT | Mod: PN

## 2019-10-08 PROCEDURE — 97112 NEUROMUSCULAR REEDUCATION: CPT | Mod: PN

## 2019-10-08 NOTE — PROGRESS NOTES
"                            Physical Therapy Daily Treatment Note     Name: Luis Wnog  Clinic Number: [id number]    Therapy Diagnosis:   Encounter Diagnoses   Name Primary?    Impaired functional mobility, balance, gait, and endurance     Decreased strength of lower extremity      Physician: Carla Altman PA-C    Visit Date: 10/8/2019    Physician Orders: PT Eval and Treat  Medical Diagnosis from Referral: I63.9 (ICD-10-CM) - CVA (cerebral vascular accident)  Evaluation Date: 9/13/2019  Authorization Period Expiration: 12/31/2019  Plan of Care Expiration: 11/8/2019  Visit # / Visits authorized: 9/30  FOTO: 9/10     Time In: 8:45 AM  Time Out: 9:30 AM  Total Billable Time: 54 minutes (1NMR, 2 TE)    Precautions: Hx of CVA's    Subjective     Pt reports: pt agreeable to PT session. He reports no new complaints of pain upon arrived.  He is compliant with home exercise program today.  Response to previous treatment: No adverse reactions  Functional change: ambulates with increased w/ cane indoors/ outdoors.    Pain: 0/10  Location: N/A     Objective     Luis received therapeutic exercises to develop strength, endurance, ROM and flexibility for 25 minutes including:    HS Stretch:    30"x3 at stairs  Sit to stands:   2x10, no hands,not today    -Cybex Leg press  6 plates, 3x10 B (focused mainly on left LE) -       3.5 plates, 2x10, LLE only  - Cybex Leg Extension 2x10, LLE only - 10#  - Cybex Ham Curls   1.0 pl 2x10, LLE only (RLE assist as needed) -     - Mini Squats:    3x10,no UE support  - Standing Hip Flex  2x10, B  - Standing Hip ABD  2x10, B  - Standing Hip Ext  2x10  B  - Forward step-ups  1x10 with RLE leading, 1x10 with LLE leading, Level 1    Luis received neuromuscular rehabilitation including proprioceptive and balance training for 20 minutes including:  -Nu step x 8 min for B UE/ LE reciprocal AROM with no rest breaks, [place] program, Level 4  - Beep board  Foot ball trunk rotations 1 min,not " today  - Tandem stance football hold trunk rotations 1 min x 2 trials alternate positioning not today  - B kneeling on blue step and foam block 1 min x 2 trials   - Hip thrusts in kneeling position on blue step with foam block 2x10  - Stair trainin stairs x 2 trials: Step-to pattern on ascent with RLE leading, step-to and reciprocal pattern practiced on descent   -Additional trial with reciprocal pattern on ascent    The following were not performed today, to be resumed next session:  Luis to participate in gait training including:   - Heel strike and targeted stepping practiced bilaterally outside of // bars with PT guarding stance leg, no AD used - not performed   - Targeted heel strike practice in // bars using cones, cueing to lift from hip and not lean trunk backwards  - Beep board  Foot ball trunk rotations 1 min,not today  - Tandem stance football hold trunk rotations 1 min x 2 trials alternate positioning   - B kneeling on blue step and foam block 1 min x 2 trials   - Hip thrusts in kneeling position on blue step with foam block 2x10    Home Exercises Provided and Patient Education Provided     Education provided:   - encouraged cont HEP  - safety with environment when ambulating     Written Home Exercises Provided: yes.  Exercises were reviewed and Luis was able to demonstrate them prior to the end of the session.  Luis demonstrated good  understanding of the education provided.     See EMR under Patient Instructions for exercises provided 2019.      Assessment   Pt completed session with no reports of pain. He did not experience no LOB with standing activities today.   Luis is progressing well towards his goals.   Pt prognosis is Good.     Pt will continue to benefit from skilled outpatient physical therapy to address the deficits listed in the problem list box on initial evaluation, provide pt/family education and to maximize pt's level of independence in the home and community  environment.     Pt's spiritual, cultural and educational needs considered and pt agreeable to plan of care and goals.    Anticipated barriers to physical therapy: Co-morbidities    Goals:     Long Term Goals (8 Weeks):   1.  Independent with initial HEP. (In progress, Not Met)  2.  Pt will perform nu-step at level 4 x 10 minutes without rests breaks going at least 50 step/min or greater.(In progress, Not Met)  3. Pt will be able to perform TUG in less than or equal to 20 secs without use of AD placingdemonstrating overall improved functional mobility. (In progress, Not Met)  4. Pt will performed sit to stand x 5 without UE support in 15 seconds without LOB backward or posterior LE hooking for functional and safe transfers. (In progress, Not Met)  5.  Pt will score greater than or equal to 45/56 on the Choi Balance Assessment with use of AD demonstrating overall improved functional mobility and balance and reduce fall risk.(In progress, Not Met)  6. Pt will walk < than or equal to 140 ft on the 2 minute walk test indoor with AD with 0 LOB and minimal gait deviations for improved safety in home ambulation and safety. (In progress, Not Met)  7.  Pt will be able to safely perform and tolerate high level ADL's without LOB. (In progress, Not Met)  8. Pt will have 0 falls from start of PT sessions.(In progress, Not Met)  9. Independent with updated HEP. (In progress, Not Met)  10. Pt will have MMT score of 3+/5 in all major ms groups in Left LE.(In progress, Not Met)  11. Pt will ambulate on TM x 6 minutes with use of UE support with 0 LOB at greater than or equal to 1.0 mph.(In progress, Not Met)  12. Pt will begin some form of community fitness to begin regular and consistent performance of exercise to continue maintenance of gains made in PT.(In progress, Not Met)    Plan     Continue to advance PT towards established PT goals.       Robin Davis, PTA, DPT

## 2019-10-08 NOTE — PROGRESS NOTES
"  Occupational Therapy Daily Treatment Note     Name: Luis Wong  Clinic Number: [id number]    Therapy Diagnosis:    Encounter Diagnoses   Name Primary?    Impaired mobility and activities of daily living     Muscle weakness     Decreased coordination      Physician: Carla Altman PA-C    Visit Date: 10/8/2019  Physician Orders: Eval and tx  Medical Diagnosis: CVA L sided weakness Recrudecesnt  Onset Date: 8/28/19 most recent CVA  Evaluation Date: 9/4/2019  Plan of Care Expiration Period: 11/1/19  Insurance Authorization period Expiration: 12/31/19  Date of Return to MD: DAI  Visit # / Visits Authortized: 11 / 30  FOTO: CVA UE hand /75% limitation from 100%  10/4/19       Time In:8:00 am   Time Out: 8:45  am   Total Billable (one on one) Time: 45 minutes      Precautions: Standard and Fall    Subjective     Pt reports: "I feel good I was able to turn on the light switch with my affected arm this week so that's progress"  he was compliant with home exercise program given last session.   Response to previous treatment:good   Functional change: Functional use at home to turn on light switch    Pain: 0/10  Location: left arms     Objective       Luis participated in neuromuscular re-education activities to improve: Coordination, Sense, Proprioception and Posture for 20 minutes. The following activities were included:  -  RPMs 6 min 3 forward 3 backwards           Cones on table top Tricep and grasp release retraining  X 3                  Supine chest press  Forward flexion     NOT PERFORMED THIS TREATMENT     Large cotton ball retrieval less A from unaffected extremity at elbow  NOT PERFORMED THIS TREATMENT     Large pom pom retrieval attempted 2 at a time X 1 tub             Luis Wong  received therapeutic exercises to develop GM/FM coordination, balance, activity tolerance and strength in order to increase ADL/IADL independence with replicated home management/self care tasks, for 25 minutes " "including: Measurements  Wrist ext Place and holds  3/30"   Pro/sup wrist wheel 2 min    Black hand gripper X 30   Green t putty  Mold     Roll   pinch X 30        Shoulder    Table slides 2 ways  2/10   Attempted uphill on mat Unable due to elbow tone and weakness at triceps.        Joint Evaluation  AROM  9/4/2019 PROM   9/4/2019 AROM  10/8/2019       Left Left Left   Shoulder flex 0-180 54 130 125   Shoulder Abd 0-180 54 125 115   Shoulder ER 0-90 Neutral WNL Neutral    Shoulder IR 0-90 Trace WNL S4   Shoulder Extension 0-80 51 65 52   Shoulder Horizontal adduction 0-90 Trace WNL 45   Elbow flex/ext 0-150 WNL WNL Ext -20   Wrist flex 0-80 WNL WNL WNL   Wrist ext 0-70 Neutral WNL 32   Supination 0-80 Trace easier WNL WnL   Pronation 0-80 trace WNl WNL   UD Trace WNL 32   RD Trace WNL 22           Strength: (ANTONIO Dynamometer in lbs.) Average 3 trials, Position II:       9/4/2019 9/4/2019 10/8/2019       Right Left Left   Rung # 10# 32# (+22)      Pinch Strength (Measured in psi)       9/4/2019 9/4/2019 10/8/2019       Right Left Left   Key Pinch 26 psi 3 psi 11#   3pt Pinch 20 psi 0 psi 9#   2pt Pinch 11 psi 0 psi 8#      Fine Motor Coordination: 9 Hole Peg Test  9 Peg Test Right Left Left 10/8/2019     Removed 9/9 9/9 9/9 dropped to towel   Replaced 9/9 0/9 9/9 used R hand to assist pegs into finger tips   Time NT sec NT sec 2:55             Home Exercises and Education Provided     Education provided:   - Previous hand out   - Progress towards goals     Written Home Exercises Provided: Patient instructed to cont prior HEP. Updated putty exercises to molding,  and pinch Red putty provided.  Exercises were reviewed and Luis was able to demonstrate them prior to the end of the session.  Luis demonstrated good  understanding of the HEP provided.   .   See EMR under Patient Instructions for exercises provided prior visit.        Assessment     Pt would continue to benefit from skilled OT. Pt " making great improvements thus far with LUE AROM and strength. Measurements taken today showing gains in all areas of motion. Still limited with synergistic movements and coordination. Main limiting factor being the LUE elbow control due to tricep weakness impacting functional reach. Future sessions to focus on this. Continued focus on GM coordination and strengthening with NMR next session. Remains highly motivated.    Luis is progressing well towards his goals and there are no updates to goals at this time. Pt prognosis is Fair.     Pt will continue to benefit from skilled outpatient occupational therapy to address the deficits listed in the problem list on initial evaluation provide pt/family education and to maximize pt's level of independence in the home and community environment.     Anticipated barriers to occupational therapy:     Pt's spiritual, cultural and educational needs considered and pt agreeable to plan of care and goals.    Goals:  Goals:  Short Term Goals:  1) Initiate Hep Progressing 10/8/2019  2) Pt to increase LUE  strength by 5 # in order to A in self care task of feeding by 4 weeks. Progressing 10/8/2019  3) Pt to increase Quick dash Score by 5 points increasing self care IND by 4 weeks. Progressing 10/8/2019  4) Pt to increase L UE AROM by 10 degrees in order to A in UB dressing by 4 weeks. Progressing 10/8/2019  5) Patient will be able to achieve less than or equal to 75% on the FOTO, demonstrating overall improved functional ability with upper extremity. (self-care category) Progressing 10/8/2019       Long Term Goals:  1) Pt to be IND with HEP in order to maintain ROM and strength needed for self care IND by d.c. Progressing 10/8/2019  2) Pt to increase L UE  strength to WNL as compared to unaffected extremity in order to open items for self feeding by d.c. Progressing 10/8/2019  3) Pt to increase Quick dash Score by 10 points increasing self care IND at home by d.c. Progressing  10/8/2019  4) Pt to increase L UE AROM to WFL in order to A in UB dressing by d/c. Progressing 10/8/2019  5) Patient will be able to achieve less than or equal to 50% on the FOTO, demonstrating overall improved functional ability with upper extremity. (self-care category) Progressing 10/8/2019       Plan   Continue per plan of care.   Updates/Grading for next session: Progress as tolerated.       Christian Teran, OT

## 2019-10-09 ENCOUNTER — OFFICE VISIT (OUTPATIENT)
Dept: NEUROLOGY | Facility: CLINIC | Age: 64
End: 2019-10-09
Payer: COMMERCIAL

## 2019-10-09 VITALS
DIASTOLIC BLOOD PRESSURE: 78 MMHG | HEART RATE: 86 BPM | HEIGHT: 69 IN | WEIGHT: 158.75 LBS | SYSTOLIC BLOOD PRESSURE: 142 MMHG | BODY MASS INDEX: 23.51 KG/M2

## 2019-10-09 DIAGNOSIS — I63.9 ISCHEMIC STROKE: Primary | ICD-10-CM

## 2019-10-09 PROCEDURE — 99999 PR PBB SHADOW E&M-EST. PATIENT-LVL III: CPT | Mod: PBBFAC,,, | Performed by: PSYCHIATRY & NEUROLOGY

## 2019-10-09 PROCEDURE — 99999 PR PBB SHADOW E&M-EST. PATIENT-LVL III: ICD-10-PCS | Mod: PBBFAC,,, | Performed by: PSYCHIATRY & NEUROLOGY

## 2019-10-09 PROCEDURE — 99214 OFFICE O/P EST MOD 30 MIN: CPT | Mod: S$GLB,,, | Performed by: PSYCHIATRY & NEUROLOGY

## 2019-10-09 PROCEDURE — 99214 PR OFFICE/OUTPT VISIT, EST, LEVL IV, 30-39 MIN: ICD-10-PCS | Mod: S$GLB,,, | Performed by: PSYCHIATRY & NEUROLOGY

## 2019-10-09 NOTE — PROGRESS NOTES
[facility] NEUROLOGY  [facility], [place]    Date: 10/9/19  Patient Name: Luis Wong   MRN: [id number]   PCP: Juan Eduardo  Referring Provider: Chandan Fofana MD    Assessment:   Luis Wong is a 63 y.o. male presenting in follow-up for ischemic stroke.  Will complete workup with TTE..  Event monitor pending.  Decision regarding anticoagulation will be pending these studies.  Stroke education completed today.    Plan:     Problem List Items Addressed This Visit     None      Visit Diagnoses     Ischemic stroke    -  Primary    Relevant Orders    Echo Color Flow Doppler? Yes    BATH/SHOWER CHAIR FOR HOME USE          Brien Lopez MD  [facility]   Department of Neurology    Patient note was created using MModal Dictation.  Any errors in syntax or even information may not have been identified and edited on initial review prior to signing this note.  Subjective:   Patient seen in consultation at the request of Chandan Fofana MD for the evaluation of ischemic stroke. A copy of this note will be sent to the referring physician.        HPI:   Mr. Luis Wong is a 63 y.o. male presenting in follow-up for ischemic stroke.  Patient presents today with his wife contributes to the history.  The patient's last visit he underwent repeat MRI brain due to progression of left hemiplegia following his initially diagnosed stroke.  Subsequent MRI revealed a more recent infarct on the left parietal white matter occurring between 8/29 and 9/19 (MRI personally reviewed).  The patient is effectively asymptomatic from this.  Since his last visit he has recently started wearing a 30 day event monitor.  MRA neck was unremarkable and MRI brain was notable for focal narrowing of the left P2 segment.  He states he is compliant with aspirin, Plavix, and statin therapy.  Patient was initiated on DAPT following identification of 2nd stroke.      PAST MEDICAL HISTORY:  Past Medical History:    Diagnosis Date    Aneurysm 10/30/2012    Diabetes mellitus     Fever blister     Herpes infection     Hypertension     ICH (intracerebral hemorrhage)     Mixed hyperlipidemia 9/5/2013    Nontraumatic thalamic hemorrhage 8/31/2016    JAQUAN (obstructive sleep apnea)     Right-sided lacunar stroke 9/11/2014    SDH (subdural hematoma)     Special screening for malignant neoplasms, colon     Stroke        PAST SURGICAL HISTORY:  Past Surgical History:   Procedure Laterality Date    Knee arthroscopic surgery         CURRENT MEDS:  Current Outpatient Medications   Medication Sig Dispense Refill    aspirin (ECOTRIN) 81 MG EC tablet TAKE 1 TABLET BY MOUTH EVERY DAY 90 tablet 3    atorvastatin (LIPITOR) 40 MG tablet Take 1 tablet (40 mg total) by mouth once daily. 90 tablet 3    clopidogrel (PLAVIX) 75 mg tablet Take 1 tablet (75 mg total) by mouth once daily. 90 tablet 3    hydroCHLOROthiazide (MICROZIDE) 12.5 mg capsule TAKE 1 CAPSULE BY MOUTH EVERY DAY 90 capsule 3    metFORMIN (GLUCOPHAGE) 500 MG tablet Take 1 tablet (500 mg total) by mouth daily with breakfast. 90 tablet 3    NIFEdipine (PROCARDIA-XL) 30 MG (OSM) 24 hr tablet Take 1 tablet (30 mg total) by mouth once daily. 90 tablet 3    potassium chloride (MICRO-K) 8 mEq CpSR TAKE 1 CAPSULE (8 MEQ TOTAL) BY MOUTH ONCE DAILY. 90 capsule 3    lisinopril (PRINIVIL,ZESTRIL) 20 MG tablet TAKE 1 TABLET BY MOUTH EVERY DAY (Patient not taking: Reported on 10/9/2019) 30 tablet 2     No current facility-administered medications for this visit.        ALLERGIES:  Review of patient's allergies indicates:  No Known Allergies    FAMILY HISTORY:  Family History   Problem Relation Age of Onset    Heart disease Mother     Diabetes Father     Cancer Sister         breast    Diabetes Paternal Grandmother     Diabetes Paternal Grandfather     Melanoma Neg Hx        SOCIAL HISTORY:  Social History     Tobacco Use    Smoking status: Never Smoker    Smokeless  "tobacco: Never Used   Substance Use Topics    Alcohol use: No     Frequency: Never     Drinks per session: Patient refused     Binge frequency: Never    Drug use: No       Review of Systems:  12 system review of systems is negative except for the symptoms mentioned in HPI.      Objective:     Vitals:    10/09/19 0901   BP: (!) 142/78   Pulse: 86   Weight: 72 kg (158 lb 11.7 oz)   Height: 5' 9" (1.753 m)     General: NAD, well nourished   Eyes: no tearing, discharge, no erythema   ENT: moist mucous membranes of the oral cavity, nares patent    Neck: Supple, full range of motion  Cardiovascular: Warm and well perfused, pulses equal and symmetrical  Lungs: Normal work of breathing, normal chest wall excursions  Skin: No rash, lesions, or breakdown on exposed skin  Psychiatry: Mood and affect are appropriate   Abdomen: soft, non tender, non distended  Extremeties: No cyanosis, clubbing or edema.    Neurological   MENTAL STATUS: Alert and oriented to person, place, and time. Attention and concentration within normal limits. Speech without dysarthria, able to name and repeat without difficulty. Recent and remote memory within normal limits   CRANIAL NERVES: Visual fields intact. PERRL. EOMI. Facial sensation intact. Face symmetrical. Hearing grossly intact. Full shoulder shrug bilaterally. Tongue protrudes midline   SENSORY: Sensation is intact to pin, light touch, vibration, proprioception and temperature throughout.    MOTOR: Normal bulk and tone.  4-/5L and  5/5 R deltoid, biceps, triceps, 4/5 interosseous, hand  bilaterally. 4/5 R and 5/5 L iliopsoas, 5/5 knee extension/flexion, foot dorsi/plantarflexion bilaterally.    REFLEXES: Symmetric and 2+ and brisk throughout.   CEREBELLAR/COORDINATION/GAIT: Gait steady .Finger to nose intact.     "

## 2019-10-09 NOTE — LETTER
October 9, 2019      Chandan Fofana MD  [address] [address]           [facility]  [address], [address]  [facility] [address]  Phone: [phone]  Fax: [phone]          Patient: Luis Wong   MR Number: [id number]   YOB: 1955   Date of Visit: 10/9/2019       Dear Dr. Chandan Fofana:    Thank you for referring Luis Wong to me for evaluation. Attached you will find relevant portions of my assessment and plan of care.    If you have questions, please do not hesitate to call me. I look forward to following Luis Wong along with you.    Sincerely,    Brien Lopez MD    Enclosure  CC:  No Recipients    If you would like to receive this communication electronically, please contact [email or web address] or [phone] to request more information on [employer] access.    For providers and/or their staff who would like to refer a patient to [facility], please contact us through our one-stop-shop provider referral line, [facility], at [phone].    If you feel you have received this communication in error or would no longer like to receive these types of communications, please e-mail [email or web address]

## 2019-10-11 ENCOUNTER — CLINICAL SUPPORT (OUTPATIENT)
Dept: REHABILITATION | Facility: HOSPITAL | Age: 64
End: 2019-10-11
Payer: MEDICARE

## 2019-10-11 DIAGNOSIS — Z78.9 IMPAIRED MOBILITY AND ACTIVITIES OF DAILY LIVING: ICD-10-CM

## 2019-10-11 DIAGNOSIS — R29.898 DECREASED STRENGTH OF LOWER EXTREMITY: ICD-10-CM

## 2019-10-11 DIAGNOSIS — Z74.09 IMPAIRED FUNCTIONAL MOBILITY, BALANCE, GAIT, AND ENDURANCE: ICD-10-CM

## 2019-10-11 DIAGNOSIS — R27.8 DECREASED COORDINATION: ICD-10-CM

## 2019-10-11 DIAGNOSIS — M62.81 MUSCLE WEAKNESS: ICD-10-CM

## 2019-10-11 DIAGNOSIS — Z74.09 IMPAIRED MOBILITY AND ACTIVITIES OF DAILY LIVING: ICD-10-CM

## 2019-10-11 PROCEDURE — 97012 MECHANICAL TRACTION THERAPY: CPT | Mod: PN

## 2019-10-11 PROCEDURE — 97112 NEUROMUSCULAR REEDUCATION: CPT | Mod: PN

## 2019-10-11 PROCEDURE — 97110 THERAPEUTIC EXERCISES: CPT | Mod: PN

## 2019-10-11 PROCEDURE — 97116 GAIT TRAINING THERAPY: CPT | Mod: PN

## 2019-10-11 NOTE — PROGRESS NOTES
"  Occupational Therapy Daily Treatment Note     Name: Luis Wong  Clinic Number: [id number]    Therapy Diagnosis:    Encounter Diagnoses   Name Primary?    Impaired mobility and activities of daily living     Muscle weakness     Decreased coordination      Physician: Carla Altman PA-C    Visit Date: 10/11/2019  Physician Orders: Eval and tx  Medical Diagnosis: CVA L sided weakness Recrudecesnt  Onset Date: 8/28/19 most recent CVA  Evaluation Date: 9/4/2019  Plan of Care Expiration Period: 11/1/19  Insurance Authorization period Expiration: 12/31/19  Date of Return to MD: DAI  Visit # / Visits Authortized: 12 / 30  FOTO: CVA UE hand /75% limitation from 100%  10/4/19       Time In:9:30 am   Time Out: 10:15  am   Total Billable (one on one) Time: 45 minutes      Precautions: Standard and Fall    Subjective     Pt reports: "I feel good today a little stiff though "  he was compliant with home exercise program given last session.   Response to previous treatment:good   Functional change: Functional use at home to turn on light switch    Pain: 0/10  Location: left arms     Objective       Luis participated in neuromuscular re-education activities to improve: Coordination, Sense, Proprioception and Posture for 25 minutes. The following activities were included:  -  RPMs 6 min 3 forward 3 backwards   WB red putty table top X 5 times OT A at elbow   PVC punch out X 5 min                          Peg retrieval  X 10   X 12 after placement    Peg placement used R hand to place in L then able to control into peg board X 12  Small and large pegs             Luis Wong  received therapeutic exercises to develop GM/FM coordination, balance, activity tolerance and strength in order to increase ADL/IADL independence with replicated home management/self care tasks, for 20 minutes including: Measurements  Wrist ext Place and holds  3/30"   Pro/sup wrist wheel 2 min    Tricep ext table slides 2/15          "   Shoulder and supine exercises:    Table slides 2 ways  2/10   Tricep ext in supine    Skullcrushers 2 ways chest + overhead   2#  2/10         Home Exercises and Education Provided     Education provided:   - Previous hand out   - Progress towards goals     Written Home Exercises Provided: Patient instructed to cont prior HEP. Updated putty exercises to molding,  and pinch Red putty provided.  Exercises were reviewed and Luis was able to demonstrate them prior to the end of the session.  Luis demonstrated good  understanding of the HEP provided.   .   See EMR under Patient Instructions for exercises provided prior visit.        Assessment     Pt would continue to benefit from skilled OT. Pt making great improvements thus far with LUE AROM and strength. He did well with FM challenges and progression of exercises. Able to show improved FM coordination and functional use of the L arm with peg challenge placing and removing. He did better with supine tricep ext exercises to increase ability to ext elbow for functional use. Continued focus on GM coordination and strengthening with NMR next session. Remains highly motivated.    Luis is progressing well towards his goals and there are no updates to goals at this time. Pt prognosis is Fair.     Pt will continue to benefit from skilled outpatient occupational therapy to address the deficits listed in the problem list on initial evaluation provide pt/family education and to maximize pt's level of independence in the home and community environment.     Anticipated barriers to occupational therapy:     Pt's spiritual, cultural and educational needs considered and pt agreeable to plan of care and goals.    Goals:  Goals:  Short Term Goals:  1) Initiate Hep Progressing 10/11/2019  2) Pt to increase LUE  strength by 5 # in order to A in self care task of feeding by 4 weeks. Progressing 10/11/2019  3) Pt to increase Quick dash Score by 5 points increasing self care  IND by 4 weeks. Progressing 10/11/2019  4) Pt to increase L UE AROM by 10 degrees in order to A in UB dressing by 4 weeks. Progressing 10/11/2019  5) Patient will be able to achieve less than or equal to 75% on the FOTO, demonstrating overall improved functional ability with upper extremity. (self-care category) Progressing 10/11/2019       Long Term Goals:  1) Pt to be IND with HEP in order to maintain ROM and strength needed for self care IND by d.c. Progressing 10/11/2019  2) Pt to increase L UE  strength to WNL as compared to unaffected extremity in order to open items for self feeding by d.c. Progressing 10/11/2019  3) Pt to increase Quick dash Score by 10 points increasing self care IND at home by d.c. Progressing 10/11/2019  4) Pt to increase L UE AROM to WFL in order to A in UB dressing by d/c. Progressing 10/11/2019  5) Patient will be able to achieve less than or equal to 50% on the FOTO, demonstrating overall improved functional ability with upper extremity. (self-care category) Progressing 10/11/2019       Plan   Continue per plan of care.   Updates/Grading for next session: Progress as tolerated.       Christian Teran, OT

## 2019-10-11 NOTE — PROGRESS NOTES
"                            Physical Therapy Daily Treatment Note     Name: Luis Wong  Clinic Number: [id number]    Therapy Diagnosis:   Encounter Diagnoses   Name Primary?    Impaired functional mobility, balance, gait, and endurance     Decreased strength of lower extremity      Physician: Carla Altman PA-C    Visit Date: 10/11/2019    Physician Orders: PT Eval and Treat  Medical Diagnosis from Referral: I63.9 (ICD-10-CM) - CVA (cerebral vascular accident)  Evaluation Date: 9/13/2019  Authorization Period Expiration: 12/31/2019  Plan of Care Expiration: 11/8/2019  Visit # / Visits authorized: 10/30  FOTO: 10/10 NEXT    Time In: 8:45 AM  Time Out: 9:30 AM  Total Billable Time: 45 minutes (1NMR, 1 GT, 1 MT)    Precautions: Hx of CVA's    Subjective     Pt reports: he can feel himself getting stronger and gaining more confidence when performing stairs. Was able to cut his grass on his riding  using a zero turn.   He is compliant with home exercise program today.  Response to previous treatment: No adverse reactions. "Felt good."  Functional change: Ambulating throughout the home without AD    Pain: 0/10  Location: N/A     Objective     Luis received therapeutic exercises to develop strength, endurance, ROM and flexibility for 17 minutes including:    HS Stretch:    30"x3 at stairs - not today  Sit to stands:   2x10, no hands - not today    -Cybex Leg press  6 plates, 3x10 B (focused mainly on left LE) - not today      3.5 plates, 2x10, LLE only  - Cybex Leg Extension 2x10, LLE only - 10# - not today  - Cybex Ham Curls   1.0 pl 2x10, LLE only (RLE assist as needed) - not today    - Mini Squats:    3x10,no UE support  - Standing Hip Flex  2x10, B  - Standing Hip ABD  2x10, B  - Standing Hip Ext  2x10  B  - Forward step-ups  1x10 with RLE leading, 1x10 with LLE leading, Level 1  Lateral heel taps:   2x15, Level 1    Luis received neuromuscular rehabilitation including proprioceptive and balance " training for 8 minutes including:  -Nu step x 8 min for B UE/ LE reciprocal AROM with no rest breaks, Keldron program, Level 4    Luis to participate in gait training for 10 minutes including:   - Backwards walking at single bar: 10 feet x 4  - Treadmill training 6' @ 0.5 mph forward walking    Luis received manual therapy to left foot/ankle for 10 minutes including:   - Splaying of metatarsals  - Manual gastroc stretching  - A/P & P/A glides of metatarsals  - P/A glides to talocrural joint  - IP joint distractions  - Manual overpressure into ankle eversion      Home Exercises Provided and Patient Education Provided     Education provided:   - encouraged cont HEP  - safety with environment when ambulating     Written Home Exercises Provided: yes.  Exercises were reviewed and Luis was able to demonstrate them prior to the end of the session.  Luis demonstrated good  understanding of the education provided.     See EMR under Patient Instructions for exercises provided 9/23/2019.      Assessment   Luis able to participate in gait training on treadmill and at single bar without LOB. Emphasized landing with foot in eversion to compensate for natural inversion. Also emphasized heel strike and bending knee and flexing hip during swing phase of LLE. Good motion felt between metatarsals during manual therapy; talocrural joint hypomobile.     Luis is progressing well towards his goals.   Pt prognosis is Good.     Pt will continue to benefit from skilled outpatient physical therapy to address the deficits listed in the problem list box on initial evaluation, provide pt/family education and to maximize pt's level of independence in the home and community environment.     Pt's spiritual, cultural and educational needs considered and pt agreeable to plan of care and goals.    Anticipated barriers to physical therapy: Co-morbidities    Goals:     Long Term Goals (8 Weeks):   1.  Independent with initial HEP. (In  progress, Not Met)  2.  Pt will perform nu-step at level 4 x 10 minutes without rests breaks going at least 50 step/min or greater.(In progress, Not Met)  3. Pt will be able to perform TUG in less than or equal to 20 secs without use of AD placingdemonstrating overall improved functional mobility. (In progress, Not Met)  4. Pt will performed sit to stand x 5 without UE support in 15 seconds without LOB backward or posterior LE hooking for functional and safe transfers. (In progress, Not Met)  5.  Pt will score greater than or equal to 45/56 on the Choi Balance Assessment with use of AD demonstrating overall improved functional mobility and balance and reduce fall risk.(In progress, Not Met)  6. Pt will walk < than or equal to 140 ft on the 2 minute walk test indoor with AD with 0 LOB and minimal gait deviations for improved safety in home ambulation and safety. (In progress, Not Met)  7.  Pt will be able to safely perform and tolerate high level ADL's without LOB. (In progress, Not Met)  8. Pt will have 0 falls from start of PT sessions.(In progress, Not Met)  9. Independent with updated HEP. (In progress, Not Met)  10. Pt will have MMT score of 3+/5 in all major ms groups in Left LE.(In progress, Not Met)  11. Pt will ambulate on TM x 6 minutes with use of UE support with 0 LOB at greater than or equal to 1.0 mph.(In progress, Not Met)  12. Pt will begin some form of community fitness to begin regular and consistent performance of exercise to continue maintenance of gains made in PT.(In progress, Not Met)    Plan     Continue to advance PT towards established PT goals.     Kandy Gatica, PT, DPT

## 2019-10-14 ENCOUNTER — CLINICAL SUPPORT (OUTPATIENT)
Dept: REHABILITATION | Facility: HOSPITAL | Age: 64
End: 2019-10-14
Payer: MEDICARE

## 2019-10-14 DIAGNOSIS — R29.898 DECREASED STRENGTH OF LOWER EXTREMITY: ICD-10-CM

## 2019-10-14 DIAGNOSIS — Z78.9 IMPAIRED MOBILITY AND ACTIVITIES OF DAILY LIVING: ICD-10-CM

## 2019-10-14 DIAGNOSIS — R27.8 DECREASED COORDINATION: ICD-10-CM

## 2019-10-14 DIAGNOSIS — Z74.09 IMPAIRED FUNCTIONAL MOBILITY, BALANCE, GAIT, AND ENDURANCE: ICD-10-CM

## 2019-10-14 DIAGNOSIS — M62.81 MUSCLE WEAKNESS: ICD-10-CM

## 2019-10-14 DIAGNOSIS — Z74.09 IMPAIRED MOBILITY AND ACTIVITIES OF DAILY LIVING: ICD-10-CM

## 2019-10-14 PROCEDURE — 97110 THERAPEUTIC EXERCISES: CPT | Mod: PN

## 2019-10-14 PROCEDURE — 97112 NEUROMUSCULAR REEDUCATION: CPT | Mod: PN

## 2019-10-14 NOTE — PROGRESS NOTES
"                            Physical Therapy Daily Treatment Note     Name: Luis Wong  Clinic Number: [id number]    Therapy Diagnosis:   Encounter Diagnoses   Name Primary?    Impaired functional mobility, balance, gait, and endurance     Decreased strength of lower extremity      Physician: Carla Altman PA-C    Visit Date: 10/14/2019    Physician Orders: PT Eval and Treat  Medical Diagnosis from Referral: I63.9 (ICD-10-CM) - CVA (cerebral vascular accident)  Evaluation Date: 9/13/2019  Authorization Period Expiration: 12/31/2019  Plan of Care Expiration: 11/8/2019  Visit # / Visits authorized: 11/30  FOTO: next at d/c    Time In: 3:30 PM  Time Out: 4:20 PM  Total Billable Time: 50 minutes (2 TE, 1 NMR)    Precautions: Hx of CVA's    Subjective     Pt reports: He's only used his cane once today. States he is feeling more secure with his LLE, but would like to make it stronger before he completely stops walking with his cane. He would like to work on his balance and strength and LLE more.   He is compliant with home exercise program today.  Response to previous treatment: No adverse reactions.  Functional change: Ambulating throughout the home and community without AD/limited use of AD    Pain: 0/10  Location: N/A     Objective     Luis received manual therapy to left foot/ankle for 10 minutes including:   - Splaying of metatarsals  - Manual gastroc stretching in long sitting  - A/P & P/A glides of metatarsals  - P/A glides to talocrural joint  - IP joint distractions  - Manual overpressure into ankle eversion (medial and lateral calcaneal glides)    Luis received therapeutic exercises to develop strength, endurance, ROM and flexibility for 30 minutes including:    HS Stretch:    30"x3 at stairs - not today  Sit to stands:   2x10, no hands - not today    -Cybex Leg press  6 plates, 3x10 B (focused mainly on left LE)      3.5 plates, x30, LLE only, no rest breaks   - Cybex Leg Extension 3x10, LLE only - " 15#  - Cybex Ham Curls   1.0 pl 2x10, with additional 5 reps, LLE only (PT assistance as needed)         Luis received neuromuscular rehabilitation including proprioceptive and balance training for 10 minutes including:  - Tandem stance football hold trunk rotations 1 min x 2 trials alternate positioning   - Marching on foam:   2x10, cues not to lean trunk backwards and to lift from hip and flex knee  - Forward step-ups  2x10, LLE leading  - Lateral heel taps:   2x15, Level 1        Home Exercises Provided and Patient Education Provided     Education provided:   - encouraged cont HEP  - safety with environment when ambulating     Written Home Exercises Provided: yes.  Exercises were reviewed and Luis was able to demonstrate them prior to the end of the session.  Luis demonstrated good  understanding of the education provided.     See EMR under Patient Instructions for exercises provided 9/23/2019.      Assessment   Focused on strengthening and balance this session. Exhibited several LOB's during tandem stance with trunk rotations, however Luis was able to recover each time with UE support. Pt was able to ambulate throughout clinic without AD; continued to emphasized landing with foot in the direction of eversion to compensate for natural inversion. Left LE strength is improving as pt is displaying improved ability to flex hip and knee to place foot on step as opposed to leaning trunk into extension to lift leg when performing forward step-ups.     Luis is progressing well towards his goals.   Pt prognosis is Good.     Pt will continue to benefit from skilled outpatient physical therapy to address the deficits listed in the problem list box on initial evaluation, provide pt/family education and to maximize pt's level of independence in the home and community environment.     Pt's spiritual, cultural and educational needs considered and pt agreeable to plan of care and goals.    Anticipated barriers to  physical therapy: Co-morbidities    Goals:     Long Term Goals (8 Weeks):   1.  Independent with initial HEP. (In progress, Not Met)  2.  Pt will perform nu-step at level 4 x 10 minutes without rests breaks going at least 50 step/min or greater.(In progress, Not Met)  3. Pt will be able to perform TUG in less than or equal to 20 secs without use of AD placingdemonstrating overall improved functional mobility. (In progress, Not Met)  4. Pt will performed sit to stand x 5 without UE support in 15 seconds without LOB backward or posterior LE hooking for functional and safe transfers. (In progress, Not Met)  5.  Pt will score greater than or equal to 45/56 on the Choi Balance Assessment with use of AD demonstrating overall improved functional mobility and balance and reduce fall risk.(In progress, Not Met)  6. Pt will walk < than or equal to 140 ft on the 2 minute walk test indoor with AD with 0 LOB and minimal gait deviations for improved safety in home ambulation and safety. (In progress, Not Met)  7.  Pt will be able to safely perform and tolerate high level ADL's without LOB. (In progress, Not Met)  8. Pt will have 0 falls from start of PT sessions.(In progress, Not Met)  9. Independent with updated HEP. (In progress, Not Met)  10. Pt will have MMT score of 3+/5 in all major ms groups in Left LE.(In progress, Not Met)  11. Pt will ambulate on TM x 6 minutes with use of UE support with 0 LOB at greater than or equal to 1.0 mph.(In progress, Not Met)  12. Pt will begin some form of community fitness to begin regular and consistent performance of exercise to continue maintenance of gains made in PT.(In progress, Not Met)    Plan     Continue to advance PT towards established PT goals.     Kandy Gatica, PT, DPT

## 2019-10-14 NOTE — PROGRESS NOTES
"  Occupational Therapy Daily Treatment Note     Name: Luis Wong  Clinic Number: [id number]    Therapy Diagnosis:    Encounter Diagnoses   Name Primary?    Impaired mobility and activities of daily living     Muscle weakness     Decreased coordination      Physician: Carla Altman PA-C    Visit Date: 10/14/2019  Physician Orders: Eval and tx  Medical Diagnosis: CVA L sided weakness Recrudecesnt  Onset Date: 8/28/19 most recent CVA  Evaluation Date: 9/4/2019  Plan of Care Expiration Period: 11/1/19  Insurance Authorization period Expiration: 12/31/19  Date of Return to MD: DAI  Visit # / Visits Authortized: 13 / 30  FOTO: CVA UE hand /75% limitation from 100%  10/4/19       Time In:2:45 pm   Time Out: 3:30 pm   Total Billable (one on one) Time: 45 minutes      Precautions: Standard and Fall    Subjective     Pt reports: "I ts ok I had a good weekend "  he was compliant with home exercise program given last session.   Response to previous treatment:good   Functional change: Functional use at home to turn on light switch    Pain: 0/10  Location: left arms     Objective      Luis Wong  received therapeutic exercises to develop GM/FM coordination, balance, activity tolerance and strength in order to increase ADL/IADL independence with replicated home management/self care tasks, for 45 minutes including: Measurements     Shoulder and supine exercises:    PVC Tract exercises: All planes   X 10 each    Shrugs and scap squeezes X 30    Bicep curls 3# dowel  2/15     Seated AAROM overhead Dowel  2/10   Supine chest press  Forward flexion 3#  2/10       Skullcrushers   3#  2/10   Isolated LUE shoulder Circles  CW/CCW  2/10    Supine pect stretch with 1/2 roll 3/30"           Home Exercises and Education Provided     Education provided:   - Previous hand out   - Progress towards goals     Written Home Exercises Provided: Patient instructed to cont prior HEP. Updated putty exercises to molding,  and pinch Red " putty provided.  Exercises were reviewed and Luis was able to demonstrate them prior to the end of the session.  Luis demonstrated good  understanding of the HEP provided.   .   See EMR under Patient Instructions for exercises provided prior visit.        Assessment     Pt would continue to benefit from skilled OT. Pt making great improvements thus far with LUE AROM and strength. He did better today with GM control and strength in the shoulder. Supine and AAROM progressed at this time. Better control at the elbow and increased strength at triceps noted. He had minor wrist pain but adapted exercises well. Added carpal tunnel exercises to HEP with good understanding and PROM stretching. Continued focus on GM coordination and strengthening with NMR next session. More focus on functional hand use and FM.GM movements.  Remains highly motivated.    Luis is progressing well towards his goals and there are no updates to goals at this time. Pt prognosis is Fair.     Pt will continue to benefit from skilled outpatient occupational therapy to address the deficits listed in the problem list on initial evaluation provide pt/family education and to maximize pt's level of independence in the home and community environment.     Anticipated barriers to occupational therapy:     Pt's spiritual, cultural and educational needs considered and pt agreeable to plan of care and goals.    Goals:  Goals:  Short Term Goals:  1) Initiate Hep Progressing 10/14/2019  2) Pt to increase LUE  strength by 5 # in order to A in self care task of feeding by 4 weeks. Progressing 10/14/2019  3) Pt to increase Quick dash Score by 5 points increasing self care IND by 4 weeks. Progressing 10/14/2019  4) Pt to increase L UE AROM by 10 degrees in order to A in UB dressing by 4 weeks. Progressing 10/14/2019  5) Patient will be able to achieve less than or equal to 75% on the FOTO, demonstrating overall improved functional ability with upper  extremity. (self-care category) Progressing 10/14/2019       Long Term Goals:  1) Pt to be IND with HEP in order to maintain ROM and strength needed for self care IND by barrera Progressing 10/14/2019  2) Pt to increase L UE  strength to WNL as compared to unaffected extremity in order to open items for self feeding by barrera Progressing 10/14/2019  3) Pt to increase Quick dash Score by 10 points increasing self care IND at home by barrera Progressing 10/14/2019  4) Pt to increase L UE AROM to WFL in order to A in UB dressing by d/cLaila Progressing 10/14/2019  5) Patient will be able to achieve less than or equal to 50% on the FOTO, demonstrating overall improved functional ability with upper extremity. (self-care category) Progressing 10/14/2019       Plan   Continue per plan of care.   Updates/Grading for next session: Progress as tolerated.       Christian Teran, OT

## 2019-10-15 ENCOUNTER — CLINICAL SUPPORT (OUTPATIENT)
Dept: REHABILITATION | Facility: HOSPITAL | Age: 64
End: 2019-10-15
Payer: MEDICARE

## 2019-10-15 DIAGNOSIS — Z74.09 IMPAIRED FUNCTIONAL MOBILITY, BALANCE, GAIT, AND ENDURANCE: ICD-10-CM

## 2019-10-15 DIAGNOSIS — R29.898 DECREASED STRENGTH OF LOWER EXTREMITY: ICD-10-CM

## 2019-10-15 PROCEDURE — 97140 MANUAL THERAPY 1/> REGIONS: CPT | Mod: PN

## 2019-10-15 PROCEDURE — 97116 GAIT TRAINING THERAPY: CPT | Mod: PN

## 2019-10-15 PROCEDURE — 97112 NEUROMUSCULAR REEDUCATION: CPT | Mod: PN

## 2019-10-15 NOTE — PROGRESS NOTES
Physical Therapy Daily Treatment Note     Name: Luis Wong  Clinic Number: [id number]    Therapy Diagnosis:   Encounter Diagnoses   Name Primary?    Impaired functional mobility, balance, gait, and endurance     Decreased strength of lower extremity      Physician: Carla Altman PA-C    Visit Date: 10/15/2019    Physician Orders: PT Eval and Treat  Medical Diagnosis from Referral: I63.9 (ICD-10-CM) - CVA (cerebral vascular accident)  Evaluation Date: 9/13/2019  Authorization Period Expiration: 12/31/2019  Plan of Care Expiration: 11/8/2019  Visit # / Visits authorized: 12/30  FOTO: next at d/c    Time In: 0930   Time Out: 1015   Total Billable Time: 45 minutes (1 MT, 1 gait, 1NMR)    Precautions: Hx of CVA's    Subjective     Pt reports: my left side is so tight and this never was an issue with my other strokes   Response to previous treatment: No adverse reactions.  Functional change: Ambulating throughout the home and community without AD/limited use of AD    Pain: 0/10  Location: N/A     Objective     Luis received manual therapy to left foot/ankle for 8 minutes including:   - Splaying of metatarsals  - Manual gastroc stretching seated with slant board with gentle overpressure at ankle joint x 2'  - AROM toe extension/flexion x 5     Clonus reflex (L): sustained at ankle with quick stretch  MAS: 2/4 L UE and LE (worse at shoulder, elbow, hand, wrist, hip and ankle)    Pt performed gait training on treadmill x 12 total consisting of forward gait x 5' at 0.9 speed setting with B UE support f/b sidestepping B x 2-3' each way at 0.2 speed setting with B UE support and 1 LOB with cues as needed for hip ER and DF .     IN between walking bouts pt performed standing hip IR stretch with B feet turned out with L trunk rotation 3 x 1'    Luis received neuromuscular rehabilitation including proprioceptive and balance training for 25 minutes including:  - PT donned wrist splint to  position pt's L hand in wrist/finger extension and to promote elbow extension  - forward walking 2 x 40'' without AD CGA for wt shift   - backward walking 2 x 40'' without AD CGA and verbal cues for full hip extension on the L and R  - cone weaving without AD x 2 laps over 20 ft  - side stepping in ladder x 2 laps without UE support with cues for L hip flexion    Home Exercises Provided and Patient Education Provided     Education provided:   - encouraged cont HEP along with new hip IR stretch and side stepping at counter  - safety with environment when ambulating     Written Home Exercises Provided: yes.  Exercises were reviewed and Luis was able to demonstrate them prior to the end of the session.  Luis demonstrated good  understanding of the education provided.     See EMR under Patient Instructions for exercises provided 9/23/2019.      Assessment   Pt is now weaning from AD and perform NMR and gait training well today without AD on TM and over ground.  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  He is responding well to stretches but tone is increasing weekly with MAS 2/4 on L and sustained clonus noted in the L ankle (PROM is still full).  He would benefit from possible medical management of spasticity at this point of rehab to help manage secondary symptoms of CVA to improve function toward indep ambulation without fall risk.      Luis is progressing well towards his goals.   Pt prognosis is Good.     Pt will continue to benefit from skilled outpatient physical therapy to address the deficits listed in the problem list box on initial evaluation, provide pt/family education and to maximize pt's level of independence in the home and community environment.     Pt's spiritual, cultural and educational needs considered and pt agreeable to plan of care and goals.    Anticipated barriers to physical therapy: Co-morbidities    Goals:     Long Term Goals (8 Weeks):    1.  Independent with initial HEP. (In progress, Not Met)  2.  Pt will perform nu-step at level 4 x 10 minutes without rests breaks going at least 50 step/min or greater.(In progress, Not Met)  3. Pt will be able to perform TUG in less than or equal to 20 secs without use of AD placingdemonstrating overall improved functional mobility. (In progress, Not Met)  4. Pt will performed sit to stand x 5 without UE support in 15 seconds without LOB backward or posterior LE hooking for functional and safe transfers. (In progress, Not Met)  5.  Pt will score greater than or equal to 45/56 on the Choi Balance Assessment with use of AD demonstrating overall improved functional mobility and balance and reduce fall risk.(In progress, Not Met)  6. Pt will walk < than or equal to 140 ft on the 2 minute walk test indoor with AD with 0 LOB and minimal gait deviations for improved safety in home ambulation and safety. (In progress, Not Met)  7.  Pt will be able to safely perform and tolerate high level ADL's without LOB. (In progress, Not Met)  8. Pt will have 0 falls from start of PT sessions.(In progress, Not Met)  9. Independent with updated HEP. (In progress, Not Met)  10. Pt will have MMT score of 3+/5 in all major ms groups in Left LE.(In progress, Not Met)  11. Pt will ambulate on TM x 6 minutes with use of UE support with 0 LOB at greater than or equal to 1.0 mph.(In progress, Not Met)  12. Pt will begin some form of community fitness to begin regular and consistent performance of exercise to continue maintenance of gains made in PT.(In progress, Not Met)    Plan     Continue to advance PT towards established PT goals, communicate with neurologist about medical management of spasticity)    Resume the following if time allows     Tandem stance football hold trunk rotations 1 min x 2 trials alternate positioning   - Marching on foam:   2x10, cues not to lean trunk backwards and to lift from hip and flex knee  - Forward  step-ups  2x10, LLE leading  - Lateral heel taps:   2x15, Level 1  Adia Diaz, PT, DPT

## 2019-10-17 ENCOUNTER — CLINICAL SUPPORT (OUTPATIENT)
Dept: REHABILITATION | Facility: HOSPITAL | Age: 64
End: 2019-10-17
Payer: MEDICARE

## 2019-10-17 DIAGNOSIS — Z78.9 IMPAIRED MOBILITY AND ACTIVITIES OF DAILY LIVING: ICD-10-CM

## 2019-10-17 DIAGNOSIS — R29.898 DECREASED STRENGTH OF LOWER EXTREMITY: ICD-10-CM

## 2019-10-17 DIAGNOSIS — R27.8 DECREASED COORDINATION: ICD-10-CM

## 2019-10-17 DIAGNOSIS — Z74.09 IMPAIRED FUNCTIONAL MOBILITY, BALANCE, GAIT, AND ENDURANCE: ICD-10-CM

## 2019-10-17 DIAGNOSIS — M62.81 MUSCLE WEAKNESS: ICD-10-CM

## 2019-10-17 DIAGNOSIS — Z74.09 IMPAIRED MOBILITY AND ACTIVITIES OF DAILY LIVING: ICD-10-CM

## 2019-10-17 PROCEDURE — 97530 THERAPEUTIC ACTIVITIES: CPT | Mod: PN

## 2019-10-17 PROCEDURE — 97112 NEUROMUSCULAR REEDUCATION: CPT | Mod: PN

## 2019-10-17 PROCEDURE — 97116 GAIT TRAINING THERAPY: CPT | Mod: PN

## 2019-10-17 PROCEDURE — 97110 THERAPEUTIC EXERCISES: CPT | Mod: PN

## 2019-10-17 NOTE — PROGRESS NOTES
Physical Therapy Daily Treatment Note     Name: Luis Wong  Clinic Number: [id number]    Therapy Diagnosis:   Encounter Diagnoses   Name Primary?    Impaired functional mobility, balance, gait, and endurance     Decreased strength of lower extremity      Physician: Carla Altman PA-C    Visit Date: 10/17/2019    Physician Orders: PT Eval and Treat  Medical Diagnosis from Referral: I63.9 (ICD-10-CM) - CVA (cerebral vascular accident)  Evaluation Date: 9/13/2019  Authorization Period Expiration: 12/31/2019  Plan of Care Expiration: 11/8/2019  Visit # / Visits authorized: 13/30  FOTO: next at d/c    Time In: 1:15 PM   Time Out: 2:00 PM  Total Billable Time: 45 minutes (1 gait,21NMR)    Precautions: Hx of CVA's    Subjective     Pt reports: my left side is so tight and this never was an issue with my other strokes   Response to previous treatment: No adverse reactions.  Functional change: Ambulating throughout the home and community without AD/limited use of AD    Pain: 0/10  Location: N/A     Objective     Luis DID NOT RECIEVE manual therapy to left foot/ankle for 0 minutes including:   - Splaying of metatarsals  - Manual gastroc stretching seated with slant board with gentle overpressure at ankle joint x 2'  - AROM toe extension/flexion x 5        Pt performed gait training on treadmill x 20 total consisting of forward gait 5% elevation x 8' at 0.9 speed setting with B UE support f/b sidestepping B x 2-3' each way at 0.2 speed setting with B UE support and 1 LOB with cues as needed for hip ER and DF .     IN between walking bouts pt performed standing hip IR stretch with B feet turned out with L trunk rotation 3 x 1'    Luis received neuromuscular rehabilitation including proprioceptive and balance training for 25 minutes including:  - PT donned wrist splint to position pt's L hand in wrist/finger extension and to promote elbow extension  - forward walking 2 x 40'' without AD CGA  for wt shift   - backward walking 2 x 40'' without AD CGA and verbal cues for full hip extension on the L and R  - cone weaving without AD x 2 laps over 20 ft  - side stepping in ladder x 2 laps without UE support with cues for L hip flexion    Home Exercises Provided and Patient Education Provided     Education provided:   - encouraged cont HEP along with new hip IR stretch and side stepping at counter  - safety with environment when ambulating     Written Home Exercises Provided: yes.  Exercises were reviewed and Luis was able to demonstrate them prior to the end of the session.  Luis demonstrated good  understanding of the education provided.     See EMR under Patient Instructions for exercises provided 9/23/2019.      Assessment     Pt tolerated session with no reports of pain noted. He was able to perform standing activities with no loss of balance, and followed verbal instructions on technique and safety accordingly.  Luis is progressing well towards his goals.   Pt prognosis is Good.     Pt will continue to benefit from skilled outpatient physical therapy to address the deficits listed in the problem list box on initial evaluation, provide pt/family education and to maximize pt's level of independence in the home and community environment.     Pt's spiritual, cultural and educational needs considered and pt agreeable to plan of care and goals.    Anticipated barriers to physical therapy: Co-morbidities    Goals:     Long Term Goals (8 Weeks):   1.  Independent with initial HEP. (In progress, Not Met)  2.  Pt will perform nu-step at level 4 x 10 minutes without rests breaks going at least 50 step/min or greater.(In progress, Not Met)  3. Pt will be able to perform TUG in less than or equal to 20 secs without use of AD placingdemonstrating overall improved functional mobility. (In progress, Not Met)  4. Pt will performed sit to stand x 5 without UE support in 15 seconds without LOB backward or posterior  LE hooking for functional and safe transfers. (In progress, Not Met)  5.  Pt will score greater than or equal to 45/56 on the Choi Balance Assessment with use of AD demonstrating overall improved functional mobility and balance and reduce fall risk.(In progress, Not Met)  6. Pt will walk < than or equal to 140 ft on the 2 minute walk test indoor with AD with 0 LOB and minimal gait deviations for improved safety in home ambulation and safety. (In progress, Not Met)  7.  Pt will be able to safely perform and tolerate high level ADL's without LOB. (In progress, Not Met)  8. Pt will have 0 falls from start of PT sessions.(In progress, Not Met)  9. Independent with updated HEP. (In progress, Not Met)  10. Pt will have MMT score of 3+/5 in all major ms groups in Left LE.(In progress, Not Met)  11. Pt will ambulate on TM x 6 minutes with use of UE support with 0 LOB at greater than or equal to 1.0 mph.(In progress, Not Met)  12. Pt will begin some form of community fitness to begin regular and consistent performance of exercise to continue maintenance of gains made in PT.(In progress, Not Met)    Plan     Continue to advance PT towards established PT goals,    Robin Davis, WENDY

## 2019-10-17 NOTE — PROGRESS NOTES
"  Occupational Therapy Daily Treatment Note     Name: Luis Wong  Clinic Number: [id number]    Therapy Diagnosis:    Encounter Diagnoses   Name Primary?    Impaired mobility and activities of daily living     Muscle weakness     Decreased coordination      Physician: Carla Altman PA-C    Visit Date: 10/17/2019  Physician Orders: Eval and tx  Medical Diagnosis: CVA L sided weakness Recrudecesnt  Onset Date: 8/28/19 most recent CVA  Evaluation Date: 9/4/2019  Plan of Care Expiration Period: 11/1/19  Insurance Authorization period Expiration: 12/31/19  Date of Return to MD: DAI  Visit # / Visits Authortized: 14 / 30  FOTO: CVA UE hand /75% limitation from 100%  10/4/19       Time In:2:00 pm   Time Out: 2:45 pm   Total Billable (one on one) Time: 45 minutes      Precautions: Standard and Fall    Subjective     Pt reports: "Im doing ok pretty good"  he was compliant with home exercise program given last session.   Response to previous treatment:good   Functional change: Functional use at home to turn on light switch    Pain: 0/10  Location: left arms     Objective      Luis Wong  received therapeutic exercises to develop GM/FM coordination, balance, activity tolerance and strength in order to increase ADL/IADL independence with replicated home management/self care tasks, for 30 minutes including: Measurements     Shoulder and supine exercises:    PVC track exercises All planes   X 10        Bicep curls seated 3# dowel  2/15         Seated AAROM overhead    Rows Dowel  2/10  Red t band   2/10   Supine chest press  Forward flexion 4#  2/10       Skullcrushers   3#  2/10   Isolated LUE shoulder FF  2/10    Isolated elbow flexion 2/10               Luis Wong  received therapeutic activities to develop GM/FM coordination, balance, activity tolerance and strength in order to increase ADL/IADL independence with replicated home management/self care tasks, for 15 minutes including:  Cone placement L and R  Elbow " support  X 20    Functional reaching card challenge Elbow support from unaffected arm  2 rows         Home Exercises and Education Provided     Education provided:   - Previous hand out   - Progress towards goals     Written Home Exercises Provided: Patient instructed to cont prior HEP. Updated putty exercises to molding,  and pinch Red putty provided.  Exercises were reviewed and Luis was able to demonstrate them prior to the end of the session.  Luis demonstrated good  understanding of the HEP provided.   .   See EMR under Patient Instructions for exercises provided prior visit.        Assessment     Pt would continue to benefit from skilled OT. Shoulder AAROM and isolated exercises main focus on session today. He did well with there ex and functional reach but is still limited due to tricep strength and inability to straighten the arm when reaching. He did well wih more resistance added and new exercises introduced.Continued focus on GM coordination and strengthening with NMR next session. More focus on functional hand use and FM.GM movements as well.  Remains highly motivated.    Luis is progressing well towards his goals and there are no updates to goals at this time. Pt prognosis is Fair.     Pt will continue to benefit from skilled outpatient occupational therapy to address the deficits listed in the problem list on initial evaluation provide pt/family education and to maximize pt's level of independence in the home and community environment.     Anticipated barriers to occupational therapy:     Pt's spiritual, cultural and educational needs considered and pt agreeable to plan of care and goals.    Goals:  Goals:  Short Term Goals:  1) Initiate Hep Progressing 10/17/2019  2) Pt to increase LUE  strength by 5 # in order to A in self care task of feeding by 4 weeks. Progressing 10/17/2019  3) Pt to increase Quick dash Score by 5 points increasing self care IND by 4 weeks. Progressing  10/17/2019  4) Pt to increase L UE AROM by 10 degrees in order to A in UB dressing by 4 weeks. Progressing 10/17/2019  5) Patient will be able to achieve less than or equal to 75% on the FOTO, demonstrating overall improved functional ability with upper extremity. (self-care category) Progressing 10/17/2019       Long Term Goals:  1) Pt to be IND with HEP in order to maintain ROM and strength needed for self care IND by d.c. Progressing 10/17/2019  2) Pt to increase L UE  strength to WNL as compared to unaffected extremity in order to open items for self feeding by d.c. Progressing 10/17/2019  3) Pt to increase Quick dash Score by 10 points increasing self care IND at home by d.c. Progressing 10/17/2019  4) Pt to increase L UE AROM to WFL in order to A in UB dressing by d/c. Progressing 10/17/2019  5) Patient will be able to achieve less than or equal to 50% on the FOTO, demonstrating overall improved functional ability with upper extremity. (self-care category) Progressing 10/17/2019       Plan   Continue per plan of care.   Updates/Grading for next session: Progress as tolerated.       Christian Teran, OT

## 2019-10-18 ENCOUNTER — HOSPITAL ENCOUNTER (OUTPATIENT)
Dept: CARDIOLOGY | Facility: HOSPITAL | Age: 64
Discharge: HOME | End: 2019-10-18
Attending: PSYCHIATRY & NEUROLOGY
Payer: COMMERCIAL

## 2019-10-18 ENCOUNTER — TELEPHONE (OUTPATIENT)
Dept: UROLOGY | Facility: CLINIC | Age: 64
End: 2019-10-18

## 2019-10-18 VITALS — WEIGHT: 158 LBS | BODY MASS INDEX: 23.4 KG/M2 | HEIGHT: 69 IN

## 2019-10-18 DIAGNOSIS — I63.9 ISCHEMIC STROKE: ICD-10-CM

## 2019-10-18 LAB
AORTIC ROOT ANNULUS: 2.64 CM
ASCENDING AORTA: 2.63 CM
AV INDEX (PROSTH): 0.9
AV MEAN GRADIENT: 4 MMHG
AV PEAK GRADIENT: 6 MMHG
AV VALVE AREA: 3.75 CM2
AV VELOCITY RATIO: 0.86
BSA FOR ECHO PROCEDURE: 1.87 M2
CV ECHO LV RWT: 0.71 CM
DOP CALC AO PEAK VEL: 1.27 M/S
DOP CALC AO VTI: 25.52 CM
DOP CALC LVOT AREA: 4.2 CM2
DOP CALC LVOT DIAMETER: 2.31 CM
DOP CALC LVOT PEAK VEL: 1.09 M/S
DOP CALC LVOT STROKE VOLUME: 95.8 CM3
DOP CALCLVOT PEAK VEL VTI: 22.87 CM
E WAVE DECELERATION TIME: 213.19 MSEC
E/A RATIO: 0.97
ECHO LV POSTERIOR WALL: 1.2 CM (ref 0.6–1.1)
FRACTIONAL SHORTENING: 40 % (ref 28–44)
INTERVENTRICULAR SEPTUM: 1.3 CM (ref 0.6–1.1)
IVRT: 0.11 MSEC
LA MAJOR: 4.95 CM
LA MINOR: 4.73 CM
LA WIDTH: 4.37 CM
LEFT ATRIUM SIZE: 3.16 CM
LEFT ATRIUM VOLUME INDEX: 30.4 ML/M2
LEFT ATRIUM VOLUME: 56.78 CM3
LEFT INTERNAL DIMENSION IN SYSTOLE: 2.05 CM (ref 2.1–4)
LEFT VENTRICLE DIASTOLIC VOLUME INDEX: 33.68 ML/M2
LEFT VENTRICLE DIASTOLIC VOLUME: 62.93 ML
LEFT VENTRICLE MASS INDEX: 74 G/M2
LEFT VENTRICLE SYSTOLIC VOLUME INDEX: 7.3 ML/M2
LEFT VENTRICLE SYSTOLIC VOLUME: 13.6 ML
LEFT VENTRICULAR INTERNAL DIMENSION IN DIASTOLE: 3.4 CM (ref 3.5–6)
LEFT VENTRICULAR MASS: 138.77 G
MV PEAK A VEL: 0.62 M/S
MV PEAK E VEL: 0.6 M/S
PISA TR MAX VEL: 2.55 M/S
PULM VEIN S/D RATIO: 0.98
PV PEAK D VEL: 0.43 M/S
PV PEAK S VEL: 0.42 M/S
RA MAJOR: 4.53 CM
RA PRESSURE: 3 MMHG
RA WIDTH: 4.28 CM
RIGHT VENTRICULAR END-DIASTOLIC DIMENSION: 3.39 CM
STJ: 2.41 CM
TR MAX PG: 26 MMHG
TRICUSPID ANNULAR PLANE SYSTOLIC EXCURSION: 2.12 CM
TV REST PULMONARY ARTERY PRESSURE: 29 MMHG

## 2019-10-18 PROCEDURE — 93306 TTE W/DOPPLER COMPLETE: CPT

## 2019-10-18 PROCEDURE — 93306 ECHO (CUPID ONLY): ICD-10-PCS | Mod: 26,,, | Performed by: INTERNAL MEDICINE

## 2019-10-18 PROCEDURE — 93306 TTE W/DOPPLER COMPLETE: CPT | Mod: 26,,, | Performed by: INTERNAL MEDICINE

## 2019-10-18 NOTE — TELEPHONE ENCOUNTER
----- Message from Vicenta Sung sent at 10/18/2019  3:19 PM CDT -----  Pt a few days ago met with PT and OT and they feel the pt needs muscle relaxer. Pt stated this is his second time call regarding this matter. Pt asked for a call back to inform if it's going to be called in or not.    Pt contact # [phone].      Thanks

## 2019-10-18 NOTE — TELEPHONE ENCOUNTER
"Returned call, spoke with patient/wife.  Questioning conversation between PT/Dr Lopez.   "will Dr Lopez be sending the muscle relaxant to [facility]"  Patient stated this to be prescribed to help with his "tone"  Please advise.  "

## 2019-10-21 ENCOUNTER — CLINICAL SUPPORT (OUTPATIENT)
Dept: REHABILITATION | Facility: HOSPITAL | Age: 64
End: 2019-10-21
Payer: MEDICARE

## 2019-10-21 ENCOUNTER — LAB VISIT (OUTPATIENT)
Dept: LAB | Facility: HOSPITAL | Age: 64
End: 2019-10-21
Attending: FAMILY MEDICINE
Payer: COMMERCIAL

## 2019-10-21 DIAGNOSIS — R27.8 DECREASED COORDINATION: ICD-10-CM

## 2019-10-21 DIAGNOSIS — Z78.9 IMPAIRED MOBILITY AND ACTIVITIES OF DAILY LIVING: ICD-10-CM

## 2019-10-21 DIAGNOSIS — Z74.09 IMPAIRED FUNCTIONAL MOBILITY, BALANCE, GAIT, AND ENDURANCE: ICD-10-CM

## 2019-10-21 DIAGNOSIS — I10 ESSENTIAL HYPERTENSION: ICD-10-CM

## 2019-10-21 DIAGNOSIS — R29.898 DECREASED STRENGTH OF LOWER EXTREMITY: ICD-10-CM

## 2019-10-21 DIAGNOSIS — M62.81 MUSCLE WEAKNESS: ICD-10-CM

## 2019-10-21 DIAGNOSIS — Z12.5 SCREENING FOR PROSTATE CANCER: ICD-10-CM

## 2019-10-21 DIAGNOSIS — E11.65 TYPE 2 DIABETES MELLITUS WITH HYPERGLYCEMIA, WITHOUT LONG-TERM CURRENT USE OF INSULIN: ICD-10-CM

## 2019-10-21 DIAGNOSIS — Z74.09 IMPAIRED MOBILITY AND ACTIVITIES OF DAILY LIVING: ICD-10-CM

## 2019-10-21 LAB
ALBUMIN SERPL BCP-MCNC: 4.1 G/DL (ref 3.5–5.2)
ALP SERPL-CCNC: 75 U/L (ref 55–135)
ALT SERPL W/O P-5'-P-CCNC: 17 U/L (ref 10–44)
ANION GAP SERPL CALC-SCNC: 9 MMOL/L (ref 8–16)
AST SERPL-CCNC: 20 U/L (ref 10–40)
BILIRUB SERPL-MCNC: 0.6 MG/DL (ref 0.1–1)
BUN SERPL-MCNC: 14 MG/DL (ref 8–23)
CALCIUM SERPL-MCNC: 9.7 MG/DL (ref 8.7–10.5)
CHLORIDE SERPL-SCNC: 103 MMOL/L (ref 95–110)
CHOLEST SERPL-MCNC: 170 MG/DL (ref 120–199)
CHOLEST/HDLC SERPL: 2.5 {RATIO} (ref 2–5)
CO2 SERPL-SCNC: 28 MMOL/L (ref 23–29)
COMPLEXED PSA SERPL-MCNC: 0.29 NG/ML (ref 0–4)
CREAT SERPL-MCNC: 1.1 MG/DL (ref 0.5–1.4)
EST. GFR  (AFRICAN AMERICAN): >60 ML/MIN/1.73 M^2
EST. GFR  (NON AFRICAN AMERICAN): >60 ML/MIN/1.73 M^2
ESTIMATED AVG GLUCOSE: 134 MG/DL (ref 68–131)
GLUCOSE SERPL-MCNC: 129 MG/DL (ref 70–110)
HBA1C MFR BLD HPLC: 6.3 % (ref 4–5.6)
HDLC SERPL-MCNC: 69 MG/DL (ref 40–75)
HDLC SERPL: 40.6 % (ref 20–50)
LDLC SERPL CALC-MCNC: 80.4 MG/DL (ref 63–159)
NONHDLC SERPL-MCNC: 101 MG/DL
POTASSIUM SERPL-SCNC: 3.7 MMOL/L (ref 3.5–5.1)
PROT SERPL-MCNC: 7.3 G/DL (ref 6–8.4)
SODIUM SERPL-SCNC: 140 MMOL/L (ref 136–145)
TRIGL SERPL-MCNC: 103 MG/DL (ref 30–150)

## 2019-10-21 PROCEDURE — 80053 COMPREHEN METABOLIC PANEL: CPT

## 2019-10-21 PROCEDURE — 97110 THERAPEUTIC EXERCISES: CPT | Mod: PN

## 2019-10-21 PROCEDURE — 83036 HEMOGLOBIN GLYCOSYLATED A1C: CPT

## 2019-10-21 PROCEDURE — 84153 ASSAY OF PSA TOTAL: CPT

## 2019-10-21 PROCEDURE — 36415 COLL VENOUS BLD VENIPUNCTURE: CPT | Mod: PO

## 2019-10-21 PROCEDURE — 97116 GAIT TRAINING THERAPY: CPT | Mod: PN

## 2019-10-21 PROCEDURE — 80061 LIPID PANEL: CPT

## 2019-10-21 RX ORDER — BACLOFEN 10 MG/1
5 TABLET ORAL 3 TIMES DAILY
Qty: 135 TABLET | Refills: 3 | Status: SHIPPED | OUTPATIENT
Start: 2019-10-21 | End: 2020-02-19 | Stop reason: CLARIF

## 2019-10-21 NOTE — PROGRESS NOTES
"  Occupational Therapy Daily Treatment Note     Name: Luis Wong  Clinic Number: [id number]    Therapy Diagnosis:    Encounter Diagnoses   Name Primary?    Impaired mobility and activities of daily living     Muscle weakness     Decreased coordination      Physician: Carla Altman, HALLE Lopez MD    Visit Date: 10/21/2019  Physician Orders: Eval and tx  Medical Diagnosis: CVA L sided weakness Recrudecesnt  Onset Date: 8/28/19 most recent CVA  Evaluation Date: 9/4/2019  Plan of Care Expiration Period: 11/1/19  Insurance Authorization period Expiration: 12/31/19  Date of Return to MD: NA  Visit # / Visits Authortized: 15 / 30  FOTO: CVA UE hand /75% limitation from 100%  10/4/19       Time In:8:40 am   Time Out: 9:30 am   Total Billable (one on one) Time: 45 minutes      Precautions: Standard and Fall    Subjective     Pt reports: "Im doing ok pretty good"  he was compliant with home exercise program given last session.   Response to previous treatment:good   Functional change: Functional use at home to turn on light switch    Pain: 0/10  Location: left arms     Objective      Luis Wong  received therapeutic exercises to develop GM/FM coordination, balance, activity tolerance and strength in order to increase ADL/IADL independence with replicated home management/self care tasks, for 45 minutes including: Measurements     Shoulder and supine exercises:            Bicep curls seated 3# dowel  2/15         Seated AAROM overhead    Rows Dowel 2#  2/10  Red t band   2/10   Supine chest press  Forward flexion 4#  2/10       Skullcrushers   4#  2/10   Isolated LUE shoulder FF 1#  2/10    Isolated elbow flexion supine  1#  2/10    Black gripper      Wrist ext 2 yellow rb  2 red RB  x30   X 30    Red CP X 30          Home Exercises and Education Provided     Education provided:   - Previous hand out   - Progress towards goals     Written Home Exercises Provided: Patient instructed to cont prior HEP. Updated " putty exercises to molding,  and pinch Red putty provided.  Exercises were reviewed and Luis was able to demonstrate them prior to the end of the session.  Luis demonstrated good  understanding of the HEP provided.   .   See EMR under Patient Instructions for exercises provided prior visit.        Assessment     Pt would continue to benefit from skilled OT. He is progressing well with skilled services but has had some increase in tone the past few weeks specifically at the elbow preventing ext and hindering shoulder use. He would benefit from a dynasplint at the elbow and wrist to prevent further tone that is restricting movement or contracture.  Continued focus on tricep strengthening and AROM/AAROM in the LUE for future sessions.  Increased resistance added to exercises today in supine with good tolerance.  Remains highly motivated.    Luis is progressing well towards his goals and there are no updates to goals at this time. Pt prognosis is Fair.     Pt will continue to benefit from skilled outpatient occupational therapy to address the deficits listed in the problem list on initial evaluation provide pt/family education and to maximize pt's level of independence in the home and community environment.     Anticipated barriers to occupational therapy:     Pt's spiritual, cultural and educational needs considered and pt agreeable to plan of care and goals.    Goals:    Short Term Goals:  1) Initiate Hep Progressing 10/21/2019  2) Pt to increase LUE  strength by 5 # in order to A in self care task of feeding by 4 weeks. Progressing 10/21/2019  3) Pt to increase Quick dash Score by 5 points increasing self care IND by 4 weeks. Progressing 10/21/2019  4) Pt to increase L UE AROM by 10 degrees in order to A in UB dressing by 4 weeks. Progressing 10/21/2019  5) Patient will be able to achieve less than or equal to 75% on the FOTO, demonstrating overall improved functional ability with upper extremity.  (self-care category) Progressing 10/21/2019       Long Term Goals:  1) Pt to be IND with HEP in order to maintain ROM and strength needed for self care IND by barrera Progressing 10/21/2019  2) Pt to increase L UE  strength to WNL as compared to unaffected extremity in order to open items for self feeding by barrera Progressing 10/21/2019  3) Pt to increase Quick dash Score by 10 points increasing self care IND at home by barrera Progressing 10/21/2019  4) Pt to increase L UE AROM to WFL in order to A in UB dressing by d/cLaila Progressing 10/21/2019  5) Patient will be able to achieve less than or equal to 50% on the FOTO, demonstrating overall improved functional ability with upper extremity. (self-care category) Progressing 10/21/2019       Plan   Continue per plan of care. Pt would benefit from dynasplint use at the elbow and wrist in order to prevent further restrictions and decreased ROM impacting IND.   Updates/Grading for next session: Progress as tolerated.       Christian Teran, OT

## 2019-10-21 NOTE — PROGRESS NOTES
Physical Therapy Daily Treatment Note     Name: Luis Wong  Clinic Number: [id number]    Therapy Diagnosis:   Encounter Diagnoses   Name Primary?    Impaired functional mobility, balance, gait, and endurance     Decreased strength of lower extremity      Physician: Carla Altman PA-C    Visit Date: 10/21/2019    Physician Orders: PT Eval and Treat  Medical Diagnosis from Referral: I63.9 (ICD-10-CM) - CVA (cerebral vascular accident)  Evaluation Date: 9/13/2019  Authorization Period Expiration: 12/31/2019  Plan of Care Expiration: 11/8/2019  Visit # / Visits authorized: 14/30  FOTO: next at d/c    Time In: 9:33 AM   Time Out: 10:15 M  Total Billable Time: 45 minutes (1 gait, 2 TE)    Precautions: Hx of CVA's    Subjective     Pt reports: no new complaints. Cut the grass over the weekend.    Response to previous treatment: No adverse reactions.  Functional change: Continues to ambulate throughout the home and community without AD/limited use of AD    Pain: 0/10  Location: N/A     Objective     Luis DID NOT RECIEVE manual therapy to left foot/ankle for 0 minutes including:   - Splaying of metatarsals  - Manual gastroc stretching seated with slant board with gentle overpressure at ankle joint x 2'  - AROM toe extension/flexion x 5        Pt performed gait training on treadmill x 20 minutes total consisting of forward gait 5% elevation x 8' at 0.9 speed setting with B UE support f/b sidestepping B x 2-3' each way at 0.2 speed setting with B UE support and 1 LOB with cues as needed for hip ER and DF .     IN between walking bouts pt performed standing hip IR stretch with B feet turned out with L trunk rotation 3 x 1'    Luis did NOT receive neuromuscular rehabilitation including proprioceptive and balance training for including:  - PT donned wrist splint to position pt's L hand in wrist/finger extension and to promote elbow extension  - forward walking 2 x 40'' without AD CGA for wt  shift   - backward walking 2 x 40'' without AD CGA and verbal cues for full hip extension on the L and R  - cone weaving without AD x 2 laps over 20 ft  - side stepping in ladder x 2 laps without UE support with cues for L hip flexion    Luis received therapeutic exercises to develop strength, endurance, ROM and flexibility for 25 minutes including:    -Cybex Leg press                    6 plates, 3x10 B (focused mainly on left LE)                                                  3.5 plates, x30, LLE only, no rest breaks   - Cybex Leg Extension           3x10, LLE only - 20#  - Cybex Ham Curls                 1.0 pl 3x10 LLE only (instructions not to lean trunk forward for assistance)       Home Exercises Provided and Patient Education Provided     Education provided:   - encouraged cont HEP along with new hip IR stretch and side stepping at counter  - safety with environment when ambulating     Written Home Exercises Provided: yes.  Exercises were reviewed and Luis was able to demonstrate them prior to the end of the session.  Luis demonstrated good  understanding of the education provided.     See EMR under Patient Instructions for exercises provided 9/23/2019.      Assessment     Luis presented to therapy with no pain or discomfort. He was compliant in his HEP as he performed several minutes on his home treadmill and continues to perform stretches on a regular basis. He is showing increased strength as he was able to perform hamstring curls without assistance from PT. Gait is improving as patient is able to lift from hips more during ambulation on treadmill.     Luis is progressing well towards his goals.   Pt prognosis is Good.     Pt will continue to benefit from skilled outpatient physical therapy to address the deficits listed in the problem list box on initial evaluation, provide pt/family education and to maximize pt's level of independence in the home and community environment.     Pt's  spiritual, cultural and educational needs considered and pt agreeable to plan of care and goals.    Anticipated barriers to physical therapy: Co-morbidities    Goals:     Long Term Goals (8 Weeks):   1.  Independent with initial HEP. (In progress, Not Met)  2.  Pt will perform nu-step at level 4 x 10 minutes without rests breaks going at least 50 step/min or greater.(In progress, Not Met)  3. Pt will be able to perform TUG in less than or equal to 20 secs without use of AD placingdemonstrating overall improved functional mobility. (In progress, Not Met)  4. Pt will performed sit to stand x 5 without UE support in 15 seconds without LOB backward or posterior LE hooking for functional and safe transfers. (In progress, Not Met)  5.  Pt will score greater than or equal to 45/56 on the Choi Balance Assessment with use of AD demonstrating overall improved functional mobility and balance and reduce fall risk.(In progress, Not Met)  6. Pt will walk < than or equal to 140 ft on the 2 minute walk test indoor with AD with 0 LOB and minimal gait deviations for improved safety in home ambulation and safety. (In progress, Not Met)  7.  Pt will be able to safely perform and tolerate high level ADL's without LOB. (In progress, Not Met)  8. Pt will have 0 falls from start of PT sessions.(In progress, Not Met)  9. Independent with updated HEP. (In progress, Not Met)  10. Pt will have MMT score of 3+/5 in all major ms groups in Left LE.(In progress, Not Met)  11. Pt will ambulate on TM x 6 minutes with use of UE support with 0 LOB at greater than or equal to 1.0 mph.(In progress, Not Met)  12. Pt will begin some form of community fitness to begin regular and consistent performance of exercise to continue maintenance of gains made in PT.(In progress, Not Met)    Plan     Continue to advance PT towards established PT goals,    Kandy Gatica, PT

## 2019-10-23 ENCOUNTER — OFFICE VISIT (OUTPATIENT)
Dept: FAMILY MEDICINE | Facility: CLINIC | Age: 64
End: 2019-10-23
Payer: COMMERCIAL

## 2019-10-23 ENCOUNTER — CLINICAL SUPPORT (OUTPATIENT)
Dept: REHABILITATION | Facility: HOSPITAL | Age: 64
End: 2019-10-23
Payer: MEDICARE

## 2019-10-23 VITALS
DIASTOLIC BLOOD PRESSURE: 80 MMHG | OXYGEN SATURATION: 98 % | SYSTOLIC BLOOD PRESSURE: 130 MMHG | HEIGHT: 69 IN | BODY MASS INDEX: 23.38 KG/M2 | WEIGHT: 157.88 LBS | HEART RATE: 75 BPM

## 2019-10-23 DIAGNOSIS — M62.81 MUSCLE WEAKNESS: ICD-10-CM

## 2019-10-23 DIAGNOSIS — I10 ESSENTIAL HYPERTENSION: Primary | ICD-10-CM

## 2019-10-23 DIAGNOSIS — E11.65 TYPE 2 DIABETES MELLITUS WITH HYPERGLYCEMIA, WITHOUT LONG-TERM CURRENT USE OF INSULIN: ICD-10-CM

## 2019-10-23 DIAGNOSIS — Z74.09 IMPAIRED FUNCTIONAL MOBILITY, BALANCE, GAIT, AND ENDURANCE: ICD-10-CM

## 2019-10-23 DIAGNOSIS — R29.898 DECREASED STRENGTH OF LOWER EXTREMITY: ICD-10-CM

## 2019-10-23 DIAGNOSIS — Z78.9 IMPAIRED MOBILITY AND ACTIVITIES OF DAILY LIVING: ICD-10-CM

## 2019-10-23 DIAGNOSIS — Z74.09 IMPAIRED MOBILITY AND ACTIVITIES OF DAILY LIVING: ICD-10-CM

## 2019-10-23 DIAGNOSIS — R27.8 DECREASED COORDINATION: ICD-10-CM

## 2019-10-23 PROCEDURE — 97110 THERAPEUTIC EXERCISES: CPT | Mod: PN

## 2019-10-23 PROCEDURE — 99999 PR PBB SHADOW E&M-EST. PATIENT-LVL III: CPT | Mod: PBBFAC,,, | Performed by: FAMILY MEDICINE

## 2019-10-23 PROCEDURE — 97116 GAIT TRAINING THERAPY: CPT | Mod: PN

## 2019-10-23 PROCEDURE — 99214 OFFICE O/P EST MOD 30 MIN: CPT | Mod: S$GLB,,, | Performed by: FAMILY MEDICINE

## 2019-10-23 PROCEDURE — 99999 PR PBB SHADOW E&M-EST. PATIENT-LVL III: ICD-10-PCS | Mod: PBBFAC,,, | Performed by: FAMILY MEDICINE

## 2019-10-23 PROCEDURE — 99214 PR OFFICE/OUTPT VISIT, EST, LEVL IV, 30-39 MIN: ICD-10-PCS | Mod: S$GLB,,, | Performed by: FAMILY MEDICINE

## 2019-10-23 NOTE — PROGRESS NOTES
Physical Therapy Daily Treatment Note     Name: Luis Wong  Clinic Number: [id number]    Therapy Diagnosis:   Encounter Diagnoses   Name Primary?    Impaired functional mobility, balance, gait, and endurance     Decreased strength of lower extremity      Physician: Carla Altman PA-C    Visit Date: 10/23/2019    Physician Orders: PT Eval and Treat  Medical Diagnosis from Referral: I63.9 (ICD-10-CM) - CVA (cerebral vascular accident)  Evaluation Date: 9/13/2019  Authorization Period Expiration: 12/31/2019  Plan of Care Expiration: 11/8/2019  Visit # / Visits authorized: 15/30  FOTO: next at d/c    Time In: 10:15 AM   Time Out: 11:00 AM  Total Billable Time: 45 minutes (1 gait, 2 TE)    Precautions: Hx of CVA's    Subjective     Pt reports: no new complaints of pain. He is agreeable to PT session   Response to previous treatment: No adverse reactions.  Functional change: Continues to ambulate throughout the home and community without AD/limited use of AD    Pain: 0/10  Location: N/A     Objective     Luis DID NOT RECIEVE manual therapy to left foot/ankle for 0 minutes including:   - Splaying of metatarsals  - Manual gastroc stretching seated with slant board with gentle overpressure at ankle joint x 2'  - AROM toe extension/flexion x 5        Pt performed gait training on treadmill x 20 minutes total consisting of forward gait 5% elevation x 8' at 0.9 speed setting with B UE support f/b sidestepping B x 2-3' each way at 0.2 speed setting with B UE support and 1 LOB with cues as needed for hip ER and DF .     IN between walking bouts pt performed standing hip IR stretch with B feet turned out with L trunk rotation 3 x 1'    Luis did NOT receive neuromuscular rehabilitation including proprioceptive and balance training for including:    - forward walking 2 x 40'' without AD CGA for wt shift   - backward walking 2 x 40'' without AD CGA and verbal cues for full hip extension on the L  and R  - cone weaving without AD x 2 laps over 20 ft  - side stepping in ladder x 2 laps without UE support with cues for L hip flexion    Luis received therapeutic exercises to develop strength, endurance, ROM and flexibility for 25 minutes including:    - Cybex Leg press                    6 plates, 3x10 B (focused mainly on left LE)                                                  3.5 plates, x30, LLE only, no rest breaks   - Cybex Leg Extension           3x10, LLE only - 20#  - Cybex Ham Curls                 1.0 pl 3x10 LLE only (instructions not to lean trunk forward for assistance)       Home Exercises Provided and Patient Education Provided     Education provided:   - encouraged cont HEP along with new hip IR stretch and side stepping at counter  - safety with environment when ambulating     Written Home Exercises Provided: yes.  Exercises were reviewed and Luis was able to demonstrate them prior to the end of the session.  Luis demonstrated good  understanding of the education provided.     See EMR under Patient Instructions for exercises provided 9/23/2019.      Assessment     Pt tolerated session with no reports of pain. He was able to follow verbal instructions on technique and safety with standing balance and gait training.   Luis is progressing well towards his goals.   Pt prognosis is Good.     Pt will continue to benefit from skilled outpatient physical therapy to address the deficits listed in the problem list box on initial evaluation, provide pt/family education and to maximize pt's level of independence in the home and community environment.     Pt's spiritual, cultural and educational needs considered and pt agreeable to plan of care and goals.    Anticipated barriers to physical therapy: Co-morbidities    Goals:     Long Term Goals (8 Weeks):   1.  Independent with initial HEP. (In progress, Not Met)  2.  Pt will perform nu-step at level 4 x 10 minutes without rests breaks going at  least 50 step/min or greater.(In progress, Not Met)  3. Pt will be able to perform TUG in less than or equal to 20 secs without use of AD placingdemonstrating overall improved functional mobility. (In progress, Not Met)  4. Pt will performed sit to stand x 5 without UE support in 15 seconds without LOB backward or posterior LE hooking for functional and safe transfers. (In progress, Not Met)  5.  Pt will score greater than or equal to 45/56 on the Choi Balance Assessment with use of AD demonstrating overall improved functional mobility and balance and reduce fall risk.(In progress, Not Met)  6. Pt will walk < than or equal to 140 ft on the 2 minute walk test indoor with AD with 0 LOB and minimal gait deviations for improved safety in home ambulation and safety. (In progress, Not Met)  7.  Pt will be able to safely perform and tolerate high level ADL's without LOB. (In progress, Not Met)  8. Pt will have 0 falls from start of PT sessions.(In progress, Not Met)  9. Independent with updated HEP. (In progress, Not Met)  10. Pt will have MMT score of 3+/5 in all major ms groups in Left LE.(In progress, Not Met)  11. Pt will ambulate on TM x 6 minutes with use of UE support with 0 LOB at greater than or equal to 1.0 mph.(In progress, Not Met)  12. Pt will begin some form of community fitness to begin regular and consistent performance of exercise to continue maintenance of gains made in PT.(In progress, Not Met)    Plan     Continue to advance PT towards established PT goals,    Robin Davis, WENDY

## 2019-10-23 NOTE — PROGRESS NOTES
"  Occupational Therapy Daily Treatment Note     Name: Luis Wong  Clinic Number: [id number]    Therapy Diagnosis:    Encounter Diagnoses   Name Primary?    Impaired mobility and activities of daily living     Muscle weakness     Decreased coordination      Physician: Carla Altman, HALLE Lopez MD    Visit Date: 10/23/2019  Physician Orders: Eval and tx  Medical Diagnosis: CVA L sided weakness Recrudecesnt  Onset Date: 8/28/19 most recent CVA  Evaluation Date: 9/4/2019  Plan of Care Expiration Period: 11/1/19  Insurance Authorization period Expiration: 12/31/19  Date of Return to MD: NA  Visit # / Visits Authortized: 16 / 30  FOTO: CVA UE hand /75% limitation from 100%  10/4/19       Time In:9:30 am   Time Out:10:15 am   Total Billable (one on one) Time: 45 minutes      Precautions: Standard and Fall    Subjective     Pt reports: "its going ok I feel good "  he was compliant with home exercise program given last session.   Response to previous treatment:good   Functional change: Functional use at home to turn on light switch    Pain: 0/10  Location: left arms     Objective      Luis Wong  received therapeutic exercises to develop GM/FM coordination, balance, activity tolerance and strength in order to increase ADL/IADL independence with replicated home management/self care tasks, for 40 minutes including: Measurements     Shoulder and supine exercises:            Bicep curls seated 4# dowel  2/15         Seated AAROM overhead    Rows Dowel 2#  2/10  Red t band   2/10   Supine chest press  Forward flexion 4#  2/10   Triceps 2 ways supine 1#  X 10    Isolated LUE shoulder FF 1#  2/10    Isolated elbow flexion supine  1#  2/10    Black gripper      Wrist ext with 1#  Without 1 # 2 yellow rb  2 red RB  X 30   X 30   X 30   Wrist ext with finger ext at top Holds 30"         Luis Wong  received therapeutic activities to develop GM/FM coordination, balance, activity tolerance and strength in order " to increase ADL/IADL independence with replicated home management/self care tasks, for 5 minutes including:  Pom pom retrieval  All fingers x 2 tubs    Finger opposition All fingers x 10         Home Exercises and Education Provided     Education provided:   - Previous hand out   - Progress towards goals     Written Home Exercises Provided: Patient instructed to cont prior HEP. Updated putty exercises to molding,  and pinch Red putty provided.  Exercises were reviewed and Luis was able to demonstrate them prior to the end of the session.  Luis demonstrated good  understanding of the HEP provided.   .   See EMR under Patient Instructions for exercises provided prior visit.        Assessment     Pt would continue to benefit from skilled OT.  He is doing really well with progression of exercises, added resistance and non compensatory use of muscle groups. Isolated shoulder elbow and hand exercises tolerated better today. No pain. He states he started taking the baclofen orally which he hasnt noticed has helped yet. Educated of medication process. Continued focus on tricep strengthening and AROM/AAROM in the LUE for future sessions.   Remains highly motivated.    Luis is progressing well towards his goals and there are no updates to goals at this time. Pt prognosis is Fair.     Pt will continue to benefit from skilled outpatient occupational therapy to address the deficits listed in the problem list on initial evaluation provide pt/family education and to maximize pt's level of independence in the home and community environment.     Anticipated barriers to occupational therapy:     Pt's spiritual, cultural and educational needs considered and pt agreeable to plan of care and goals.    Goals:    Short Term Goals:  1) Initiate Hep Progressing 10/23/2019  2) Pt to increase LUE  strength by 5 # in order to A in self care task of feeding by 4 weeks. Progressing 10/23/2019  3) Pt to increase Quick dash Score by  5 points increasing self care IND by 4 weeks. Progressing 10/23/2019  4) Pt to increase L UE AROM by 10 degrees in order to A in UB dressing by 4 weeks. Progressing 10/23/2019  5) Patient will be able to achieve less than or equal to 75% on the FOTO, demonstrating overall improved functional ability with upper extremity. (self-care category) Progressing 10/23/2019       Long Term Goals:  1) Pt to be IND with HEP in order to maintain ROM and strength needed for self care IND by d.c. Progressing 10/23/2019  2) Pt to increase L UE  strength to WNL as compared to unaffected extremity in order to open items for self feeding by d.c. Progressing 10/23/2019  3) Pt to increase Quick dash Score by 10 points increasing self care IND at home by d.c. Progressing 10/23/2019  4) Pt to increase L UE AROM to WFL in order to A in UB dressing by d/c. Progressing 10/23/2019  5) Patient will be able to achieve less than or equal to 50% on the FOTO, demonstrating overall improved functional ability with upper extremity. (self-care category) Progressing 10/23/2019       Plan   Continue per plan of care. Pt would benefit from dynasplint use at the elbow and wrist in order to prevent further restrictions and decreased ROM impacting IND.   Updates/Grading for next session: Progress as tolerated.       Christian Teran, OT

## 2019-10-23 NOTE — PROGRESS NOTES
Subjective:       Patient ID: Luis Wong is a 63 y.o. male.    Chief Complaint: Follow-up and Hypertension    63 years old male came to the clinic for blood pressure check.  Pressure today stable.  No Chest pain, palpitation, orthopnea or PND.  Patient did want to take ACE inhibitor or ARB for renal protection.  Kidney function was normal.  Microalbumin was negative.  Last A1c was stable.  No polyuria polydipsia or polyphagia.    Review of Systems   Constitutional: Negative.  Negative for activity change and unexpected weight change.   HENT: Negative.  Negative for hearing loss, rhinorrhea and trouble swallowing.    Eyes: Negative.  Negative for discharge and visual disturbance.   Respiratory: Negative.  Negative for chest tightness and wheezing.    Cardiovascular: Negative.  Negative for chest pain, palpitations and leg swelling.   Gastrointestinal: Negative.  Negative for blood in stool, constipation, diarrhea and vomiting.   Endocrine: Negative for polydipsia, polyphagia and polyuria.   Genitourinary: Negative.  Negative for difficulty urinating, hematuria and urgency.   Musculoskeletal: Negative.  Negative for arthralgias, joint swelling and neck pain.   Skin: Negative.    Neurological: Negative.  Negative for weakness and headaches.   Psychiatric/Behavioral: Negative.  Negative for confusion and dysphoric mood.       Objective:      Physical Exam   Constitutional: He is oriented to person, place, and time. He appears well-developed and well-nourished. No distress.   HENT:   Head: Normocephalic and atraumatic.   Right Ear: External ear normal.   Left Ear: External ear normal.   Nose: Nose normal.   Mouth/Throat: Oropharynx is clear and moist. No oropharyngeal exudate.   Eyes: Pupils are equal, round, and reactive to light. Conjunctivae and EOM are normal. Right eye exhibits no discharge. Left eye exhibits no discharge. No scleral icterus.   Neck: Normal range of motion. Neck supple. No JVD present. No  tracheal deviation present. No thyromegaly present.   Cardiovascular: Normal rate, regular rhythm, normal heart sounds and intact distal pulses. Exam reveals no gallop and no friction rub.   No murmur heard.  Pulmonary/Chest: Effort normal and breath sounds normal. No stridor. No respiratory distress. He has no wheezes. He has no rales. He exhibits no tenderness.   Abdominal: Soft. Bowel sounds are normal. He exhibits no distension and no mass. There is no tenderness. There is no rebound and no guarding.   Musculoskeletal: Normal range of motion. He exhibits no edema or tenderness.   Lymphadenopathy:     He has no cervical adenopathy.   Neurological: He is alert and oriented to person, place, and time. He has normal reflexes. He displays atrophy. He displays no tremor and normal reflexes. A sensory deficit is present. No cranial nerve deficit. He exhibits normal muscle tone. He displays no seizure activity. Coordination and gait abnormal.   Left hemiparesis.   Skin: Skin is warm and dry. No rash noted. He is not diaphoretic. No erythema. No pallor.   Psychiatric: He has a normal mood and affect. His behavior is normal. Judgment and thought content normal.   Nursing note and vitals reviewed.      Assessment:       1. Essential hypertension    2. Type 2 diabetes mellitus with hyperglycemia, without long-term current use of insulin        Plan:         Luis was seen today for follow-up and hypertension.    Diagnoses and all orders for this visit:    Essential hypertension  -     Comprehensive metabolic panel; Future  -     Lipid panel; Future    Type 2 diabetes mellitus with hyperglycemia, without long-term current use of insulin  -     Comprehensive metabolic panel; Future  -     Lipid panel; Future  -     Hemoglobin A1c; Future

## 2019-10-24 NOTE — PROGRESS NOTES
Physical Therapy Daily Treatment Note     Name: Luis Wong  Clinic Number: [id number]    Therapy Diagnosis:   Encounter Diagnoses   Name Primary?    Impaired functional mobility, balance, gait, and endurance     Decreased strength of lower extremity      Physician: Carla Altman PA-C    Visit Date: 10/25/2019    Physician Orders: PT Eval and Treat  Medical Diagnosis from Referral: I63.9 (ICD-10-CM) - CVA (cerebral vascular accident)  Evaluation Date: 9/13/2019  Authorization Period Expiration: 12/31/2019  Plan of Care Expiration: 11/8/2019  Visit # / Visits authorized: 16/30  FOTO: 6/10    Time In: 11:30 AM   Time Out: 12:33 PM  Total Billable Time: 63 minutes (2 NMR, 1 GT, 1 MT)    Precautions: Hx of CVA's    Subjective     Pt reports: No new complaints. He is steadily seeing improvement in his lower extremity strength  Response to previous treatment: No adverse reactions.  Functional change: Displays a reciprocal pattern when both ascending and descending the stairs at home.     Pain: 0/10  Location: N/A     Objective     Luis received manual therapy to left foot/ankle for 15 minutes including:   - Splaying of metatarsals  - Manual gastroc stretching seated with slant board with gentle overpressure at ankle joint x 2'  - AROM toe extension/flexion x 5     Pt performed gait training on treadmill x 18 minutes total consisting of forward gait 5% elevation x 8' at 0.9 speed setting with B UE support f/b sidestepping B x 2-3' each way at 0.3 - 0.4 speed setting with B UE support and 1 LOB with cues as needed for hip ER and DF .     IN between walking bouts pt performed standing hip IR stretch with B feet turned out with L trunk rotation 3 x 1'    Luis receive neuromuscular rehabilitation including proprioceptive and balance training for 20 minutes including:  - Nu-Step: 8 minutes, Level 6 for reciprocal BUE and BLE motion  - Forward walking 2 x 40'' without AD CGA for wt shift -  OOT  - Backward walking 2 x 40'' without AD CGA and verbal cues for full hip extension on the L and R - OOT  - Cone weaving without AD x 2 laps over 20 feet  - Side stepping in ladder x 2 laps x 20 feet without UE support with cues for L hip flexion    Luis received therapeutic exercises to develop strength, endurance, ROM and flexibility for 10 minutes including:    - Cybex Leg press                  6 plates, 3x10 B (focused mainly on left LE)                                                  3.5 plates, x30, LLE only, no rest breaks   - Cybex Leg Extension           3x10, LLE only - 20# - OOT  - Cybex Ham Curls                 1.0 pl 3x10 LLE only (instructions not to lean trunk forward for assistance) - OOT       Home Exercises Provided and Patient Education Provided     Education provided:   - encouraged cont HEP along with new hip IR stretch and side stepping at counter  - safety with environment when ambulating     Written Home Exercises Provided: yes.  Exercises were reviewed and Luis was able to demonstrate them prior to the end of the session.  Luis demonstrated good  understanding of the education provided.     See EMR under Patient Instructions for exercises provided 9/23/2019.      Assessment     Luis continues to show progress with increases in lower extremity strength and control when ambulating. During side-stepping in the ladder, he was able to clear the ladder for one full lap by striving to lift his leg from his hip; he did however display 1 LOB when moving laterally to the right with Celi from PT to correct balance.   Luis is progressing well towards his goals.   Pt prognosis is Good.     Pt will continue to benefit from skilled outpatient physical therapy to address the deficits listed in the problem list box on initial evaluation, provide pt/family education and to maximize pt's level of independence in the home and community environment.     Pt's spiritual, cultural and educational  needs considered and pt agreeable to plan of care and goals.    Anticipated barriers to physical therapy: Co-morbidities    Goals:     Long Term Goals (8 Weeks):   1.  Independent with initial HEP. (In progress, Not Met)  2.  Pt will perform nu-step at level 4 x 10 minutes without rests breaks going at least 50 step/min or greater.(In progress, Not Met)  3. Pt will be able to perform TUG in less than or equal to 20 secs without use of AD placingdemonstrating overall improved functional mobility. (In progress, Not Met)  4. Pt will performed sit to stand x 5 without UE support in 15 seconds without LOB backward or posterior LE hooking for functional and safe transfers. (In progress, Not Met)  5.  Pt will score greater than or equal to 45/56 on the Choi Balance Assessment with use of AD demonstrating overall improved functional mobility and balance and reduce fall risk.(In progress, Not Met)  6. Pt will walk < than or equal to 140 ft on the 2 minute walk test indoor with AD with 0 LOB and minimal gait deviations for improved safety in home ambulation and safety. (In progress, Not Met)  7.  Pt will be able to safely perform and tolerate high level ADL's without LOB. (In progress, Not Met)  8. Pt will have 0 falls from start of PT sessions.(In progress, Not Met)  9. Independent with updated HEP. (In progress, Not Met)  10. Pt will have MMT score of 3+/5 in all major ms groups in Left LE.(In progress, Not Met)  11. Pt will ambulate on TM x 6 minutes with use of UE support with 0 LOB at greater than or equal to 1.0 mph.(In progress, Not Met)  12. Pt will begin some form of community fitness to begin regular and consistent performance of exercise to continue maintenance of gains made in PT.(In progress, Not Met)    Plan     Continue to advance PT towards established PT goals.   Kandy Gatica, PT

## 2019-10-25 ENCOUNTER — CLINICAL SUPPORT (OUTPATIENT)
Dept: REHABILITATION | Facility: HOSPITAL | Age: 64
End: 2019-10-25
Payer: MEDICARE

## 2019-10-25 DIAGNOSIS — Z74.09 IMPAIRED FUNCTIONAL MOBILITY, BALANCE, GAIT, AND ENDURANCE: ICD-10-CM

## 2019-10-25 DIAGNOSIS — R29.898 DECREASED STRENGTH OF LOWER EXTREMITY: ICD-10-CM

## 2019-10-25 PROCEDURE — 97116 GAIT TRAINING THERAPY: CPT | Mod: PN

## 2019-10-25 PROCEDURE — 97140 MANUAL THERAPY 1/> REGIONS: CPT | Mod: PN

## 2019-10-25 PROCEDURE — 97112 NEUROMUSCULAR REEDUCATION: CPT | Mod: PN

## 2019-10-28 ENCOUNTER — PATIENT OUTREACH (OUTPATIENT)
Dept: ADMINISTRATIVE | Facility: OTHER | Age: 64
End: 2019-10-28

## 2019-10-28 ENCOUNTER — CLINICAL SUPPORT (OUTPATIENT)
Dept: REHABILITATION | Facility: HOSPITAL | Age: 64
End: 2019-10-28
Payer: MEDICARE

## 2019-10-28 DIAGNOSIS — Z78.9 IMPAIRED MOBILITY AND ACTIVITIES OF DAILY LIVING: ICD-10-CM

## 2019-10-28 DIAGNOSIS — Z74.09 IMPAIRED MOBILITY AND ACTIVITIES OF DAILY LIVING: ICD-10-CM

## 2019-10-28 DIAGNOSIS — R29.898 DECREASED STRENGTH OF LOWER EXTREMITY: ICD-10-CM

## 2019-10-28 DIAGNOSIS — Z74.09 IMPAIRED FUNCTIONAL MOBILITY, BALANCE, GAIT, AND ENDURANCE: ICD-10-CM

## 2019-10-28 DIAGNOSIS — R27.8 DECREASED COORDINATION: ICD-10-CM

## 2019-10-28 DIAGNOSIS — M62.81 MUSCLE WEAKNESS: ICD-10-CM

## 2019-10-28 PROCEDURE — 97110 THERAPEUTIC EXERCISES: CPT | Mod: PN

## 2019-10-28 PROCEDURE — 97530 THERAPEUTIC ACTIVITIES: CPT | Mod: PN

## 2019-10-28 PROCEDURE — 97116 GAIT TRAINING THERAPY: CPT | Mod: PN,59

## 2019-10-28 PROCEDURE — 97112 NEUROMUSCULAR REEDUCATION: CPT | Mod: PN

## 2019-10-28 NOTE — PROGRESS NOTES
Physical Therapy Daily Treatment Note     Name: Luis Wong  Clinic Number: [id number]    Therapy Diagnosis:   Encounter Diagnoses   Name Primary?    Impaired functional mobility, balance, gait, and endurance     Decreased strength of lower extremity      Physician: Carla Altman PA-C    Visit Date: 10/28/2019    Physician Orders: PT Eval and Treat  Medical Diagnosis from Referral: I63.9 (ICD-10-CM) - CVA (cerebral vascular accident)  Evaluation Date: 9/13/2019  Authorization Period Expiration: 12/31/2019  Plan of Care Expiration: 11/8/2019  Visit # / Visits authorized: 17/30  FOTO: 7/10    Time In: 9:30 AM   Time Out: 10:15 AM  Total Billable Time: 45 minutes (1 NMR, 1 TE, 1 GT)    Precautions: Hx of CVA's    Subjective     Pt reports: He had an eventful weekend, practiced on the treadmill and performed his mat exercises over the weekend.   Response to previous treatment: No adverse reactions.  Functional change: None stated     Pain: 0/10  Location: N/A     Objective     Luis did NOT receive manual therapy to left foot/ankle:   - Splaying of metatarsals  - Manual gastroc stretching seated with slant board with gentle overpressure at ankle joint x 2'  - AROM toe extension/flexion x 5     Pt performed gait training on treadmill x 16 minutes total consisting of forward gait 5% elevation x 6' at 1.0 speed setting with B UE support f/b sidestepping B x 3' each way at 0.3 - 0.4 speed setting with B UE support and 1 LOB with cues as needed for hip ER and DF .     IN between walking bouts pt performed standing hip IR stretch with B feet turned out with L trunk rotation 3 x 1'    Luis receive neuromuscular rehabilitation including proprioceptive and balance training for 10 minutes including:  - Nu-Step: 10 minutes, Level 5 for reciprocal BUE and BLE motion  - Forward walking 2 x 40'' without AD CGA for wt shift - OOT  - Backward walking 2 x 40'' without AD CGA and verbal cues for full  hip extension on the L and R - OOT  - Cone weaving without AD x 2 laps over 20 feet - OOT  - Side stepping in ladder x 2 laps x 20 feet without UE support with cues for L hip flexion - OOT    Luis received therapeutic exercises to develop strength, endurance, ROM and flexibility for 19 minutes including:    - Cybex Leg press                  6 plates, 3x10 B (focused mainly on left LE) - OOT                                                  3.5 plates, x30, LLE only, no rest breaks - OOT  - Cybex Leg Extension           3x10, LLE only - 20#, 40# BLE 3x10  - Cybex Ham Curls                 1.0 pl 3x10 LLE only  - Review of ankle exercises     Home Exercises Provided and Patient Education Provided     Education provided:   - encouraged cont HEP along with new hip IR stretch and side stepping at counter  - safety with environment when ambulating     Written Home Exercises Provided: yes.  Exercises were reviewed and Luis was able to demonstrate them prior to the end of the session.  Luis demonstrated good  understanding of the education provided.     See EMR under Patient Instructions for exercises provided 9/23/2019.      Assessment     Luis participated in skilled physical therapy with no LOB throughout session. He has met 2 LTG's of performing the Nu-Step 10 minutes without a rest break, and 6 minutes on the treadmill at 1.0 mph. During treadmill training he did exhibit slight fatigue as his leg began to turn into increased internal rotation. [place] with hamstring curls has improved as he has increased his range of motion without assistance from PT or compensation with leaning trunk forward. Ankle exercises issued and reviewed today to begin assisting with regaining strength and ROM.      Luis is progressing well towards his goals.   Pt prognosis is Good.     Pt will continue to benefit from skilled outpatient physical therapy to address the deficits listed in the problem list box on initial  evaluation, provide pt/family education and to maximize pt's level of independence in the home and community environment.     Pt's spiritual, cultural and educational needs considered and pt agreeable to plan of care and goals.    Anticipated barriers to physical therapy: Co-morbidities    Goals:     Long Term Goals (8 Weeks):   1.  Independent with initial HEP. (In progress, Not Met)  2.  Pt will perform nu-step at level 4 x 10 minutes without rests breaks going at least 50 step/min or greater. MET 10/28/2019 (Level 5)  3. Pt will be able to perform TUG in less than or equal to 20 secs without use of AD placingdemonstrating overall improved functional mobility. (In progress, Not Met)  4. Pt will performed sit to stand x 5 without UE support in 15 seconds without LOB backward or posterior LE hooking for functional and safe transfers. (In progress, Not Met)  5.  Pt will score greater than or equal to 45/56 on the Choi Balance Assessment with use of AD demonstrating overall improved functional mobility and balance and reduce fall risk.(In progress, Not Met)  6. Pt will walk < than or equal to 140 ft on the 2 minute walk test indoor with AD with 0 LOB and minimal gait deviations for improved safety in home ambulation and safety. (In progress, Not Met)  7.  Pt will be able to safely perform and tolerate high level ADL's without LOB. (In progress, Not Met)  8. Pt will have 0 falls from start of PT sessions.(In progress, Not Met)  9. Independent with updated HEP. (In progress, Not Met)  10. Pt will have MMT score of 3+/5 in all major ms groups in Left LE.(In progress, Not Met)  11. Pt will ambulate on TM x 6 minutes with use of UE support with 0 LOB at greater than or equal to 1.0 mph. MET 10/28/2019  12. Pt will begin some form of community fitness to begin regular and consistent performance of exercise to continue maintenance of gains made in PT.(In progress, Not Met)    Plan     Continue to advance PT towards  established PT goals.   Review and perform ankle exercises next visit.   Kandy Gatica, PT

## 2019-10-28 NOTE — PROGRESS NOTES
"  Occupational Therapy Daily Treatment Note     Name: Luis Wong  Clinic Number: [id number]    Therapy Diagnosis:    Encounter Diagnoses   Name Primary?    Impaired mobility and activities of daily living     Muscle weakness     Decreased coordination      Physician: Carla Altman, HALLE Lopez MD    Visit Date: 10/28/2019  Physician Orders: Eval and tx  Medical Diagnosis: CVA L sided weakness Recrudecesnt  Onset Date: 8/28/19 most recent CVA  Evaluation Date: 9/4/2019  Plan of Care Expiration Period: 11/1/19  Insurance Authorization period Expiration: 12/31/19  Date of Return to MD: NA  Visit # / Visits Authortized: 16 / 30  FOTO: CVA UE hand /75% limitation from 100%  10/4/19       Time In:8:45 am   Time Out:9:30 am   Total Billable (one on one) Time: 45 minutes      Precautions: Standard and Fall    Subjective     Pt reports: "Im doing ok my insurance changes the first so maybe I can get the splint "  he was compliant with home exercise program given last session.   Response to previous treatment:good   Functional change: Functional use at home to turn on light switch    Pain: 0/10  Location: left arms     Objective      Luis Wong  received therapeutic exercises to develop GM/FM coordination, balance, activity tolerance and strength in order to increase ADL/IADL independence with replicated home management/self care tasks, for 35 minutes including: Measurements     Shoulder and supine exercises:    UBE 90 RPM 3 min forward 2 min backward   Hand    Wrist 3 ways 0#  2/10    Red t putty  Mold    Roll  Pinch   PVC punch out Red  X 30            Bicep curls seated 4# dowel  2/15         Shoulder    UE ranger L and R  F and backwards  X 30 each                             Luis Wong  received therapeutic activities to develop GM/FM coordination, balance, activity tolerance and strength in order to increase ADL/IADL independence with replicated home management/self care tasks, for 10 minutes " including:  Pom pom retrieval  All fingers x 2 tubs    Finger opposition All fingers x 10   Tolar retrieval  X 1/2 tub    Isopheres 2 min          Home Exercises and Education Provided     Education provided:   - Previous hand out   - Progress towards goals     Written Home Exercises Provided: Patient instructed to cont prior HEP. Updated putty exercises to molding,  and pinch Red putty provided.  Exercises were reviewed and Luis was able to demonstrate them prior to the end of the session.  Luis demonstrated good  understanding of the HEP provided.   .   See EMR under Patient Instructions for exercises provided prior visit.        Assessment     Pt would continue to benefit from skilled OT. Main focus today being hand and wrist with FM coordination and pincer grasp function. He did well with new challenges but is still limited with forearm and wrist control. Improved wrist ext but is still limited due to tone. He states insurance change this month and would def benefit from dynasplint use to prevent further inhibited ROM and tone.  Next session to focus on measurements and UPOC.  Remains highly motivated.    Luis is progressing well towards his goals and there are no updates to goals at this time. Pt prognosis is Fair.     Pt will continue to benefit from skilled outpatient occupational therapy to address the deficits listed in the problem list on initial evaluation provide pt/family education and to maximize pt's level of independence in the home and community environment.     Anticipated barriers to occupational therapy:     Pt's spiritual, cultural and educational needs considered and pt agreeable to plan of care and goals.    Goals:    Short Term Goals:  1) Initiate Hep Progressing 10/28/2019  2) Pt to increase LUE  strength by 5 # in order to A in self care task of feeding by 4 weeks. Progressing 10/28/2019  3) Pt to increase Quick dash Score by 5 points increasing self care IND by 4 weeks.  Progressing 10/28/2019  4) Pt to increase L UE AROM by 10 degrees in order to A in UB dressing by 4 weeks. Progressing 10/28/2019  5) Patient will be able to achieve less than or equal to 75% on the FOTO, demonstrating overall improved functional ability with upper extremity. (self-care category) Progressing 10/28/2019       Long Term Goals:  1) Pt to be IND with HEP in order to maintain ROM and strength needed for self care IND by d.c. Progressing 10/28/2019  2) Pt to increase L UE  strength to WNL as compared to unaffected extremity in order to open items for self feeding by d.c. Progressing 10/28/2019  3) Pt to increase Quick dash Score by 10 points increasing self care IND at home by d.c. Progressing 10/28/2019  4) Pt to increase L UE AROM to WFL in order to A in UB dressing by d/c. Progressing 10/28/2019  5) Patient will be able to achieve less than or equal to 50% on the FOTO, demonstrating overall improved functional ability with upper extremity. (self-care category) Progressing 10/28/2019       Plan   Continue per plan of care. Pt would benefit from dynasplint use at the elbow and wrist in order to prevent further restrictions and decreased ROM impacting IND.   Updates/Grading for next session: Progress as tolerated.       Christian Teran, OT

## 2019-10-28 NOTE — PATIENT INSTRUCTIONS
Ankle Pump    With left leg elevated, gently flex and extend ankle. Move through full range of motion. Avoid pain. Perform more frequently if having increased swelling.  Repeat 10 times left side per set. Do 2 sets per session. Do 2 sessions per day.      Ankle Alphabet    Sit with leg straight out in front of you and heel off edge of bed or propped up on towel roll. Using ankle and foot only, trace the letters of the alphabet. Perform A to Z. Do not let the knee move too much side to side.  Repeat 3 times left side per set. Do 2 sessions per day.    Towel Scrunches    Place left foot flat on towel, knee pointed forward. Use forefoot and toes to pull towel backward.  Do not allow heel or knee to move. Repetitions should be slow and controlled.  Repeat 1 minute, 3 times left side per set. Do 1 sets per session. Do 1 sessions per day.      Gastroc, Sitting (Passive)    Sit with leg straight out in front of you and a towel under your heel and around ball of foot. Gently pull toward body. Hold 30 seconds.   Repeat 3 times left side per set. Do 2 sets per session. Do 2 sessions per day.    Toe Lift    Sit with your foot flat on the floor and your finger under your big toe. Without rolling in/out or lifting your heel/toes, push down into your finger with your big toe joint. Maintain the pressure of the ball of your foot on your finger, keep the four little toes relaxed and in contact with the ground, then slowly lift the big toe up and down again. Do slowly and take breaks as needed.  Repeat 10 times left side per set. Do 2 sets per session. Do 2 sessions per day.    Resistance Band Ankle Movements, 4 ways    1. Wrap band around foot and attach below the foot. Pull foot up against resistance band. Slowly release for 3-5 seconds.  2. Wrap band around foot and hold band in your hand. Push foot down against resistance band. Slowly release for 3-5 seconds.  3. Wrap band around foot and loop around other foot and hold the end  of the band. Pull foot out to side against resistance band. Slowly release for 3-5 seconds.   4. Cross one leg over the other, wrap band around bottom foot, step top of foot on band and hold the end of the band. Pull foot to the inside against resistance band. Slowly release for 3-5 seconds.     Use Yellow resistance band.  Repeat 10 times left side per set. Do 2 sets per session. Do 2 sessions per day.      Tandem Stand    Stand with right foot in front of the other. Hold 30 seconds. Repeat with other leg forward.  Repeat 3 times each side per session. Do 2 sessions per day.    Copyright © [employer]. All rights reserved.

## 2019-10-29 ENCOUNTER — DOCUMENTATION ONLY (OUTPATIENT)
Dept: REHABILITATION | Facility: HOSPITAL | Age: 64
End: 2019-10-29

## 2019-10-29 ENCOUNTER — OFFICE VISIT (OUTPATIENT)
Dept: ORTHOPEDICS | Facility: CLINIC | Age: 64
End: 2019-10-29
Payer: COMMERCIAL

## 2019-10-29 DIAGNOSIS — G56.03 BILATERAL CARPAL TUNNEL SYNDROME: Primary | ICD-10-CM

## 2019-10-29 DIAGNOSIS — Z74.09 IMPAIRED FUNCTIONAL MOBILITY, BALANCE, GAIT, AND ENDURANCE: ICD-10-CM

## 2019-10-29 DIAGNOSIS — R29.898 DECREASED STRENGTH OF LOWER EXTREMITY: ICD-10-CM

## 2019-10-29 PROCEDURE — 20526 PR INJECT CARPAL TUNNEL: ICD-10-PCS | Mod: 50,S$GLB,, | Performed by: ORTHOPAEDIC SURGERY

## 2019-10-29 PROCEDURE — 99999 PR PBB SHADOW E&M-EST. PATIENT-LVL II: CPT | Mod: PBBFAC,,, | Performed by: ORTHOPAEDIC SURGERY

## 2019-10-29 PROCEDURE — 99213 PR OFFICE/OUTPT VISIT, EST, LEVL III, 20-29 MIN: ICD-10-PCS | Mod: 25,S$GLB,, | Performed by: ORTHOPAEDIC SURGERY

## 2019-10-29 PROCEDURE — 99999 PR PBB SHADOW E&M-EST. PATIENT-LVL II: ICD-10-PCS | Mod: PBBFAC,,, | Performed by: ORTHOPAEDIC SURGERY

## 2019-10-29 PROCEDURE — 99213 OFFICE O/P EST LOW 20 MIN: CPT | Mod: 25,S$GLB,, | Performed by: ORTHOPAEDIC SURGERY

## 2019-10-29 PROCEDURE — 20526 THER INJECTION CARP TUNNEL: CPT | Mod: 50,S$GLB,, | Performed by: ORTHOPAEDIC SURGERY

## 2019-10-29 RX ORDER — TRIAMCINOLONE ACETONIDE 40 MG/ML
40 INJECTION, SUSPENSION INTRA-ARTICULAR; INTRAMUSCULAR
Status: COMPLETED | OUTPATIENT
Start: 2019-10-29 | End: 2019-10-29

## 2019-10-29 RX ADMIN — TRIAMCINOLONE ACETONIDE 40 MG: 40 INJECTION, SUSPENSION INTRA-ARTICULAR; INTRAMUSCULAR at 11:10

## 2019-10-29 NOTE — PROGRESS NOTES
Subjective:      Patient ID: Luis Wong is a 63 y.o. male.  Chief Complaint: Pain and Numbness of the Right Hand      HPI  Luis Wong is a  63 y.o. male presenting today for follow up of bilateral carpal tunnel syndrome.  He reports that he is having a flare-up again in both hands  Right worse than left  We had discussed surgery previously but he recently had had a stroke and is still recovering from the stroke she wants to hold off on any surgery for now  He does have some hemiparesis in the left arm related to stroke.    Review of patient's allergies indicates:  No Known Allergies      Current Outpatient Medications   Medication Sig Dispense Refill    aspirin (ECOTRIN) 81 MG EC tablet TAKE 1 TABLET BY MOUTH EVERY DAY 90 tablet 3    atorvastatin (LIPITOR) 40 MG tablet Take 1 tablet (40 mg total) by mouth once daily. 90 tablet 3    baclofen (LIORESAL) 10 MG tablet Take 0.5 tablets (5 mg total) by mouth 3 (three) times daily. 135 tablet 3    clopidogrel (PLAVIX) 75 mg tablet Take 1 tablet (75 mg total) by mouth once daily. 90 tablet 3    hydroCHLOROthiazide (MICROZIDE) 12.5 mg capsule TAKE 1 CAPSULE BY MOUTH EVERY DAY 90 capsule 3    metFORMIN (GLUCOPHAGE) 500 MG tablet Take 1 tablet (500 mg total) by mouth daily with breakfast. 90 tablet 3    NIFEdipine (PROCARDIA-XL) 30 MG (OSM) 24 hr tablet Take 1 tablet (30 mg total) by mouth once daily. 90 tablet 3    potassium chloride (MICRO-K) 8 mEq CpSR TAKE 1 CAPSULE (8 MEQ TOTAL) BY MOUTH ONCE DAILY. 90 capsule 3     No current facility-administered medications for this visit.        Past Medical History:   Diagnosis Date    Aneurysm 10/30/2012    Diabetes mellitus     Fever blister     Herpes infection     Hypertension     ICH (intracerebral hemorrhage)     Mixed hyperlipidemia 9/5/2013    Nontraumatic thalamic hemorrhage 8/31/2016    JAQUAN (obstructive sleep apnea)     Right-sided lacunar stroke 9/11/2014    SDH (subdural hematoma)     Special  screening for malignant neoplasms, colon     Stroke        Past Surgical History:   Procedure Laterality Date    Knee arthroscopic surgery         OBJECTIVE:   PHYSICAL EXAM:       Vitals:    10/29/19 1056   PainSc:   1     Ortho/SPM Exam  Examination both hands demonstrate a positive Tinel sign at the wrist slight swelling in the volar compartment bilaterally  Left hand has contracture limited motion in spasticity of the elbow and hand    RADIOGRAPHS:  None  Comments: I have personally reviewed the imaging and I agree with the above radiologist's report.    ASSESSMENT/PLAN:     IMPRESSION:  1.  Bilateral carpal tunnel syndrome.  2.  Hemiparesis left arm after recent stroke    PLAN:  For the left arm I would recommend that he continue therapy and a wrist brace to prevent contracture  Also injection performed each wrist as follows.  After pause for time-out identified each carpal tunnel injected bilaterally with combination Kenalog 20 mg 0.5 cc xylocaine sterile technique  Tolerated the procedure well without complication  Follow-up 3 months    FOLLOW UP:  3 months    Disclaimer: This note has been generated using voice-recognition software. There may be typographical errors that have been missed during proof-reading.

## 2019-10-30 ENCOUNTER — CLINICAL SUPPORT (OUTPATIENT)
Dept: REHABILITATION | Facility: HOSPITAL | Age: 64
End: 2019-10-30
Payer: MEDICARE

## 2019-10-30 DIAGNOSIS — Z78.9 IMPAIRED MOBILITY AND ACTIVITIES OF DAILY LIVING: ICD-10-CM

## 2019-10-30 DIAGNOSIS — R29.898 DECREASED STRENGTH OF LOWER EXTREMITY: ICD-10-CM

## 2019-10-30 DIAGNOSIS — R27.8 DECREASED COORDINATION: ICD-10-CM

## 2019-10-30 DIAGNOSIS — M62.81 MUSCLE WEAKNESS: ICD-10-CM

## 2019-10-30 DIAGNOSIS — Z74.09 IMPAIRED MOBILITY AND ACTIVITIES OF DAILY LIVING: ICD-10-CM

## 2019-10-30 DIAGNOSIS — Z74.09 IMPAIRED FUNCTIONAL MOBILITY, BALANCE, GAIT, AND ENDURANCE: ICD-10-CM

## 2019-10-30 PROCEDURE — 97530 THERAPEUTIC ACTIVITIES: CPT | Mod: PN

## 2019-10-30 PROCEDURE — 97116 GAIT TRAINING THERAPY: CPT | Mod: PN

## 2019-10-30 PROCEDURE — 97110 THERAPEUTIC EXERCISES: CPT | Mod: PN

## 2019-10-30 PROCEDURE — 97112 NEUROMUSCULAR REEDUCATION: CPT | Mod: PN

## 2019-10-30 NOTE — PROGRESS NOTES
"  Occupational Therapy Daily Treatment Note     Name: Luis Wong  Clinic Number: [id number]    Therapy Diagnosis:    Encounter Diagnoses   Name Primary?    Impaired mobility and activities of daily living     Muscle weakness     Decreased coordination      Physician: Carla Altman, HALLE Lopez MD    Visit Date: 10/30/2019  Physician Orders: Eval and tx  Medical Diagnosis: CVA L sided weakness Recrudecesnt  Onset Date: 8/28/19 most recent CVA  Evaluation Date: 9/4/2019  Plan of Care Expiration Period: 11/1/19  Insurance Authorization period Expiration: 12/31/19  Date of Return to MD: NA  Visit # / Visits Authortized: 17 / 30  FOTO: CVA UE hand /75% limitation from 100%  10/4/19       Time In:9:30 am   Time Out:10:15 am   Total Billable (one on one) Time: 45 minutes      Precautions: Standard and Fall    Subjective     Pt reports: "Im doing good today I hope the cold weather doesn't make me stiff"  he was compliant with home exercise program given last session.   Response to previous treatment:good   Functional change: Functional use at home to turn on light switch    Pain: 0/10  Location: left arms     Objective      Luis Wong  received therapeutic exercises to develop GM/FM coordination, balance, activity tolerance and strength in order to increase ADL/IADL independence with replicated home management/self care tasks, for 35 minutes including: Measurements included     Shoulder and supine exercises:    UBE 90 RPM 3 min forward 2 min backward   Hand    Finger spreads  Opposition  Isolated flexion 1st 2 fingers  Last 2 fingers  Thumb opposition X 30   X 30    Circles  X 30   Green t putty  Mold    Roll  Pinch   PVC punch out Red  X 30            Bicep curls seated 4# dowel  2/15         Shoulder    AAROM flexion overhead    Attempted yoga block chest press unable due to pain 0#  2/10    Supine chest press  Forward flexion 4#  2/10                        Luis Wong  received therapeutic " activities to develop GM/FM coordination, balance, activity tolerance and strength in order to increase ADL/IADL independence with replicated home management/self care tasks, for 10 minutes including:  Cotton ball in hand manipulation 4 at a time  X 1 tub                        Home Exercises and Education Provided     Education provided:   - Previous hand out   - Progress towards goals     Written Home Exercises Provided: Patient instructed to cont prior HEP. Updated putty exercises to molding,  and pinch Red putty provided.  Exercises were reviewed and Luis was able to demonstrate them prior to the end of the session.  Luis demonstrated good  understanding of the HEP provided.   .   See EMR under Patient Instructions for exercises provided prior visit.        Assessment   See tx section for UPOC.

## 2019-10-30 NOTE — PLAN OF CARE
"  Outpatient Therapy Updated Plan of Care     Visit Date: 10/30/2019  Name: Luis Wong  Clinic Number: [id number]    Therapy Diagnosis:   Encounter Diagnoses   Name Primary?    Impaired mobility and activities of daily living     Muscle weakness     Decreased coordination      Physician: Carla Altman, HALLE Lopez MD    Physician Orders: Eval and tx  Medical Diagnosis: CVA L sided weakness  Evaluation Date: 9/4/2019    Total Visits Received: 17  Cancelled Visits: 0  No Show Visits: 0    Current Certification Period:  11/1/19 to 12/27/19  Precautions:  Universal , fall  Visits from Evaluation Date:  17  Functional Level Prior to Evaluation:  IND    Subjective     Update: " I feel ok I hope the cold doesn't make me stiff"    Objective     Update:      Joint Evaluation  AROM  9/4/2019 PROM   9/4/2019 AROM  10/8/2019    AROM  10/30/2019       Left Left Left Left   Shoulder flex 0-180 54 130 125 122   Shoulder Abd 0-180 54 125 115 110   Shoulder ER 0-90 Neutral WNL Neutral  Neutral    Shoulder IR 0-90 Trace WNL S4 S4   Shoulder Extension 0-80 51 65 52 60   Shoulder Horizontal adduction 0-90 Trace WNL 45 WNl   Elbow flex/ext 0-150 WNL WNL Ext -20 WNL/Ext -28   Wrist flex 0-80 WNL WNL WNL    Wrist ext 0-70 Neutral WNL 32 35   Supination 0-80 Trace easier WNL WnL WNL   Pronation 0-80 trace WNl WNL WNL   UD Trace WNL 32 32   RD Trace WNL 22 22             Strength: (ANTONIO Dynamometer in lbs.) Average 3 trials, Position II:       9/4/2019 9/4/2019 10/8/2019    10/30/2019       Right Left Left Left   Rung # 10# 32# (+22) 35# (+3)      Pinch Strength (Measured in psi)       9/4/2019 9/4/2019 10/8/2019    10/30/2019       Right Left Left Left   Key Pinch 26 psi 3 psi 11# 11   3pt Pinch 20 psi 0 psi 9# 9   2pt Pinch 11 psi 0 psi 8# 8      Fine Motor Coordination: 9 Hole Peg Test  9 Peg Test Right Left Left 10/8/2019    Left  10/30/2019     Removed 9/9 9/9 9/9 dropped to towel 9/9 dropped to towel "   Replaced 9/9 0/9 9/9 used R hand to assist pegs into finger tips 9/9 using R hand to A pegs into finger tips almost able to use L only   Time NT sec NT sec 2:55  1:35             Assessment     Update:   Pt would continue to benefit from skilled OT. Main focus today being hand and wrist with FM coordination and pincer grasp function. He did well with new challenges but is still limited with forearm and wrist control. Improved wrist ext but is still limited due to tone. He states insurance change this month and would def benefit from dynasplint use to prevent further inhibited ROM and tone.  Next session to focus on measurements and UPOC.  Remains highly motivated.    Luis is progressing well towards his goals and there are no updates to goals at this time. Pt prognosis is Fair.     Pt will continue to benefit from skilled outpatient occupational therapy to address the deficits listed in the problem list on initial evaluation provide pt/family education and to maximize pt's level of independence in the home and community environment.     Anticipated barriers to occupational therapy:     Pt's spiritual, cultural and educational needs considered and pt agreeable to plan of care and goals.    Previous Short Term Goals Status:   Goals:  Short Term Goals:  1) Initiate Hep Progressing 10/30/2019  2) Pt to increase LUE  strength by 5 # in order to A in self care task of feeding by 4 weeks. MET 10/30/2019  3) Pt to increase Quick dash Score by 5 points increasing self care IND by 4 weeks. Progressing 10/30/2019  4) Pt to increase L UE AROM by 10 degrees in order to A in UB dressing by 4 weeks.MET10/30/2019  5) Patient will be able to achieve less than or equal to 75% on the FOTO, demonstrating overall improved functional ability with upper extremity. (self-care category) Progressing 10/30/2019       New Short Term Goals Status:     Long Term Goals:  1) Pt to be IND with HEP in order to maintain ROM and strength  needed for self care IND by barrera Progressing 10/30/2019  2) Pt to increase L UE  strength to WNL as compared to unaffected extremity in order to open items for self feeding by barrera Progressing 10/30/2019  3) Pt to increase Quick dash Score by 10 points increasing self care IND at home by barrera Progressing 10/30/2019  4) Pt to increase L UE AROM to WFL in order to A in UB dressing by grant/david Progressing 10/30/2019  5) Patient will be able to achieve less than or equal to 50% on the FOTO, demonstrating overall improved functional ability with upper extremity. (self-care category) Progressing 10/30/2019        Long Term Goal Status:   continue per initial plan of care.  Reasons for Recertification of Therapy:   Pt continues to benefit from skilled services and has made good progress thus far with good future rehab potential. Pt remains limited with LUE strength, ROM and coordination at this time which all still impact their performance of ADLs , IADLs affecting her habits, roles and routines.      Plan     Updated Certification Period: 10/30/2019 to 12/27/19  Recommended Treatment Plan: 2 times per week for 8 weeks: Electrical Stimulation PRN, Fluidotherapy, Manual Therapy, Moist Heat/ Ice, Neuromuscular Re-ed, Orthotic Management and Training, Paraffin, Patient Education, Self Care, Therapeutic Activites and Therapeutic Exercise  Other Recommendations: K tape, Cupping, UPOC, Orthotic training PRN. Pt would benefit from dynasplint use at the elbow and wrist in order to prevent further restrictions and decreased ROM impacting IND.       Christian Teran, OT  10/30/2019      I CERTIFY THE NEED FOR THESE SERVICES FURNISHED UNDER THIS PLAN OF TREATMENT AND WHILE UNDER MY CARE    Physician's comments:        Physician's Signature: ___________________________________________________

## 2019-10-30 NOTE — PROGRESS NOTES
Physical Therapy Daily Treatment Note     Name: Luis Wong  Clinic Number: [id number]    Therapy Diagnosis:   Encounter Diagnoses   Name Primary?    Impaired functional mobility, balance, gait, and endurance     Decreased strength of lower extremity      Physician: Carla Altman PA-C    Visit Date: 10/30/2019    Physician Orders: PT Eval and Treat  Medical Diagnosis from Referral: I63.9 (ICD-10-CM) - CVA (cerebral vascular accident)  Evaluation Date: 9/13/2019  Authorization Period Expiration: 12/31/2019  Plan of Care Expiration: 11/8/2019  Visit # / Visits authorized: 18/30  FOTO: 8/10    Time In:10:15 AM   Time Out: 11:00 AM  Total Billable Time: 42 minutes (1 NMR, 1 TE, 1 GT)    Precautions: Hx of CVA's    Subjective     Pt reports:no reports of pain, pt agreeable to PT session   Response to previous treatment: No adverse reactions.  Functional change: None stated     Pain: 0/10  Location: N/A     Objective     Luis did NOT receive manual therapy to left foot/ankle:   - Splaying of metatarsals  - Manual gastroc stretching seated with slant board with gentle overpressure at ankle joint x 2'  - AROM toe extension/flexion x 5     Pt performed gait training on treadmill x 12 minutes total consisting of forward gait 5% elevation x 6' at 1.0 speed setting with B UE support f/b sidestepping B x 3' each way at 0.3 - 0.4 speed setting with B UE support    Inbetween walking bouts pt performed standing hip IR stretch with B feet turned out with L trunk rotation 3 x 1'    Luis receive neuromuscular rehabilitation including proprioceptive and balance training for 10 minutes including:  - Nu-Step: 10 minutes, Level 5 for reciprocal BUE and BLE motion  - Forward walking 2 x 40'' without AD CGA for wt shift - OOT  - Backward walking 2 x 40'' without AD CGA and verbal cues for full hip extension on the L and R - OOT  - [facility] weaving without AD x 2 laps over 20 feet - OOT  - Side stepping in  ladder x 2 laps x 20 feet without UE support with cues for L hip flexion - OOT    Luis received therapeutic exercises to develop strength, endurance, ROM and flexibility for 20 minutes including:    - Cybex Leg press                  6 plates, 3x10 B (focused mainly on left LE) -                                                   3.5 plates, x30, LLE only, no rest breaks -  - Cybex Leg Extension           3x10, LLE only - 20#, 40# BLE 3x10  - Cybex Ham Curls                 1.0 pl 3x10 LLE only  - Review of ankle exercises     Home Exercises Provided and Patient Education Provided     Education provided:   - encouraged cont HEP along with new hip IR stretch and side stepping at counter  - safety with environment when ambulating     Written Home Exercises Provided: yes.  Exercises were reviewed and Luis was able to demonstrate them prior to the end of the session.  Luis demonstrated good  understanding of the education provided.     See EMR under Patient Instructions for exercises provided 9/23/2019.      Assessment     Pt performed all therx with no episodes of Loss of balance noted. Verbal instructions provided for heel strike with R LE  On treadmill today.   Luis is progressing well towards his goals.   Pt prognosis is Good.     Pt will continue to benefit from skilled outpatient physical therapy to address the deficits listed in the problem list box on initial evaluation, provide pt/family education and to maximize pt's level of independence in the home and community environment.     Pt's spiritual, cultural and educational needs considered and pt agreeable to plan of care and goals.    Anticipated barriers to physical therapy: Co-morbidities    Goals:     Long Term Goals (8 Weeks):   1.  Independent with initial HEP. (In progress, Not Met)  2.  Pt will perform nu-step at level 4 x 10 minutes without rests breaks going at least 50 step/min or greater. MET 10/28/2019 (Level 5)  3. Pt will be able to perform  TUG in less than or equal to 20 secs without use of AD placingdemonstrating overall improved functional mobility. (In progress, Not Met)  4. Pt will performed sit to stand x 5 without UE support in 15 seconds without LOB backward or posterior LE hooking for functional and safe transfers. (In progress, Not Met)  5.  Pt will score greater than or equal to 45/56 on the Choi Balance Assessment with use of AD demonstrating overall improved functional mobility and balance and reduce fall risk.(In progress, Not Met)  6. Pt will walk < than or equal to 140 ft on the 2 minute walk test indoor with AD with 0 LOB and minimal gait deviations for improved safety in home ambulation and safety. (In progress, Not Met)  7.  Pt will be able to safely perform and tolerate high level ADL's without LOB. (In progress, Not Met)  8. Pt will have 0 falls from start of PT sessions.(In progress, Not Met)  9. Independent with updated HEP. (In progress, Not Met)  10. Pt will have MMT score of 3+/5 in all major ms groups in Left LE.(In progress, Not Met)  11. Pt will ambulate on TM x 6 minutes with use of UE support with 0 LOB at greater than or equal to 1.0 mph. MET 10/28/2019  12. Pt will begin some form of community fitness to begin regular and consistent performance of exercise to continue maintenance of gains made in PT.(In progress, Not Met)    Plan     Continue to advance PT towards established PT goals.      Robin Davis, PTA

## 2019-11-01 ENCOUNTER — CLINICAL SUPPORT (OUTPATIENT)
Dept: REHABILITATION | Facility: HOSPITAL | Age: 64
End: 2019-11-01
Payer: MEDICARE

## 2019-11-01 DIAGNOSIS — R29.898 DECREASED STRENGTH OF LOWER EXTREMITY: ICD-10-CM

## 2019-11-01 DIAGNOSIS — Z74.09 IMPAIRED FUNCTIONAL MOBILITY, BALANCE, GAIT, AND ENDURANCE: ICD-10-CM

## 2019-11-01 PROCEDURE — 97116 GAIT TRAINING THERAPY: CPT | Mod: PN

## 2019-11-01 PROCEDURE — 97110 THERAPEUTIC EXERCISES: CPT | Mod: PN

## 2019-11-01 PROCEDURE — 97112 NEUROMUSCULAR REEDUCATION: CPT | Mod: PN

## 2019-11-01 NOTE — PROGRESS NOTES
Physical Therapy Daily Treatment Note     Name: Luis Wong  Clinic Number: [id number]    Therapy Diagnosis:   Encounter Diagnoses   Name Primary?    Impaired functional mobility, balance, gait, and endurance     Decreased strength of lower extremity      Physician: Carla Altman PA-C     Visit Date: 11/1/2019    Physician Orders: PT Eval and Treat  Medical Diagnosis from Referral: I63.9 (ICD-10-CM) - CVA (cerebral vascular accident)  Evaluation Date: 9/13/2019  Authorization Period Expiration: 12/31/2019  Plan of Care Expiration: 11/8/2019  Visit # / Visits authorized: 19/30  FOTO: 9/10    Time In:10:15 AM   Time Out: 11:10 AM  Total Billable Time: 55 minutes (1 NMR, 2 TE, 1 GT)    Precautions: Hx of CVA's    Subjective     Pt reports: No new complaints. States he's been practicing walking on the treadmill at home and feels as though he is walking around the house easier.   Response to previous treatment: No adverse reactions.  Functional change: Ambulation within the home feels easier as well as navigating stairs.     Pain: 0/10  Location: N/A     Objective     Luis received manual therapy to left foot/ankle for 5 minutes including:   - Splaying of metatarsals  - Manual gastroc stretching seated with slant board with gentle overpressure at ankle joint x 2' - not today  - AROM toe extension/flexion x 5     Pt performed gait training on treadmill x 16 minutes total consisting of:  -Forward gait 5% elevation x 6' at 1.0 speed setting with B UE support f/b  -Sidestepping 5% elevation x 3' each way at 0.3 - 0.4 speed setting with B UE support  -Backward gait 5% elevation x 4' at 0.4 speed setting with RUE support    Inbetween walking bouts pt performed standing hip IR stretch with B feet turned out with L trunk rotation 3 x 1'    Luis receive neuromuscular rehabilitation including proprioceptive and balance training for 8 minutes including:  - Nu-Step: 8 minutes, Level 6 for  reciprocal BUE and BLE motion  - Forward walking 2 x 40'' without AD CGA for wt shift - OOT  - Backward walking 2 x 40'' without AD CGA and verbal cues for full hip extension on the L and R - OOT  - [employer] weaving without AD x 2 laps over 20 feet - OOT  - Side stepping in ladder x 2 laps x 20 feet without UE support with cues for L hip flexion - OOT    Luis received therapeutic exercises to develop strength, endurance, ROM and flexibility for 25 minutes including:    - Cybex Leg press                  6 plates, 3x10 B (focused mainly on left LE) - OOT                                                  3.5 plates, x30, LLE only, no rest breaks -OOT  - Cybex Leg Extension           3x10, LLE only - 20#, 40# BLE 3x10 - OOT  - Cybex Ham Curls                 1.0 pl 3x10 LLE only  - Towel Scrunches  1' x 2  - Dorsiflexion/PF  x10 each direction, AAROM for DF  - Inversion/Eversion x10 each direction, AAROM for Eversion    Home Exercises Provided and Patient Education Provided     Education provided:   - encouraged cont HEP along with new hip IR stretch and side stepping at counter  - safety with environment when ambulating     Written Home Exercises Provided: yes.  Exercises were reviewed and Luis was able to demonstrate them prior to the end of the session.  Luis demonstrated good  understanding of the education provided.     See EMR under Patient Instructions for exercises provided 9/23/2019.      Assessment     Luis tolerated the addition of backward walking on treadmill without LOB. Focused on hamstring curls today to continue building hamstring strength to assist with countering quadriceps strength to ultimately prevent knee hyperextension. Ankle and toe exercises reviewed and performed today with AAROM needed for DF and eversion. Patient instructed to add ankle exercises to daily HEP on a regular basis.     Luis is progressing well towards his goals.   Pt prognosis is Good.     Pt will continue to benefit from  skilled outpatient physical therapy to address the deficits listed in the problem list box on initial evaluation, provide pt/family education and to maximize pt's level of independence in the home and community environment.     Pt's spiritual, cultural and educational needs considered and pt agreeable to plan of care and goals.    Anticipated barriers to physical therapy: Co-morbidities    Goals:     Long Term Goals (8 Weeks):   1.  Independent with initial HEP. (In progress, Not Met)  2.  Pt will perform nu-step at level 4 x 10 minutes without rests breaks going at least 50 step/min or greater. MET 10/28/2019 (Level 5)  3. Pt will be able to perform TUG in less than or equal to 20 secs without use of AD placingdemonstrating overall improved functional mobility. (In progress, Not Met)  4. Pt will performed sit to stand x 5 without UE support in 15 seconds without LOB backward or posterior LE hooking for functional and safe transfers. (In progress, Not Met)  5.  Pt will score greater than or equal to 45/56 on the Choi Balance Assessment with use of AD demonstrating overall improved functional mobility and balance and reduce fall risk.(In progress, Not Met)  6. Pt will walk < than or equal to 140 ft on the 2 minute walk test indoor with AD with 0 LOB and minimal gait deviations for improved safety in home ambulation and safety. (In progress, Not Met)  7.  Pt will be able to safely perform and tolerate high level ADL's without LOB. (In progress, Not Met)  8. Pt will have 0 falls from start of PT sessions.(In progress, Not Met)  9. Independent with updated HEP. (In progress, Not Met)  10. Pt will have MMT score of 3+/5 in all major ms groups in Left LE.(In progress, Not Met)  11. Pt will ambulate on TM x 6 minutes with use of UE support with 0 LOB at greater than or equal to 1.0 mph. MET 10/28/2019  12. Pt will begin some form of community fitness to begin regular and consistent performance of exercise to continue  maintenance of gains made in PT.(In progress, Not Met)    Plan     Continue to advance towards functional goals.  Focus on strength, gait, and balance as able.      Kandy Gatica, PT

## 2019-11-03 NOTE — PROGRESS NOTES
"                            Physical Therapy Daily Treatment Note     Name: Luis Wong  Clinic Number: [id number]    Therapy Diagnosis:   Encounter Diagnoses   Name Primary?    Impaired functional mobility, balance, gait, and endurance     Decreased strength of lower extremity      Physician: Carla Altman PA-C     Visit Date: 11/4/2019    Physician Orders: PT Eval and Treat  Medical Diagnosis from Referral: I63.9 (ICD-10-CM) - CVA (cerebral vascular accident)  Evaluation Date: 9/13/2019  Authorization Period Expiration: 12/31/2019  Plan of Care Expiration: 11/8/2019  Visit # / Visits authorized: 2/50   FOTO: 10/10 DONE    Time In: 10:15 AM   Time Out: 11:05 AM  Total Billable Time: 50 minutes (2 GT, 1 NMR)    Precautions: Hx of CVA's    Subjective     Pt reports: No new complaints. He was able to perform his ankle exercises this past weekend with some challenges, but states "they weren't bad."   Response to previous treatment: No adverse reactions.  Functional change: None stated    Pain: 0/10  Location: N/A     Objective     Luis did NOT receive manual therapy to left foot/ankle: NOT TODAY   - Splaying of metatarsals - not today  - Manual gastroc stretching seated with slant board with gentle overpressure at ankle joint x 2' - not today  - AROM toe extension/flexion x 5 - not today    Pt performed gait training on treadmill x 20 minutes total consisting of:  -Forward gait 5% elevation x 8' at 1.0 speed setting with B UE support f/b  -Sidestepping 5% elevation x 3' each way at 0.4 speed setting with B UE support  -Backward gait 5% elevation x 4' at 0.4 speed setting with RUE support    Gait training continued to include the following for 10 minutes:  - Forward walking 2 x 40'' without AD CGA for wt shift  - Backward walking 2 x 40'' without AD CGA and verbal cues for full hip extension on the L and R    Inbetween walking bouts pt performed standing hip IR stretch with B feet turned out with L trunk " rotation 3 x 1'    Luis receive neuromuscular rehabilitation including proprioceptive and balance training for 20 minutes including:  - Nu-Step: 8 minutes, Level 6 for reciprocal BUE and BLE motion  - Side stepping in ladder x 2 laps x 20 feet without UE support with cues for L hip flexion  - Forward walking in ladder x 2 laps x 20 feet without AD or UE support  - Cone weaving without AD x 2 laps over 20 feet - OOT    Luis received therapeutic exercises to develop strength, endurance, ROM and flexibility for 5 minutes including:    - Cybex Ham Curls                 1.0 pl 3x10 LLE only  - Cybex Leg Extension           3x10, LLE only - 20#, 40# BLE 3x10 - OOT  - Cybex Leg press                  6 plates, 3x10 B (focused mainly on left LE) - OOT                                                  3.5 plates, x30, LLE only, no rest breaks -OOT  - Towel Scrunches  1' x 2 - OOT  - Dorsiflexion/PF  x10 each direction, AAROM for DF - OOT  - Inversion/Eversion  x10 each direction, AAROM for Eversion - OOT    Home Exercises Provided and Patient Education Provided     Education provided:   - encouraged cont HEP along with new hip IR stretch and side stepping at counter  - safety with environment when ambulating     Written Home Exercises Provided: yes.  Exercises were reviewed and Luis was able to demonstrate them prior to the end of the session.  Luis demonstrated good  understanding of the education provided.     See EMR under Patient Instructions for exercises provided 9/23/2019.      Assessment     Luis presented to therapy with SPC, however ambulated throughout the clinic without AD or LOB. He was able to increase time in overall gait training on treadmill to 20 minutes by increasing time with side stepping and backward gait. He continues to require minimal to moderate verbal cueing to lift from hip and bend knee to clear the floor. 2 LOB noted during side-stepping in ladder going towards the right; pt required PT  intervention to prevent major LOB. Hamstring strength continues to improve as patient is near 80 degrees of knee flexion when performing resisted hamstring curls; pt previously required AAROM assist to complete exercise.     Luis is progressing well towards his goals.   Pt prognosis is Good.     Pt will continue to benefit from skilled outpatient physical therapy to address the deficits listed in the problem list box on initial evaluation, provide pt/family education and to maximize pt's level of independence in the home and community environment.     Pt's spiritual, cultural and educational needs considered and pt agreeable to plan of care and goals.    Anticipated barriers to physical therapy: Co-morbidities    Goals:     Long Term Goals (8 Weeks):   1.  Independent with initial HEP. (In progress, Not Met)  2.  Pt will perform nu-step at level 4 x 10 minutes without rests breaks going at least 50 step/min or greater. MET 10/28/2019 (Level 5)  3. Pt will be able to perform TUG in less than or equal to 20 secs without use of AD placingdemonstrating overall improved functional mobility. (In progress, Not Met)  4. Pt will performed sit to stand x 5 without UE support in 15 seconds without LOB backward or posterior LE hooking for functional and safe transfers. (In progress, Not Met)  5.  Pt will score greater than or equal to 45/56 on the Choi Balance Assessment with use of AD demonstrating overall improved functional mobility and balance and reduce fall risk.(In progress, Not Met)  6. Pt will walk < than or equal to 140 ft on the 2 minute walk test indoor with AD with 0 LOB and minimal gait deviations for improved safety in home ambulation and safety. (In progress, Not Met)  7.  Pt will be able to safely perform and tolerate high level ADL's without LOB. (In progress, Not Met)  8. Pt will have 0 falls from start of PT sessions.(In progress, Not Met)  9. Independent with updated HEP. (In progress, Not Met)  10. Pt  will have MMT score of 3+/5 in all major ms groups in Left LE.(In progress, Not Met)  11. Pt will ambulate on TM x 6 minutes with use of UE support with 0 LOB at greater than or equal to 1.0 mph. MET 10/28/2019  12. Pt will begin some form of community fitness to begin regular and consistent performance of exercise to continue maintenance of gains made in PT.(In progress, Not Met)    Plan     Continue to advance towards functional goals.  Focus on strength, gait, and balance as able.      Kandy Gatica, PT

## 2019-11-04 ENCOUNTER — CLINICAL SUPPORT (OUTPATIENT)
Dept: REHABILITATION | Facility: HOSPITAL | Age: 64
End: 2019-11-04
Payer: MEDICARE

## 2019-11-04 DIAGNOSIS — Z74.09 IMPAIRED MOBILITY AND ACTIVITIES OF DAILY LIVING: ICD-10-CM

## 2019-11-04 DIAGNOSIS — R27.8 DECREASED COORDINATION: ICD-10-CM

## 2019-11-04 DIAGNOSIS — M62.81 MUSCLE WEAKNESS: ICD-10-CM

## 2019-11-04 DIAGNOSIS — Z78.9 IMPAIRED MOBILITY AND ACTIVITIES OF DAILY LIVING: ICD-10-CM

## 2019-11-04 DIAGNOSIS — Z74.09 IMPAIRED FUNCTIONAL MOBILITY, BALANCE, GAIT, AND ENDURANCE: ICD-10-CM

## 2019-11-04 DIAGNOSIS — R29.898 DECREASED STRENGTH OF LOWER EXTREMITY: ICD-10-CM

## 2019-11-04 PROCEDURE — 97530 THERAPEUTIC ACTIVITIES: CPT | Mod: PN

## 2019-11-04 PROCEDURE — 97116 GAIT TRAINING THERAPY: CPT | Mod: PN

## 2019-11-04 PROCEDURE — 97110 THERAPEUTIC EXERCISES: CPT | Mod: PN

## 2019-11-04 PROCEDURE — 97112 NEUROMUSCULAR REEDUCATION: CPT | Mod: PN

## 2019-11-04 NOTE — PROGRESS NOTES
"  Occupational Therapy Daily Treatment Note     Name: Luis Wong  Clinic Number: [id number]    Therapy Diagnosis:    Encounter Diagnoses   Name Primary?    Impaired mobility and activities of daily living     Muscle weakness     Decreased coordination      Physician: Carla Altman, HALLE Lopez MD    Visit Date: 11/4/2019  Physician Orders: Eval and tx  Medical Diagnosis: CVA L sided weakness Recrudecesnt  Onset Date: 8/28/19 most recent CVA  Evaluation Date: 9/4/2019  Plan of Care Expiration Period: 12/27/19  Insurance Authorization period Expiration: 12/31/19  Date of Return to MD: NA  Visit # / Visits Authortized: 18 / 30  FOTO: CVA UE hand /75% limitation from 100%  10/4/19       Time In:9:30 am   Time Out:10:15 am   Total Billable (one on one) Time: 45 minutes      Precautions: Standard and Fall    Subjective     Pt reports: "I know why my wrist was hurting I slipping getting something under the sofa and bumped it so It got sore and stiff"  he was compliant with home exercise program given last session.   Response to previous treatment:good   Functional change: Functional use at home to turn on light switch    Pain: 0/10  Location: left arms     Objective      Luis Wong  received therapeutic exercises to develop GM/FM coordination, balance, activity tolerance and strength in order to increase ADL/IADL independence with replicated home management/self care tasks, for 35 minutes including: Measurements included     Shoulder and supine exercises:    UBE 90 RPM 3 min forward 2 min backward   Hand    Finger spreads  Opposition  Isolated flexion 1st 2 fingers  Last 2 fingers  Thumb opposition X 30   X 30    Circles  X 30   Green t putty  Mold    Roll  Pinch   PVC punch out Red  X 30    Wrist ext  Pro/sup wheel  X 30                Shoulder    AAROM overhead      Bicep curls 2#  2/10  4#  2/10                            Luis Wong  received therapeutic activities to develop GM/FM " coordination, balance, activity tolerance and strength in order to increase ADL/IADL independence with replicated home management/self care tasks, for 10 minutes including:  Cotton ball in hand manipulation 4 at a time  X 1 tub    Pom pom retrieval Large plus in hand manip x 3 pom poms  x 2 tubs                    Home Exercises and Education Provided     Education provided:   - Previous hand out   - Progress towards goals     Written Home Exercises Provided: Patient instructed to cont prior HEP. Updated putty exercises to molding,  and pinch Red putty provided.  Exercises were reviewed and Luis was able to demonstrate them prior to the end of the session.  Luis demonstrated good  understanding of the HEP provided.   .   See EMR under Patient Instructions for exercises provided prior visit.        Assessment   Pt would continue to benefit from skilled OT. He is still limited by tone in the L arm especially at the elbow preventing ext. He is doing well and progressing well with therapy. Recent change in copay may limit future attendance via pt report. He is progressing well with the wrist and hand with minor limitations with coordination and strength. Still limited in the L arm with elbow control for synergistic an coordinated movements.   Luis is progressing well towards his goals and there are no updates to goals at this time. Pt prognosis is Fair.      Pt will continue to benefit from skilled outpatient occupational therapy to address the deficits listed in the problem list on initial evaluation provide pt/family education and to maximize pt's level of independence in the home and community environment.      Anticipated barriers to occupational therapy:      Pt's spiritual, cultural and educational needs considered and pt agreeable to plan of care and goals.     Previous Short Term Goals Status:   Goals:  Short Term Goals:  1) Initiate Hep Met 10/30/2019  2) Pt to increase LUE  strength by 5 # in  order to A in self care task of feeding by 4 weeks. MET 10/30/2019  3) Pt to increase Quick dash Score by 5 points increasing self care IND by 4 weeks. Progressing 11/4/2019  4) Pt to increase L UE AROM by 10 degrees in order to A in UB dressing by 4 weeks.MET 11/4/2019  5) Patient will be able to achieve less than or equal to 75% on the FOTO, demonstrating overall improved functional ability with upper extremity. (self-care category) Progressing 11/4/2019          New Short Term Goals Status:     Long Term Goals:  1) Pt to be IND with HEP in order to maintain ROM and strength needed for self care IND by [email or web address] Progressing 11/4/2019  2) Pt to increase L UE  strength to WNL as compared to unaffected extremity in order to open items for self feeding by [email or web address]. Progressing 11/4/2019  3) Pt to increase Quick dash Score by 10 points increasing self care IND at home by [email or web address] Progressing 11/4/2019  4) Pt to increase L UE AROM to WFL in order to A in UB dressing by d/c. Progressing 11/4/2019  5) Patient will be able to achieve less than or equal to 50% on the FOTO, demonstrating overall improved functional ability with upper extremity. (self-care category) Progressing 11/4/2019          Long Term Goal Status:   continue per initial plan of care.  Reasons for Recertification of Therapy:   Pt continues to benefit from skilled services and has made good progress thus far with good future rehab potential. Pt remains limited with LUE strength, ROM and coordination at this time which all still impact their performance of ADLs , IADLs affecting her habits, roles and routines.        Plan      Continue per [facility]. Need for future splinting and dynasplint to prevent contracture while increasing arm swing for functional ambulation and balance.   Progress as tolerated.     Christian Teran  OTR/L

## 2019-11-05 ENCOUNTER — PATIENT OUTREACH (OUTPATIENT)
Dept: OTHER | Facility: OTHER | Age: 64
End: 2019-11-05

## 2019-11-06 ENCOUNTER — CLINICAL SUPPORT (OUTPATIENT)
Dept: REHABILITATION | Facility: HOSPITAL | Age: 64
End: 2019-11-06
Payer: MEDICARE

## 2019-11-06 DIAGNOSIS — Z74.09 IMPAIRED MOBILITY AND ACTIVITIES OF DAILY LIVING: ICD-10-CM

## 2019-11-06 DIAGNOSIS — Z78.9 IMPAIRED MOBILITY AND ACTIVITIES OF DAILY LIVING: ICD-10-CM

## 2019-11-06 DIAGNOSIS — R27.8 DECREASED COORDINATION: ICD-10-CM

## 2019-11-06 DIAGNOSIS — M62.81 MUSCLE WEAKNESS: ICD-10-CM

## 2019-11-06 DIAGNOSIS — Z74.09 IMPAIRED FUNCTIONAL MOBILITY, BALANCE, GAIT, AND ENDURANCE: ICD-10-CM

## 2019-11-06 DIAGNOSIS — R29.898 DECREASED STRENGTH OF LOWER EXTREMITY: ICD-10-CM

## 2019-11-06 PROCEDURE — 97112 NEUROMUSCULAR REEDUCATION: CPT | Mod: PN

## 2019-11-06 PROCEDURE — 97140 MANUAL THERAPY 1/> REGIONS: CPT | Mod: PN

## 2019-11-06 PROCEDURE — 97116 GAIT TRAINING THERAPY: CPT | Mod: PN

## 2019-11-06 PROCEDURE — 97110 THERAPEUTIC EXERCISES: CPT | Mod: PN

## 2019-11-06 NOTE — PROGRESS NOTES
"  Occupational Therapy Daily Treatment Note     Name: Luis Wong  Clinic Number: [id number]    Therapy Diagnosis:    Encounter Diagnoses   Name Primary?    Impaired mobility and activities of daily living     Muscle weakness     Decreased coordination      Physician: Carla Altman, HALLE Lopez MD    Visit Date: 11/6/2019  Physician Orders: Eval and tx  Medical Diagnosis: CVA L sided weakness Recrudecesnt  Onset Date: 8/28/19 most recent CVA  Evaluation Date: 9/4/2019  Plan of Care Expiration Period: 12/27/19  Insurance Authorization period Expiration: 12/31/19  Date of Return to MD: NA  Visit # / Visits Authortized: 2/50 change in insurance  FOTO: CVA UE hand /75% limitation from 100%  10/4/19       Time In:9:30 am   Time Out:10:15 am   Total Billable (one on one) Time: 45 minutes      Precautions: Standard and Fall    Subjective     Pt reports: "I have more pain in the wrist today than before I just hope nothing got messed up"  he was compliant with home exercise program given last session.   Response to previous treatment:good   Functional change: Functional use at home to turn on light switch    Pain: 0/10  Location: left arms     Objective   Manual Therapy:  Pt received retrograde massage and PROM with joint mobs to wrist to decrease edema and stiffness for increased ROM. STM performed to decreased stiffness in surrounding musculature. 8 min       Luis Wong  received therapeutic exercises to develop GM/FM coordination, balance, activity tolerance and strength in order to increase ADL/IADL independence with replicated home management/self care tasks, for 35 minutes including: Measurements included     Shoulder and supine exercises:    UBE 90 RPM 3 min forward 2 min backward   Hand    Finger spreads  Opposition  Isolated flexion 1st 2 fingers  Last 2 fingers  Thumb opposition X 30   X 30    Circles  X 30   Red t putty  Mold    Roll  Pinch   PVC punch out Red  X 30                  "   Shoulder    AAROM overhead      Bicep curls 3#  2/10  3#  2/10                            Luis Wong  received therapeutic activities to develop GM/FM coordination, balance, activity tolerance and strength in order to increase ADL/IADL independence with replicated home management/self care tasks, for 2 minutes including:  Green CP pom pom retrieval small  X 2 tubs                      Home Exercises and Education Provided     Education provided:   - Previous hand out   - Progress towards goals     Written Home Exercises Provided: Patient instructed to cont prior HEP. Updated putty exercises to molding,  and pinch Red putty provided.  Exercises were reviewed and Luis was able to demonstrate them prior to the end of the session.  Luis demonstrated good  understanding of the HEP provided.   .   See EMR under Patient Instructions for exercises provided prior visit.        Assessment   Pt would continue to benefit from skilled OT. He is still limited by tone in the L arm especially at the elbow preventing ext. He is doing well and progressing well with therapy. Recent change in copay may limit future attendance via pt report. He is progressing well with the wrist and hand with minor limitations with coordination and strength. Still limited in the L arm with elbow control for synergistic an coordinated movements.   Luis is progressing well towards his goals and there are no updates to goals at this time. Pt prognosis is Fair.      Pt will continue to benefit from skilled outpatient occupational therapy to address the deficits listed in the problem list on initial evaluation provide pt/family education and to maximize pt's level of independence in the home and community environment.      Anticipated barriers to occupational therapy:      Pt's spiritual, cultural and educational needs considered and pt agreeable to plan of care and goals.     Previous Short Term Goals Status:   Goals:  Short Term Goals:  1)  Initiate Hep Met 10/30/2019  2) Pt to increase LUE  strength by 5 # in order to A in self care task of feeding by 4 weeks. MET 10/30/2019  3) Pt to increase Quick dash Score by 5 points increasing self care IND by 4 weeks. Progressing 11/6/2019  4) Pt to increase L UE AROM by 10 degrees in order to A in UB dressing by 4 weeks.MET 11/6/2019  5) Patient will be able to achieve less than or equal to 75% on the FOTO, demonstrating overall improved functional ability with upper extremity. (self-care category) Progressing 11/6/2019          New Short Term Goals Status:     Long Term Goals:  1) Pt to be IND with HEP in order to maintain ROM and strength needed for self care IND by d.c. Progressing 11/6/2019  2) Pt to increase L UE  strength to WNL as compared to unaffected extremity in order to open items for self feeding by d.c. Progressing 11/6/2019  3) Pt to increase Quick dash Score by 10 points increasing self care IND at home by d.c. Progressing 11/6/2019  4) Pt to increase L UE AROM to WFL in order to A in UB dressing by d/c. Progressing 11/6/2019  5) Patient will be able to achieve less than or equal to 50% on the FOTO, demonstrating overall improved functional ability with upper extremity. (self-care category) Progressing 11/6/2019          Long Term Goal Status:   continue per initial plan of care.  Reasons for Recertification of Therapy:   Pt continues to benefit from skilled services and has made good progress thus far with good future rehab potential. Pt remains limited with LUE strength, ROM and coordination at this time which all still impact their performance of ADLs , IADLs affecting her habits, roles and routines.        Plan      Continue per [facility]. Need for future splinting and dynasplint to prevent contracture while increasing arm swing for functional ambulation and balance.   Progress as tolerated.     Christian Teran  OTR/L

## 2019-11-06 NOTE — PROGRESS NOTES
Physical Therapy Daily Treatment Note     Name: Luis Wong  Clinic Number: [id number]    Therapy Diagnosis:   Encounter Diagnoses   Name Primary?    Impaired functional mobility, balance, gait, and endurance     Decreased strength of lower extremity      Physician: Carla Altman PA-C     Visit Date: 11/6/2019    Physician Orders: PT Eval and Treat  Medical Diagnosis from Referral: I63.9 (ICD-10-CM) - CVA (cerebral vascular accident)  Evaluation Date: 9/13/2019  Authorization Period Expiration: 12/31/2019  Plan of Care Expiration: 11/8/2019  Visit # / Visits authorized: 3/50   FOTO: 1/10    Time In: 10:15 AM   Time Out: 11:05 AM  Total Billable Time: 50 minutes (2 GT, 1 NMR)    Precautions: Hx of CVA's    Subjective     Pt reports: Pt reported LOB resulting in a fall on his left wrist and arm. He did not go to the ER  Response to previous treatment: No adverse reactions.  Functional change: None stated    Pain: 0/10  Location: N/A     Objective     Luis did NOT receive manual therapy to left foot/ankle: NOT TODAY   - Splaying of metatarsals - not today  - Manual gastroc stretching seated with slant board with gentle overpressure at ankle joint x 2' - not today  - AROM toe extension/flexion x 5 - not today    Pt performed gait training on treadmill x 20 minutes total consisting of:  -Forward gait 5% elevation x 8' at 1.0 speed setting with B UE support f/b  -Sidestepping 5% elevation x 3' each way at 0.4 speed setting with B UE support  -Backward gait 5% elevation x 4' at 0.4 speed setting with RUE support    Gait training continued to include the following for 10 minutes:  - Forward walking 2 x 40'' without AD SBA for wt shift  - Lateral and Backward walking 2 x 40'' without AD SBA     Inbetween walking bouts pt performed standing hip IR stretch with B feet turned out with L trunk rotation 3 x 1'    Luis receive neuromuscular rehabilitation including proprioceptive and balance  training for 10 minutes including:  - Nu-Step: 8 minutes, Level 6 for reciprocal BUE and BLE motion  - Side stepping in ladder x 2 laps x 20 feet without UE support with cues for L hip flexion NOT PERFORMED TODAY  - Forward walking in ladder x 2 laps x 20 feet without AD or UE support  NOT PERFORMED TODAY  - Cone weaving without AD x 2 laps over 20 feet - OOT    Luis received therapeutic exercises to develop strength, endurance, ROM and flexibility for 5 minutes including:    - Cybex Ham Curls                 1.0 pl 3x10 LLE only  - Cybex Leg Extension           3x10, LLE only - 20#, 40# BLE 3x10 - OOT  - Cybex Leg press                  6 plates, 3x10 B (focused mainly on left LE)                                                   3.5 plates, x30, LLE only, no rest breaks   - Towel Scrunches  1' x 2 - OOT  - Dorsiflexion/PF  x10 each direction, AAROM for DF - OOT  - Inversion/Eversion  x10 each direction, AAROM for Eversion - OOT    Home Exercises Provided and Patient Education Provided     Education provided:   - encouraged cont HEP along with new hip IR stretch and side stepping at counter  - safety with environment when ambulating     Written Home Exercises Provided: yes.  Exercises were reviewed and Luis was able to demonstrate them prior to the end of the session.  Luis demonstrated good  understanding of the education provided.     See EMR under Patient Instructions for exercises provided 9/23/2019.      Assessment     Pt presented to PT with a SPC, but ambulated throughout the session without the cane showing no signs of LOB. Pt did exhibit one episode of  LOB during cone weaving but was able to recover without assistance. Pt performed exercises with no adverse reactions.   Luis is progressing well towards his goals.   Pt prognosis is Good.     Pt will continue to benefit from skilled outpatient physical therapy to address the deficits listed in the problem list box on initial evaluation, provide  pt/family education and to maximize pt's level of independence in the home and community environment.     Pt's spiritual, cultural and educational needs considered and pt agreeable to plan of care and goals.    Anticipated barriers to physical therapy: Co-morbidities    Goals:     Long Term Goals (8 Weeks):   1.  Independent with initial HEP. (In progress, Not Met)  2.  Pt will perform nu-step at level 4 x 10 minutes without rests breaks going at least 50 step/min or greater. MET 10/28/2019 (Level 5)  3. Pt will be able to perform TUG in less than or equal to 20 secs without use of AD placingdemonstrating overall improved functional mobility. (In progress, Not Met)  4. Pt will performed sit to stand x 5 without UE support in 15 seconds without LOB backward or posterior LE hooking for functional and safe transfers. (In progress, Not Met)  5.  Pt will score greater than or equal to 45/56 on the Choi Balance Assessment with use of AD demonstrating overall improved functional mobility and balance and reduce fall risk.(In progress, Not Met)  6. Pt will walk < than or equal to 140 ft on the 2 minute walk test indoor with AD with 0 LOB and minimal gait deviations for improved safety in home ambulation and safety. (In progress, Not Met)  7.  Pt will be able to safely perform and tolerate high level ADL's without LOB. (In progress, Not Met)  8. Pt will have 0 falls from start of PT sessions.(In progress, Not Met)  9. Independent with updated HEP. (In progress, Not Met)  10. Pt will have MMT score of 3+/5 in all major ms groups in Left LE.(In progress, Not Met)  11. Pt will ambulate on TM x 6 minutes with use of UE support with 0 LOB at greater than or equal to 1.0 mph. MET 10/28/2019  12. Pt will begin some form of community fitness to begin regular and consistent performance of exercise to continue maintenance of gains made in PT.(In progress, Not Met)    Plan     Continue to advance towards functional goals.  Focus on  strength, gait, and balance as able.      Robin Davis, WENDY

## 2019-11-07 ENCOUNTER — PATIENT OUTREACH (OUTPATIENT)
Dept: OTHER | Facility: OTHER | Age: 64
End: 2019-11-07

## 2019-11-07 NOTE — PROGRESS NOTES
"Digital Medicine: Clinician Follow-Up    Called patient for digital medicine follow-up. Overall, he is doing well. Recently admitted for CVA and has restarted therapy and feels "great" today.  Stopped irbesartan and is currently taking nifedipine and hydrochlorothiazide with no complaints.     The history is provided by the patient. No  was used.     Follow Up  Follow-up reason(s): reading review              PLAN  continue monitoring    Current 30-day average is at goal of <130/80 mmHg.  Continue current medication regimen as prescribed.  Follow-up in 3-6 months, sooner if BP begins to trend up or down.           Topic    Eye Exam        Last 5 Patient Entered Readings                                      Current 30 Day Average: 120/74     Recent Readings 11/7/2019 11/5/2019 11/3/2019 10/28/2019 10/27/2019    SBP (mmHg) 119 116 115 129 122    DBP (mmHg) 63 71 76 77 85    Pulse 90 64 80 80 64             Hypertension Medications             hydroCHLOROthiazide (MICROZIDE) 12.5 mg capsule TAKE 1 CAPSULE BY MOUTH EVERY DAY    NIFEdipine (PROCARDIA-XL) 30 MG (OSM) 24 hr tablet Take 1 tablet (30 mg total) by mouth once daily.                 Screenings  "

## 2019-11-08 ENCOUNTER — CLINICAL SUPPORT (OUTPATIENT)
Dept: REHABILITATION | Facility: HOSPITAL | Age: 64
End: 2019-11-08
Payer: MEDICARE

## 2019-11-08 DIAGNOSIS — Z74.09 IMPAIRED FUNCTIONAL MOBILITY, BALANCE, GAIT, AND ENDURANCE: ICD-10-CM

## 2019-11-08 DIAGNOSIS — R29.898 DECREASED STRENGTH OF LOWER EXTREMITY: ICD-10-CM

## 2019-11-08 PROCEDURE — 97116 GAIT TRAINING THERAPY: CPT | Mod: PN

## 2019-11-08 PROCEDURE — 97112 NEUROMUSCULAR REEDUCATION: CPT | Mod: PN

## 2019-11-08 NOTE — PROGRESS NOTES
"                            Physical Therapy Daily Treatment Note     Name: Luis Wong  Clinic Number: [id number]    Therapy Diagnosis:   Encounter Diagnoses   Name Primary?    Impaired functional mobility, balance, gait, and endurance     Decreased strength of lower extremity      Physician: Carla Altman PA-C     Visit Date: 11/8/2019    Physician Orders: PT Eval and Treat  Medical Diagnosis from Referral: I63.9 (ICD-10-CM) - CVA (cerebral vascular accident)  Evaluation Date: 9/13/2019  Authorization Period Expiration: 12/31/2019  Plan of Care Expiration: 11/8/2019  Visit # / Visits authorized: 4/50   FOTO: 2/10    Time In: 9:20 AM   Time Out: 10:15 AM  Total Billable Time: 50 minutes (2 GT, 2 NMR)    Precautions: Hx of CVA's    Subjective     Pt reports: He's having a pretty good morning, no complaints of pain, discomfort, or LOB  Response to previous treatment: No adverse reactions.  Functional change: None stated    Pain: 0/10  Location: N/A     Objective     Luis performed gait training on treadmill x 20 minutes total consisting of:  -Forward gait at grade 2 elevation x 6' at 1.0 speed setting with B UE support f/b  -Sidestepping grade 2 elevation x 4' each way at 0.4 speed setting to the left and 0.5 speed setting to the right with B UE support  -Backward gait grade 2 elevation x 4' at 0.5 speed setting with RUE support    - Forward walking 2 x 40'' without AD SBA for wt shift - OOT  - Lateral and Backward walking 2 x 40'' without AD SBA - OOT    Inbetween walking bouts pt performed standing hip IR stretch with B feet turned out with L trunk rotation 3 x 1'      Luis receive neuromuscular rehabilitation including proprioceptive and balance training for 25 minutes including:    - Nu-Step: 8 minutes, Level 6 for reciprocal BUE and BLE motion  - Half-Tandem on 6" step: 30"x2, B  - Marching on foam:                30"x2, cues not to lean trunk backwards and to lift from hip and flex knee  - Forward " "step-ups                  2x10, LLE leading  - Toe taps:   2x10, with RLE only, to increase SLS on LLE    - Side stepping in ladder x 2 laps x 20 feet without UE support with cues for L hip flexion - OOT  - Forward walking in ladder x 2 laps x 20 feet without AD or UE support  - OOT  - Cone weaving without AD x 2 laps over 20 feet - OOT      Luis received therapeutic exercises to develop strength, endurance, ROM and flexibility for 10 minutes including:    - Cybex Ham Curls                 1.0 pl 3x10 LLE only  - Cybex Leg Extension           3x10, LLE only - 20#, 40# BLE 3x10 - OOT  - Cybex Leg press                  6 plates, 3x10 B (focused mainly on left LE) - OOT                                                  3.5 plates, x30, LLE only, no rest breaks  - OOT      Home Exercises Provided and Patient Education Provided     Education provided:   - encouraged cont HEP along with new hip IR stretch and side stepping at counter  - safety with environment when ambulating     Written Home Exercises Provided: yes.  Exercises were reviewed and Luis was able to demonstrate them prior to the end of the session.  Luis demonstrated good  understanding of the education provided.     See EMR under Patient Instructions for exercises provided 9/23/2019.      Assessment     Luis presented to therapy ready to work and ambulated throughout clinic with no AD. He tolerated the increase in elevation on the treadmill for forward, sideways, and backwards stepping without LOB. Balance is improving as patient is able to perform half-tandem stance with minimal swaying. Progressed to performing forward step-ups on 6" inch step with 1 UE support.    Luis is progressing well towards his goals.   Pt prognosis is Good.     Pt will continue to benefit from skilled outpatient physical therapy to address the deficits listed in the problem list box on initial evaluation, provide pt/family education and to maximize pt's level of " independence in the home and community environment.     Pt's spiritual, cultural and educational needs considered and pt agreeable to plan of care and goals.    Anticipated barriers to physical therapy: Co-morbidities    Goals:     Long Term Goals (8 Weeks):   1.  Independent with initial HEP. (In progress, Not Met)  2.  Pt will perform nu-step at level 4 x 10 minutes without rests breaks going at least 50 step/min or greater. MET 10/28/2019 (Level 5)  3. Pt will be able to perform TUG in less than or equal to 20 secs without use of AD placingdemonstrating overall improved functional mobility. (In progress, Not Met)  4. Pt will performed sit to stand x 5 without UE support in 15 seconds without LOB backward or posterior LE hooking for functional and safe transfers. (In progress, Not Met)  5.  Pt will score greater than or equal to 45/56 on the Choi Balance Assessment with use of AD demonstrating overall improved functional mobility and balance and reduce fall risk.(In progress, Not Met)  6. Pt will walk < than or equal to 140 ft on the 2 minute walk test indoor with AD with 0 LOB and minimal gait deviations for improved safety in home ambulation and safety. (In progress, Not Met)  7.  Pt will be able to safely perform and tolerate high level ADL's without LOB. (In progress, Not Met)  8. Pt will have 0 falls from start of PT sessions.(In progress, Not Met)  9. Independent with updated HEP. (In progress, Not Met)  10. Pt will have MMT score of 3+/5 in all major ms groups in Left LE.(In progress, Not Met)  11. Pt will ambulate on TM x 6 minutes with use of UE support with 0 LOB at greater than or equal to 1.0 mph. MET 10/28/2019  12. Pt will begin some form of community fitness to begin regular and consistent performance of exercise to continue maintenance of gains made in PT.(In progress, Not Met)    Plan     Continue to advance towards functional goals.  Focus on strength, gait, and balance as able.      Kandy  Gavi, PT

## 2019-11-09 NOTE — PROGRESS NOTES
"                            Physical Therapy Daily Treatment Note     Name: Luis Wong  Clinic Number: [id number]    Therapy Diagnosis:   Encounter Diagnoses   Name Primary?    Impaired functional mobility, balance, gait, and endurance     Decreased strength of lower extremity      Physician: Carla Altman PA-C     Visit Date: 11/11/2019    Physician Orders: PT Eval and Treat  Medical Diagnosis from Referral: I63.9 (ICD-10-CM) - CVA (cerebral vascular accident)  Evaluation Date: 9/13/2019  Authorization Period Expiration: 12/31/2019  Plan of Care Expiration: 11/8/2019  Visit # / Visits authorized: 5/50 (18 prior visits)  FOTO: 3/10    Time In: 10:15 AM   Time Out: 11:10 AM  Total Billable Time: 55 minutes (3 TE, 1 MT)    Precautions: Hx of CVA's    Subjective     Pt reports: No new complaints today. States that his ankle exercises are getting better as he is able to lift his big toe and perform towel crunches with more ease than last week.   Response to previous treatment: No adverse reactions.  Functional change: None stated    Pain: 0/10  Location: N/A     Objective     Luis received therapeutic exercises for 35 minutes including objective measurements.     Luis received manual therapy to left foot/ankle for 10 minutes including:   - Splaying and Grade III A/P and P/A glides of metatarsals   - Manual gastroc stretching seated with slant board with gentle overpressure at ankle joint x 30"x3  - AROM toe extension/flexion x 10      Luis receive neuromuscular rehabilitation including proprioceptive and balance training for 10 minutes including:    - Side stepping in ladder x 2 laps x 20 feet without UE support with cues for L hip flexion  - Forward walking in ladder x 2 laps x 20 feet without AD or UE support           Home Exercises Provided and Patient Education Provided     Education provided:   - Importance of performing daily HEP to maximize therapy benefits  - safety with environment when ambulating "     Written Home Exercises Provided: yes.  Exercises were reviewed and Luis was able to demonstrate them prior to the end of the session.  Luis demonstrated good  understanding of the education provided.     See EMR under Patient Instructions for exercises provided 9/23/2019 and 10/28/2019.      Assessment     See updated POC.       Luis is progressing well towards his goals.   Pt prognosis is Good.     Pt will continue to benefit from skilled outpatient physical therapy to address the deficits listed in the problem list box on initial evaluation, provide pt/family education and to maximize pt's level of independence in the home and community environment.     Pt's spiritual, cultural and educational needs considered and pt agreeable to plan of care and goals.    Anticipated barriers to physical therapy: Co-morbidities    Goals:     See updated POC.     Plan     See updated POC.      Kandy Gatica, PT

## 2019-11-11 ENCOUNTER — CLINICAL SUPPORT (OUTPATIENT)
Dept: REHABILITATION | Facility: HOSPITAL | Age: 64
End: 2019-11-11
Payer: MEDICARE

## 2019-11-11 DIAGNOSIS — R27.8 DECREASED COORDINATION: ICD-10-CM

## 2019-11-11 DIAGNOSIS — M62.81 MUSCLE WEAKNESS: ICD-10-CM

## 2019-11-11 DIAGNOSIS — Z74.09 IMPAIRED FUNCTIONAL MOBILITY, BALANCE, GAIT, AND ENDURANCE: ICD-10-CM

## 2019-11-11 DIAGNOSIS — Z74.09 IMPAIRED MOBILITY AND ACTIVITIES OF DAILY LIVING: ICD-10-CM

## 2019-11-11 DIAGNOSIS — Z78.9 IMPAIRED MOBILITY AND ACTIVITIES OF DAILY LIVING: ICD-10-CM

## 2019-11-11 DIAGNOSIS — R29.898 DECREASED STRENGTH OF LOWER EXTREMITY: ICD-10-CM

## 2019-11-11 PROCEDURE — 97110 THERAPEUTIC EXERCISES: CPT | Mod: PN

## 2019-11-11 PROCEDURE — 97112 NEUROMUSCULAR REEDUCATION: CPT | Mod: PN

## 2019-11-11 PROCEDURE — 97140 MANUAL THERAPY 1/> REGIONS: CPT | Mod: PN

## 2019-11-11 PROCEDURE — G8979 MOBILITY GOAL STATUS: HCPCS | Mod: CJ,PN

## 2019-11-11 PROCEDURE — G8978 MOBILITY CURRENT STATUS: HCPCS | Mod: CK,PN

## 2019-11-11 NOTE — PROGRESS NOTES
"  Occupational Therapy Daily Treatment Note     Name: Luis Wong  Clinic Number: [id number]    Therapy Diagnosis:    Encounter Diagnoses   Name Primary?    Impaired mobility and activities of daily living     Muscle weakness     Decreased coordination      Physician: Carla Altman, HALLE Lopez MD    Visit Date: 11/11/2019  Physician Orders: Eval and tx  Medical Diagnosis: CVA L sided weakness Recrudecesnt  Onset Date: 8/28/19 most recent CVA  Evaluation Date: 9/4/2019  Plan of Care Expiration Period: 12/27/19  Insurance Authorization period Expiration: 12/31/19  Date of Return to MD: NA  Visit # / Visits Authortized: 3/50 change in insurance  FOTO: CVA UE hand /75% limitation from 100%  10/4/19       Time In:9:30 am   Time Out:10:15 am   Total Billable (one on one) Time: 45 minutes      Precautions: Standard and Fall    Subjective     Pt reports: "I been taking the medicine for 3 weeks now and I dont know if its working all the way yet can you see improvements yet?"  he was compliant with home exercise program given last session.   Response to previous treatment:good   Functional change: Functional use at home to turn on light switch    Pain: 0/10  Location: left arms     Objective   Manual Therapy: Mt: Pt received manual therapy consisting of PROM in all planes, joint mobilization (grades I-II) with gentle oscillations at acromioclavicular and glenohumeral joint along with myofascial release and STM to surrounding musculature (biceps, pects, deltoids, traps, triceps etc.) to decrease stiffness and pain with movements. 10 min      Luis Wong  received therapeutic exercises to develop GM/FM coordination, balance, activity tolerance and strength in order to increase ADL/IADL independence with replicated home management/self care tasks, for 30 minutes including: Measurements included     Shoulder and supine exercises:    UBE 90 RPM 3 min forward 2 min backward                   Shoulder    AAROM " overhead      Bicep curls 4#  2/10  4#  2/10   Supine chest press  Forward flexion 4#  2/10   Skull crushers 2# DB  2/10    AAROM yoga block 2/10   Isolated FF supine 2# DB  2/10             Luis participated in neuromuscular re-education activities to improve: Coordination and Proprioception for 5 minutes. The following activities were included:  Seated accuracy reaching with L hand frontal plane towards mirror X 20 difficult            Home Exercises and Education Provided     Education provided:   - Previous hand out   - Progress towards goals     Written Home Exercises Provided: Patient instructed to cont prior HEP. Updated putty exercises to molding,  and pinch Red putty provided.  Exercises were reviewed and Luis was able to demonstrate them prior to the end of the session.  Luis demonstrated good  understanding of the HEP provided.   .   See EMR under Patient Instructions for exercises provided prior visit.        Assessment   Pt would continue to benefit from skilled OT. Main focus being tricep ext and NMR at elbow to prevent elbow bending with functional reach. He did well with this. Still limited by tone in the biceps and weakness in the triceps with flexor tone limiting reach. He did well with ext exercises today. Added resistance to AAROM and AROM isolated shoulder movements. Less pain in the wrist today tolerating MT and LLPS better. He is progressing well with more need for isolated shoulder movements and strengthening in future sessions.   Luis is progressing well towards his goals and there are no updates to goals at this time. Pt prognosis is Fair.      Pt will continue to benefit from skilled outpatient occupational therapy to address the deficits listed in the problem list on initial evaluation provide pt/family education and to maximize pt's level of independence in the home and community environment.      Anticipated barriers to occupational therapy:      Pt's spiritual, cultural and  educational needs considered and pt agreeable to plan of care and goals.     Previous Short Term Goals Status:   Goals:  Short Term Goals:  1) Initiate Hep Met 10/30/2019  2) Pt to increase LUE  strength by 5 # in order to A in self care task of feeding by 4 weeks. MET 10/30/2019  3) Pt to increase Quick dash Score by 5 points increasing self care IND by 4 weeks. Progressing 11/11/2019  4) Pt to increase L UE AROM by 10 degrees in order to A in UB dressing by 4 weeks.MET 11/11/2019  5) Patient will be able to achieve less than or equal to 75% on the FOTO, demonstrating overall improved functional ability with upper extremity. (self-care category) Progressing 11/11/2019          New Short Term Goals Status:     Long Term Goals:  1) Pt to be IND with HEP in order to maintain ROM and strength needed for self care IND by d.c. Progressing 11/11/2019  2) Pt to increase L UE  strength to WNL as compared to unaffected extremity in order to open items for self feeding by d.c. Progressing 11/11/2019  3) Pt to increase Quick dash Score by 10 points increasing self care IND at home by d.c. Progressing 11/11/2019  4) Pt to increase L UE AROM to WFL in order to A in UB dressing by d/c. Progressing 11/11/2019  5) Patient will be able to achieve less than or equal to 50% on the FOTO, demonstrating overall improved functional ability with upper extremity. (self-care category) Progressing 11/11/2019          Long Term Goal Status:   continue per initial plan of care.  Reasons for Recertification of Therapy:   Pt continues to benefit from skilled services and has made good progress thus far with good future rehab potential. Pt remains limited with LUE strength, ROM and coordination at this time which all still impact their performance of ADLs , IADLs affecting her habits, roles and routines.        Plan      Continue per UPOC. Need for future splinting and dynasplint to prevent contracture while increasing arm swing for  functional ambulation and balance.   Progress as tolerated.     Christian Teran  OTR/L

## 2019-11-11 NOTE — PLAN OF CARE
Outpatient Therapy Updated Plan of Care     Visit Date: 2019    Name: Luis Wong  Clinic Number: [id number]    Therapy Diagnosis:   Encounter Diagnoses   Name Primary?    Impaired functional mobility, balance, gait, and endurance     Decreased strength of lower extremity      Physician: Carla Altman PA-C    Physician Orders: PT Eval and Treat  Medical Diagnosis from Referral: I63.9 (ICD-10-CM) - CVA (cerebral vascular accident)  Evaluation Date: 2019  Current Certification Period:  2019 to 2019  Authorization Period Expiration: 2019  Plan of Care Expiration: 2019  Visit # / Visits authorized:      G-Code/FOTO: 5/10    Precautions: Hx of CVA's  Functional Level Prior to Evaluation:  Independent    Subjective     Update:     Pt reports: No new complaints today. States that his ankle exercises are getting better as he is able to lift his big toe and perform towel crunches with more ease than last week.   Response to previous treatment: No adverse reactions.  Functional change: None stated    Pain: 0/10  Location: N/A    Objective     Update: Changes/improvements are bolded below:    Lower Extremity Strength    RLE LLE   Hip Flexion: 5/5 3+/5   Hip Extension:  4+/5 3-/5   Hip Abduction: 5/5 4+/5   Hip Adduction: 4+/5 4+/5   Knee Extension: 5/5 5/5   Knee Flexion: 5/5 3+/5 in sitting   Ankle Dorsiflexion: 5/5 3/5   Ankle Plantarflexion: 5/5 3-/5   Ankle Inversion: 5/5 3/5   Ankle Eversion: 5/5 3-/5        5x sit to stand = 13 seconds    TU seconds without AD    2 minute walk test: 180 feet without AD    LATHAM  BALANCE ASSESSMENT TOOL  1. Sitting to Standing   4 - able to stand without using hands and stabilize independently  2. Standing Unsupported   4 - able to stand safely 2 minutes without hold  3. Sitting Unsupported   4 - able to sit safely and securely 2 minutes  4. Standing to Sitting   3 - controls descent by using hands  5. Pivot Transfer   3 - able to transfer  safely with definite use of hands  6. Standing with Eyes Closed   4 - albe to stand 10 seconds safely  7. Standing with Feet Together   3 - able to place feet together independently and stand for 1 minute with supervision  8. Reaching Forward with Outstretched Arm   2 - can reach forward 5 cm/2 inches safely  9. Retrieving Object from Floor   3 - able to pick slipper but needs supervision  10. Turning to Look Behind   3 - looks behind one side only, other side less weight shift  11. Turning 360 Degrees   2 - able to turn 360 safely but slowly  12. Placing Alternate Foot on Step   3 - able to stand independently and completely 8 steps > 20 seconds  13. Standing with One Foot in Front   4 - able to tandem stand independently and hold 30 seconds  14. Standing on One Foot   2 - able to lift leg indepentdently and hold = or < 3 seconds    TOTAL SCORE: 44  Maximum: 56  Score:   0-20= high fall risk   21-40 moderate fall risk   41-56 low fall risk     Fall risk cut-off scores:   Elderly and History of falls: <51/56   Elderly and No history of falls: <42/56   CVA: <45/56       Assessment     Update: Luis is progressing excellently through skilled physical therapy as evident by improvements in all objective measurements since his initial evaluation. He has improved x5 sit to stand time from 20 seconds to 13 seconds without UE support, improved Choi Balance score from 31/56 to 44/56, able to participate in 2 minute walk test and ambulate 180 feet, ambulate on the treadmill for greater than 6 minutes at 1.0 mph, and has improved bilateral lower extremity strength significantly. He would benefit from continuing skilled physical therapy in order to continue building strength in BLE as well as improving functional gait mechanics. Luis has been able to return to some functional activities at home since the start of therapy including mowing his own lawn and performing reciprocal gait when navigating stairs. Patient may benefit  from AFO in the future to assist with DF and eversion.     Long Term Goals (8 Weeks):   1.  Independent with initial HEP. MET 11/11/2019  2.  Pt will perform nu-step at level 4 x 10 minutes without rests breaks going at least 50 step/min or greater. MET 10/28/2019 (Level 5)  3. Pt will be able to perform TUG in less than or equal to 20 secs without use of AD demonstrating overall improved functional mobility. PROGRESSING, NOT MET 11/11/2019  4. Pt will performed sit to stand x 5 without UE support in 15 seconds without LOB backward or posterior LE hooking for functional and safe transfers. MET 11/11/2019  5.  Pt will score greater than or equal to 45/56 on the Choi Balance Assessment with use of AD demonstrating overall improved functional mobility and balance and reduce fall risk. PROGRESSING, NOT MET 11/11/2019, 44/56  6. Pt will walk > than or equal to 140 ft on the 2 minute walk test indoor with AD with 0 LOB and minimal gait deviations for improved safety in home ambulation and safety.  MET 11/11/2019, 180 feet  7.  Pt will be able to safely perform and tolerate high level ADL's without LOB.  PROGRESSING, NOT MET 11/11/2019  8. Pt will have 0 falls from start of PT sessions.  PROGRESSING, NOT MET 11/11/2019  9. Independent with updated HEP.  PROGRESSING, NOT MET 11/11/2019  10. Pt will have MMT score of 3+/5 in all major ms groups in Left LE.  PROGRESSING, NOT MET 11/11/2019  11. Pt will ambulate on TM x 6 minutes with use of UE support with 0 LOB at greater than or equal to 1.0 mph. MET 10/28/2019  12. Pt will begin some form of community fitness to begin regular and consistent performance of exercise to continue maintenance of gains made in PT.  PROGRESSING, NOT MET 11/11/2019    Long Term Goal Status:  d/c LTG's 1 and 2. Continue goals 5, 7-10 and 12. Modify goals 3, 4, 6, and 11 as changed below:     3. Pt will be able to perform TUG in less than or equal to 25 secs without use of AD demonstrating overall  improved functional mobility.  4. Pt will performed sit to stand x 5 without UE support in 10 seconds without LOB backward or posterior LE hooking for functional and safe transfers.  6. Pt will walk > than or equal to 210 ft on the 2 minute walk test indoor with AD with 0 LOB and minimal gait deviations for improved safety in home ambulation and safety.   11. Pt will ambulate on TM x 6 minutes with use of UE support with 0 LOB at greater than or equal to 1.3 mph    Reasons for Recertification of Therapy:   Expiration of current POC.     Plan     Updated Certification Period: 11/11/2019 to 1/10/2020  Recommended Treatment Plan: 3 times per week for 8 weeks: Gait Training, Manual Therapy, Moist Heat/ Ice, Neuromuscular Re-ed, Patient Education, Therapeutic Activites and Therapeutic Exercise  Other Recommendations: Possible referral for an AFO with DF assist    Kandy Gatica, PT, DPT  11/11/2019      I CERTIFY THE NEED FOR THESE SERVICES FURNISHED UNDER THIS PLAN OF TREATMENT AND WHILE UNDER MY CARE    Physician's comments:        Physician's Signature: ___________________________________________________

## 2019-11-13 ENCOUNTER — CLINICAL SUPPORT (OUTPATIENT)
Dept: REHABILITATION | Facility: HOSPITAL | Age: 64
End: 2019-11-13
Payer: MEDICARE

## 2019-11-13 DIAGNOSIS — R29.898 DECREASED STRENGTH OF LOWER EXTREMITY: ICD-10-CM

## 2019-11-13 DIAGNOSIS — Z74.09 IMPAIRED FUNCTIONAL MOBILITY, BALANCE, GAIT, AND ENDURANCE: ICD-10-CM

## 2019-11-13 DIAGNOSIS — M62.81 MUSCLE WEAKNESS: ICD-10-CM

## 2019-11-13 DIAGNOSIS — Z78.9 IMPAIRED MOBILITY AND ACTIVITIES OF DAILY LIVING: ICD-10-CM

## 2019-11-13 DIAGNOSIS — R27.8 DECREASED COORDINATION: ICD-10-CM

## 2019-11-13 DIAGNOSIS — Z74.09 IMPAIRED MOBILITY AND ACTIVITIES OF DAILY LIVING: ICD-10-CM

## 2019-11-13 PROCEDURE — 97140 MANUAL THERAPY 1/> REGIONS: CPT | Mod: PN

## 2019-11-13 PROCEDURE — 97112 NEUROMUSCULAR REEDUCATION: CPT | Mod: PN

## 2019-11-13 PROCEDURE — 97116 GAIT TRAINING THERAPY: CPT | Mod: PN

## 2019-11-13 NOTE — PROGRESS NOTES
"  Occupational Therapy Daily Treatment Note     Name: Luis Wong  Clinic Number: [id number]    Therapy Diagnosis:    Encounter Diagnoses   Name Primary?    Impaired mobility and activities of daily living     Muscle weakness     Decreased coordination      Physician: Carla Altman, HALLE Lopez MD    Visit Date: 11/13/2019  Physician Orders: Eval and tx  Medical Diagnosis: CVA L sided weakness Recrudecesnt  Onset Date: 8/28/19 most recent CVA  Evaluation Date: 9/4/2019  Plan of Care Expiration Period: 12/27/19  Insurance Authorization period Expiration: 12/31/19  Date of Return to MD: NA  Visit # / Visits Authortized: 4/50 change in insurance  FOTO: CVA UE hand /75% limitation from 100%  10/4/19       Time In:9:30 am   Time Out:10:15 am   Total Billable (one on one) Time: 45 minutes      Precautions: Standard and Fall    Subjective     Pt reports: "Douglas came to see me yesterday I got all the braces its going pretty good so far.. I can put my glasses on with both hands now?"  he was compliant with home exercise program given last session.   Response to previous treatment:good   Functional change: Sable to put glasses on with BUEs    Pain: 0/10  Location: left arms     Objective      Luis Wong  received NMR to develop GM/FM coordination, balance, activity tolerance and strength in order to increase ADL/IADL independence with replicated home management/self care tasks, for 45 minutes including:      Shoulder and supine exercises:        Shoulder    AAROM   Chest press  overhead       Yoga block OT A  2/10  2/10   Supine chest press  Forward flexion  Isolated FF in supine Yoga block  2/10  2/10   Sidelying  Ext  abduction Arm bent  2/10  2/10   Gator    Sidelying FF OT A arm straight  2/10  Yellow ball  2/10                 Home Exercises and Education Provided     Education provided:   - Previous hand out   - Progress towards goals     Written Home Exercises Provided: Patient instructed to cont " prior HEP. Updated putty exercises to molding,  and pinch Red putty provided.  Exercises were reviewed and Luis was able to demonstrate them prior to the end of the session.  Luis demonstrated good  understanding of the HEP provided.   .   See EMR under Patient Instructions for exercises provided prior visit.        Assessment   Pt would continue to benefit from skilled OT.Main focus being AROM and AAROM in gravity assisted and resistive planes. Mor focus on periscap and posterior movements today due to weakness. He states the dynasplints are helping so far. Educated of updates to HEP and new exercise at home to try sidelying with understanding. He is progressing well. States no pain only fatigue with exercises. Still having minor wrist pain with WB refrained today. Educated of dynasplint wear schedule and use for exercises with pt verbalized understanding.   Luis is progressing well towards his goals and there are no updates to goals at this time. Pt prognosis is Fair.      Pt will continue to benefit from skilled outpatient occupational therapy to address the deficits listed in the problem list on initial evaluation provide pt/family education and to maximize pt's level of independence in the home and community environment.      Anticipated barriers to occupational therapy:      Pt's spiritual, cultural and educational needs considered and pt agreeable to plan of care and goals.     Previous Short Term Goals Status:   Goals:  Short Term Goals:  1) Initiate Hep Met 10/30/2019  2) Pt to increase LUE  strength by 5 # in order to A in self care task of feeding by 4 weeks. MET 10/30/2019  3) Pt to increase Quick dash Score by 5 points increasing self care IND by 4 weeks. Progressing 11/13/2019  4) Pt to increase L UE AROM by 10 degrees in order to A in UB dressing by 4 weeks.MET 11/13/2019  5) Patient will be able to achieve less than or equal to 75% on the FOTO, demonstrating overall improved functional  ability with upper extremity. (self-care category) Progressing 11/13/2019          New Short Term Goals Status:     Long Term Goals:  1) Pt to be IND with HEP in order to maintain ROM and strength needed for self care IND by barrera Progressing 11/13/2019  2) Pt to increase L UE  strength to WNL as compared to unaffected extremity in order to open items for self feeding by barrera Progressing 11/13/2019  3) Pt to increase Quick dash Score by 10 points increasing self care IND at home by barrera Progressing 11/13/2019  4) Pt to increase L UE AROM to WFL in order to A in UB dressing by grant/cLaila Progressing 11/13/2019  5) Patient will be able to achieve less than or equal to 50% on the FOTO, demonstrating overall improved functional ability with upper extremity. (self-care category) Progressing 11/13/2019          Long Term Goal Status:   continue per initial plan of care.  Reasons for Recertification of Therapy:   Pt continues to benefit from skilled services and has made good progress thus far with good future rehab potential. Pt remains limited with LUE strength, ROM and coordination at this time which all still impact their performance of ADLs , IADLs affecting her habits, roles and routines.        Plan      Continue per [facility]. Need for future splinting and dynasplint to prevent contracture while increasing arm swing for functional ambulation and balance.   Progress as tolerated.     Christian Teran  OTR/L

## 2019-11-13 NOTE — PROGRESS NOTES
"                            Physical Therapy Daily Treatment Note     Name: Luis Wong  Clinic Number: [id number]    Therapy Diagnosis:   Encounter Diagnoses   Name Primary?    Impaired functional mobility, balance, gait, and endurance     Decreased strength of lower extremity      Physician: Carla Altman PA-C     Visit Date: 11/13/2019    Physician Orders: PT Eval and Treat  Medical Diagnosis from Referral: I63.9 (ICD-10-CM) - CVA (cerebral vascular accident)  Evaluation Date: 9/13/2019  Authorization Period Expiration: 12/31/2019  Plan of Care Expiration: 11/8/2019  Visit # / Visits authorized: 6/50 (19 prior visits)  FOTO: 4/10    Time In: 10:15 AM   Time Out: 11:00 AM  Total Billable Time: 45 minutes (1 GT, 1 MT, 1NMR)    Precautions: Hx of CVA's    Subjective     Pt reports: Pt had no complaints of pain. Pt agreeable to session.  Response to previous treatment: No adverse reactions.  Functional change: None stated    Pain: 0/10  Location: N/A     Objective     Luis received therapeutic exercises for 0 minutes including objective measurements.     Luis received manual therapy to left foot/ankle for 15 minutes including:   - Splaying and Grade III A/P and P/A glides of metatarsals   - Manual gastroc stretching seated with slant board with gentle overpressure at ankle joint x 30"x3  - AROM toe extension/flexion x 10  - gastroc splaying    Luis receive neuromuscular rehabilitation including proprioceptive and balance training for 10 minutes including:  - Side stepping over orange floor tiles x 2 laps x 40 feet without AD or UE support    - Forward walking over orange floor tiles x 2 laps x 40 feet without AD or UE support         Pt performed gait training on treadmill x 20 minutes total consisting of:  -Forward gait 5% elevation x 8' at 1.0 speed setting with B UE support f/b  -Sidestepping 5% elevation x 3' each way at 0.4 speed setting with B UE support  -Backward gait 5% elevation x 4' at 0.4 " speed setting with RUE support     Home Exercises Provided and Patient Education Provided     Education provided:   -cont HEP     Written Home Exercises Provided: yes.  Exercises were reviewed and Luis was able to demonstrate them prior to the end of the session.  Luis demonstrated good  understanding of the education provided.     See EMR under Patient Instructions for exercises provided 9/23/2019 and 10/28/2019.      Assessment     Pt had no complaints of increased pain following session. Pt presented to session with SPC, but was able to ambulate through the gym with out it with no episodes of loss of balance. Pt was encouraged to increase step length during forward and lateral step overs. Pt performed all exercises with no adverse reactions.     Luis is progressing well towards his goals.   Pt prognosis is Good.     Pt will continue to benefit from skilled outpatient physical therapy to address the deficits listed in the problem list box on initial evaluation, provide pt/family education and to maximize pt's level of independence in the home and community environment.     Pt's spiritual, cultural and educational needs considered and pt agreeable to plan of care and goals.    Anticipated barriers to physical therapy: Co-morbidities    Long Term Goals (8 Weeks):   1.  Independent with initial HEP. MET 11/11/2019  2.  Pt will perform nu-step at level 4 x 10 minutes without rests breaks going at least 50 step/min or greater. MET 10/28/2019 (Level 5)  3. Pt will be able to perform TUG in less than or equal to 20 secs without use of AD demonstrating overall improved functional mobility. PROGRESSING, NOT MET 11/11/2019  4. Pt will performed sit to stand x 5 without UE support in 15 seconds without LOB backward or posterior LE hooking for functional and safe transfers. MET 11/11/2019  5.  Pt will score greater than or equal to 45/56 on the Choi Balance Assessment with use of AD demonstrating overall improved  functional mobility and balance and reduce fall risk. PROGRESSING, NOT MET 11/11/2019, 44/56  6. Pt will walk > than or equal to 140 ft on the 2 minute walk test indoor with AD with 0 LOB and minimal gait deviations for improved safety in home ambulation and safety.  MET 11/11/2019, 180 feet  7.  Pt will be able to safely perform and tolerate high level ADL's without LOB.  PROGRESSING, NOT MET 11/11/2019  8. Pt will have 0 falls from start of PT sessions.  PROGRESSING, NOT MET 11/11/2019  9. Independent with updated HEP.  PROGRESSING, NOT MET 11/11/2019  10. Pt will have MMT score of 3+/5 in all major ms groups in Left LE.  PROGRESSING, NOT MET 11/11/2019  11. Pt will ambulate on TM x 6 minutes with use of UE support with 0 LOB at greater than or equal to 1.0 mph. MET 10/28/2019  12. Pt will begin some form of community fitness to begin regular and consistent performance of exercise to continue maintenance of gains made in PT.  PROGRESSING, NOT MET 11/11/2019     Long Term Goal Status:  d/c LTG's 1 and 2. Continue goals 5, 7-10 and 12. Modify goals 3, 4, 6, and 11 as changed below:      3. Pt will be able to perform TUG in less than or equal to 25 secs without use of AD demonstrating overall improved functional mobility.PROGRESSING, NOT MET  4. Pt will performed sit to stand x 5 without UE support in 10 seconds without LOB backward or posterior LE hooking for functional and safe transfers.PROGRESSING, NOT MET  6. Pt will walk > than or equal to 210 ft on the 2 minute walk test indoor with AD with 0 LOB and minimal gait deviations for improved safety in home ambulation and safety. PROGRESSING, NOT MET  11. Pt will ambulate on TM x 6 minutes with use of UE support with 0 LOB at greater than or equal to 1.3 mph PROGRESSING, NOT MET            Plan   Cont to advance PT as per POC  Robin Davis, WENDY

## 2019-11-15 ENCOUNTER — CLINICAL SUPPORT (OUTPATIENT)
Dept: REHABILITATION | Facility: HOSPITAL | Age: 64
End: 2019-11-15
Payer: MEDICARE

## 2019-11-15 DIAGNOSIS — R29.898 DECREASED STRENGTH OF LOWER EXTREMITY: ICD-10-CM

## 2019-11-15 DIAGNOSIS — Z74.09 IMPAIRED FUNCTIONAL MOBILITY, BALANCE, GAIT, AND ENDURANCE: ICD-10-CM

## 2019-11-15 DIAGNOSIS — Z86.73 HISTORY OF ARTERIAL ISCHEMIC STROKE: Primary | ICD-10-CM

## 2019-11-15 PROCEDURE — 97116 GAIT TRAINING THERAPY: CPT | Mod: PN

## 2019-11-15 PROCEDURE — 97110 THERAPEUTIC EXERCISES: CPT | Mod: PN

## 2019-11-15 NOTE — PROGRESS NOTES
Physical Therapy Daily Treatment Note     Name: Luis Wong  Clinic Number: [id number]    Therapy Diagnosis:   Encounter Diagnoses   Name Primary?    Impaired functional mobility, balance, gait, and endurance     Decreased strength of lower extremity      Physician: Carla Altman PA-C     Visit Date: 11/15/2019    Physician Orders: PT Eval and Treat  Medical Diagnosis from Referral: I63.9 (ICD-10-CM) - CVA (cerebral vascular accident)  Evaluation Date: 9/13/2019  Authorization Period Expiration: 12/31/2019  Plan of Care Expiration: 1/10/2020  Visit # / Visits authorized: 7/50 (19 prior visits)  FOTO: 5/10    Time In: 9:30 AM   Time Out: 10:30 AM  Total Billable Time: 60 minutes (2 GT, 2 TE)    Precautions: Hx of CVA's    Subjective     Pt reports: States he's been taking Baclofen for ~3 weeks and he feels as though it may be making his muscles more tired/weak. States that by the end of the day he seems to be more fatigued with walking.  Response to previous treatment: No adverse reactions.  Functional change: None stated    Pain: 0/10  Location: N/A     Objective     Luis received therapeutic exercises to develop strength, endurance, ROM and flexibility for 30 minutes including:    - Nu-Step   10 minutes x Level 6   - Cybex Ham Curls                 1.0 pl 3x10 LLE only  - Cybex Leg Extension           3x10, LLE only - 20#, 40# BLE 3x10  - Cybex Leg press                  6 plates, 3x10 B                                                  3.5 plates, x30, LLE only, no rest breaks        Pt performed gait training on treadmill x 30 minutes total consisting of:  -Forward gait 5% elevation x 5' at 1.0 speed setting and an additional 1' at 1.1 speed setting, with RUE support f/b  -Side-stepping 5% elevation x 3' each way at 0.5  speed setting with  RUE support  -Backward gait 5% elevation x 5' at 0.4 speed setting with RUE support     Home Exercises Provided and Patient Education  Provided     Education provided:   -cont HEP     Written Home Exercises Provided: yes.  Exercises were reviewed and Luis was able to demonstrate them prior to the end of the session.  Luis demonstrated good  understanding of the education provided.     See EMR under Patient Instructions for exercises provided 9/23/2019 and 10/28/2019.      Assessment     Luis is continuing to progress well in skilled physical therapy and continues to maximize his benefits by performing daily exercises outside of therapy days to further gain strength in bilateral lower extremities and to improve balance and functional gait. Due to his left-sided weakness, Luis continues to display left foot drop during gait and ambulates with left foot in a naturally inverted and plantarflexed position, causing it to often catch onto the floor and create slight instability during ambulation. Patient is actively working hard in therapy, but would benefit from a customized AFO for left ankle and foot to assist with DF.     Other recommendations: Customized AFO for LLE    Luis is progressing well towards his goals.   Pt prognosis is Good.     Pt will continue to benefit from skilled outpatient physical therapy to address the deficits listed in the problem list box on initial evaluation, provide pt/family education and to maximize pt's level of independence in the home and community environment.     Pt's spiritual, cultural and educational needs considered and pt agreeable to plan of care and goals.    Anticipated barriers to physical therapy: Co-morbidities    Long Term Goals (8 Weeks):   3. Pt will be able to perform TUG in less than or equal to 25 secs without use of AD demonstrating overall improved functional mobility. PROGRESSING, NOT MET 11/15/2019  4. Pt will performed sit to stand x 5 without UE support in 10 seconds without LOB backward or posterior LE hooking for functional and safe transfers. PROGRESSING, NOT MET 11/15/2019  5.  Pt  "will score greater than or equal to 45/56 on the Choi Balance Assessment with use of AD demonstrating overall improved functional mobility and balance and reduce fall risk. PROGRESSING, NOT MET 11/15/2019   6. Pt will walk > than or equal to 210 ft on the 2 minute walk test indoor with AD with 0 LOB and minimal gait deviations for improved safety in home ambulation and safety.  PROGRESSING, NOT MET 11/15/2019 feet  7.  Pt will be able to safely perform and tolerate high level ADL's without LOB.  PROGRESSING, NOT MET 11/15/2019  8. Pt will have 0 falls from start of PT sessions.  PROGRESSING, NOT MET 11/15/2019  9. Independent with updated HEP. PROGRESSING, NOT MET 11/15/2019  10. Pt will have MMT score of 3+/5 in all major ms groups in Left LE.  PROGRESSING, NOT MET 11/15/2019  11. Pt will ambulate on TM x 6 minutes with use of UE support with 0 LOB at greater than or equal to 1.3 mph. PROGRESSING, NOT MET 11/15/2019  12. Pt will begin some form of community fitness to begin regular and consistent performance of exercise to continue maintenance of gains made in PT.  PROGRESSING, NOT MET 11/15/2019          Plan   Cont to advance PT as per POC    Resume the following manual therapy and neuromuscular reeducation exercises next visit:  Luis did NOT receive manual therapy to left foot/ankle:   - Splaying and Grade III A/P and P/A glides of metatarsals - OOT  - Manual gastroc stretching seated with slant board with gentle overpressure at ankle joint x 30"x3 - OOT  - AROM toe extension/flexion x 10 - OOT  - gastroc splaying - OOT    Luis did NOT receive neuromuscular rehabilitation including proprioceptive and balance training:  - Side stepping over orange floor tiles x 2 laps x 40 feet without AD or UE support   - OOT  - Forward walking over orange floor tiles x 2 laps x 40 feet without AD or UE support  - OOT    Kandy Gatica, PT, DPT  "

## 2019-11-17 NOTE — PROGRESS NOTES
Physical Therapy Daily Treatment Note     Name: Luis Wong  Clinic Number: [id number]    Therapy Diagnosis:   Encounter Diagnoses   Name Primary?    Impaired functional mobility, balance, gait, and endurance     Decreased strength of lower extremity      Physician: Carla Altman PA-C     Visit Date: 11/18/2019    Physician Orders: PT Eval and Treat  Medical Diagnosis from Referral: I63.9 (ICD-10-CM) - CVA (cerebral vascular accident)  Evaluation Date: 9/13/2019  Authorization Period Expiration: 12/31/2019  Plan of Care Expiration: 1/10/2020  Visit # / Visits authorized: 8/50 (18 prior visits)  FOTO: 6/10    Time In: 10:15 AM   Time Out: 11:05 AM  Total Billable Time: 45 minutes (3 TE)    Precautions: Hx of CVA's    Subjective     Pt reports: No new complaints. Continuing to perform ankle exercises and treadmill training at home.   Response to previous treatment: No adverse reactions.  Functional change: None stated    Pain: 0/10  Location: N/A     Objective     Luis received therapeutic exercises to develop strength, endurance, ROM and flexibility for 45 minutes including:    - Nu-Step   10 minutes x Level 6   - Cybex Ham Curls                 1.0 pl 3x10 LLE only  - Cybex Leg Extension           3x10, LLE only - 20#, 40# BLE 3x10  - NOT TODAY  - Cybex Leg press                  7.0 plates, 3x10 B                                                  3.5 plates, x30, LLE only, no rest breaks    - Fwd Step-ups  x20 with RLE leading, x20 with LLE leading, Level 1  - Lateral Step-ups  2x10, 1 UE support, // bars, Level 1  - Mini Squats:  3x10  - Heel Raises:  3x10  - Lateral Step over:  2x10, Small blue block  - Kneeling to standing x10 reps pushing up with RLE, x10 reps pushing up with LLE        Pt did NOT perform gait training on treadmill: - NOT TODAY  -Forward gait 5% elevation x 5' at 1.0 speed setting and an additional 1' at 1.1 speed setting, with RUE support f/b - NOT  "TODAY  -Side-stepping 5% elevation x 3' each way at 0.5  speed setting with  RUE support - NOT TODAY  -Backward gait 5% elevation x 5' at 0.4 speed setting with RUE support - NOT TODAY     Home Exercises Provided and Patient Education Provided     Education provided:   -cont HEP     Written Home Exercises Provided: yes.  Exercises were reviewed and Luis was able to demonstrate them prior to the end of the session.  Luis demonstrated good  understanding of the education provided.     See EMR under Patient Instructions for exercises provided 9/23/2019 and 10/28/2019.      Assessment     Luis was able to perform all standing TE with minimal LOB but with self-correction. Able to progress to sidestepping over 2" block, only hitting it with left foot in the beginning, however by the end of exercise he was able to clear it multiple times; to progress to red primo next visit. He was also able to progress to 7.0 plates for DL leg press. Luis continues to progress well in therapy.       Luis is progressing well towards his goals.   Pt prognosis is Good.     Pt will continue to benefit from skilled outpatient physical therapy to address the deficits listed in the problem list box on initial evaluation, provide pt/family education and to maximize pt's level of independence in the home and community environment.     Pt's spiritual, cultural and educational needs considered and pt agreeable to plan of care and goals.    Anticipated barriers to physical therapy: Co-morbidities    Long Term Goals (8 Weeks):     3. Pt will be able to perform TUG in less than or equal to 25 secs without use of AD demonstrating overall improved functional mobility. PROGRESSING, NOT MET 11/18/2019  4. Pt will performed sit to stand x 5 without UE support in 10 seconds without LOB backward or posterior LE hooking for functional and safe transfers. PROGRESSING, NOT MET 11/18/2019  5.  Pt will score greater than or equal to 45/56 on the Choi " Balance Assessment with use of AD demonstrating overall improved functional mobility and balance and reduce fall risk. PROGRESSING, NOT MET 11/18/2019   6. Pt will walk > than or equal to 210 ft on the 2 minute walk test indoor with AD with 0 LOB and minimal gait deviations for improved safety in home ambulation and safety.  PROGRESSING, NOT MET 11/18/2019 feet  7.  Pt will be able to safely perform and tolerate high level ADL's without LOB.  PROGRESSING, NOT MET 11/18/2019  8. Pt will have 0 falls from start of PT sessions.  PROGRESSING, NOT MET 11/18/2019  9. Independent with updated HEP. PROGRESSING, NOT MET 11/18/2019  10. Pt will have MMT score of 3+/5 in all major ms groups in Left LE.  PROGRESSING, NOT MET 11/18/2019  11. Pt will ambulate on TM x 6 minutes with use of UE support with 0 LOB at greater than or equal to 1.3 mph. PROGRESSING, NOT MET 11/18/2019  12. Pt will begin some form of community fitness to begin regular and consistent performance of exercise to continue maintenance of gains made in PT.  PROGRESSING, NOT MET 11/18/2019          Plan   Progress to side stepping over 1 primo in // bars  Attempt 1.5 plates on hamstring curls during last set of 10 repetitions.    Kandy Gatica, PT, DPT

## 2019-11-18 ENCOUNTER — CLINICAL SUPPORT (OUTPATIENT)
Dept: REHABILITATION | Facility: HOSPITAL | Age: 64
End: 2019-11-18
Payer: MEDICARE

## 2019-11-18 ENCOUNTER — TELEPHONE (OUTPATIENT)
Dept: CARDIOLOGY | Facility: CLINIC | Age: 64
End: 2019-11-18

## 2019-11-18 ENCOUNTER — PATIENT OUTREACH (OUTPATIENT)
Dept: ADMINISTRATIVE | Facility: OTHER | Age: 64
End: 2019-11-18

## 2019-11-18 DIAGNOSIS — Z74.09 IMPAIRED MOBILITY AND ACTIVITIES OF DAILY LIVING: ICD-10-CM

## 2019-11-18 DIAGNOSIS — M62.81 MUSCLE WEAKNESS: ICD-10-CM

## 2019-11-18 DIAGNOSIS — Z78.9 IMPAIRED MOBILITY AND ACTIVITIES OF DAILY LIVING: ICD-10-CM

## 2019-11-18 DIAGNOSIS — R27.8 DECREASED COORDINATION: ICD-10-CM

## 2019-11-18 DIAGNOSIS — Z74.09 IMPAIRED FUNCTIONAL MOBILITY, BALANCE, GAIT, AND ENDURANCE: ICD-10-CM

## 2019-11-18 DIAGNOSIS — R29.898 DECREASED STRENGTH OF LOWER EXTREMITY: ICD-10-CM

## 2019-11-18 PROCEDURE — 97110 THERAPEUTIC EXERCISES: CPT | Mod: PN

## 2019-11-18 PROCEDURE — 97112 NEUROMUSCULAR REEDUCATION: CPT | Mod: PN

## 2019-11-18 NOTE — PROGRESS NOTES
"  Occupational Therapy Daily Treatment Note     Name: Luis Wong  Clinic Number: [id number]    Therapy Diagnosis:    Encounter Diagnoses   Name Primary?    Impaired mobility and activities of daily living     Muscle weakness     Decreased coordination      Physician: Carla Altman, HALLE Lopez MD    Visit Date: 11/18/2019  Physician Orders: Eval and tx  Medical Diagnosis: CVA L sided weakness Recrudecesnt  Onset Date: 8/28/19 most recent CVA  Evaluation Date: 9/4/2019  Plan of Care Expiration Period: 12/27/19  Insurance Authorization period Expiration: 12/31/19  Date of Return to MD: DAI  Visit # / Visits Authortized: 5/50 change in insurance  FOTO: CVA UE hand /75% limitation from 100%  10/4/19       Time In:9:30 am   Time Out:10:15 am   Total Billable (one on one) Time: 45 minutes      Precautions: Standard and Fall    Subjective     Pt reports: "I stopped taking the baclofen meds cause I feel so weak and its making me feel puny?"  he was compliant with home exercise program given last session.   Response to previous treatment:good   Functional change: Sable to put glasses on with BUEs    Pain: 0/10  Location: left arms     Objective      Luis Wong  received NMR to develop GM/FM coordination, balance, activity tolerance and strength in order to increase ADL/IADL independence with replicated home management/self care tasks, for 45 minutes including:      UBE 60 rpms  6 min 3 forward 3 back   Black hand gripper 3 yellow 2 red X 30   Wrist ext with AA through full range with R hand X 30    Opposition  all fingers x 10 each    Red cp X 30 squeezes pad to pad pinch   Md pom poms opposition to RF X 2 tubs        Pulleys  3 min    Kiran slides Towel  X 15                   Home Exercises and Education Provided     Education provided:   - Previous hand out   - Progress towards goals     Written Home Exercises Provided: Patient instructed to cont prior HEP. Updated putty exercises to molding,  and " pinch Red putty provided.  Exercises were reviewed and Luis was able to demonstrate them prior to the end of the session.  Luis demonstrated good  understanding of the HEP provided.   .   See EMR under Patient Instructions for exercises provided prior visit.        Assessment   Pt would continue to benefit from skilled OT. He seems to have less tone in the elbow with less flexion during ambulation walking into clinic. He states stopping the meds for his tone due to it making him feel weak.. Educated to contact MD about this. Main focus of sessin being the hand and wrist along with isolated FM coordination and opposition. He did well with BUE overhead AAROM with wall slides but required OT A to prevent hiking at the shoulder. State new exercises going well at home with focus on the shoulder.    Luis is progressing well towards his goals and there are no updates to goals at this time. Pt prognosis is Fair.      Pt will continue to benefit from skilled outpatient occupational therapy to address the deficits listed in the problem list on initial evaluation provide pt/family education and to maximize pt's level of independence in the home and community environment.      Anticipated barriers to occupational therapy:      Pt's spiritual, cultural and educational needs considered and pt agreeable to plan of care and goals.     Previous Short Term Goals Status:   Goals:  Short Term Goals:  1) Initiate Hep Met 10/30/2019  2) Pt to increase LUE  strength by 5 # in order to A in self care task of feeding by 4 weeks. MET 10/30/2019  3) Pt to increase Quick dash Score by 5 points increasing self care IND by 4 weeks. Progressing 11/18/2019  4) Pt to increase L UE AROM by 10 degrees in order to A in UB dressing by 4 weeks.MET 11/18/2019  5) Patient will be able to achieve less than or equal to 75% on the FOTO, demonstrating overall improved functional ability with upper extremity. (self-care category) Progressing  11/18/2019          New Short Term Goals Status:     Long Term Goals:  1) Pt to be IND with HEP in order to maintain ROM and strength needed for self care IND by barrera Progressing 11/18/2019  2) Pt to increase L UE  strength to WNL as compared to unaffected extremity in order to open items for self feeding by barrera Progressing 11/18/2019  3) Pt to increase Quick dash Score by 10 points increasing self care IND at home by barrera Progressing 11/18/2019  4) Pt to increase L UE AROM to WFL in order to A in UB dressing by d/cLaila Progressing 11/18/2019  5) Patient will be able to achieve less than or equal to 50% on the FOTO, demonstrating overall improved functional ability with upper extremity. (self-care category) Progressing 11/18/2019          Long Term Goal Status:   continue per initial plan of care.  Reasons for Recertification of Therapy:   Pt continues to benefit from skilled services and has made good progress thus far with good future rehab potential. Pt remains limited with LUE strength, ROM and coordination at this time which all still impact their performance of ADLs , IADLs affecting her habits, roles and routines.        Plan      Continue per UPOC. Need for future splinting and dynasplint to prevent contracture while increasing arm swing for functional ambulation and balance.   Progress as tolerated.     Christian Teran  OTR/L

## 2019-11-18 NOTE — TELEPHONE ENCOUNTER
I left a v/m for patient letting him know that his monitor results are good. I let him know if he has any questions please feel free to call us.

## 2019-11-18 NOTE — TELEPHONE ENCOUNTER
----- Message from Dixon Bahena MD sent at 11/17/2019  7:26 PM CST -----  Please let the patient know that the monitor result is ok. Thanks

## 2019-11-19 ENCOUNTER — PATIENT MESSAGE (OUTPATIENT)
Dept: NEUROLOGY | Facility: CLINIC | Age: 64
End: 2019-11-19

## 2019-11-20 ENCOUNTER — OFFICE VISIT (OUTPATIENT)
Dept: CARDIOLOGY | Facility: CLINIC | Age: 64
End: 2019-11-20
Payer: MEDICARE

## 2019-11-20 VITALS — HEIGHT: 69 IN | BODY MASS INDEX: 23.15 KG/M2 | HEART RATE: 85 BPM | WEIGHT: 156.31 LBS

## 2019-11-20 DIAGNOSIS — I63.9 CEREBROVASCULAR ACCIDENT (CVA), UNSPECIFIED MECHANISM: Primary | ICD-10-CM

## 2019-11-20 DIAGNOSIS — E78.2 MIXED HYPERLIPIDEMIA: ICD-10-CM

## 2019-11-20 DIAGNOSIS — I49.8 OTHER SPECIFIED CARDIAC ARRHYTHMIAS: Primary | ICD-10-CM

## 2019-11-20 DIAGNOSIS — I10 ESSENTIAL HYPERTENSION: ICD-10-CM

## 2019-11-20 PROCEDURE — 99214 OFFICE O/P EST MOD 30 MIN: CPT | Mod: S$GLB,,, | Performed by: INTERNAL MEDICINE

## 2019-11-20 PROCEDURE — 3008F BODY MASS INDEX DOCD: CPT | Mod: CPTII,S$GLB,, | Performed by: INTERNAL MEDICINE

## 2019-11-20 PROCEDURE — 99214 PR OFFICE/OUTPT VISIT, EST, LEVL IV, 30-39 MIN: ICD-10-PCS | Mod: S$GLB,,, | Performed by: INTERNAL MEDICINE

## 2019-11-20 PROCEDURE — 1101F PT FALLS ASSESS-DOCD LE1/YR: CPT | Mod: CPTII,S$GLB,, | Performed by: INTERNAL MEDICINE

## 2019-11-20 PROCEDURE — 3008F PR BODY MASS INDEX (BMI) DOCUMENTED: ICD-10-PCS | Mod: CPTII,S$GLB,, | Performed by: INTERNAL MEDICINE

## 2019-11-20 PROCEDURE — 99999 PR PBB SHADOW E&M-EST. PATIENT-LVL III: CPT | Mod: PBBFAC,,, | Performed by: INTERNAL MEDICINE

## 2019-11-20 PROCEDURE — 99999 PR PBB SHADOW E&M-EST. PATIENT-LVL III: ICD-10-PCS | Mod: PBBFAC,,, | Performed by: INTERNAL MEDICINE

## 2019-11-20 PROCEDURE — 1101F PR PT FALLS ASSESS DOC 0-1 FALLS W/OUT INJ PAST YR: ICD-10-PCS | Mod: CPTII,S$GLB,, | Performed by: INTERNAL MEDICINE

## 2019-11-20 RX ORDER — GABAPENTIN 300 MG/1
CAPSULE ORAL
COMMUNITY
Start: 2019-09-30 | End: 2020-02-19 | Stop reason: CLARIF

## 2019-11-20 NOTE — PATIENT INSTRUCTIONS
Understanding Loop Recorder Implantation  An implantable loop recorder (ILR) is a device that records information about how your heart is working. A loop recorder may be implanted if you have problems such as:  · Fainting, dizziness, or lightheadedness  · Heart palpitations  · Very fast or slow heartbeats  · Unexplained falls  · Possible hidden heart rhythm problems that can cause strokes  · Heart attack in the past  · Certain gene disorders  During implantation, a small device is placed under the skin on your chest, a few inches below your collarbone. The device works as an electrocardiogram (ECG). It constantly picks up electrical signals from your heart. An ILR records for up to 3 years.  How a loop recorder helps  You may need an ILR if other tests havent found the cause of your symptoms. An ILR constantly records your hearts electrical activity. For example, if you faint because of an arrhythmia, the device records your hearts activity before, during, and after you faint. Then your health care provider can see how your heart was acting.  Or you may need to trigger your ILR with an activator. This is a small, handheld device. You press a button on the activator when you are feeling symptoms. The IRL then records your hearts activity. This device is very useful when you don't have symptoms often or if your provider needs to look at long-term information about your heart.  Once the cause of your symptoms is found, you can be treated. You may need another small device to help control your heart rhythm. This may be a pacemaker or an implantable cardioverter-defibrillator (ICD).  How loop recorder implantation is done  The doctor will make a small cut (incision) in your skin. This is usually done in the left upper chest, a few inches below your collarbone. He or she will make a small pocket under your skin. The doctor will place the loop recorder in this pocket. The machine is about the size of a flat AA  battery.  Your provider can remove the device in a similar way once enough information has been recorded.  Risks of loop recorder implantation  All procedures have some risks. The risks of this procedure include:  · Bleeding or bruising  · Infection that may require that the ILR be removed  · Damage to your heart or blood vessels  · Mild pain at your implantation site  Your own risks will depend on your age, your other overall health, and other factors. Ask your healthcare provider about which risks most apply to you.  Date Last Reviewed: 3/1/2017  © 8852-0055 [employer]. [address] [place] [address]. All rights reserved. This information is not intended as a substitute for professional medical care. Always follow your healthcare professional's instructions.    Aerobic Exercise for a Healthy Heart  Exercise is a lot more than an energy booster and a stress reliever. It also strengthens your heart muscle, lowers your blood pressure and cholesterol, and burns calories. It can also improve your resting muscle tone, and your mood.     Remember, some activity is better than none.    Choose an aerobic activity  Choose an activity that makes your heart and lungs work harder than they do when you rest or walk normally. This aerobic exercise can improve the way your heart and other muscles use oxygen. Make it fun by exercising with a friend and choosing an activity you enjoy. Here are some ideas:  · Walking  · Swimming  · Bicycling  · Stair climbing  · Dancing  · Jogging  · Gardening  Exercise regularly  If you havent been exercising regularly,  get your doctors OK first. Then start slowly.  Here are some tips:  · Begin exercising 3 times a week for 5 to 10 minutes at a time.  · When you feel comfortable, add a few minutes each session.  · Slowly build up to exercising 3 to 4 times each week. Each session should last for 40 minutes, on average, and involve moderate- to vigorous-intensity  physical activity.  · If you have been given nitroglycerin, be sure to carry it when you exercise.  · If you get chest pain (angina) when youre exercising, stop what youre doing, take your nitroglycerin, and call your doctor.  Date Last Reviewed: 6/2/2016  © 9839-6881 [employer]. [address] [place] [address], [place] [address]. All rights reserved. This information is not intended as a substitute for professional medical care. Always follow your healthcare professional's instructions.          Eating Heart-Healthy Foods  Eating has a big impact on your heart health. In fact, eating healthier can improve several of your heart risks at once. For instance, it helps you manage weight, cholesterol, and blood pressure. Here are ideas to help you make heart-healthy changes without giving up all the foods and flavors you love.  Getting started  · Talk with your health care provider about eating plans, such as the DASH or Mediterranean diet. You may also be referred to a dietitian.  · Change a few things at a time. Give yourself time to get used to a few eating changes before adding more.  · Work to create a tasty, healthy eating plan that you can stick to for the rest of your life.    Goals for healthy eating  Below are some tips to improve your eating habits:  · Limit saturated fats and trans fats. Saturated fats raise your levels of cholesterol, so keep these fats to a minimum. They are found in foods such as fatty meats, whole milk, cheese, and palm and coconut oils. Avoid trans fats because they lower good cholesterol as well as raise bad cholesterol. Trans fats are most often found in processed foods.  · Reduce sodium (salt) intake. Eating too much salt may increase your blood pressure. Limit your sodium intake to 2,300 milligrams (mg) per day, or less if your health care provider recommends it. Dining out less often and eating fewer processed foods are two great ways to decrease the amount of salt you  consume.  · Managing calories. A calorie is a unit of energy. Your body burns calories for fuel, but if you eat more calories than your body burns, the extras are stored as fat. Your health care provider can help you create a diet plan to manage your calories. This will likely include eating healthier foods as well as exercising regularly. To help you track your progress, keep a diary to record what you eat and how often you exercise.  Choose the right foods  Aim to make these foods staples of your diet. If you have diabetes, you may have different recommendations than what is listed here:  · Fruits and vegetable provide plenty of nutrients without a lot of calories. At meals, fill half your plate with these foods. Split the other half of your plate between whole grains and lean protein.  · Whole grains are high in fiber and rich in vitamins and nutrients. Good choices include whole-wheat bread, pasta, and brown rice.  · Lean proteins give you nutrition with less fat. Good choices include fish, skinless chicken, and beans.  · Low-fat or nonfat dairy provides nutrients without a lot of fat. Try low-fat or nonfat milk, cheese, or yogurt.  · Healthy fats can be good for you in small amounts. These are unsaturated fats, such as olive oil, nuts, and fish. Try to have at least 2 servings per week of fatty fish such as salmon, sardines, mackerel, rainbow trout, and albacore tuna. These contain omega-3 fatty acids, which are good for your heart. Flaxseed is another source of a heart-healthy fat.  More on heart healthy eating    Read food labels  Healthy eating starts at the grocery store. Be sure to pay attention to food labels on packaged foods. Look for products that are high in fiber and protein, and low in saturated fat, cholesterol, and sodium. Avoid products that contain trans fat. And pay close attention to serving size. For instance, if you plan to eat two servings, double all the numbers on the label.  Prepare food  right  A gayle part of healthy cooking is cutting down on added fat and salt. Look on the internet for lower-fat, lower-sodium recipes. Also, try these tips:  · Remove fat from meat and skin from poultry before cooking.  · Skim fat from the surface of soups and sauces.  · Broil, boil, bake, steam, grill, and microwave food without added fats.  · Choose ingredients that spice up your food without adding calories, fat, or sodium. Try these items: horseradish, hot sauce, lemon, mustard, nonfat salad dressings, and vinegar. For salt-free herbs and spices, try basil, cilantro, cinnamon, pepper, and rosemary.  Date Last Reviewed: 6/25/2015  © 9253-7342 [employer]. [address] [place] [address], [place] [address]. All rights reserved. This information is not intended as a substitute for professional medical care. Always follow your healthcare professional's instructions.

## 2019-11-20 NOTE — PROGRESS NOTES
Subjective:   @Patient ID:  Luis Wong is a 64 y.o. male who presents for evaluation of cardio embolic source of stroke.       HPI:     Patient here for follow up of event monitor.   He completed the EM, no evidence of atrial fibrillation.   Now we are discussing the ILR option     Unfortunately patient had recurrent episodes of stroke.  Last event was 8/2019 resulted in significant left side weakness. Problem started in 2012,  since there he had about 3-4 event. Last event is the strongest.  Echo done no evidence of interatrial shunt. No lV thrombus detected.     He denies any chest pain, or palpitations.       MRA 8/2019 normal carotids.  He stated that he doesn't think that he had monitor or ILR in the past.        Pertinent hx, DM, and HTN    Prior cardiovascular  Hx  --------------------------------       - ECHO 6/2018    1 - Normal left ventricular systolic function (EF 60-65%).     2 - Impaired LV relaxation, normal LAP (grade 1 diastolic dysfunction).     3 - Normal right ventricular systolic function .     4 - The estimated PA systolic pressure is 29 mmHg.     5 - No evidence of intracardiac shunt.    CAC 18 in 2014     - EKG SR, no evidence of A.fib         Patient Active Problem List    Diagnosis Date Noted    Impaired functional mobility, balance, gait, and endurance 09/19/2019    Decreased strength of lower extremity 09/13/2019    Impaired mobility and activities of daily living 09/04/2019    CVA (cerebral vascular accident) 08/29/2019    Bilateral carpal tunnel syndrome 01/08/2019    Muscle weakness 07/11/2018    Decreased coordination 07/11/2018    Dysarthria 06/26/2018    Normocytic anemia 06/26/2018    Pre-diabetes 09/25/2017    History of cerebral parenchymal hemorrhage 08/31/2016     R thalamus, 2012      History of stroke 09/11/2014     R corona radiata, small artery infarct 6/2018      Special screening for malignant neoplasms, colon 12/14/2013    Hyperglycemia 09/05/2013     Mixed hyperlipidemia 2013    Cataracts, bilateral 2013    Nuclear sclerosis 2012    HTN (hypertension) 2012     Right Arm BP - Sittin/77  Left Arm BP - Sittin/81  LAST HbA1c  Lab Results   Component Value Date    HGBA1C 6.3 (H) 10/21/2019       Lipid panel  Lab Results   Component Value Date    CHOL 170 10/21/2019    CHOL 214 (H) 2019    CHOL 222 (H) 2019     Lab Results   Component Value Date    HDL 69 10/21/2019    HDL 82 (H) 2019    HDL 76 (H) 2019     Lab Results   Component Value Date    LDLCALC 80.4 10/21/2019    LDLCALC 113.0 2019    LDLCALC 128.8 2019     Lab Results   Component Value Date    TRIG 103 10/21/2019    TRIG 95 2019    TRIG 86 2019     Lab Results   Component Value Date    CHOLHDL 40.6 10/21/2019    CHOLHDL 38.3 2019    CHOLHDL 34.2 2019            Review of Systems   Constitution: Negative for chills and fever.   HENT: Negative for hearing loss and nosebleeds.    Eyes: Negative for blurred vision.   Cardiovascular: Negative for chest pain and palpitations.   Respiratory: Negative for hemoptysis and shortness of breath.    Hematologic/Lymphatic: Negative for bleeding problem.   Skin: Negative for itching.   Musculoskeletal:        On wheelchair now.    Gastrointestinal: Negative for abdominal pain and hematochezia.   Genitourinary: Negative for hematuria.   Neurological: Positive for focal weakness (Left side ). Negative for dizziness.   Psychiatric/Behavioral: Negative for altered mental status and depression.       Objective:   Physical Exam   Constitutional: He is oriented to person, place, and time. He appears well-developed and well-nourished.   HENT:   Head: Normocephalic and atraumatic.   Eyes: Conjunctivae are normal.   Neck: Neck supple. No JVD present.   Cardiovascular: Normal rate, regular rhythm and normal heart sounds. Exam reveals no gallop and no friction rub.   No murmur  heard.  Pulmonary/Chest: Effort normal and breath sounds normal. No stridor. No respiratory distress. He has no wheezes.   Neurological: He is alert and oriented to person, place, and time. He exhibits abnormal muscle tone (left UE and LE).   Skin: Skin is warm and dry.   Psychiatric: He has a normal mood and affect. His behavior is normal.       Assessment:     1. Cerebrovascular accident (CVA), unspecified mechanism    2. Essential hypertension    3. Mixed hyperlipidemia        Plan:     - Patient with recurrent stroke, unknown source.  No evidence of PFO per echo.     - 30 day EM no A.fib or flutter. I have discussed with him the option of ILR and he agrees to proceed with ILR.  Will arrange to the ILR.     - F/U with Neurology team.     - ASA/Statin    EKG , and ECHO reviewed independently     Continue with current medical plan and lifestyle changes.  Return sooner for concerns or questions. If symptoms persist go to the ED  I have reviewed all pertinent data including patient's medical history in detail and updated the computerized patient record.     No orders of the defined types were placed in this encounter.      Follow up as scheduled.     He expressed verbal understanding and agreed with the plan    Patient's Medications   New Prescriptions    No medications on file   Previous Medications    ASPIRIN (ECOTRIN) 81 MG EC TABLET    TAKE 1 TABLET BY MOUTH EVERY DAY    ATORVASTATIN (LIPITOR) 40 MG TABLET    Take 1 tablet (40 mg total) by mouth once daily.    BACLOFEN (LIORESAL) 10 MG TABLET    Take 0.5 tablets (5 mg total) by mouth 3 (three) times daily.    CLOPIDOGREL (PLAVIX) 75 MG TABLET    Take 1 tablet (75 mg total) by mouth once daily.    GABAPENTIN (NEURONTIN) 300 MG CAPSULE        HYDROCHLOROTHIAZIDE (MICROZIDE) 12.5 MG CAPSULE    TAKE 1 CAPSULE BY MOUTH EVERY DAY    METFORMIN (GLUCOPHAGE) 500 MG TABLET    Take 1 tablet (500 mg total) by mouth daily with breakfast.    NIFEDIPINE (PROCARDIA-XL) 30 MG  (OSM) 24 HR TABLET    Take 1 tablet (30 mg total) by mouth once daily.    POTASSIUM CHLORIDE (MICRO-K) 8 MEQ CPSR    TAKE 1 CAPSULE (8 MEQ TOTAL) BY MOUTH ONCE DAILY.   Modified Medications    No medications on file   Discontinued Medications    No medications on file

## 2019-11-21 ENCOUNTER — INITIAL CONSULT (OUTPATIENT)
Dept: CARDIOLOGY | Facility: CLINIC | Age: 64
End: 2019-11-21
Payer: MEDICARE

## 2019-11-21 VITALS
WEIGHT: 156 LBS | BODY MASS INDEX: 23.11 KG/M2 | HEIGHT: 69 IN | HEART RATE: 100 BPM | DIASTOLIC BLOOD PRESSURE: 83 MMHG | SYSTOLIC BLOOD PRESSURE: 120 MMHG

## 2019-11-21 DIAGNOSIS — R27.8 DECREASED COORDINATION: ICD-10-CM

## 2019-11-21 DIAGNOSIS — Z86.79 HISTORY OF CEREBRAL PARENCHYMAL HEMORRHAGE: ICD-10-CM

## 2019-11-21 DIAGNOSIS — R47.1 DYSARTHRIA: ICD-10-CM

## 2019-11-21 DIAGNOSIS — I49.8 OTHER SPECIFIED CARDIAC ARRHYTHMIAS: ICD-10-CM

## 2019-11-21 DIAGNOSIS — I63.9 CEREBROVASCULAR ACCIDENT (CVA), UNSPECIFIED MECHANISM: Primary | ICD-10-CM

## 2019-11-21 DIAGNOSIS — Z86.73 HISTORY OF STROKE: ICD-10-CM

## 2019-11-21 DIAGNOSIS — E78.2 MIXED HYPERLIPIDEMIA: ICD-10-CM

## 2019-11-21 DIAGNOSIS — I10 ESSENTIAL HYPERTENSION: ICD-10-CM

## 2019-11-21 PROCEDURE — 3008F PR BODY MASS INDEX (BMI) DOCUMENTED: ICD-10-PCS | Mod: CPTII,S$GLB,, | Performed by: INTERNAL MEDICINE

## 2019-11-21 PROCEDURE — 3074F SYST BP LT 130 MM HG: CPT | Mod: CPTII,S$GLB,, | Performed by: INTERNAL MEDICINE

## 2019-11-21 PROCEDURE — 99215 PR OFFICE/OUTPT VISIT, EST, LEVL V, 40-54 MIN: ICD-10-PCS | Mod: S$GLB,,, | Performed by: INTERNAL MEDICINE

## 2019-11-21 PROCEDURE — 3074F PR MOST RECENT SYSTOLIC BLOOD PRESSURE < 130 MM HG: ICD-10-PCS | Mod: CPTII,S$GLB,, | Performed by: INTERNAL MEDICINE

## 2019-11-21 PROCEDURE — 3079F PR MOST RECENT DIASTOLIC BLOOD PRESSURE 80-89 MM HG: ICD-10-PCS | Mod: CPTII,S$GLB,, | Performed by: INTERNAL MEDICINE

## 2019-11-21 PROCEDURE — 99215 OFFICE O/P EST HI 40 MIN: CPT | Mod: S$GLB,,, | Performed by: INTERNAL MEDICINE

## 2019-11-21 PROCEDURE — 1101F PT FALLS ASSESS-DOCD LE1/YR: CPT | Mod: CPTII,S$GLB,, | Performed by: INTERNAL MEDICINE

## 2019-11-21 PROCEDURE — 99999 PR PBB SHADOW E&M-EST. PATIENT-LVL III: CPT | Mod: PBBFAC,,, | Performed by: INTERNAL MEDICINE

## 2019-11-21 PROCEDURE — 93005 RHYTHM STRIP: ICD-10-PCS | Mod: S$GLB,,, | Performed by: INTERNAL MEDICINE

## 2019-11-21 PROCEDURE — 3008F BODY MASS INDEX DOCD: CPT | Mod: CPTII,S$GLB,, | Performed by: INTERNAL MEDICINE

## 2019-11-21 PROCEDURE — 93005 ELECTROCARDIOGRAM TRACING: CPT | Mod: S$GLB,,, | Performed by: INTERNAL MEDICINE

## 2019-11-21 PROCEDURE — 93010 ELECTROCARDIOGRAM REPORT: CPT | Mod: S$GLB,,, | Performed by: INTERNAL MEDICINE

## 2019-11-21 PROCEDURE — 3079F DIAST BP 80-89 MM HG: CPT | Mod: CPTII,S$GLB,, | Performed by: INTERNAL MEDICINE

## 2019-11-21 PROCEDURE — 93010 RHYTHM STRIP: ICD-10-PCS | Mod: S$GLB,,, | Performed by: INTERNAL MEDICINE

## 2019-11-21 PROCEDURE — 99999 PR PBB SHADOW E&M-EST. PATIENT-LVL III: ICD-10-PCS | Mod: PBBFAC,,, | Performed by: INTERNAL MEDICINE

## 2019-11-21 PROCEDURE — 1101F PR PT FALLS ASSESS DOC 0-1 FALLS W/OUT INJ PAST YR: ICD-10-PCS | Mod: CPTII,S$GLB,, | Performed by: INTERNAL MEDICINE

## 2019-11-21 NOTE — H&P (VIEW-ONLY)
Subjective:    Patient ID:  Luis Wong is a 64 y.o. male who presents for evaluation of cryptogenic CVA    HPI   64 y.o. M  HTN on meds  DM on meds    Recurrent crypotgenic CVA, with L sided weakness.  Event monitor negative    echo 60-65%. no shunt.    My interpretation of today's ECG is NSR      Review of Systems   Constitution: Negative. Negative for malaise/fatigue.   HENT: Negative.  Negative for ear pain and tinnitus.    Eyes: Negative for blurred vision.   Cardiovascular: Negative.  Negative for chest pain, dyspnea on exertion, near-syncope, palpitations and syncope.   Respiratory: Negative.  Negative for shortness of breath.    Endocrine: Negative.  Negative for polyuria.   Hematologic/Lymphatic: Does not bruise/bleed easily.   Skin: Negative.  Negative for rash.   Musculoskeletal: Negative.  Negative for joint pain and muscle weakness.   Gastrointestinal: Negative.  Negative for abdominal pain and change in bowel habit.   Genitourinary: Negative for frequency.   Neurological: Positive for focal weakness. Negative for dizziness and weakness.   Psychiatric/Behavioral: Negative.  Negative for depression. The patient is not nervous/anxious.    Allergic/Immunologic: Negative for environmental allergies.        Objective:    Physical Exam   Constitutional: He is oriented to person, place, and time. He appears well-developed and well-nourished.   HENT:   Head: Normocephalic and atraumatic.   Eyes: Conjunctivae, EOM and lids are normal. No scleral icterus.   Neck: Normal range of motion. No JVD present. No tracheal deviation present. No thyromegaly present.   Cardiovascular: Normal rate, regular rhythm, normal heart sounds and intact distal pulses.  No extrasystoles are present. PMI is not displaced. Exam reveals no gallop and no friction rub.   No murmur heard.  Pulses:       Radial pulses are 2+ on the right side, and 2+ on the left side.   Pulmonary/Chest: Effort normal and breath sounds normal. No accessory  muscle usage. No tachypnea. No respiratory distress. He has no wheezes. He has no rales.   Abdominal: Soft. Bowel sounds are normal. He exhibits no distension. There is no hepatosplenomegaly. There is no tenderness.   Musculoskeletal: Normal range of motion. He exhibits no edema.   Neurological: He is alert and oriented to person, place, and time. He has normal reflexes. He exhibits abnormal muscle tone.   L side weak. LUE in brace to prevent contracture.   Skin: Skin is warm and dry. No rash noted.   Psychiatric: He has a normal mood and affect. His behavior is normal.   Nursing note and vitals reviewed.        Assessment:       1. Cerebrovascular accident (CVA), unspecified mechanism    2. Other specified cardiac arrhythmias    3. Decreased coordination    4. Dysarthria    5. History of cerebral parenchymal hemorrhage    6. History of stroke    7. Essential hypertension    8. Mixed hyperlipidemia         Plan:       Cryptogenic CVA.    Will place ILR for surveillance for AF/AFL.  I discussed with the patient the risks and benefits of ILR placement. Our discussion of risks included (but was not limited to) the possibility of infection, bleeding, medication reaction, scar.  I discussed with patient risks, indications, benefits, and alternatives of the planned procedure. All questions were answered. Patient understands and wishes to proceed.

## 2019-11-21 NOTE — LETTER
November 21, 2019      Dixon Bahena MD  [address]   [place] [address]           [place] [facility]  [address]  [place] [address]  Phone: [phone]          Patient: Luis Wong   MR Number: [id number]   YOB: 1955   Date of Visit: 11/21/2019       Dear Dr. Dixon Bahena:    Thank you for referring Luis Wong to me for evaluation. Attached you will find relevant portions of my assessment and plan of care.    If you have questions, please do not hesitate to call me. I look forward to following Luis Wong along with you.    Sincerely,    Efren Sanford MD    Enclosure  CC:  No Recipients    If you would like to receive this communication electronically, please contact [email or web address] or [phone] to request more information on [facility] access.    For providers and/or their staff who would like to refer a patient to [facility], please contact us through our one-stop-shop provider referral line, [facility], at [phone].    If you feel you have received this communication in error or would no longer like to receive these types of communications, please e-mail [email or web address]

## 2019-11-22 ENCOUNTER — CLINICAL SUPPORT (OUTPATIENT)
Dept: REHABILITATION | Facility: HOSPITAL | Age: 64
End: 2019-11-22
Payer: MEDICARE

## 2019-11-22 DIAGNOSIS — R29.898 DECREASED STRENGTH OF LOWER EXTREMITY: ICD-10-CM

## 2019-11-22 DIAGNOSIS — I63.9 CEREBROVASCULAR ACCIDENT (CVA), UNSPECIFIED MECHANISM: Primary | ICD-10-CM

## 2019-11-22 DIAGNOSIS — Z74.09 IMPAIRED FUNCTIONAL MOBILITY, BALANCE, GAIT, AND ENDURANCE: ICD-10-CM

## 2019-11-22 PROCEDURE — 97110 THERAPEUTIC EXERCISES: CPT | Mod: PN

## 2019-11-22 NOTE — PROGRESS NOTES
Physical Therapy Daily Treatment Note     Name: Luis Wong  Clinic Number: [id number]    Therapy Diagnosis:   Encounter Diagnoses   Name Primary?    Impaired functional mobility, balance, gait, and endurance     Decreased strength of lower extremity      Physician: Carla Altman PA-C     Visit Date: 11/22/2019    Physician Orders: PT Eval and Treat  Medical Diagnosis from Referral: I63.9 (ICD-10-CM) - CVA (cerebral vascular accident)  Evaluation Date: 9/13/2019  Authorization Period Expiration: 12/31/2019  Plan of Care Expiration: 1/10/2020  Visit # / Visits authorized: 9/50 (18 prior visits)  FOTO: 7/10    Time In: 9:30 AM   Time Out: 10:20 AM  Total Billable Time: 50 minutes (3 TE)    Precautions: Hx of CVA's    Subjective     Pt reports: No new complaints. Continuing to perform ankle exercises and treadmill training at home.   Response to previous treatment: No adverse reactions.  Functional change: None stated    Pain: 0/10  Location: N/A     Objective     Luis received therapeutic exercises to develop strength, endurance, ROM and flexibility for 50 minutes including:    - Nu-Step   10 minutes x Level 6 (893 steps)  - Cybex Ham Curls                 1.5 plates pl 3x10 LLE only  - Cybex Leg Extension           3x10, LLE only - 20#,       40# BLE 3x10   - Cybex Leg press                  7.0 plates, 3x10 B                                                  3.5 plates, x30, LLE only, no rest breaks    - Fwd Step-ups  x20 with RLE leading, x20 with LLE leading, Level 1  - Lateral Step-ups  3x10, 1 UE support, // bars, Level 1  - Mini Squats:   3x10 - OOT  - Heel Raises:   3x10 - OOT  - Lateral Step over:  2x10, 1 primo  - Kneeling to standing: x10 reps pushing up with RLE, x10 reps pushing up with LLE - OOT        Pt did NOT perform gait training on treadmill: - NOT TODAY  -Forward gait 5% elevation x 5' at 1.0 speed setting and an additional 1' at 1.1 speed setting, with RUE  "support f/b - NOT TODAY  -Side-stepping 5% elevation x 3' each way at 0.5  speed setting with  RUE support - NOT TODAY  -Backward gait 5% elevation x 5' at 0.4 speed setting with RUE support - NOT TODAY     Home Exercises Provided and Patient Education Provided     Education provided:   -cont HEP     Written Home Exercises Provided: yes.  Exercises were reviewed and Luis was able to demonstrate them prior to the end of the session.  Luis demonstrated good  understanding of the education provided.     See EMR under Patient Instructions for exercises provided 9/23/2019 and 10/28/2019.      Assessment     Luis was able to increase resistance for hamstring curls from 1.0 plates to 1.5 plates with good form and no adverse reactions. Progressed lateral side-stepping from 2" blue block to ~3.5" primo. Patient was able to clear primo multiple times, only hitting it due to fatigue. Patient encouraged to continue supine exercises and ankle exercises at home to further progress therapy. PT contacted [employer] for update about AFO. PT was informed that information was received and patient will receive phone call once all paperwork is in order.       Luis is progressing well towards his goals.   Pt prognosis is Good.     Pt will continue to benefit from skilled outpatient physical therapy to address the deficits listed in the problem list box on initial evaluation, provide pt/family education and to maximize pt's level of independence in the home and community environment.     Pt's spiritual, cultural and educational needs considered and pt agreeable to plan of care and goals.    Anticipated barriers to physical therapy: Co-morbidities    Long Term Goals (8 Weeks):     3. Pt will be able to perform TUG in less than or equal to 25 secs without use of AD demonstrating overall improved functional mobility. PROGRESSING, NOT MET 11/22/2019  4. Pt will performed sit to stand x 5 without UE support in 10 seconds without " LOB backward or posterior LE hooking for functional and safe transfers. PROGRESSING, NOT MET 11/22/2019  5.  Pt will score greater than or equal to 45/56 on the Choi Balance Assessment with use of AD demonstrating overall improved functional mobility and balance and reduce fall risk. PROGRESSING, NOT MET 11/22/2019   6. Pt will walk > than or equal to 210 ft on the 2 minute walk test indoor with AD with 0 LOB and minimal gait deviations for improved safety in home ambulation and safety.  PROGRESSING, NOT MET 11/22/2019 feet  7.  Pt will be able to safely perform and tolerate high level ADL's without LOB.  PROGRESSING, NOT MET 11/22/2019  8. Pt will have 0 falls from start of PT sessions.  PROGRESSING, NOT MET 11/22/2019  9. Independent with updated HEP. PROGRESSING, NOT MET 11/22/2019  10. Pt will have MMT score of 3+/5 in all major ms groups in Left LE.  PROGRESSING, NOT MET 11/22/2019  11. Pt will ambulate on TM x 6 minutes with use of UE support with 0 LOB at greater than or equal to 1.3 mph. PROGRESSING, NOT MET 11/22/2019  12. Pt will begin some form of community fitness to begin regular and consistent performance of exercise to continue maintenance of gains made in PT.  PROGRESSING, NOT MET 11/22/2019          Plan   Progress to recumbent bike next visit.   Resume gait training with increasing speed as appropriate  Continue balance/single leg activities    Kandy Gatica, PT, DPT

## 2019-11-25 ENCOUNTER — CLINICAL SUPPORT (OUTPATIENT)
Dept: REHABILITATION | Facility: HOSPITAL | Age: 64
End: 2019-11-25
Payer: MEDICARE

## 2019-11-25 DIAGNOSIS — Z78.9 IMPAIRED MOBILITY AND ACTIVITIES OF DAILY LIVING: ICD-10-CM

## 2019-11-25 DIAGNOSIS — Z74.09 IMPAIRED FUNCTIONAL MOBILITY, BALANCE, GAIT, AND ENDURANCE: ICD-10-CM

## 2019-11-25 DIAGNOSIS — Z74.09 IMPAIRED MOBILITY AND ACTIVITIES OF DAILY LIVING: ICD-10-CM

## 2019-11-25 DIAGNOSIS — M62.81 MUSCLE WEAKNESS: ICD-10-CM

## 2019-11-25 DIAGNOSIS — R29.898 DECREASED STRENGTH OF LOWER EXTREMITY: ICD-10-CM

## 2019-11-25 DIAGNOSIS — R27.8 DECREASED COORDINATION: ICD-10-CM

## 2019-11-25 PROCEDURE — 97110 THERAPEUTIC EXERCISES: CPT | Mod: PN

## 2019-11-25 PROCEDURE — 97116 GAIT TRAINING THERAPY: CPT | Mod: PN

## 2019-11-25 PROCEDURE — 97112 NEUROMUSCULAR REEDUCATION: CPT | Mod: PN

## 2019-11-25 NOTE — PROGRESS NOTES
Physical Therapy Daily Treatment Note     Name: Luis Wong  Clinic Number: [id number]    Therapy Diagnosis:   Encounter Diagnoses   Name Primary?    Impaired functional mobility, balance, gait, and endurance     Decreased strength of lower extremity      Physician: Carla Altman PA-C     Visit Date: 11/25/2019    Physician Orders: PT Eval and Treat  Medical Diagnosis from Referral: I63.9 (ICD-10-CM) - CVA (cerebral vascular accident)  Evaluation Date: 9/13/2019  Authorization Period Expiration: 12/31/2019  Plan of Care Expiration: 1/10/2020  Visit # / Visits authorized: 10/50 (18 prior visits)  FOTO: 8/10    Time In: 10:15 AM   Time Out: 10:20 AM  Total Billable Time: 45 minutes (1GT, 2 TE)    Precautions: Hx of CVA's    Subjective     Pt reports: No new complaints. Pt agreeable to PT session .   Response to previous treatment: No adverse reactions.  Functional change: None stated    Pain: 0/10  Location: N/A     Objective     Luis received therapeutic exercises to develop strength, endurance, ROM and flexibility for 30 minutes including:    - Nu-Step   10 minutes x Level 6 (893 steps)  - Cybex Ham Curls                 1.5 plates pl 3x10 LLE only  - Cybex Leg Extension           3x10, LLE only - 20#,       40# BLE 3x10   - Cybex Leg press                  7.0 plates, 3x10 B                                                  3.5 plates, x30, LLE only, no rest breaks    - Fwd Step-ups  x20 with RLE leading, x20 with LLE leading, Level 1NOT TODAY  - Lateral Step-ups  3x10, 1 UE support, // bars, Level 1NOT TODAY  - Mini Squats:   3x10 - OOT NOT TODAY  - Heel Raises:   3x10 - OOT NOT TODAY  - Lateral Step over:  2x10, 1 primo NOT TODAY  - Kneeling to standing: x10 reps pushing up with RLE, x10 reps pushing up with LLE - OOT        Pt did performd gait training on treadmill: x15 min   -Forward gait 5% elevation x 5' at 1.0 speed setting and an additional 1' at 1.1 speed setting, with  RUE support f/b -   -Side-stepping 5% elevation x 3' each way at 0.5  speed setting with  RUE support -  -Backward gait 5% elevation x 5' at 0.4 speed setting with RUE support - NOT TODAY     Home Exercises Provided and Patient Education Provided     Education provided:   -cont HEP     Written Home Exercises Provided: yes.  Exercises were reviewed and Luis was able to demonstrate them prior to the end of the session.  Luis demonstrated good  understanding of the education provided.     See EMR under Patient Instructions for exercises provided 9/23/2019 and 10/28/2019.      Assessment      pt tolerated session with no reports of pain, no adverse reactions. He was able to perform treadmill with no episodes of Loss of balance and followed verbal instructions on technique and postural awareness.     Luis is progressing well towards his goals.   Pt prognosis is Good.     Pt will continue to benefit from skilled outpatient physical therapy to address the deficits listed in the problem list box on initial evaluation, provide pt/family education and to maximize pt's level of independence in the home and community environment.     Pt's spiritual, cultural and educational needs considered and pt agreeable to plan of care and goals.    Anticipated barriers to physical therapy: Co-morbidities    Long Term Goals (8 Weeks):     3. Pt will be able to perform TUG in less than or equal to 25 secs without use of AD demonstrating overall improved functional mobility. PROGRESSING, NOT MET 11/25/2019  4. Pt will performed sit to stand x 5 without UE support in 10 seconds without LOB backward or posterior LE hooking for functional and safe transfers. PROGRESSING, NOT MET 11/25/2019  5.  Pt will score greater than or equal to 45/56 on the Choi Balance Assessment with use of AD demonstrating overall improved functional mobility and balance and reduce fall risk. PROGRESSING, NOT MET 11/25/2019   6. Pt will walk > than or equal  to 210 ft on the 2 minute walk test indoor with AD with 0 LOB and minimal gait deviations for improved safety in home ambulation and safety.  PROGRESSING, NOT MET 11/25/2019 feet  7.  Pt will be able to safely perform and tolerate high level ADL's without LOB.  PROGRESSING, NOT MET 11/25/2019  8. Pt will have 0 falls from start of PT sessions.  PROGRESSING, NOT MET 11/25/2019  9. Independent with updated HEP. PROGRESSING, NOT MET 11/25/2019  10. Pt will have MMT score of 3+/5 in all major ms groups in Left LE.  PROGRESSING, NOT MET 11/25/2019  11. Pt will ambulate on TM x 6 minutes with use of UE support with 0 LOB at greater than or equal to 1.3 mph. PROGRESSING, NOT MET 11/25/2019  12. Pt will begin some form of community fitness to begin regular and consistent performance of exercise to continue maintenance of gains made in PT.  PROGRESSING, NOT MET 11/25/2019          Plan   Progress to recumbent bike next visit.   Continue balance/single leg activities    Robin Davis, PTA, DPT

## 2019-11-25 NOTE — PROGRESS NOTES
"  Occupational Therapy Daily Treatment Note     Name: Luis Wong  Clinic Number: [id number]    Therapy Diagnosis:    Encounter Diagnoses   Name Primary?    Impaired mobility and activities of daily living     Muscle weakness     Decreased coordination      Physician: Carla Altman, HALLE Lopez MD    Visit Date: 11/25/2019  Physician Orders: Eval and tx  Medical Diagnosis: CVA L sided weakness Recrudecesnt  Onset Date: 8/28/19 most recent CVA  Evaluation Date: 9/4/2019  Plan of Care Expiration Period: 12/27/19  Insurance Authorization period Expiration: 12/31/19  Date of Return to MD: NA  Visit # / Visits Authortized: 6/50 change in insurance  FOTO: CVA UE hand /75% limitation from 100%  10/4/19       Time In:9:30 am   Time Out:10:15 am   Total Billable (one on one) Time: 45 minutes      Precautions: Standard and Fall    Subjective     Pt reports: "I feel like my strength is better without the meds I was taking"  he was compliant with home exercise program given last session.   Response to previous treatment:good   Functional change: Sable to put glasses on with BUEs    Pain: 0/10   Location: left arms     Objective      Luis Wong  received NMR to develop GM/FM coordination, balance, activity tolerance and strength in order to increase ADL/IADL independence with replicated home management/self care tasks, for 45 minutes including:      UBE 60 rpms  6 min 3 forward 3 back   Seated bicep curls  Overhead FF 3# dowel  2/10   Supine isolated L FF 2/10 attempted 2 #    Sidelying with isolated abduction airsplint 2/10   Standing functional reach overhead with cups x10 multiple levels       Brown hand gripper Level 3 x 30   Wrist ext  Pro/sup X 30     Green putty punch outs for  X 30                    Home Exercises and Education Provided     Education provided:   - Previous hand out   - Progress towards goals     Written Home Exercises Provided: Patient instructed to cont prior HEP. Updated putty " exercises to molding,  and pinch Red putty provided.  Exercises were reviewed and Luis was able to demonstrate them prior to the end of the session.  Luis demonstrated good  understanding of the HEP provided.   .   See EMR under Patient Instructions for exercises provided prior visit.        Assessment   Pt would continue to benefit from skilled OT. He did better today with isolated strengthening and controlled GM movements. More functional reaching challenges and overhead arm use incorporated in session today. He did well with this. More focus on this in future sessions. Still minor limitations with flexion of the shoulder due to decreased forearm and elbow control with flexor synergy. State new exercises going well at home with focus on the shoulder.    Luis is progressing well towards his goals and there are no updates to goals at this time. Pt prognosis is Fair.      Pt will continue to benefit from skilled outpatient occupational therapy to address the deficits listed in the problem list on initial evaluation provide pt/family education and to maximize pt's level of independence in the home and community environment.      Anticipated barriers to occupational therapy:      Pt's spiritual, cultural and educational needs considered and pt agreeable to plan of care and goals.     Previous Short Term Goals Status:   Goals:  Short Term Goals:  1) Initiate Hep Met 10/30/2019  2) Pt to increase LUE  strength by 5 # in order to A in self care task of feeding by 4 weeks. MET 10/30/2019  3) Pt to increase Quick dash Score by 5 points increasing self care IND by 4 weeks. Progressing 11/25/2019  4) Pt to increase L UE AROM by 10 degrees in order to A in UB dressing by 4 weeks.MET 11/25/2019  5) Patient will be able to achieve less than or equal to 75% on the FOTO, demonstrating overall improved functional ability with upper extremity. (self-care category) Progressing 11/25/2019          New Short Term Goals  Status:     Long Term Goals:  1) Pt to be IND with HEP in order to maintain ROM and strength needed for self care IND by barrera Progressing 11/25/2019  2) Pt to increase L UE  strength to WNL as compared to unaffected extremity in order to open items for self feeding by barrera Progressing 11/25/2019  3) Pt to increase Quick dash Score by 10 points increasing self care IND at home by barrera Progressing 11/25/2019  4) Pt to increase L UE AROM to WFL in order to A in UB dressing by grant/cLaila Progressing 11/25/2019  5) Patient will be able to achieve less than or equal to 50% on the FOTO, demonstrating overall improved functional ability with upper extremity. (self-care category) Progressing 11/25/2019          Long Term Goal Status:   continue per initial plan of care.  Reasons for Recertification of Therapy:   Pt continues to benefit from skilled services and has made good progress thus far with good future rehab potential. Pt remains limited with LUE strength, ROM and coordination at this time which all still impact their performance of ADLs , IADLs affecting her habits, roles and routines.        Plan      Continue per [facility]. Need for future splinting and dynasplint to prevent contracture while increasing arm swing for functional ambulation and balance.   Progress as tolerated.     Christian Teran  OTR/L

## 2019-11-26 DIAGNOSIS — I49.8 OTHER SPECIFIED CARDIAC ARRHYTHMIAS: Primary | ICD-10-CM

## 2019-11-27 ENCOUNTER — CLINICAL SUPPORT (OUTPATIENT)
Dept: REHABILITATION | Facility: HOSPITAL | Age: 64
End: 2019-11-27
Payer: MEDICARE

## 2019-11-27 ENCOUNTER — PATIENT MESSAGE (OUTPATIENT)
Dept: SLEEP MEDICINE | Facility: CLINIC | Age: 64
End: 2019-11-27

## 2019-11-27 ENCOUNTER — PATIENT OUTREACH (OUTPATIENT)
Dept: ADMINISTRATIVE | Facility: OTHER | Age: 64
End: 2019-11-27

## 2019-11-27 DIAGNOSIS — R29.898 DECREASED STRENGTH OF LOWER EXTREMITY: ICD-10-CM

## 2019-11-27 DIAGNOSIS — Z74.09 IMPAIRED FUNCTIONAL MOBILITY, BALANCE, GAIT, AND ENDURANCE: ICD-10-CM

## 2019-11-27 PROCEDURE — 97110 THERAPEUTIC EXERCISES: CPT | Mod: PN

## 2019-11-27 PROCEDURE — 97116 GAIT TRAINING THERAPY: CPT | Mod: PN

## 2019-11-27 NOTE — PROGRESS NOTES
Physical Therapy Daily Treatment Note     Name: Luis Wong  Clinic Number: [id number]    Therapy Diagnosis:   Encounter Diagnoses   Name Primary?    Impaired functional mobility, balance, gait, and endurance     Decreased strength of lower extremity      Physician: Carla Altman PA-C     Visit Date: 11/27/2019    Physician Orders: PT Eval and Treat  Medical Diagnosis from Referral: I63.9 (ICD-10-CM) - CVA (cerebral vascular accident)  Evaluation Date: 9/13/2019  Authorization Period Expiration: 12/31/2019  Plan of Care Expiration: 1/10/2020  Visit # / Visits authorized: 11/50 (18 prior visits)  FOTO: 9/10    Time In: 10:15 AM   Time Out: 10:20 AM  Total Billable Time: 45 minutes (1GT, 2 TE)    Precautions: Hx of CVA's    Subjective     Pt reports: No new complaints. Pt agreeable to PT session   Response to previous treatment: No adverse reactions.  Functional change: improved B LE strength     Pain: 0/10  Location: N/A     Objective     Luis received therapeutic exercises to develop strength, endurance, ROM and flexibility for 30 minutes including:    - Nu-Step   10 minutes x Level 6 (893 steps)  - Cybex Ham Curls                 1.5 plates pl 3x10 LLE only  - Cybex Leg Extension           3x10, LLE only - 20#,       40# BLE 3x10   - Cybex Leg press                  7.0 plates, 3x10 B                                                  3.5 plates, x30, LLE only, no rest breaks    - Fwd Step-ups  x20 with RLE leading, x20 with LLE leading, Level 1NOT TODAY  - Lateral Step-ups  3x10, 1 UE support, // bars, Level 1NOT TODAY  - Mini Squats:   3x10   - Heel Raises:   3x10   - Lateral Step over:  2x10, 1 primo NOT TODAY  - Kneeling to standing: x10 reps pushing up with RLE, x10 reps pushing up with LLE - OOT        Pt did performd gait training on treadmill: x15 min   -Forward gait 5% elevation x 5' at 1.0 speed setting and an additional 1' at 1.1 speed setting, with RUE support f/b -    -Side-stepping 5% elevation x 3' each way at 0.5  speed setting with  RUE support -  -Backward gait 5% elevation x 5' at 0.4 speed setting with RUE support - NOT TODAY     Home Exercises Provided and Patient Education Provided     Education provided:   -cont HEP     Written Home Exercises Provided: yes.  Exercises were reviewed and Luis was able to demonstrate them prior to the end of the session.  Luis demonstrated good  understanding of the education provided.     See EMR under Patient Instructions for exercises provided 9/23/2019 and 10/28/2019.      Assessment      pt tolerated session with no reports of pain, no adverse reactions. He was able to perform treadmill with no episodes of Loss of balance and followed verbal instructions on technique and postural awareness.     Luis is progressing well towards his goals.   Pt prognosis is Good.     Pt will continue to benefit from skilled outpatient physical therapy to address the deficits listed in the problem list box on initial evaluation, provide pt/family education and to maximize pt's level of independence in the home and community environment.     Pt's spiritual, cultural and educational needs considered and pt agreeable to plan of care and goals.    Anticipated barriers to physical therapy: Co-morbidities    Long Term Goals (8 Weeks):     3. Pt will be able to perform TUG in less than or equal to 25 secs without use of AD demonstrating overall improved functional mobility. PROGRESSING, NOT MET 11/27/2019  4. Pt will performed sit to stand x 5 without UE support in 10 seconds without LOB backward or posterior LE hooking for functional and safe transfers. PROGRESSING, NOT MET 11/27/2019  5.  Pt will score greater than or equal to 45/56 on the Choi Balance Assessment with use of AD demonstrating overall improved functional mobility and balance and reduce fall risk. PROGRESSING, NOT MET 11/27/2019   6. Pt will walk > than or equal to 210 ft on the 2 minute  walk test indoor with AD with 0 LOB and minimal gait deviations for improved safety in home ambulation and safety.  PROGRESSING, NOT MET 11/27/2019 feet  7.  Pt will be able to safely perform and tolerate high level ADL's without LOB.  PROGRESSING, NOT MET 11/27/2019  8. Pt will have 0 falls from start of PT sessions.  PROGRESSING, NOT MET 11/27/2019  9. Independent with updated HEP. PROGRESSING, NOT MET 11/27/2019  10. Pt will have MMT score of 3+/5 in all major ms groups in Left LE.  PROGRESSING, NOT MET 11/27/2019  11. Pt will ambulate on TM x 6 minutes with use of UE support with 0 LOB at greater than or equal to 1.3 mph. PROGRESSING, NOT MET 11/27/2019  12. Pt will begin some form of community fitness to begin regular and consistent performance of exercise to continue maintenance of gains made in PT.  PROGRESSING, NOT MET 11/27/2019          Plan   Progress to recumbent bike next visit.   Continue balance/single leg activities    Robin Davis, PTA, DPT

## 2019-11-29 ENCOUNTER — CLINICAL SUPPORT (OUTPATIENT)
Dept: REHABILITATION | Facility: HOSPITAL | Age: 64
End: 2019-11-29
Payer: MEDICARE

## 2019-11-29 DIAGNOSIS — Z74.09 IMPAIRED FUNCTIONAL MOBILITY, BALANCE, GAIT, AND ENDURANCE: ICD-10-CM

## 2019-11-29 DIAGNOSIS — R29.898 DECREASED STRENGTH OF LOWER EXTREMITY: ICD-10-CM

## 2019-11-29 PROCEDURE — 97112 NEUROMUSCULAR REEDUCATION: CPT | Mod: PN

## 2019-11-29 PROCEDURE — 97116 GAIT TRAINING THERAPY: CPT | Mod: PN

## 2019-11-29 NOTE — PROGRESS NOTES
Physical Therapy Daily Treatment Note     Name: Luis Wong  Clinic Number: [id number]    Therapy Diagnosis:   Encounter Diagnoses   Name Primary?    Impaired functional mobility, balance, gait, and endurance     Decreased strength of lower extremity      Physician: Carla Altman PA-C     Visit Date: 11/29/2019    Physician Orders: PT Eval and Treat  Medical Diagnosis from Referral: I63.9 (ICD-10-CM) - CVA (cerebral vascular accident)  Evaluation Date: 9/13/2019  Authorization Period Expiration: 12/31/2019  Plan of Care Expiration: 1/10/2020  Visit # / Visits authorized: 12/50 (18 prior visits)  FOTO: next at d/c    Time In: 1100 AM   Time Out: 1145 AM  Total Billable Time: 45 minutes (1GT, 2 NMR)    Precautions: Hx of CVA's    Subjective     Pt reports: he stopped taking the baclofen b/c it was making him weak and tired and he told his neurologist but she has not recommended a replacement anti spasticity drug.  Response to previous treatment: No adverse reactions.  Functional change: improved B LE strength     Pain: 0/10  Location: N/A     Objective     Luis received Neuro muscular re-ed to develop strength, endurance, ROM and flexibility for 38 minutes including:    - Step fan placed in front pt on floor with 5 steps and pt in SLS x 10' with L stepping on command to different step heights and locations to promote L LE coordination and movement f/b SLS x 10' with L LE to promote L LE WBing with tactile and verbal cues to prevent hyperextension/buckling.     - wrist weight bearing ROM seated of L UE x 5' total with L weightbearing on yoga block with forward trunk flexion x 15 f/b x10 reps with hand weight bearing on mat f/b trunk rotation in weight bearing.      - MT stretching x 5' L 3 x 30'' HS stretch f/b 3 x 30'' hip ER passively.      Pt did performd gait training on treadmill: x 8 min total including set up  -Forward gait 5% elevation x 5' at 0.9 speed setting with L UE  wrapped to front handle to promote wrist extension.     Home Exercises Provided and Patient Education Provided     Education provided:   -cont HEP     Written Home Exercises Provided: yes.  Exercises were reviewed and Luis was able to demonstrate them prior to the end of the session.  Luis demonstrated good  understanding of the education provided.     See EMR under Patient Instructions for exercises provided 9/23/2019 and 10/28/2019.      Assessment     Pt has a significant amount of L ankle, wrist and elbow tone that limits L UE use and movement as well as causing toe inn gait deviations.  Pt needs therapy in combination with anti-spasticity medical treatment.    Luis is progressing well towards his goals.   Pt prognosis is Good.     Pt will continue to benefit from skilled outpatient physical therapy to address the deficits listed in the problem list box on initial evaluation, provide pt/family education and to maximize pt's level of independence in the home and community environment.     Pt's spiritual, cultural and educational needs considered and pt agreeable to plan of care and goals.    Anticipated barriers to physical therapy: Co-morbidities    Long Term Goals (8 Weeks):     3. Pt will be able to perform TUG in less than or equal to 25 secs without use of AD demonstrating overall improved functional mobility. PROGRESSING, NOT MET 11/29/2019  4. Pt will performed sit to stand x 5 without UE support in 10 seconds without LOB backward or posterior LE hooking for functional and safe transfers. PROGRESSING, NOT MET 11/29/2019  5.  Pt will score greater than or equal to 45/56 on the Choi Balance Assessment with use of AD demonstrating overall improved functional mobility and balance and reduce fall risk. PROGRESSING, NOT MET 11/29/2019   6. Pt will walk > than or equal to 210 ft on the 2 minute walk test indoor with AD with 0 LOB and minimal gait deviations for improved safety in home ambulation and safety.   PROGRESSING, NOT MET 11/29/2019 feet  7.  Pt will be able to safely perform and tolerate high level ADL's without LOB.  PROGRESSING, NOT MET 11/29/2019  8. Pt will have 0 falls from start of PT sessions.  PROGRESSING, NOT MET 11/29/2019  9. Independent with updated HEP. PROGRESSING, NOT MET 11/29/2019  10. Pt will have MMT score of 3+/5 in all major ms groups in Left LE.  PROGRESSING, NOT MET 11/29/2019  11. Pt will ambulate on TM x 6 minutes with use of UE support with 0 LOB at greater than or equal to 1.3 mph. PROGRESSING, NOT MET 11/29/2019  12. Pt will begin some form of community fitness to begin regular and consistent performance of exercise to continue maintenance of gains made in PT.  PROGRESSING, NOT MET 11/29/2019     Plan   Cont Treadmill and perform step fan   Continue balance/single leg activities    Adia Diaz, PT, DPT

## 2019-12-02 ENCOUNTER — OFFICE VISIT (OUTPATIENT)
Dept: SLEEP MEDICINE | Facility: CLINIC | Age: 64
End: 2019-12-02
Payer: MEDICARE

## 2019-12-02 VITALS
BODY MASS INDEX: 22.98 KG/M2 | HEIGHT: 69 IN | HEART RATE: 77 BPM | WEIGHT: 155.19 LBS | DIASTOLIC BLOOD PRESSURE: 82 MMHG | SYSTOLIC BLOOD PRESSURE: 138 MMHG

## 2019-12-02 DIAGNOSIS — G47.30 SLEEP APNEA, UNSPECIFIED TYPE: ICD-10-CM

## 2019-12-02 DIAGNOSIS — G47.33 OSA (OBSTRUCTIVE SLEEP APNEA): Primary | ICD-10-CM

## 2019-12-02 PROCEDURE — 1101F PR PT FALLS ASSESS DOC 0-1 FALLS W/OUT INJ PAST YR: ICD-10-PCS | Mod: CPTII,S$GLB,, | Performed by: PSYCHIATRY & NEUROLOGY

## 2019-12-02 PROCEDURE — 99999 PR PBB SHADOW E&M-EST. PATIENT-LVL III: ICD-10-PCS | Mod: PBBFAC,,, | Performed by: PSYCHIATRY & NEUROLOGY

## 2019-12-02 PROCEDURE — 99204 PR OFFICE/OUTPT VISIT, NEW, LEVL IV, 45-59 MIN: ICD-10-PCS | Mod: S$GLB,,, | Performed by: PSYCHIATRY & NEUROLOGY

## 2019-12-02 PROCEDURE — 3079F PR MOST RECENT DIASTOLIC BLOOD PRESSURE 80-89 MM HG: ICD-10-PCS | Mod: CPTII,S$GLB,, | Performed by: PSYCHIATRY & NEUROLOGY

## 2019-12-02 PROCEDURE — 3008F BODY MASS INDEX DOCD: CPT | Mod: CPTII,S$GLB,, | Performed by: PSYCHIATRY & NEUROLOGY

## 2019-12-02 PROCEDURE — 3075F PR MOST RECENT SYSTOLIC BLOOD PRESS GE 130-139MM HG: ICD-10-PCS | Mod: CPTII,S$GLB,, | Performed by: PSYCHIATRY & NEUROLOGY

## 2019-12-02 PROCEDURE — 3079F DIAST BP 80-89 MM HG: CPT | Mod: CPTII,S$GLB,, | Performed by: PSYCHIATRY & NEUROLOGY

## 2019-12-02 PROCEDURE — 3008F PR BODY MASS INDEX (BMI) DOCUMENTED: ICD-10-PCS | Mod: CPTII,S$GLB,, | Performed by: PSYCHIATRY & NEUROLOGY

## 2019-12-02 PROCEDURE — 99999 PR PBB SHADOW E&M-EST. PATIENT-LVL III: CPT | Mod: PBBFAC,,, | Performed by: PSYCHIATRY & NEUROLOGY

## 2019-12-02 PROCEDURE — 1101F PT FALLS ASSESS-DOCD LE1/YR: CPT | Mod: CPTII,S$GLB,, | Performed by: PSYCHIATRY & NEUROLOGY

## 2019-12-02 PROCEDURE — 3075F SYST BP GE 130 - 139MM HG: CPT | Mod: CPTII,S$GLB,, | Performed by: PSYCHIATRY & NEUROLOGY

## 2019-12-02 PROCEDURE — 99204 OFFICE O/P NEW MOD 45 MIN: CPT | Mod: S$GLB,,, | Performed by: PSYCHIATRY & NEUROLOGY

## 2019-12-02 NOTE — LETTER
December 17, 2019      Juan Eduardo MD  [address]  [place] [address]           [facility]  [address]  [place] [address]  Phone: [phone]  Fax: [phone]          Patient: Luis Wong   MR Number: [id number]   YOB: 1955   Date of Visit: 12/2/2019       Dear Dr. Juan Eduardo:    Thank you for referring Luis Wong to me for evaluation. Attached you will find relevant portions of my assessment and plan of care.    If you have questions, please do not hesitate to call me. I look forward to following Luis Wong along with you.    Sincerely,    Pooja Jimenez MD    Enclosure  CC:  No Recipients    If you would like to receive this communication electronically, please contact [email or web address] or [phone] to request more information on [employer] access.    For providers and/or their staff who would like to refer a patient to [facility], please contact us through our one-stop-shop provider referral line, [facility], at [phone].    If you feel you have received this communication in error or would no longer like to receive these types of communications, please e-mail [email or web address]

## 2019-12-02 NOTE — PATIENT INSTRUCTIONS
FOR THE MASK:    DIAL [phone] ->3->2  OPTIONS ARE:  PRESS 1 - IF YOU ARE A , PHYSICIAN, OR NURSE CALLING ABOUT THE STATUS OF AN ORDER.  PRESS 2 - FOR OXYGEN, NEBULIZER, AND VENTILATOR EQUIPMENT.  PRESS 3 - FOR CPAP  OPTION 1 FOR RESUPPLY ORDER AND OPTION 2 FOR QUESTIONS ABOUT YOU UNIT OR TO SCHEDULE AN APPT.  PRESS 4 - FOR DIABETIC SUPPLIES.  PRESS 5 - FOR WALKERS, WHEELCHAIRS, CRUTCHES, CANES, AND ORTHOTIC BRACES.  PRESS 6 - IF YOU ARE CALLING TO CHECK THE STATUS OF YOUR ORDER.  PRESS 7 - FOR QUESTIONS REQUARDING BILLING OR YOUR INVOICE.  PRESS 8 - FOR ALL OTHER QUESTIONS  __________________________________________________________    For SLEEP STUDY:\    SLEEP LAB (Yulia or Edi) will contact you to schedulethe sleep study. Their number is [phone] (ext 2). Please call them if you do not hear from them in 2 weeks from now.  The [facility] is located on 7th floor of the [facility]; [facility] is located in [facility].    SLEEP CLINIC (my assistant) will call you when the sleep study results are ready - if you have not heard from us by 2 weeks from the date of the study, please call [phone] (ext 1) or you can use [facility] to contact me.    You are advised to abstain from driving should you feel sleepy or drowsy.

## 2019-12-03 ENCOUNTER — PATIENT MESSAGE (OUTPATIENT)
Dept: CARDIOLOGY | Facility: HOSPITAL | Age: 64
End: 2019-12-03

## 2019-12-03 ENCOUNTER — CLINICAL SUPPORT (OUTPATIENT)
Dept: REHABILITATION | Facility: HOSPITAL | Age: 64
End: 2019-12-03
Payer: MEDICARE

## 2019-12-03 DIAGNOSIS — Z74.09 IMPAIRED MOBILITY AND ACTIVITIES OF DAILY LIVING: ICD-10-CM

## 2019-12-03 DIAGNOSIS — M62.81 MUSCLE WEAKNESS: ICD-10-CM

## 2019-12-03 DIAGNOSIS — Z78.9 IMPAIRED MOBILITY AND ACTIVITIES OF DAILY LIVING: ICD-10-CM

## 2019-12-03 DIAGNOSIS — R27.8 DECREASED COORDINATION: ICD-10-CM

## 2019-12-03 PROCEDURE — 97110 THERAPEUTIC EXERCISES: CPT | Mod: PN

## 2019-12-03 PROCEDURE — 97530 THERAPEUTIC ACTIVITIES: CPT | Mod: PN

## 2019-12-03 NOTE — PROGRESS NOTES
"  Occupational Therapy Daily Treatment Note     Name: Luis Wong  Clinic Number: [id number]    Therapy Diagnosis:    Encounter Diagnoses   Name Primary?    Impaired mobility and activities of daily living     Muscle weakness     Decreased coordination      Physician: Carla Altman, HALLE Lopez MD    Visit Date: 12/3/2019  Physician Orders: Eval and tx  Medical Diagnosis: CVA L sided weakness Recrudecesnt  Onset Date: 8/28/19 most recent CVA  Evaluation Date: 9/4/2019  Plan of Care Expiration Period: 12/27/19  Insurance Authorization period Expiration: 12/31/19  Date of Return to MD: NA  Visit # / Visits Authortized: 7/50 change in insurance  FOTO: CVA UE hand /75% limitation from 100%  10/4/19       Time In:9:30 am   Time Out:10:15 am   Total Billable (one on one) Time: 45 minutes      Precautions: Standard and Fall    Subjective     Pt reports: "I feel ok they suggested I get a loop recorder for my heart coming up soon just to be certain its not my heart causing the strokes"  he was compliant with home exercise program given last session.   Response to previous treatment:good   Functional change: able to put glasses on with BUEs    Pain: 0/10   Location: left arms     Objective      Luis Wong  received TE to develop GM/FM coordination, balance, activity tolerance and strength in order to increase ADL/IADL independence with replicated home management/self care tasks, for 30 minutes including:      UBE 60 rpms  6 min 3 forward 3 back   Standing tricep ext Cable machine two ways:  X 10 each 3 #                   Seated tricep ext with table top blocking elbow Red t band unable yellow unable due to line of pull and weakness in separate triceps muscle  2/10                            Luis Wong  received therapeutic activities to develop GM/FM coordination, balance, activity tolerance and strength in order to increase ADL/IADL independence with replicated home management/self care tasks, for 15 " minutes including:  Nuts and bolts On off x 5    CP 2 finger pinch for pom pom   x 1 tub    Isopheres 2 min palm down 2 rotations   Card flip using R hand to place into L L hand x 52        Update:      Joint Evaluation  AROM  9/4/2019 PROM   9/4/2019 AROM  10/8/2019    AROM  10/30/2019    AROM  12/3/2019       Left Left Left Left Left   Shoulder flex 0-180 54 130 125 122 NT today due to time    Shoulder Abd 0-180 54 125 115 110    Shoulder ER 0-90 Neutral WNL Neutral  Neutral     Shoulder IR 0-90 Trace WNL S4 S4    Shoulder Extension 0-80 51 65 52 60    Shoulder Horizontal adduction 0-90 Trace WNL 45 WNl    Elbow flex/ext 0-150 WNL WNL Ext -20 WNL/Ext -28    Wrist flex 0-80 WNL WNL WNL      Wrist ext 0-70 Neutral WNL 32 35    Supination 0-80 Trace easier WNL WnL WNL    Pronation 0-80 trace WNl WNL WNL    UD Trace WNL 32 32    RD Trace WNL 22 22              Strength: (ANTONIO Dynamometer in lbs.) Average 3 trials, Position II:       9/4/2019 9/4/2019 10/8/2019    10/30/2019    12/3/2019       Right Left Left Left Left   Rung # 10# 32# (+22) 35# (+3) 40#      Pinch Strength (Measured in psi)       9/4/2019 9/4/2019 10/8/2019    10/30/2019    12/3/2019       Right Left Left Left Left   Key Pinch 26 psi 3 psi 11# 11 11   3pt Pinch 20 psi 0 psi 9# 9 9   2pt Pinch 11 psi 0 psi 8# 8 7      Fine Motor Coordination: 9 Hole Peg Test  9 Peg Test Right Left Left 10/8/2019    Left  10/30/2019      Removed 9/9 9/9 9/9 dropped to towel 9/9 dropped to towel   Replaced 9/9 0/9 9/9 used R hand to assist pegs into finger tips 9/9 using R hand to A pegs into finger tips almost able to use L only   Time NT sec NT sec 2:55  1:35           Home Exercises and Education Provided     Education provided:   - Previous hand out   - Progress towards goals     Written Home Exercises Provided: Patient instructed to cont prior HEP. Updated putty exercises to molding,  and pinch Red putty provided.  Exercises were reviewed and Luis  was able to demonstrate them prior to the end of the session.  Luis demonstrated good  understanding of the HEP provided.   .   See EMR under Patient Instructions for exercises provided prior visit.        Assessment   Pt would continue to benefit from skilled OT. More focus on the hand and FM coordination in the hand and wrist along with measurements. His  strength and pinch continue to improve gradually along with FM control. He did well with new FM coordination tasks and challenges. More focus on the shoulder and elbow next session to decrease tone in the elbow and attempt more functional reaching task and overhead movements. Noted triceps weakness with likely lateral and medial head weakness due to pt having good ext with arm in front of him. More focus on isolating lateral and medial head with exercises next session.  State new exercises going well at home with focus on the shoulder need for updated HEP focusing on tricep strengthening as well.   Luis is progressing well towards his goals and there are no updates to goals at this time. Pt prognosis is Fair.      Pt will continue to benefit from skilled outpatient occupational therapy to address the deficits listed in the problem list on initial evaluation provide pt/family education and to maximize pt's level of independence in the home and community environment.      Anticipated barriers to occupational therapy:      Pt's spiritual, cultural and educational needs considered and pt agreeable to plan of care and goals.     Previous Short Term Goals Status:   Goals:  Short Term Goals:  1) Initiate Hep Met 10/30/2019  2) Pt to increase LUE  strength by 5 # in order to A in self care task of feeding by 4 weeks. MET 10/30/2019  3) Pt to increase Quick dash Score by 5 points increasing self care IND by 4 weeks. Progressing 12/3/2019  4) Pt to increase L UE AROM by 10 degrees in order to A in UB dressing by 4 weeks.MET 12/3/2019  5) Patient will be able to  achieve less than or equal to 75% on the FOTO, demonstrating overall improved functional ability with upper extremity. (self-care category) Progressing 12/3/2019          New Short Term Goals Status:     Long Term Goals:  1) Pt to be IND with HEP in order to maintain ROM and strength needed for self care IND by barrera Progressing 12/3/2019  2) Pt to increase L UE  strength to WNL as compared to unaffected extremity in order to open items for self feeding by barrera Progressing 12/3/2019  3) Pt to increase Quick dash Score by 10 points increasing self care IND at home by barrera Progressing 12/3/2019  4) Pt to increase L UE AROM to WFL in order to A in UB dressing by grant/cLaila Progressing 12/3/2019  5) Patient will be able to achieve less than or equal to 50% on the FOTO, demonstrating overall improved functional ability with upper extremity. (self-care category) Progressing 12/3/2019         Long Term Goal Status:   continue per initial plan of care.  Reasons for Recertification of Therapy:   Pt continues to benefit from skilled services and has made good progress thus far with good future rehab potential. Pt remains limited with LUE strength, ROM and coordination at this time which all still impact their performance of ADLs , IADLs affecting her habits, roles and routines.        Plan      Continue per [facility]. Need for future splinting and dynasplint to prevent contracture while increasing arm swing for functional ambulation and balance.   Progress as tolerated.     Christian Teran  OTR/L

## 2019-12-04 ENCOUNTER — CLINICAL SUPPORT (OUTPATIENT)
Dept: REHABILITATION | Facility: HOSPITAL | Age: 64
End: 2019-12-04
Payer: MEDICARE

## 2019-12-04 DIAGNOSIS — Z74.09 IMPAIRED FUNCTIONAL MOBILITY, BALANCE, GAIT, AND ENDURANCE: ICD-10-CM

## 2019-12-04 DIAGNOSIS — R27.8 DECREASED COORDINATION: ICD-10-CM

## 2019-12-04 DIAGNOSIS — Z74.09 IMPAIRED MOBILITY AND ACTIVITIES OF DAILY LIVING: ICD-10-CM

## 2019-12-04 DIAGNOSIS — Z78.9 IMPAIRED MOBILITY AND ACTIVITIES OF DAILY LIVING: ICD-10-CM

## 2019-12-04 DIAGNOSIS — R29.898 DECREASED STRENGTH OF LOWER EXTREMITY: ICD-10-CM

## 2019-12-04 DIAGNOSIS — M62.81 MUSCLE WEAKNESS: ICD-10-CM

## 2019-12-04 PROCEDURE — 97110 THERAPEUTIC EXERCISES: CPT | Mod: PN

## 2019-12-04 PROCEDURE — 97112 NEUROMUSCULAR REEDUCATION: CPT | Mod: PN

## 2019-12-04 PROCEDURE — 97116 GAIT TRAINING THERAPY: CPT | Mod: PN

## 2019-12-04 PROCEDURE — 97530 THERAPEUTIC ACTIVITIES: CPT | Mod: PN

## 2019-12-04 NOTE — PROGRESS NOTES
Physical Therapy Daily Treatment Note     Name: Luis Wong  Clinic Number: [id number]    Therapy Diagnosis:   Encounter Diagnoses   Name Primary?    Impaired functional mobility, balance, gait, and endurance     Decreased strength of lower extremity      Physician: Carla Altman PA-C     Visit Date: 12/4/2019    Physician Orders: PT Eval and Treat  Medical Diagnosis from Referral: I63.9 (ICD-10-CM) - CVA (cerebral vascular accident)  Evaluation Date: 9/13/2019  Authorization Period Expiration: 12/31/2019  Plan of Care Expiration: 1/10/2020  Visit # / Visits authorized: 12/50 (18 prior visits)  FOTO: next at d/c    Time In: 1100 AM   Time Out: 1145 AM  Total Billable Time: 45 minutes (1GT, 2 NMR)    Precautions: Hx of CVA's    Subjective     Pt reports: pt agreeable to PT session. No reports of pain stated.   Response to previous treatment: No adverse reactions.  Functional change: improved B LE strength     Pain: 0/10  Location: N/A     Objective     Luis received Neuro muscular re-ed to develop strength, endurance, ROM and flexibility for 30 minutes including:    - Step fan placed in front pt on floor with 5 steps and pt in SLS x 10' with L stepping on command to different step heights and locations to promote L LE coordination and movement f/b SLS x 10' with L LE to promote L LE WBing with tactile and verbal cues to prevent hyperextension/buckling.     -sidesteps targeting orange floor tiles 4 trials of 25 ft.     - MT stretching x 5' L 3 x 30'' HS stretch f/b 3 x 30'' hip ER passively.      Pt did performd gait training on treadmill: x 15 min total including set up  -Forward gait 5% elevation x 9' at 0.9 speed setting with L UE wrapped to front handle to promote wrist extension.   -lateral sidestep in TM 3 min with B uE use at 0.5 mph B.   Home Exercises Provided and Patient Education Provided     Education provided:   -cont HEP     Written Home Exercises Provided:  yes.  Exercises were reviewed and Luis was able to demonstrate them prior to the end of the session.  Luis demonstrated good  understanding of the education provided.     See EMR under Patient Instructions for exercises provided 9/23/2019 and 10/28/2019.      Assessment     Pt completed session with no reports of pain. He performed standing activities with no LOB no reports of SOB.     Luis is progressing well towards his goals.   Pt prognosis is Good.     Pt will continue to benefit from skilled outpatient physical therapy to address the deficits listed in the problem list box on initial evaluation, provide pt/family education and to maximize pt's level of independence in the home and community environment.     Pt's spiritual, cultural and educational needs considered and pt agreeable to plan of care and goals.    Anticipated barriers to physical therapy: Co-morbidities    Long Term Goals (8 Weeks):     3. Pt will be able to perform TUG in less than or equal to 25 secs without use of AD demonstrating overall improved functional mobility. PROGRESSING, NOT MET 12/4/2019  4. Pt will performed sit to stand x 5 without UE support in 10 seconds without LOB backward or posterior LE hooking for functional and safe transfers. PROGRESSING, NOT MET 12/4/2019  5.  Pt will score greater than or equal to 45/56 on the Choi Balance Assessment with use of AD demonstrating overall improved functional mobility and balance and reduce fall risk. PROGRESSING, NOT MET 12/4/2019   6. Pt will walk > than or equal to 210 ft on the 2 minute walk test indoor with AD with 0 LOB and minimal gait deviations for improved safety in home ambulation and safety.  PROGRESSING, NOT MET 12/4/2019 feet  7.  Pt will be able to safely perform and tolerate high level ADL's without LOB.  PROGRESSING, NOT MET 12/4/2019  8. Pt will have 0 falls from start of PT sessions.  PROGRESSING, NOT MET 12/4/2019  9. Independent with updated HEP. PROGRESSING, NOT  MET 12/4/2019  10. Pt will have MMT score of 3+/5 in all major ms groups in Left LE.  PROGRESSING, NOT MET 12/4/2019  11. Pt will ambulate on TM x 6 minutes with use of UE support with 0 LOB at greater than or equal to 1.3 mph. PROGRESSING, NOT MET 12/4/2019  12. Pt will begin some form of community fitness to begin regular and consistent performance of exercise to continue maintenance of gains made in PT.  PROGRESSING, NOT MET 12/4/2019     Plan   Cont Treadmill and perform step fan   Continue balance/single leg activities    Robin Davis, PTA,

## 2019-12-04 NOTE — PROGRESS NOTES
"  Occupational Therapy Daily Treatment Note     Name: Luis Wong  Clinic Number: [id number]    Therapy Diagnosis:    Encounter Diagnoses   Name Primary?    Impaired mobility and activities of daily living     Muscle weakness     Decreased coordination      Physician: Carla Altman, HALLE Lopez MD    Visit Date: 12/4/2019  Physician Orders: Eval and tx  Medical Diagnosis: CVA L sided weakness Recrudecesnt  Onset Date: 8/28/19 most recent CVA  Evaluation Date: 9/4/2019  Plan of Care Expiration Period: 12/27/19  Insurance Authorization period Expiration: 12/31/19  Date of Return to MD: NA  Visit # / Visits Authortized: 7/50 change in insurance  FOTO: CVA UE hand /75% limitation from 100%  10/4/19       Time In:10:15 am   Time Out:11:00 am   Total Billable (one on one) Time: 45 minutes      Precautions: Standard and Fall    Subjective     Pt reports: "I feel pretty good "  he was compliant with home exercise program given last session.   Response to previous treatment:good   Functional change: able to put glasses on with BUEs    Pain: 0/10   Location: left arms     Objective      Luis Wong  received TE to develop GM/FM coordination, balance, activity tolerance and strength in order to increase ADL/IADL independence with replicated home management/self care tasks, for 30 minutes including:      UBE 60 rpms  6 min 3 forward 3 back   Wrist ext over roll X 30    Opposition to all fingers X 30            Shoulder    Supine tricep ext X 20    Supine place and holds with weight 1#  FF 3/30"   Supine tricep ext from chest    Supine tricep ext from overhead    FF overhead isolated L 1#  2/10  1#  2/10   Seated L shoulder flexion focus on arm straight X 20                Luis Wong  received therapeutic activities to develop GM/FM coordination, balance, activity tolerance and strength in order to increase ADL/IADL independence with replicated home management/self care tasks, for 15 minutes including:  CP " pinch green  2 pinches  x20 each    Opposition to pom poms all fingers  x 1 tub    Tennis ball pinch with marbles X 30         Swelling measurements L wrist- 19.2 with R wrist being 17 cm    Provided with size C tubagrip to prevent swelling and edema    Home Exercises and Education Provided     Education provided:   - Previous hand out   - Progress towards goals     Written Home Exercises Provided: Patient instructed to cont prior HEP. Updated putty exercises to molding,  and pinch Red putty provided.  Exercises were reviewed and Luis was able to demonstrate them prior to the end of the session.  Luis demonstrated good  understanding of the HEP provided.   .   See EMR under Patient Instructions for exercises provided prior visit.        Assessment   Pt would continue to benefit from skilled OT. He did well today with improved movements of the hand and wrist. Isolated opposition tolerated well. He did well with new challenges for the hand. Also tolerated supine tricep ext exercises well in order to strengthen for increased control of the elbow during isolated LUE functional use. He did well with this with min tone still hindering his ability to straighten the arm.   More focus on the shoulder and elbow next session to decrease tone in the elbow and attempt more functional reaching task and overhead movements. Noted triceps weakness with likely lateral and medial head weakness due to pt having good ext with arm in front of him. More focus on isolating lateral and medial head with exercises next session.  State new exercises going well at home with focus on the shoulder need for updated HEP focusing on tricep strengthening as well.   Luis is progressing well towards his goals and there are no updates to goals at this time. Pt prognosis is Fair.      Pt will continue to benefit from skilled outpatient occupational therapy to address the deficits listed in the problem list on initial evaluation provide  pt/family education and to maximize pt's level of independence in the home and community environment.      Anticipated barriers to occupational therapy:      Pt's spiritual, cultural and educational needs considered and pt agreeable to plan of care and goals.     Previous Short Term Goals Status:   Goals:  Short Term Goals:  1) Initiate Hep Met 10/30/2019  2) Pt to increase LUE  strength by 5 # in order to A in self care task of feeding by 4 weeks. MET 10/30/2019  3) Pt to increase Quick dash Score by 5 points increasing self care IND by 4 weeks. Progressing 12/4/2019  4) Pt to increase L UE AROM by 10 degrees in order to A in UB dressing by 4 weeks.MET 12/4/2019  5) Patient will be able to achieve less than or equal to 75% on the FOTO, demonstrating overall improved functional ability with upper extremity. (self-care category) Progressing 12/4/2019          New Short Term Goals Status:     Long Term Goals:  1) Pt to be IND with HEP in order to maintain ROM and strength needed for self care IND by d.c. Progressing 12/4/2019  2) Pt to increase L UE  strength to WNL as compared to unaffected extremity in order to open items for self feeding by d.c. Progressing 12/4/2019  3) Pt to increase Quick dash Score by 10 points increasing self care IND at home by d.c. Progressing 12/4/2019  4) Pt to increase L UE AROM to WFL in order to A in UB dressing by d/c. Progressing 12/4/2019  5) Patient will be able to achieve less than or equal to 50% on the FOTO, demonstrating overall improved functional ability with upper extremity. (self-care category) Progressing 12/4/2019         Long Term Goal Status:   continue per initial plan of care.  Reasons for Recertification of Therapy:   Pt continues to benefit from skilled services and has made good progress thus far with good future rehab potential. Pt remains limited with LUE strength, ROM and coordination at this time which all still impact their performance of ADLs , IADLs  affecting her habits, roles and routines.        Plan      Continue per [facility]. Need for future splinting and dynasplint to prevent contracture while increasing arm swing for functional ambulation and balance.   Progress as tolerated.     Christian Teran  OTR/L

## 2019-12-05 ENCOUNTER — PATIENT MESSAGE (OUTPATIENT)
Dept: CARDIOLOGY | Facility: HOSPITAL | Age: 64
End: 2019-12-05

## 2019-12-06 ENCOUNTER — CLINICAL SUPPORT (OUTPATIENT)
Dept: REHABILITATION | Facility: HOSPITAL | Age: 64
End: 2019-12-06
Payer: MEDICARE

## 2019-12-06 DIAGNOSIS — Z74.09 IMPAIRED FUNCTIONAL MOBILITY, BALANCE, GAIT, AND ENDURANCE: ICD-10-CM

## 2019-12-06 DIAGNOSIS — R29.898 DECREASED STRENGTH OF LOWER EXTREMITY: ICD-10-CM

## 2019-12-06 PROCEDURE — 97116 GAIT TRAINING THERAPY: CPT | Mod: PN

## 2019-12-06 PROCEDURE — 97112 NEUROMUSCULAR REEDUCATION: CPT | Mod: PN

## 2019-12-06 NOTE — PROGRESS NOTES
"                            Physical Therapy Daily Treatment Note     Name: Luis Wong  Clinic Number: [id number]    Therapy Diagnosis:   Encounter Diagnoses   Name Primary?    Impaired functional mobility, balance, gait, and endurance     Decreased strength of lower extremity      Physician: Carla Altman PA-C     Visit Date: 12/6/2019    Physician Orders: PT Eval and Treat  Medical Diagnosis from Referral: I63.9 (ICD-10-CM) - CVA (cerebral vascular accident)  Evaluation Date: 9/13/2019  Authorization Period Expiration: 12/31/2019  Plan of Care Expiration: 1/10/2020  Visit # / Visits authorized: 13/50 (19 prior visits)  FOTO: 3/10    Time In: 1014  Time Out: 1102  Total Billable Time: 48 minutes     Precautions: Standard; history of of CVA's    Subjective     Pt reports: he feels like therapy is helping with his balance and gait. Strength still needs some improvement. Patient walks without cane at home; had difficulty finding it this morning.   Patient compliant with HEP  Response to previous treatment: improving balance and gait  Functional change: walks without AD at home. Reciprocally navigates stairs with unilateral UE support.     No pain    Objective     Luis participated in neuromuscular re-education activities to improve: Balance, Coordination, Kinesthetic, Sense, Proprioception and Posture for 33 minutes. The following activities were included:    Neuro re-ed and endurance training with reciprocal motion of upper and lower limbs on sci-fit x 6.5 minutes at level 6.5 at > or equal to 75 spm w/o rest.      Step fan (2 Therafoams stacked on outside of 4" step, step stool on top and ~1 ft. back from start of 4" step) for LE coordination and single leg balance: Toe tap on 4" step and step stool x15 B. Toe tap to 4" step, medial and lateral therafoam x15 B. R UE support as needed for both on // bar. Supervision  Single leg balance: 1"x10 B; 2-3"x5 B. Available UE support in // bar, time recorded " "reflects balance without UE support.    Side stepping >/=1 foot (orange floor tiles, skipping grey tiles) x 45 feet B. Verbal cues to increase step length. Supervision.   Forward stepping >/=1 foot (orange floor tiles, skipping grey tiles) x 45 feet leading with L LE. Supervision.   Sit<>stands without UE support: x10. Performed in 40 seconds  Semi-tandem sit<>stands without UE support: x5 B    Luis participated in gait training to improve functional mobility and safety for 15 minutes, including set-up and adjustment of walking directions:  Treadmill walking forwards on grade 1 incline at 0.8-1.0 MPH x 5'  Treadmill walking sideways on grade 1 incline at 0.4-0.5 MPH x 2' B  Treadmill walking backwards on grade 0 incline at 0.5 MPH x 2'    Home Exercises Provided and Patient Education Provided     Education provided:   - Continue HEP; add semi-tandem sit<>stands at home; increased tandem positioning will increase difficulty of performance    Written Home Exercises Provided: Not today.  Exercises were reviewed and Luis was able to demonstrate them prior to the end of the session.  Luis demonstrated good  understanding of the education provided.     See EMR under Patient Instructions for exercises provided 9/23/2019 and 10/28/2019.    Assessment     Patient walks fair-good without cane; slight L knee flexion throughout stance as well as during step fan exercise however no near buckling or hyperextension observed. Able to progress single leg standing from 1" to 3" B; some instability warranting use of UE support near 3". Step length on side stepping >/=1 foot improved with cues. Increased difficulty performing semi-tandem sit<>stands challenging L LE (oriented behind R LE). Met long term sit<>stand goal.     Luis is progressing well towards his goals.   Pt prognosis is Good.   Pt will continue to benefit from skilled outpatient physical therapy to address the deficits listed in the problem list box on initial " evaluation, provide pt/family education and to maximize pt's level of independence in the home and community environment.     Pt's spiritual, cultural and educational needs considered and pt agreeable to plan of care and goals.  Anticipated barriers to physical therapy: Co-morbidities    Long Term Goals (8 Weeks):   3. Pt will be able to perform TUG in less than or equal to 25 secs without use of AD demonstrating overall improved functional mobility. PROGRESSING, NOT MET 12/6/2019  4. Pt will performed sit to stand x 5 without UE support in 10 seconds without LOB backward or posterior LE hooking for functional and safe transfers. MET  5. Pt will score greater than or equal to 45/56 on the [place] Balance Assessment with use of AD demonstrating overall improved functional mobility and balance and reduce fall risk. PROGRESSING, NOT MET 12/6/2019   6. Pt will walk > than or equal to 210 ft on the 2 minute walk test indoor with AD with 0 LOB and minimal gait deviations for improved safety in home ambulation and safety.  PROGRESSING, NOT MET 12/6/2019 feet  7. Pt will be able to safely perform and tolerate high level ADL's without LOB.  PROGRESSING, NOT MET 12/6/2019  8. Pt will have 0 falls from start of PT sessions.  PROGRESSING, NOT MET 12/6/2019  9. Independent with updated HEP. MET  10. Pt will have MMT score of 3+/5 in all major ms groups in Left LE.  PROGRESSING, NOT MET 12/6/2019  11. Pt will ambulate on TM x 6 minutes with use of UE support with 0 LOB at greater than or equal to 1.3 mph. PROGRESSING, NOT MET 12/6/2019  12. Pt will begin some form of community fitness to begin regular and consistent performance of exercise to continue maintenance of gains made in PT.  PROGRESSING, NOT MET 12/6/2019     Plan     Continue balance activities, particularly in single leg. Attempt treadmill walking without UE support. Test TUG, 2 minute walk test, and LATHAM soon.     Cleopatra Mcdonnell, PT,

## 2019-12-09 ENCOUNTER — TELEPHONE (OUTPATIENT)
Dept: ELECTROPHYSIOLOGY | Facility: CLINIC | Age: 64
End: 2019-12-09

## 2019-12-09 ENCOUNTER — CLINICAL SUPPORT (OUTPATIENT)
Dept: REHABILITATION | Facility: HOSPITAL | Age: 64
End: 2019-12-09
Payer: MEDICARE

## 2019-12-09 DIAGNOSIS — Z74.09 IMPAIRED FUNCTIONAL MOBILITY, BALANCE, GAIT, AND ENDURANCE: ICD-10-CM

## 2019-12-09 DIAGNOSIS — M62.81 MUSCLE WEAKNESS: ICD-10-CM

## 2019-12-09 DIAGNOSIS — Z74.09 IMPAIRED MOBILITY AND ACTIVITIES OF DAILY LIVING: ICD-10-CM

## 2019-12-09 DIAGNOSIS — R27.8 DECREASED COORDINATION: ICD-10-CM

## 2019-12-09 DIAGNOSIS — R29.898 DECREASED STRENGTH OF LOWER EXTREMITY: ICD-10-CM

## 2019-12-09 DIAGNOSIS — Z78.9 IMPAIRED MOBILITY AND ACTIVITIES OF DAILY LIVING: ICD-10-CM

## 2019-12-09 PROCEDURE — 97140 MANUAL THERAPY 1/> REGIONS: CPT | Mod: PN

## 2019-12-09 PROCEDURE — 97530 THERAPEUTIC ACTIVITIES: CPT | Mod: PN,59

## 2019-12-09 PROCEDURE — 97110 THERAPEUTIC EXERCISES: CPT | Mod: PN

## 2019-12-09 PROCEDURE — 97112 NEUROMUSCULAR REEDUCATION: CPT | Mod: PN

## 2019-12-09 NOTE — PROGRESS NOTES
"  Occupational Therapy Daily Treatment Note     Name: Luis Wong  Clinic Number: [id number]    Therapy Diagnosis:    No diagnosis found.  Physician: Carla Altman, HALLE Lopez MD    Visit Date: 12/9/2019  Physician Orders: Eval and tx  Medical Diagnosis: CVA L sided weakness Recrudecesnt  Onset Date: 8/28/19 most recent CVA  Evaluation Date: 9/4/2019  Plan of Care Expiration Period: 12/27/19  Insurance Authorization period Expiration: 12/31/19  Date of Return to MD: NA  Visit # / Visits Authortized: 8/50 change in insurance  FOTO: CVA UE hand /75% limitation from 100%  10/4/19       Time In:10:15 am   Time Out:11:00 am   Total Billable (one on one) Time: 45 minutes      Precautions: Standard and Fall    Subjective     Pt reports: "I dont have any pain today "  he was compliant with home exercise program given last session.   Response to previous treatment:good   Functional change: able to put glasses on with BUEs    Pain: 0/10   Location: left arms     Objective      Luis Wong  received TE to develop GM/FM coordination, balance, activity tolerance and strength in order to increase ADL/IADL independence with replicated home management/self care tasks, for 20 minutes including:   UBE 60 rpms  6 min 3 forward 3 back   Wrist ext over roll X 30    T putty exercises  Mold   Roll    pinch X 30            Shoulder    Supine tricep ext X 20    Supine FF  Chest press 5#  2/15                        Luis Wong  received therapeutic activities to develop GM/FM coordination, balance, activity tolerance and strength in order to increase ADL/IADL independence with replicated home management/self care tasks, for 15 minutes including:  Pom pom retrieval AMAP x 3 tubs able to hold 3 at a time                 Mt: Pt received manual therapy consisting of PROM in all planes, joint mobilization (grades I-II) with gentle oscillations at acromioclavicular and glenohumeral joint along with myofascial release and STM " to surrounding musculature (biceps, pects, deltoids, traps, triceps etc.) to decrease stiffness and pain with movements. 10 min        Home Exercises and Education Provided     Education provided:   - Previous hand out   - Progress towards goals     Written Home Exercises Provided: Patient instructed to cont prior HEP. Updated putty exercises to molding,  and pinch Red putty provided.  Exercises were reviewed and Luis was able to demonstrate them prior to the end of the session.  Luis demonstrated good  understanding of the HEP provided.   .   See EMR under Patient Instructions for exercises provided prior visit.        Assessment   Pt would continue to benefit from skilled OT. He did well with seated hand exercises focusing on in hand manipulation and opposition. More control with supination and GM control today. He did well with supine exercises and increased resistance. Poor tolerance to AAROM with pulleys due to inability to ext elbow. He tolerated supine LLPS and MT from OT well.   Luis is progressing well towards his goals and there are no updates to goals at this time. Pt prognosis is Fair.      Pt will continue to benefit from skilled outpatient occupational therapy to address the deficits listed in the problem list on initial evaluation provide pt/family education and to maximize pt's level of independence in the home and community environment.      Anticipated barriers to occupational therapy:      Pt's spiritual, cultural and educational needs considered and pt agreeable to plan of care and goals.     Previous Short Term Goals Status:   Goals:  Short Term Goals:  1) Initiate Hep Met 10/30/2019  2) Pt to increase LUE  strength by 5 # in order to A in self care task of feeding by 4 weeks. MET 10/30/2019  3) Pt to increase Quick dash Score by 5 points increasing self care IND by 4 weeks. Progressing 12/9/2019  4) Pt to increase L UE AROM by 10 degrees in order to A in UB dressing by 4 weeks.MET  12/9/2019  5) Patient will be able to achieve less than or equal to 75% on the FOTO, demonstrating overall improved functional ability with upper extremity. (self-care category) Progressing 12/9/2019          New Short Term Goals Status:     Long Term Goals:  1) Pt to be IND with HEP in order to maintain ROM and strength needed for self care IND by barrera Progressing 12/9/2019  2) Pt to increase L UE  strength to WNL as compared to unaffected extremity in order to open items for self feeding by barrera Progressing 12/9/2019  3) Pt to increase Quick dash Score by 10 points increasing self care IND at home by barrera Progressing 12/9/2019  4) Pt to increase L UE AROM to WFL in order to A in UB dressing by d/cLaila Progressing 12/9/2019  5) Patient will be able to achieve less than or equal to 50% on the FOTO, demonstrating overall improved functional ability with upper extremity. (self-care category) Progressing 12/9/2019         Long Term Goal Status:   continue per initial plan of care.  Reasons for Recertification of Therapy:   Pt continues to benefit from skilled services and has made good progress thus far with good future rehab potential. Pt remains limited with LUE strength, ROM and coordination at this time which all still impact their performance of ADLs , IADLs affecting her habits, roles and routines.        Plan      Continue per [facility]. Need for future splinting and dynasplint to prevent contracture while increasing arm swing for functional ambulation and balance.   Progress as tolerated.     Christian Teran  OTR/L

## 2019-12-09 NOTE — PROGRESS NOTES
"                            Physical Therapy Daily Treatment Note     Name: Luis Wong  Clinic Number: [id number]    Therapy Diagnosis:   Encounter Diagnoses   Name Primary?    Impaired functional mobility, balance, gait, and endurance     Decreased strength of lower extremity      Physician: Carla Altman PA-C     Visit Date: 12/9/2019    Physician Orders: PT Eval and Treat  Medical Diagnosis from Referral: I63.9 (ICD-10-CM) - CVA (cerebral vascular accident)  Evaluation Date: 9/13/2019  Authorization Period Expiration: 12/31/2019  Plan of Care Expiration: 1/10/2020  Visit # / Visits authorized: 14/50 (19 prior visits)  FOTO: 4/10    Time In: 9:30 AM  Time Out: 10:17 AM  Total Billable Time: 47 minutes (1 TE, 2 NMR)    Precautions: Standard; history of of CVA's    Subjective     Pt reports: That he would like to begin strength training again. He's still waiting on phone call about AFO.   Patient compliant with HEP  Response to previous treatment: Continued improvement with balance and gait  Functional change: walks without AD at home. Reciprocally navigates stairs with unilateral UE support.     No pain    Objective     Luis participated in neuromuscular re-education activities to improve: Balance, Coordination, Kinesthetic, Sense, Proprioception and Posture for 17 minutes. The following activities were included:    Neuro re-ed and endurance training with reciprocal motion of upper and lower limbs on sci-fit x 6.5 minutes at level 6.5 at > or equal to 75 spm w/o rest. - NOT TODAY     Step fan (2 Therafoams stacked on outside of 4" step, step stool on top and ~1 ft. back from start of 4" step) for LE coordination and single leg balance: Toe tap on 4" step and step stool x15 B. Toe tap to 4" step, medial and lateral therafoam x15 B. R UE support as needed for both on // bar. Supervision  Single leg balance: 1"x10 B; 30"x1 with intermittent UE support. .    Side stepping >/=1 foot (orange floor tiles, " skipping grey tiles) x 45 feet B. Verbal cues to increase step length. Supervision. - NOT TODAY  Forward stepping >/=1 foot (orange floor tiles, skipping grey tiles) x 45 feet leading with L LE. Supervision. - NOT TODAY  Sit<>stands without UE support: x10. Performed in 30 seconds  Semi-tandem sit<>stands without UE support: x10 B    Luis participated in gait training to improve functional mobility and safety for 15 minutes, including set-up and adjustment of walking directions:  Treadmill walking forwards on grade 1 incline at 0.9-1.0 MPH x 6'  Treadmill walking sideways on grade 1 incline at 0.5 MPH x 3' B  Treadmill walking backwards on grade 0 incline at 0.5 MPH x 3'    Luis received therapeutic exercises to develop strength and endurance for 15 minutes including:  Cybex Leg Extension:  DL 3x10, 25#      LLE only, 3x10, 15#  Cybex Ham Curl:  LLE only, 3x10, 1.5 plates      Home Exercises Provided and Patient Education Provided     Education provided:   - Continue HEP; add semi-tandem sit<>stands at home; increased tandem positioning will increase difficulty of performance    Written Home Exercises Provided: Not today.  Exercises were reviewed and Luis was able to demonstrate them prior to the end of the session.  Luis demonstrated good  understanding of the education provided.     See EMR under Patient Instructions for exercises provided 9/23/2019 and 10/28/2019.    Assessment     Patient able to increase time on treadmill to 15 minutes total with break in multiple directions. Returned to strengthening on cybex machines with increase in weight to 1.5 plates for hamstring curls. Step fan slightly challenging during medial and lateral taps on foam. Checked on status of custom AFO and status is still pending MD's signature.     Luis is progressing well towards his goals.   Pt prognosis is Good.   Pt will continue to benefit from skilled outpatient physical therapy to address the deficits listed in the  problem list box on initial evaluation, provide pt/family education and to maximize pt's level of independence in the home and community environment.     Pt's spiritual, cultural and educational needs considered and pt agreeable to plan of care and goals.  Anticipated barriers to physical therapy: Co-morbidities    Long Term Goals (8 Weeks):   3. Pt will be able to perform TUG in less than or equal to 25 secs without use of AD demonstrating overall improved functional mobility. PROGRESSING, NOT MET 12/9/2019  4. Pt will performed sit to stand x 5 without UE support in 10 seconds without LOB backward or posterior LE hooking for functional and safe transfers. MET  5. Pt will score greater than or equal to 45/56 on the [place] Balance Assessment with use of AD demonstrating overall improved functional mobility and balance and reduce fall risk. PROGRESSING, NOT MET 12/9/2019   6. Pt will walk > than or equal to 210 ft on the 2 minute walk test indoor with AD with 0 LOB and minimal gait deviations for improved safety in home ambulation and safety.  PROGRESSING, NOT MET 12/9/2019   7. Pt will be able to safely perform and tolerate high level ADL's without LOB.  PROGRESSING, NOT MET 12/9/2019  8. Pt will have 0 falls from start of PT sessions.  PROGRESSING, NOT MET 12/9/2019  9. Independent with updated HEP. MET  10. Pt will have MMT score of 3+/5 in all major ms groups in Left LE.  PROGRESSING, NOT MET 12/9/2019  11. Pt will ambulate on TM x 6 minutes with use of UE support with 0 LOB at greater than or equal to 1.3 mph. PROGRESSING, NOT MET 12/9/2019  12. Pt will begin some form of community fitness to begin regular and consistent performance of exercise to continue maintenance of gains made in PT.  PROGRESSING, NOT MET 12/9/2019     Plan     Continue balance activities, particularly in single leg. A  ttempt treadmill walking without UE support.   Test TUG, 2 minute walk test, and LATHAM soon.     Kandy Gatica, PT,

## 2019-12-09 NOTE — TELEPHONE ENCOUNTER
Spoke to Luis Wong    CONFIRMED procedure arrival time of 10am on 12/12  Reiterated instructions including:  -Directions to check in desk  -High importance of HOLDING Metformin on the day of the procedure.    -Pre-procedure LABS on admit  --Bathe night prior and morning prior to procedure with Hibiclens solution or an antibacterial soap     Pt verbalizes understanding of above and appreciates call.\

## 2019-12-11 ENCOUNTER — CLINICAL SUPPORT (OUTPATIENT)
Dept: REHABILITATION | Facility: HOSPITAL | Age: 64
End: 2019-12-11
Payer: MEDICARE

## 2019-12-11 ENCOUNTER — OFFICE VISIT (OUTPATIENT)
Dept: NEUROLOGY | Facility: CLINIC | Age: 64
End: 2019-12-11
Payer: MEDICARE

## 2019-12-11 VITALS
HEIGHT: 69 IN | BODY MASS INDEX: 22.98 KG/M2 | WEIGHT: 155.19 LBS | DIASTOLIC BLOOD PRESSURE: 75 MMHG | HEART RATE: 81 BPM | SYSTOLIC BLOOD PRESSURE: 130 MMHG

## 2019-12-11 DIAGNOSIS — R27.8 DECREASED COORDINATION: ICD-10-CM

## 2019-12-11 DIAGNOSIS — M62.81 MUSCLE WEAKNESS: ICD-10-CM

## 2019-12-11 DIAGNOSIS — Z74.09 IMPAIRED FUNCTIONAL MOBILITY, BALANCE, GAIT, AND ENDURANCE: ICD-10-CM

## 2019-12-11 DIAGNOSIS — I63.9 ISCHEMIC STROKE: ICD-10-CM

## 2019-12-11 DIAGNOSIS — Z74.09 IMPAIRED MOBILITY AND ACTIVITIES OF DAILY LIVING: ICD-10-CM

## 2019-12-11 DIAGNOSIS — R29.898 DECREASED STRENGTH OF LOWER EXTREMITY: ICD-10-CM

## 2019-12-11 DIAGNOSIS — R25.2 SPASTICITY: Primary | ICD-10-CM

## 2019-12-11 DIAGNOSIS — Z78.9 IMPAIRED MOBILITY AND ACTIVITIES OF DAILY LIVING: ICD-10-CM

## 2019-12-11 PROCEDURE — 99214 OFFICE O/P EST MOD 30 MIN: CPT | Mod: S$GLB,,, | Performed by: PSYCHIATRY & NEUROLOGY

## 2019-12-11 PROCEDURE — 3075F PR MOST RECENT SYSTOLIC BLOOD PRESS GE 130-139MM HG: ICD-10-PCS | Mod: CPTII,S$GLB,, | Performed by: PSYCHIATRY & NEUROLOGY

## 2019-12-11 PROCEDURE — 97140 MANUAL THERAPY 1/> REGIONS: CPT | Mod: PN

## 2019-12-11 PROCEDURE — 3078F PR MOST RECENT DIASTOLIC BLOOD PRESSURE < 80 MM HG: ICD-10-PCS | Mod: CPTII,S$GLB,, | Performed by: PSYCHIATRY & NEUROLOGY

## 2019-12-11 PROCEDURE — 99214 PR OFFICE/OUTPT VISIT, EST, LEVL IV, 30-39 MIN: ICD-10-PCS | Mod: S$GLB,,, | Performed by: PSYCHIATRY & NEUROLOGY

## 2019-12-11 PROCEDURE — 99999 PR PBB SHADOW E&M-EST. PATIENT-LVL IV: ICD-10-PCS | Mod: PBBFAC,,, | Performed by: PSYCHIATRY & NEUROLOGY

## 2019-12-11 PROCEDURE — 97112 NEUROMUSCULAR REEDUCATION: CPT | Mod: PN

## 2019-12-11 PROCEDURE — 1101F PR PT FALLS ASSESS DOC 0-1 FALLS W/OUT INJ PAST YR: ICD-10-PCS | Mod: CPTII,S$GLB,, | Performed by: PSYCHIATRY & NEUROLOGY

## 2019-12-11 PROCEDURE — 1101F PT FALLS ASSESS-DOCD LE1/YR: CPT | Mod: CPTII,S$GLB,, | Performed by: PSYCHIATRY & NEUROLOGY

## 2019-12-11 PROCEDURE — 3075F SYST BP GE 130 - 139MM HG: CPT | Mod: CPTII,S$GLB,, | Performed by: PSYCHIATRY & NEUROLOGY

## 2019-12-11 PROCEDURE — 3008F BODY MASS INDEX DOCD: CPT | Mod: CPTII,S$GLB,, | Performed by: PSYCHIATRY & NEUROLOGY

## 2019-12-11 PROCEDURE — 97530 THERAPEUTIC ACTIVITIES: CPT | Mod: PN,59

## 2019-12-11 PROCEDURE — 3078F DIAST BP <80 MM HG: CPT | Mod: CPTII,S$GLB,, | Performed by: PSYCHIATRY & NEUROLOGY

## 2019-12-11 PROCEDURE — 3008F PR BODY MASS INDEX (BMI) DOCUMENTED: ICD-10-PCS | Mod: CPTII,S$GLB,, | Performed by: PSYCHIATRY & NEUROLOGY

## 2019-12-11 PROCEDURE — 97110 THERAPEUTIC EXERCISES: CPT | Mod: PN

## 2019-12-11 PROCEDURE — 99999 PR PBB SHADOW E&M-EST. PATIENT-LVL IV: CPT | Mod: PBBFAC,,, | Performed by: PSYCHIATRY & NEUROLOGY

## 2019-12-11 NOTE — PROGRESS NOTES
"  Occupational Therapy Daily Treatment Note     Name: Luis Wong  Clinic Number: [id number]    Therapy Diagnosis:    Encounter Diagnoses   Name Primary?    Impaired mobility and activities of daily living     Muscle weakness     Decreased coordination      Physician: Carla Altman, HALLE Lopez MD    Visit Date: 12/11/2019  Physician Orders: Eval and tx  Medical Diagnosis: CVA L sided weakness Recrudecesnt  Onset Date: 8/28/19 most recent CVA  Evaluation Date: 9/4/2019  Plan of Care Expiration Period: 12/27/19  Insurance Authorization period Expiration: 12/31/19  Date of Return to MD: NA  Visit # / Visits Authortized: 9/50 change in insurance  FOTO: CVA UE hand /75% limitation from 100%  10/4/19       Time In:9:30 am   Time Out:10:15 am   Total Billable (one on one) Time: 45 minutes      Precautions: Standard and Fall    Subjective     Pt reports: "I feel ok my wife and I had some concerns about the tone and stuff on what we need to do next "  he was compliant with home exercise program given last session.   Response to previous treatment:good   Functional change: able to put glasses on with BUEs    Pain: 0/10   Location: left arms     Objective   Mt: Pt received manual therapy consisting of PROM in all planes, joint mobilization (grades I-II) with gentle oscillations at acromioclavicular and glenohumeral joint along with myofascial release and STM to surrounding musculature (biceps, pects, deltoids, traps, triceps etc.) to decrease stiffness and pain with movements. 10 min        Luis Wong  received TE to develop GM/FM coordination, balance, activity tolerance and strength in order to increase ADL/IADL independence with replicated home management/self care tasks, for 20 minutes including:   UBE 60 rpms  6 min 3 forward 3 back   Seated shoulder shrugs  Scap squeezes X 30   X 30   Supine isolated shoulder flexion      NDT circles Airsplint to ensure elbow ext  2#  2/10   0#  2/10          "   Shoulder    Standing IR with FF and airsplint to prevent hiking of the shoulder against mirror 2/15                             Luis Wong  received therapeutic activities to develop GM/FM coordination, balance, activity tolerance and strength in order to increase ADL/IADL independence with replicated home management/self care tasks, for 15 minutes including:  Standing reaching task Cones x 10 on and off shelf using airsplint  OT A to prevent hiking of the shoulder  10 min                      Home Exercises and Education Provided     Education provided:   - Previous hand out   - Progress towards goals     Written Home Exercises Provided: Patient instructed to cont prior HEP. Updated putty exercises to molding,  and pinch Red putty provided.  Exercises were reviewed and Luis was able to demonstrate them prior to the end of the session.  Luis demonstrated good  understanding of the HEP provided.   .   See EMR under Patient Instructions for exercises provided prior visit.        Assessment   Pt would continue to benefit from skilled OT. More focus on the shoulder and PROM to the elbow. LLPS with airsplint required today due to tone in the elbow. States going to see a spasticity specialist this week in order to attempt to control the tone in the biceps. He did well with LUE use using airsplint to prevent compensatory movements and techs. He was able to incorporate grasp and release exercises today reaching shelf height. Still limited due to tone in the elbow but progressing well.    Luis is progressing well towards his goals and there are no updates to goals at this time. Pt prognosis is Fair.      Pt will continue to benefit from skilled outpatient occupational therapy to address the deficits listed in the problem list on initial evaluation provide pt/family education and to maximize pt's level of independence in the home and community environment.      Anticipated barriers to occupational therapy:       Pt's spiritual, cultural and educational needs considered and pt agreeable to plan of care and goals.     Previous Short Term Goals Status:   Goals:  Short Term Goals:  1) Initiate Hep Met 10/30/2019  2) Pt to increase LUE  strength by 5 # in order to A in self care task of feeding by 4 weeks. MET 10/30/2019  3) Pt to increase Quick dash Score by 5 points increasing self care IND by 4 weeks. Progressing 12/11/2019  4) Pt to increase L UE AROM by 10 degrees in order to A in UB dressing by 4 weeks.MET 12/11/2019  5) Patient will be able to achieve less than or equal to 75% on the FOTO, demonstrating overall improved functional ability with upper extremity. (self-care category) Progressing 12/11/2019          New Short Term Goals Status:     Long Term Goals:  1) Pt to be IND with HEP in order to maintain ROM and strength needed for self care IND by d.c. Progressing 12/11/2019  2) Pt to increase L UE  strength to WNL as compared to unaffected extremity in order to open items for self feeding by d.c. Progressing 12/11/2019  3) Pt to increase Quick dash Score by 10 points increasing self care IND at home by d.c. Progressing 12/11/2019  4) Pt to increase L UE AROM to WFL in order to A in UB dressing by d/c. Progressing 12/11/2019  5) Patient will be able to achieve less than or equal to 50% on the FOTO, demonstrating overall improved functional ability with upper extremity. (self-care category) Progressing 12/11/2019         Long Term Goal Status:   continue per initial plan of care.  Reasons for Recertification of Therapy:   Pt continues to benefit from skilled services and has made good progress thus far with good future rehab potential. Pt remains limited with LUE strength, ROM and coordination at this time which all still impact their performance of ADLs , IADLs affecting her habits, roles and routines.        Plan      Continue per UPOC. Need for future splinting and dynasplint to prevent contracture  while increasing arm swing for functional ambulation and balance.   Progress as tolerated.     Christian Teran  OTR/L

## 2019-12-11 NOTE — PROGRESS NOTES
[facility] NEUROLOGY  [facility], [place]    Date: 12/11/19  Patient Name: Luis Wong   MRN: [id number]   PCP: Juan Eduardo  Referring Provider: No ref. provider found    Assessment:   Luis Wong is a 64 y.o. male presenting in follow-up management ischemic stroke.  Will continue Plavix and statin therapy.  Have provided referral to p.m. and R for consideration of Botox.  Awaiting approval for for patient's splint as ordered by PT/OT.  Loop recorder to be placed tomorrow. TTE reviewed as below.   Plan:     Problem List Items Addressed This Visit     None      Visit Diagnoses     Spasticity    -  Primary    Relevant Orders    Ambulatory consult to Physical Medicine Rehab    Ischemic stroke        Relevant Orders    Ambulatory consult to Physical Medicine Rehab        Brien Lopez MD  [facility]   Department of Neurology    Patient note was created using MModal Dictation.  Any errors in syntax or even information may not have been identified and edited on initial review prior to signing this note.  Subjective:        HPI:   Mr. Luis Wong is a 64 y.o. male presenting in follow-up for management of ischemic stroke.  Patient presents today with his wife who contributes to the history.  The patient has recently undergone TTE which was unrevealing.  He will be undergoing a loop recorder placed that tomorrow.  He states he remains compliant with Plavix.  He continues to work with PT but still has considerable contracture of his left side.  He has a trial of baclofen and does not like sedating medications.  He is interested in speaking with PM&R or consideration of possible Botox.    PAST MEDICAL HISTORY:  Past Medical History:   Diagnosis Date    Aneurysm 10/30/2012    Diabetes mellitus     Fever blister     Herpes infection     Hypertension     ICH (intracerebral hemorrhage)     Mixed hyperlipidemia 9/5/2013    Nontraumatic thalamic hemorrhage 8/31/2016     JAQUAN (obstructive sleep apnea)     Right-sided lacunar stroke 9/11/2014    SDH (subdural hematoma)     Special screening for malignant neoplasms, colon     Stroke        PAST SURGICAL HISTORY:  Past Surgical History:   Procedure Laterality Date    Knee arthroscopic surgery         CURRENT MEDS:  Current Outpatient Medications   Medication Sig Dispense Refill    aspirin (ECOTRIN) 81 MG EC tablet TAKE 1 TABLET BY MOUTH EVERY DAY 90 tablet 3    atorvastatin (LIPITOR) 40 MG tablet Take 1 tablet (40 mg total) by mouth once daily. 90 tablet 3    baclofen (LIORESAL) 10 MG tablet Take 0.5 tablets (5 mg total) by mouth 3 (three) times daily. 135 tablet 3    clopidogrel (PLAVIX) 75 mg tablet Take 1 tablet (75 mg total) by mouth once daily. 90 tablet 3    gabapentin (NEURONTIN) 300 MG capsule       hydroCHLOROthiazide (MICROZIDE) 12.5 mg capsule TAKE 1 CAPSULE BY MOUTH EVERY DAY 90 capsule 3    metFORMIN (GLUCOPHAGE) 500 MG tablet Take 1 tablet (500 mg total) by mouth daily with breakfast. 90 tablet 3    NIFEdipine (PROCARDIA-XL) 30 MG (OSM) 24 hr tablet Take 1 tablet (30 mg total) by mouth once daily. 90 tablet 3    potassium chloride (MICRO-K) 8 mEq CpSR TAKE 1 CAPSULE (8 MEQ TOTAL) BY MOUTH ONCE DAILY. 90 capsule 3     No current facility-administered medications for this visit.        ALLERGIES:  Review of patient's allergies indicates:  No Known Allergies    FAMILY HISTORY:  Family History   Problem Relation Age of Onset    Heart disease Mother     Diabetes Father     Cancer Sister         breast    Diabetes Paternal Grandmother     Diabetes Paternal Grandfather     Melanoma Neg Hx        SOCIAL HISTORY:  Social History     Tobacco Use    Smoking status: Never Smoker    Smokeless tobacco: Never Used   Substance Use Topics    Alcohol use: No     Frequency: Never     Drinks per session: Patient refused     Binge frequency: Never    Drug use: No       Review of Systems:  12 system review of systems  "is negative except for the symptoms mentioned in HPI.      Objective:     Vitals:    12/11/19 1633   BP: 130/75   Pulse: 81   Weight: 70.4 kg (155 lb 3.3 oz)   Height: 5' 9" (1.753 m)     General: NAD, well nourished   Eyes: no tearing, discharge, no erythema   ENT: moist mucous membranes of the oral cavity, nares patent    Neck: Supple, full range of motion  Cardiovascular: Warm and well perfused, pulses equal and symmetrical  Lungs: Normal work of breathing, normal chest wall excursions  Skin: No rash, lesions, or breakdown on exposed skin  Psychiatry: Mood and affect are appropriate   Abdomen: soft, non tender, non distended  Extremeties: No cyanosis, clubbing or edema.    Neurological   MENTAL STATUS: Alert and oriented to person, place, and time. Attention and concentration within normal limits. Speech without dysarthria,. Recent and remote memory within normal limits   CRANIAL NERVES: Visual fields intact. PERRL. EOMI. Facial sensation intact. Face symmetrical. Hearing grossly intact. Full shoulder shrug bilaterally. Tongue protrudes midline   SENSORY: Sensation is intact to light touch throughout.    MOTOR: Normal bulk and tone.  5/5 deltoid, biceps, triceps, interosseous, hand  bilaterally. 5/5 iliopsoas, knee extension/flexion, foot dorsi/plantarflexion bilaterally.    REFLEXES: Symmetric and 1+ throughout.   CEREBELLAR/COORDINATION/GAIT: Gait steady with normal arm swing and stride length. Finger to nose intact. Normal rapid alternating movements.       "

## 2019-12-12 ENCOUNTER — PATIENT OUTREACH (OUTPATIENT)
Dept: ADMINISTRATIVE | Facility: OTHER | Age: 64
End: 2019-12-12

## 2019-12-12 ENCOUNTER — HOSPITAL ENCOUNTER (OUTPATIENT)
Facility: HOSPITAL | Age: 64
Discharge: HOME | End: 2019-12-12
Attending: INTERNAL MEDICINE | Admitting: INTERNAL MEDICINE
Payer: MEDICARE

## 2019-12-12 VITALS
DIASTOLIC BLOOD PRESSURE: 71 MMHG | OXYGEN SATURATION: 100 % | SYSTOLIC BLOOD PRESSURE: 142 MMHG | TEMPERATURE: 98 F | BODY MASS INDEX: 22.96 KG/M2 | HEIGHT: 69 IN | HEART RATE: 70 BPM | RESPIRATION RATE: 18 BRPM | WEIGHT: 155 LBS

## 2019-12-12 DIAGNOSIS — Z01.818 PREOP TESTING: ICD-10-CM

## 2019-12-12 DIAGNOSIS — I10 ESSENTIAL HYPERTENSION: ICD-10-CM

## 2019-12-12 DIAGNOSIS — E11.9 DIABETES MELLITUS WITHOUT COMPLICATION: Primary | ICD-10-CM

## 2019-12-12 DIAGNOSIS — I63.9 CEREBROVASCULAR ACCIDENT (CVA), UNSPECIFIED MECHANISM: Primary | ICD-10-CM

## 2019-12-12 LAB
ANION GAP SERPL CALC-SCNC: 11 MMOL/L (ref 8–16)
APTT BLDCRRT: 29.6 SEC (ref 21–32)
BASOPHILS # BLD AUTO: 0.01 K/UL (ref 0–0.2)
BASOPHILS NFR BLD: 0.3 % (ref 0–1.9)
BUN SERPL-MCNC: 19 MG/DL (ref 8–23)
CALCIUM SERPL-MCNC: 9.9 MG/DL (ref 8.7–10.5)
CHLORIDE SERPL-SCNC: 103 MMOL/L (ref 95–110)
CO2 SERPL-SCNC: 30 MMOL/L (ref 23–29)
CREAT SERPL-MCNC: 1.1 MG/DL (ref 0.5–1.4)
DIFFERENTIAL METHOD: ABNORMAL
EOSINOPHIL # BLD AUTO: 0.1 K/UL (ref 0–0.5)
EOSINOPHIL NFR BLD: 2.6 % (ref 0–8)
ERYTHROCYTE [DISTWIDTH] IN BLOOD BY AUTOMATED COUNT: 12.9 % (ref 11.5–14.5)
EST. GFR  (AFRICAN AMERICAN): >60 ML/MIN/1.73 M^2
EST. GFR  (NON AFRICAN AMERICAN): >60 ML/MIN/1.73 M^2
GIANT PLATELETS BLD QL SMEAR: PRESENT
GLUCOSE SERPL-MCNC: 118 MG/DL (ref 70–110)
HCT VFR BLD AUTO: 39.4 % (ref 40–54)
HGB BLD-MCNC: 13 G/DL (ref 14–18)
INR PPP: 1 (ref 0.8–1.2)
LYMPHOCYTES # BLD AUTO: 1.3 K/UL (ref 1–4.8)
LYMPHOCYTES NFR BLD: 37 % (ref 18–48)
MCH RBC QN AUTO: 27.9 PG (ref 27–31)
MCHC RBC AUTO-ENTMCNC: 33 G/DL (ref 32–36)
MCV RBC AUTO: 85 FL (ref 82–98)
MONOCYTES # BLD AUTO: 0.4 K/UL (ref 0.3–1)
MONOCYTES NFR BLD: 11.3 % (ref 4–15)
NEUTROPHILS # BLD AUTO: 1.7 K/UL (ref 1.8–7.7)
NEUTROPHILS NFR BLD: 48.8 % (ref 38–73)
PLATELET # BLD AUTO: 189 K/UL (ref 150–350)
PMV BLD AUTO: 13 FL (ref 9.2–12.9)
POTASSIUM SERPL-SCNC: 3.6 MMOL/L (ref 3.5–5.1)
PROTHROMBIN TIME: 10.7 SEC (ref 9–12.5)
RBC # BLD AUTO: 4.66 M/UL (ref 4.6–6.2)
SODIUM SERPL-SCNC: 144 MMOL/L (ref 136–145)
WBC # BLD AUTO: 3.46 K/UL (ref 3.9–12.7)

## 2019-12-12 PROCEDURE — 36415 COLL VENOUS BLD VENIPUNCTURE: CPT

## 2019-12-12 PROCEDURE — 85025 COMPLETE CBC W/AUTO DIFF WBC: CPT

## 2019-12-12 PROCEDURE — 33285 INSJ SUBQ CAR RHYTHM MNTR: CPT | Performed by: INTERNAL MEDICINE

## 2019-12-12 PROCEDURE — 63600175 PHARM REV CODE 636 W HCPCS: Performed by: INTERNAL MEDICINE

## 2019-12-12 PROCEDURE — C1764 EVENT RECORDER, CARDIAC: HCPCS | Performed by: INTERNAL MEDICINE

## 2019-12-12 PROCEDURE — 85610 PROTHROMBIN TIME: CPT

## 2019-12-12 PROCEDURE — 33285 PR INSERTION,SUBQ CARDIAC RHYTHM MONITOR, W/PRGRMG: ICD-10-PCS | Mod: ,,, | Performed by: INTERNAL MEDICINE

## 2019-12-12 PROCEDURE — 25000003 PHARM REV CODE 250: Performed by: INTERNAL MEDICINE

## 2019-12-12 PROCEDURE — 80048 BASIC METABOLIC PNL TOTAL CA: CPT

## 2019-12-12 PROCEDURE — 33285 INSJ SUBQ CAR RHYTHM MNTR: CPT | Mod: ,,, | Performed by: INTERNAL MEDICINE

## 2019-12-12 PROCEDURE — 85730 THROMBOPLASTIN TIME PARTIAL: CPT

## 2019-12-12 PROCEDURE — 93005 ELECTROCARDIOGRAM TRACING: CPT | Mod: 59

## 2019-12-12 DEVICE — SYSTEM REVEAL LINQ
Type: IMPLANTABLE DEVICE | Site: CHEST | Status: NON-FUNCTIONAL
Removed: 2023-08-11

## 2019-12-12 RX ORDER — LIDOCAINE HCL/EPINEPHRINE/PF 2%-1:200K
VIAL (ML) INJECTION
Status: DISCONTINUED | OUTPATIENT
Start: 2019-12-12 | End: 2019-12-12 | Stop reason: HOSPADM

## 2019-12-12 RX ORDER — NIFEDIPINE 30 MG/1
TABLET, EXTENDED RELEASE ORAL
Qty: 90 TABLET | Refills: 3 | Status: SHIPPED | OUTPATIENT
Start: 2019-12-12 | End: 2020-12-10

## 2019-12-12 RX ORDER — CEFAZOLIN SODIUM 1 G/3ML
INJECTION, POWDER, FOR SOLUTION INTRAMUSCULAR; INTRAVENOUS
Status: DISCONTINUED | OUTPATIENT
Start: 2019-12-12 | End: 2019-12-12 | Stop reason: HOSPADM

## 2019-12-12 NOTE — PROGRESS NOTES
Physical Therapy Daily Treatment Note     Name: Luis Wong  Clinic Number: [id number]    Therapy Diagnosis:   Encounter Diagnoses   Name Primary?    Impaired functional mobility, balance, gait, and endurance     Decreased strength of lower extremity      Physician: Carla Altman PA-C     Visit Date: 12/11/2019    Physician Orders: PT Eval and Treat  Medical Diagnosis from Referral: I63.9 (ICD-10-CM) - CVA (cerebral vascular accident)  Evaluation Date: 9/13/2019  Authorization Period Expiration: 12/31/2019  Plan of Care Expiration: 1/10/2020  Visit # / Visits authorized: 15/50 (19 prior visits)  FOTO: 5/10    Time In: 1015 AM  Time Out: 1100 AM  Total Billable Time: 45 minutes (3 NMR)    Precautions: Standard; history of of CVA's    Subjective     Pt reports: that he was confused about if he would still be able to come to therapy her after researching a PM&R MDs.  He verbalized understanding when educated on how he would just be going to that MD to help manage his spasticity not to transfer there for rehab.  Response to previous treatment: Continued improvement with balance and gait  Functional change: walks without AD at home. Reciprocally navigates stairs with unilateral UE support.     No pain    Objective     Pt performed developmental sequence for Neuromuscular re-education to improve core and trunk stability and flexibility as well a limb dissociation x 40 minutes.  Sequencing consisted of the following performed on floor near low mat with chair on L side to wt bear in the L LE with assist of PT on yoga block and cutting board (to manage L UE)    --Tall kneel x 3 minutes, w/  UE support B performing trunk twist B   --Tall to 1/2 kneel transitions 2 x 10, w UE support and Dima assist  For L LE transitions as needed  --1/2 kneel 2 x 2' minutes with each LE leading, w/  UE support    --1/2 kneel to 1/2 stand transitions x 10 on eah, w/  UE support Dima assist needed to block L  LE with R LE was leading    --Backward walking 2 x 40'' total without LOB  --sit to stands with R LE on step 2 x 10 with cues to increase L LE wt bearing.    Home Exercises Provided and Patient Education Provided     Education provided:   - Continue HEP; add semi-tandem sit<>stands at home; increased tandem positioning will increase difficulty of performance  - need for spasticity management  - reasons he can continue rehab here  - need for AFO order be attained  - benefit of PT along with spasticity management    Written Home Exercises Provided: Not today  Exercises were reviewed and Luis was able to demonstrate them prior to the end of the session.  Luis demonstrated good  understanding of the education provided.     See EMR under Patient Instructions for exercises provided 9/23/2019 and 10/28/2019.    Assessment     Pt was able to perform higher level NMR today with increased focus on LE stability and function wt bearing AAROM while in kneeling and standing.  His tone and and ankle inversion continue to be limiting factors in mobility.   Pt will receive AFO soon and has consult with MD to see if he is a good candidate for baclofen.     Luis is progressing well towards his goals.   Pt prognosis is Good.   Pt will continue to benefit from skilled outpatient physical therapy to address the deficits listed in the problem list box on initial evaluation, provide pt/family education and to maximize pt's level of independence in the home and community environment.     Pt's spiritual, cultural and educational needs considered and pt agreeable to plan of care and goals.  Anticipated barriers to physical therapy: Co-morbidities    Long Term Goals (8 Weeks):   3. Pt will be able to perform TUG in less than or equal to 25 secs without use of AD demonstrating overall improved functional mobility. PROGRESSING, NOT MET 12/11/2019  4. Pt will performed sit to stand x 5 without UE support in 10 seconds without LOB backward  or posterior LE hooking for functional and safe transfers. MET  5. Pt will score greater than or equal to 45/56 on the [place] Balance Assessment with use of AD demonstrating overall improved functional mobility and balance and reduce fall risk. PROGRESSING, NOT MET 12/11/2019   6. Pt will walk > than or equal to 210 ft on the 2 minute walk test indoor with AD with 0 LOB and minimal gait deviations for improved safety in home ambulation and safety.  PROGRESSING, NOT MET 12/11/2019   7. Pt will be able to safely perform and tolerate high level ADL's without LOB.  PROGRESSING, NOT MET 12/11/2019  8. Pt will have 0 falls from start of PT sessions.  PROGRESSING, NOT MET 12/11/2019  9. Independent with updated HEP. MET  10. Pt will have MMT score of 3+/5 in all major ms groups in Left LE.  PROGRESSING, NOT MET 12/11/2019  11. Pt will ambulate on TM x 6 minutes with use of UE support with 0 LOB at greater than or equal to 1.3 mph. PROGRESSING, NOT MET 12/11/2019  12. Pt will begin some form of community fitness to begin regular and consistent performance of exercise to continue maintenance of gains made in PT.  PROGRESSING, NOT MET 12/11/2019     Plan     Continue balance activities, particularly in single leg. A  ttempt treadmill walking without UE support, and developmental sequencing   Test TUG, 2 minute walk test, and LATHAM soon.     Adia Diaz, PT,

## 2019-12-12 NOTE — NURSING
aquacel foam 4x4 dressing applied to loop recorder site-- site w/o any pain redness swelling nor bleeding; iv removed left arm; spouse at bedside and all questions answered and verbalizes understanding of instructions and follow uop appt made for recheck for dec 19 and knows about the 4 month md follow up also and in writing; pt aao and stable and has no complaints at discharge

## 2019-12-12 NOTE — NURSING
Pt sipping on juice po; pt cleared for discharge to home and awaiting education to be completed per [employer] rep at bedside and wife present; pt aao and has no complaints and sinus on monitor

## 2019-12-12 NOTE — DISCHARGE INSTRUCTIONS
Having Loop Recorder Implantation  An implantable loop recorder (ILR) is a device that records information about how your heart is working. During implantation, a small device is placed under the skin on your chest, a few inches below your collarbone. The device works as an electrocardiogram (ECG). It constantly picks up electrical signals from your heart. An ILR records for up to 3 years.  What to tell your healthcare provider  Tell your healthcare provider about all the medicines you take. This includes over-the-counter medicines like ibuprofen. It also includes vitamins, herbs, and other supplements. Tell your healthcare provider if you:  · Have had any recent changes in your health, such as an infection or fever  · Are sensitive or allergic to any medicines, latex, tape, or anesthesia (local and general)  · Are pregnant or think you may be pregnant  Tests before your procedure  Before your surgery, you may need an ECG. During this test, sticky pads called electrodes will be put on your chest. Wires will be attached to the pads. These wires lead to a machine. The machine measures your hearts electrical activity for a short period of time.  Getting ready for a loop recorder implantation  Talk with your healthcare provider about how to get ready for your procedure. You may need to stop taking some medicines before the procedure, such as blood thinners and aspirin.  Also, make sure to:  · Ask a family member or friend to take you home from the hospital. You cannot drive yourself.  · Dont eat or drink after midnight the night before your surgery, unless your doctor says its OK.  · Follow all other instructions from your healthcare provider.  You will be asked to sign a consent form that gives your permission to do the procedure. Read the form carefully. Ask questions if something is not clear.  During your procedure  Ask your healthcare provider about what to expect during your procedure. A typical procedure  goes like this:  · You may be given medicine to help you relax.  · The doctor will put a local anesthesia medicine on your skin to numb it.  · The doctor will make a small cut (incision) in your skin. This is usually done in the left upper chest, a few inches below your collarbone.  · The doctor will make a small pocket under your skin. He or she will place the loop recorder in this pocket. The machine is about the size of a flat AA battery.  · The doctor will close your incision with stitches (sutures). He or she will put a bandage on the area.  After your procedure  You will be shown how to use the activator, if you need one.  You will likely be able to go home the day of the procedure. You will need someone drive you home. You can take pain medicine if you need it. Talk with your healthcare provider about whats best to take.  Rest for a day or two at home. You can go back to your normal activities as soon as you feel able.  Follow-up care  Tell your cardiologist if another healthcare provider wants you to get an MRI test. An MRI may cause your ILR to display a false reading.  You may keep your loop recorder for up to 2 to 3 years. When you no longer need it, you will need to have it removed in a similar procedure.  When to call your healthcare provider  Call your healthcare provider right away if any of these occur:  · Bleeding or swelling at the insertion site  · Redness, swelling, fluid leaking, or warmth at the incision site  · Fever of 100.4°F (38°C) or higher  · Pain around your loop recorder  · Dizziness  · Chest pain  · Lack of energy  · Fainting spells  · Twitching chest muscles  · Rapid pulse or pounding heartbeat  · Shortness of breath   Date Last Reviewed: 8/10/2015  © 4481-6745 [employer]. [address] [place] [address], [place] [address]. All rights reserved. This information is not intended as a substitute for professional medical care. Always follow your healthcare professional's  instructions.

## 2019-12-12 NOTE — NURSING
md at bedside and discussed procedure and plan and follow up for 1 week for a post procedure check up; pt aao and verbalizes understanding and questions answered

## 2019-12-12 NOTE — PLAN OF CARE
Pt prepared for loop recorder; pre op teaching completed and verbalized understanding; spouse in waiting room; skin wm dry color pink; labs pending and has iv access rt a/c; pt has left hemiparesis and has left hand tingling and some what contractured left arm and walks with a limp with a cane; sinus on monitor; npo maintained

## 2019-12-12 NOTE — PLAN OF CARE
Pt to cath lab recovery per stretcher with nurse jennings and bedside report received; loop recorder site to left chest w/ skin adhesive and open to air; pt deniis any pain and has no complaints; post procedure and discharge teaching done and verbalizes understanding; pt connected to monitor and sinus noted; iv clamped and intact; pt belongings returned and at bedside and wearing glasses; rails up x 2

## 2019-12-12 NOTE — NURSING
Written instructions to pt and spouse at bedside along with [employer] book and box per rep--- Anusha

## 2019-12-12 NOTE — DISCHARGE SUMMARY
[facility]  Discharge Note  Short Stay    Admit Date: 12/12/2019    Discharge Date and Time: No discharge date for patient encounter.     Attending Physician: Efren Sanford MD     Discharge Provider: Efren Sanford    Diagnoses:  There are no hospital problems to display for this patient.      Discharged Condition: good    Hospital Course: Patient was admitted for an outpatient procedure and tolerated the procedure well with no complications.    Final Diagnoses: Same as principal problem.    Disposition: Home or Self Care    Follow up/Patient Instructions:    Medications:  Reconciled Home Medications:      Medication List      CONTINUE taking these medications    aspirin 81 MG EC tablet  Commonly known as:  ECOTRIN  TAKE 1 TABLET BY MOUTH EVERY DAY     atorvastatin 40 MG tablet  Commonly known as:  LIPITOR  Take 1 tablet (40 mg total) by mouth once daily.     baclofen 10 MG tablet  Commonly known as:  LIORESAL  Take 0.5 tablets (5 mg total) by mouth 3 (three) times daily.     clopidogrel 75 mg tablet  Commonly known as:  PLAVIX  Take 1 tablet (75 mg total) by mouth once daily.     gabapentin 300 MG capsule  Commonly known as:  NEURONTIN     hydroCHLOROthiazide 12.5 mg capsule  Commonly known as:  MICROZIDE  TAKE 1 CAPSULE BY MOUTH EVERY DAY     metFORMIN 500 MG tablet  Commonly known as:  GLUCOPHAGE  Take 1 tablet (500 mg total) by mouth daily with breakfast.     NIFEdipine 30 MG (OSM) 24 hr tablet  Commonly known as:  PROCARDIA-XL  TAKE 1 TABLET BY MOUTH EVERY DAY     potassium chloride 8 mEq Cpsr  Commonly known as:  MICRO-K  TAKE 1 CAPSULE (8 MEQ TOTAL) BY MOUTH ONCE DAILY.          No discharge procedures on file.  Follow-up Information     [facility]. Schedule an appointment as soon as possible for a visit in 1 week.    Why:  For wound re-check           Efren Sanford MD In 4 months.    Specialties:  Electrophysiology, Cardiology  Contact information:  [facility] [facility]  [address]  [phone]                   Discharge Procedure Orders (must include Diet, Follow-up, Activity):  Normal diet and activity. Follow-up as above.

## 2019-12-12 NOTE — NURSING
Pt discharged to home per whch with spouse per pov; escorted by nurse to vehicle; pt aao and has no complaints and aquacel foam drsg to loop site cdi

## 2019-12-15 NOTE — PROGRESS NOTES
Physical Therapy Daily Treatment Note     Name: Luis Wong  Clinic Number: [id number]    Therapy Diagnosis:   Encounter Diagnoses   Name Primary?    Impaired functional mobility, balance, gait, and endurance     Decreased strength of lower extremity      Physician: Carla Altman PA-C     Visit Date: 12/16/2019    Physician Orders: PT Eval and Treat  Medical Diagnosis from Referral: I63.9 (ICD-10-CM) - CVA (cerebral vascular accident)  Evaluation Date: 9/13/2019  Authorization Period Expiration: 12/31/2019  Plan of Care Expiration: 1/10/2020  Visit # / Visits authorized: 16/50 (19 prior visits)  FOTO: 6/10    Time In: 9:30 AM  Time Out: 10:17 AM  Total Billable Time: 47 minutes (2 TE, 1 NMR)    Precautions: Standard; history of of CVA's    Subjective     Pt reports: he's still recovering from his procedure on Thursday and hasn't been able to perform any stretches or exercises since. Has an appointment with PM&R MD on Friday 12/20.   Response to previous treatment: Continued improvement with balance and gait  Functional change: walks without AD at home. Reciprocally navigates stairs with unilateral UE support.     No pain    Objective     Pt performed developmental sequence for Neuromuscular re-education to improve core and trunk stability and flexibility as well a limb dissociation x 15 minutes.  Sequencing consisted of the following performed on floor near low mat with chair on L side to wt bear in the L LE with assist of PT on yoga block and cutting board (to manage L UE)    --Tall kneel x 3 minutes, w/  UE support B performing trunk twist B, on FOAM, // bars  --Tall to 1/2 kneel transitions 2 x 10, w UE support and Dima assist  For L LE transitions as needed - OOT  --1/2 kneel 2 x 2' minutes with each LE leading, w/  UE support, on FOAM, // bars   --1/2 kneel to 1/2 stand transitions x 10 on eah, w/  UE support Dima assist needed to block L LE with R LE was leading -  OOT    --Backward walking 2 x 40'' total without LOB - OOT  --sit to stands with R LE on step 2 x 10 with cues to increase L LE wt bearing - OOT    -- Nu-Step for reciprocal BUE and BLE motion for 4 minutes at level 7.0.       Luis participated in gait training for 5 minutes on treadmill including the following:  --Treadmill forward ambulation WITHOUT UE support for 5 minutes with speed at 0.4 mph and close SBA for balance.       Luis participated in therapeutic exercises for 23 minutes including objective measurements:    TU seconds  2 min walk test: 195 feet      LATHAM  BALANCE ASSESSMENT TOOL  1. Sitting to Standing   3 - able to stand independely using hands  2. Standing Unsupported   4 - able to stand safely 2 minutes without hold  3. Sitting Unsupported   4 - able to sit safely and securely 2 minutes  4. Standing to Sitting   4 - sits safely with minimal use of hands  5. Pivot Transfer   3 - able to transfer safely with definite use of hands  6. Standing with Eyes Closed   4 - albe to stand 10 seconds safely  7. Standing with Feet Together   4 - able to place feet together independently and stand 1 minute safely  8. Reaching Forward with Outstretched Arm   3 - can reach forward 12 cm/5 inches safely  9. Retrieving Object from Floor   3 - able to pick slipper but needs supervision  10. Turning to Look Behind   4 - looks behind from both sides and weights shifts well  11. Turning 360 Degrees   1 - needs close supervision or verbal cueing  12. Placing Alternate Foot on Step   3 - able to stand independently and completely 8 steps > 20 seconds  13. Standing with One Foot in Front   4 - able to tandem stand independently and hold 30 seconds  14. Standing on One Foot   2 - able to lift leg indepentdently and hold = or < 3 seconds    TOTAL SCORE: 46  Maximum: 56  Score:   0-20= high fall risk   21-40 moderate fall risk   41-56 low fall risk     Fall risk cut-off scores:   Elderly and History of falls: <51/56    Elderly and No history of falls: <42/56   CVA: <45/56         Home Exercises Provided and Patient Education Provided     Education provided:   - Continue HEP; add semi-tandem sit<>stands at home; increased tandem positioning will increase difficulty of performance  - need for spasticity management  - reasons he can continue rehab here  - need for AFO order be attained  - benefit of PT along with spasticity management    Written Home Exercises Provided: Not today  Exercises were reviewed and Luis was able to demonstrate them prior to the end of the session.  Luis demonstrated good  understanding of the education provided.     See EMR under Patient Instructions for exercises provided 9/23/2019 and 10/28/2019.    Assessment     Improved TUG score from 31 seconds without AD to 21 seconds without AD. Improved 2 minute walk score from 180 feet to 195 feet without use of AD. Able to ambulate on treadmill without use of UE's at 0.4 mph and close SBA for balance. Improved LATHAM balance score from 44/56 to 46/56. Following objective tests today, patient has met 2 long term goals and has made great progress towards remaining goals. Continuing to work on obtaining custom AFO for patient to further improve gait mechanics.       Luis is progressing well towards his goals.   Pt prognosis is Good.   Pt will continue to benefit from skilled outpatient physical therapy to address the deficits listed in the problem list box on initial evaluation, provide pt/family education and to maximize pt's level of independence in the home and community environment.     Pt's spiritual, cultural and educational needs considered and pt agreeable to plan of care and goals.  Anticipated barriers to physical therapy: Co-morbidities    Long Term Goals (8 Weeks):   3. Pt will be able to perform TUG in less than or equal to 25 secs without use of AD demonstrating overall improved functional mobility. MET 12/16/2019; 21 seconds  4. Pt will  performed sit to stand x 5 without UE support in 10 seconds without LOB backward or posterior LE hooking for functional and safe transfers. MET  5. Pt will score greater than or equal to 45/56 on the Choi Balance Assessment with use of AD demonstrating overall improved functional mobility and balance and reduce fall risk. MET 12/16/2019  6. Pt will walk > than or equal to 210 ft on the 2 minute walk test indoor with AD with 0 LOB and minimal gait deviations for improved safety in home ambulation and safety.  PROGRESSING, NOT MET 12/16/2019; 195 feet on 12/16/19  7. Pt will be able to safely perform and tolerate high level ADL's without LOB.  PROGRESSING, NOT MET 12/16/2019  8. Pt will have 0 falls from start of PT sessions.  PROGRESSING, NOT MET 12/16/2019; fall reported 11/6/19  9. Independent with updated HEP. MET  10. Pt will have MMT score of 3+/5 in all major ms groups in Left LE.  PROGRESSING, NOT MET 12/16/2019  11. Pt will ambulate on TM x 6 minutes with use of UE support with 0 LOB at greater than or equal to 1.3 mph. PROGRESSING, NOT MET 12/16/2019  12. Pt will begin some form of community fitness to begin regular and consistent performance of exercise to continue maintenance of gains made in PT.  PROGRESSING, NOT MET 12/16/2019     Plan     Continue balance activities, particularly in single leg. Continue to attempt treadmill walking without UE support, and developmental sequencing      Kandy Gatica, PT

## 2019-12-16 ENCOUNTER — CLINICAL SUPPORT (OUTPATIENT)
Dept: REHABILITATION | Facility: HOSPITAL | Age: 64
End: 2019-12-16
Payer: MEDICARE

## 2019-12-16 DIAGNOSIS — Z74.09 IMPAIRED FUNCTIONAL MOBILITY, BALANCE, GAIT, AND ENDURANCE: ICD-10-CM

## 2019-12-16 DIAGNOSIS — R29.898 DECREASED STRENGTH OF LOWER EXTREMITY: ICD-10-CM

## 2019-12-16 DIAGNOSIS — Z74.09 IMPAIRED MOBILITY AND ACTIVITIES OF DAILY LIVING: ICD-10-CM

## 2019-12-16 DIAGNOSIS — M62.81 MUSCLE WEAKNESS: ICD-10-CM

## 2019-12-16 DIAGNOSIS — Z78.9 IMPAIRED MOBILITY AND ACTIVITIES OF DAILY LIVING: ICD-10-CM

## 2019-12-16 DIAGNOSIS — R27.8 DECREASED COORDINATION: ICD-10-CM

## 2019-12-16 PROCEDURE — 97140 MANUAL THERAPY 1/> REGIONS: CPT | Mod: PN

## 2019-12-16 PROCEDURE — 97112 NEUROMUSCULAR REEDUCATION: CPT | Mod: PN

## 2019-12-16 PROCEDURE — 97110 THERAPEUTIC EXERCISES: CPT | Mod: PN

## 2019-12-16 NOTE — PROGRESS NOTES
"  Occupational Therapy Daily Treatment Note     Name: Luis Wong  Clinic Number: [id number]    Therapy Diagnosis:    Encounter Diagnoses   Name Primary?    Impaired mobility and activities of daily living     Muscle weakness     Decreased coordination      Physician: Carla Altman, HALLE Lopez MD    Visit Date: 12/16/2019  Physician Orders: Eval and tx  Medical Diagnosis: CVA L sided weakness Recrudecesnt  Onset Date: 8/28/19 most recent CVA  Evaluation Date: 9/4/2019  Plan of Care Expiration Period: 12/27/19  Insurance Authorization period Expiration: 12/31/19  Date of Return to MD: NA  Visit # / Visits Authortized: 10/50 change in insurance  FOTO: CVA UE hand / 35% limitation down from 75% with the hand survey.      Time In:10:15 am   Time Out:11:00 am   Total Billable (one on one) Time: 45 minutes      Precautions: Standard and Fall    Subjective     Pt reports: "I go see the other doctor Friday for my tone "  he was compliant with home exercise program given last session.   Response to previous treatment:good   Functional change: able to put glasses on with BUEs    Pain: 0/10   Location: left arms     Objective   Mt: Pt received manual therapy consisting of PROM in all planes, joint mobilization (grades I-II) with gentle oscillations at acromioclavicular and glenohumeral joint along with myofascial release and STM to surrounding musculature (biceps, pects, deltoids, traps, triceps etc.) to decrease stiffness and pain with movements. 10 min        Luis Wong  received TE to develop GM/FM coordination, balance, activity tolerance and strength in order to increase ADL/IADL independence with replicated home management/self care tasks, for 30 minutes including:   UBE 60 rpms  NOT PERFORMED THIS TREATMENT     Seated shoulder shrugs  Scap squeezes X 30   X 30   Hand    CP pinch Red   x20   Black gripper 2 red 2 yellow  X 20    Red t putty punch outs X 30    Shoulder    PROM all planes X 10  "   Supine biceps flex/ext 2#  2/10                                 Luis Wong  received therapeutic activities to develop GM/FM coordination, balance, activity tolerance and strength in order to increase ADL/IADL independence with replicated home management/self care tasks, for 5 minutes including:  Med management task  Opening containers  X 4                     Home Exercises and Education Provided     Education provided:   - Previous hand out   - Progress towards goals     Written Home Exercises Provided: Patient instructed to cont prior HEP. Updated putty exercises to molding,  and pinch Red putty provided.  Exercises were reviewed and Luis was able to demonstrate them prior to the end of the session.  Luis demonstrated good  understanding of the HEP provided.   .   See EMR under Patient Instructions for exercises provided prior visit.        Assessment   Pt would continue to benefit from skilled OT. Less tone noted in the elbow and shoulder today although he states feeling real stiff. He had a loop recorder placement Thursday so no overhead movements or strengthening performed to ensure healing. He did well with biceps exercises and hand /pinch strengthening. States going to see a spasticity specialist this week in order to attempt to control the tone in the biceps.  Still limited due to tone in the elbow but progressing well.  FOTO sore improved with the hand, UE, and CVA portion of survey with all areas improving.   Luis is progressing well towards his goals and there are no updates to goals at this time. Pt prognosis is Fair.      Pt will continue to benefit from skilled outpatient occupational therapy to address the deficits listed in the problem list on initial evaluation provide pt/family education and to maximize pt's level of independence in the home and community environment.      Anticipated barriers to occupational therapy:      Pt's spiritual, cultural and educational needs  considered and pt agreeable to plan of care and goals.     Previous Short Term Goals Status:   Goals:  Short Term Goals:  1) Initiate Hep Met 10/30/2019  2) Pt to increase LUE  strength by 5 # in order to A in self care task of feeding by 4 weeks. MET 10/30/2019  3) Pt to increase Quick dash Score by 5 points increasing self care IND by 4 weeks. Progressing 12/16/2019  4) Pt to increase L UE AROM by 10 degrees in order to A in UB dressing by 4 weeks.MET 12/16/2019  5) Patient will be able to achieve less than or equal to 75% on the FOTO, demonstrating overall improved functional ability with upper extremity. (self-care category) Progressing 12/16/2019           New Short Term Goals Status:     Long Term Goals:  1) Pt to be IND with HEP in order to maintain ROM and strength needed for self care IND by d.c. Progressing 12/16/2019  2) Pt to increase L UE  strength to WNL as compared to unaffected extremity in order to open items for self feeding by d.c. Progressing 12/16/2019  3) Pt to increase Quick dash Score by 10 points increasing self care IND at home by d.c. Progressing 12/16/2019  4) Pt to increase L UE AROM to WFL in order to A in UB dressing by d/c. Progressing 12/16/2019  5) Patient will be able to achieve less than or equal to 50% on the FOTO, demonstrating overall improved functional ability with upper extremity. (self-care category) Progressing 12/16/2019         Long Term Goal Status:   continue per initial plan of care.  Reasons for Recertification of Therapy:   Pt continues to benefit from skilled services and has made good progress thus far with good future rehab potential. Pt remains limited with LUE strength, ROM and coordination at this time which all still impact their performance of ADLs , IADLs affecting her habits, roles and routines.        Plan      Continue per [facility]. Need for future splinting and dynasplint to prevent contracture while increasing arm swing for functional ambulation  and balance.   Progress as tolerated.     Christian Teran  OTR/L

## 2019-12-17 ENCOUNTER — DOCUMENTATION ONLY (OUTPATIENT)
Dept: REHABILITATION | Facility: HOSPITAL | Age: 64
End: 2019-12-17

## 2019-12-17 DIAGNOSIS — R29.898 DECREASED STRENGTH OF LOWER EXTREMITY: ICD-10-CM

## 2019-12-17 DIAGNOSIS — Z74.09 IMPAIRED FUNCTIONAL MOBILITY, BALANCE, GAIT, AND ENDURANCE: ICD-10-CM

## 2019-12-17 NOTE — PROGRESS NOTES
Face to Face PTA Conference performed with Robin Davis PTA regarding patient's current status, overall progress, and plan of care.    Kandy Gatica, PT, DPT    Robin Davis, PTA

## 2019-12-18 ENCOUNTER — CLINICAL SUPPORT (OUTPATIENT)
Dept: REHABILITATION | Facility: HOSPITAL | Age: 64
End: 2019-12-18
Payer: MEDICARE

## 2019-12-18 DIAGNOSIS — M62.81 MUSCLE WEAKNESS: ICD-10-CM

## 2019-12-18 DIAGNOSIS — R27.8 DECREASED COORDINATION: ICD-10-CM

## 2019-12-18 DIAGNOSIS — R29.898 DECREASED STRENGTH OF LOWER EXTREMITY: ICD-10-CM

## 2019-12-18 DIAGNOSIS — Z78.9 IMPAIRED MOBILITY AND ACTIVITIES OF DAILY LIVING: ICD-10-CM

## 2019-12-18 DIAGNOSIS — Z74.09 IMPAIRED MOBILITY AND ACTIVITIES OF DAILY LIVING: ICD-10-CM

## 2019-12-18 DIAGNOSIS — Z74.09 IMPAIRED FUNCTIONAL MOBILITY, BALANCE, GAIT, AND ENDURANCE: ICD-10-CM

## 2019-12-18 PROCEDURE — 97530 THERAPEUTIC ACTIVITIES: CPT | Mod: PN

## 2019-12-18 PROCEDURE — 97140 MANUAL THERAPY 1/> REGIONS: CPT | Mod: PN

## 2019-12-18 PROCEDURE — 97110 THERAPEUTIC EXERCISES: CPT | Mod: PN

## 2019-12-18 PROCEDURE — 97116 GAIT TRAINING THERAPY: CPT | Mod: PN

## 2019-12-18 NOTE — PROGRESS NOTES
"                            Physical Therapy Daily Treatment Note     Name: Luis Wong  Clinic Number: [id number]    Therapy Diagnosis:   Encounter Diagnoses   Name Primary?    Impaired functional mobility, balance, gait, and endurance     Decreased strength of lower extremity      Physician: Carla Altman PA-C     Visit Date: 12/18/2019    Physician Orders: PT Eval and Treat  Medical Diagnosis from Referral: I63.9 (ICD-10-CM) - CVA (cerebral vascular accident)  Evaluation Date: 9/13/2019  Authorization Period Expiration: 12/31/2019  Plan of Care Expiration: 1/10/2020  Visit # / Visits authorized: 17/50 (20 prior visits)  FOTO: 7/10    Time In: 10:15 AM  Time Out: 11:00 AM  Total Billable Time: 40 minutes (1 TE, 2GT)    Precautions: Standard; history of of CVA's    Subjective     Pt reports: agreeable to PT session. He reports no complaints of pain upon arrival. "Im ready"  Response to previous treatment: Continued improvement with balance and gait  Functional change: walks without AD at home. Reciprocally navigates stairs with unilateral UE support.       Objective     Luis participated in gait training for 30 minutes on treadmill including the following:  --Treadmill forward ambulation WITHOUT UE support for 5 minutes with speed at 0.4 mph and close SBA for balance.   --6 cone weaving FWD/ LAT/ BWD x 3 trials on gym floor with SBA and no assistive device    Luis participated in therapeutic exercises for 10 minutes including objective measurements:  -Cybex knee extension 20# 2x15 B  -Cybex Ham curls 1.5 pl 2x15 B      Home Exercises Provided and Patient Education Provided     Education provided:   Cont HEP      Written Home Exercises Provided: Not today  Exercises were reviewed and Luis was able to demonstrate them prior to the end of the session.  Luis demonstrated good  understanding of the education provided.     See EMR under Patient Instructions for exercises provided 9/23/2019 and " 10/28/2019.    Assessment     Pt tolerated session with no episodes of loss of balance noted. He performed standing activities with no adverse reactions.   Luis is progressing well towards his goals.   Pt prognosis is Good.   Pt will continue to benefit from skilled outpatient physical therapy to address the deficits listed in the problem list box on initial evaluation, provide pt/family education and to maximize pt's level of independence in the home and community environment.     Pt's spiritual, cultural and educational needs considered and pt agreeable to plan of care and goals.  Anticipated barriers to physical therapy: Co-morbidities    Long Term Goals (8 Weeks):   3. Pt will be able to perform TUG in less than or equal to 25 secs without use of AD demonstrating overall improved functional mobility. MET 12/16/2019; 21 seconds  4. Pt will performed sit to stand x 5 without UE support in 10 seconds without LOB backward or posterior LE hooking for functional and safe transfers. MET  5. Pt will score greater than or equal to 45/56 on the Choi Balance Assessment with use of AD demonstrating overall improved functional mobility and balance and reduce fall risk. MET 12/16/2019  6. Pt will walk > than or equal to 210 ft on the 2 minute walk test indoor with AD with 0 LOB and minimal gait deviations for improved safety in home ambulation and safety.  PROGRESSING, NOT MET 12/18/2019; 195 feet on 12/16/19  7. Pt will be able to safely perform and tolerate high level ADL's without LOB.  PROGRESSING, NOT MET 12/18/2019  8. Pt will have 0 falls from start of PT sessions.  PROGRESSING, NOT MET 12/18/2019; fall reported 11/6/19  9. Independent with updated HEP. MET  10. Pt will have MMT score of 3+/5 in all major ms groups in Left LE.  PROGRESSING, NOT MET 12/18/2019  11. Pt will ambulate on TM x 6 minutes with use of UE support with 0 LOB at greater than or equal to 1.3 mph. PROGRESSING, NOT MET 12/18/2019  12. Pt will  begin some form of community fitness to begin regular and consistent performance of exercise to continue maintenance of gains made in PT.  PROGRESSING, NOT MET 12/18/2019     Plan     Cont to advance PT as per POC.     Robin Davis, PTA

## 2019-12-18 NOTE — PROGRESS NOTES
"  Occupational Therapy Daily Treatment Note     Name: Luis Wong  Clinic Number: [id number]    Therapy Diagnosis:    Encounter Diagnoses   Name Primary?    Impaired mobility and activities of daily living     Muscle weakness     Decreased coordination      Physician: Carla Altman, HALLE Lopez MD    Visit Date: 12/18/2019  Physician Orders: Eval and tx  Medical Diagnosis: CVA L sided weakness Recrudecesnt  Onset Date: 8/28/19 most recent CVA  Evaluation Date: 9/4/2019  Plan of Care Expiration Period: 12/27/19  Insurance Authorization period Expiration: 12/31/19  Date of Return to MD: NA  Visit # / Visits Authortized: 11/50 change in insurance  FOTO: CVA UE hand / 35% limitation down from 75% with the hand survey.      Time In:9:30 am   Time Out:10:15 am   Total Billable (one on one) Time: 45 minutes      Precautions: Standard and Fall    Subjective     Pt reports: "I am doing pretty good"  he was compliant with home exercise program given last session.   Response to previous treatment:good   Functional change: able to put glasses on with BUEs    Pain: 0/10   Location: left arms     Objective   Mt: Pt received manual therapy consisting of PROM in all planes, joint mobilization (grades I-II) with gentle oscillations at acromioclavicular and glenohumeral joint along with myofascial release and STM to surrounding musculature (biceps, pects, deltoids, traps, triceps etc.) to decrease stiffness and pain with movements. 10 min        Luis Wong  received TE to develop GM/FM coordination, balance, activity tolerance and strength in order to increase ADL/IADL independence with replicated home management/self care tasks, for 30 minutes including:   UBE 60 rpms  NOT PERFORMED THIS TREATMENT     Seated shoulder shrugs  Scap squeezes X 30   X 30   Hand    CP pinch Red   x20   Black gripper 2 red 2 yellow  X 20    Green  t putty punch outs    Mold  Roll X 30    Shoulder    PROM all planes X 10    Supine " biceps flex/ext 2#  2/10    Standing place and holds overhead  green ball unable to tolerate                            Luis Wong  received therapeutic activities to develop GM/FM coordination, balance, activity tolerance and strength in order to increase ADL/IADL independence with replicated home management/self care tasks, for 5 minutes including:  [place] retrieval  X 1/2 tub   Pom pom retrieval  Using green CP  X 1 tub                  Home Exercises and Education Provided     Education provided:   - Previous hand out   - Progress towards goals     Written Home Exercises Provided: Patient instructed to cont prior HEP. Updated putty exercises to molding,  and pinch Red putty provided.  Exercises were reviewed and Luis was able to demonstrate them prior to the end of the session.  Luis demonstrated good  understanding of the HEP provided.   .   See EMR under Patient Instructions for exercises provided prior visit.        Assessment   Pt would continue to benefit from skilled OT. More pain in the shoulder today over deltoid insertion and lateral aspect of shoulder. He responded well to MT but states not having relief long after therapy sessions. He is still limited by tone in the arm and shoulder along with pain. He states trying to use the dynasplint but that it hurts the shoulder some times so he probably doesn't wear it like he should.  States going to see a spasticity specialist this week in order to attempt to control the tone in the biceps.  Still limited due to tone in the elbow but progressing well. Table top exercises tolerated fairly well but still limited due to tone in the elbow preventing ext for functional reach.  Luis is progressing well towards his goals and there are no updates to goals at this time. Pt prognosis is Fair.      Pt will continue to benefit from skilled outpatient occupational therapy to address the deficits listed in the problem list on initial evaluation provide  pt/family education and to maximize pt's level of independence in the home and community environment.      Anticipated barriers to occupational therapy:      Pt's spiritual, cultural and educational needs considered and pt agreeable to plan of care and goals.     Previous Short Term Goals Status:   Goals:  Short Term Goals:  1) Initiate Hep Met 10/30/2019  2) Pt to increase LUE  strength by 5 # in order to A in self care task of feeding by 4 weeks. MET 10/30/2019  3) Pt to increase Quick dash Score by 5 points increasing self care IND by 4 weeks. Progressing 12/18/2019  4) Pt to increase L UE AROM by 10 degrees in order to A in UB dressing by 4 weeks.MET 12/18/2019  5) Patient will be able to achieve less than or equal to 75% on the FOTO, demonstrating overall improved functional ability with upper extremity. (self-care category) Progressing 12/18/2019           New Short Term Goals Status:     Long Term Goals:  1) Pt to be IND with HEP in order to maintain ROM and strength needed for self care IND by d.c. Progressing 12/18/2019  2) Pt to increase L UE  strength to WNL as compared to unaffected extremity in order to open items for self feeding by d.c. Progressing 12/18/2019  3) Pt to increase Quick dash Score by 10 points increasing self care IND at home by d.c. Progressing 12/18/2019  4) Pt to increase L UE AROM to WFL in order to A in UB dressing by d/c. Progressing 12/18/2019  5) Patient will be able to achieve less than or equal to 50% on the FOTO, demonstrating overall improved functional ability with upper extremity. (self-care category) Progressing 12/18/2019         Long Term Goal Status:   continue per initial plan of care.  Reasons for Recertification of Therapy:   Pt continues to benefit from skilled services and has made good progress thus far with good future rehab potential. Pt remains limited with LUE strength, ROM and coordination at this time which all still impact their performance of ADLs  , IADLs affecting her habits, roles and routines.        Plan      Continue per UPOC. Need for future splinting and dynasplint to prevent contracture while increasing arm swing for functional ambulation and balance.   Progress as tolerated.     Christian Teran  OTR/L

## 2019-12-19 ENCOUNTER — CLINICAL SUPPORT (OUTPATIENT)
Dept: CARDIOLOGY | Facility: HOSPITAL | Age: 64
End: 2019-12-19
Attending: INTERNAL MEDICINE
Payer: MEDICARE

## 2019-12-19 DIAGNOSIS — I49.8 OTHER SPECIFIED CARDIAC ARRHYTHMIAS: ICD-10-CM

## 2019-12-19 PROCEDURE — 93291 INTERROG DEV EVAL SCRMS IP: CPT

## 2019-12-20 ENCOUNTER — CLINICAL SUPPORT (OUTPATIENT)
Dept: REHABILITATION | Facility: HOSPITAL | Age: 64
End: 2019-12-20
Payer: MEDICARE

## 2019-12-20 DIAGNOSIS — R29.898 DECREASED STRENGTH OF LOWER EXTREMITY: ICD-10-CM

## 2019-12-20 DIAGNOSIS — Z74.09 IMPAIRED FUNCTIONAL MOBILITY, BALANCE, GAIT, AND ENDURANCE: ICD-10-CM

## 2019-12-20 PROCEDURE — 97112 NEUROMUSCULAR REEDUCATION: CPT | Mod: PN

## 2019-12-20 PROCEDURE — 97116 GAIT TRAINING THERAPY: CPT | Mod: PN

## 2019-12-20 NOTE — PROGRESS NOTES
Physical Therapy Daily Treatment Note     Name: Luis Wong  Clinic Number: [id number]    Therapy Diagnosis:   Encounter Diagnoses   Name Primary?    Impaired functional mobility, balance, gait, and endurance     Decreased strength of lower extremity      Physician: Carla Altman PA-C     Visit Date: 12/20/2019    Physician Orders: PT Eval and Treat  Medical Diagnosis from Referral: I63.9 (ICD-10-CM) - CVA (cerebral vascular accident)  Evaluation Date: 9/13/2019  Authorization Period Expiration: 12/31/2019  Plan of Care Expiration: 1/10/2020  Visit # / Visits authorized: 18/50 (20 prior visits)  FOTO: 8/10    Time In: 9:30 AM  Time Out: 10:18 AM  Total Billable Time: 48 minutes (2 NMR, 1 GT)    Precautions: Standard; history of of CVA's    Subjective     Pt reports: that he's feeling okay today, no new complaints   Response to previous treatment: Continued improvement with balance and gait  Functional change: Ongoing       Objective     Luis participated in gait training for 12 minutes on treadmill including the following:  --Treadmill forward ambulation WITHOUT UE support for 5 minutes with speed at 0.4 mph and close SBA for balance.   -- Treadmill forward ambulation WITH UE support for 6 minutes with speed at 1.1 mph and close SBA for balance.     Luis did NOT participate in therapeutic exercises:  -Cybex knee extension 20# 2x15 B - not today  -Cybex Ham curls 1.5 pl 2x15 B - not today    Pt performed developmental sequence for Neuromuscular re-education to improve core and trunk stability and flexibility as well a limb dissociation x 36 minutes.  Sequencing consisted of the following performed on floor near low mat with chair on L side to wt bear in the L LE with assist of PT on yoga block and cutting board (to manage L UE)     --Tall kneel x 3 minutes, w/  UE support B performing trunk twist B, on FOAM- OOT  --Tall to 1/2 kneel transitions 2 x 10, w UE support and Dima  assist  For L LE transitions as needed - OOT  --1/2 kneel 2 x 2' minutes with each LE leading, w/  UE support, on FOAM   --1/2 kneel to full standing transitions x 10 on each, w/  UE support Dima assist needed to block L LE with R LE was leading    --1/2 kneel roll into long sitting position. Practiced floor to standing technique by rolling onto knees into tall kneeling, pushing to 1/2 kneeling, pushing to standing.      --Backward walking 2 x 40'' total without LOB - OOT  --sit to stands with R LE positioned on step 2 x 10 with cues to increase L LE wt bearing     -- Nu-Step for reciprocal BUE and BLE motion for 10 minutes at level 7.0.        Home Exercises Provided and Patient Education Provided     Education provided:   Cont HEP      Written Home Exercises Provided: Not today  Exercises were reviewed and Luis was able to demonstrate them prior to the end of the session.  Luis demonstrated good  understanding of the education provided.     See EMR under Patient Instructions for exercises provided 9/23/2019 and 10/28/2019.    Assessment     Patient completed gait training on treadmill displaying circumducted gait with LLE. Verbal cueing to advance leg forward by flexing hip and knee. Practiced weight bearing, weight shifting, and floor to stand transitions as documented above on airex yoga mat with UE support. Patient required seated rest breaks when going from long sitting to standing, however with proper guidance patient able to return to standing position. Patient has an appointment with PM&R MD later today to discuss options in regards to his spasticity and tone.   Luis is progressing well towards his goals.   Pt prognosis is Good.   Pt will continue to benefit from skilled outpatient physical therapy to address the deficits listed in the problem list box on initial evaluation, provide pt/family education and to maximize pt's level of independence in the home and community environment.     Pt's  spiritual, cultural and educational needs considered and pt agreeable to plan of care and goals.  Anticipated barriers to physical therapy: Co-morbidities    Long Term Goals (8 Weeks):   3. Pt will be able to perform TUG in less than or equal to 25 secs without use of AD demonstrating overall improved functional mobility. MET 12/16/2019; 21 seconds  4. Pt will performed sit to stand x 5 without UE support in 10 seconds without LOB backward or posterior LE hooking for functional and safe transfers. MET  5. Pt will score greater than or equal to 45/56 on the Choi Balance Assessment with use of AD demonstrating overall improved functional mobility and balance and reduce fall risk. MET 12/16/2019  6. Pt will walk > than or equal to 210 ft on the 2 minute walk test indoor with AD with 0 LOB and minimal gait deviations for improved safety in home ambulation and safety.  PROGRESSING, NOT MET 12/20/2019; 195 feet on 12/16/19  7. Pt will be able to safely perform and tolerate high level ADL's without LOB.  PROGRESSING, NOT MET 12/20/2019  8. Pt will have 0 falls from start of PT sessions.  PROGRESSING, NOT MET 12/20/2019; fall reported 11/6/19  9. Independent with updated HEP. MET  10. Pt will have MMT score of 3+/5 in all major ms groups in Left LE.  PROGRESSING, NOT MET 12/20/2019  11. Pt will ambulate on TM x 6 minutes with use of UE support with 0 LOB at greater than or equal to 1.3 mph. PROGRESSING, NOT MET 12/20/2019  12. Pt will begin some form of community fitness to begin regular and consistent performance of exercise to continue maintenance of gains made in PT.  PROGRESSING, NOT MET 12/20/2019     Plan     Cont to advance PT as per POC.     Kandy Gatica, PT

## 2019-12-23 ENCOUNTER — CLINICAL SUPPORT (OUTPATIENT)
Dept: REHABILITATION | Facility: HOSPITAL | Age: 64
End: 2019-12-23
Payer: MEDICARE

## 2019-12-23 ENCOUNTER — CLINICAL SUPPORT (OUTPATIENT)
Dept: CARDIOLOGY | Facility: HOSPITAL | Age: 64
End: 2019-12-23
Attending: INTERNAL MEDICINE
Payer: MEDICARE

## 2019-12-23 DIAGNOSIS — Z74.09 IMPAIRED FUNCTIONAL MOBILITY, BALANCE, GAIT, AND ENDURANCE: ICD-10-CM

## 2019-12-23 DIAGNOSIS — Z78.9 IMPAIRED MOBILITY AND ACTIVITIES OF DAILY LIVING: ICD-10-CM

## 2019-12-23 DIAGNOSIS — R27.8 DECREASED COORDINATION: ICD-10-CM

## 2019-12-23 DIAGNOSIS — Z74.09 IMPAIRED MOBILITY AND ACTIVITIES OF DAILY LIVING: ICD-10-CM

## 2019-12-23 DIAGNOSIS — R29.898 DECREASED STRENGTH OF LOWER EXTREMITY: ICD-10-CM

## 2019-12-23 DIAGNOSIS — M62.81 MUSCLE WEAKNESS: ICD-10-CM

## 2019-12-23 DIAGNOSIS — I49.8 OTHER SPECIFIED CARDIAC ARRHYTHMIAS: ICD-10-CM

## 2019-12-23 PROCEDURE — 97110 THERAPEUTIC EXERCISES: CPT | Mod: PN

## 2019-12-23 PROCEDURE — 93291 INTERROG DEV EVAL SCRMS IP: CPT

## 2019-12-23 PROCEDURE — 97116 GAIT TRAINING THERAPY: CPT | Mod: PN

## 2019-12-23 PROCEDURE — 97140 MANUAL THERAPY 1/> REGIONS: CPT | Mod: PN

## 2019-12-23 NOTE — PLAN OF CARE
"  Outpatient Therapy Updated Plan of Care     Visit Date: 12/23/2019  Name: Luis Wong  Clinic Number: [id number]    Therapy Diagnosis:   Encounter Diagnoses   Name Primary?    Impaired mobility and activities of daily living     Muscle weakness     Decreased coordination      Physician: Carla Altman PA-C    Physician Orders: Eval and tx  Medical Diagnosis: CVA L sided weakness Recrudecesnt  Onset Date: 8/28/19 most recent CVA  Evaluation Date: 9/4/2019  Plan of Care Expiration Period: 2/21/20  Insurance Authorization period Expiration: 12/31/19  Date of Return to MD: DAI  Visit # / Visits Authortized: 12/50 change in insurance  FOTO: CVA UE hand / 35% limitation down from 75% with the hand survey.      Time In:11:00 am   Time Out:11:45 am   Total Billable (one on one) Time: 45 minutes      Precautions: Standard and Fall      Subjective     Update: " I am pretty good saw the other doctor she wanted to try some stuff for my tone."    Objective     Update:      Joint Evaluation  AROM  9/4/2019 PROM   9/4/2019 AROM  10/8/2019    AROM  10/30/2019    AROM  12/23/2019          Left Left Left Left Left   Shoulder flex 0-180 54 130 125 122  128   Shoulder Abd 0-180 54 125 115 110  115   Shoulder ER 0-90 Neutral WNL Neutral  Neutral   38   Shoulder IR 0-90 Trace WNL S4 S4  S4   Shoulder Extension 0-80 51 65 52 60  62   Shoulder Horizontal adduction 0-90 Trace WNL 45 WNl  WNL   Elbow flex/ext 0-150 WNL WNL Ext -20 WNL/Ext -28  145 ext    Wrist flex 0-80 WNL WNL WNL    WNL   Wrist ext 0-70 Neutral WNL 32 35  30   Supination 0-80 Trace easier WNL WnL WNL  WNL   Pronation 0-80 trace WNl WNL WNL  WNL   UD Trace WNL 32 32  28   RD Trace WNL 22 22  22             Strength: (ANTONIO Dynamometer in lbs.) Average 3 trials, Position II:       10/30/2019    12/3/2019    12/23/2019       Left Left Left   Rung II 35# (+3) 40# 36#      Pinch Strength (Measured in psi)       10/30/2019    12/3/2019    12/23/2019       Left Left " Left   Carr Pinch 11 11 12   3pt Pinch 9 9 9   2pt Pinch 8 7 8      Fine Motor Coordination: 9 Hole Peg Test  9 Peg Test Left 10/8/2019    Left  10/30/2019    Left  12/23/2019     Removed 9/9 dropped to towel 9/9 dropped to towel 9/9 dropped to towel no R hand A   Replaced 9/9 used R hand to assist pegs into finger tips 9/9 using R hand to A pegs into finger tips almost able to use L only 9/9 using R hand for wrist A   Time 2:55  1:35 1:45        Box and Blocks:  L hand:18 blocks    Assessment     Update: Pt continues to tolerate sessions well. No pain except in the wrist with tone and restrictions being the main cause of pain and limited ROM. Also still limited with AROM and functional use of the L arm due to tone in the biceps/elbow flexion impacting AROM. Tolerates PROM well with more need for focus and maintenance of tone. He saw a specialist this past week in order to attempt medication assistance with tone inhibition and has a dynasplint for tone in the wrist and hand which he may not be as compliant as he should be. Pt would continue to benefit from skilled OT services to increase ROM, strength, activity tolerance, and Fm/ GM coordination in order to increase safety and IND with ADLs.       Previous Short Term Goals Status:     Short Term Goals:  1) Initiate Hep Met 10/30/2019  2) Pt to increase LUE  strength by 5 # in order to A in self care task of feeding by 4 weeks. MET 10/30/2019  3) Pt to increase Quick dash Score by 5 points increasing self care IND by 4 weeks. Progressing 12/23/2019  4) Pt to increase L UE AROM by 10 degrees in order to A in UB dressing by 4 weeks.MET 12/23/2019  5) Patient will be able to achieve less than or equal to 75% on the FOTO, demonstrating overall improved functional ability with upper extremity. (self-care category) Progressing 12/23/2019        New Short Term Goals Status:     Long Term Goals:  1) Pt to be IND with HEP in order to maintain ROM and strength needed for  self care IND by barrera Progressing 12/23/2019  2) Pt to increase L UE  strength to WNL as compared to unaffected extremity in order to open items for self feeding by barrera Progressing 12/23/2019  3) Pt to increase Quick dash Score by 10 points increasing self care IND at home by barrera Progressing 12/23/2019  4) Pt to increase L UE AROM to WFL in order to A in UB dressing by grant/david Progressing 12/23/2019  5) Patient will be able to achieve less than or equal to 50% on the FOTO, demonstrating overall improved functional ability with upper extremity. (self-care category) Progressing 12/23/2019       New Short Term Goals Status:   Continue per STG and LTG listed above.   Long Term Goal Status:   continue per initial plan of care.  Reasons for Recertification of Therapy:   Pt continues to benefit from skilled services and has made good progress thus far with good future rehab potential. Pt remains limited with LUE strength, ROM and coordination at this time which all still impact their performance of ADLs , IADLs affecting her habits, roles and routines.      Plan     Updated Certification Period: 12/23/2019 to 2/21/20  Recommended Treatment Plan: 2 times per week for 8 weeks: Electrical Stimulation PRN, Fluidotherapy, Manual Therapy, Moist Heat/ Ice, Neuromuscular Re-ed, Orthotic Management and Training, Paraffin, Patient Education, Self Care, Therapeutic Activites and Therapeutic Exercise  Other Recommendations: K tape, Cupping, UPOC, Orthotic training PRN.       Christian Teran, OT  12/23/2019      I CERTIFY THE NEED FOR THESE SERVICES FURNISHED UNDER THIS PLAN OF TREATMENT AND WHILE UNDER MY CARE    Physician's comments:        Physician's Signature: ___________________________________________________

## 2019-12-23 NOTE — PROGRESS NOTES
"  Occupational Therapy Daily Treatment Note     Name: Luis Wong  Clinic Number: [id number]    Therapy Diagnosis:    Encounter Diagnoses   Name Primary?    Impaired mobility and activities of daily living     Muscle weakness     Decreased coordination      Physician: Carla Altman, HALLE Lopez MD    Visit Date: 12/23/2019  Physician Orders: Eval and tx  Medical Diagnosis: CVA L sided weakness Recrudecesnt  Onset Date: 8/28/19 most recent CVA  Evaluation Date: 9/4/2019  Plan of Care Expiration Period: 12/27/19  Insurance Authorization period Expiration: 12/31/19  Date of Return to MD: NA  Visit # / Visits Authortized: 11/50 change in insurance  FOTO: CVA UE hand / 35% limitation down from 75% with the hand survey.      Time In:9:30 am   Time Out:10:15 am   Total Billable (one on one) Time: 45 minutes      Precautions: Standard and Fall    Subjective     Pt reports: "No pain today I saw the doctor for my tone"  he was compliant with home exercise program given last session.   Response to previous treatment:good   Functional change: able to put glasses on with BUEs    Pain: 0/10   Location: left arms     Objective   Mt: Pt received manual therapy consisting of PROM in all planes, joint mobilization (grades I-II) with gentle oscillations at acromioclavicular and glenohumeral joint along with myofascial release and STM to surrounding musculature (biceps, pects, deltoids, traps, triceps etc.) to decrease stiffness and pain with movements. 10 min        Luis Wong  received TE to develop GM/FM coordination, balance, activity tolerance and strength in order to increase ADL/IADL independence with replicated home management/self care tasks, for 30 minutes including: measurements for UPOC.   UBE 60 rpms         Shoulder    PROM all planes X 10    Supine biceps flex/ext 2#  2/10                                 Luis Wong  received therapeutic activities to develop GM/FM coordination, balance, activity " Patient seen resting comfortably.  States pain is well controlled on pain medications.  Denies CP, SOB, N/V/D, dizziness, palpitations, Pihpps to be removed at 10 am today. Patient currently tolerating clears, denies flatus or BM    Vital Signs Last 24 Hrs  T(C): 36.8 (28 Oct 2017 02:00), Max: 37.2 (27 Oct 2017 23:10)  T(F): 98.2 (28 Oct 2017 02:00), Max: 98.9 (27 Oct 2017 23:10)  HR: 103 (28 Oct 2017 02:00) (91 - 124)  BP: 111/70 (28 Oct 2017 02:00) (103/47 - 119/69)  RR: 16 (28 Oct 2017 02:00) (13 - 18)  SpO2: 100% (28 Oct 2017 02:00) (100% - 100%)    Gen: A&O x 3, NAD  Chest: CTABL  Cardiac: S1+S2+ RRR  Breast: Soft, nontender, non engorged  Abdomen: Nontender, nondistended, +BS, Incision c/d/i, no erythema  Gyn: lochia wnl  Extremities: Nontender, no worsening edema, venodynes in place                          9.4    18.7  )-----------( 115      ( 28 Oct 2017 07:03 )             29.0 tolerance and strength in order to increase ADL/IADL independence with replicated home management/self care tasks, for 10 minutes including:  Pom pom in hand manipulation  X 3 at a time. 2 Tubs                         Home Exercises and Education Provided     Education provided:   - Previous hand out   - Progress towards goals     Written Home Exercises Provided: Patient instructed to cont prior HEP. Updated putty exercises to molding,  and pinch Red putty provided.  Exercises were reviewed and Luis was able to demonstrate them prior to the end of the session.  Luis demonstrated good  understanding of the HEP provided.   .   See EMR under Patient Instructions for exercises provided prior visit.        Assessment     See tx section for UPOC.     Luis is progressing well towards his goals and there are no updates to goals at this time. Pt prognosis is Fair.      Pt will continue to benefit from skilled outpatient occupational therapy to address the deficits listed in the problem list on initial evaluation provide pt/family education and to maximize pt's level of independence in the home and community environment.      Anticipated barriers to occupational therapy:      Pt's spiritual, cultural and educational needs considered and pt agreeable to plan of care and goals.     Previous Short Term Goals Status:   Goals:  Short Term Goals:  1) Initiate Hep Met 10/30/2019  2) Pt to increase LUE  strength by 5 # in order to A in self care task of feeding by 4 weeks. MET 10/30/2019  3) Pt to increase Quick dash Score by 5 points increasing self care IND by 4 weeks. Progressing 12/23/2019  4) Pt to increase L UE AROM by 10 degrees in order to A in UB dressing by 4 weeks.MET 12/23/2019  5) Patient will be able to achieve less than or equal to 75% on the FOTO, demonstrating overall improved functional ability with upper extremity. (self-care category) Progressing 12/23/2019           New Short Term Goals Status:      Long Term Goals:  1) Pt to be IND with HEP in order to maintain ROM and strength needed for self care IND by barrera Progressing 12/23/2019  2) Pt to increase L UE  strength to WNL as compared to unaffected extremity in order to open items for self feeding by barrera Progressing 12/23/2019  3) Pt to increase Quick dash Score by 10 points increasing self care IND at home by barrera Progressing 12/23/2019  4) Pt to increase L UE AROM to WFL in order to A in UB dressing by grant/cLaila Progressing 12/23/2019  5) Patient will be able to achieve less than or equal to 50% on the FOTO, demonstrating overall improved functional ability with upper extremity. (self-care category) Progressing 12/23/2019         Long Term Goal Status:   continue per initial plan of care.  Reasons for Recertification of Therapy:   Pt continues to benefit from skilled services and has made good progress thus far with good future rehab potential. Pt remains limited with LUE strength, ROM and coordination at this time which all still impact their performance of ADLs , IADLs affecting her habits, roles and routines.        Plan      Continue per UPOC. Need for future splinting and dynasplint to prevent contracture while increasing arm swing for functional ambulation and balance.   Progress as tolerated.     Christian Teran  OTR/L

## 2019-12-23 NOTE — PROGRESS NOTES
Physical Therapy Daily Treatment Note     Name: Luis Wong  Clinic Number: [id number]    Therapy Diagnosis:   Encounter Diagnoses   Name Primary?    Impaired functional mobility, balance, gait, and endurance     Decreased strength of lower extremity      Physician: Carla Altman PA-C     Visit Date: 12/23/2019    Physician Orders: PT Eval and Treat  Medical Diagnosis from Referral: I63.9 (ICD-10-CM) - CVA (cerebral vascular accident)  Evaluation Date: 9/13/2019  Authorization Period Expiration: 12/31/2019  Plan of Care Expiration: 1/10/2020  Visit # / Visits authorized: 19/50 (20 prior visits)  FOTO: 9/10    Time In: 9:30 AM  Time Out: 10:15 AM  Total Billable Time: 45 minutes (2 TE, 1 GT)    Precautions: Standard; history of of CVA's    Subjective     Pt reports: that he went to a new MD this past Friday and is no longer taking Baclofen, but is taking a new medication only at night and will be starting gabapentin soon. He will also be receiving a block test where lidocaine is injected and if he reacts favorably then he will receive botox in his LUE first and possibly his LLE following.   Response to previous treatment: No adverse reactions.   Functional change: Ongoing       Objective     Luis participated in gait training for 12 minutes on treadmill including the following:  --Treadmill forward ambulation WITHOUT UE support for 5 minutes with speed at 0.4 mph and close SBA for balance.   -- Treadmill forward ambulation WITH UE support for 5 minutes with speed at 1.1 mph and close SBA for balance.         Luis did participated in therapeutic exercises for 33 minutes including:  -- Nu-Step for reciprocal BUE and BLE motion for 10 minutes at level 7.0.     -Cybex knee extension:  20# 2x15, LLE only      25#, 2x15, B  -Cybex Ham curls:  1.0 pl 2x15 B  -Cybex Leg Press:  6.0 plates, 3x10, B      4.0 plates, 3x10, LLE only        Luis participated in developmental sequences for  Neuromuscular re-education to improve core and trunk stability and flexibility as well a limb dissociation. Sequencing consisted of the following performed on floor near low mat with chair on L side to wt bear in the L LE with assist of PT on yoga block and cutting board (to manage L UE) - NOT TODAY     --Tall kneel x 3 minutes, w/  UE support B performing trunk twist B, on FOAM- OOT  --Tall to 1/2 kneel transitions 2 x 10, w UE support and Dima assist  For L LE transitions as needed - OOT  --1/2 kneel 2 x 2' minutes with each LE leading, w/  UE support, on FOAM - OOT  --1/2 kneel to full standing transitions x 10 on each, w/  UE support Dima assist needed to block L LE with R LE was leading - OOT     --Backward walking 2 x 40'' total without LOB - OOT  --sit to stands with R LE positioned on step 2 x 10 with cues to increase L LE wt bearing - OOT          Home Exercises Provided and Patient Education Provided     Education provided:   Cont HEP      Written Home Exercises Provided: Not today  Exercises were reviewed and Luis was able to demonstrate them prior to the end of the session.  Luis demonstrated good  understanding of the education provided.     See EMR under Patient Instructions for exercises provided 9/23/2019 and 10/28/2019.    Assessment     Patient presented to therapy session ready to work. Continues to require verbal cueing to lift left leg from hip and bend at knee when advancing left leg forward. Ambulated throughout clinic without AD and close SBA.   Luis is progressing well towards his goals.   Pt prognosis is Good.   Pt will continue to benefit from skilled outpatient physical therapy to address the deficits listed in the problem list box on initial evaluation, provide pt/family education and to maximize pt's level of independence in the home and community environment.     Pt's spiritual, cultural and educational needs considered and pt agreeable to plan of care and goals.  Anticipated  barriers to physical therapy: Co-morbidities    Long Term Goals (8 Weeks):   3. Pt will be able to perform TUG in less than or equal to 25 secs without use of AD demonstrating overall improved functional mobility. MET 12/16/2019; 21 seconds  4. Pt will performed sit to stand x 5 without UE support in 10 seconds without LOB backward or posterior LE hooking for functional and safe transfers. MET  5. Pt will score greater than or equal to 45/56 on the Choi Balance Assessment with use of AD demonstrating overall improved functional mobility and balance and reduce fall risk. MET 12/16/2019  6. Pt will walk > than or equal to 210 ft on the 2 minute walk test indoor with AD with 0 LOB and minimal gait deviations for improved safety in home ambulation and safety.  PROGRESSING, NOT MET 12/23/2019; 195 feet on 12/16/19  7. Pt will be able to safely perform and tolerate high level ADL's without LOB.  PROGRESSING, NOT MET 12/23/2019  8. Pt will have 0 falls from start of PT sessions.  PROGRESSING, NOT MET 12/23/2019; fall reported 11/6/19  9. Independent with updated HEP. MET  10. Pt will have MMT score of 3+/5 in all major ms groups in Left LE.  PROGRESSING, NOT MET 12/23/2019  11. Pt will ambulate on TM x 6 minutes with use of UE support with 0 LOB at greater than or equal to 1.3 mph. PROGRESSING, NOT MET 12/23/2019  12. Pt will begin some form of community fitness to begin regular and consistent performance of exercise to continue maintenance of gains made in PT.  PROGRESSING, NOT MET 12/23/2019     Plan     Resume LE stretching and NMR next visit.     Kandy Gatica, PT

## 2019-12-24 ENCOUNTER — CLINICAL SUPPORT (OUTPATIENT)
Dept: REHABILITATION | Facility: HOSPITAL | Age: 64
End: 2019-12-24
Payer: MEDICARE

## 2019-12-24 DIAGNOSIS — Z74.09 IMPAIRED FUNCTIONAL MOBILITY, BALANCE, GAIT, AND ENDURANCE: ICD-10-CM

## 2019-12-24 DIAGNOSIS — R29.898 DECREASED STRENGTH OF LOWER EXTREMITY: ICD-10-CM

## 2019-12-24 PROCEDURE — 97112 NEUROMUSCULAR REEDUCATION: CPT | Mod: PN

## 2019-12-24 PROCEDURE — G8979 MOBILITY GOAL STATUS: HCPCS | Mod: CJ,PN

## 2019-12-24 PROCEDURE — 97116 GAIT TRAINING THERAPY: CPT | Mod: PN

## 2019-12-24 PROCEDURE — G8978 MOBILITY CURRENT STATUS: HCPCS | Mod: CK,PN

## 2019-12-24 PROCEDURE — 97110 THERAPEUTIC EXERCISES: CPT | Mod: PN

## 2019-12-24 NOTE — PROGRESS NOTES
Physical Therapy Daily Treatment Note     Name: Luis Wong  Clinic Number: [id number]    Therapy Diagnosis:   Encounter Diagnoses   Name Primary?    Impaired functional mobility, balance, gait, and endurance     Decreased strength of lower extremity      Physician: Carla Altman PA-C     Visit Date: 12/24/2019    Physician Orders: PT Eval and Treat  Medical Diagnosis from Referral: I63.9 (ICD-10-CM) - CVA (cerebral vascular accident)  Evaluation Date: 9/13/2019  Authorization Period Expiration: 12/31/2019  Plan of Care Expiration: 1/10/2020  Visit # / Visits authorized: 20/50 (20 prior visits)  FOTO: 10/10 NEXT    Time In: 9:30 AM  Time Out: 10:15 AM  Total Billable Time: 40 minutes (1TE, 1 GT, 1 NMR)    Precautions: Standard; history of of CVA's    Subjective     Pt reports:no reports of pain. He states he has been feeling more comfortable ambulating on flat surfaces without assistive device.   Response to previous treatment: No adverse reactions.   Functional change: Ongoing       Objective     Luis participated in gait training for 15 minutes on treadmill including the following:  --Treadmill forward ambulation WITHOUT UE support for 5 minutes with speed at 0.4 mph and close SBA for balance.   -- Treadmill forward ambulation WITH UE support for 5 minutes with speed at 1.1 mph and close SBA for balance.         Luis did participated in therapeutic exercises for 15 minutes including:  -- Nu-Step for reciprocal BUE and BLE motion for 0 minutes at level 7.0. Not performed today     -Cybex knee extension:  20# 2x15, LLE only      25#, 2x15, B  -Cybex Ham curls:  1.0 pl 2x15 B  -Cybex Leg Press:  6.0 plates, 3x10, B      4.0 plates, 3x10, LLE only    Luis participated in developmental sequences for Neuromuscular re-education x15 min to improve core and trunk stability and flexibility as well a limb dissociation.      --Tall kneel x 3 minutes, w/  UE support B performing trunk  "twist B, on FOAM- OOT  --Tall to 1/2 kneel transitions 2 x 10, w UE support and Dima assist  For L LE transitions as needed - OOT  --1/2 kneel 2 x 2' minutes with each LE leading, w/  UE support, on FOAM - OOT  --1/2 kneel to full standing transitions x 10 on each, w/  UE support Dima assist needed to block L LE with R LE was leading - OOT     --6 cone weaving Backward walking 4 x 20'' total without LOB   --6 cone weaving lateral walking 4 x 20"          Home Exercises Provided and Patient Education Provided     Education provided:   Cont HEP      Written Home Exercises Provided: Not today  Exercises were reviewed and Luis was able to demonstrate them prior to the end of the session.  Luis demonstrated good  understanding of the education provided.     See EMR under Patient Instructions for exercises provided 9/23/2019 and 10/28/2019.    Assessment     Pt demonstrated no loss of balance while ambulating on treadmill and performing cone weaving. Pt demonstrated good safety awareness while ambulating in gym without use of SPC.     Luis is progressing well towards his goals.   Pt prognosis is Good.   Pt will continue to benefit from skilled outpatient physical therapy to address the deficits listed in the problem list box on initial evaluation, provide pt/family education and to maximize pt's level of independence in the home and community environment.     Pt's spiritual, cultural and educational needs considered and pt agreeable to plan of care and goals.  Anticipated barriers to physical therapy: Co-morbidities    Long Term Goals (8 Weeks):   3. Pt will be able to perform TUG in less than or equal to 25 secs without use of AD demonstrating overall improved functional mobility. MET 12/16/2019; 21 seconds  4. Pt will performed sit to stand x 5 without UE support in 10 seconds without LOB backward or posterior LE hooking for functional and safe transfers. MET  5. Pt will score greater than or equal to 45/56 on " the Choi Balance Assessment with use of AD demonstrating overall improved functional mobility and balance and reduce fall risk. MET 12/16/2019  6. Pt will walk > than or equal to 210 ft on the 2 minute walk test indoor with AD with 0 LOB and minimal gait deviations for improved safety in home ambulation and safety.  PROGRESSING, NOT MET 12/24/2019; 195 feet on 12/16/19  7. Pt will be able to safely perform and tolerate high level ADL's without LOB.  PROGRESSING, NOT MET 12/24/2019  8. Pt will have 0 falls from start of PT sessions.  PROGRESSING, NOT MET 12/24/2019; fall reported 11/6/19  9. Independent with updated HEP. MET  10. Pt will have MMT score of 3+/5 in all major ms groups in Left LE.  PROGRESSING, NOT MET 12/24/2019  11. Pt will ambulate on TM x 6 minutes with use of UE support with 0 LOB at greater than or equal to 1.3 mph. PROGRESSING, NOT MET 12/24/2019  12. Pt will begin some form of community fitness to begin regular and consistent performance of exercise to continue maintenance of gains made in PT.  PROGRESSING, NOT MET 12/24/2019     Plan     Resume LE stretching and NMR    Advance PT as per POC.     Robin Davis, PTA

## 2019-12-27 ENCOUNTER — CLINICAL SUPPORT (OUTPATIENT)
Dept: REHABILITATION | Facility: HOSPITAL | Age: 64
End: 2019-12-27
Payer: MEDICARE

## 2019-12-27 DIAGNOSIS — R27.8 DECREASED COORDINATION: ICD-10-CM

## 2019-12-27 DIAGNOSIS — Z74.09 IMPAIRED MOBILITY AND ACTIVITIES OF DAILY LIVING: ICD-10-CM

## 2019-12-27 DIAGNOSIS — Z78.9 IMPAIRED MOBILITY AND ACTIVITIES OF DAILY LIVING: ICD-10-CM

## 2019-12-27 DIAGNOSIS — Z74.09 IMPAIRED FUNCTIONAL MOBILITY, BALANCE, GAIT, AND ENDURANCE: ICD-10-CM

## 2019-12-27 DIAGNOSIS — M62.81 MUSCLE WEAKNESS: ICD-10-CM

## 2019-12-27 DIAGNOSIS — R29.898 DECREASED STRENGTH OF LOWER EXTREMITY: ICD-10-CM

## 2019-12-27 PROCEDURE — 97110 THERAPEUTIC EXERCISES: CPT | Mod: PN

## 2019-12-27 PROCEDURE — 97140 MANUAL THERAPY 1/> REGIONS: CPT | Mod: PN

## 2019-12-27 NOTE — PROGRESS NOTES
"                            Physical Therapy Daily Treatment Note     Name: Luis Wong  Clinic Number: [id number]    Therapy Diagnosis:   Encounter Diagnoses   Name Primary?    Impaired functional mobility, balance, gait, and endurance     Decreased strength of lower extremity      Physician: Carla Altman PA-C     Visit Date: 12/27/2019    Physician Orders: PT Eval and Treat  Medical Diagnosis from Referral: I63.9 (ICD-10-CM) - CVA (cerebral vascular accident)  Evaluation Date: 9/13/2019  Authorization Period Expiration: 12/31/2019  Plan of Care Expiration: 1/10/2020  Visit # / Visits authorized: 21/50 (20 prior visits)  FOTO/G-Code: 1/10 DONE    Time In: 9:30 AM  Time Out: 10:15 AM  Total Billable Time: 45 minutes (3 TE)    Precautions: Standard; history of of CVA's    Subjective     Pt reports: no new complaints today. He's feeling good.   Response to previous treatment: No adverse reactions.   Functional change: Ongoing       Objective     Luis did participated in therapeutic exercises for 45 minutes including:    -- Nu-Step for reciprocal BUE and BLE motion for 5 minutes at level 7.0    - SLR:    3x10  - Bridges:   3x10  - Supine Hip ADD  3x10, 5" holds  - Supine Hip ABD:  3x10, GTB  - Sidelying Hip ABD:  3x10    -Cybex knee extension: 25#, 2x15, B      20# 2x15, LLE only  -Cybex Ham curls:  1.0 pl 2x15 B  -Cybex Leg Press:  7.0 plates, 3x10, B      4.5 plates, 3x10, LLE only         Home Exercises Provided and Patient Education Provided     Education provided:   Cont HEP      Written Home Exercises Provided: Not today  Exercises were reviewed and Luis was able to demonstrate them prior to the end of the session.  Luis demonstrated good  understanding of the education provided.     See EMR under Patient Instructions for exercises provided 9/23/2019 and 10/28/2019.    Assessment     Luis was able to return to performing mat exercises with increased control from the start of physical therapy. " During supine bridges, hip add and hip abd, patient was able to control LLE by keeping it neutral showing an overall increase in adductor control. Patient encouraged to resume performing all HEP exercises in anticipation for botox injections early next year.     Luis is progressing well towards his goals.   Pt prognosis is Good.   Pt will continue to benefit from skilled outpatient physical therapy to address the deficits listed in the problem list box on initial evaluation, provide pt/family education and to maximize pt's level of independence in the home and community environment.     Pt's spiritual, cultural and educational needs considered and pt agreeable to plan of care and goals.  Anticipated barriers to physical therapy: Co-morbidities    Long Term Goals (8 Weeks):   3. Pt will be able to perform TUG in less than or equal to 25 secs without use of AD demonstrating overall improved functional mobility. MET 12/16/2019; 21 seconds  4. Pt will performed sit to stand x 5 without UE support in 10 seconds without LOB backward or posterior LE hooking for functional and safe transfers. MET  5. Pt will score greater than or equal to 45/56 on the Choi Balance Assessment with use of AD demonstrating overall improved functional mobility and balance and reduce fall risk. MET 12/16/2019  6. Pt will walk > than or equal to 210 ft on the 2 minute walk test indoor with AD with 0 LOB and minimal gait deviations for improved safety in home ambulation and safety.  PROGRESSING, NOT MET 12/27/2019; 195 feet on 12/16/19  7. Pt will be able to safely perform and tolerate high level ADL's without LOB.  PROGRESSING, NOT MET 12/27/2019  8. Pt will have 0 falls from start of PT sessions.  PROGRESSING, NOT MET 12/27/2019; fall reported 11/6/19  9. Independent with updated HEP. MET  10. Pt will have MMT score of 3+/5 in all major ms groups in Left LE.  PROGRESSING, NOT MET 12/27/2019  11. Pt will ambulate on TM x 6 minutes with use of  UE support with 0 LOB at greater than or equal to 1.3 mph. PROGRESSING, NOT MET 12/27/2019  12. Pt will begin some form of community fitness to begin regular and consistent performance of exercise to continue maintenance of gains made in PT.  PROGRESSING, NOT MET 12/27/2019     Plan     Resume LE stretching and NMR    Advance PT as per POC.     Kandy Gatica, PT

## 2019-12-27 NOTE — PROGRESS NOTES
"  Occupational Therapy Daily Treatment Note     Name: Luis Wong  Clinic Number: [id number]    Therapy Diagnosis:    Encounter Diagnoses   Name Primary?    Impaired mobility and activities of daily living     Muscle weakness     Decreased coordination      Physician: Carla Altman, HALLE Lopez MD    Visit Date: 12/27/2019  Physician Orders: Eval and tx  Medical Diagnosis: CVA L sided weakness Recrudecesnt  Onset Date: 8/28/19 most recent CVA  Evaluation Date: 9/4/2019  Plan of Care Expiration Period: 2/21/20  Insurance Authorization period Expiration: 12/31/19  Date of Return to MD: NA  Visit # / Visits Authortized: 11/50 change in insurance  FOTO: CVA UE hand / 35% limitation down from 75% with the hand survey.      Time In:9:30 am   Time Out:10:15 am   Total Billable (one on one) Time: 45 minutes      Precautions: Standard and Fall    Subjective     Pt reports: "I feel ok the shoulder been bothering me later in the day though its pretty stiff  "  he was compliant with home exercise program given last session.   Response to previous treatment:good   Functional change: able to put glasses on with BUEs    Pain: 0/10   Location: left arms     Objective   Mt: Pt received manual therapy consisting of PROM in all planes, joint mobilization (grades I-II) with gentle oscillations at acromioclavicular and glenohumeral joint along with myofascial release and STM to surrounding musculature (biceps, pects, deltoids, traps, triceps etc.) to decrease stiffness and pain with movements. 10 min        Luis Wong  received TE to develop GM/FM coordination, balance, activity tolerance and strength in order to increase ADL/IADL independence with replicated home management/self care tasks, for 35 minutes including: measurements for UPOC.           Shoulder    PROM all planes X 10    Supine biceps flex/ext 2#  2/10    Seated bicep curls 3#  2/15   Pect stretch with half bolster 3/30"   Supine chest press with half " bolster  FF    Short arc NDTs CW/CCW  FF/ Horizontal abduc 3# dowel  2/15    2/10 each    Seated band exercises with half bolster  Rows  Ext (difficult) Red  2/10 OT A for ext                   Home Exercises and Education Provided     Education provided:   - Previous hand out   - Progress towards goals     Written Home Exercises Provided: Patient instructed to cont prior HEP. Updated putty exercises to molding,  and pinch Red putty provided.  Exercises were reviewed and Luis was able to demonstrate them prior to the end of the session.  Luis demonstrated good  understanding of the HEP provided.   .   See EMR under Patient Instructions for exercises provided prior visit.        Assessment     Pt has had more pain in the shoulder with likely impingement due to tone and inability to correct posture with rounded shoulders. Pt educated of this with verbal understanding and good demonstration. He did well with new bolster exercises and was educated of carryover at home with good understanding. More focus on posterior muscle groups of the shoulder in next few sessions to A in postural correction and strengthening secondary to complaints of pain.     Luis is progressing well towards his goals and there are no updates to goals at this time. Pt prognosis is Fair.      Pt will continue to benefit from skilled outpatient occupational therapy to address the deficits listed in the problem list on initial evaluation provide pt/family education and to maximize pt's level of independence in the home and community environment.      Anticipated barriers to occupational therapy:      Pt's spiritual, cultural and educational needs considered and pt agreeable to plan of care and goals.     Previous Short Term Goals Status:   Goals:  Short Term Goals:  1) Initiate Hep Met 10/30/2019  2) Pt to increase LUE  strength by 5 # in order to A in self care task of feeding by 4 weeks. MET 10/30/2019  3) Pt to increase Quick dash  Score by 5 points increasing self care IND by 4 weeks. Progressing 12/27/2019  4) Pt to increase L UE AROM by 10 degrees in order to A in UB dressing by 4 weeks.MET 12/27/2019  5) Patient will be able to achieve less than or equal to 75% on the FOTO, demonstrating overall improved functional ability with upper extremity. (self-care category) Progressing 12/27/2019           New Short Term Goals Status:     Long Term Goals:  1) Pt to be IND with HEP in order to maintain ROM and strength needed for self care IND by d.c. Progressing 12/27/2019  2) Pt to increase L UE  strength to WNL as compared to unaffected extremity in order to open items for self feeding by d.c. Progressing 12/27/2019  3) Pt to increase Quick dash Score by 10 points increasing self care IND at home by [email or web address] Progressing 12/27/2019  4) Pt to increase L UE AROM to WFL in order to A in UB dressing by d/c. Progressing 12/27/2019  5) Patient will be able to achieve less than or equal to 50% on the FOTO, demonstrating overall improved functional ability with upper extremity. (self-care category) Progressing 12/27/2019         Long Term Goal Status:   continue per initial plan of care.  Reasons for Recertification of Therapy:   Pt continues to benefit from skilled services and has made good progress thus far with good future rehab potential. Pt remains limited with LUE strength, ROM and coordination at this time which all still impact their performance of ADLs , IADLs affecting her habits, roles and routines.        Plan      Continue per UPOC. Need for future splinting and dynasplint to prevent contracture while increasing arm swing for functional ambulation and balance.   Progress as tolerated.     Christian Teran  OTR/L

## 2019-12-27 NOTE — PROGRESS NOTES
CMS Impairment/Limitation/Restriction for FOTO CVA Survey    Therapist reviewed FOTO scores for Luis Wong on 12/24/2019.   FOTO documents entered into [employer] - see Media section.    Limitation Score: 40%  Category: Mobility    Current : CK = at least 40% but < 60% impaired, limited or restricted  Goal: CJ = at least 20% but < 40% impaired, limited or restricted

## 2019-12-30 ENCOUNTER — CLINICAL SUPPORT (OUTPATIENT)
Dept: REHABILITATION | Facility: HOSPITAL | Age: 64
End: 2019-12-30
Payer: MEDICARE

## 2019-12-30 DIAGNOSIS — Z78.9 IMPAIRED MOBILITY AND ACTIVITIES OF DAILY LIVING: ICD-10-CM

## 2019-12-30 DIAGNOSIS — Z74.09 IMPAIRED FUNCTIONAL MOBILITY, BALANCE, GAIT, AND ENDURANCE: ICD-10-CM

## 2019-12-30 DIAGNOSIS — R29.898 DECREASED STRENGTH OF LOWER EXTREMITY: ICD-10-CM

## 2019-12-30 DIAGNOSIS — R27.8 DECREASED COORDINATION: ICD-10-CM

## 2019-12-30 DIAGNOSIS — Z74.09 IMPAIRED MOBILITY AND ACTIVITIES OF DAILY LIVING: ICD-10-CM

## 2019-12-30 DIAGNOSIS — M62.81 MUSCLE WEAKNESS: ICD-10-CM

## 2019-12-30 PROCEDURE — 97110 THERAPEUTIC EXERCISES: CPT | Mod: PN

## 2019-12-30 PROCEDURE — 97140 MANUAL THERAPY 1/> REGIONS: CPT | Mod: PN

## 2019-12-30 NOTE — PROGRESS NOTES
"  Occupational Therapy Daily Treatment Note     Name: Luis Wong  Clinic Number: [id number]    Therapy Diagnosis:    No diagnosis found.  Physician: Carla Altman, HALLE Lopez MD    Visit Date: 12/30/2019  Physician Orders: Eval and tx  Medical Diagnosis: CVA L sided weakness Recrudecesnt  Onset Date: 8/28/19 most recent CVA  Evaluation Date: 9/4/2019  Plan of Care Expiration Period: 2/21/20  Insurance Authorization period Expiration: 12/31/19  Date of Return to MD: NA  Visit # / Visits Authortized: 11/50 change in insurance  FOTO: CVA UE hand / 35% limitation down from 75% with the hand survey.      Time In:10:15 am   Time Out:11:00  am   Total Billable (one on one) Time: 45 minutes      Precautions: Standard and Fall    Subjective     Pt reports: "  "  he was compliant with home exercise program given last session.   Response to previous treatment:good   Functional change: able to put glasses on with BUEs    Pain: 0/10   Location: left arms     Objective   Mt: Pt received manual therapy consisting of PROM in all planes, joint mobilization (grades I-II) with gentle oscillations at acromioclavicular and glenohumeral joint along with myofascial release and STM to surrounding musculature (biceps, pects, deltoids, traps, triceps etc.) to decrease stiffness and pain with movements. 15 min        Luis Wong  received TE to develop GM/FM coordination, balance, activity tolerance and strength in order to increase ADL/IADL independence with replicated home management/self care tasks, for 35 minutes including: measurements for UPOC.           Shoulder    PROM all planes X 10        Supine bicep curls 3#  2/15   Pect stretch with half bolster 3/30"   Supine  Isolated FF with airsplint    Short arc NDTs CW/CCW  FF/ Horizontal abduc 2#   2/15    2/10 each    Prone with airsplint   Ext  Abduction  2/10                   Home Exercises and Education Provided     Education provided:   - Previous hand out   - " Progress towards goals     Written Home Exercises Provided: Patient instructed to cont prior HEP. Updated putty exercises to molding,  and pinch Red putty provided.  Exercises were reviewed and Luis was able to demonstrate them prior to the end of the session.  Luis demonstrated good  understanding of the HEP provided.   .   See EMR under Patient Instructions for exercises provided prior visit.        Assessment     Pt has had more pain in the shoulder with likely impingement due to tone and inability to correct posture with rounded shoulders. States it was better today with no pain since last session. More focus on supine and prone exercises of the shoulder today with MT and LLPS to prevent tone. More focus on the hand and wrist next session with FM control and  strength.  Pt educated of this with verbal understanding and good demonstration. He did well with new bolster exercises and was educated of carryover at home with good understanding. More focus on posterior muscle groups of the shoulder in next few sessions to A in postural correction and strengthening secondary to complaints of pain.     Luis is progressing well towards his goals and there are no updates to goals at this time. Pt prognosis is Fair.      Pt will continue to benefit from skilled outpatient occupational therapy to address the deficits listed in the problem list on initial evaluation provide pt/family education and to maximize pt's level of independence in the home and community environment.      Anticipated barriers to occupational therapy:      Pt's spiritual, cultural and educational needs considered and pt agreeable to plan of care and goals.     Previous Short Term Goals Status:   Goals:  Short Term Goals:  1) Initiate Hep Met 10/30/2019  2) Pt to increase LUE  strength by 5 # in order to A in self care task of feeding by 4 weeks. MET 10/30/2019  3) Pt to increase Quick dash Score by 5 points increasing self care IND by  4 weeks. Progressing 12/30/2019  4) Pt to increase L UE AROM by 10 degrees in order to A in UB dressing by 4 weeks.MET 12/30/2019  5) Patient will be able to achieve less than or equal to 75% on the FOTO, demonstrating overall improved functional ability with upper extremity. (self-care category) Progressing 12/30/2019           New Short Term Goals Status:     Long Term Goals:  1) Pt to be IND with HEP in order to maintain ROM and strength needed for self care IND by d.c. Progressing 12/30/2019  2) Pt to increase L UE  strength to WNL as compared to unaffected extremity in order to open items for self feeding by grant.cLaila Progressing 12/30/2019  3) Pt to increase Quick dash Score by 10 points increasing self care IND at home by grant.cLaila Progressing 12/30/2019  4) Pt to increase L UE AROM to WFL in order to A in UB dressing by d/c. Progressing 12/30/2019  5) Patient will be able to achieve less than or equal to 50% on the FOTO, demonstrating overall improved functional ability with upper extremity. (self-care category) Progressing 12/30/2019         Long Term Goal Status:   continue per initial plan of care.  Reasons for Recertification of Therapy:   Pt continues to benefit from skilled services and has made good progress thus far with good future rehab potential. Pt remains limited with LUE strength, ROM and coordination at this time which all still impact their performance of ADLs , IADLs affecting her habits, roles and routines.        Plan      Continue per UPOC. Need for future splinting and dynasplint to prevent contracture while increasing arm swing for functional ambulation and balance.   Progress as tolerated.     Christian Teran  OTR/L

## 2019-12-30 NOTE — PROGRESS NOTES
"                            Physical Therapy Daily Treatment Note     Name: Luis Wong  Clinic Number: [id number]    Therapy Diagnosis:   Encounter Diagnoses   Name Primary?    Impaired functional mobility, balance, gait, and endurance     Decreased strength of lower extremity      Physician: Carla Altman PA-C     Visit Date: 12/30/2019    Physician Orders: PT Eval and Treat  Medical Diagnosis from Referral: I63.9 (ICD-10-CM) - CVA (cerebral vascular accident)  Evaluation Date: 9/13/2019  Authorization Period Expiration: 12/31/2019  Plan of Care Expiration: 1/10/2020  Visit # / Visits authorized: 22/50 (20 prior visits)  FOTO/G-Code: 2/10    Time In: 9:30 AM  Time Out: 10:15 AM  Total Billable Time: 45 minutes (3 TE)    Precautions: Standard; history of of CVA's    Subjective     Pt reports: He was feeling a little sick this past weekend and was unable to perform any exercises   Response to previous treatment: No adverse reactions.   Functional change: Ongoing       Objective     Luis participated in therapeutic exercises for 45 minutes including:    -- Nu-Step for reciprocal BUE and BLE motion for 10 minutes at level 7.0    - SLR:    3x10, LLE only, 1#  - Bridges:   3x10  - Supine Hip ADD  3x10, 5" holds  - Supine Hip ABD:  3x10, GTB  - Sidelying Hip ABD:  3x10, LLE only, 1#    - Sit to Stands  2x10, LLE positioned posteriorly   - Fwd Step-ups  x20 RLE leading, x20 LLE leading, 6" step  - Lat stepping over primo 30"x3, 1 primo  - Hip Thrusts   3x10, kneeling -> tall kneeling on FOAM Airex with // bars lowered    -Cybex knee extension: 25#, 2x15, B - not today      20# 2x15, LLE only - not today  -Cybex Ham curls:  1.0 pl 2x15 B - not today  -Cybex Leg Press:  7.0 plates, 3x10, B - not today      4.5 plates, 3x10, LLE only - not today         Home Exercises Provided and Patient Education Provided     Education provided:   Cont HEP      Written Home Exercises Provided: Not today  Exercises were reviewed " and Luis was able to demonstrate them prior to the end of the session.  Luis demonstrated good  understanding of the education provided.     See EMR under Patient Instructions for exercises provided 9/23/2019 and 10/28/2019.    Assessment     Luis was able to progress to performing SLR and sidelying hip ABD with 1# ankle weights, working toward increasing strength in LLE. Returned to performing sit to stands with LLE positioned slightly posterior for increased weight bearing. Displayed some LOB when performing hip thrusts with no UE. Patient required CGA when going from tall kneeling to standing.     Luis is progressing well towards his goals.   Pt prognosis is Good.   Pt will continue to benefit from skilled outpatient physical therapy to address the deficits listed in the problem list box on initial evaluation, provide pt/family education and to maximize pt's level of independence in the home and community environment.     Pt's spiritual, cultural and educational needs considered and pt agreeable to plan of care and goals.  Anticipated barriers to physical therapy: Co-morbidities    Long Term Goals (8 Weeks):   3. Pt will be able to perform TUG in less than or equal to 25 secs without use of AD demonstrating overall improved functional mobility. MET 12/16/2019; 21 seconds  4. Pt will performed sit to stand x 5 without UE support in 10 seconds without LOB backward or posterior LE hooking for functional and safe transfers. MET  5. Pt will score greater than or equal to 45/56 on the Choi Balance Assessment with use of AD demonstrating overall improved functional mobility and balance and reduce fall risk. MET 12/16/2019  6. Pt will walk > than or equal to 210 ft on the 2 minute walk test indoor with AD with 0 LOB and minimal gait deviations for improved safety in home ambulation and safety.  PROGRESSING, NOT MET 12/30/2019; 195 feet on 12/16/19  7. Pt will be able to safely perform and tolerate high level  ADL's without LOB.  PROGRESSING, NOT MET 12/30/2019  8. Pt will have 0 falls from start of PT sessions.  PROGRESSING, NOT MET 12/30/2019; fall reported 11/6/19  9. Independent with updated HEP. MET  10. Pt will have MMT score of 3+/5 in all major ms groups in Left LE.  PROGRESSING, NOT MET 12/30/2019  11. Pt will ambulate on TM x 6 minutes with use of UE support with 0 LOB at greater than or equal to 1.3 mph. PROGRESSING, NOT MET 12/30/2019  12. Pt will begin some form of community fitness to begin regular and consistent performance of exercise to continue maintenance of gains made in PT.  PROGRESSING, NOT MET 12/30/2019     Plan     Resume LE stretching and NMR    Advance PT as per POC.     Kandy Gatica, PT

## 2020-01-03 ENCOUNTER — CLINICAL SUPPORT (OUTPATIENT)
Dept: REHABILITATION | Facility: HOSPITAL | Age: 65
End: 2020-01-03
Payer: MEDICARE

## 2020-01-03 DIAGNOSIS — Z78.9 IMPAIRED MOBILITY AND ACTIVITIES OF DAILY LIVING: ICD-10-CM

## 2020-01-03 DIAGNOSIS — R27.8 DECREASED COORDINATION: ICD-10-CM

## 2020-01-03 DIAGNOSIS — M62.81 MUSCLE WEAKNESS: ICD-10-CM

## 2020-01-03 DIAGNOSIS — Z74.09 IMPAIRED FUNCTIONAL MOBILITY, BALANCE, GAIT, AND ENDURANCE: ICD-10-CM

## 2020-01-03 DIAGNOSIS — Z74.09 IMPAIRED MOBILITY AND ACTIVITIES OF DAILY LIVING: ICD-10-CM

## 2020-01-03 DIAGNOSIS — R29.898 DECREASED STRENGTH OF LOWER EXTREMITY: ICD-10-CM

## 2020-01-03 PROCEDURE — 97140 MANUAL THERAPY 1/> REGIONS: CPT | Mod: PN

## 2020-01-03 PROCEDURE — 97110 THERAPEUTIC EXERCISES: CPT | Mod: PN

## 2020-01-03 NOTE — PROGRESS NOTES
"                            Physical Therapy Daily Treatment Note     Name: Luis Wong  Clinic Number: [id number]    Therapy Diagnosis:   Encounter Diagnoses   Name Primary?    Impaired functional mobility, balance, gait, and endurance     Decreased strength of lower extremity      Physician: Carla Altman PA-C     Visit Date: 1/3/2020    Physician Orders: PT Eval and Treat  Medical Diagnosis from Referral: I63.9 (ICD-10-CM) - CVA (cerebral vascular accident)  Evaluation Date: 9/13/2019  Authorization Period Expiration: 12/31/2019  Plan of Care Expiration: 1/10/2020  Visit # / Visits authorized: 23/50 (20 prior visits)  FOTO/G-Code: 3/10    Time In: 9:23 AM  Time Out: 10:15 AM  Total Billable Time: 52 minutes (2 TE, 1 MT)    Precautions: Standard; history of of CVA's    Subjective     Pt reports: that he's feeling pretty good today. No complaints.   Response to previous treatment: No adverse reactions.   Functional change: Ongoing       Objective     Luis received the following manual therapy techniques: Joint mobilizations and manual stretching were applied to the: Left Lower Extremity for 10 minutes, including:  Manual stretching of hamstrings in supine: 30"x3, LLE only  PROM to L Hip: Flexion/Extension, IR/ER  Left ankle/foot mobilizations:    - Grade III anterior and posterior mobilizations of TC joint   - Splaying of metatarsals   - Anterior/posterior grade III mobilizations of metatarsals       Luis participated in therapeutic exercises for 42 minutes including:    -- Nu-Step for reciprocal BUE and BLE motion for 10 minutes at level 7.0    - SLR:    3x10, LLE only, 1.5#  - Bridges:   3x10, 3" holds  - Supine Hip ADD  3x10, 5" holds  - Supine Hip ABD:  3x10, BTB  - Sidelying Hip ABD:  3x10, LLE only, 1# - not today    - Sit to Stands   2x10, LLE positioned posteriorly   - Fwd Step-ups  x20 RLE leading, x20 LLE leading, 6" step  - Lat stepping over primo 30"x3, 1 primo  - Hip Thrusts   3x10, " "kneeling -> tall kneeling on FOAM Airex with // bars lowered - OOT    -Cybex knee extension: 25#, 2x15, B - not today      20# 2x15, LLE only - not today  -Cybex Ham curls:  1.0 pl 2x15 B - not today  -Cybex Leg Press:  7.0 plates, 3x10, B - not today      4.5 plates, 3x10, LLE only - not today      Luis participated in neuromuscular re-education activities to improve: Balance and Coordination for 10 minutes. The following activities were included:  SLS on FOAM: 10"x3, B  Mini Squats on FOAM: 2x10         Home Exercises Provided and Patient Education Provided     Education provided:   Cont HEP      Written Home Exercises Provided: Not today  Exercises were reviewed and Luis was able to demonstrate them prior to the end of the session.  Luis demonstrated good  understanding of the education provided.     See EMR under Patient Instructions for exercises provided 9/23/2019 and 10/28/2019.    Assessment     Tolerated the resumption of manual stretching for LLE. Increased resistance for supine hip ABD from GTB to BlueTB as well as SLR's from 1# ankle weight to 1.5# ankle weight without adverse reactions. Required verbal and tactile cueing when performing lateral stepping over 1 primo as patient tends to lean trunk towards the right and hikes left hip to compensate lifting left leg over primo; able to self-correct after multiple cueing.      Luis is progressing well towards his goals.   Pt prognosis is Good.   Pt will continue to benefit from skilled outpatient physical therapy to address the deficits listed in the problem list box on initial evaluation, provide pt/family education and to maximize pt's level of independence in the home and community environment.     Pt's spiritual, cultural and educational needs considered and pt agreeable to plan of care and goals.  Anticipated barriers to physical therapy: Co-morbidities    Long Term Goals (8 Weeks):   3. Pt will be able to perform TUG in less than or equal to " 25 secs without use of AD demonstrating overall improved functional mobility. MET 12/16/2019; 21 seconds  4. Pt will performed sit to stand x 5 without UE support in 10 seconds without LOB backward or posterior LE hooking for functional and safe transfers. MET  5. Pt will score greater than or equal to 45/56 on the Choi Balance Assessment with use of AD demonstrating overall improved functional mobility and balance and reduce fall risk. MET 12/16/2019  6. Pt will walk > than or equal to 210 ft on the 2 minute walk test indoor with AD with 0 LOB and minimal gait deviations for improved safety in home ambulation and safety.  PROGRESSING, NOT MET 1/3/2020; 195 feet on 12/16/19  7. Pt will be able to safely perform and tolerate high level ADL's without LOB.  PROGRESSING, NOT MET 1/3/2020  8. Pt will have 0 falls from start of PT sessions.  PROGRESSING, NOT MET 1/3/2020; fall reported 11/6/19  9. Independent with updated HEP. MET  10. Pt will have MMT score of 3+/5 in all major ms groups in Left LE.  PROGRESSING, NOT MET 1/3/2020  11. Pt will ambulate on TM x 6 minutes with use of UE support with 0 LOB at greater than or equal to 1.3 mph. PROGRESSING, NOT MET 1/3/2020  12. Pt will begin some form of community fitness to begin regular and consistent performance of exercise to continue maintenance of gains made in PT.  PROGRESSING, NOT MET 1/3/2020     Plan     Resume LE stretching and NMR    Advance PT as per POC.     Kandy Gatica, PT

## 2020-01-03 NOTE — PROGRESS NOTES
"  Occupational Therapy Daily Treatment Note     Name: Luis Wong  Clinic Number: [id number]    Therapy Diagnosis:    No diagnosis found.  Physician: Carla Altman, HALLE Lopez MD    Visit Date: 1/3/2020  Physician Orders: Eval and tx  Medical Diagnosis: CVA L sided weakness Recrudecesnt  Onset Date: 8/28/19 most recent CVA  Evaluation Date: 9/4/2019  Plan of Care Expiration Period: 2/21/20  Insurance Authorization period Expiration: 12/31/19  Date of Return to MD: NA  Visit # / Visits Authortized: 12/50 change in insurance  FOTO: CVA UE hand / 35% limitation down from 75% with the hand survey.      Time In:10:15 am   Time Out:11:00  am   Total Billable (one on one) Time: 45 minutes      Precautions: Standard and Fall    Subjective     Pt reports: "The shoulder real stiff today . "  he was compliant with home exercise program given last session.   Response to previous treatment:good   Functional change: able to put glasses on with BUEs    Pain: 0/10   Location: left arms     Objective   Mt: Pt received manual therapy consisting of PROM in all planes, joint mobilization (grades I-II) with gentle oscillations at acromioclavicular and glenohumeral joint along with myofascial release and STM to surrounding musculature (biceps, pects, deltoids, traps, triceps etc.) to decrease stiffness and pain with movements. 15 min        Luis Wong  received TE to develop GM/FM coordination, balance, activity tolerance and strength in order to increase ADL/IADL independence with replicated home management/self care tasks, for 30 minutes including: measurements for UPOC.           Shoulder    PROM all planes X 10            Pect stretch with half bolster 3/30"   Supine  AAROM FF  Chest press    Short arc NDTs CW/CCW  FF/ Horizontal abduc 0# due to pain today    2/15    2/10 each        Hand    Green t putty   Mold  Roll    Pinch  pvc punch out X 30            Home Exercises and Education Provided     Education " provided:   - Previous hand out   - Progress towards goals     Written Home Exercises Provided: Patient instructed to cont prior HEP. Updated putty exercises to molding,  and pinch Red putty provided.  Exercises were reviewed and Luis was able to demonstrate them prior to the end of the session.  Luis demonstrated good  understanding of the HEP provided.   .   See EMR under Patient Instructions for exercises provided prior visit.        Assessment     Pt has had more pain in the shoulder today and was unable to tolerate UBE due to stiffness and pain. Main focus on the shoulder with PROM and MT for pain relief and in order to prevent rounded posture. Less resistance today with exercises due to pain in order to keep exercises pain free. He did well with these. End of session focusing on hand strengthening and functional exercises with good tolerance.    Luis is progressing well towards his goals and there are no updates to goals at this time. Pt prognosis is Fair.      Pt will continue to benefit from skilled outpatient occupational therapy to address the deficits listed in the problem list on initial evaluation provide pt/family education and to maximize pt's level of independence in the home and community environment.      Anticipated barriers to occupational therapy:      Pt's spiritual, cultural and educational needs considered and pt agreeable to plan of care and goals.     Previous Short Term Goals Status:   Goals:  Short Term Goals:  1) Initiate Hep Met 10/30/2019  2) Pt to increase LUE  strength by 5 # in order to A in self care task of feeding by 4 weeks. MET 10/30/2019  3) Pt to increase Quick dash Score by 5 points increasing self care IND by 4 weeks. Progressing 1/3/2020  4) Pt to increase L UE AROM by 10 degrees in order to A in UB dressing by 4 weeks.MET 1/3/2020  5) Patient will be able to achieve less than or equal to 75% on the FOTO, demonstrating overall improved functional ability with  upper extremity. (self-care category) Progressing 1/3/2020           New Short Term Goals Status:     Long Term Goals:  1) Pt to be IND with HEP in order to maintain ROM and strength needed for self care IND by barrera Progressing 1/3/2020  2) Pt to increase L UE  strength to WNL as compared to unaffected extremity in order to open items for self feeding by barrera Progressing 1/3/2020  3) Pt to increase Quick dash Score by 10 points increasing self care IND at home by barrera Progressing 1/3/2020  4) Pt to increase L UE AROM to WFL in order to A in UB dressing by d/cLaila Progressing 1/3/2020  5) Patient will be able to achieve less than or equal to 50% on the FOTO, demonstrating overall improved functional ability with upper extremity. (self-care category) Progressing 1/3/2020         Long Term Goal Status:   continue per initial plan of care.  Reasons for Recertification of Therapy:   Pt continues to benefit from skilled services and has made good progress thus far with good future rehab potential. Pt remains limited with LUE strength, ROM and coordination at this time which all still impact their performance of ADLs , IADLs affecting her habits, roles and routines.        Plan      Continue per UPOC. Need for future splinting and dynasplint to prevent contracture while increasing arm swing for functional ambulation and balance.   Progress as tolerated.     Christian Teran  OTR/L

## 2020-01-06 ENCOUNTER — CLINICAL SUPPORT (OUTPATIENT)
Dept: REHABILITATION | Facility: HOSPITAL | Age: 65
End: 2020-01-06
Payer: MEDICARE

## 2020-01-06 ENCOUNTER — PATIENT OUTREACH (OUTPATIENT)
Dept: ADMINISTRATIVE | Facility: OTHER | Age: 65
End: 2020-01-06

## 2020-01-06 ENCOUNTER — OFFICE VISIT (OUTPATIENT)
Dept: ORTHOPEDICS | Facility: CLINIC | Age: 65
End: 2020-01-06
Payer: MEDICARE

## 2020-01-06 DIAGNOSIS — G56.01 CARPAL TUNNEL SYNDROME OF RIGHT WRIST: Primary | ICD-10-CM

## 2020-01-06 DIAGNOSIS — R27.8 DECREASED COORDINATION: ICD-10-CM

## 2020-01-06 DIAGNOSIS — Z78.9 IMPAIRED MOBILITY AND ACTIVITIES OF DAILY LIVING: ICD-10-CM

## 2020-01-06 DIAGNOSIS — R29.898 DECREASED STRENGTH OF LOWER EXTREMITY: ICD-10-CM

## 2020-01-06 DIAGNOSIS — Z74.09 IMPAIRED MOBILITY AND ACTIVITIES OF DAILY LIVING: ICD-10-CM

## 2020-01-06 DIAGNOSIS — Z74.09 IMPAIRED FUNCTIONAL MOBILITY, BALANCE, GAIT, AND ENDURANCE: ICD-10-CM

## 2020-01-06 DIAGNOSIS — M62.81 MUSCLE WEAKNESS: ICD-10-CM

## 2020-01-06 PROCEDURE — 99214 PR OFFICE/OUTPT VISIT, EST, LEVL IV, 30-39 MIN: ICD-10-PCS | Mod: 25,S$GLB,, | Performed by: ORTHOPAEDIC SURGERY

## 2020-01-06 PROCEDURE — 1101F PT FALLS ASSESS-DOCD LE1/YR: CPT | Mod: CPTII,S$GLB,, | Performed by: ORTHOPAEDIC SURGERY

## 2020-01-06 PROCEDURE — 1101F PR PT FALLS ASSESS DOC 0-1 FALLS W/OUT INJ PAST YR: ICD-10-PCS | Mod: CPTII,S$GLB,, | Performed by: ORTHOPAEDIC SURGERY

## 2020-01-06 PROCEDURE — 99999 PR PBB SHADOW E&M-EST. PATIENT-LVL III: ICD-10-PCS | Mod: PBBFAC,,, | Performed by: ORTHOPAEDIC SURGERY

## 2020-01-06 PROCEDURE — 97110 THERAPEUTIC EXERCISES: CPT | Mod: PN

## 2020-01-06 PROCEDURE — 97140 MANUAL THERAPY 1/> REGIONS: CPT | Mod: PN

## 2020-01-06 PROCEDURE — 20526 THER INJECTION CARP TUNNEL: CPT | Mod: 50,S$GLB,, | Performed by: ORTHOPAEDIC SURGERY

## 2020-01-06 PROCEDURE — 97112 NEUROMUSCULAR REEDUCATION: CPT | Mod: PN

## 2020-01-06 PROCEDURE — 99999 PR PBB SHADOW E&M-EST. PATIENT-LVL III: CPT | Mod: PBBFAC,,, | Performed by: ORTHOPAEDIC SURGERY

## 2020-01-06 PROCEDURE — 20526 PR INJECT CARPAL TUNNEL: ICD-10-PCS | Mod: 50,S$GLB,, | Performed by: ORTHOPAEDIC SURGERY

## 2020-01-06 PROCEDURE — 99214 OFFICE O/P EST MOD 30 MIN: CPT | Mod: 25,S$GLB,, | Performed by: ORTHOPAEDIC SURGERY

## 2020-01-06 RX ORDER — TIZANIDINE 4 MG/1
4 TABLET ORAL DAILY PRN
COMMUNITY
End: 2020-07-20

## 2020-01-06 RX ORDER — GABAPENTIN 100 MG/1
100 CAPSULE ORAL DAILY
COMMUNITY
End: 2020-05-13

## 2020-01-06 RX ORDER — TRIAMCINOLONE ACETONIDE 40 MG/ML
40 INJECTION, SUSPENSION INTRA-ARTICULAR; INTRAMUSCULAR
Status: COMPLETED | OUTPATIENT
Start: 2020-01-06 | End: 2020-01-06

## 2020-01-06 RX ADMIN — TRIAMCINOLONE ACETONIDE 40 MG: 40 INJECTION, SUSPENSION INTRA-ARTICULAR; INTRAMUSCULAR at 01:01

## 2020-01-06 NOTE — PROGRESS NOTES
"  Occupational Therapy Daily Treatment Note     Name: Luis Wong  Clinic Number: [id number]    Therapy Diagnosis:    Encounter Diagnoses   Name Primary?    Impaired mobility and activities of daily living     Muscle weakness     Decreased coordination      Physician: Carla Altman, HALLE Lopez MD    Visit Date: 1/6/2020  Physician Orders: Eval and tx  Medical Diagnosis: CVA L sided weakness Recrudecesnt  Onset Date: 8/28/19 most recent CVA  Evaluation Date: 9/4/2019  Plan of Care Expiration Period: 2/21/20  Insurance Authorization period Expiration: 12/31/19  Date of Return to MD: NA  Visit # / Visits Authortized: 12/50 change in insurance  FOTO: CVA UE hand / 35% limitation down from 75% with the hand survey.      Time In:10:15 am   Time Out:11:00  am   Total Billable (one on one) Time: 45 minutes      Precautions: Standard and Fall    Subjective     Pt reports: "The shoulder real stiff today I just dont know why it is and it hurts over here "  he was compliant with home exercise program given last session.   Response to previous treatment:good   Functional change: able to put glasses on with BUEs    Pain: 0/10   Location: left arms     Objective   Mt: Pt received manual therapy consisting of PROM in all planes, joint mobilization (grades I-II) with gentle oscillations at acromioclavicular and glenohumeral joint along with myofascial release and STM to surrounding musculature (biceps, pects, deltoids, traps, triceps etc.) to decrease stiffness and pain with movements. 10 min      Luis Wong  received TE to develop GM/FM coordination, balance, activity tolerance and strength in order to increase ADL/IADL independence with replicated home management/self care tasks, for 20 minutes including: measurements for UPOC.           Shoulder    PROM all planes X 10                Supine  AAROM FF  Chest press    Short arc NDTs CW/CCW  FF/ Horizontal abduc  Chest press/FF Lonly 0# due to pain today  "   2/15    2/10 each       2/10       Hand              Luis participated in neuromuscular re-education activities to improve: Coordination, Sense and Proprioception for 10 minutes. The following activities were included:  Grasp and release with cones 2 bouts x 10 cones stacking on table top   Functional reach in frontal plane Joint blocking at elbow to prevent compensatory movements  2/10           Home Exercises and Education Provided     Education provided:   - Previous hand out   - Progress towards goals     Written Home Exercises Provided: Patient instructed to cont prior HEP. Updated putty exercises to molding,  and pinch Red putty provided.  Exercises were reviewed and Luis was able to demonstrate them prior to the end of the session.  Luis demonstrated good  understanding of the HEP provided.   .   See EMR under Patient Instructions for exercises provided prior visit.        Assessment   Pt tolerated session well. He still has pain in the shoulder in the same area. Still limited by tone in the biceps and shoulder impacting functional use. He is doing better with forward flexion with grasp and release but is still impaired with movements. More focus on isolated LUE strengthening, ROM and functional reach next session. Attempt overhead and grasp and release exercises to tolerance in future sessions. States going to see ortho MD with education to ask MD about shoulder pain and for his unaffected wrist pain.     Luis is progressing well towards his goals and there are no updates to goals at this time. Pt prognosis is Fair.      Pt will continue to benefit from skilled outpatient occupational therapy to address the deficits listed in the problem list on initial evaluation provide pt/family education and to maximize pt's level of independence in the home and community environment.      Anticipated barriers to occupational therapy:      Pt's spiritual, cultural and educational needs considered and pt  agreeable to plan of care and goals.     Previous Short Term Goals Status:   Goals:  Short Term Goals:  1) Initiate Hep Met 10/30/2019  2) Pt to increase LUE  strength by 5 # in order to A in self care task of feeding by 4 weeks. MET 10/30/2019  3) Pt to increase Quick dash Score by 5 points increasing self care IND by 4 weeks. Progressing 1/6/2020  4) Pt to increase L UE AROM by 10 degrees in order to A in UB dressing by 4 weeks.MET 1/6/2020  5) Patient will be able to achieve less than or equal to 75% on the FOTO, demonstrating overall improved functional ability with upper extremity. (self-care category) Progressing 1/6/2020           New Short Term Goals Status:     Long Term Goals:  1) Pt to be IND with HEP in order to maintain ROM and strength needed for self care IND by d.c. Progressing 1/6/2020  2) Pt to increase L UE  strength to WNL as compared to unaffected extremity in order to open items for self feeding by d.c. Progressing 1/6/2020  3) Pt to increase Quick dash Score by 10 points increasing self care IND at home by d.c. Progressing 1/6/2020  4) Pt to increase L UE AROM to WFL in order to A in UB dressing by d/c. Progressing 1/6/2020  5) Patient will be able to achieve less than or equal to 50% on the FOTO, demonstrating overall improved functional ability with upper extremity. (self-care category) Progressing 1/6/2020      Long Term Goal Status:   continue per initial plan of care.  Reasons for Recertification of Therapy:   Pt continues to benefit from skilled services and has made good progress thus far with good future rehab potential. Pt remains limited with LUE strength, ROM and coordination at this time which all still impact their performance of ADLs , IADLs affecting her habits, roles and routines.        Plan      Continue per UPOC. Need for future splinting and dynasplint to prevent contracture while increasing arm swing for functional ambulation and balance.   Progress as tolerated.      Christian Teran  OTR/L

## 2020-01-06 NOTE — PROGRESS NOTES
CC:  Bilateral carpal tunnel syndrome        HPI:  Luis Wong is a very pleasant 64 y.o. male with ongoing symptoms both hands related carpal tunnel syndrome also hemiparesis left arm from her previous stroke  He would like to go ahead and set up surgery for the right hand for right carpal tunnel release but have an injection today in both hands to get him through until surgery         PAST MEDICAL HISTORY:   Past Medical History:   Diagnosis Date    Aneurysm 10/30/2012    Diabetes mellitus     Fever blister     Herpes infection     Hypertension     ICH (intracerebral hemorrhage)     Mixed hyperlipidemia 9/5/2013    Nontraumatic thalamic hemorrhage 8/31/2016    JAQUAN (obstructive sleep apnea)     Right-sided lacunar stroke 9/11/2014    SDH (subdural hematoma)     Special screening for malignant neoplasms, colon     Stroke      PAST SURGICAL HISTORY:   Past Surgical History:   Procedure Laterality Date    INSERTION OF IMPLANTABLE LOOP RECORDER N/A 12/12/2019    Procedure: Insertion, Implantable Loop Recorder;  Surgeon: Efren Sanford MD;  [facility] CATH LAB[facility]EP;  Service: Cardiology;  Laterality: N/A;  Crytogenic CVA, LOOP,  MDT, Local,  DM    Knee arthroscopic surgery       FAMILY HISTORY:   Family History   Problem Relation Age of Onset    Heart disease Mother     Diabetes Father     Cancer Sister         breast    Diabetes Paternal Grandmother     Diabetes Paternal Grandfather     Melanoma Neg Hx      SOCIAL HISTORY:   Social History     Socioeconomic History    Marital status:      Spouse name: Not on file    Number of children: Not on file    Years of education: Not on file    Highest education level: Not on file   Occupational History    Not on file   Social Needs    Financial resource strain: Not on file    Food insecurity:     Worry: Not on file     Inability: Not on file    Transportation needs:     Medical: No     Non-medical: No   Tobacco Use    Smoking  status: Never Smoker    Smokeless tobacco: Never Used   Substance and Sexual Activity    Alcohol use: No     Frequency: Never     Drinks per session: Patient refused     Binge frequency: Never    Drug use: No    Sexual activity: Not on file   Lifestyle    Physical activity:     Days per week: 4 days     Minutes per session: 40 min    Stress: Not at all   Relationships    Social connections:     Talks on phone: Patient refused     Gets together: Patient refused     Attends Orthodoxy service: Not on file     Active member of club or organization: Patient refused     Attends meetings of clubs or organizations: Patient refused     Relationship status:    Other Topics Concern    Not on file   Social History Narrative    Not on file       MEDICATIONS:   Current Outpatient Medications:     aspirin (ECOTRIN) 81 MG EC tablet, TAKE 1 TABLET BY MOUTH EVERY DAY, Disp: 90 tablet, Rfl: 3    atorvastatin (LIPITOR) 40 MG tablet, Take 1 tablet (40 mg total) by mouth once daily., Disp: 90 tablet, Rfl: 3    baclofen (LIORESAL) 10 MG tablet, Take 0.5 tablets (5 mg total) by mouth 3 (three) times daily., Disp: 135 tablet, Rfl: 3    clopidogrel (PLAVIX) 75 mg tablet, Take 1 tablet (75 mg total) by mouth once daily., Disp: 90 tablet, Rfl: 3    gabapentin (NEURONTIN) 100 MG capsule, Take 100 mg by mouth once daily., Disp: , Rfl:     gabapentin (NEURONTIN) 300 MG capsule, , Disp: , Rfl:     hydroCHLOROthiazide (MICROZIDE) 12.5 mg capsule, TAKE 1 CAPSULE BY MOUTH EVERY DAY, Disp: 90 capsule, Rfl: 3    metFORMIN (GLUCOPHAGE) 500 MG tablet, Take 1 tablet (500 mg total) by mouth daily with breakfast., Disp: 90 tablet, Rfl: 3    NIFEdipine (PROCARDIA-XL) 30 MG (OSM) 24 hr tablet, TAKE 1 TABLET BY MOUTH EVERY DAY, Disp: 90 tablet, Rfl: 3    potassium chloride (MICRO-K) 8 mEq CpSR, TAKE 1 CAPSULE (8 MEQ TOTAL) BY MOUTH ONCE DAILY., Disp: 90 capsule, Rfl: 3    tiZANidine (ZANAFLEX) 4 MG tablet, Take 4 mg by mouth daily as  needed., Disp: , Rfl:   ALLERGIES: Review of patient's allergies indicates:  No Known Allergies    Review of Systems:  Constitutional: no fever or chills  ENT: no nasal congestion or sore throat  Respiratory: no cough or shortness of breath  Cardiovascular: no chest pain or palpitations  Gastrointestinal: no nausea or vomiting, PUD, GERD, NSAID intolerance  Genitourinary: no hematuria or dysuria  Integument/Breast: no rash or pruritis  Hematologic/Lymphatic: no easy bruising or lymphadenopathy  Musculoskeletal: see HPI  Neurological: no seizures or tremors  Behavioral/Psych: no auditory or visual hallucinations      Physical Exam   Vitals:    01/06/20 1319   PainSc:   7       Constitutional: Oriented to person, place, and time. Appears well-developed and well-nourished.   HENT:   Head: Normocephalic and atraumatic.   Nose: Nose normal.   Eyes: No scleral icterus.   Neck: Normal range of motion.   Cardiovascular: Normal rate and regular rhythm.    Pulses:       Radial pulses are 2+ on the right side, and 2+ on the left side.   Pulmonary/Chest: Effort normal and breath sounds normal.   Abdominal: Soft.   Neurological: Alert and oriented to person, place, and time.   Skin: Skin is warm.   Psychiatric: Normal mood and affect.     MUSCULOSKELETAL UPPER EXTREMITY:  Examination hands demonstrates positive Tinel sign over the median nerve the wrist bilaterally  He does have flexion contracture left hand and arm related to the stroke            Diagnostic Studies:  Nerve conduction study previous showed bilateral carpal tunnel syndrome        Assessment:  Bilateral carpal tunnel syndrome right worse than left  Release as an outpatient  The risks and benefits of surgery explained  Plan:  We will plan surgery for right carpal tunnel  Also today injection performed each wrist as follows  After pause for time-out identified each wrist injected bilaterally with combination Kenalog 20 mg 0.5 cc xylocaine sterile  "technique  Tolerated the procedure well without complication  Follow-up of the above surgery    The risks and benefits of surgery were discussed with the patient today and they understand.  The consent was signed in the office for surgery.      Barry Dutton MD (Jay)  [facility]  Orthopedic Upper Extremity Surgery    "

## 2020-01-08 ENCOUNTER — CLINICAL SUPPORT (OUTPATIENT)
Dept: REHABILITATION | Facility: HOSPITAL | Age: 65
End: 2020-01-08
Payer: MEDICARE

## 2020-01-08 DIAGNOSIS — Z74.09 IMPAIRED FUNCTIONAL MOBILITY, BALANCE, GAIT, AND ENDURANCE: ICD-10-CM

## 2020-01-08 DIAGNOSIS — Z78.9 IMPAIRED MOBILITY AND ACTIVITIES OF DAILY LIVING: ICD-10-CM

## 2020-01-08 DIAGNOSIS — R27.8 DECREASED COORDINATION: ICD-10-CM

## 2020-01-08 DIAGNOSIS — Z74.09 IMPAIRED MOBILITY AND ACTIVITIES OF DAILY LIVING: ICD-10-CM

## 2020-01-08 DIAGNOSIS — R29.898 DECREASED STRENGTH OF LOWER EXTREMITY: ICD-10-CM

## 2020-01-08 DIAGNOSIS — M62.81 MUSCLE WEAKNESS: ICD-10-CM

## 2020-01-08 PROCEDURE — 97110 THERAPEUTIC EXERCISES: CPT | Mod: PN

## 2020-01-08 PROCEDURE — 97110 THERAPEUTIC EXERCISES: CPT | Mod: PN,CQ

## 2020-01-08 PROCEDURE — 97112 NEUROMUSCULAR REEDUCATION: CPT | Mod: PN

## 2020-01-08 PROCEDURE — 97140 MANUAL THERAPY 1/> REGIONS: CPT | Mod: PN

## 2020-01-08 NOTE — PROGRESS NOTES
"  Occupational Therapy Daily Treatment Note     Name: Luis Wong  Clinic Number: [id number]    Therapy Diagnosis:    Encounter Diagnoses   Name Primary?    Impaired mobility and activities of daily living     Muscle weakness     Decreased coordination      Physician: Carla Altman, HALLE Lopez MD    Visit Date: 1/8/2020  Physician Orders: Eval and tx  Medical Diagnosis: CVA L sided weakness Recrudecesnt  Onset Date: 8/28/19 most recent CVA  Evaluation Date: 9/4/2019  Plan of Care Expiration Period: 2/21/20  Insurance Authorization period Expiration: 12/31/19  Date of Return to MD: NA  Visit # / Visits Authortized: 13/50 change in insurance  FOTO: CVA UE hand / 35% limitation down from 75% with the hand survey.      Time In:9:30 am   Time Out:10:15 am   Total Billable (one on one) Time: 45 minutes      Precautions: Standard and Fall    Subjective     Pt reports: "its ok so far but still stiff "  he was compliant with home exercise program given last session.   Response to previous treatment:good   Functional change: able to put glasses on with BUEs    Pain: 0/10   Location: left arms     Objective   Mt: Pt received manual therapy consisting of PROM in all planes, joint mobilization (grades I-II) with gentle oscillations at acromioclavicular and glenohumeral joint along with myofascial release and STM to surrounding musculature (biceps, pects, deltoids, traps, triceps etc.) to decrease stiffness and pain with movements. 15 min      Luis Wong  received TE to develop GM/FM coordination, balance, activity tolerance and strength in order to increase ADL/IADL independence with replicated home management/self care tasks, for 15 minutes including: measurements for UPOC.           Shoulder    PROM all planes X 10    LLPS with bolster between scap 15 min during MT   Supine place and holds   3/30"       Hand    Green t putty  Mold  Roll     Pinch  PVC punch out  X 30          Luis participated in " neuromuscular re-education activities to improve: Coordination, Sense and Proprioception for 15 minutes. The following activities were included:  Sidelying ext using airsplint 2/10   Sidelying abduction with OT scap mob 2/10 with airsplint    Grasp and release with cones using airsplint  Without airsplint 2 bouts x 10 cones stacking on table top    X 10                    Home Exercises and Education Provided     Education provided:   - Previous hand out   - Progress towards goals     Written Home Exercises Provided: Patient instructed to cont prior HEP. Updated putty exercises to molding,  and pinch Red putty provided.  Exercises were reviewed and Luis was able to demonstrate them prior to the end of the session.  Luis demonstrated good  understanding of the HEP provided.   .   See EMR under Patient Instructions for exercises provided prior visit.        Assessment   Pt tolerated session well. He still has pain in the shoulder in the same area likely due rounded shoulder and posture secondary to tone. Re educated of importance of posture today with good understanding. He is doing well with services. More focus on LLPS and PROM with MT today in order to prevent impingement symptoms and pain with good response. He did well with functional reaching task with and wtihout airsplint although still limited by tone and flexion at the elbow. Educated of continue need for dynasplint use with pt stating he hasnt been wearing it due to pain in the shoulder. Attempt overhead and grasp and release exercises to tolerance in future sessions. States going to see ortho MD with education to ask MD about shoulder pain and for his unaffected wrist pain.     Luis is progressing well towards his goals and there are no updates to goals at this time. Pt prognosis is Fair.      Pt will continue to benefit from skilled outpatient occupational therapy to address the deficits listed in the problem list on initial evaluation provide  pt/family education and to maximize pt's level of independence in the home and community environment.      Anticipated barriers to occupational therapy:      Pt's spiritual, cultural and educational needs considered and pt agreeable to plan of care and goals.     Previous Short Term Goals Status:   Goals:  Short Term Goals:  1) Initiate Hep Met 10/30/2019  2) Pt to increase LUE  strength by 5 # in order to A in self care task of feeding by 4 weeks. MET 10/30/2019  3) Pt to increase Quick dash Score by 5 points increasing self care IND by 4 weeks. Progressing 1/8/2020  4) Pt to increase L UE AROM by 10 degrees in order to A in UB dressing by 4 weeks.MET 1/8/2020  5) Patient will be able to achieve less than or equal to 75% on the FOTO, demonstrating overall improved functional ability with upper extremity. (self-care category) Progressing 1/8/2020           New Short Term Goals Status:     Long Term Goals:  1) Pt to be IND with HEP in order to maintain ROM and strength needed for self care IND by d.c. Progressing 1/8/2020  2) Pt to increase L UE  strength to WNL as compared to unaffected extremity in order to open items for self feeding by d.c. Progressing 1/8/2020  3) Pt to increase Quick dash Score by 10 points increasing self care IND at home by d.c. Progressing 1/8/2020  4) Pt to increase L UE AROM to WFL in order to A in UB dressing by d/c. Progressing 1/8/2020  5) Patient will be able to achieve less than or equal to 50% on the FOTO, demonstrating overall improved functional ability with upper extremity. (self-care category) Progressing 1/8/2020      Long Term Goal Status:   continue per initial plan of care.  Reasons for Recertification of Therapy:   Pt continues to benefit from skilled services and has made good progress thus far with good future rehab potential. Pt remains limited with LUE strength, ROM and coordination at this time which all still impact their performance of ADLs , IADLs affecting  her habits, roles and routines.        Plan      Continue per UPOC. Need for future splinting and dynasplint to prevent contracture while increasing arm swing for functional ambulation and balance.   Progress as tolerated.     Christian Teran  OTR/L

## 2020-01-11 ENCOUNTER — CLINICAL SUPPORT (OUTPATIENT)
Dept: CARDIOLOGY | Facility: HOSPITAL | Age: 65
End: 2020-01-11
Payer: MEDICARE

## 2020-01-11 DIAGNOSIS — Z95.818 PRESENCE OF OTHER CARDIAC IMPLANTS AND GRAFTS: ICD-10-CM

## 2020-01-11 PROCEDURE — G2066 INTER DEVC REMOTE 30D: HCPCS | Performed by: INTERNAL MEDICINE

## 2020-01-13 ENCOUNTER — CLINICAL SUPPORT (OUTPATIENT)
Dept: REHABILITATION | Facility: HOSPITAL | Age: 65
End: 2020-01-13
Payer: MEDICARE

## 2020-01-13 ENCOUNTER — PATIENT OUTREACH (OUTPATIENT)
Dept: ADMINISTRATIVE | Facility: OTHER | Age: 65
End: 2020-01-13

## 2020-01-13 DIAGNOSIS — R29.898 DECREASED STRENGTH OF LOWER EXTREMITY: ICD-10-CM

## 2020-01-13 DIAGNOSIS — Z78.9 IMPAIRED MOBILITY AND ACTIVITIES OF DAILY LIVING: ICD-10-CM

## 2020-01-13 DIAGNOSIS — Z74.09 IMPAIRED MOBILITY AND ACTIVITIES OF DAILY LIVING: ICD-10-CM

## 2020-01-13 DIAGNOSIS — R27.8 DECREASED COORDINATION: ICD-10-CM

## 2020-01-13 DIAGNOSIS — Z74.09 IMPAIRED FUNCTIONAL MOBILITY, BALANCE, GAIT, AND ENDURANCE: ICD-10-CM

## 2020-01-13 DIAGNOSIS — M62.81 MUSCLE WEAKNESS: ICD-10-CM

## 2020-01-13 PROCEDURE — 97112 NEUROMUSCULAR REEDUCATION: CPT | Mod: PN

## 2020-01-13 PROCEDURE — 97140 MANUAL THERAPY 1/> REGIONS: CPT | Mod: PN

## 2020-01-13 PROCEDURE — 97110 THERAPEUTIC EXERCISES: CPT | Mod: PN

## 2020-01-13 NOTE — PROGRESS NOTES
Physical Therapy Daily Treatment Note     Name: Luis Wong  Clinic Number: [id number]    Therapy Diagnosis:   Encounter Diagnoses   Name Primary?    Impaired functional mobility, balance, gait, and endurance     Decreased strength of lower extremity      Physician: Carla Altman PA-C     Visit Date: 1/13/2020    Physician Orders: PT Eval and Treat  Medical Diagnosis from Referral: I63.9 (ICD-10-CM) - CVA (cerebral vascular accident)  Evaluation Date: 9/13/2019  Authorization Period Expiration: 12/31/2020  Plan of Care Expiration: 1/10/2020  Visit # / Visits authorized: 4/50 (45 prior visits)  FOTO/G-Code: 6/10     Time In: 9:30 AM  Time Out: 10:15 AM  Total Billable Time: 45 minutes (3 TE)    Precautions: Standard; history of of CVA's    Subjective     Pt reports: Patient states that he was approved by his insurance to receive his Botox injections. His appointment is currently set for January 24, 2020 and will be receiving the injections in his UE first and LE will follow shortly after.    Response to previous treatment: No adverse reactions.   Functional change: Ongoing       Objective     Luis participated in therapeutic exercises for 45 minutes including objective measurements:    -- Nu-Step for reciprocal BUE and BLE motion for 10 minutes at level 8.0    -Cybex knee extension: 25#, 2x15, B      20# 2x15, DL up, LLE down  -Cybex Ham curls:  1.0 pl 2x15 B  -Cybex Leg Press:  7.0 plates, 3x10, B      4.5 plates, 3x10, LLE only      Home Exercises Provided and Patient Education Provided     Education provided:   Cont HEP      Written Home Exercises Provided: Not today  Exercises were reviewed and Luis was able to demonstrate them prior to the end of the session.  Luis demonstrated good  understanding of the education provided.     See EMR under Patient Instructions for exercises provided 9/23/2019 and 10/28/2019.    Assessment     See updated POC.     Luis is progressing  well towards his goals.   Pt prognosis is Good.   Pt will continue to benefit from skilled outpatient physical therapy to address the deficits listed in the problem list box on initial evaluation, provide pt/family education and to maximize pt's level of independence in the home and community environment.     Pt's spiritual, cultural and educational needs considered and pt agreeable to plan of care and goals.  Anticipated barriers to physical therapy: Co-morbidities    Goals:  See updated POC.      Plan     See updated POC.     Kandy Gatica, PT

## 2020-01-13 NOTE — PROGRESS NOTES
"  Occupational Therapy Daily Treatment Note     Name: Luis Wong  Clinic Number: [id number]    Therapy Diagnosis:    No diagnosis found.  Physician: Carla Altman, HALLE Lopez MD    Visit Date: 1/13/2020  Physician Orders: Eval and tx  Medical Diagnosis: CVA L sided weakness Recrudecesnt  Onset Date: 8/28/19 most recent CVA  Evaluation Date: 9/4/2019  Plan of Care Expiration Period: 2/21/20  Insurance Authorization period Expiration: 12/31/19  Date of Return to MD: NA  Visit # / Visits Authortized: 13/50 change in insurance  FOTO: CVA UE hand / 35% limitation down from 75% with the hand survey.      Time In:10:15 am   Time Out:11:00 am   Total Billable (one on one) Time: 45 minutes      Precautions: Standard and Fall    Subjective     Pt reports: "itsok the shoulder feeling better"  he was compliant with home exercise program given last session.   Response to previous treatment:good   Functional change: able to put glasses on with BUEs    Pain: 0/10   Location: left arms     Objective   Mt: Pt received manual therapy consisting of PROM in all planes, joint mobilization (grades I-II) with gentle oscillations at acromioclavicular and glenohumeral joint along with myofascial release and STM to surrounding musculature (biceps, pects, deltoids, traps, triceps etc.) to decrease stiffness and pain with movements. 15 min      Luis Wong  received TE to develop GM/FM coordination, balance, activity tolerance and strength in order to increase ADL/IADL independence with replicated home management/self care tasks, for 15 minutes including: measurements for UPOC.           Shoulder    PROM all planes X 10    LLPS with bolster between scap 15 min during MT   Supine place and holds   3/30"   Chest press  Forward flexion supine     3#  2/10       Hand    Green t putty   with ext     CP pinch with small pom pom retrieval    X 30     Green CP  X 1 tub        Luis participated in neuromuscular re-education " activities to improve: Coordination and Proprioception for 15 minutes. The following activities were included:    Isolated chest press 0#  2/10   Yoga block chest press isolating FF 0#   2/10   Horizontal ABD/ADD short arc movements  Isolated NDT 1/2 circles 2/10         Home Exercises and Education Provided     Education provided:   - Previous hand out   - Progress towards goals     Written Home Exercises Provided: Patient instructed to cont prior HEP. Updated putty exercises to molding,  and pinch Red putty provided.  Exercises were reviewed and Luis was able to demonstrate them prior to the end of the session.  Luis demonstrated good  understanding of the HEP provided.   .   See EMR under Patient Instructions for exercises provided prior visit.        Assessment   Pt tolerated session well. Having less pain in the shoulder since last week. He did well with established and new exercises today. Focus on controlled GM movements and isolated shoulder strengthening and ROM of the L arm. More focus on LLPS and PROM with MT today which he is responding well to. Educated of continue need for dynasplint use with pt stating he hasnt been wearing it due to pain in the shoulder. Attempt overhead and grasp and release exercises to tolerance in future sessions. States going to see ortho MD with education to ask MD about shoulder pain and for his unaffected wrist pain.     Luis is progressing well towards his goals and there are no updates to goals at this time. Pt prognosis is Fair.      Pt will continue to benefit from skilled outpatient occupational therapy to address the deficits listed in the problem list on initial evaluation provide pt/family education and to maximize pt's level of independence in the home and community environment.      Anticipated barriers to occupational therapy:      Pt's spiritual, cultural and educational needs considered and pt agreeable to plan of care and goals.     Previous Short Term  Goals Status:   Goals:  Short Term Goals:  1) Initiate Hep Met 10/30/2019  2) Pt to increase LUE  strength by 5 # in order to A in self care task of feeding by 4 weeks. MET 10/30/2019  3) Pt to increase Quick dash Score by 5 points increasing self care IND by 4 weeks. Progressing 1/13/2020  4) Pt to increase L UE AROM by 10 degrees in order to A in UB dressing by 4 weeks.MET 1/13/2020  5) Patient will be able to achieve less than or equal to 75% on the FOTO, demonstrating overall improved functional ability with upper extremity. (self-care category) Progressing 1/13/2020           New Short Term Goals Status:     Long Term Goals:  1) Pt to be IND with HEP in order to maintain ROM and strength needed for self care IND by d.c. Progressing 1/13/2020  2) Pt to increase L UE  strength to WNL as compared to unaffected extremity in order to open items for self feeding by d.c. Progressing 1/13/2020  3) Pt to increase Quick dash Score by 10 points increasing self care IND at home by d.c. Progressing 1/13/2020  4) Pt to increase L UE AROM to WFL in order to A in UB dressing by d/c. Progressing 1/13/2020  5) Patient will be able to achieve less than or equal to 50% on the FOTO, demonstrating overall improved functional ability with upper extremity. (self-care category) Progressing 1/13/2020      Long Term Goal Status:   continue per initial plan of care.  Reasons for Recertification of Therapy:   Pt continues to benefit from skilled services and has made good progress thus far with good future rehab potential. Pt remains limited with LUE strength, ROM and coordination at this time which all still impact their performance of ADLs , IADLs affecting her habits, roles and routines.        Plan      Continue per UPOC. Need for future splinting and dynasplint to prevent contracture while increasing arm swing for functional ambulation and balance.   Progress as tolerated.     Christian Teran  OTR/L

## 2020-01-13 NOTE — PLAN OF CARE
Outpatient Therapy Updated Plan of Care     Visit Date: 2020    Name: Luis Wong  Clinic Number: [id number]    Therapy Diagnosis:   Encounter Diagnoses   Name Primary?    Impaired functional mobility, balance, gait, and endurance     Decreased strength of lower extremity      Physician: Carla Altman PA-C    Physician Orders: PT Eval and Treat  Medical Diagnosis from Referral: I63.9 (ICD-10-CM) - CVA (cerebral vascular accident)  Evaluation Date: 2019   Current Certification Period:  2019 to 1/10/2020  Authorization Period Expiration: 2020  Plan of Care Expiration: 1/10/2020  Visit # / Visits authorized:  (45 prior visits)    Precautions: Standard; history of of CVA's  Functional Level Prior to Evaluation:  Independent    Subjective     Update:   Pt reports: Patient states that he was approved by his insurance to receive his Botox injections. His appointment is currently set for 2020 and will be receiving the injection in his UE first and LE will follow shortly after.    Response to previous treatment: No adverse reactions.   Functional change: Ongoing     Objective     Update:    Improvements bolded below, regressions in red:    Lower Extremity Strength    RLE LLE   Hip Flexion: 5/5 3+/5   Hip Extension:  4+/5 3-/5   Hip Abduction: 5/5 4+/5   Hip Adduction: 4+/5 4+/5   Knee Extension: 5/5 5/5   Knee Flexion: 5/5 2+/5 in prone   Ankle Dorsiflexion: 5/5 3-/5   Ankle Plantarflexion: 5/5 3/5   Ankle Inversion: 5/5 3/5   Ankle Eversion: 5/5 3-/5         TU seconds without AD     2 minute walk test: 218 feet without AD      Assessment     Update: Patient has had some regressions in strength with ankle dorsiflexion, however has been able to maintain strength in all other major muscle groups. Has improved TUG and 2 min walk test since last measurements. Patient is currently scheduled for Botox injections next week. Will reassess all tests and measures following  injections, with update on goals. Patient is also pending a customized AFO fitting following injections. Pending outcome of injections, new goals will be set appropriately and patient will begin the process of discharge once goals are attained. Until injections, patient is continuing to improve strength and balance respectively    Long Term Goals (8 Weeks):   3. Pt will be able to perform TUG in less than or equal to 25 secs without use of AD demonstrating overall improved functional mobility. MET 19 seconds on 1/13/2020  4. Pt will performed sit to stand x 5 without UE support in 10 seconds without LOB backward or posterior LE hooking for functional and safe transfers. MET  5. Pt will score greater than or equal to 45/56 on the [place] Balance Assessment with use of AD demonstrating overall improved functional mobility and balance and reduce fall risk. MET 12/16/2019  6. Pt will walk > than or equal to 210 ft on the 2 minute walk test indoor with AD with 0 LOB and minimal gait deviations for improved safety in home ambulation and safety.  MET 1/13/2020; 218 feet  7. Pt will be able to safely perform and tolerate high level ADL's without LOB.  PROGRESSING, NOT MET 1/13/2020  8. Pt will have 0 falls from 11/6/19.  PROGRESSING, NOT MET 1/13/2020; fall reported 11/6/19  9. Independent with updated HEP. MET  10. Pt will have MMT score of 3+/5 in all major ms groups in Left LE.  PROGRESSING, NOT MET 1/13/2020  11. Pt will ambulate on TM x 6 minutes with use of UE support with 0 LOB at greater than or equal to 1.3 mph. PROGRESSING, NOT MET 1/13/2020  12. Pt will begin some form of community fitness to begin regular and consistent performance of exercise to continue maintenance of gains made in PT.  PROGRESSING, NOT MET 1/13/2020    Long Term Goal Status:   modified:  D/c goals 4, 5, and 9. Modify goals 3 and 6 as written below. Continue goals 7, 8, 10, 11, and 12.   3. Pt will be able to perform TUG in less than or equal to  17 seconds, safely, without use of AD demonstrating overall improved functional mobility.  6. Pt will walk > than or equal to 230 feet on the 2 minute walk test indoor with AD with 0 LOB and minimal gait deviations for improved safety in home ambulation and safety    Reasons for Recertification of Therapy:   Expiration of current POC. Update of goals    Plan     Updated Certification Period: 1/13/2020 to 3/6/2020  Recommended Treatment Plan: 2 times per week for 8 weeks: Gait Training, Manual Therapy, Moist Heat/ Ice, Neuromuscular Re-ed, Patient Education, Therapeutic Activites, Therapeutic Exercise and FDN, ASTYM  Other Recommendations: Follow-up about possible Botox injections    Kandy Gatica, PT, DPT  1/13/2020      I CERTIFY THE NEED FOR THESE SERVICES FURNISHED UNDER THIS PLAN OF TREATMENT AND WHILE UNDER MY CARE    Physician's comments:        Physician's Signature: ___________________________________________________

## 2020-01-14 ENCOUNTER — OFFICE VISIT (OUTPATIENT)
Dept: CARDIOLOGY | Facility: CLINIC | Age: 65
End: 2020-01-14
Payer: MEDICARE

## 2020-01-14 VITALS
HEIGHT: 69 IN | HEART RATE: 96 BPM | OXYGEN SATURATION: 98 % | SYSTOLIC BLOOD PRESSURE: 143 MMHG | BODY MASS INDEX: 23.43 KG/M2 | WEIGHT: 158.19 LBS | DIASTOLIC BLOOD PRESSURE: 78 MMHG

## 2020-01-14 DIAGNOSIS — I63.9 CRYPTOGENIC STROKE: Primary | ICD-10-CM

## 2020-01-14 DIAGNOSIS — I10 ESSENTIAL HYPERTENSION: ICD-10-CM

## 2020-01-14 DIAGNOSIS — E78.2 MIXED HYPERLIPIDEMIA: ICD-10-CM

## 2020-01-14 PROCEDURE — 3077F PR MOST RECENT SYSTOLIC BLOOD PRESSURE >= 140 MM HG: ICD-10-PCS | Mod: CPTII,S$GLB,, | Performed by: INTERNAL MEDICINE

## 2020-01-14 PROCEDURE — 3008F PR BODY MASS INDEX (BMI) DOCUMENTED: ICD-10-PCS | Mod: CPTII,S$GLB,, | Performed by: INTERNAL MEDICINE

## 2020-01-14 PROCEDURE — 99214 PR OFFICE/OUTPT VISIT, EST, LEVL IV, 30-39 MIN: ICD-10-PCS | Mod: S$GLB,,, | Performed by: INTERNAL MEDICINE

## 2020-01-14 PROCEDURE — 3077F SYST BP >= 140 MM HG: CPT | Mod: CPTII,S$GLB,, | Performed by: INTERNAL MEDICINE

## 2020-01-14 PROCEDURE — 99999 PR PBB SHADOW E&M-EST. PATIENT-LVL IV: ICD-10-PCS | Mod: PBBFAC,,, | Performed by: INTERNAL MEDICINE

## 2020-01-14 PROCEDURE — 99999 PR PBB SHADOW E&M-EST. PATIENT-LVL IV: CPT | Mod: PBBFAC,,, | Performed by: INTERNAL MEDICINE

## 2020-01-14 PROCEDURE — 1101F PR PT FALLS ASSESS DOC 0-1 FALLS W/OUT INJ PAST YR: ICD-10-PCS | Mod: CPTII,S$GLB,, | Performed by: INTERNAL MEDICINE

## 2020-01-14 PROCEDURE — 3078F DIAST BP <80 MM HG: CPT | Mod: CPTII,S$GLB,, | Performed by: INTERNAL MEDICINE

## 2020-01-14 PROCEDURE — 3008F BODY MASS INDEX DOCD: CPT | Mod: CPTII,S$GLB,, | Performed by: INTERNAL MEDICINE

## 2020-01-14 PROCEDURE — 99214 OFFICE O/P EST MOD 30 MIN: CPT | Mod: S$GLB,,, | Performed by: INTERNAL MEDICINE

## 2020-01-14 PROCEDURE — 3078F PR MOST RECENT DIASTOLIC BLOOD PRESSURE < 80 MM HG: ICD-10-PCS | Mod: CPTII,S$GLB,, | Performed by: INTERNAL MEDICINE

## 2020-01-14 PROCEDURE — 1101F PT FALLS ASSESS-DOCD LE1/YR: CPT | Mod: CPTII,S$GLB,, | Performed by: INTERNAL MEDICINE

## 2020-01-14 NOTE — PATIENT INSTRUCTIONS
Aerobic Exercise for a Healthy Heart  Exercise is a lot more than an energy booster and a stress reliever. It also strengthens your heart muscle, lowers your blood pressure and cholesterol, and burns calories. It can also improve your resting muscle tone, and your mood.     Remember, some activity is better than none.    Choose an aerobic activity  Choose an activity that makes your heart and lungs work harder than they do when you rest or walk normally. This aerobic exercise can improve the way your heart and other muscles use oxygen. Make it fun by exercising with a friend and choosing an activity you enjoy. Here are some ideas:  · Walking  · Swimming  · Bicycling  · Stair climbing  · Dancing  · Jogging  · Gardening  Exercise regularly  If you havent been exercising regularly,  get your doctors OK first. Then start slowly.  Here are some tips:  · Begin exercising 3 times a week for 5 to 10 minutes at a time.  · When you feel comfortable, add a few minutes each session.  · Slowly build up to exercising 3 to 4 times each week. Each session should last for 40 minutes, on average, and involve moderate- to vigorous-intensity physical activity.  · If you have been given nitroglycerin, be sure to carry it when you exercise.  · If you get chest pain (angina) when youre exercising, stop what youre doing, take your nitroglycerin, and call your doctor.  Date Last Reviewed: 6/2/2016  © 2055-6424 [employer]. [address] [place] [id number]. All rights reserved. This information is not intended as a substitute for professional medical care. Always follow your healthcare professional's instructions.          Eating Heart-Healthy Foods  Eating has a big impact on your heart health. In fact, eating healthier can improve several of your heart risks at once. For instance, it helps you manage weight, cholesterol, and blood pressure. Here are ideas to help you make heart-healthy changes without giving up  all the foods and flavors you love.  Getting started  · Talk with your health care provider about eating plans, such as the DASH or Mediterranean diet. You may also be referred to a dietitian.  · Change a few things at a time. Give yourself time to get used to a few eating changes before adding more.  · Work to create a tasty, healthy eating plan that you can stick to for the rest of your life.    Goals for healthy eating  Below are some tips to improve your eating habits:  · Limit saturated fats and trans fats. Saturated fats raise your levels of cholesterol, so keep these fats to a minimum. They are found in foods such as fatty meats, whole milk, cheese, and palm and coconut oils. Avoid trans fats because they lower good cholesterol as well as raise bad cholesterol. Trans fats are most often found in processed foods.  · Reduce sodium (salt) intake. Eating too much salt may increase your blood pressure. Limit your sodium intake to 2,300 milligrams (mg) per day, or less if your health care provider recommends it. Dining out less often and eating fewer processed foods are two great ways to decrease the amount of salt you consume.  · Managing calories. A calorie is a unit of energy. Your body burns calories for fuel, but if you eat more calories than your body burns, the extras are stored as fat. Your health care provider can help you create a diet plan to manage your calories. This will likely include eating healthier foods as well as exercising regularly. To help you track your progress, keep a diary to record what you eat and how often you exercise.  Choose the right foods  Aim to make these foods staples of your diet. If you have diabetes, you may have different recommendations than what is listed here:  · Fruits and vegetable provide plenty of nutrients without a lot of calories. At meals, fill half your plate with these foods. Split the other half of your plate between whole grains and lean protein.  · Whole  grains are high in fiber and rich in vitamins and nutrients. Good choices include whole-wheat bread, pasta, and brown rice.  · Lean proteins give you nutrition with less fat. Good choices include fish, skinless chicken, and beans.  · Low-fat or nonfat dairy provides nutrients without a lot of fat. Try low-fat or nonfat milk, cheese, or yogurt.  · Healthy fats can be good for you in small amounts. These are unsaturated fats, such as olive oil, nuts, and fish. Try to have at least 2 servings per week of fatty fish such as salmon, sardines, mackerel, rainbow trout, and albacore tuna. These contain omega-3 fatty acids, which are good for your heart. Flaxseed is another source of a heart-healthy fat.  More on heart healthy eating    Read food labels  Healthy eating starts at the grocery store. Be sure to pay attention to food labels on packaged foods. Look for products that are high in fiber and protein, and low in saturated fat, cholesterol, and sodium. Avoid products that contain trans fat. And pay close attention to serving size. For instance, if you plan to eat two servings, double all the numbers on the label.  Prepare food right  A key part of healthy cooking is cutting down on added fat and salt. Look on the internet for lower-fat, lower-sodium recipes. Also, try these tips:  · Remove fat from meat and skin from poultry before cooking.  · Skim fat from the surface of soups and sauces.  · Broil, boil, bake, steam, grill, and microwave food without added fats.  · Choose ingredients that spice up your food without adding calories, fat, or sodium. Try these items: horseradish, hot sauce, lemon, mustard, nonfat salad dressings, and vinegar. For salt-free herbs and spices, try basil, cilantro, cinnamon, pepper, and rosemary.  Date Last Reviewed: 6/25/2015  © 4258-8684 [employer]. [address] [place] [address], [place] [address]. All rights reserved. This information is not intended as a substitute for  professional medical care. Always follow your healthcare professional's instructions.

## 2020-01-14 NOTE — PROGRESS NOTES
Subjective:   @Patient ID:  Luis Wong is a 64 y.o. male who presents for follow-up of possible cardio embolic source of stroke.       HPI:     Patient here for follow up post ILR placement   He stated that he is doing well  No more recurrent CVA    EM, no evidence of atrial fibrillation.     Unfortunately patient had recurrent episodes of stroke.  Last event was 8/2019 resulted in significant left side weakness. Problem started in 2012,  since there he had about 3-4 event. Last event is the strongest.  Echo done no evidence of interatrial shunt. No lV thrombus detected.     He denies any chest pain, or palpitations.       MRA 8/2019 normal carotids.  He stated that he doesn't think that he had monitor or ILR in the past.        Pertinent hx, DM, and HTN    Prior cardiovascular  Hx  --------------------------------       - ECHO 6/2018    1 - Normal left ventricular systolic function (EF 60-65%).     2 - Impaired LV relaxation, normal LAP (grade 1 diastolic dysfunction).     3 - Normal right ventricular systolic function .     4 - The estimated PA systolic pressure is 29 mmHg.     5 - No evidence of intracardiac shunt.    CAC 18 in 2014     - EKG SR, no evidence of A.fib         Patient Active Problem List    Diagnosis Date Noted    Impaired functional mobility, balance, gait, and endurance 09/19/2019    Decreased strength of lower extremity 09/13/2019    Impaired mobility and activities of daily living 09/04/2019    Cryptogenic stroke 08/29/2019    Bilateral carpal tunnel syndrome 01/08/2019    Muscle weakness 07/11/2018    Decreased coordination 07/11/2018    Dysarthria 06/26/2018    Normocytic anemia 06/26/2018    Pre-diabetes 09/25/2017    History of cerebral parenchymal hemorrhage 08/31/2016     R thalamus, 2012      History of stroke 09/11/2014     R corona radiata, small artery infarct 6/2018      Special screening for malignant neoplasms, colon 12/14/2013    Hyperglycemia 09/05/2013     Mixed hyperlipidemia 09/05/2013    Cataracts, bilateral 09/05/2013    Nuclear sclerosis 11/13/2012    HTN (hypertension) 11/07/2012        LAST HbA1c  Lab Results   Component Value Date    HGBA1C 6.3 (H) 10/21/2019       Lipid panel  Lab Results   Component Value Date    CHOL 170 10/21/2019    CHOL 214 (H) 08/29/2019    CHOL 222 (H) 06/24/2019     Lab Results   Component Value Date    HDL 69 10/21/2019    HDL 82 (H) 08/29/2019    HDL 76 (H) 06/24/2019     Lab Results   Component Value Date    LDLCALC 80.4 10/21/2019    LDLCALC 113.0 08/29/2019    LDLCALC 128.8 06/24/2019     Lab Results   Component Value Date    TRIG 103 10/21/2019    TRIG 95 08/29/2019    TRIG 86 06/24/2019     Lab Results   Component Value Date    CHOLHDL 40.6 10/21/2019    CHOLHDL 38.3 08/29/2019    CHOLHDL 34.2 06/24/2019            Review of Systems   Constitution: Negative for chills and fever.   HENT: Negative for hearing loss and nosebleeds.    Eyes: Negative for blurred vision.   Cardiovascular: Negative for chest pain and palpitations.   Respiratory: Negative for hemoptysis and shortness of breath.    Hematologic/Lymphatic: Negative for bleeding problem.   Skin: Negative for itching.   Musculoskeletal:        On wheelchair now.    Gastrointestinal: Negative for abdominal pain and hematochezia.   Genitourinary: Negative for hematuria.   Neurological: Positive for focal weakness (Left side ). Negative for dizziness.   Psychiatric/Behavioral: Negative for altered mental status and depression.       Objective:   Physical Exam   Constitutional: He is oriented to person, place, and time. He appears well-developed and well-nourished.   HENT:   Head: Normocephalic and atraumatic.   Eyes: Conjunctivae are normal.   Neck: Neck supple. No JVD present.   Cardiovascular: Normal rate, regular rhythm and normal heart sounds. Exam reveals no gallop and no friction rub.   No murmur heard.  Pulmonary/Chest: Effort normal and breath sounds normal. No  stridor. No respiratory distress. He has no wheezes.   Neurological: He is alert and oriented to person, place, and time. He exhibits abnormal muscle tone (left UE and LE).   Skin: Skin is warm and dry.   Psychiatric: He has a normal mood and affect. His behavior is normal.       Assessment:     1. Essential hypertension    2. Mixed hyperlipidemia    3. Cryptogenic stroke        Plan:     - Patient with recurrent stroke, unknown source.  No evidence of PFO per echo.     - ILR in place to detect any arrhythmias as potential cause for recurrent strokes.     - From cardiac standpoint ok to proceed with planned carpal tunnel surgery. No further cardiac work up warranted    - F/U with Neurology team.     - ASA/Statin      Continue with current medical plan and lifestyle changes.  Return sooner for concerns or questions. If symptoms persist go to the ED  I have reviewed all pertinent data including patient's medical history in detail and updated the computerized patient record.     No orders of the defined types were placed in this encounter.      Follow up as scheduled.     He expressed verbal understanding and agreed with the plan    Patient's Medications   New Prescriptions    No medications on file   Previous Medications    ASPIRIN (ECOTRIN) 81 MG EC TABLET    TAKE 1 TABLET BY MOUTH EVERY DAY    ATORVASTATIN (LIPITOR) 40 MG TABLET    Take 1 tablet (40 mg total) by mouth once daily.    BACLOFEN (LIORESAL) 10 MG TABLET    Take 0.5 tablets (5 mg total) by mouth 3 (three) times daily.    CLOPIDOGREL (PLAVIX) 75 MG TABLET    Take 1 tablet (75 mg total) by mouth once daily.    GABAPENTIN (NEURONTIN) 100 MG CAPSULE    Take 100 mg by mouth once daily.    GABAPENTIN (NEURONTIN) 300 MG CAPSULE        HYDROCHLOROTHIAZIDE (MICROZIDE) 12.5 MG CAPSULE    TAKE 1 CAPSULE BY MOUTH EVERY DAY    METFORMIN (GLUCOPHAGE) 500 MG TABLET    Take 1 tablet (500 mg total) by mouth daily with breakfast.    NIFEDIPINE (PROCARDIA-XL) 30 MG (OSM) 24  HR TABLET    TAKE 1 TABLET BY MOUTH EVERY DAY    POTASSIUM CHLORIDE (MICRO-K) 8 MEQ CPSR    TAKE 1 CAPSULE (8 MEQ TOTAL) BY MOUTH ONCE DAILY.    TIZANIDINE (ZANAFLEX) 4 MG TABLET    Take 4 mg by mouth daily as needed.   Modified Medications    No medications on file   Discontinued Medications    No medications on file

## 2020-01-16 ENCOUNTER — CLINICAL SUPPORT (OUTPATIENT)
Dept: REHABILITATION | Facility: HOSPITAL | Age: 65
End: 2020-01-16
Payer: MEDICARE

## 2020-01-16 DIAGNOSIS — Z74.09 IMPAIRED FUNCTIONAL MOBILITY, BALANCE, GAIT, AND ENDURANCE: ICD-10-CM

## 2020-01-16 DIAGNOSIS — R29.898 DECREASED STRENGTH OF LOWER EXTREMITY: ICD-10-CM

## 2020-01-16 DIAGNOSIS — Z74.09 IMPAIRED MOBILITY AND ACTIVITIES OF DAILY LIVING: ICD-10-CM

## 2020-01-16 DIAGNOSIS — M62.81 MUSCLE WEAKNESS: ICD-10-CM

## 2020-01-16 DIAGNOSIS — Z78.9 IMPAIRED MOBILITY AND ACTIVITIES OF DAILY LIVING: ICD-10-CM

## 2020-01-16 DIAGNOSIS — R27.8 DECREASED COORDINATION: ICD-10-CM

## 2020-01-16 PROCEDURE — 97140 MANUAL THERAPY 1/> REGIONS: CPT | Mod: PN

## 2020-01-16 PROCEDURE — 97110 THERAPEUTIC EXERCISES: CPT | Mod: PN

## 2020-01-16 PROCEDURE — 97110 THERAPEUTIC EXERCISES: CPT | Mod: PN,CQ

## 2020-01-16 PROCEDURE — 97112 NEUROMUSCULAR REEDUCATION: CPT | Mod: PN

## 2020-01-16 NOTE — PROGRESS NOTES
"  Occupational Therapy Daily Treatment Note     Name: Luis Wong  Clinic Number: [id number]    Therapy Diagnosis:    Encounter Diagnoses   Name Primary?    Impaired mobility and activities of daily living     Muscle weakness     Decreased coordination      Physician: Carla Altman, HALLE Lopez MD    Visit Date: 1/16/2020  Physician Orders: Eval and tx  Medical Diagnosis: CVA L sided weakness Recrudecesnt  Onset Date: 8/28/19 most recent CVA  Evaluation Date: 9/4/2019  Plan of Care Expiration Period: 2/21/20  Insurance Authorization period Expiration: 12/31/19  Date of Return to MD: NA  Visit # / Visits Authortized: 15/50 change in insurance  FOTO: CVA UE hand / 35% limitation down from 75% with the hand survey.      Time In:10:15 am   Time Out:11:00 am   Total Billable (one on one) Time: 45 minutes      Precautions: Standard and Fall    Subjective     Pt reports: " It still hurts over here pt pointing to mid deltoid insertion"  he was compliant with home exercise program given last session.   Response to previous treatment:good   Functional change: able to put glasses on with BUEs    Pain: 0/10   Location: left arms     Objective   Mt: Pt received manual therapy consisting of PROM in all planes, joint mobilization (grades I-II) with gentle oscillations at acromioclavicular and glenohumeral joint along with myofascial release and STM to surrounding musculature (biceps, pects, deltoids, traps, triceps etc.) to decrease stiffness and pain with movements. 15 min      Luis Wong  received TE to develop GM/FM coordination, balance, activity tolerance and strength in order to increase ADL/IADL independence with replicated home management/self care tasks, for 15 minutes including: measurements for UPOC.           Shoulder    PROM all planes X 10    LLPS with bolster between scap 15 min during MT   Airsplint for LLPS to elbow    FF isolated LUE 2/15         Hand    Green t putty   with ext        X " 30            Luis participated in neuromuscular re-education activities to improve: Coordination and Proprioception for 15 minutes. The following activities were included:  Counter top slides CW/CCW/ FF 2/10 each way       Horizontal ABD/ADD short arc movements  Isolated NDT 1/2 circles 2/10         Home Exercises and Education Provided     Education provided:   - Previous hand out   - Progress towards goals     Written Home Exercises Provided: Patient instructed to cont prior HEP. Updated putty exercises to molding,  and pinch Red putty provided.  Exercises were reviewed and Luis was able to demonstrate them prior to the end of the session.  Luis demonstrated good  understanding of the HEP provided.   .   See EMR under Patient Instructions for exercises provided prior visit.        Assessment   Pt tolerated session well. Increased pain with attempted progression of exercises. He still has increased tone in the arm at the albow and shoulder. He continues to show inconsistiencies with therapy tolerance and PROM. Likely little carryover with exercises or dynasplinting at this point. He see tone specialist soon in hopes to start some other medications. States going to see ortho MD with education to ask MD about shoulder pain and for his unaffected wrist pain.     Luis is progressing well towards his goals and there are no updates to goals at this time. Pt prognosis is Fair.      Pt will continue to benefit from skilled outpatient occupational therapy to address the deficits listed in the problem list on initial evaluation provide pt/family education and to maximize pt's level of independence in the home and community environment.      Anticipated barriers to occupational therapy:      Pt's spiritual, cultural and educational needs considered and pt agreeable to plan of care and goals.     Previous Short Term Goals Status:   Goals:  Short Term Goals:  1) Initiate Hep Met 10/30/2019  2) Pt to increase LUE   strength by 5 # in order to A in self care task of feeding by 4 weeks. MET 10/30/2019  3) Pt to increase Quick dash Score by 5 points increasing self care IND by 4 weeks. Progressing 1/16/2020  4) Pt to increase L UE AROM by 10 degrees in order to A in UB dressing by 4 weeks.MET 1/16/2020  5) Patient will be able to achieve less than or equal to 75% on the FOTO, demonstrating overall improved functional ability with upper extremity. (self-care category) Progressing 1/16/2020           New Short Term Goals Status:     Long Term Goals:  1) Pt to be IND with HEP in order to maintain ROM and strength needed for self care IND by d.c. Progressing 1/16/2020  2) Pt to increase L UE  strength to WNL as compared to unaffected extremity in order to open items for self feeding by d.c. Progressing 1/16/2020  3) Pt to increase Quick dash Score by 10 points increasing self care IND at home by d.c. Progressing 1/16/2020  4) Pt to increase L UE AROM to WFL in order to A in UB dressing by d/c. Progressing 1/16/2020  5) Patient will be able to achieve less than or equal to 50% on the FOTO, demonstrating overall improved functional ability with upper extremity. (self-care category) Progressing 1/16/2020      Long Term Goal Status:   continue per initial plan of care.  Reasons for Recertification of Therapy:   Pt continues to benefit from skilled services and has made good progress thus far with good future rehab potential. Pt remains limited with LUE strength, ROM and coordination at this time which all still impact their performance of ADLs , IADLs affecting her habits, roles and routines.        Plan      Continue per UPOC. Need for future splinting and dynasplint to prevent contracture while increasing arm swing for functional ambulation and balance.   Progress as tolerated.     Christian Teran  OTR/L

## 2020-01-16 NOTE — PROGRESS NOTES
"                            Physical Therapy Daily Treatment Note     Name: Luis Wong  Clinic Number: [id number]    Therapy Diagnosis:   Encounter Diagnoses   Name Primary?    Impaired functional mobility, balance, gait, and endurance     Decreased strength of lower extremity      Physician: Carla Altman PA-C     Visit Date: 1/16/2020    Physician Orders: PT Eval and Treat  Medical Diagnosis from Referral: I63.9 (ICD-10-CM) - CVA (cerebral vascular accident)  Evaluation Date: 9/13/2019  Authorization Period Expiration: 12/31/2020  Plan of Care Expiration: 3/06/2020  Visit # / Visits authorized: 5/50 (45 prior visits)  FOTO/G-Code: 7/10     Time In: 9:30 AM  Time Out: 10:15 AM  Total Billable Time: 45 minutes (3 TE)    Precautions: Standard; history of of CVA's    Subjective     Pt reports:agreeable to PT session. " I"ve been scheduled for botox next Friday"   Response to previous treatment: No adverse reactions.   Functional change: Ongoing       Objective     Luis participated in therapeutic exercises for 45 minutes including objective measurements:    -- Nu-Step for reciprocal BUE and BLE motion for 10 minutes at level 6.0 HILLS PROGRAM    -Cybex knee extension: 25#, 2x15, B      20# 2x15, DL up, LLE down  -Cybex Ham curls:  1.0 pl 2x15 B  -Cybex Leg Press:  7.0 plates, 3x10, B      4.5 plates, 3x10, LLE only  -lateral sidesteps 35 ft x 2 trials B  No UE assist/ no AD    Home Exercises Provided and Patient Education Provided     Education provided:   Cont HEP      Written Home Exercises Provided: Not today  Exercises were reviewed and Luis was able to demonstrate them prior to the end of the session.  Luis demonstrated good  understanding of the education provided.     See EMR under Patient Instructions for exercises provided 9/23/2019 and 10/28/2019.    Assessment     Pt able to complete today's session with no reports of pain. He did not experience any Loss of balance with sides stepping on " level surface, demonstrated good tolerance to dynamic standing with no use of assistive device.     Luis is progressing well towards his goals.   Pt prognosis is Good.   Pt will continue to benefit from skilled outpatient physical therapy to address the deficits listed in the problem list box on initial evaluation, provide pt/family education and to maximize pt's level of independence in the home and community environment.     Pt's spiritual, cultural and educational needs considered and pt agreeable to plan of care and goals.  Anticipated barriers to physical therapy: Co-morbidities    Goals:  Long Term Goals (8 Weeks):   3. Pt will be able to perform TUG in less than or equal to 25 secs without use of AD demonstrating overall improved functional mobility. MET 19 seconds on 1/13/2020  4. Pt will performed sit to stand x 5 without UE support in 10 seconds without LOB backward or posterior LE hooking for functional and safe transfers. MET  5. Pt will score greater than or equal to 45/56 on the Choi Balance Assessment with use of AD demonstrating overall improved functional mobility and balance and reduce fall risk. MET 12/16/2019  6. Pt will walk > than or equal to 210 ft on the 2 minute walk test indoor with AD with 0 LOB and minimal gait deviations for improved safety in home ambulation and safety.  MET 1/13/2020; 218 feet  7. Pt will be able to safely perform and tolerate high level ADL's without LOB.  PROGRESSING, NOT MET 1/13/2020  8. Pt will have 0 falls from 11/6/19.  PROGRESSING, NOT MET 1/13/2020; fall reported 11/6/19  9. Independent with updated HEP. MET  10. Pt will have MMT score of 3+/5 in all major ms groups in Left LE.  PROGRESSING, NOT MET 1/13/2020  11. Pt will ambulate on TM x 6 minutes with use of UE support with 0 LOB at greater than or equal to 1.3 mph. PROGRESSING, NOT MET 1/13/2020  12. Pt will begin some form of community fitness to begin regular and consistent performance of exercise to  continue maintenance of gains made in PT.  PROGRESSING, NOT MET 1/13/2020     Long Term Goal Status:   modified:  D/c goals 4, 5, and 9. Modify goals 3 and 6 as written below. Continue goals 7, 8, 10, 11, and 12.  (In progress, not met)  3. Pt will be able to perform TUG in less than or equal to 17 seconds, safely, without use of AD demonstrating overall improved functional mobility. (In progress, not met)  6. Pt will walk > than or equal to 230 feet on the 2 minute walk test indoor with AD with 0 LOB and minimal gait deviations for improved safety in home ambulation and safety (In progress, not met)        Plan     Cont to advance towards established PT goals.   Robin Davis, PTA

## 2020-01-20 ENCOUNTER — CLINICAL SUPPORT (OUTPATIENT)
Dept: REHABILITATION | Facility: HOSPITAL | Age: 65
End: 2020-01-20
Payer: MEDICARE

## 2020-01-20 DIAGNOSIS — Z78.9 IMPAIRED MOBILITY AND ACTIVITIES OF DAILY LIVING: ICD-10-CM

## 2020-01-20 DIAGNOSIS — R27.8 DECREASED COORDINATION: ICD-10-CM

## 2020-01-20 DIAGNOSIS — Z74.09 IMPAIRED MOBILITY AND ACTIVITIES OF DAILY LIVING: ICD-10-CM

## 2020-01-20 DIAGNOSIS — R29.898 DECREASED STRENGTH OF LOWER EXTREMITY: ICD-10-CM

## 2020-01-20 DIAGNOSIS — M62.81 MUSCLE WEAKNESS: ICD-10-CM

## 2020-01-20 DIAGNOSIS — Z74.09 IMPAIRED FUNCTIONAL MOBILITY, BALANCE, GAIT, AND ENDURANCE: ICD-10-CM

## 2020-01-20 PROCEDURE — 97140 MANUAL THERAPY 1/> REGIONS: CPT | Mod: PN

## 2020-01-20 PROCEDURE — 97530 THERAPEUTIC ACTIVITIES: CPT | Mod: PN,59

## 2020-01-20 PROCEDURE — 97110 THERAPEUTIC EXERCISES: CPT | Mod: PN

## 2020-01-20 NOTE — PROGRESS NOTES
"                            Physical Therapy Daily Treatment Note     Name: Luis Wong  Clinic Number: [id number]    Therapy Diagnosis:   Encounter Diagnoses   Name Primary?    Impaired functional mobility, balance, gait, and endurance     Decreased strength of lower extremity      Physician: Carla Altman PA-C     Visit Date: 1/20/2020    Physician Orders: PT Eval and Treat  Medical Diagnosis from Referral: I63.9 (ICD-10-CM) - CVA (cerebral vascular accident)  Evaluation Date: 9/13/2019  Authorization Period Expiration: 12/31/2020  Plan of Care Expiration: 3/06/2020  Visit # / Visits authorized: 6/50 (45 prior visits)  FOTO/G-Code: 8/10     Time In: 9:25 AM  Time Out: 10:10 AM  Total Billable Time: 45 minutes (3 TE)    Precautions: Standard; history of of CVA's    Subjective     Pt reports: He's feeling okay today. Both excited and slightly reserved about Botox injections on Friday.   Response to previous treatment: No adverse reactions.   Functional change: Ongoing       Objective     Luis participated in therapeutic exercises for 45 minutes including:    - Nu-Step for reciprocal BUE and BLE motion for 10 minutes at level 7.0 HILLS PROGRAM - MULTIPLE PEAKS    - Lateral stepping over 1 primo: 30"x4  - Fwd Step-ups:   2x10, B, 6" step  - Fwd Step-ups w/FOAM 2x10, B, 6" step with foam on top, CGA/SBA for balance  - Step-Fan:      - Level 1 step -> step stool: x10, B      - Foam disc -> step stool -> opposite foam disc: x10, B    -Cybex knee extension: 30#, 2x15, B      20# 2x15, DL up, LLE down  -Cybex Ham curls:  1.0 pl 2x15 B - not today  -Cybex Leg Press:  7.0 plates, 3x10, B      5.0 plates, 3x10, LLE only  -lateral sidesteps   35 ft x 2 trials B  No UE assist/ no AD - not today        Home Exercises Provided and Patient Education Provided     Education provided:   Cont HEP      Written Home Exercises Provided: Not today  Exercises were reviewed and Luis was able to demonstrate them prior to the end " of the session.  Luis demonstrated good  understanding of the education provided.     See EMR under Patient Instructions for exercises provided 9/23/2019 and 10/28/2019.    Assessment     Patient able to ambulate throughout clinic without AD, however displays heavy left knee buckling that decreases with use of AD. Progressed back to performing step-fan, step-ups, and lateral stepping over one primo. No LOB noted with performance, however patient had several instances of hyperextending left knee during SLS activities involved with step-fan and step-ups.     Luis is progressing well towards his goals.   Pt prognosis is Good.   Pt will continue to benefit from skilled outpatient physical therapy to address the deficits listed in the problem list box on initial evaluation, provide pt/family education and to maximize pt's level of independence in the home and community environment.     Pt's spiritual, cultural and educational needs considered and pt agreeable to plan of care and goals.  Anticipated barriers to physical therapy: Co-morbidities    Goals:  Long Term Goals (8 Weeks):   3. Pt will be able to perform TUG in less than or equal to 17 seconds, safely, without use of AD demonstrating overall improved functional mobility. PROGRESSING, NOT MET 1/20/2020  6. Pt will walk > than or equal to 230 feet on the 2 minute walk test indoor with AD with 0 LOB and minimal gait deviations for improved safety in home ambulation and safety. PROGRESSING, NOT MET 1/20/2020  7. Pt will be able to safely perform and tolerate high level ADL's without LOB. PROGRESSING, NOT MET 1/20/2020  8. Pt will have 0 falls from 11/6/19. PROGRESSING, NOT MET 1/20/2020  10. Pt will have MMT score of 3+/5 in all major ms groups in Left LE.  PROGRESSING, NOT MET 1/20/2020  11. Pt will ambulate on TM x 6 minutes with use of UE support with 0 LOB at greater than or equal to 1.3 mph. PROGRESSING, NOT MET 1/20/2020  12. Pt will begin some form of  community fitness to begin regular and consistent performance of exercise to continue maintenance of gains made in PT. PROGRESSING, NOT MET 1/20/2020        Plan     Cont to advance towards established PT goals.     Kandy Gatica, PT

## 2020-01-20 NOTE — PROGRESS NOTES
"  Occupational Therapy Daily Treatment Note     Name: Luis Wong  Clinic Number: [id number]    Therapy Diagnosis:    Encounter Diagnoses   Name Primary?    Impaired mobility and activities of daily living     Muscle weakness     Decreased coordination      Physician: Carla Altman, HALLE Lopez MD    Visit Date: 1/20/2020  Physician Orders: Eval and tx  Medical Diagnosis: CVA L sided weakness Recrudecesnt  Onset Date: 8/28/19 most recent CVA  Evaluation Date: 9/4/2019  Plan of Care Expiration Period: 2/21/20  Insurance Authorization period Expiration: 12/31/19  Date of Return to MD: NA  Visit # / Visits Authortized: 15/50 change in insurance  FOTO: CVA UE hand / 35% limitation down from 75% with the hand survey.      Time In:10:15 am   Time Out:11:00 am   Total Billable (one on one) Time: 45 minutes      Precautions: Standard and Fall    Subjective     Pt reports: " It still hurts over here pt pointing to mid deltoid insertion"  he was compliant with home exercise program given last session.   Response to previous treatment:good   Functional change: able to put glasses on with BUEs    Pain: 0/10   Location: left arms     Objective   Mt: Pt received manual therapy consisting of PROM in all planes, joint mobilization (grades I-II) with gentle oscillations at acromioclavicular and glenohumeral joint along with myofascial release and STM to surrounding musculature (biceps, pects, deltoids, traps, triceps etc.) to decrease stiffness and pain with movements. 15 min      Ulis Jeffreythi  received TE to develop GM/FM coordination, balance, activity tolerance and strength in order to increase ADL/IADL independence with replicated home management/self care tasks, for 20 minutes including: measurements for UPOC.           Shoulder    PROM all planes X 10        Isolated L tricep ext    Chest press  FF 1#  2/10  2#  2/15       Hand    Red putty punch out with wrist ext 10 min         Luis Wong  received " therapeutic activities to develop GM/FM coordination, balance, activity tolerance and strength in order to increase ADL/IADL independence with replicated home management/self care tasks, for 10 minutes including:  Card flip challenge to increase supination and FM coordination.       Home Exercises and Education Provided     Education provided:   - Previous hand out   - Progress towards goals     Written Home Exercises Provided: Patient instructed to cont prior HEP. Updated putty exercises to molding,  and pinch Red putty provided.  Exercises were reviewed and Luis was able to demonstrate them prior to the end of the session.  Luis demonstrated good  understanding of the HEP provided.   .   See EMR under Patient Instructions for exercises provided prior visit.        Assessment   Pt tolerated session well. He tolerated established exercises fair. Still having pain in the shoulder but also still restricted by tone. He states still not wearing the dynasplint with education that it would help to prevent tone in the shoulder and elbow. Pt stating it is too heavy. Still having pain at the wrist on the L arm as well impacting performance. Tolerating LLPS and MT fairly well but decreased carryover secondary to tone. States going to see ortho MD with education to ask MD about shoulder pain and for his unaffected wrist pain.     Luis is progressing well towards his goals and there are no updates to goals at this time. Pt prognosis is Fair.      Pt will continue to benefit from skilled outpatient occupational therapy to address the deficits listed in the problem list on initial evaluation provide pt/family education and to maximize pt's level of independence in the home and community environment.      Anticipated barriers to occupational therapy:      Pt's spiritual, cultural and educational needs considered and pt agreeable to plan of care and goals.     Previous Short Term Goals Status:   Goals:  Short Term  Goals:  1) Initiate Hep Met 10/30/2019  2) Pt to increase LUE  strength by 5 # in order to A in self care task of feeding by 4 weeks. MET 10/30/2019  3) Pt to increase Quick dash Score by 5 points increasing self care IND by 4 weeks. Progressing 1/20/2020  4) Pt to increase L UE AROM by 10 degrees in order to A in UB dressing by 4 weeks.MET 1/20/2020  5) Patient will be able to achieve less than or equal to 75% on the FOTO, demonstrating overall improved functional ability with upper extremity. (self-care category) Progressing 1/20/2020           New Short Term Goals Status:     Long Term Goals:  1) Pt to be IND with HEP in order to maintain ROM and strength needed for self care IND by d.c. Progressing 1/20/2020  2) Pt to increase L UE  strength to WNL as compared to unaffected extremity in order to open items for self feeding by d.c. Progressing 1/20/2020  3) Pt to increase Quick dash Score by 10 points increasing self care IND at home by d.c. Progressing 1/20/2020  4) Pt to increase L UE AROM to WFL in order to A in UB dressing by d/c. Progressing 1/20/2020  5) Patient will be able to achieve less than or equal to 50% on the FOTO, demonstrating overall improved functional ability with upper extremity. (self-care category) Progressing 1/20/2020      Long Term Goal Status:   continue per initial plan of care.  Reasons for Recertification of Therapy:   Pt continues to benefit from skilled services and has made good progress thus far with good future rehab potential. Pt remains limited with LUE strength, ROM and coordination at this time which all still impact their performance of ADLs , IADLs affecting her habits, roles and routines.        Plan      Continue per UPOC. Need for future splinting and dynasplint to prevent contracture while increasing arm swing for functional ambulation and balance.   Progress as tolerated.     Christian Teran  OTR/L

## 2020-01-22 ENCOUNTER — DOCUMENTATION ONLY (OUTPATIENT)
Dept: REHABILITATION | Facility: HOSPITAL | Age: 65
End: 2020-01-22

## 2020-01-22 ENCOUNTER — CLINICAL SUPPORT (OUTPATIENT)
Dept: REHABILITATION | Facility: HOSPITAL | Age: 65
End: 2020-01-22
Payer: MEDICARE

## 2020-01-22 DIAGNOSIS — R29.898 DECREASED STRENGTH OF LOWER EXTREMITY: ICD-10-CM

## 2020-01-22 DIAGNOSIS — M62.81 MUSCLE WEAKNESS: ICD-10-CM

## 2020-01-22 DIAGNOSIS — Z74.09 IMPAIRED FUNCTIONAL MOBILITY, BALANCE, GAIT, AND ENDURANCE: ICD-10-CM

## 2020-01-22 DIAGNOSIS — Z78.9 IMPAIRED MOBILITY AND ACTIVITIES OF DAILY LIVING: ICD-10-CM

## 2020-01-22 DIAGNOSIS — Z74.09 IMPAIRED MOBILITY AND ACTIVITIES OF DAILY LIVING: ICD-10-CM

## 2020-01-22 DIAGNOSIS — R27.8 DECREASED COORDINATION: ICD-10-CM

## 2020-01-22 PROCEDURE — 97110 THERAPEUTIC EXERCISES: CPT | Mod: PN,CQ

## 2020-01-22 PROCEDURE — 97140 MANUAL THERAPY 1/> REGIONS: CPT | Mod: PN

## 2020-01-22 PROCEDURE — 97110 THERAPEUTIC EXERCISES: CPT | Mod: PN

## 2020-01-22 PROCEDURE — 97530 THERAPEUTIC ACTIVITIES: CPT | Mod: PN

## 2020-01-22 NOTE — PROGRESS NOTES
"  Occupational Therapy Daily Treatment Note     Name: Luis Wong  Clinic Number: [id number]    Therapy Diagnosis:    Encounter Diagnoses   Name Primary?    Impaired mobility and activities of daily living     Muscle weakness     Decreased coordination      Physician: Carla Altman, HALLE Lopez MD    Visit Date: 1/22/2020  Physician Orders: Eval and tx  Medical Diagnosis: CVA L sided weakness Recrudecesnt  Onset Date: 8/28/19 most recent CVA  Evaluation Date: 9/4/2019  Plan of Care Expiration Period: 2/21/20  Insurance Authorization period Expiration: 12/31/19  Date of Return to MD: NA  Visit # / Visits Authortized: 16/50 change in insurance  FOTO: CVA UE hand / 35% limitation down from 75% with the hand survey.      Time In:10:15 am   Time Out:11:00 am   Total Billable (one on one) Time: 45 minutes      Precautions: Standard and Fall    Subjective     Pt reports: " Its better today not as sore in the shoulder"  he was compliant with home exercise program given last session.   Response to previous treatment:good   Functional change: able to put glasses on with BUEs    Pain: 0/10   Location: left arms     Objective   Mt: Pt received manual therapy consisting of PROM in all planes, joint mobilization (grades I-II) with gentle oscillations at acromioclavicular and glenohumeral joint along with myofascial release and STM to surrounding musculature (biceps, pects, deltoids, traps, triceps etc.) to decrease stiffness and pain with movements. 15 min  Sidelying scap mobs included     Luis Wong  received TE to develop GM/FM coordination, balance, activity tolerance and strength in order to increase ADL/IADL independence with replicated home management/self care tasks, for 20 minutes including: measurements for UPOC.           Shoulder    PROM all planes X 10    Seated band exercises:  Rows  Bicep curls  Supine:  Elbow ext  Shoulder ext   Red  2/10  2/10    2/10  2/10   Horizontal abduction with scap mob " from rolled up [facility] 2/10                    Luis Wong  received therapeutic activities to develop GM/FM coordination, balance, activity tolerance and strength in order to increase ADL/IADL independence with replicated home management/self care tasks, for 10 minutes including:  Card sorting challenge in standing using LUE airsplint applied reaching overhead and shoulder height.       Home Exercises and Education Provided     Education provided:   - Previous hand out   - Progress towards goals     Written Home Exercises Provided: Patient instructed to cont prior HEP. Updated putty exercises to molding,  and pinch Red putty provided.  Exercises were reviewed and Luis was able to demonstrate them prior to the end of the session.  Luis demonstrated good  understanding of the HEP provided.   .   See EMR under Patient Instructions for exercises provided prior visit.        Assessment   Pt tolerated session well. Less tone and pain in the shoulder noted today. Session focused a lot on posterior muscle groups of the shoulder prior to functional use with more focus on this in future sessions. He is getting stronger with the arm and increasing functional use with A from air splint due to weakness at the triceps and ability to ext elbow with functional reach. Future sessions to continue NMR and strengthening for LUE use.     Luis is progressing well towards his goals and there are no updates to goals at this time. Pt prognosis is Fair.      Pt will continue to benefit from skilled outpatient occupational therapy to address the deficits listed in the problem list on initial evaluation provide pt/family education and to maximize pt's level of independence in the home and community environment.      Anticipated barriers to occupational therapy:      Pt's spiritual, cultural and educational needs considered and pt agreeable to plan of care and goals.     Previous Short Term Goals Status:   Goals:  Short Term  Goals:  1) Initiate Hep Met 10/30/2019  2) Pt to increase LUE  strength by 5 # in order to A in self care task of feeding by 4 weeks. MET 10/30/2019  3) Pt to increase Quick dash Score by 5 points increasing self care IND by 4 weeks. Progressing 1/22/2020  4) Pt to increase L UE AROM by 10 degrees in order to A in UB dressing by 4 weeks.MET 1/22/2020  5) Patient will be able to achieve less than or equal to 75% on the FOTO, demonstrating overall improved functional ability with upper extremity. (self-care category) Progressing 1/22/2020           New Short Term Goals Status:     Long Term Goals:  1) Pt to be IND with HEP in order to maintain ROM and strength needed for self care IND by d.c. Progressing 1/22/2020  2) Pt to increase L UE  strength to WNL as compared to unaffected extremity in order to open items for self feeding by d.c. Progressing 1/22/2020  3) Pt to increase Quick dash Score by 10 points increasing self care IND at home by d.c. Progressing 1/22/2020  4) Pt to increase L UE AROM to WFL in order to A in UB dressing by d/c. Progressing 1/22/2020  5) Patient will be able to achieve less than or equal to 50% on the FOTO, demonstrating overall improved functional ability with upper extremity. (self-care category) Progressing 1/22/2020      Long Term Goal Status:   continue per initial plan of care.  Reasons for Recertification of Therapy:   Pt continues to benefit from skilled services and has made good progress thus far with good future rehab potential. Pt remains limited with LUE strength, ROM and coordination at this time which all still impact their performance of ADLs , IADLs affecting her habits, roles and routines.        Plan      Continue per UPOC.   Progress as tolerated.     Christian Teran  OTR/L

## 2020-01-22 NOTE — PROGRESS NOTES
Face to Face PTA Conference performed with Robin Davis PTA regarding patient's current status, overall progress, and plan of care.    Kandy Gatica, PT    Robin Davis, PTA

## 2020-01-22 NOTE — PROGRESS NOTES
"                            Physical Therapy Daily Treatment Note     Name: Luis Wong  Clinic Number: [id number]    Therapy Diagnosis:   Encounter Diagnoses   Name Primary?    Impaired functional mobility, balance, gait, and endurance     Decreased strength of lower extremity      Physician: Carla Altman PA-C     Visit Date: 1/22/2020    Physician Orders: PT Eval and Treat  Medical Diagnosis from Referral: I63.9 (ICD-10-CM) - CVA (cerebral vascular accident)  Evaluation Date: 9/13/2019  Authorization Period Expiration: 12/31/2020  Plan of Care Expiration: 3/06/2020  Visit # / Visits authorized: 7/50 (45 prior visits)  FOTO/G-Code: 9/10     Time In: 9:30 AM  Time Out: 10:10 AM  Total Billable Time: 45 minutes (3 TE)    Precautions: Standard; history of of CVA's    Subjective     Pt reports: pt agreeable to PT today. He has no reports of pain upon arrival today.    Response to previous treatment: No adverse reactions.   Functional change: Ongoing       Objective     Luis participated in therapeutic exercises for 45 minutes including:    - Nu-Step for reciprocal BUE and BLE motion for 10 minutes at level 7.0 HILLS PROGRAM - MULTIPLE PEAKS    - Lateral stepping over 1 primo: 30"x4  - Fwd Step-ups:   2x10, B, 6" step  - Fwd Step-ups w/FOAM 2x10, B, 6" step with foam on top, CGA/SBA for balance      -Cybex knee extension: 30#, 2x15, B      20# 2x15, DL up, LLE down  -Cybex Ham curls:  1.0 pl 2x15 B - not today  -Cybex Leg Press:  7.0 plates, 3x10, B      5.0 plates, 3x10, LLE only  -lateral sidesteps   35 ft x 2 trials B  No UE assist/ no AD - not today        Home Exercises Provided and Patient Education Provided     Education provided:   Cont HEP      Written Home Exercises Provided: Not today  Exercises were reviewed and Luis was able to demonstrate them prior to the end of the session.  Luis demonstrated good  understanding of the education provided.     See EMR under Patient Instructions for " exercises provided 9/23/2019 and 10/28/2019.    Assessment     Patient completed today's session with no episodes of loss of balance or reports of pain. He ambulates with SPC on level surfaces and mod indep. Pt follows verbal instructions on technique and safety with use of cybex machinery and balance activities.     Luis is progressing well towards his goals.   Pt prognosis is Good.   Pt will continue to benefit from skilled outpatient physical therapy to address the deficits listed in the problem list box on initial evaluation, provide pt/family education and to maximize pt's level of independence in the home and community environment.     Pt's spiritual, cultural and educational needs considered and pt agreeable to plan of care and goals.  Anticipated barriers to physical therapy: Co-morbidities    Goals:  Long Term Goals (8 Weeks):   3. Pt will be able to perform TUG in less than or equal to 17 seconds, safely, without use of AD demonstrating overall improved functional mobility. PROGRESSING, NOT MET 1/22/2020  6. Pt will walk > than or equal to 230 feet on the 2 minute walk test indoor with AD with 0 LOB and minimal gait deviations for improved safety in home ambulation and safety. PROGRESSING, NOT MET 1/22/2020  7. Pt will be able to safely perform and tolerate high level ADL's without LOB. PROGRESSING, NOT MET 1/22/2020  8. Pt will have 0 falls from 11/6/19. PROGRESSING, NOT MET 1/22/2020  10. Pt will have MMT score of 3+/5 in all major ms groups in Left LE.  PROGRESSING, NOT MET 1/22/2020  11. Pt will ambulate on TM x 6 minutes with use of UE support with 0 LOB at greater than or equal to 1.3 mph. PROGRESSING, NOT MET 1/22/2020  12. Pt will begin some form of community fitness to begin regular and consistent performance of exercise to continue maintenance of gains made in PT. PROGRESSING, NOT MET 1/22/2020        Plan     Cont to advance towards established PT goals.     Robin Davis, WENDY

## 2020-01-29 ENCOUNTER — CLINICAL SUPPORT (OUTPATIENT)
Dept: REHABILITATION | Facility: HOSPITAL | Age: 65
End: 2020-01-29
Payer: MEDICARE

## 2020-01-29 DIAGNOSIS — R29.898 DECREASED STRENGTH OF LOWER EXTREMITY: ICD-10-CM

## 2020-01-29 DIAGNOSIS — Z74.09 IMPAIRED FUNCTIONAL MOBILITY, BALANCE, GAIT, AND ENDURANCE: ICD-10-CM

## 2020-01-29 PROCEDURE — 97110 THERAPEUTIC EXERCISES: CPT | Mod: PN,CQ

## 2020-01-29 NOTE — PROGRESS NOTES
"                            Physical Therapy Daily Treatment Note     Name: Luis Wong  Clinic Number: [id number]    Therapy Diagnosis:   Encounter Diagnoses   Name Primary?    Impaired functional mobility, balance, gait, and endurance     Decreased strength of lower extremity      Physician: Carla Altman PA-C     Visit Date: 1/29/2020    Physician Orders: PT Eval and Treat  Medical Diagnosis from Referral: I63.9 (ICD-10-CM) - CVA (cerebral vascular accident)  Evaluation Date: 9/13/2019  Authorization Period Expiration: 12/31/2020  Plan of Care Expiration: 3/06/2020  Visit # / Visits authorized: 8/50 (45 prior visits)  FOTO: /10     Time In: 9:30 AM  Time Out: 10:10 AM  Total Billable Time: 45 minutes (3 TE)    Precautions: Standard; history of of CVA's    Subjective     Pt reports: pt agreeable to PT session. He reports no complaints of pain today.   Response to previous treatment: No adverse reactions.   Functional change: Ongoing       Objective     Luis participated in therapeutic exercises for 45 minutes including:    - Nu-Step for reciprocal BUE and BLE motion for 10 minutes at level 7.0 HILLS PROGRAM - MULTIPLE PEAKS    - Lateral stepping over 1 primo: 30"x4  - Fwd Step-ups:   2x10, B, 6" step  - Fwd Step-ups w/FOAM 2x10, B, 6" step with foam on top, CGA/SBA for balance      -Cybex knee extension: 30#, 2x15, B      20# 2x15, DL up, LLE down  -Cybex Ham curls:  1.0 pl 2x15 B - not today  -Cybex Leg Press:  7.0 plates, 2x20, B      5.0 plates, 2x20, LLE only  -lateral sidesteps   35 ft x 2 trials B  No UE assist/ no AD - not today        Home Exercises Provided and Patient Education Provided     Education provided:   Cont HEP      Written Home Exercises Provided: Not today  Exercises were reviewed and Luis was able to demonstrate them prior to the end of the session.  Luis demonstrated good  understanding of the education provided.     See EMR under Patient Instructions for exercises provided " 9/23/2019 and 10/28/2019.    Assessment     Pt able to complete session with no reports of pain. He performs all activities safely. Often ambulates in gym without assistive device between machinery equipment.     Luis is progressing well towards his goals.   Pt prognosis is Good.   Pt will continue to benefit from skilled outpatient physical therapy to address the deficits listed in the problem list box on initial evaluation, provide pt/family education and to maximize pt's level of independence in the home and community environment.     Pt's spiritual, cultural and educational needs considered and pt agreeable to plan of care and goals.  Anticipated barriers to physical therapy: Co-morbidities    Goals:  Long Term Goals (8 Weeks):   3. Pt will be able to perform TUG in less than or equal to 17 seconds, safely, without use of AD demonstrating overall improved functional mobility. PROGRESSING, NOT MET 1/29/2020  6. Pt will walk > than or equal to 230 feet on the 2 minute walk test indoor with AD with 0 LOB and minimal gait deviations for improved safety in home ambulation and safety. PROGRESSING, NOT MET 1/29/2020  7. Pt will be able to safely perform and tolerate high level ADL's without LOB. PROGRESSING, NOT MET 1/29/2020  8. Pt will have 0 falls from 11/6/19. PROGRESSING, NOT MET 1/29/2020  10. Pt will have MMT score of 3+/5 in all major ms groups in Left LE.  PROGRESSING, NOT MET 1/29/2020  11. Pt will ambulate on TM x 6 minutes with use of UE support with 0 LOB at greater than or equal to 1.3 mph. PROGRESSING, NOT MET 1/29/2020  12. Pt will begin some form of community fitness to begin regular and consistent performance of exercise to continue maintenance of gains made in PT. PROGRESSING, NOT MET 1/29/2020        Plan     Cont to advance towards established PT goals.     Robin Davis, PTA

## 2020-01-30 ENCOUNTER — CLINICAL SUPPORT (OUTPATIENT)
Dept: REHABILITATION | Facility: HOSPITAL | Age: 65
End: 2020-01-30
Payer: MEDICARE

## 2020-01-30 DIAGNOSIS — R27.8 DECREASED COORDINATION: ICD-10-CM

## 2020-01-30 DIAGNOSIS — M62.81 MUSCLE WEAKNESS: ICD-10-CM

## 2020-01-30 DIAGNOSIS — Z74.09 IMPAIRED MOBILITY AND ACTIVITIES OF DAILY LIVING: ICD-10-CM

## 2020-01-30 DIAGNOSIS — Z78.9 IMPAIRED MOBILITY AND ACTIVITIES OF DAILY LIVING: ICD-10-CM

## 2020-01-30 PROCEDURE — 97112 NEUROMUSCULAR REEDUCATION: CPT | Mod: PN

## 2020-01-30 PROCEDURE — 97140 MANUAL THERAPY 1/> REGIONS: CPT | Mod: PN

## 2020-01-30 PROCEDURE — 97530 THERAPEUTIC ACTIVITIES: CPT | Mod: PN,59

## 2020-01-30 NOTE — PROGRESS NOTES
"  Occupational Therapy Daily Treatment Note     Name: Luis Wong  Clinic Number: [id number]    Therapy Diagnosis:    Encounter Diagnoses   Name Primary?    Impaired mobility and activities of daily living     Muscle weakness     Decreased coordination      Physician: Carla Altman, HALEL Lopez MD    Visit Date: 1/30/2020  Physician Orders: Eval and tx  Medical Diagnosis: CVA L sided weakness Recrudecesnt  Onset Date: 8/28/19 most recent CVA  Evaluation Date: 9/4/2019  Plan of Care Expiration Period: 2/21/20  Insurance Authorization period Expiration: 12/31/19  Date of Return to MD: NA  Visit # / Visits Authortized: 18/50 change in insurance  FOTO: CVA UE hand / 35% limitation down from 75% with the hand survey.      Time In:10:10 am   Time Out:11:00 am   Total Billable (one on one) Time: 50 minutes      Precautions: Standard and Fall    Subjective     Pt reports: "I feel ok today its a little less stiff im supposed to get the botox in two weeks now"  he was compliant with home exercise program given last session.   Response to previous treatment:good   Functional change: able to put glasses on with BUEs    Pain: 0/10   Location: left arms     Objective   Mt: Pt received manual therapy consisting of PROM in all planes, joint mobilization (grades I-II) with gentle oscillations at acromioclavicular and glenohumeral joint along with myofascial release and STM to surrounding musculature (biceps, pects, deltoids, traps, triceps etc.) to decrease stiffness and pain with movements. 15 min  Sidelying scap mobs included     Luis Wong  received TE to develop GM/FM coordination, balance, activity tolerance and strength in order to increase ADL/IADL independence with replicated home management/self care tasks, for 20 minutes including: measurements for UPOC.           Shoulder    PROM all planes X 10    Supine isolated LUE exercises  FF  ABD  NDT circles 2/10   Sidelying abduction  Gator  FF 2/10          "            Luis Wong  received therapeutic activities to develop GM/FM coordination, balance, activity tolerance and strength in order to increase ADL/IADL independence with replicated home management/self care tasks, for 10 minutes including:  Standing ball/bag toss Focus on coordinated multiple step movements  10 min            Home Exercises and Education Provided     Education provided:   - Previous hand out   - Progress towards goals     Written Home Exercises Provided: Patient instructed to cont prior HEP. Updated putty exercises to molding,  and pinch Red putty provided.  Exercises were reviewed and Luis was able to demonstrate them prior to the end of the session.  Luis demonstrated good  understanding of the HEP provided.   .   See EMR under Patient Instructions for exercises provided prior visit.        Assessment   Pt tolerated session well. He has seemed to tolerate MT and stretching prior to exercises of the shoulder better. Increased ability with coordinated movements of the L. Still limited by weakness and delayed coordination response but did well with reaction challenge today.  Future sessions to continue NMR and strengthening for LUE use as well as more multiple step coordination challenges and grasp/release.     Luis is progressing well towards his goals and there are no updates to goals at this time. Pt prognosis is Fair.      Pt will continue to benefit from skilled outpatient occupational therapy to address the deficits listed in the problem list on initial evaluation provide pt/family education and to maximize pt's level of independence in the home and community environment.      Anticipated barriers to occupational therapy:      Pt's spiritual, cultural and educational needs considered and pt agreeable to plan of care and goals.     Previous Short Term Goals Status:   Goals:  Short Term Goals:  1) Initiate Hep Met 10/30/2019  2) Pt to increase LUE  strength by 5 # in order  to A in self care task of feeding by 4 weeks. MET 10/30/2019  3) Pt to increase Quick dash Score by 5 points increasing self care IND by 4 weeks. Progressing 1/30/2020  4) Pt to increase L UE AROM by 10 degrees in order to A in UB dressing by 4 weeks.MET 1/30/2020  5) Patient will be able to achieve less than or equal to 75% on the FOTO, demonstrating overall improved functional ability with upper extremity. (self-care category) Progressing 1/30/2020        New Short Term Goals Status:     Long Term Goals:  1) Pt to be IND with HEP in order to maintain ROM and strength needed for self care IND by d.c. Progressing 1/30/2020  2) Pt to increase L UE  strength to WNL as compared to unaffected extremity in order to open items for self feeding by d.c. Progressing 1/30/2020  3) Pt to increase Quick dash Score by 10 points increasing self care IND at home by d.c. Progressing 1/30/2020  4) Pt to increase L UE AROM to WFL in order to A in UB dressing by d/c. Progressing 1/30/2020  5) Patient will be able to achieve less than or equal to 50% on the FOTO, demonstrating overall improved functional ability with upper extremity. (self-care category) Progressing 1/30/2020      Long Term Goal Status:   continue per initial plan of care.  Reasons for Recertification of Therapy:   Pt continues to benefit from skilled services and has made good progress thus far with good future rehab potential. Pt remains limited with LUE strength, ROM and coordination at this time which all still impact their performance of ADLs , IADLs affecting her habits, roles and routines.        Plan      Continue per UPOC.   Progress as tolerated.     Christian Teran  OTR/L

## 2020-01-31 ENCOUNTER — CLINICAL SUPPORT (OUTPATIENT)
Dept: REHABILITATION | Facility: HOSPITAL | Age: 65
End: 2020-01-31
Payer: MEDICARE

## 2020-01-31 DIAGNOSIS — Z78.9 IMPAIRED MOBILITY AND ACTIVITIES OF DAILY LIVING: ICD-10-CM

## 2020-01-31 DIAGNOSIS — Z74.09 IMPAIRED FUNCTIONAL MOBILITY, BALANCE, GAIT, AND ENDURANCE: ICD-10-CM

## 2020-01-31 DIAGNOSIS — M62.81 MUSCLE WEAKNESS: ICD-10-CM

## 2020-01-31 DIAGNOSIS — Z74.09 IMPAIRED MOBILITY AND ACTIVITIES OF DAILY LIVING: ICD-10-CM

## 2020-01-31 DIAGNOSIS — R27.8 DECREASED COORDINATION: ICD-10-CM

## 2020-01-31 DIAGNOSIS — R29.898 DECREASED STRENGTH OF LOWER EXTREMITY: ICD-10-CM

## 2020-01-31 PROCEDURE — 97110 THERAPEUTIC EXERCISES: CPT | Mod: PN

## 2020-01-31 PROCEDURE — 97112 NEUROMUSCULAR REEDUCATION: CPT | Mod: PN

## 2020-01-31 PROCEDURE — 97530 THERAPEUTIC ACTIVITIES: CPT | Mod: PN

## 2020-01-31 NOTE — PROGRESS NOTES
"  Occupational Therapy Daily Treatment Note     Name: Luis Wong  Clinic Number: [id number]    Therapy Diagnosis:    Encounter Diagnoses   Name Primary?    Impaired mobility and activities of daily living     Muscle weakness     Decreased coordination      Physician: Carla Altman, HALLE Lopez MD    Visit Date: 1/31/2020  Physician Orders: Eval and tx  Medical Diagnosis: CVA L sided weakness Recrudecesnt  Onset Date: 8/28/19 most recent CVA  Evaluation Date: 9/4/2019  Plan of Care Expiration Period: 2/21/20  Insurance Authorization period Expiration: 12/31/19  Date of Return to MD: NA  Visit # / Visits Authortized: 18/50 change in insurance  FOTO: CVA UE hand / 35% limitation down from 75% with the hand survey.      Time In:10:15 am   Time Out:11:00 am   Total Billable (one on one) Time: 45 minutes      Precautions: Standard and Fall    Subjective     Pt reports: "I feel good since yesterday I brought this arm brace with me I been wearing instead of the heavy dynasplint"  he was compliant with home exercise program given last session.   Response to previous treatment:good   Functional change: able to put glasses on with BUEs    Pain: 0/10   Location: left arms     Objective        Luis Wong  received TE to develop GM/FM coordination, balance, activity tolerance and strength in order to increase ADL/IADL independence with replicated home management/self care tasks, for 20 minutes including: measurements for UPOC.           Shoulder    PROM all planes X 10    Supine self PROM using dowel  ER  Abduction with ER  Shoulder AAROM  FF  Chest press 2/10  2/10  2/10   Supine tricep ext Yellow t band  2/10                    Luis Wong  received therapeutic activities to develop GM/FM coordination, balance, activity tolerance and strength in order to increase ADL/IADL independence with replicated home management/self care tasks, for 10 minutes including:  Standing ball/bag toss with arm splint Focus " on coordinated multiple step movements  10 min    Volleyball bounce Bilateral hand use  2/10  L hand use with R underneath  2/10       Luis participated in neuromuscular re-education activities to improve: Balance, Coordination and Sense for 15 minutes. The following activities were included:  PVC track exercises all planes 2/10               Home Exercises and Education Provided     Education provided:   - Previous hand out   - Progress towards goals     Written Home Exercises Provided: Patient instructed to cont prior HEP. Updated putty exercises to molding,  and pinch Red putty provided.  Exercises were reviewed and Luis was able to demonstrate them prior to the end of the session.  Luis demonstrated good  understanding of the HEP provided.   .   See EMR under Patient Instructions for exercises provided prior visit.        Assessment   Pt tolerated session well. Increased control with LUE arm use with noncompensatory techs but still required A to prevent elbow flexion He did well with bimanual UE coordination and exercises in supine. Still limited with coordination and strength but progressing well. Future sessions to continue NMR and strengthening for LUE use as well as more multiple step coordination challenges and grasp/release.     Luis is progressing well towards his goals and there are no updates to goals at this time. Pt prognosis is Fair.      Pt will continue to benefit from skilled outpatient occupational therapy to address the deficits listed in the problem list on initial evaluation provide pt/family education and to maximize pt's level of independence in the home and community environment.      Anticipated barriers to occupational therapy:      Pt's spiritual, cultural and educational needs considered and pt agreeable to plan of care and goals.     Previous Short Term Goals Status:   Goals:  Short Term Goals:  1) Initiate Hep Met 10/30/2019  2) Pt to increase LUE  strength by 5 # in  order to A in self care task of feeding by 4 weeks. MET 10/30/2019  3) Pt to increase Quick dash Score by 5 points increasing self care IND by 4 weeks. Progressing 1/31/2020  4) Pt to increase L UE AROM by 10 degrees in order to A in UB dressing by 4 weeks.MET 1/31/2020  5) Patient will be able to achieve less than or equal to 75% on the FOTO, demonstrating overall improved functional ability with upper extremity. (self-care category) Progressing 1/31/2020        New Short Term Goals Status:     Long Term Goals:  1) Pt to be IND with HEP in order to maintain ROM and strength needed for self care IND by d.c. Progressing 1/31/2020  2) Pt to increase L UE  strength to WNL as compared to unaffected extremity in order to open items for self feeding by d.c. Progressing 1/31/2020  3) Pt to increase Quick dash Score by 10 points increasing self care IND at home by d.c. Progressing 1/31/2020  4) Pt to increase L UE AROM to WFL in order to A in UB dressing by d/c. Progressing 1/31/2020  5) Patient will be able to achieve less than or equal to 50% on the FOTO, demonstrating overall improved functional ability with upper extremity. (self-care category) Progressing 1/31/2020      Long Term Goal Status:   continue per initial plan of care.  Reasons for Recertification of Therapy:   Pt continues to benefit from skilled services and has made good progress thus far with good future rehab potential. Pt remains limited with LUE strength, ROM and coordination at this time which all still impact their performance of ADLs , IADLs affecting her habits, roles and routines.        Plan      Continue per UPOC.   Progress as tolerated.     Christian Teran  OTR/L

## 2020-01-31 NOTE — PROGRESS NOTES
"                            Physical Therapy Daily Treatment Note     Name: Luis Wong  Clinic Number: [id number]    Therapy Diagnosis:   Encounter Diagnoses   Name Primary?    Impaired functional mobility, balance, gait, and endurance     Decreased strength of lower extremity      Physician: Carla Altman PA-C     Visit Date: 1/31/2020    Physician Orders: PT Eval and Treat  Medical Diagnosis from Referral: I63.9 (ICD-10-CM) - CVA (cerebral vascular accident)  Evaluation Date: 9/13/2019  Authorization Period Expiration: 12/31/2020  Plan of Care Expiration: 3/06/2020  Visit # / Visits authorized: 9/50 (45 prior visits)  FOTO: next at discharge     Time In: 9:30 AM  Time Out: 10:15 AM  Total Billable Time: 45 minutes (3 TE)    Precautions: Standard; history of of CVA's    Subjective     Pt reports: No new complaints. Will be receiving Botox injections on 2/11/2020.    Response to previous treatment: No adverse reactions.   Functional change: Ongoing       Objective     Luis participated in therapeutic exercises for 45 minutes including:    - Nu-Step for reciprocal BUE and BLE motion for 10 minutes at level 7.0     - Lateral stepping over 1 primo: 30"x4  - Fwd Step-ups:   2x10, B, 6" step  - Fwd Step-ups w/FOAM 2x10, B, 6" step with foam on top, CGA/SBA for balance    - Bridges:   3x10, 3" holds  - SLR:    3x10, 1.5#, LLE  - Supine Hip ADD:  3x10    -Cybex knee extension: 30#, 2x15, B      20# 2x15, DL up, LLE down  -Cybex Ham curls:  1.0 pl 2x15 B - not today  -Cybex Leg Press:  7.5 plates, 2x20, B      5.5 plates, 2x20, LLE only    -lateral sidesteps   35 ft x 2 trials B  No UE assist/ no AD - not today        Home Exercises Provided and Patient Education Provided     Education provided:   Cont HEP      Written Home Exercises Provided: Not today  Exercises were reviewed and Luis was able to demonstrate them prior to the end of the session.  Luis demonstrated good  understanding of the education " provided.     See EMR under Patient Instructions for exercises provided 9/23/2019 and 10/28/2019.    Assessment     Patient displayed some increased fatigue today towards end of session with left knee buckling following step-ups. Slight LOB noted when ambulating throughout the gym between machinery.     Luis is progressing well towards his goals.   Pt prognosis is Good.   Pt will continue to benefit from skilled outpatient physical therapy to address the deficits listed in the problem list box on initial evaluation, provide pt/family education and to maximize pt's level of independence in the home and community environment.     Pt's spiritual, cultural and educational needs considered and pt agreeable to plan of care and goals.  Anticipated barriers to physical therapy: Co-morbidities    Goals:  Long Term Goals (8 Weeks):   3. Pt will be able to perform TUG in less than or equal to 17 seconds, safely, without use of AD demonstrating overall improved functional mobility. PROGRESSING, NOT MET 1/31/2020  6. Pt will walk > than or equal to 230 feet on the 2 minute walk test indoor with AD with 0 LOB and minimal gait deviations for improved safety in home ambulation and safety. PROGRESSING, NOT MET 1/31/2020  7. Pt will be able to safely perform and tolerate high level ADL's without LOB. PROGRESSING, NOT MET 1/31/2020  8. Pt will have 0 falls from 11/6/19. PROGRESSING, NOT MET 1/31/2020  10. Pt will have MMT score of 3+/5 in all major ms groups in Left LE.  PROGRESSING, NOT MET 1/31/2020  11. Pt will ambulate on TM x 6 minutes with use of UE support with 0 LOB at greater than or equal to 1.3 mph. PROGRESSING, NOT MET 1/31/2020  12. Pt will begin some form of community fitness to begin regular and consistent performance of exercise to continue maintenance of gains made in PT. PROGRESSING, NOT MET 1/31/2020        Plan     Resume treadmill training next session.   Discuss possible hold until botox injections are  performed.     Kandy Gatica PT

## 2020-02-03 ENCOUNTER — CLINICAL SUPPORT (OUTPATIENT)
Dept: REHABILITATION | Facility: HOSPITAL | Age: 65
End: 2020-02-03
Payer: MEDICARE

## 2020-02-03 DIAGNOSIS — R27.8 DECREASED COORDINATION: ICD-10-CM

## 2020-02-03 DIAGNOSIS — R29.898 DECREASED STRENGTH OF LOWER EXTREMITY: ICD-10-CM

## 2020-02-03 DIAGNOSIS — M62.81 MUSCLE WEAKNESS: ICD-10-CM

## 2020-02-03 DIAGNOSIS — Z74.09 IMPAIRED FUNCTIONAL MOBILITY, BALANCE, GAIT, AND ENDURANCE: ICD-10-CM

## 2020-02-03 DIAGNOSIS — Z78.9 IMPAIRED MOBILITY AND ACTIVITIES OF DAILY LIVING: ICD-10-CM

## 2020-02-03 DIAGNOSIS — Z74.09 IMPAIRED MOBILITY AND ACTIVITIES OF DAILY LIVING: ICD-10-CM

## 2020-02-03 PROCEDURE — 97112 NEUROMUSCULAR REEDUCATION: CPT | Mod: PN

## 2020-02-03 PROCEDURE — 97530 THERAPEUTIC ACTIVITIES: CPT | Mod: PN

## 2020-02-03 PROCEDURE — 97110 THERAPEUTIC EXERCISES: CPT | Mod: PN

## 2020-02-03 NOTE — PROGRESS NOTES
"                            Physical Therapy Daily Treatment Note     Name: Luis Wong  Clinic Number: [id number]    Therapy Diagnosis:   Encounter Diagnoses   Name Primary?    Impaired functional mobility, balance, gait, and endurance     Decreased strength of lower extremity      Physician: Carla Altman PA-C     Visit Date: 2/3/2020    Physician Orders: PT Eval and Treat  Medical Diagnosis from Referral: I63.9 (ICD-10-CM) - CVA (cerebral vascular accident)  Evaluation Date: 9/13/2019  Authorization Period Expiration: 12/31/2020  Plan of Care Expiration: 3/06/2020  Visit # / Visits authorized: 10/50 (45 prior visits)  FOTO: next at discharge     Time In: 9:30 AM  Time Out: 10:15 AM  Total Billable Time: 45 minutes (2 TE, 1 NMR)      Subjective     Pt reports: No new complaints  Response to previous treatment: No adverse reactions.   Functional change: Ongoing       Objective     Luis participated in therapeutic exercises for 30 minutes including:    - Nu-Step for reciprocal BUE and BLE motion for 10 minutes at level 7.0     - Fwd Step-ups w/FOAM 2x10, B, 6" step with foam, SBA for balance    - Bridges:   3x10, 3" holds - not today  - SLR:    3x10, 1.5#, LLE - not today  - Supine Hip ADD:  3x10 - not today    -Cybex knee extension: 30#, 2x15, B      20# 2x15, DL up, LLE down  -Cybex Ham curls:  1.0 pl 2x15 B - not today  -Cybex Leg Press:  7.5 plates, 2x20, B      5.5 plates, 2x20, LLE only    - Fwd Hurdles (6):  3 laps, / bar RUE support  - Lateral Hurdles (6): 3 laps, / bar, RUE support      Luis participated in neuromuscular re-education activities to improve: Balance and Proprioception for 15 minutes. The following activities were included:    - Tandem walking:   3 laps, // bars RUE support  - Tandem on FOAM beam: 4 laps, forward and backward, // bars, RUE support  - Marching on FOAM: 30"x3, RUE support            Home Exercises Provided and Patient Education Provided     Education provided:   Cont " HEP      Written Home Exercises Provided: Not today  Exercises were reviewed and Luis was able to demonstrate them prior to the end of the session.  Luis demonstrated good  understanding of the education provided.     See EMR under Patient Instructions for exercises provided 9/23/2019 and 10/28/2019.    Assessment     Resumed balance exercises today with no adverse reactions or LOB noted. Tolerated addition of marching on foam, tandem walking on solid surface and on foam and ambulation over hurdles. Slight knee buckling noted during ambulation throughout clinic.     Luis is progressing well towards his goals.   Pt prognosis is Good.   Pt will continue to benefit from skilled outpatient physical therapy to address the deficits listed in the problem list box on initial evaluation, provide pt/family education and to maximize pt's level of independence in the home and community environment.     Pt's spiritual, cultural and educational needs considered and pt agreeable to plan of care and goals.  Anticipated barriers to physical therapy: Co-morbidities    Goals:  Long Term Goals (8 Weeks):   3. Pt will be able to perform TUG in less than or equal to 17 seconds, safely, without use of AD demonstrating overall improved functional mobility. PROGRESSING, NOT MET 2/3/2020  6. Pt will walk > than or equal to 230 feet on the 2 minute walk test indoor with AD with 0 LOB and minimal gait deviations for improved safety in home ambulation and safety. PROGRESSING, NOT MET 2/3/2020  7. Pt will be able to safely perform and tolerate high level ADL's without LOB. PROGRESSING, NOT MET 2/3/2020  8. Pt will have 0 falls from 11/6/19. PROGRESSING, NOT MET 2/3/2020  10. Pt will have MMT score of 3+/5 in all major ms groups in Left LE.  PROGRESSING, NOT MET 2/3/2020  11. Pt will ambulate on TM x 6 minutes with use of UE support with 0 LOB at greater than or equal to 1.3 mph. PROGRESSING, NOT MET 2/3/2020  12. Pt will begin some form of  community fitness to begin regular and consistent performance of exercise to continue maintenance of gains made in PT. PROGRESSING, NOT MET 2/3/2020        Plan     Resume treadmill training next session.     Kandy Gatica, PT

## 2020-02-03 NOTE — PROGRESS NOTES
"  Occupational Therapy Daily Treatment Note     Name: Luis Wong  Clinic Number: [id number]    Therapy Diagnosis:    Encounter Diagnoses   Name Primary?    Impaired mobility and activities of daily living     Muscle weakness     Decreased coordination      Physician: Carla Altman, HALLE Lopez MD    Visit Date: 2/3/2020  Physician Orders: Eval and tx  Medical Diagnosis: CVA L sided weakness Recrudecesnt  Onset Date: 8/28/19 most recent CVA  Evaluation Date: 9/4/2019  Plan of Care Expiration Period: 2/21/20  Insurance Authorization period Expiration: 12/31/19  Date of Return to MD: NA  Visit # / Visits Authortized: 19/50 change in insurance  FOTO: CVA UE hand / 35% limitation down from 75% with the hand survey.      Time In:10:15 am   Time Out:11:00 am   Total Billable (one on one) Time: 45 minutes 2 TE 1 TA     Precautions: Standard and Fall    Subjective     Pt reports: "I have no pain today"  he was compliant with home exercise program given last session.   Response to previous treatment:good   Functional change: able to put glasses on with BUEs    Pain: 0/10   Location:      Objective        Luis Wong  received TE to develop GM/FM coordination, balance, activity tolerance and strength in order to increase ADL/IADL independence with replicated home management/self care tasks, for 30 minutes including: measurements for UPOC.   Exercises    UBE Arm Bike 6 minutes   Shoulder    Red theraband  Rows  External Rotation 2/10   PVC exercises  Shoulder Flexion  Chest Press  External Rotation 10x   Hammer supination and pronation 10x        Luis Wong  received therapeutic activities to develop GM/FM coordination, balance, activity tolerance and strength in order to increase ADL/IADL independence with replicated home management/self care tasks, for 10 minutes including:  Opening Jar 3x       Luis participated in neuromuscular re-education activities to improve: Balance, Coordination and Sense for " 5 minutes. The following activities were included:  PVC track exercises FF 2/10       Home Exercises and Education Provided     Education provided:   - Previous hand out   - Progress towards goals   - Self feeding activity modifcations    Written Home Exercises Provided: Patient instructed to cont prior HEP. Updated putty exercises to molding,  and pinch Red putty provided.  Exercises were reviewed and Luis was able to demonstrate them prior to the end of the session.  Luis demonstrated good  understanding of the HEP provided.   .   See EMR under Patient Instructions for exercises provided prior visit.        Assessment     Pt participated in therapeutic exercises, activities, and NMR to address LUE tone, shoulder strength, and FM coordination. Pt participated in therapeutic exercises with good strength, but required facilitation and cueing to keep proper form during exercises. Pt expressed difficulty with certain self-feeding tasks, so dycem and education was provided on activity modifications. Pt demonstrated limitations with elbow extension and supination due to tone, resulting in difficulties engaging in certain meal time tasks. Pt participated in NMR and exercises with PVC to address ROM in LUE. Pt would benefit from cont'd OT to address LUE strength, coordination, and grasp/release to participate in ADLs and IADLs.    Luis is progressing well towards his goals and there are no updates to goals at this time. Pt prognosis is Fair.      Pt will continue to benefit from skilled outpatient occupational therapy to address the deficits listed in the problem list on initial evaluation provide pt/family education and to maximize pt's level of independence in the home and community environment.      Anticipated barriers to occupational therapy:      Pt's spiritual, cultural and educational needs considered and pt agreeable to plan of care and goals.     Previous Short Term Goals Status:   Goals:  Short Term  Goals:  1) Initiate Hep Met 10/30/2019  2) Pt to increase LUE  strength by 5 # in order to A in self care task of feeding by 4 weeks. MET 10/30/2019  3) Pt to increase Quick dash Score by 5 points increasing self care IND by 4 weeks. Progressing 2/3/2020  4) Pt to increase L UE AROM by 10 degrees in order to A in UB dressing by 4 weeks.MET 2/3/2020  5) Patient will be able to achieve less than or equal to 75% on the FOTO, demonstrating overall improved functional ability with upper extremity. (self-care category) Progressing 2/3/2020        New Short Term Goals Status:     Long Term Goals:  1) Pt to be IND with HEP in order to maintain ROM and strength needed for self care IND by d.c. Progressing 2/3/2020  2) Pt to increase L UE  strength to WNL as compared to unaffected extremity in order to open items for self feeding by d.c. Progressing 2/3/2020  3) Pt to increase Quick dash Score by 10 points increasing self care IND at home by d.c. Progressing 2/3/2020  4) Pt to increase L UE AROM to WFL in order to A in UB dressing by d/c. Progressing 2/3/2020  5) Patient will be able to achieve less than or equal to 50% on the FOTO, demonstrating overall improved functional ability with upper extremity. (self-care category) Progressing 2/3/2020      Long Term Goal Status:   continue per initial plan of care.  Reasons for Recertification of Therapy:   Pt continues to benefit from skilled services and has made good progress thus far with good future rehab potential. Pt remains limited with LUE strength, ROM and coordination at this time which all still impact their performance of ADLs , IADLs affecting her habits, roles and routines.        Plan      Continue per UPOC.   Progress as tolerated.     Christian Teran  OTR/L

## 2020-02-05 NOTE — PROGRESS NOTES
"                            Physical Therapy Daily Treatment Note     Name: Luis Wong  Clinic Number: [id number]    Therapy Diagnosis:   Encounter Diagnoses   Name Primary?    Impaired functional mobility, balance, gait, and endurance     Decreased strength of lower extremity      Physician: Carla Altman PA-C     Visit Date: 2/7/2020    Physician Orders: PT Eval and Treat  Medical Diagnosis from Referral: I63.9 (ICD-10-CM) - CVA (cerebral vascular accident)  Evaluation Date: 9/13/2019  Authorization Period Expiration: 12/31/2020  Plan of Care Expiration: 3/06/2020  Visit # / Visits authorized: 11/50 (45 prior visits)  FOTO: next at discharge     Time In: 9:30 AM  Time Out: 10:15 AM  Total Billable Time: 45 minutes (2 TE, 1 NMR)      Subjective     Pt reports: that the muscle relaxer that he's been taking has been causing him to feel lethargic at times and he's been unable to perform his exercises this week like he wanted to as a result.   Pt was compliant with home exercise program.   Response to previous treatment: No adverse reactions.   Functional change: Ongoing       Objective     Luis participated in therapeutic exercises for 35 minutes including:    - Nu-Step for reciprocal BUE and BLE motion for 10 minutes at level 7.0     - Fwd Step-ups  2x10, B, 6" step, SBA for balance    - Bridges:   3x10, 3" holds - not today  - SLR:    3x10, 1.5#, LLE - not today  - Supine Hip ADD:  3x10 - not today    -Cybex knee extension: 30#, 2x15, B      20# 2x15, DL up, LLE down  -Cybex Ham curls:  1.0 pl 2x15 B - not today  -Cybex Leg Press:  7.5 plates, 2x20, B      5.5 plates, 2x20, LLE only    -Standing Hip EXT  2x10, LLE    - Fwd Hurdles (6):  3 laps, / bar RUE support  - Lateral Hurdles (6):  3 laps, / bar, RUE support      Luis participated in neuromuscular re-education activities to improve: Balance and Proprioception for 10 minutes. The following activities were included:    - Tandem on FOAM beam: 3 laps, " "2 beams, forward and backward, // bars, RUE support  - Marching on FOAM: 30"x3, RUE support            Home Exercises Provided and Patient Education Provided     Education provided:   Cont HEP      Written Home Exercises Provided: Not today  Exercises were reviewed and Luis was able to demonstrate them prior to the end of the session.  Luis demonstrated good  understanding of the education provided.     See EMR under Patient Instructions for exercises provided 9/23/2019 and 10/28/2019.    Assessment     Patient able to complete all therex and NMR exercises with no LOB noted. Increased to performing forward and lateral walking on FOAM beam to 2 beams as opposed to 1 that was used last session. Incorporated standing hip extension to help improve hamstring strength to assist with decreased instances of knee hyperextension.     Luis is progressing well towards his goals.   Pt prognosis is Good.   Pt will continue to benefit from skilled outpatient physical therapy to address the deficits listed in the problem list box on initial evaluation, provide pt/family education and to maximize pt's level of independence in the home and community environment.     Pt's spiritual, cultural and educational needs considered and pt agreeable to plan of care and goals.  Anticipated barriers to physical therapy: Co-morbidities    Goals:  Long Term Goals (8 Weeks):   3. Pt will be able to perform TUG in less than or equal to 17 seconds, safely, without use of AD demonstrating overall improved functional mobility. PROGRESSING, NOT MET 2/7/2020  6. Pt will walk > than or equal to 230 feet on the 2 minute walk test indoor with AD with 0 LOB and minimal gait deviations for improved safety in home ambulation and safety. PROGRESSING, NOT MET 2/7/2020  7. Pt will be able to safely perform and tolerate high level ADL's without LOB. PROGRESSING, NOT MET 2/7/2020  8. Pt will have 0 falls from 11/6/19. PROGRESSING, NOT MET 2/7/2020  10. Pt " will have MMT score of 3+/5 in all major ms groups in Left LE.  PROGRESSING, NOT MET 2/7/2020  11. Pt will ambulate on TM x 6 minutes with use of UE support with 0 LOB at greater than or equal to 1.3 mph. PROGRESSING, NOT MET 2/7/2020  12. Pt will begin some form of community fitness to begin regular and consistent performance of exercise to continue maintenance of gains made in PT. PROGRESSING, NOT MET 2/7/2020        Plan     Resume treadmill training next session.     Kandy Gatica, PT

## 2020-02-07 ENCOUNTER — CLINICAL SUPPORT (OUTPATIENT)
Dept: REHABILITATION | Facility: HOSPITAL | Age: 65
End: 2020-02-07
Payer: MEDICARE

## 2020-02-07 DIAGNOSIS — Z74.09 IMPAIRED FUNCTIONAL MOBILITY, BALANCE, GAIT, AND ENDURANCE: ICD-10-CM

## 2020-02-07 DIAGNOSIS — R29.898 DECREASED STRENGTH OF LOWER EXTREMITY: ICD-10-CM

## 2020-02-07 DIAGNOSIS — R27.8 DECREASED COORDINATION: ICD-10-CM

## 2020-02-07 DIAGNOSIS — M62.81 MUSCLE WEAKNESS: ICD-10-CM

## 2020-02-07 DIAGNOSIS — Z74.09 IMPAIRED MOBILITY AND ACTIVITIES OF DAILY LIVING: ICD-10-CM

## 2020-02-07 DIAGNOSIS — Z78.9 IMPAIRED MOBILITY AND ACTIVITIES OF DAILY LIVING: ICD-10-CM

## 2020-02-07 PROCEDURE — 97110 THERAPEUTIC EXERCISES: CPT | Mod: PN

## 2020-02-07 PROCEDURE — 97112 NEUROMUSCULAR REEDUCATION: CPT | Mod: PN

## 2020-02-07 PROCEDURE — 97140 MANUAL THERAPY 1/> REGIONS: CPT | Mod: PN

## 2020-02-07 NOTE — PROGRESS NOTES
"  Occupational Therapy Daily Treatment Note     Name: Luis Wong  Clinic Number: [id number]    Therapy Diagnosis:    Encounter Diagnoses   Name Primary?    Impaired mobility and activities of daily living     Muscle weakness     Decreased coordination      Physician: Carla Altman, HALLE Lopez MD    Visit Date: 2/7/2020  Physician Orders: Eval and tx  Medical Diagnosis: CVA L sided weakness Recrudecesnt  Onset Date: 8/28/19 most recent CVA  Evaluation Date: 9/4/2019  Plan of Care Expiration Period: 2/21/20  Insurance Authorization period Expiration: 12/31/19  Date of Return to MD: NA  Visit # / Visits Authortized: 20/50 change in insurance  FOTO: CVA UE hand / 35% limitation down from 75% with the hand survey.      Time In:10:15 am   Time Out:11:00 am   Total Billable (one on one) Time: 45 minutes 2 TE 1 TA     Precautions: Standard and Fall    Subjective     Pt reports: "I have no pain today its getting better "  he was compliant with home exercise program given last session.   Response to previous treatment:good   Functional change: able to put glasses on with BUEs    Pain: 0/10   Location:      Objective     Mt: Pt received manual therapy consisting of PROM in all planes, joint mobilization (grades I-II) with gentle oscillations at acromioclavicular and glenohumeral joint along with myofascial release and STM to surrounding musculature (biceps, pects, deltoids, traps, triceps etc.) to decrease stiffness and pain with movements. 15 min        Luis Wong  received TE to develop GM/FM coordination, balance, activity tolerance and strength in order to increase ADL/IADL independence with replicated home management/self care tasks, for 10 minutes including: measurements for UPOC.   Exercises    Green t putty   x 30    Red t bar Jluis jannwns  2/10   Shoulder    PROM all planes L arm   x 10                     Luis Wong  received therapeutic activities to develop GM/FM coordination, " balance, activity tolerance and strength in order to increase ADL/IADL independence with replicated home management/self care tasks, for 10 minutes including:  Cup challenge bimanual hand passing R to L Solo cups  X 10   Practice pro/sup with cups 2/10               Luis participated in neuromuscular re-education activities to improve: Balance, Coordination and Sense for 10 minutes. The following activities were included:  Cup placement lower shelf to middle shelf 2/10           Cup placement floor level  X 10      PVC exercises  Reaching floor level   2/10 with cues for elbow ext       Home Exercises and Education Provided     Education provided:   - Previous hand out   - Progress towards goals   - Self feeding activity modifcations    Written Home Exercises Provided: Patient instructed to cont prior HEP. Updated putty exercises to molding,  and pinch Red putty provided.  Exercises were reviewed and Luis was able to demonstrate them prior to the end of the session.  Luis demonstrated good  understanding of the HEP provided.   .   See EMR under Patient Instructions for exercises provided prior visit.        Assessment     Pt tolerated session well. Main focus being LUE functional ROM and grasp/release. He did well with session today with less pain in the shoulder only having fatigue limiting performance with rest break required. Elbow ext and GM control better today with exercises. Educated pt of recovery process with CTS which he states he is having in the R unaffected extremity. Pt would benefit from cont'd OT to address LUE strength, coordination, and grasp/release to participate in ADLs and IADLs.    Luis is progressing well towards his goals and there are no updates to goals at this time. Pt prognosis is Fair.      Pt will continue to benefit from skilled outpatient occupational therapy to address the deficits listed in the problem list on initial evaluation provide pt/family education and to  maximize pt's level of independence in the home and community environment.      Anticipated barriers to occupational therapy:      Pt's spiritual, cultural and educational needs considered and pt agreeable to plan of care and goals.     Previous Short Term Goals Status:   Goals:  Short Term Goals:  1) Initiate Hep Met 10/30/2019  2) Pt to increase LUE  strength by 5 # in order to A in self care task of feeding by 4 weeks. MET 10/30/2019  3) Pt to increase Quick dash Score by 5 points increasing self care IND by 4 weeks. Progressing 2/7/2020  4) Pt to increase L UE AROM by 10 degrees in order to A in UB dressing by 4 weeks.MET 2/7/2020  5) Patient will be able to achieve less than or equal to 75% on the FOTO, demonstrating overall improved functional ability with upper extremity. (self-care category) Progressing 2/7/2020        New Short Term Goals Status:     Long Term Goals:  1) Pt to be IND with HEP in order to maintain ROM and strength needed for self care IND by d.c. Progressing 2/7/2020  2) Pt to increase L UE  strength to WNL as compared to unaffected extremity in order to open items for self feeding by d.c. Progressing 2/7/2020  3) Pt to increase Quick dash Score by 10 points increasing self care IND at home by d.c. Progressing 2/7/2020  4) Pt to increase L UE AROM to WFL in order to A in UB dressing by d/c. Progressing 2/7/2020  5) Patient will be able to achieve less than or equal to 50% on the FOTO, demonstrating overall improved functional ability with upper extremity. (self-care category) Progressing 2/7/2020      Long Term Goal Status:   continue per initial plan of care.  Reasons for Recertification of Therapy:   Pt continues to benefit from skilled services and has made good progress thus far with good future rehab potential. Pt remains limited with LUE strength, ROM and coordination at this time which all still impact their performance of ADLs , IADLs affecting her habits, roles and  routines.        Plan      Continue per UPOC.   Progress as tolerated.     Christian Teran  OTR/L

## 2020-02-10 ENCOUNTER — CLINICAL SUPPORT (OUTPATIENT)
Dept: CARDIOLOGY | Facility: HOSPITAL | Age: 65
End: 2020-02-10
Payer: MEDICARE

## 2020-02-10 ENCOUNTER — CLINICAL SUPPORT (OUTPATIENT)
Dept: REHABILITATION | Facility: HOSPITAL | Age: 65
End: 2020-02-10
Payer: MEDICARE

## 2020-02-10 DIAGNOSIS — R27.8 DECREASED COORDINATION: ICD-10-CM

## 2020-02-10 DIAGNOSIS — M62.81 MUSCLE WEAKNESS: ICD-10-CM

## 2020-02-10 DIAGNOSIS — Z74.09 IMPAIRED FUNCTIONAL MOBILITY, BALANCE, GAIT, AND ENDURANCE: ICD-10-CM

## 2020-02-10 DIAGNOSIS — Z78.9 IMPAIRED MOBILITY AND ACTIVITIES OF DAILY LIVING: ICD-10-CM

## 2020-02-10 DIAGNOSIS — Z74.09 IMPAIRED MOBILITY AND ACTIVITIES OF DAILY LIVING: ICD-10-CM

## 2020-02-10 DIAGNOSIS — R29.898 DECREASED STRENGTH OF LOWER EXTREMITY: ICD-10-CM

## 2020-02-10 PROCEDURE — 93298 CARDIAC DEVICE CHECK - REMOTE: ICD-10-PCS | Mod: ,,, | Performed by: INTERNAL MEDICINE

## 2020-02-10 PROCEDURE — G2066 INTER DEVC REMOTE 30D: HCPCS | Performed by: INTERNAL MEDICINE

## 2020-02-10 PROCEDURE — 97110 THERAPEUTIC EXERCISES: CPT | Mod: PN

## 2020-02-10 PROCEDURE — 93298 REM INTERROG DEV EVAL SCRMS: CPT | Mod: ,,, | Performed by: INTERNAL MEDICINE

## 2020-02-10 PROCEDURE — 97112 NEUROMUSCULAR REEDUCATION: CPT | Mod: PN

## 2020-02-10 PROCEDURE — 97140 MANUAL THERAPY 1/> REGIONS: CPT | Mod: PN

## 2020-02-10 NOTE — PROGRESS NOTES
"  Occupational Therapy Daily Treatment Note     Name: Luis Wong  Clinic Number: [id number]    Therapy Diagnosis:    Encounter Diagnoses   Name Primary?    Impaired mobility and activities of daily living     Muscle weakness     Decreased coordination      Physician: Carla Altman, HALLE Lopez MD    Visit Date: 2/10/2020  Physician Orders: Eval and tx  Medical Diagnosis: CVA L sided weakness Recrudecesnt  Onset Date: 8/28/19 most recent CVA  Evaluation Date: 9/4/2019  Plan of Care Expiration Period: 2/21/20  Insurance Authorization period Expiration: 12/31/19  Date of Return to MD: NA  Visit # / Visits Authortized: 20/50 change in insurance  FOTO: CVA UE hand / 35% limitation down from 75% with the hand survey.      Time In:10:15 am   Time Out:10:55 am   Total Billable (one on one) Time: 40 minutes 2 TE 1 NMR     Precautions: Standard and Fall    Subjective     Pt reports: "I really pushed myself this weekend I cut my grass"  he was compliant with home exercise program given last session.   Response to previous treatment:good   Functional change: able to put glasses on with BUEs    Pain: 0/10   Location:      Objective        Luis Wong  received TE to develop GM/FM coordination, balance, activity tolerance and strength in order to increase ADL/IADL independence with replicated home management/self care tasks, for 25 minutes including: measurements for UPOC.   Exercises            Shoulder    PROM all planes L arm   x 10    Elbow flex/ext  Against resistance    Tricep ext against  Shouder ext against resistance 2/10  3#  2/10  Red band  2/10    AAROM with no resistance 2/10 scaption overhead    Isolated LUE shoulder flexion  2#  2/10       Luis participated in neuromuscular re-education activities to improve: Balance, Coordination and Sense for 20 minutes. The following activities were included:  Grasp and release with cones, rings and golf balls X 5 on and off each    Folding clothes using " BUEs  X 5          Home Exercises and Education Provided     Education provided:   - Previous hand out   - Progress towards goals   - Self feeding activity modifcations    Written Home Exercises Provided: Patient instructed to cont prior HEP. Updated putty exercises to molding,  and pinch Red putty provided.  Exercises were reviewed and Luis was able to demonstrate them prior to the end of the session.  Luis demonstrated good  understanding of the HEP provided.   .   See EMR under Patient Instructions for exercises provided prior visit.        Assessment   Pt continues to tolerate sessions well. He states going to get botox injection tomorrow. He did well with increased resistance and strengthening exercises with more focus on extensor muscle groups. Still having pain and impingement type symptoms of the shoulder but is progress well with functional use of the LUE. He claims he was able to do some yard work this weekend and increased tolerance to grasp and release challenges.     Luis is progressing well towards his goals and there are no updates to goals at this time. Pt prognosis is Fair.      Pt will continue to benefit from skilled outpatient occupational therapy to address the deficits listed in the problem list on initial evaluation provide pt/family education and to maximize pt's level of independence in the home and community environment.      Anticipated barriers to occupational therapy:      Pt's spiritual, cultural and educational needs considered and pt agreeable to plan of care and goals.     Previous Short Term Goals Status:   Goals:  Short Term Goals:  1) Initiate Hep Met 10/30/2019  2) Pt to increase LUE  strength by 5 # in order to A in self care task of feeding by 4 weeks. MET 10/30/2019  3) Pt to increase Quick dash Score by 5 points increasing self care IND by 4 weeks. Progressing 2/10/2020  4) Pt to increase L UE AROM by 10 degrees in order to A in UB dressing by 4 weeks.MET  2/10/2020  5) Patient will be able to achieve less than or equal to 75% on the FOTO, demonstrating overall improved functional ability with upper extremity. (self-care category) Progressing 2/10/2020        New Short Term Goals Status:     Long Term Goals:  1) Pt to be IND with HEP in order to maintain ROM and strength needed for self care IND by d.cLaila Progressing 2/10/2020  2) Pt to increase L UE  strength to WNL as compared to unaffected extremity in order to open items for self feeding by d.cLaila Progressing 2/10/2020  3) Pt to increase Quick dash Score by 10 points increasing self care IND at home by d.cLaila Progressing 2/10/2020  4) Pt to increase L UE AROM to WFL in order to A in UB dressing by d/c. Progressing 2/10/2020  5) Patient will be able to achieve less than or equal to 50% on the FOTO, demonstrating overall improved functional ability with upper extremity. (self-care category) Progressing 2/10/2020      Long Term Goal Status:   continue per initial plan of care.  Reasons for Recertification of Therapy:   Pt continues to benefit from skilled services and has made good progress thus far with good future rehab potential. Pt remains limited with LUE strength, ROM and coordination at this time which all still impact their performance of ADLs , IADLs affecting her habits, roles and routines.        Plan      Continue per UPOC.   Progress as tolerated.     Christian Teran  OTR/L

## 2020-02-10 NOTE — PROGRESS NOTES
"                            Physical Therapy Daily Treatment Note     Name: Luis Wong  Clinic Number: [id number]    Therapy Diagnosis:   Encounter Diagnoses   Name Primary?    Impaired functional mobility, balance, gait, and endurance     Decreased strength of lower extremity      Physician: Carla Altman PA-C     Visit Date: 2/10/2020    Physician Orders: PT Eval and Treat  Medical Diagnosis from Referral: I63.9 (ICD-10-CM) - CVA (cerebral vascular accident)  Evaluation Date: 9/13/2019  Authorization Period Expiration: 12/31/2020  Plan of Care Expiration: 3/06/2020  Visit # / Visits authorized: 12/50 (45 prior visits)  FOTO: next at discharge     Time In: 9:30 AM  Time Out: 10:17 AM  Total Billable Time: 47 minutes (1 MT, 2 TE)      Subjective     Pt reports: no new complaints  Pt was compliant with home exercise program.   Response to previous treatment: No adverse reactions.   Functional change: Ongoing       Objective     Luis received the following manual therapy techniques: Joint mobilizations and manual stretching were applied to the: Left Lower Extremity, for 8 minutes including:  Manual stretching of hamstrings in supine: 30"x3, LLE only  PROM to L Hip: Flexion/Extension, IR/ER    Luis participated in therapeutic exercises for 39 minutes including:    - Nu-Step for reciprocal BUE and BLE motion for 10 minutes at level 8.0    - Bridges:   3x10, 3" holds  - SLR:    3x10, 1.5#, LLE  - Supine Hip ADD:  3x10, 5" holds    - Fwd Step-ups  2x10, B, 6" step, SBA for balance - OOT    -Cybex knee extension: 30#, 2x15, B - OOT      20# 2x15, DL up, LLE down - OOT  -Cybex Ham curls:  1.0 pl 2x15 B  -Cybex Leg Press:  8.0 plates, 2x20, B      6.0 plates, 2x20, LLE only    -Standing Hip EXT  2x10, LLE - OOT    - Fwd Hurdles (6):  3 laps, / bar RUE support - OOT  - Lateral Hurdles (6):  3 laps, / bar, RUE support - OOT      Luis participated in neuromuscular re-education activities to improve: Balance and " "Proprioception. The following activities were included: NOT TODAY - OOT    - Tandem on FOAM beam: 3 laps, 2 beams, forward and backward, // bars, RUE support - OOT  - Marching on FOAM: 30"x3, RUE support - OOT            Home Exercises Provided and Patient Education Provided     Education provided:   Cont HEP      Written Home Exercises Provided: Not today  Exercises were reviewed and Luis was able to demonstrate them prior to the end of the session.  Luis demonstrated good  understanding of the education provided.     See EMR under Patient Instructions for exercises provided 9/23/2019 and 10/28/2019.    Assessment     Resumed manual stretching of hamstrings and PROM of left hip joint today. Tolerated increases in resistance for Nu-Step and leg press. Intermittent use of cane throughout facility with continued left knee buckling noted. Will be receiving botox injections tomorrow in [facility].     Luis is progressing well towards his goals.   Pt prognosis is Good.   Pt will continue to benefit from skilled outpatient physical therapy to address the deficits listed in the problem list box on initial evaluation, provide pt/family education and to maximize pt's level of independence in the home and community environment.     Pt's spiritual, cultural and educational needs considered and pt agreeable to plan of care and goals.  Anticipated barriers to physical therapy: Co-morbidities    Goals:  Long Term Goals (8 Weeks):   3. Pt will be able to perform TUG in less than or equal to 17 seconds, safely, without use of AD demonstrating overall improved functional mobility. PROGRESSING, NOT MET 2/10/2020  6. Pt will walk > than or equal to 230 feet on the 2 minute walk test indoor with AD with 0 LOB and minimal gait deviations for improved safety in home ambulation and safety. PROGRESSING, NOT MET 2/10/2020  7. Pt will be able to safely perform and tolerate high level ADL's without LOB. PROGRESSING, NOT MET 2/10/2020  8. Pt " will have 0 falls from 11/6/19. PROGRESSING, NOT MET 2/10/2020  10. Pt will have MMT score of 3+/5 in all major ms groups in Left LE.  PROGRESSING, NOT MET 2/10/2020  11. Pt will ambulate on TM x 6 minutes with use of UE support with 0 LOB at greater than or equal to 1.3 mph. PROGRESSING, NOT MET 2/10/2020  12. Pt will begin some form of community fitness to begin regular and consistent performance of exercise to continue maintenance of gains made in PT. PROGRESSING, NOT MET 2/10/2020        Plan     Resume treadmill training next session.     Kandy Gatica, PT

## 2020-02-14 ENCOUNTER — CLINICAL SUPPORT (OUTPATIENT)
Dept: REHABILITATION | Facility: HOSPITAL | Age: 65
End: 2020-02-14
Payer: MEDICARE

## 2020-02-14 ENCOUNTER — PATIENT MESSAGE (OUTPATIENT)
Dept: NEUROLOGY | Facility: CLINIC | Age: 65
End: 2020-02-14

## 2020-02-14 DIAGNOSIS — R29.898 DECREASED STRENGTH OF LOWER EXTREMITY: ICD-10-CM

## 2020-02-14 DIAGNOSIS — Z74.09 IMPAIRED MOBILITY AND ACTIVITIES OF DAILY LIVING: ICD-10-CM

## 2020-02-14 DIAGNOSIS — Z78.9 IMPAIRED MOBILITY AND ACTIVITIES OF DAILY LIVING: ICD-10-CM

## 2020-02-14 DIAGNOSIS — Z74.09 IMPAIRED FUNCTIONAL MOBILITY, BALANCE, GAIT, AND ENDURANCE: ICD-10-CM

## 2020-02-14 DIAGNOSIS — M62.81 MUSCLE WEAKNESS: ICD-10-CM

## 2020-02-14 DIAGNOSIS — R27.8 DECREASED COORDINATION: ICD-10-CM

## 2020-02-14 PROCEDURE — 97110 THERAPEUTIC EXERCISES: CPT | Mod: PN

## 2020-02-14 PROCEDURE — 97112 NEUROMUSCULAR REEDUCATION: CPT | Mod: PN

## 2020-02-14 PROCEDURE — 97530 THERAPEUTIC ACTIVITIES: CPT | Mod: PN

## 2020-02-14 NOTE — PROGRESS NOTES
"                            Physical Therapy Daily Treatment Note     Name: Luis Wong  Clinic Number: [id number]    Therapy Diagnosis:   Encounter Diagnoses   Name Primary?    Impaired functional mobility, balance, gait, and endurance     Decreased strength of lower extremity      Physician: Carla Altman PA-C     Visit Date: 2/14/2020    Physician Orders: PT Eval and Treat  Medical Diagnosis from Referral: I63.9 (ICD-10-CM) - CVA (cerebral vascular accident)  Evaluation Date: 9/13/2019  Authorization Period Expiration: 12/31/2020  Plan of Care Expiration: 3/06/2020  Visit # / Visits authorized: 12/50 (45 prior visits)  FOTO: next at discharge     Time In: 9:26 AM  Time Out: 10:15 AM  Total Billable Time: 49 minutes (3 TE)      Subjective     Pt reports: received botox injections in his LUE earlier this week. Continuing to feel a little sluggish and fatigue due to medication.   Pt was compliant with home exercise program.   Response to previous treatment: No adverse reactions.   Functional change: Ongoing       Objective     Luis participated in therapeutic exercises for 44 minutes including:    - Nu-Step for reciprocal BUE and BLE motion for 10 minutes at level 8.0    - Bridges:   3x10, 3" holds - OOT  - SLR:    3x10, 1.5#, LLE - OOT  - Supine Hip ADD:  3x10, 5" holds - OOT    - Fwd Step-ups  2x10, B, 6" step, SBA for balance - OOT    -Cybex knee extension: 30#, 2x15, B      20# 2x15, DL up, LLE down  -Cybex Ham curls:  1.0 pl 2x15 B  -Cybex Leg Press:  8.0 plates, 2x20, B      6.0 plates, 2x20, LLE only    - Fwd Hurdles (6):  3 laps, / bar RUE support  - Lateral Hurdles (6):  3 laps, / bar, RUE support      Luis participated in neuromuscular re-education activities to improve: Balance and Proprioception for 5 minutes. The following activities were included:     - Tandem on FOAM beam: 3 laps, 2 beams, forward and backward, // bars, RUE support  - Marching on FOAM: 30"x3, RUE support - Not " today            Home Exercises Provided and Patient Education Provided     Education provided:   Cont HEP      Written Home Exercises Provided: Not today  Exercises were reviewed and Luis was able to demonstrate them prior to the end of the session.  Luis demonstrated good  understanding of the education provided.     See EMR under Patient Instructions for exercises provided 9/23/2019 and 10/28/2019.    Assessment     Able to maintain performance of increases in resistance from last session. Continued display of knee hyperextension with performance of forward walks over hurdles when patient is in SLS on LLE.     Luis is progressing well towards his goals.   Pt prognosis is Good.   Pt will continue to benefit from skilled outpatient physical therapy to address the deficits listed in the problem list box on initial evaluation, provide pt/family education and to maximize pt's level of independence in the home and community environment.     Pt's spiritual, cultural and educational needs considered and pt agreeable to plan of care and goals.  Anticipated barriers to physical therapy: Co-morbidities    Goals:  Long Term Goals (8 Weeks):   3. Pt will be able to perform TUG in less than or equal to 17 seconds, safely, without use of AD demonstrating overall improved functional mobility. PROGRESSING, NOT MET 2/14/2020  6. Pt will walk > than or equal to 230 feet on the 2 minute walk test indoor with AD with 0 LOB and minimal gait deviations for improved safety in home ambulation and safety. PROGRESSING, NOT MET 2/14/2020  7. Pt will be able to safely perform and tolerate high level ADL's without LOB. PROGRESSING, NOT MET 2/14/2020  8. Pt will have 0 falls from 11/6/19. PROGRESSING, NOT MET 2/14/2020  10. Pt will have MMT score of 3+/5 in all major ms groups in Left LE.  PROGRESSING, NOT MET 2/14/2020  11. Pt will ambulate on TM x 6 minutes with use of UE support with 0 LOB at greater than or equal to  1.3 mph. PROGRESSING, NOT MET 2/14/2020  12. Pt will begin some form of community fitness to begin regular and consistent performance of exercise to continue maintenance of gains made in PT. PROGRESSING, NOT MET 2/14/2020        Plan     Resume treadmill training next session.     Kandy Gatica, PT

## 2020-02-14 NOTE — PROGRESS NOTES
"  Occupational Therapy Daily Treatment Note     Name: Luis Wong  Clinic Number: [id number]    Therapy Diagnosis:    Encounter Diagnoses   Name Primary?    Impaired mobility and activities of daily living     Muscle weakness     Decreased coordination      Physician: Carla Altman, HALLE Lopez MD    Visit Date: 2/14/2020  Physician Orders: Eval and tx  Medical Diagnosis: CVA L sided weakness Recrudecesnt  Onset Date: 8/28/19 most recent CVA  Evaluation Date: 9/4/2019  Plan of Care Expiration Period: 2/21/20  Insurance Authorization period Expiration: 12/31/19  Date of Return to MD: NA  Visit # / Visits Authortized: 21/50 change in insurance  FOTO: CVA UE hand / 35% limitation down from 75% with the hand survey.      Time In: 10:17 AM  Time Out: 11:02 AM  Total Billable (one on one) Time: 45 minutes 1 TE 1 TA 1 NMR     Precautions: Standard and Fall    Subjective     Pt reports: "This was fun" - referring to Wii exercise  he was compliant with home exercise program given last session.   Response to previous treatment:good   Functional change:     Pain: 0/10   Location:      Objective      Luis Wong  received TE to develop GM/FM coordination, balance, activity tolerance and strength in order to increase ADL/IADL independence with replicated home management/self care tasks, for 15 minutes including:     Exercises    Ball stretches Forward stretch with both arms   Dowel stretch external rotation 2/10   Bicep Curls 5# R and 3# L 2/15       Luis participated in neuromuscular re-education activities to improve: Balance, Coordination and Sense for 15 minutes. The following activities were included:  Reach  In low plane 3/12 cups   Ball planes of movement  FF and ER 2/10   Roll on table with bolster 2/10   PVC shoulder flexion 2/10     Luis participated in therapeutic activities for 15 minutes   Wii Sports Table Tennis and Bowling       Home Exercises and Education Provided     Education provided: "   - Previous hand out   - Progress towards goals   - Self feeding activity modifcations    Written Home Exercises Provided: Patient instructed to cont prior HEP. Updated putty exercises to molding,  and pinch Red putty provided.  Exercises were reviewed and Luis was able to demonstrate them prior to the end of the session.  Luis demonstrated good  understanding of the HEP provided.   .   See EMR under Patient Instructions for exercises provided prior visit.        Assessment     Pt participated in therapeutic exercises and activities. Pt participated in low reaching exercises to normalize shoulder flexion movement. Pt participated in exercises with cup taped to beach ball to work on shoulder movement in different planes. Pt worked on shoulder flexion with arm extended on bolster on top of table. Pt participated in shoulder flexion exercise with gliding PVC pipe on PVC cane. Pt tolerated all exercises well. Pt participated in Wiihabiliation exercises with table tennis and bowling. Pt initially demonstrated difficulty with arm flexion and coordination with console control, but progressed with practice. Pt would benefit from cont'd OT to return to functional level of performance.    Luis is progressing well towards his goals and there are no updates to goals at this time. Pt prognosis is Fair.      Pt will continue to benefit from skilled outpatient occupational therapy to address the deficits listed in the problem list on initial evaluation provide pt/family education and to maximize pt's level of independence in the home and community environment.      Anticipated barriers to occupational therapy:      Pt's spiritual, cultural and educational needs considered and pt agreeable to plan of care and goals.     Previous Short Term Goals Status:   Goals:  Short Term Goals:  1) Initiate Hep Met 10/30/2019  2) Pt to increase LUE  strength by 5 # in order to A in self care task of feeding by 4 weeks. MET  10/30/2019  3) Pt to increase Quick dash Score by 5 points increasing self care IND by 4 weeks. Progressing 2/14/2020  4) Pt to increase L UE AROM by 10 degrees in order to A in UB dressing by 4 weeks.MET 2/14/2020  5) Patient will be able to achieve less than or equal to 75% on the FOTO, demonstrating overall improved functional ability with upper extremity. (self-care category) Progressing 2/14/2020        New Short Term Goals Status:     Long Term Goals:  1) Pt to be IND with HEP in order to maintain ROM and strength needed for self care IND by d.c. Progressing 2/14/2020  2) Pt to increase L UE  strength to WNL as compared to unaffected extremity in order to open items for self feeding by d.c. Progressing 2/14/2020  3) Pt to increase Quick dash Score by 10 points increasing self care IND at home by d.c. Progressing 2/14/2020  4) Pt to increase L UE AROM to WFL in order to A in UB dressing by d/c. Progressing 2/14/2020  5) Patient will be able to achieve less than or equal to 50% on the FOTO, demonstrating overall improved functional ability with upper extremity. (self-care category) Progressing 2/14/2020      Long Term Goal Status:   continue per initial plan of care.  Reasons for Recertification of Therapy:   Pt continues to benefit from skilled services and has made good progress thus far with good future rehab potential. Pt remains limited with LUE strength, ROM and coordination at this time which all still impact their performance of ADLs , IADLs affecting her habits, roles and routines.        Plan      Continue per [facility].   Progress as tolerated.     Tran Bejarano, OTS

## 2020-02-17 ENCOUNTER — CLINICAL SUPPORT (OUTPATIENT)
Dept: REHABILITATION | Facility: HOSPITAL | Age: 65
End: 2020-02-17
Payer: MEDICARE

## 2020-02-17 ENCOUNTER — TELEPHONE (OUTPATIENT)
Dept: ORTHOPEDICS | Facility: CLINIC | Age: 65
End: 2020-02-17

## 2020-02-17 ENCOUNTER — TELEPHONE (OUTPATIENT)
Dept: SURGERY | Facility: HOSPITAL | Age: 65
End: 2020-02-17

## 2020-02-17 DIAGNOSIS — Z78.9 IMPAIRED MOBILITY AND ACTIVITIES OF DAILY LIVING: ICD-10-CM

## 2020-02-17 DIAGNOSIS — Z74.09 IMPAIRED FUNCTIONAL MOBILITY, BALANCE, GAIT, AND ENDURANCE: ICD-10-CM

## 2020-02-17 DIAGNOSIS — R27.8 DECREASED COORDINATION: ICD-10-CM

## 2020-02-17 DIAGNOSIS — Z74.09 IMPAIRED MOBILITY AND ACTIVITIES OF DAILY LIVING: ICD-10-CM

## 2020-02-17 DIAGNOSIS — R29.898 DECREASED STRENGTH OF LOWER EXTREMITY: ICD-10-CM

## 2020-02-17 DIAGNOSIS — M62.81 MUSCLE WEAKNESS: ICD-10-CM

## 2020-02-17 PROCEDURE — 97110 THERAPEUTIC EXERCISES: CPT | Mod: PN

## 2020-02-17 PROCEDURE — 97140 MANUAL THERAPY 1/> REGIONS: CPT | Mod: PN

## 2020-02-17 PROCEDURE — 97116 GAIT TRAINING THERAPY: CPT | Mod: PN

## 2020-02-17 NOTE — TELEPHONE ENCOUNTER
----- Message from Barry Dutton Jr., MD sent at 2/17/2020  2:43 PM CST -----  2 days before surgery  ----- Message -----  From: Arabella Barclay LPN  Sent: 2/17/2020   2:30 PM CST  To: MD Dr. Nato Houston Jr.,     This patient is scheduled for surgery Friday, 2/21 R CTR, when/if should he d/c his aspirin and plavix?

## 2020-02-17 NOTE — PROGRESS NOTES
"  Occupational Therapy Daily Treatment Note     Name: Luis Wong  Clinic Number: [id number]    Therapy Diagnosis:    Encounter Diagnoses   Name Primary?    Impaired mobility and activities of daily living     Muscle weakness     Decreased coordination      Physician: Carla Altman, HALLE Lopez MD    Visit Date: 2/17/2020  Physician Orders: Eval and tx  Medical Diagnosis: CVA L sided weakness Recrudecesnt  Onset Date: 8/28/19 most recent CVA  Evaluation Date: 9/4/2019  Plan of Care Expiration Period: 2/21/20  Insurance Authorization period Expiration: 12/31/19  Date of Return to MD: NA  Visit # / Visits Authortized: 22/50 change in insurance  FOTO: CVA UE hand / 35% limitation down from 75% with the hand survey.      Time In: 10:15 AM  Time Out: 11:00 AM  Total Billable (one on one) Time: 45 minutes 1 MT 2TE     Precautions: Standard and Fall    Subjective     Pt reports: "Im still supposed to have the surgery with my R hand for my carpal tunnel"  he was compliant with home exercise program given last session.   Response to previous treatment:good   Functional change:     Pain: 0/10   Location:      Objective     Mt: Pt received manual therapy consisting of PROM in all planes, joint mobilization (grades I-II) with gentle oscillations at acromioclavicular and glenohumeral joint along with myofascial release and STM to surrounding musculature (biceps, pects, deltoids, traps, triceps etc.) to decrease stiffness and pain with movements. 10 min      Luis Wong  received TE to develop GM/FM coordination, balance, activity tolerance and strength in order to increase ADL/IADL independence with replicated home management/self care tasks, for 30 minutes including:     Exercises    Red t putty:  Mold   Roll   PVC punch out with wrist ext X 30 each    Green CP pinch  X 30    Supine shoulder    NDT small circles CW/CCW  1/10   Elbow flex/ext 1#  2/15   Shoulder flexion   External Rotation OT blocking in " supine  1#  2/15           Luis participated in therapeutic activities for 15 minutes  Cotton ball retrieval with tongs  X 1 tub          Home Exercises and Education Provided     Education provided:   - Previous hand out   - Progress towards goals   - Self feeding activity modifcations    Written Home Exercises Provided: Patient instructed to cont prior HEP. Updated putty exercises to molding,  and pinch Red putty provided.  Exercises were reviewed and Luis was able to demonstrate them prior to the end of the session.  Luis demonstrated good  understanding of the HEP provided.   .   See EMR under Patient Instructions for exercises provided prior visit.        Assessment     Pt tolerated session well. His botox injection seems to be reducing the tone in the L arm specifically the biceps. He did well with L hand and wrist coordination challenges and strengthening. He is progressing well with possible further progress due to recent botox injection. He is concerned with his R wrist at this time due to pain with scheduled CTS on the R but educated of possible need for hold on the surgery or neuro OT services until CTR episode resolved. He is doing well with neuro focus at this time and would continue to benefit from LUE coordination and NMR to increase functional use.   Luis is progressing well towards his goals and there are no updates to goals at this time. Pt prognosis is Fair.      Pt will continue to benefit from skilled outpatient occupational therapy to address the deficits listed in the problem list on initial evaluation provide pt/family education and to maximize pt's level of independence in the home and community environment.      Anticipated barriers to occupational therapy:      Pt's spiritual, cultural and educational needs considered and pt agreeable to plan of care and goals.     Previous Short Term Goals Status:   Goals:  Short Term Goals:  1) Initiate Hep Met 10/30/2019  2) Pt to increase  LUE  strength by 5 # in order to A in self care task of feeding by 4 weeks. MET 10/30/2019  3) Pt to increase Quick dash Score by 5 points increasing self care IND by 4 weeks. Progressing 2/17/2020  4) Pt to increase L UE AROM by 10 degrees in order to A in UB dressing by 4 weeks.MET 2/17/2020  5) Patient will be able to achieve less than or equal to 75% on the FOTO, demonstrating overall improved functional ability with upper extremity. (self-care category) Progressing 2/17/2020        New Short Term Goals Status:     Long Term Goals:  1) Pt to be IND with HEP in order to maintain ROM and strength needed for self care IND by d.c. Progressing 2/17/2020  2) Pt to increase L UE  strength to WNL as compared to unaffected extremity in order to open items for self feeding by d.c. Progressing 2/17/2020  3) Pt to increase Quick dash Score by 10 points increasing self care IND at home by d.c. Progressing 2/17/2020  4) Pt to increase L UE AROM to WFL in order to A in UB dressing by d/c. Progressing 2/17/2020  5) Patient will be able to achieve less than or equal to 50% on the FOTO, demonstrating overall improved functional ability with upper extremity. (self-care category) Progressing 2/17/2020      Long Term Goal Status:   continue per initial plan of care.  Reasons for Recertification of Therapy:   Pt continues to benefit from skilled services and has made good progress thus far with good future rehab potential. Pt remains limited with LUE strength, ROM and coordination at this time which all still impact their performance of ADLs , IADLs affecting her habits, roles and routines.       Plan      Continue per [facility].   Progress as tolerated.     Christian Teran, OTS

## 2020-02-17 NOTE — PROGRESS NOTES
"                            Physical Therapy Daily Treatment Note     Name: Luis Wong  Clinic Number: [id number]    Therapy Diagnosis:   Encounter Diagnoses   Name Primary?    Impaired functional mobility, balance, gait, and endurance     Decreased strength of lower extremity      Physician: Carla Altman PA-C     Visit Date: 2/17/2020    Physician Orders: PT Eval and Treat  Medical Diagnosis from Referral: I63.9 (ICD-10-CM) - CVA (cerebral vascular accident)  Evaluation Date: 9/13/2019  Authorization Period Expiration: 12/31/2020  Plan of Care Expiration: 3/06/2020  Visit # / Visits authorized: 13/50 (45 prior visits)  FOTO: next at discharge     Time In: 9:25 AM  Time Out: 10:17 AM  Total Billable Time: 52  minutes (2 TE, 1 GT)      Subjective     Pt reports: Starting to see some effects of his botox injections in his LUE. Has moments of decreased spasticity.    Pt was compliant with home exercise program.   Response to previous treatment: No adverse reactions.   Functional change: Ongoing       Objective     Luis participated in gait training for 17 minutes on treadmill including the following:  --Treadmill forward ambulation with BUE support for 8 minutes with speed ranging from 0.5-0.9 mph and close SBA for balance.   -- Treadmill side-stepping ambulation with BUE support for 3 minutes in each direction with speed at 0.4-0.5 mph and close SBA for balance.     Luis participated in therapeutic exercises for 35 minutes including:    - Nu-Step for reciprocal BUE and BLE motion for 10 minutes at level 8.0    - Bridges:   3x10, 3" holds - NOT TODAY  - SLR:    3x10, 1.5#, LLE - NOT TODAY  - Supine Hip ADD:  3x10, 5" holds - NOT TODAY    - Fwd Step-ups  2x10, B, 6" step, SBA for balance - NOT TODAY    -Cybex knee extension: 30# plus dial turned to "15", 2x15, B      20# 2x15, DL up, LLE down  -Cybex Ham curls:  1.0 pl 2x15 B  -Cybex Leg Press:  8.0 plates, 2x20, B      6.0 plates, 2x20, LLE only    - Fwd " "Hurdles (6):  3 laps, / bar RUE support - OOT  - Lateral Hurdles (6):  3 laps, / bar, RUE support - OOT      Luis did NOT participate in neuromuscular re-education activities to improve: Balance and Proprioception. The following activities were included:     - Tandem on FOAM beam: 3 laps, 2 beams, forward and backward, // bars, RUE support - OOT  - Marching on FOAM: 30"x3, RUE support - OOT            Home Exercises Provided and Patient Education Provided     Education provided:   Cont HEP      Written Home Exercises Provided: Not today  Exercises were reviewed and Luis was able to demonstrate them prior to the end of the session.  Luis demonstrated good  understanding of the education provided.     See EMR under Patient Instructions for exercises provided 9/23/2019 and 10/28/2019.    Assessment     Returned to gait training on treadmill today with no LOB. Patient did however require multiple verbal cueing for lifting leg and not sliding it on the treadmill.     Luis is progressing well towards his goals.   Pt prognosis is Good.   Pt will continue to benefit from skilled outpatient physical therapy to address the deficits listed in the problem list box on initial evaluation, provide pt/family education and to maximize pt's level of independence in the home and community environment.     Pt's spiritual, cultural and educational needs considered and pt agreeable to plan of care and goals.  Anticipated barriers to physical therapy: Co-morbidities    Goals:  Long Term Goals (8 Weeks):   3. Pt will be able to perform TUG in less than or equal to 17 seconds, safely, without use of AD demonstrating overall improved functional mobility. PROGRESSING, NOT MET 2/17/2020  6. Pt will walk > than or equal to 230 feet on the 2 minute walk test indoor with AD with 0 LOB and minimal gait deviations for improved safety in home ambulation and safety. PROGRESSING, NOT MET 2/17/2020  7. Pt will be able to safely perform and " tolerate high level ADL's without LOB. PROGRESSING, NOT MET 2/17/2020  8. Pt will have 0 falls from 11/6/19. PROGRESSING, NOT MET 2/17/2020  10. Pt will have MMT score of 3+/5 in all major ms groups in Left LE.  PROGRESSING, NOT MET 2/17/2020  11. Pt will ambulate on TM x 6 minutes with use of UE support with 0 LOB at greater than or equal to 1.3 mph. PROGRESSING, NOT MET 2/17/2020  12. Pt will begin some form of community fitness to begin regular and consistent performance of exercise to continue maintenance of gains made in PT. PROGRESSING, NOT MET 2/17/2020        Plan     Continue LE strengthening and balance as tolerated.     Kandy Gatica, PT

## 2020-02-19 ENCOUNTER — CLINICAL SUPPORT (OUTPATIENT)
Dept: REHABILITATION | Facility: HOSPITAL | Age: 65
End: 2020-02-19
Payer: MEDICARE

## 2020-02-19 ENCOUNTER — ANESTHESIA EVENT (OUTPATIENT)
Dept: SURGERY | Facility: HOSPITAL | Age: 65
End: 2020-02-19
Payer: MEDICARE

## 2020-02-19 ENCOUNTER — HOSPITAL ENCOUNTER (OUTPATIENT)
Dept: PREADMISSION TESTING | Facility: HOSPITAL | Age: 65
Discharge: HOME | End: 2020-02-19
Attending: ORTHOPAEDIC SURGERY
Payer: MEDICARE

## 2020-02-19 ENCOUNTER — CLINICAL SUPPORT (OUTPATIENT)
Dept: LAB | Facility: HOSPITAL | Age: 65
End: 2020-02-19
Attending: ORTHOPAEDIC SURGERY
Payer: MEDICARE

## 2020-02-19 DIAGNOSIS — Z74.09 IMPAIRED FUNCTIONAL MOBILITY, BALANCE, GAIT, AND ENDURANCE: ICD-10-CM

## 2020-02-19 DIAGNOSIS — R29.898 DECREASED STRENGTH OF LOWER EXTREMITY: ICD-10-CM

## 2020-02-19 DIAGNOSIS — M62.81 MUSCLE WEAKNESS: ICD-10-CM

## 2020-02-19 DIAGNOSIS — R27.8 DECREASED COORDINATION: ICD-10-CM

## 2020-02-19 DIAGNOSIS — Z74.09 IMPAIRED MOBILITY AND ACTIVITIES OF DAILY LIVING: ICD-10-CM

## 2020-02-19 DIAGNOSIS — Z78.9 IMPAIRED MOBILITY AND ACTIVITIES OF DAILY LIVING: ICD-10-CM

## 2020-02-19 PROCEDURE — 97140 MANUAL THERAPY 1/> REGIONS: CPT | Mod: PN

## 2020-02-19 PROCEDURE — 93005 ELECTROCARDIOGRAM TRACING: CPT

## 2020-02-19 PROCEDURE — 97110 THERAPEUTIC EXERCISES: CPT | Mod: PN,CQ

## 2020-02-19 PROCEDURE — 97110 THERAPEUTIC EXERCISES: CPT | Mod: PN

## 2020-02-19 RX ORDER — SODIUM CHLORIDE, SODIUM LACTATE, POTASSIUM CHLORIDE, CALCIUM CHLORIDE 600; 310; 30; 20 MG/100ML; MG/100ML; MG/100ML; MG/100ML
INJECTION, SOLUTION INTRAVENOUS CONTINUOUS
Status: CANCELLED | OUTPATIENT
Start: 2020-02-19

## 2020-02-19 RX ORDER — LIDOCAINE HYDROCHLORIDE 10 MG/ML
1 INJECTION, SOLUTION EPIDURAL; INFILTRATION; INTRACAUDAL; PERINEURAL ONCE
Status: CANCELLED | OUTPATIENT
Start: 2020-02-19 | End: 2020-02-19

## 2020-02-19 NOTE — PROGRESS NOTES
Occupational Therapy Daily Treatment Note     Name: Luis Wong  Clinic Number: [id number]    Therapy Diagnosis:    Encounter Diagnoses   Name Primary?    Impaired mobility and activities of daily living     Muscle weakness     Decreased coordination      Physician: Carla Altman, AHLLE Lopez MD    Visit Date: 2/19/2020  Physician Orders: Eval and tx  Medical Diagnosis: CVA L sided weakness Recrudecesnt  Onset Date: 8/28/19 most recent CVA  Evaluation Date: 9/4/2019  Plan of Care Expiration Period: 2/21/20  Insurance Authorization period Expiration: 12/31/19  Date of Return to MD: NA  Visit # / Visits Authortized: 23/50 change in insurance  FOTO: CVA UE hand / 35% limitation down from 75% with the hand survey.      Time In:  11:00 AM  Time Out:  11:47 AM  Total Billable (one on one) Time: 47 minutes     Precautions: Standard and Fall    Subjective     Pt reports:    he was compliant with home exercise program given last session.   Response to previous treatment:good   Functional change:     Pain: 7/10   Location:  L shoulder    Objective     Mt: Pt received manual therapy consisting of PROM in all planes, joint mobilization (grades I-II) with gentle oscillations at acromioclavicular and glenohumeral joint along with myofascial release and STM to surrounding musculature (biceps, pects, deltoids, traps, triceps etc.) to decrease stiffness and pain with movements. 15 min      Luis Wong  received TE to develop GM/FM coordination, balance, activity tolerance and strength in order to increase ADL/IADL independence with replicated home management/self care tasks, for 30 minutes. Pt also participated in re-measurements    Exercises    Red t putty:  Mold    PVC punch out  X 15 each    Peg board Brown gripper Rung 2       Home Exercises and Education Provided     Education provided:   - Previous hand out   - Progress towards goals   - Self feeding activity modifcations    Written Home Exercises  Provided: Patient instructed to cont prior HEP. Updated putty exercises to molding,  and pinch Red putty provided.  Exercises were reviewed and Luis was able to demonstrate them prior to the end of the session.  Luis demonstrated good  understanding of the HEP provided.   .   See EMR under Patient Instructions for exercises provided prior visit.        Assessment     See treatment section for updated plan of care.     Luis is progressing well towards his goals and there are no updates to goals at this time. Pt prognosis is Fair.      Pt will continue to benefit from skilled outpatient occupational therapy to address the deficits listed in the problem list on initial evaluation provide pt/family education and to maximize pt's level of independence in the home and community environment.      Anticipated barriers to occupational therapy:      Pt's spiritual, cultural and educational needs considered and pt agreeable to plan of care and goals.     Previous Short Term Goals Status:   Goals:  Short Term Goals:  1) Initiate Hep Met 10/30/2019  2) Pt to increase LUE  strength by 5 # in order to A in self care task of feeding by 4 weeks. MET 10/30/2019  3) Pt to increase Quick dash Score by 5 points increasing self care IND by 4 weeks. Progressing 2/19/2020  4) Pt to increase L UE AROM by 10 degrees in order to A in UB dressing by 4 weeks.MET 2/19/2020  5) Patient will be able to achieve less than or equal to 75% on the FOTO, demonstrating overall improved functional ability with upper extremity. (self-care category) Progressing 2/19/2020        New Short Term Goals Status:     Long Term Goals:  1) Pt to be IND with HEP in order to maintain ROM and strength needed for self care IND by d.c. Progressing 2/19/2020  2) Pt to increase L UE  strength to WNL as compared to unaffected extremity in order to open items for self feeding by d.c. Progressing 2/19/2020  3) Pt to increase Quick dash Score by 10 points  increasing self care IND at home by d.c. Progressing 2/19/2020  4) Pt to increase L UE AROM to WFL in order to A in UB dressing by d/c. Progressing 2/19/2020  5) Patient will be able to achieve less than or equal to 50% on the FOTO, demonstrating overall improved functional ability with upper extremity. (self-care category) Progressing 2/19/2020      Long Term Goal Status:   continue per initial plan of care.  Reasons for Recertification of Therapy:   Pt continues to benefit from skilled services and has made good progress thus far with good future rehab potential. Pt remains limited with LUE strength, ROM and coordination at this time which all still impact their performance of ADLs , IADLs affecting her habits, roles and routines.       Plan      Continue per [facility].   Progress as tolerated.     Tran Bejarano, OTS

## 2020-02-19 NOTE — PLAN OF CARE
Outpatient Therapy Updated Plan of Care     Visit Date: 2/19/2020  Name: Luis Wong  Clinic Number: [id number]    Therapy Diagnosis:   Encounter Diagnoses   Name Primary?    Impaired mobility and activities of daily living     Muscle weakness     Decreased coordination      Physician: Carla Altman PA-C    Physician Orders: eval and tx  Medical Diagnosis: CVA L sided weakness recrudescent   Evaluation Date: 9/4/2019    Total Visits Received: 23  Cancelled Visits: 0  No Show Visits: 0    Current Certification Period:  2/21/2020 to 4/17/2020  Precautions:  Standard and fall  Visits from Evaluation Date:  23  Functional Level Prior to Evaluation:  Mod I    Subjective     Update:   Pt reports:    he was compliant with home exercise program given last session.   Response to previous treatment:good   Functional change:      Pain: 7/10   Location:  L shoulder    Objective     Update:   Joint Evaluation  AROM  9/4/2019 PROM   9/4/2019 AROM  10/8/2019    AROM  10/30/2019    AROM  12/23/2019       AROM  2/19/2020     Left Left Left Left Left Left   Shoulder flex 0-180 54 130 125 122  128 115   Shoulder Abd 0-180 54 125 115 110  115 110   Shoulder ER 0-90 Neutral WNL Neutral  Neutral   38 25   Shoulder IR 0-90 Trace WNL S4 S4  S4 Waistline   Shoulder Extension 0-80 51 65 52 60  62 63   Shoulder Horizontal adduction 0-90 Trace WNL 45 WNl  WNL WNL   Elbow flex/ext 0-150 WNL WNL Ext -20 WNL/Ext -28  145 ext  155 ext   Wrist flex 0-80 WNL WNL WNL    WNL WNL   Wrist ext 0-70 Neutral WNL 32 35  30 30   Supination 0-80 Trace easier WNL WnL WNL  WNL WNL   Pronation 0-80 trace WNl WNL WNL  WNL WNL   UD Trace WNL 32 32  28 WNL   RD Trace WNL 22 22  22 WNL             Strength: (ANTONIO Dynamometer in lbs.) Average 3 trials, Position II:       10/30/2019    12/3/2019    12/23/2019    2/19/2020     Left Left Left Left   Rung II 35# (+3) 40# 36# 30#      Pinch Strength (Measured in psi)       10/30/2019    12/3/2019     12/23/2019 2/19/2020     Left Left Left Left   Carr Pinch 11 11 12 13   3pt Pinch 9 9 9 9   2pt Pinch 8 7 8 8      Fine Motor Coordination: 9 Hole Peg Test  9 Peg Test Left 10/8/2019    Left  10/30/2019    Left  12/23/2019    Left   2/19/2020   Removed 9/9 dropped to towel 9/9 dropped to towel 9/9 dropped to towel no R hand A 9/9  Dropped 4  No R hand A   Replaced 9/9 used R hand to assist pegs into finger tips 9/9 using R hand to A pegs into finger tips almost able to use L only 9/9 using R hand for wrist A 9/9 using R hand to move pegs from palm to fingertips  Dropped 5   Time 2:55  1:35 1:45  2:46      Box and Blocks:  12/23/2019 L hand:18 blocks  2/19/2020 L hand: 20 blocks      Assessment     Update: Pt participated in therapeutic exercises and re-measurements. Pt reported pain and stiffness with LUE today most likely limiting his re-measurements. Pt progressed with shoulder extension. Although time increased in 9 Hole Peg, pt was able to maintain majority of pegs in hand during peg removal. Pt's other measurements remained around the same as last measurements. Pt participated in therapeutic exercises to address  strength with good tolerance. Pt limited with peg removal with brown gripper due to abnormal shoulder flexor pattern and limited  strength. Pt would benefit from cont'd OT to address LUE functional use and strength.    Previous Short Term Goals Status:  3/5    Short Term Goals:  1) Initiate Hep Met 10/30/2019  2) Pt to increase LUE  strength by 5 # in order to A in self care task of feeding by 4 weeks. MET 10/30/2019  3) Pt to increase Quick dash Score by 5 points increasing self care IND by 4 weeks. NOT MET 2/19/2020  4) Pt to increase L UE AROM by 10 degrees in order to A in UB dressing by 4 weeks. MET 2/19/2020  5) Patient will be able to achieve less than or equal to 75% on the FOTO, demonstrating overall improved functional ability with upper extremity. (self-care category) NOT MET  2/19/2020    New Short Term Goals Status:   1/5    Long Term Goals:  1) Pt to be IND with HEP in order to maintain ROM and strength needed for self care IND by shante. MET 2/19/2020  2) Pt to increase L UE  strength to WNL as compared to unaffected extremity in order to open items for self feeding by barrera NOT MET 2/19/2020  3) Pt to increase Quick dash Score by 10 points increasing self care IND at home by barrera NOT MET 2/19/2020  4) Pt to increase L UE AROM to WFL in order to A in UB dressing by d/cLaila NOT MET  2/19/2020  5) Patient will be able to achieve less than or equal to 50% on the FOTO, demonstrating overall improved functional ability with upper extremity. (self-care category) NOT MET 2/19/2020    Long Term Goal Status:  continue per initial plan of care.  Reasons for Recertification of Therapy: Pt did not meet all of his goals, but still has potential to progress towards more functional level of performance.    Plan     Updated Certification Period: 2/19/2020 to 4/17/2020  Recommended Treatment Plan: 2 times per week for 8 weeks: Electrical Stimulation as needed, Manual Therapy, Moist Heat/ Ice, Neuromuscular Re-ed, Patient Education, Self Care, Therapeutic Activites and Therapeutic Exercise  Other Recommendations:     JAKE Bradley  2/19/2020      I CERTIFY THE NEED FOR THESE SERVICES FURNISHED UNDER THIS PLAN OF TREATMENT AND WHILE UNDER MY CARE    Physician's comments:        Physician's Signature: ___________________________________________________

## 2020-02-19 NOTE — PROGRESS NOTES
"                            Physical Therapy Daily Treatment Note     Name: Luis Wong  Clinic Number: [id number]    Therapy Diagnosis:   Encounter Diagnoses   Name Primary?    Impaired functional mobility, balance, gait, and endurance     Decreased strength of lower extremity      Physician: Carla Altman PA-C     Visit Date: 2/19/2020    Physician Orders: PT Eval and Treat  Medical Diagnosis from Referral: I63.9 (ICD-10-CM) - CVA (cerebral vascular accident)  Evaluation Date: 9/13/2019  Authorization Period Expiration: 12/31/2020  Plan of Care Expiration: 3/06/2020  Visit # / Visits authorized: 14/50 (45 prior visits)  FOTO: next at discharge     Time In: 10:15 AM  Time Out: 11:00 AM  Total Billable Time: 45 minutes (3 TE)      Subjective     Pt reports: "I have a carpel tunnel surgery on this Friday". Pt agreeable to PT session. No reports of new pain today.   Pt was compliant with home exercise program.   Response to previous treatment: No adverse reactions.   Functional change: Ongoing       Objective     Luis participated in therapeutic exercises for 35 minutes including:    - Nu-Step for reciprocal BUE and BLE motion for 10 minutes at level 8.0    - Bridges:   3x10, 3" holds - NOT TODAY  - SLR:    3x10, 1.5#, LLE - NOT TODAY  - Supine Hip ADD:  3x10, 5" holds - NOT TODAY    - Fwd Step-ups  2x10, B, 6" step, SBA for balance - NOT TODAY    -Cybex knee extension: 30# plus dial turned to "15", 2x15, B      20# 2x15, DL up, LLE down  -Cybex Ham curls:  1.0 pl 2x15 B  -Cybex Leg Press:  8.0 plates, 2x20, B      6.0 plates, 2x20, LLE only  -Hamstring Stretch  30"x3 stair case B  -Gastroc stretch  30"x3 stair case B       Luis did NOT participate in neuromuscular re-education activities to improve: Balance and Proprioception. The following activities were included:     - Tandem on FOAM beam: 3 laps, 2 beams, forward and backward, // bars, RUE support - OOT  - Marching on FOAM: 30"x3, RUE support - " REMY Smith participated in gait training for 17 minutes on treadmill including the following:  --Treadmill forward ambulation with BUE support for 8 minutes with speed ranging from 0.5-0.9 mph and close SBA for balance.   --Treadmill side-stepping ambulation with BUE support for 3 minutes in each direction with speed at 0.4-0.5 mph and close SBA for balance.     Home Exercises Provided and Patient Education Provided     Education provided:   Cont HEP      Written Home Exercises Provided: Not today  Exercises were reviewed and Luis was able to demonstrate them prior to the end of the session.  Luis demonstrated good  understanding of the education provided.     See EMR under Patient Instructions for exercises provided 9/23/2019 and 10/28/2019.    Assessment     Pt tolerated session with no reports of pain, and no adverse reactions. He was able to follow verbal instructions accordingly, he experienced no loss of balance while ambulating throughout gym.   Luis is progressing well towards his goals.   Pt prognosis is Good.   Pt will continue to benefit from skilled outpatient physical therapy to address the deficits listed in the problem list box on initial evaluation, provide pt/family education and to maximize pt's level of independence in the home and community environment.     Pt's spiritual, cultural and educational needs considered and pt agreeable to plan of care and goals.  Anticipated barriers to physical therapy: Co-morbidities    Goals:  Long Term Goals (8 Weeks):   3. Pt will be able to perform TUG in less than or equal to 17 seconds, safely, without use of AD demonstrating overall improved functional mobility. PROGRESSING, NOT MET 2/19/2020  6. Pt will walk > than or equal to 230 feet on the 2 minute walk test indoor with AD with 0 LOB and minimal gait deviations for improved safety in home ambulation and safety. PROGRESSING, NOT MET 2/19/2020  7. Pt will be able to safely perform and tolerate high  level ADL's without LOB. PROGRESSING, NOT MET 2/19/2020  8. Pt will have 0 falls from 11/6/19. PROGRESSING, NOT MET 2/19/2020  10. Pt will have MMT score of 3+/5 in all major ms groups in Left LE.  PROGRESSING, NOT MET 2/19/2020  11. Pt will ambulate on TM x 6 minutes with use of UE support with 0 LOB at greater than or equal to 1.3 mph. PROGRESSING, NOT MET 2/19/2020  12. Pt will begin some form of community fitness to begin regular and consistent performance of exercise to continue maintenance of gains made in PT. PROGRESSING, NOT MET 2/19/2020        Plan   Cont progress PT as per POC  Continue LE strengthening and balance as tolerated.     Robin Davis, PTA

## 2020-02-21 ENCOUNTER — ANESTHESIA (OUTPATIENT)
Dept: SURGERY | Facility: HOSPITAL | Age: 65
End: 2020-02-21
Payer: MEDICARE

## 2020-02-21 ENCOUNTER — HOSPITAL ENCOUNTER (OUTPATIENT)
Facility: HOSPITAL | Age: 65
Discharge: HOME | End: 2020-02-21
Attending: ORTHOPAEDIC SURGERY | Admitting: ORTHOPAEDIC SURGERY
Payer: MEDICARE

## 2020-02-21 VITALS
TEMPERATURE: 98 F | SYSTOLIC BLOOD PRESSURE: 145 MMHG | HEIGHT: 69 IN | OXYGEN SATURATION: 99 % | DIASTOLIC BLOOD PRESSURE: 75 MMHG | RESPIRATION RATE: 20 BRPM | BODY MASS INDEX: 22.96 KG/M2 | HEART RATE: 70 BPM | WEIGHT: 155 LBS

## 2020-02-21 DIAGNOSIS — G56.01 CARPAL TUNNEL SYNDROME OF RIGHT WRIST: ICD-10-CM

## 2020-02-21 LAB — POCT GLUCOSE: 115 MG/DL (ref 70–110)

## 2020-02-21 PROCEDURE — 64721 PR REVISE MEDIAN N/CARPAL TUNNEL SURG: ICD-10-PCS | Mod: RT,,, | Performed by: ORTHOPAEDIC SURGERY

## 2020-02-21 PROCEDURE — 63600175 PHARM REV CODE 636 W HCPCS: Performed by: ORTHOPAEDIC SURGERY

## 2020-02-21 PROCEDURE — 36000706: Performed by: ORTHOPAEDIC SURGERY

## 2020-02-21 PROCEDURE — 64721 CARPAL TUNNEL SURGERY: CPT | Mod: RT,,, | Performed by: ORTHOPAEDIC SURGERY

## 2020-02-21 PROCEDURE — 36000707: Performed by: ORTHOPAEDIC SURGERY

## 2020-02-21 PROCEDURE — 63600175 PHARM REV CODE 636 W HCPCS: Performed by: FAMILY MEDICINE

## 2020-02-21 PROCEDURE — 37000009 HC ANESTHESIA EA ADD 15 MINS: Performed by: ORTHOPAEDIC SURGERY

## 2020-02-21 PROCEDURE — 25000003 PHARM REV CODE 250: Performed by: ORTHOPAEDIC SURGERY

## 2020-02-21 PROCEDURE — 71000015 HC POSTOP RECOV 1ST HR: Performed by: ORTHOPAEDIC SURGERY

## 2020-02-21 PROCEDURE — 37000008 HC ANESTHESIA 1ST 15 MINUTES: Performed by: ORTHOPAEDIC SURGERY

## 2020-02-21 PROCEDURE — 71000016 HC POSTOP RECOV ADDL HR: Performed by: ORTHOPAEDIC SURGERY

## 2020-02-21 PROCEDURE — S0020 INJECTION, BUPIVICAINE HYDRO: HCPCS | Performed by: ORTHOPAEDIC SURGERY

## 2020-02-21 RX ORDER — FENTANYL CITRATE 50 UG/ML
INJECTION, SOLUTION INTRAMUSCULAR; INTRAVENOUS
Status: DISCONTINUED | OUTPATIENT
Start: 2020-02-21 | End: 2020-02-21

## 2020-02-21 RX ORDER — KETOROLAC TROMETHAMINE 30 MG/ML
15 INJECTION, SOLUTION INTRAMUSCULAR; INTRAVENOUS ONCE
Status: COMPLETED | OUTPATIENT
Start: 2020-02-21 | End: 2020-02-21

## 2020-02-21 RX ORDER — CEFAZOLIN SODIUM 1 G/3ML
INJECTION, POWDER, FOR SOLUTION INTRAMUSCULAR; INTRAVENOUS
Status: DISCONTINUED | OUTPATIENT
Start: 2020-02-21 | End: 2020-02-21

## 2020-02-21 RX ORDER — MIDAZOLAM HYDROCHLORIDE 1 MG/ML
INJECTION, SOLUTION INTRAMUSCULAR; INTRAVENOUS
Status: DISCONTINUED | OUTPATIENT
Start: 2020-02-21 | End: 2020-02-21

## 2020-02-21 RX ORDER — SODIUM CHLORIDE, SODIUM LACTATE, POTASSIUM CHLORIDE, CALCIUM CHLORIDE 600; 310; 30; 20 MG/100ML; MG/100ML; MG/100ML; MG/100ML
INJECTION, SOLUTION INTRAVENOUS CONTINUOUS PRN
Status: DISCONTINUED | OUTPATIENT
Start: 2020-02-21 | End: 2020-02-21

## 2020-02-21 RX ORDER — ONDANSETRON 8 MG/1
8 TABLET, ORALLY DISINTEGRATING ORAL EVERY 8 HOURS PRN
Status: DISCONTINUED | OUTPATIENT
Start: 2020-02-21 | End: 2020-02-21 | Stop reason: HOSPADM

## 2020-02-21 RX ORDER — ACETAMINOPHEN 325 MG/1
650 TABLET ORAL EVERY 4 HOURS PRN
Status: DISCONTINUED | OUTPATIENT
Start: 2020-02-21 | End: 2020-02-21 | Stop reason: HOSPADM

## 2020-02-21 RX ORDER — SODIUM CHLORIDE 9 MG/ML
INJECTION, SOLUTION INTRAVENOUS CONTINUOUS
Status: DISCONTINUED | OUTPATIENT
Start: 2020-02-21 | End: 2020-02-21 | Stop reason: HOSPADM

## 2020-02-21 RX ORDER — PROPOFOL 10 MG/ML
VIAL (ML) INTRAVENOUS CONTINUOUS PRN
Status: DISCONTINUED | OUTPATIENT
Start: 2020-02-21 | End: 2020-02-21

## 2020-02-21 RX ORDER — HYDROCODONE BITARTRATE AND ACETAMINOPHEN 5; 325 MG/1; MG/1
1 TABLET ORAL EVERY 4 HOURS PRN
Qty: 20 TABLET | Refills: 0 | Status: SHIPPED | OUTPATIENT
Start: 2020-02-21 | End: 2020-05-13

## 2020-02-21 RX ORDER — LIDOCAINE HYDROCHLORIDE 10 MG/ML
INJECTION, SOLUTION EPIDURAL; INFILTRATION; INTRACAUDAL; PERINEURAL
Status: DISCONTINUED | OUTPATIENT
Start: 2020-02-21 | End: 2020-02-21 | Stop reason: HOSPADM

## 2020-02-21 RX ORDER — SODIUM CHLORIDE, SODIUM LACTATE, POTASSIUM CHLORIDE, CALCIUM CHLORIDE 600; 310; 30; 20 MG/100ML; MG/100ML; MG/100ML; MG/100ML
INJECTION, SOLUTION INTRAVENOUS CONTINUOUS
Status: DISCONTINUED | OUTPATIENT
Start: 2020-02-21 | End: 2020-02-21 | Stop reason: HOSPADM

## 2020-02-21 RX ORDER — LIDOCAINE HCL/PF 100 MG/5ML
SYRINGE (ML) INTRAVENOUS
Status: DISCONTINUED | OUTPATIENT
Start: 2020-02-21 | End: 2020-02-21

## 2020-02-21 RX ORDER — BACITRACIN 50000 [IU]/1
INJECTION, POWDER, FOR SOLUTION INTRAMUSCULAR
Status: DISCONTINUED | OUTPATIENT
Start: 2020-02-21 | End: 2020-02-21 | Stop reason: HOSPADM

## 2020-02-21 RX ORDER — OXYCODONE HYDROCHLORIDE 5 MG/1
10 TABLET ORAL EVERY 4 HOURS PRN
Status: DISCONTINUED | OUTPATIENT
Start: 2020-02-21 | End: 2020-02-21 | Stop reason: HOSPADM

## 2020-02-21 RX ORDER — CEFAZOLIN SODIUM 2 G/50ML
2 SOLUTION INTRAVENOUS
Status: DISCONTINUED | OUTPATIENT
Start: 2020-02-21 | End: 2020-02-21 | Stop reason: HOSPADM

## 2020-02-21 RX ORDER — BUPIVACAINE HYDROCHLORIDE 5 MG/ML
INJECTION, SOLUTION EPIDURAL; INTRACAUDAL
Status: DISCONTINUED | OUTPATIENT
Start: 2020-02-21 | End: 2020-02-21 | Stop reason: HOSPADM

## 2020-02-21 RX ORDER — PROPOFOL 10 MG/ML
VIAL (ML) INTRAVENOUS
Status: DISCONTINUED | OUTPATIENT
Start: 2020-02-21 | End: 2020-02-21

## 2020-02-21 RX ORDER — LIDOCAINE HYDROCHLORIDE 10 MG/ML
1 INJECTION, SOLUTION EPIDURAL; INFILTRATION; INTRACAUDAL; PERINEURAL ONCE
Status: DISCONTINUED | OUTPATIENT
Start: 2020-02-21 | End: 2020-02-21 | Stop reason: HOSPADM

## 2020-02-21 RX ORDER — SODIUM CHLORIDE 9 MG/ML
INJECTION, SOLUTION INTRAVENOUS CONTINUOUS PRN
Status: DISCONTINUED | OUTPATIENT
Start: 2020-02-21 | End: 2020-02-21

## 2020-02-21 RX ADMIN — LIDOCAINE HYDROCHLORIDE 100 MG: 20 INJECTION, SOLUTION INTRAVENOUS at 07:02

## 2020-02-21 RX ADMIN — FENTANYL CITRATE 50 MCG: 50 INJECTION, SOLUTION INTRAMUSCULAR; INTRAVENOUS at 07:02

## 2020-02-21 RX ADMIN — PROPOFOL 80 MCG/KG/MIN: 10 INJECTION, EMULSION INTRAVENOUS at 07:02

## 2020-02-21 RX ADMIN — CEFAZOLIN 2 G: 330 INJECTION, POWDER, FOR SOLUTION INTRAMUSCULAR; INTRAVENOUS at 07:02

## 2020-02-21 RX ADMIN — KETOROLAC TROMETHAMINE 15 MG: 30 INJECTION, SOLUTION INTRAMUSCULAR at 08:02

## 2020-02-21 RX ADMIN — SODIUM CHLORIDE: 0.9 INJECTION, SOLUTION INTRAVENOUS at 07:02

## 2020-02-21 RX ADMIN — PROPOFOL 50 MG: 10 INJECTION, EMULSION INTRAVENOUS at 07:02

## 2020-02-21 RX ADMIN — MIDAZOLAM 2 MG: 1 INJECTION INTRAMUSCULAR; INTRAVENOUS at 07:02

## 2020-02-21 NOTE — DISCHARGE INSTRUCTIONS
Discharge Instructions for Carpal Tunnel Release  You had a carpal tunnel release procedure to help relieve the symptoms of carpal tunnel syndrome. In carpal tunnel syndrome, a nerve in the wrist is compressed and irritated. This causes numbness and pain in the fingers and hand. Carpal tunnel release relieves the compression of the nerve. Here are instructions that will help you care for your arm and wrist when you are at home.  Home care  · Avoid gripping objects tightly or lifting with your affected arm.  · Wear your bandage, splint, or cast as directed by your doctor.  · Always keep the dressing, splint, or cast dry and clean.  · When showering, cover your hand and  wrist with plastic and use tape or rubberbands to keep the dressing, splint, or cast dry. Shower as necessary.    · Use an ice pack or bag of frozen peas -- or something similar -- wrapped in a thin towel on your wrist to reduce swelling for the first 48 hours. Leave the ice pack on for 20 minutes; then take it off for 20 minutes. Repeat as needed.  · Keep your arm elevated above your heart for 24 to 48 hours after surgery.  · Do the exercises you learned in the hospital, or as instructed by your doctor.  · Take pain medicine as directed.  · Dont drive until your doctor says its OK. Never drive while you are taking opioid pain medicine.  · Ask your doctor when you can return to work. If your job requires heavy lifting, you may not be able to begin working again for several weeks.  Follow-up care  Make a follow-up appointment as directed by your doctor.     When to seek medical care  Call 911 right away if you have any of the following:  · Chest pain  · Shortness of breath  Otherwise, call your doctor immediately if you have any of the following:  · A splint, cast, or dressing that has gotten wet  · Increased bleeding or drainage from the incision (cut)  · Opening of the incision  · Fever above 100.4°F (38.9°C) taken by mouth, or shaking  chills  · Any new numbness in the fingers or thumb  · Blue hand or fingers  · Increased pain with or without activity  · Increased redness, tenderness, or swelling of the incision   Date Last Reviewed: 11/15/2015  © 9730-8027 [employer]. [address] [place] [address]. All rights reserved. This information is not intended as a substitute for professional medical care. Always follow your healthcare professional's instructions.    ANESTHESIA  -For the first 24 hours after surgery:  Do not drive, use heavy equipment, make important decisions, or drink alcohol  -It is normal to feel sleepy for several hours.  Rest until you are more awake.  -Have someone stay with you, if needed.  They can watch for problems and help keep you safe.  -Some possible post anesthesia side effects include: nausea and vomiting, sore throat and hoarseness, sleepiness, and dizziness.    PAIN  -If you have pain after surgery, pain medicine will help you feel better.  Take it as directed, before pain becomes severe.  Most pain relievers taken by mouth need at least 20-30 minutes to start working.  -Do not drive or drink alcohol while taking pain medicine.  -Pain medication can upset your stomach.  Taking them with a little food may help.  -Other ways to help control pain: elevation, ice, and relaxation  -Call your surgeon if still having unmanageable pain an hour after taking pain medicine.  -Pain medicine can cause constipation.  Taking an over-the counter stool softener while on prescription pain medicine and drinking plenty of fluids can prevent this side effect.  -Call your surgeon if you have severe side effects like: breathing problems, trouble waking up, dizziness, confusion, or severe constipation.    NAUSEA  -Some people have nausea after surgery.  This is often because of anesthesia, pain, pain medicine, or the stress of surgery.  -Do not push yourself to eat.  Start off with clear liquids and soup.  Slowly  move to solid foods.  Don't eat fatty, rich, spicy foods at first.  Eat smaller amounts.  -If you develop persistent nausea and vomiting please notify your surgeon immediately.    BLEEDING  -Different types of surgery require different types of care and dressing changes.  It is important to follow all instructions and advice from your surgeon.  Change dressing as directed.  Call your surgeon for any concerns regarding postop bleeding.    SIGNS OF INFECTION  -Signs of infection include: fever, swelling, drainage, and redness  -Notify your surgeon if you have a fever of 100.4 F (38.0 C) or higher.  -Notify your surgeon if you notice redness, swelling, increased pain, pus, or a foul smell at the incision site.      Acetaminophen; Hydrocodone tablets or capsules  What is this medicine?  ACETAMINOPHEN; HYDROCODONE (a set a JILLIAN ulises fen; [place] droe KOE done) is a pain reliever. It is used to treat moderate to severe pain.  How should I use this medicine?  Take this medicine by mouth with a glass of water. Follow the directions on the prescription label. You can take it with or without food. If it upsets your stomach, take it with food. Do not take your medicine more often than directed.  A special MedGuide will be given to you by the pharmacist with each prescription and refill. Be sure to read this information carefully each time.  Talk to your pediatrician regarding the use of this medicine in children. Special care may be needed.  What side effects may I notice from receiving this medicine?  Side effects that you should report to your doctor or health care professional as soon as possible:  · allergic reactions like skin rash, itching or hives, swelling of the face, lips, or tongue  · breathing problems  · confusion  · redness, blistering, peeling or loosening of the skin, including inside the mouth  · signs and symptoms of low blood pressure like dizziness; feeling faint or lightheaded, falls; unusually weak or  tired  · trouble passing urine or change in the amount of urine  · yellowing of the eyes or skin  Side effects that usually do not require medical attention (report to your doctor or health care professional if they continue or are bothersome):  · constipation  · dry mouth  · nausea, vomiting  · tiredness  What may interact with this medicine?  This medicine may interact with the following medications:  · alcohol  · antiviral medicines for HIV or AIDS  · atropine  · antihistamines for allergy, cough and cold  · certain antibiotics like erythromycin, clarithromycin  · certain medicines for anxiety or sleep  · certain medicines for bladder problems like oxybutynin, tolterodine  · certain medicines for depression like amitriptyline, fluoxetine, sertraline  · certain medicines for fungal infections like ketoconazole and itraconazole  · certain medicines for Parkinson's disease like benztropine, trihexyphenidyl  · certain medicines for seizures like carbamazepine, phenobarbital, phenytoin, primidone  · certain medicines for stomach problems like dicyclomine, hyoscyamine  · certain medicines for travel sickness like scopolamine  · general anesthetics like halothane, isoflurane, methoxyflurane, propofol  · ipratropium  · local anesthetics like lidocaine, pramoxine, tetracaine  · MAOIs like Carbex, Eldepryl, Marplan, Nardil, and Parnate  · medicines that relax muscles for surgery  · other medicines with acetaminophen  · other narcotic medicines for pain or cough  · phenothiazines like chlorpromazine, mesoridazine, prochlorperazine, thioridazine  · rifampin  What if I miss a dose?  If you miss a dose, take it as soon as you can. If it is almost time for your next dose, take only that dose. Do not take double or extra doses.  Where should I keep my medicine?  Keep out of the reach of children. This medicine can be abused. Keep your medicine in a safe place to protect it from theft. Do not share this medicine with anyone.  Selling or giving away this medicine is dangerous and against the law.  This medicine may cause accidental overdose and death if it taken by other adults, children, or pets. Mix any unused medicine with a substance like cat litter or coffee grounds. Then throw the medicine away in a sealed container like a sealed bag or a coffee can with a lid. Do not use the medicine after the expiration date.  Store at room temperature between 15 and 30 degrees C (59 and 86 degrees F).  What should I tell my health care provider before I take this medicine?  They need to know if you have any of these conditions:  · brain tumor  · Crohn's disease, inflammatory bowel disease, or ulcerative colitis  · drug abuse or addiction  · head injury  · heart or circulation problems  · if you often drink alcohol  · kidney disease or problems going to the bathroom  · liver disease  · lung disease, asthma, or breathing problems  · an unusual or allergic reaction to acetaminophen, hydrocodone, other opioid analgesics, other medicines, foods, dyes, or preservatives  · pregnant or trying to get pregnant  · breast-feeding  What should I watch for while using this medicine?  Tell your doctor or health care professional if your pain does not go away, if it gets worse, or if you have new or a different type of pain. You may develop tolerance to the medicine. Tolerance means that you will need a higher dose of the medicine for pain relief. Tolerance is normal and is expected if you take the medicine for a long time.  Do not suddenly stop taking your medicine because you may develop a severe reaction. Your body becomes used to the medicine. This does NOT mean you are addicted. Addiction is a behavior related to getting and using a drug for a non-medical reason. If you have pain, you have a medical reason to take pain medicine. Your doctor will tell you how much medicine to take. If your doctor wants you to stop the medicine, the dose will be slowly  lowered over time to avoid any side effects.  There are different types of narcotic medicines (opiates). If you take more than one type at the same time or if you are taking another medicine that also causes drowsiness, you may have more side effects. Give your health care provider a list of all medicines you use. Your doctor will tell you how much medicine to take. Do not take more medicine than directed. Call emergency for help if you have problems breathing or unusual sleepiness.  Do not take other medicines that contain acetaminophen with this medicine. Always read labels carefully. If you have questions, ask your doctor or pharmacist.  If you take too much acetaminophen get medical help right away. Too much acetaminophen can be very dangerous and cause liver damage. Even if you do not have symptoms, it is important to get help right away.  You may get drowsy or dizzy. Do not drive, use machinery, or do anything that needs mental alertness until you know how this medicine affects you. Do not stand or sit up quickly, especially if you are an older patient. This reduces the risk of dizzy or fainting spells. Alcohol may interfere with the effect of this medicine. Avoid alcoholic drinks.  The medicine will cause constipation. Try to have a bowel movement at least every 2 to 3 days. If you do not have a bowel movement for 3 days, call your doctor or health care professional.  Your mouth may get dry. Chewing sugarless gum or sucking hard candy, and drinking plenty of water may help. Contact your doctor if the problem does not go away or is severe.  NOTE:This sheet is a summary. It may not cover all possible information. If you have questions about this medicine, talk to your doctor, pharmacist, or health care provider. Copyright© 2017 [employer]

## 2020-02-21 NOTE — OP NOTE
Operative Note       Surgery Date: 2/21/2020     Surgeon(s) and Role:     * Barry Dutton Jr., MD - Primary    Pre-op Diagnosis:  Carpal tunnel syndrome of right wrist [G56.01]    Post-op Diagnosis: Post-Op Diagnosis Codes:     * Carpal tunnel syndrome of right wrist [G56.01]    Procedure(s) (LRB):  RELEASE, CARPAL TUNNEL (Right)    Anesthesia: Local MAC    Procedure in Detail/Findings:  DATE OF PROCEDURE:   2/21/2020     PREOPERATIVE DIAGNOSIS:  right carpal tunnel syndrome.     POSTOPERATIVE DIAGNOSIS: rightt carpal tunnel syndrome.     OPERATIVE PROCEDURE: right carpal tunnel release.     SURGEON:  Barry Dutton Jr., M.ALIYAH     ANESTHESIA:  MAC.     ESTIMATED BLOOD LOSS:  Minimal.     COMPLICATIONS:  None.     SPECIMENS:  None.     BRIEF INDICATIONS:  A 64 y.o.-year-old male with carpal tunnel syndrome, unresponsive to conservative treatment, taken to surgery for the above   procedure.     OPERATIVE PROCEDURE IN DETAIL:  After operative consent was obtained, the   patient was brought to the Operating Room, placed supine on the operating room   table.  Anesthesia by IV sedation followed by injection of Xylocaine and   Marcaine combination into the operative site in the palm.  Following this,   tourniquet applied to the arm.  The arm was then prepped and draped out in the   normal sterile fashion.  The Esmarch used to exsanguinate the limb and the   tourniquet inflated to 225 mmHg.     Following this, a curved incision was made thenar in the crease of palm with a   #15 blade, 2.5 cm in length.  Palmar fascia divided, deep retractors placed.    Transverse carpal ligament identified, carefully divided with the [place]choopda blade.    The median nerve protected with the [place] elevator and division of ligament   continued proximally and distally under direct visualization.  After complete   release of the median nerve, the contents of the carpal canal were inspected and   noted to be intact including the recurrent branch.   The wound was irrigated and   closed with interrupted 4-0 Monocryl in the subcutaneous layer.  Dermabond on   the skin.  Sterile dressing applied followed by light wrap and the tourniquet   deflated.  The patient was brought to the Recovery Room in stable condition.    All sponge and needle counts reported as correct.  No complications.           Estimated Blood Loss: * No values recorded between 2/21/2020  7:16 AM and 2/21/2020  7:32 AM *           Specimens (From admission, onward)    None        Implants: * No implants in log *           Disposition: PACU - hemodynamically stable.           Condition: Good    Attestation:  I was present and scrubbed for the entire procedure.           Discharge Note    Admit Date: 2/21/2020    Attending Physician: Barry Dutton Jr., MD     Discharge Physician: Barry Dutton Jr., MD    Final Diagnosis: Post-Op Diagnosis Codes:     * Carpal tunnel syndrome of right wrist [G56.01]    Disposition: Home or Self Care    Patient Instructions:   Current Discharge Medication List      START taking these medications    Details   HYDROcodone-acetaminophen (NORCO) 5-325 mg per tablet Take 1 tablet by mouth every 4 (four) hours as needed for Pain.  Qty: 20 tablet, Refills: 0    Comments: Quantity prescribed more than 7 day supply? No         CONTINUE these medications which have NOT CHANGED    Details   atorvastatin (LIPITOR) 40 MG tablet Take 1 tablet (40 mg total) by mouth once daily.  Qty: 90 tablet, Refills: 3      gabapentin (NEURONTIN) 100 MG capsule Take 100 mg by mouth once daily.      hydroCHLOROthiazide (MICROZIDE) 12.5 mg capsule TAKE 1 CAPSULE BY MOUTH EVERY DAY  Qty: 90 capsule, Refills: 3      metFORMIN (GLUCOPHAGE) 500 MG tablet Take 1 tablet (500 mg total) by mouth daily with breakfast.  Qty: 90 tablet, Refills: 3    Associated Diagnoses: Type 2 diabetes mellitus with hyperglycemia, without long-term current use of insulin      NIFEdipine (PROCARDIA-XL) 30 MG (OSM) 24 hr  tablet TAKE 1 TABLET BY MOUTH EVERY DAY  Qty: 90 tablet, Refills: 3    Associated Diagnoses: Essential hypertension      potassium chloride (MICRO-K) 8 mEq CpSR TAKE 1 CAPSULE (8 MEQ TOTAL) BY MOUTH ONCE DAILY.  Qty: 90 capsule, Refills: 3    Associated Diagnoses: Essential hypertension      tiZANidine (ZANAFLEX) 4 MG tablet Take 4 mg by mouth daily as needed.      aspirin (ECOTRIN) 81 MG EC tablet TAKE 1 TABLET BY MOUTH EVERY DAY  Qty: 90 tablet, Refills: 3      clopidogrel (PLAVIX) 75 mg tablet Take 1 tablet (75 mg total) by mouth once daily.  Qty: 90 tablet, Refills: 3             Discharge Procedure Orders (must include Diet, Follow-up, Activity)   Discharge Procedure Orders (must include Diet, Follow-up, Activity)   Diet general     Call MD for:  temperature >100.4     Call MD for:  persistent nausea and vomiting     Call MD for:  severe uncontrolled pain     Keep surgical extremity elevated     Remove dressing in 24 hours     Shower on day dressing removed (No bath)        Discharge Date: No discharge date for patient encounter.

## 2020-02-21 NOTE — ANESTHESIA POSTPROCEDURE EVALUATION
Anesthesia Post Evaluation    Patient: Luis Wong    Procedure(s) Performed: Procedure(s) (LRB):  RELEASE, CARPAL TUNNEL (Right)    Final Anesthesia Type: MAC    Patient location during evaluation: [facility]  Patient participation: Yes- Able to Participate  Level of consciousness: awake and alert  Post-procedure vital signs: reviewed and stable  Pain management: adequate  Airway patency: patent    PONV status at discharge: No PONV  Anesthetic complications: no      Cardiovascular status: blood pressure returned to baseline  Respiratory status: unassisted  Hydration status: euvolemic  Follow-up not needed.          Vitals Value Taken Time   /81 2/21/2020  7:39 AM   Temp 36.5 °C (97.7 °F) 2/21/2020  7:39 AM   Pulse 76 2/21/2020  7:39 AM   Resp 18 2/21/2020  7:39 AM   SpO2 99 % 2/21/2020  7:39 AM         No case tracking events are documented in the log.      Pain/Mary Score: No data recorded

## 2020-02-21 NOTE — H&P
CC:  Bilateral carpal tunnel syndrome           HPI:  Luis Wong is a very pleasant 64 y.o. male with ongoing symptoms both hands related carpal tunnel syndrome also hemiparesis left arm from her previous stroke  He would like to go ahead and set up surgery for the right hand for right carpal tunnel release but have an injection today in both hands to get him through until surgery            PAST MEDICAL HISTORY:        Past Medical History:   Diagnosis Date    Aneurysm 10/30/2012    Diabetes mellitus      Fever blister      Herpes infection      Hypertension      ICH (intracerebral hemorrhage)      Mixed hyperlipidemia 9/5/2013    Nontraumatic thalamic hemorrhage 8/31/2016    JAQUAN (obstructive sleep apnea)      Right-sided lacunar stroke 9/11/2014    SDH (subdural hematoma)      Special screening for malignant neoplasms, colon      Stroke        PAST SURGICAL HISTORY:         Past Surgical History:   Procedure Laterality Date    INSERTION OF IMPLANTABLE LOOP RECORDER N/A 12/12/2019     Procedure: Insertion, Implantable Loop Recorder;  Surgeon: Efren Sanford MD;  Location: [facility] CATH LAB/EP;  Service: Cardiology;  Laterality: N/A;  Crytogenic CVA, LOOP,  MDT, Local,  DM    Knee arthroscopic surgery          FAMILY HISTORY:         Family History   Problem Relation Age of Onset    Heart disease Mother      Diabetes Father      Cancer Sister           breast    Diabetes Paternal Grandmother      Diabetes Paternal Grandfather      Melanoma Neg Hx        SOCIAL HISTORY:   Social History            Socioeconomic History    Marital status:        Spouse name: Not on file    Number of children: Not on file    Years of education: Not on file    Highest education level: Not on file   Occupational History    Not on file   Social Needs    Financial resource strain: Not on file    Food insecurity:       Worry: Not on file       Inability: Not on file    Transportation needs:        Medical: No       Non-medical: No   Tobacco Use    Smoking status: Never Smoker    Smokeless tobacco: Never Used   Substance and Sexual Activity    Alcohol use: No       Frequency: Never       Drinks per session: Patient refused       Binge frequency: Never    Drug use: No    Sexual activity: Not on file   Lifestyle    Physical activity:       Days per week: 4 days       Minutes per session: 40 min    Stress: Not at all   Relationships    Social connections:       Talks on phone: Patient refused       Gets together: Patient refused       Attends Christian service: Not on file       Active member of club or organization: Patient refused       Attends meetings of clubs or organizations: Patient refused       Relationship status:    Other Topics Concern    Not on file   Social History Narrative    Not on file         MEDICATIONS:   Current Outpatient Medications:     aspirin (ECOTRIN) 81 MG EC tablet, TAKE 1 TABLET BY MOUTH EVERY DAY, Disp: 90 tablet, Rfl: 3    atorvastatin (LIPITOR) 40 MG tablet, Take 1 tablet (40 mg total) by mouth once daily., Disp: 90 tablet, Rfl: 3    baclofen (LIORESAL) 10 MG tablet, Take 0.5 tablets (5 mg total) by mouth 3 (three) times daily., Disp: 135 tablet, Rfl: 3    clopidogrel (PLAVIX) 75 mg tablet, Take 1 tablet (75 mg total) by mouth once daily., Disp: 90 tablet, Rfl: 3    gabapentin (NEURONTIN) 100 MG capsule, Take 100 mg by mouth once daily., Disp: , Rfl:     gabapentin (NEURONTIN) 300 MG capsule, , Disp: , Rfl:     hydroCHLOROthiazide (MICROZIDE) 12.5 mg capsule, TAKE 1 CAPSULE BY MOUTH EVERY DAY, Disp: 90 capsule, Rfl: 3    metFORMIN (GLUCOPHAGE) 500 MG tablet, Take 1 tablet (500 mg total) by mouth daily with breakfast., Disp: 90 tablet, Rfl: 3    NIFEdipine (PROCARDIA-XL) 30 MG (OSM) 24 hr tablet, TAKE 1 TABLET BY MOUTH EVERY DAY, Disp: 90 tablet, Rfl: 3    potassium chloride (MICRO-K) 8 mEq CpSR, TAKE 1 CAPSULE (8 MEQ TOTAL) BY MOUTH ONCE DAILY., Disp:  90 capsule, Rfl: 3    tiZANidine (ZANAFLEX) 4 MG tablet, Take 4 mg by mouth daily as needed., Disp: , Rfl:   ALLERGIES: Review of patient's allergies indicates:  No Known Allergies     Review of Systems:  Constitutional: no fever or chills  ENT: no nasal congestion or sore throat  Respiratory: no cough or shortness of breath  Cardiovascular: no chest pain or palpitations  Gastrointestinal: no nausea or vomiting, PUD, GERD, NSAID intolerance  Genitourinary: no hematuria or dysuria  Integument/Breast: no rash or pruritis  Hematologic/Lymphatic: no easy bruising or lymphadenopathy  Musculoskeletal: see HPI  Neurological: no seizures or tremors  Behavioral/Psych: no auditory or visual hallucinations        Physical Exam       Vitals:     01/06/20 1319   PainSc:   7         Constitutional: Oriented to person, place, and time. Appears well-developed and well-nourished.   HENT:   Head: Normocephalic and atraumatic.   Nose: Nose normal.   Eyes: No scleral icterus.   Neck: Normal range of motion.   Cardiovascular: Normal rate and regular rhythm.    Pulses:       Radial pulses are 2+ on the right side, and 2+ on the left side.   Pulmonary/Chest: Effort normal and breath sounds normal.   Abdominal: Soft.   Neurological: Alert and oriented to person, place, and time.   Skin: Skin is warm.   Psychiatric: Normal mood and affect.      MUSCULOSKELETAL UPPER EXTREMITY:  Examination hands demonstrates positive Tinel sign over the median nerve the wrist bilaterally  He does have flexion contracture left hand and arm related to the stroke                 Diagnostic Studies:  Nerve conduction study previous showed bilateral carpal tunnel syndrome           Assessment:  Bilateral carpal tunnel syndrome right worse than left  Release as an outpatient  The risks and benefits of surgery explained  Plan:  We will plan surgery for right carpal tunnel  Also today injection performed each wrist as follows  After pause for time-out identified  "each wrist injected bilaterally with combination Kenalog 20 mg 0.5 cc xylocaine sterile technique  Tolerated the procedure well without complication  Follow-up of the above surgery     The risks and benefits of surgery were discussed with the patient today and they understand.  The consent was signed in the office for surgery.        Barry Dutton MD (Jay)  [facility]  Orthopedic Upper Extremity Surgery    "

## 2020-02-21 NOTE — OP NOTE
Certification of Assistant at Surgery       Surgery Date: 2/21/2020     Participating Surgeons:  Surgeon(s) and Role:     * Barry Dutton Jr., MD - Primary    Procedures:  Procedure(s) (LRB):  RELEASE, CARPAL TUNNEL (Right)    Assistant Surgeon's Certification of Necessity:  I understand that section 1842 (b) (6) (d) of the Social Security Act generally prohibits Medicare Part B reasonable charge payment for the services of assistants at surgery in teaching hospitals when qualified residents are available to furnish such services. I certify that the services for which payment is claimed were medically necessary, and that no qualified resident was available to perform the services. I further understand that these services are subject to post-payment review by the Medicare carrier.      Erica Myles PA-C    02/21/2020  7:33 AM

## 2020-02-21 NOTE — TRANSFER OF CARE
"Anesthesia Transfer of Care Note    Patient: Luis Wong    Procedure(s) Performed: Procedure(s) (LRB):  RELEASE, CARPAL TUNNEL (Right)    Patient location: same day.    Anesthesia Type: MAC    Transport from OR: Transported from OR on room air with adequate spontaneous ventilation    Post pain: adequate analgesia    Post assessment: no apparent anesthetic complications    Post vital signs: stable    Level of consciousness: awake, oriented and alert    Nausea/Vomiting: no nausea/vomiting    Complications: none    Transfer of care protocol was followed      Last vitals:   Visit Vitals  /81   Pulse 78   Temp 36.8 °C (98.2 °F) (Temporal)   Resp 20   Ht 5' 9" (1.753 m)   Wt 70.3 kg (155 lb)   SpO2 98%   BMI 22.89 kg/m²     "

## 2020-02-21 NOTE — PLAN OF CARE
Discharge criteria met,voicing desire to go home. Discharge instructions given to patient & wife; verbalized understanding. Discharge home via wheelchair in care of wife.

## 2020-02-24 RX ORDER — HYDROMORPHONE HYDROCHLORIDE 2 MG/ML
0.5 INJECTION, SOLUTION INTRAMUSCULAR; INTRAVENOUS; SUBCUTANEOUS EVERY 5 MIN PRN
Status: DISCONTINUED | OUTPATIENT
Start: 2020-02-24 | End: 2020-02-24

## 2020-02-25 ENCOUNTER — PATIENT MESSAGE (OUTPATIENT)
Dept: ORTHOPEDICS | Facility: CLINIC | Age: 65
End: 2020-02-25

## 2020-02-28 ENCOUNTER — CLINICAL SUPPORT (OUTPATIENT)
Dept: REHABILITATION | Facility: HOSPITAL | Age: 65
End: 2020-02-28
Payer: MEDICARE

## 2020-02-28 DIAGNOSIS — Z74.09 IMPAIRED MOBILITY AND ACTIVITIES OF DAILY LIVING: ICD-10-CM

## 2020-02-28 DIAGNOSIS — R27.8 DECREASED COORDINATION: ICD-10-CM

## 2020-02-28 DIAGNOSIS — R29.898 DECREASED STRENGTH OF LOWER EXTREMITY: ICD-10-CM

## 2020-02-28 DIAGNOSIS — Z78.9 IMPAIRED MOBILITY AND ACTIVITIES OF DAILY LIVING: ICD-10-CM

## 2020-02-28 DIAGNOSIS — M62.81 MUSCLE WEAKNESS: ICD-10-CM

## 2020-02-28 DIAGNOSIS — Z74.09 IMPAIRED FUNCTIONAL MOBILITY, BALANCE, GAIT, AND ENDURANCE: ICD-10-CM

## 2020-02-28 PROCEDURE — 97110 THERAPEUTIC EXERCISES: CPT | Mod: PN,CQ

## 2020-02-28 PROCEDURE — 97112 NEUROMUSCULAR REEDUCATION: CPT | Mod: PN

## 2020-02-28 PROCEDURE — 97110 THERAPEUTIC EXERCISES: CPT | Mod: PN

## 2020-02-28 NOTE — PROGRESS NOTES
"  Occupational Therapy Daily Treatment Note     Name: Luis Wong  Clinic Number: [id number]    Therapy Diagnosis:    Encounter Diagnoses   Name Primary?    Impaired mobility and activities of daily living     Muscle weakness     Decreased coordination      Physician: Carla Altman, HALLE Lopez MD    Visit Date: 2/28/2020  Physician Orders: Eval and tx  Medical Diagnosis: CVA L sided weakness Recrudecesnt  Onset Date: 8/28/19 most recent CVA  Evaluation Date: 9/4/2019  Plan of Care Expiration Period: 2/21/20  Insurance Authorization period Expiration: 12/31/19  Date of Return to MD: NA  Visit # / Visits Authortized: 24/50 change in insurance  FOTO: CVA UE hand / 35% limitation down from 75% with the hand survey.      Time In: 10:15 AM    Time Out:  11:00 AM  Total Billable (one on one) Time: 45 min 1 TE 2 NMR     Precautions: Standard and Fall, no weightbearing and  with R hand due to carpal tunnel surgery    Subjective     Pt reports:  "I just start out tight at the beginning, but once it loosens up it's good"  he was compliant with home exercise program given last session.   Response to previous treatment: good   Functional change:     Pain:   Location:      Objective      Luis Wong  received TE to develop GM/FM coordination, balance, activity tolerance and strength in order to increase ADL/IADL independence with replicated home management/self care tasks, for 15 minutes with heat applied to L shoulder.    Exercises    PROM In all planes of movement   Peach theraputty 30x   Black gripper 2 red and 2 yellow  30x     Luis Wong participated in neuromuscular re-education for 25 minutes:  Pt completed the following exercises below in order to increase ROM, strength and tolerance of affected UE in order to increase IND and functional use:    Exercises Date 2/28/2020   Reaching in low plane 12 cups x3   Reaching across 12 cups x3   Reaching high on raised table 12 cups   Shoulder arc 24 " rings   Cup to mouth 2/10   Table stretches FF  Horizontal Abduction     Luis Wong participated in therapeutic activities for 5 minutes    Fishing activity - standing balance 5 fish       Home Exercises and Education Provided     Education provided:   - Previous hand out   - Progress towards goals   - Self feeding activity modifcations    Written Home Exercises Provided: Patient instructed to cont prior HEP. Updated putty exercises to molding,  and pinch Red putty provided.  Exercises were reviewed and Luis was able to demonstrate them prior to the end of the session.  Luis demonstrated good  understanding of the HEP provided.   .   See EMR under Patient Instructions for exercises provided prior visit.        Assessment     Pt participated in TE, NMR, and TA to address L shoulder tone, AROM, and  strength to participate in ADLs and IADLs. Pt recently underwent Carpal Tunnel surgery, so there were weightbearing precautions on R hand. Pt participated in stretches to L shoulder in all tolerable planes of movement with limitations due to pain in shoulder. Pt participated in reaching in different plane levels to normalize flexor patterns. Pt participated in cup to mouth and upgraded exercises by adding marbles in cup second round. Pt demonstrated marked improvement in this activity with less compensatory movements. Pt was able to reach higher with notable increase in shoulder flexion. Pt would benefit from cont'd OT to progress towards goals and increase functional level of performance.     Luis is progressing well towards his goals and there are no updates to goals at this time. Pt prognosis is Fair.      Pt will continue to benefit from skilled outpatient occupational therapy to address the deficits listed in the problem list on initial evaluation provide pt/family education and to maximize pt's level of independence in the home and community environment.      Anticipated barriers to occupational  therapy:      Pt's spiritual, cultural and educational needs considered and pt agreeable to plan of care and goals.     Previous Short Term Goals Status:   Goals:  Short Term Goals:  1) Initiate Hep Met 10/30/2019  2) Pt to increase LUE  strength by 5 # in order to A in self care task of feeding by 4 weeks. MET 10/30/2019  3) Pt to increase Quick dash Score by 5 points increasing self care IND by 4 weeks. Progressing 2/28/2020  4) Pt to increase L UE AROM by 10 degrees in order to A in UB dressing by 4 weeks.MET 2/28/2020  5) Patient will be able to achieve less than or equal to 75% on the FOTO, demonstrating overall improved functional ability with upper extremity. (self-care category) Progressing 2/28/2020        New Short Term Goals Status:     Long Term Goals:  1) Pt to be IND with HEP in order to maintain ROM and strength needed for self care IND by d.c. Progressing 2/28/2020  2) Pt to increase L UE  strength to WNL as compared to unaffected extremity in order to open items for self feeding by d.c. Progressing 2/28/2020  3) Pt to increase Quick dash Score by 10 points increasing self care IND at home by d.c. Progressing 2/28/2020  4) Pt to increase L UE AROM to WFL in order to A in UB dressing by d/c. Progressing 2/28/2020  5) Patient will be able to achieve less than or equal to 50% on the FOTO, demonstrating overall improved functional ability with upper extremity. (self-care category) Progressing 2/28/2020      Long Term Goal Status:   continue per initial plan of care.  Reasons for Recertification of Therapy:   Pt continues to benefit from skilled services and has made good progress thus far with good future rehab potential. Pt remains limited with LUE strength, ROM and coordination at this time which all still impact their performance of ADLs , IADLs affecting her habits, roles and routines.     Plan      Continue per [facility].   Progress as tolerated.     Christian Teran, OTS

## 2020-02-28 NOTE — PROGRESS NOTES
"                            Physical Therapy Daily Treatment Note     Name: Luis Wong  Clinic Number: [id number]    Therapy Diagnosis:   Encounter Diagnoses   Name Primary?    Impaired functional mobility, balance, gait, and endurance     Decreased strength of lower extremity      Physician: Carla Altman PA-C     Visit Date: 2/28/2020    Physician Orders: PT Eval and Treat  Medical Diagnosis from Referral: I63.9 (ICD-10-CM) - CVA (cerebral vascular accident)  Evaluation Date: 9/13/2019  Authorization Period Expiration: 12/31/2020  Plan of Care Expiration: 3/06/2020  Visit # / Visits authorized: 15/50 (45 prior visits)  FOTO: next at discharge     Time In: 9:30 AM  Time Out: 10:15 AM  Total Billable Time: 45 minutes (3 TE)      Subjective     * Pt seen by Kandy Gatica DPT, prior to treatment to approved today's PT session, following his Carpel Tunnel Procedure on 2/21/2020.    Pt reports: "I am feeling ok today". Pt reports his carpel tunnel release was successful. Reports of R hand soreness and stated his restrictions.   Pt was compliant with home exercise program.   Response to previous treatment: No adverse reactions.   Functional change: ambulated without SPC today.       Objective     Luis participated in therapeutic exercises for 40 minutes including:    - Nu-Step for reciprocal BUE and BLE motion for 10 minutes at level 8.0 (restricted UE use today)     - Fwd Step-ups  2x10, B, 6" step, SBA for balance - NOT TODAY    -Cybex knee extension: 30# plus dial turned to "15", 2x15, B      20# 2x15, DL up, LLE down  -Cybex Ham curls:  1.0 pl 2x15 B  -Cybex Leg Press:  8.0 plates, 2x20, B      6.0 plates, 2x20, LLE only  -Hamstring Stretch  30"x3 stair case B  -Gastroc stretch  30"x3 stair case B       Luis did NOT participate in neuromuscular re-education activities to improve: Balance and Proprioception. The following activities were included:     - Tandem on FOAM beam: 3 laps, 2 beams, forward and " "backward, // bars, RUE support - OOT  - Marching on FOAM: 30"x3, RUE support - OOT    Luis did NOT participate in gait training for 0 minutes on treadmill including the following:  --Treadmill forward ambulation with BUE support for 8 minutes with speed ranging from 0.5-0.9 mph and close SBA for balance.   --Treadmill side-stepping ambulation with BUE support for 3 minutes in each direction with speed at 0.4-0.5 mph and close SBA for balance.     Home Exercises Provided and Patient Education Provided     Education provided:   Cont HEP      Written Home Exercises Provided: Not today  Exercises were reviewed and Luis was able to demonstrate them prior to the end of the session.  Luis demonstrated good  understanding of the education provided.     See EMR under Patient Instructions for exercises provided 9/23/2019 and 10/28/2019.    Assessment     Limited UE use throughout session due to R UE restrictions post op. He was able to complete session with no reports of pain and no adverse reactions.   Luis is progressing well towards his goals.   Pt prognosis is Good.   Pt will continue to benefit from skilled outpatient physical therapy to address the deficits listed in the problem list box on initial evaluation, provide pt/family education and to maximize pt's level of independence in the home and community environment.     Pt's spiritual, cultural and educational needs considered and pt agreeable to plan of care and goals.  Anticipated barriers to physical therapy: Co-morbidities    Goals:  Long Term Goals (8 Weeks):   3. Pt will be able to perform TUG in less than or equal to 17 seconds, safely, without use of AD demonstrating overall improved functional mobility. PROGRESSING, NOT MET 2/28/2020  6. Pt will walk > than or equal to 230 feet on the 2 minute walk test indoor with AD with 0 LOB and minimal gait deviations for improved safety in home ambulation and safety. PROGRESSING, NOT MET 2/28/2020  7. Pt will be " able to safely perform and tolerate high level ADL's without LOB. PROGRESSING, NOT MET 2/28/2020  8. Pt will have 0 falls from 11/6/19. PROGRESSING, NOT MET 2/28/2020  10. Pt will have MMT score of 3+/5 in all major ms groups in Left LE.  PROGRESSING, NOT MET 2/28/2020  11. Pt will ambulate on TM x 6 minutes with use of UE support with 0 LOB at greater than or equal to 1.3 mph. PROGRESSING, NOT MET 2/28/2020  12. Pt will begin some form of community fitness to begin regular and consistent performance of exercise to continue maintenance of gains made in PT. PROGRESSING, NOT MET 2/28/2020        Plan   Cont progress PT as per POC  Continue LE strengthening and balance as tolerated.     Robin Davis, PTA

## 2020-03-02 ENCOUNTER — CLINICAL SUPPORT (OUTPATIENT)
Dept: REHABILITATION | Facility: HOSPITAL | Age: 65
End: 2020-03-02
Payer: MEDICARE

## 2020-03-02 DIAGNOSIS — Z74.09 IMPAIRED MOBILITY AND ACTIVITIES OF DAILY LIVING: ICD-10-CM

## 2020-03-02 DIAGNOSIS — Z74.09 IMPAIRED FUNCTIONAL MOBILITY, BALANCE, GAIT, AND ENDURANCE: ICD-10-CM

## 2020-03-02 DIAGNOSIS — R27.8 DECREASED COORDINATION: ICD-10-CM

## 2020-03-02 DIAGNOSIS — M62.81 MUSCLE WEAKNESS: ICD-10-CM

## 2020-03-02 DIAGNOSIS — Z78.9 IMPAIRED MOBILITY AND ACTIVITIES OF DAILY LIVING: ICD-10-CM

## 2020-03-02 DIAGNOSIS — R29.898 DECREASED STRENGTH OF LOWER EXTREMITY: ICD-10-CM

## 2020-03-02 PROCEDURE — 97110 THERAPEUTIC EXERCISES: CPT | Mod: PN

## 2020-03-02 PROCEDURE — 97112 NEUROMUSCULAR REEDUCATION: CPT | Mod: PN

## 2020-03-02 PROCEDURE — 97116 GAIT TRAINING THERAPY: CPT | Mod: PN

## 2020-03-02 NOTE — PROGRESS NOTES
"  Occupational Therapy Daily Treatment Note     Name: Luis Wong  Clinic Number: [id number]    Therapy Diagnosis:    Encounter Diagnoses   Name Primary?    Impaired mobility and activities of daily living     Muscle weakness     Decreased coordination      Physician: Carla Altman, HALLE Lopez MD    Visit Date: 3/2/2020  Physician Orders: Eval and tx  Medical Diagnosis: CVA L sided weakness Recrudecesnt  Onset Date: 8/28/19 most recent CVA  Evaluation Date: 9/4/2019  Plan of Care Expiration Period: 2/21/20  Insurance Authorization period Expiration: 12/31/19  Date of Return to MD: NA  Visit # / Visits Authortized: 25/50 change in insurance  FOTO: CVA UE hand / 35% limitation down from 75% with the hand survey.      Time In:  10:15 AM  Time Out:   11:00 AM   Total Billable (one on one) Time:  45 minutes      Precautions: Standard and Fall, no weightbearing and  with R hand due to carpal tunnel surgery    Subjective     Pt reports:  "I sleep with a heat pad so my arm isn't stiff"  he was compliant with home exercise program given last session.   Response to previous treatment: good   Functional change:     Pain:   Location:      Objective      Luis Wong  received TE to develop GM/FM coordination, balance, activity tolerance and strength in order to increase ADL/IADL independence with replicated home management/self care tasks, for 15 minutes with heat applied to L shoulder.    Exercises    PROM In all planes of movement   Peach theraputty 30x   Dowel Stretches  -ER  -horizontal abduction  -FF 20x       Luis Wong participated in neuromuscular re-education for 30 minutes:  Pt completed the following exercises below in order to increase ROM, strength and tolerance of affected UE in order to increase IND and functional use:    Exercises Date 3/2/2020   Reaching in low plane 10 cones   Reaching across 10 cones   Reaching high 10 cones   Impingement exercises 10x   Cup to mouth 2/10 "   Supination actvitiy 2/10   Hand extension rocks 10x       Home Exercises and Education Provided     Education provided:   - Previous hand out   - Progress towards goals     Written Home Exercises Provided: Patient instructed to cont prior HEP. Updated putty exercises to molding,  and pinch Red putty provided.  Exercises were reviewed and Luis was able to demonstrate them prior to the end of the session.  Luis demonstrated good  understanding of the HEP provided.   .   See EMR under Patient Instructions for exercises provided prior visit.        Assessment     Pt participated in TE, NMR, and TA to address L shoulder tone, AROM, and  strength to participate in ADLs and IADLs. Pt participated in stretches to L shoulder in all tolerable planes of movement with limitations due to pain and tightiness in shoulder. Pt participated in impingement exercise with pressing down on back of chair and walking backwards with simulteanous shoulder flexion to approximate humeral head to address impingement and pain in shoulder with good tolerance. Pt participated in reaching in different plane levels to normalize flexor patterns. Pt participated in cone to mouth exercise with marked improvement in normal flexor pattern after initial tactile cueing.  Pt would benefit from cont'd OT to progress towards goals and increase functional level of performance.     Luis is progressing well towards his goals and there are no updates to goals at this time. Pt prognosis is Fair.      Pt will continue to benefit from skilled outpatient occupational therapy to address the deficits listed in the problem list on initial evaluation provide pt/family education and to maximize pt's level of independence in the home and community environment.      Anticipated barriers to occupational therapy:      Pt's spiritual, cultural and educational needs considered and pt agreeable to plan of care and goals.     Previous Short Term Goals Status:    Goals:  Short Term Goals:  1) Initiate Hep Met 10/30/2019  2) Pt to increase LUE  strength by 5 # in order to A in self care task of feeding by 4 weeks. MET 10/30/2019  3) Pt to increase Quick dash Score by 5 points increasing self care IND by 4 weeks. Progressing 3/2/2020  4) Pt to increase L UE AROM by 10 degrees in order to A in UB dressing by 4 weeks.MET 3/2/2020  5) Patient will be able to achieve less than or equal to 75% on the FOTO, demonstrating overall improved functional ability with upper extremity. (self-care category) Progressing 3/2/2020        New Short Term Goals Status:     Long Term Goals:  1) Pt to be IND with HEP in order to maintain ROM and strength needed for self care IND by d.c. Progressing 3/2/2020  2) Pt to increase L UE  strength to WNL as compared to unaffected extremity in order to open items for self feeding by d.c. Progressing 3/2/2020  3) Pt to increase Quick dash Score by 10 points increasing self care IND at home by d.c. Progressing 3/2/2020  4) Pt to increase L UE AROM to WFL in order to A in UB dressing by d/c. Progressing 3/2/2020  5) Patient will be able to achieve less than or equal to 50% on the FOTO, demonstrating overall improved functional ability with upper extremity. (self-care category) Progressing 3/2/2020      Long Term Goal Status:   continue per initial plan of care.  Reasons for Recertification of Therapy:   Pt continues to benefit from skilled services and has made good progress thus far with good future rehab potential. Pt remains limited with LUE strength, ROM and coordination at this time which all still impact their performance of ADLs , IADLs affecting her habits, roles and routines.     Plan      Continue per UPOC.   Progress as tolerated.     Tran Bejarano, OTS

## 2020-03-02 NOTE — PROGRESS NOTES
"                            Physical Therapy Daily Treatment Note     Name: Luis Wong  Clinic Number: [id number]    Therapy Diagnosis:   Encounter Diagnoses   Name Primary?    Impaired functional mobility, balance, gait, and endurance     Decreased strength of lower extremity      Physician: Carla Altman PA-C     Visit Date: 3/2/2020    Physician Orders: PT Eval and Treat  Medical Diagnosis from Referral: I63.9 (ICD-10-CM) - CVA (cerebral vascular accident)  Evaluation Date: 9/13/2019  Authorization Period Expiration: 12/31/2020  Plan of Care Expiration: 3/06/2020  Visit # / Visits authorized: 16/50 (45 prior visits)  FOTO: next at discharge     Time In: 9:25 AM  Time Out: 10:11 AM  Total Billable Time: 46 minutes (2 TE, 1 GT)      Subjective     Pt reports: he will be receiving his botox injections for his LLE next Tuesday.   Pt was compliant with home exercise program.   Response to previous treatment: No adverse reactions.   Functional change: ambulation within the home and community without SPC      Objective     Luis participated in therapeutic exercises for 32 minutes including:    - Nu-Step for reciprocal BUE and BLE motion for 10 minutes at level 8.0  - Cybex knee extension: 30# plus dial turned to "15", 2x15, B      20# 2x15, DL up, LLE down  - Cybex Ham curls:  1.5 pl 2x15 B  - Cybex Leg Press:  8.5 plates, 2x20, B      6.5 plates, 2x20, LLE only  - Fwd Step-ups  2x10, B, 6" step, SBA for balance - OOT  - Hamstring Stretch  30"x3 stair case B- OOT  - Gastroc stretch  30"x3 stair case B - OOT      Luis participated in neuromuscular re-education activities to improve: Balance and Proprioception for 6 minutes. The following activities were included:     - Fwd over hurdles (4): 3 laps at / bar, RUE use, intermittent LUE use  - Tandem on FOAM beam: 3 laps, 2 beams, forward and backward, // bars, RUE support - OOT  - Marching on FOAM: 30"x3, RUE support - ZACHARYOT    Luis participated in gait " training for 8 minutes on treadmill including the following:  --Treadmill forward ambulation with BUE support for 8 minutes with speed ranging from 0.7-0.9 mph and close SBA for balance.   --Treadmill side-stepping ambulation with BUE support for 3 minutes in each direction with speed at 0.4-0.5 mph and close SBA for balance. - not today      Home Exercises Provided and Patient Education Provided     Education provided:   Cont HEP      Written Home Exercises Provided: Not today  Exercises were reviewed and Luis was able to demonstrate them prior to the end of the session.  Luis demonstrated good  understanding of the education provided.     See EMR under Patient Instructions for exercises provided 9/23/2019 and 10/28/2019.    Assessment     Patient presented to therapy without AD. Able to tolerate increases in resistance for cybex machines. Required increased verbal cueing during treadmill training to ambulate with heel strike and avoid sliding foot.      Luis is progressing well towards his goals.   Pt prognosis is Good.   Pt will continue to benefit from skilled outpatient physical therapy to address the deficits listed in the problem list box on initial evaluation, provide pt/family education and to maximize pt's level of independence in the home and community environment.     Pt's spiritual, cultural and educational needs considered and pt agreeable to plan of care and goals.  Anticipated barriers to physical therapy: Co-morbidities    Goals:  Long Term Goals (8 Weeks):   3. Pt will be able to perform TUG in less than or equal to 17 seconds, safely, without use of AD demonstrating overall improved functional mobility. PROGRESSING, NOT MET 3/2/2020  6. Pt will walk > than or equal to 230 feet on the 2 minute walk test indoor with AD with 0 LOB and minimal gait deviations for improved safety in home ambulation and safety. PROGRESSING, NOT MET 3/2/2020  7. Pt will be able to safely perform and tolerate high  level ADL's without LOB. PROGRESSING, NOT MET 3/2/2020  8. Pt will have 0 falls from 11/6/19. PROGRESSING, NOT MET 3/2/2020  10. Pt will have MMT score of 3+/5 in all major ms groups in Left LE.  PROGRESSING, NOT MET 3/2/2020  11. Pt will ambulate on TM x 6 minutes with use of UE support with 0 LOB at greater than or equal to 1.3 mph. PROGRESSING, NOT MET 3/2/2020  12. Pt will begin some form of community fitness to begin regular and consistent performance of exercise to continue maintenance of gains made in PT. PROGRESSING, NOT MET 3/2/2020        Plan   Cont progress PT as per POC  Continue LE strengthening and balance as tolerated.   Update POC soon.     Kandy Gatica, PT

## 2020-03-03 ENCOUNTER — PATIENT MESSAGE (OUTPATIENT)
Dept: OTHER | Facility: OTHER | Age: 65
End: 2020-03-03

## 2020-03-05 ENCOUNTER — OFFICE VISIT (OUTPATIENT)
Dept: ORTHOPEDICS | Facility: CLINIC | Age: 65
End: 2020-03-05
Payer: MEDICARE

## 2020-03-05 VITALS — BODY MASS INDEX: 22.96 KG/M2 | WEIGHT: 155 LBS | HEIGHT: 69 IN

## 2020-03-05 DIAGNOSIS — G56.03 BILATERAL CARPAL TUNNEL SYNDROME: Primary | ICD-10-CM

## 2020-03-05 PROCEDURE — 99024 PR POST-OP FOLLOW-UP VISIT: ICD-10-PCS | Mod: S$GLB,,, | Performed by: ORTHOPAEDIC SURGERY

## 2020-03-05 PROCEDURE — 99999 PR PBB SHADOW E&M-EST. PATIENT-LVL III: CPT | Mod: PBBFAC,,, | Performed by: ORTHOPAEDIC SURGERY

## 2020-03-05 PROCEDURE — 99024 POSTOP FOLLOW-UP VISIT: CPT | Mod: S$GLB,,, | Performed by: ORTHOPAEDIC SURGERY

## 2020-03-05 PROCEDURE — 99999 PR PBB SHADOW E&M-EST. PATIENT-LVL III: ICD-10-PCS | Mod: PBBFAC,,, | Performed by: ORTHOPAEDIC SURGERY

## 2020-03-05 NOTE — PROGRESS NOTES
Subjective:      Patient ID: Luis Wong is a 64 y.o. male.  Chief Complaint: Post-op Evaluation of the Right Wrist      HPI  Luis Wong is a  64 y.o. male presenting today for post op visit.  He is s/p right carpal tunnel release doing well no problems reported pleased with the results no numbness reported.     Review of patient's allergies indicates:  No Known Allergies      Current Outpatient Medications   Medication Sig Dispense Refill    aspirin (ECOTRIN) 81 MG EC tablet TAKE 1 TABLET BY MOUTH EVERY DAY 90 tablet 3    atorvastatin (LIPITOR) 40 MG tablet Take 1 tablet (40 mg total) by mouth once daily. 90 tablet 3    clopidogrel (PLAVIX) 75 mg tablet Take 1 tablet (75 mg total) by mouth once daily. 90 tablet 3    gabapentin (NEURONTIN) 100 MG capsule Take 100 mg by mouth once daily.      hydroCHLOROthiazide (MICROZIDE) 12.5 mg capsule TAKE 1 CAPSULE BY MOUTH EVERY DAY 90 capsule 3    metFORMIN (GLUCOPHAGE) 500 MG tablet Take 1 tablet (500 mg total) by mouth daily with breakfast. 90 tablet 3    NIFEdipine (PROCARDIA-XL) 30 MG (OSM) 24 hr tablet TAKE 1 TABLET BY MOUTH EVERY DAY 90 tablet 3    potassium chloride (MICRO-K) 8 mEq CpSR TAKE 1 CAPSULE (8 MEQ TOTAL) BY MOUTH ONCE DAILY. 90 capsule 3    tiZANidine (ZANAFLEX) 4 MG tablet Take 4 mg by mouth daily as needed.      HYDROcodone-acetaminophen (NORCO) 5-325 mg per tablet Take 1 tablet by mouth every 4 (four) hours as needed for Pain. (Patient not taking: Reported on 3/5/2020) 20 tablet 0     No current facility-administered medications for this visit.        Past Medical History:   Diagnosis Date    Aneurysm 10/30/2012    Diabetes mellitus     Fever blister     Herpes infection     Hypertension     ICH (intracerebral hemorrhage)     Mixed hyperlipidemia 9/5/2013    Nontraumatic thalamic hemorrhage 8/31/2016    JAQUAN (obstructive sleep apnea)     Right-sided lacunar stroke 9/11/2014    SDH (subdural hematoma)     Special screening  "for malignant neoplasms, colon     Stroke        Past Surgical History:   Procedure Laterality Date    CARPAL TUNNEL RELEASE Right 2/21/2020    Procedure: RELEASE, CARPAL TUNNEL;  Surgeon: Barry Dutton Jr., MD;  Location: [facility] OR;  Service: Orthopedics;  Laterality: Right;    INSERTION OF IMPLANTABLE LOOP RECORDER N/A 12/12/2019    Procedure: Insertion, Implantable Loop Recorder;  Surgeon: Efren Sanford MD;  Location: [facility] CATH LAB/EP;  Service: Cardiology;  Laterality: N/A;  Crytogenic CVA, LOOP,  MDT, Local,  DM    Knee arthroscopic surgery         OBJECTIVE:   PHYSICAL EXAM:  Height: 5' 9" (175.3 cm) Weight: 70.3 kg (155 lb)  Vitals:    03/05/20 1011   Weight: 70.3 kg (155 lb)   Height: 5' 9" (1.753 m)   PainSc: 0-No pain     Ortho/SPM Exam  Examination right hand wrist incision looks good well-healed range of motion fingers excellent  Sensation intact   strength improving      RADIOGRAPHS:  None  Comments: I have personally reviewed the imaging and I agree with the above radiologist's report.    ASSESSMENT/PLAN:     IMPRESSION:  Status post right carpal tunnel release doing well    PLAN:  Advance activities as tolerated scar massage strengthening discontinue wrist splint    FOLLOW UP:  4-6 weeks    Disclaimer: This note has been generated using voice-recognition software. There may be typographical errors that have been missed during proof-reading.  "

## 2020-03-06 ENCOUNTER — CLINICAL SUPPORT (OUTPATIENT)
Dept: REHABILITATION | Facility: HOSPITAL | Age: 65
End: 2020-03-06
Payer: MEDICARE

## 2020-03-06 DIAGNOSIS — Z74.09 IMPAIRED FUNCTIONAL MOBILITY, BALANCE, GAIT, AND ENDURANCE: ICD-10-CM

## 2020-03-06 DIAGNOSIS — Z78.9 IMPAIRED MOBILITY AND ACTIVITIES OF DAILY LIVING: ICD-10-CM

## 2020-03-06 DIAGNOSIS — M62.81 MUSCLE WEAKNESS: ICD-10-CM

## 2020-03-06 DIAGNOSIS — Z74.09 IMPAIRED MOBILITY AND ACTIVITIES OF DAILY LIVING: ICD-10-CM

## 2020-03-06 DIAGNOSIS — R27.8 DECREASED COORDINATION: ICD-10-CM

## 2020-03-06 DIAGNOSIS — R29.898 DECREASED STRENGTH OF LOWER EXTREMITY: ICD-10-CM

## 2020-03-06 PROCEDURE — 97112 NEUROMUSCULAR REEDUCATION: CPT | Mod: PN,CQ

## 2020-03-06 PROCEDURE — 97110 THERAPEUTIC EXERCISES: CPT | Mod: PN,CQ

## 2020-03-06 PROCEDURE — 97112 NEUROMUSCULAR REEDUCATION: CPT | Mod: PN

## 2020-03-06 PROCEDURE — 97110 THERAPEUTIC EXERCISES: CPT | Mod: PN

## 2020-03-06 NOTE — PROGRESS NOTES
"                            Physical Therapy Daily Treatment Note     Name: Luis Wong  Clinic Number: [id number]    Therapy Diagnosis:   Encounter Diagnoses   Name Primary?    Impaired functional mobility, balance, gait, and endurance     Decreased strength of lower extremity      Physician: Carla Altman PA-C     Visit Date: 3/6/2020    Physician Orders: PT Eval and Treat  Medical Diagnosis from Referral: I63.9 (ICD-10-CM) - CVA (cerebral vascular accident)  Evaluation Date: 9/13/2019  Authorization Period Expiration: 12/31/2020  Plan of Care Expiration: 3/06/2020  Visit # / Visits authorized: 17/50 (45 prior visits)  FOTO: next at discharge     Time In: 10:15 AM  Time Out: 11:0 AM  Total Billable Time: 45 minutes (2 TE, 1 NMR)      Subjective     Pt reports:pt agreeable to PT session, no reports of pain   Pt was compliant with home exercise program.   Response to previous treatment: No adverse reactions.   Functional change: ambulation within the home and community without SPC      Objective     Luis participated in therapeutic exercises for 30 minutes including:      - Cybex knee extension: 30# plus dial turned to "15", 2x15, B      20# 2x15, DL up, LLE down  - Cbyex multi hip  1.5 pl 2x15 B  - Cybex Ham curls:  1.5 pl 2x15 B  - Cybex Leg Press:  8.5 plates, 2x20, B      6.5 plates, 2x20, LLE only  - Fwd Step-ups  2x10, B, 6" step, SBA for balance - OOT  - Hamstring Stretch  30"x3 stair case B- OOT  - Gastroc stretch  30"x3 stair case B - OOT      Luis participated in neuromuscular re-education activities to improve: Balance and Proprioception for 10 minutes. The following activities were included:   - Nu-Step for reciprocal BUE and BLE motion for 10 minutes at level 8.0  - Fwd over hurdles (4): 3 laps at / bar, RUE use, intermittent LUE use OOT  - Tandem on FOAM beam: 3 laps, 2 beams, forward and backward, // bars, RUE support - ZACHARYOT  - Marching on FOAM: 30"x3, RUE support - REMY Smith did not " participate in gait training for 0 minutes on treadmill including the following:  --Treadmill forward ambulation with BUE support for 8 minutes with speed ranging from 0.7-0.9 mph and close SBA for balance.   --Treadmill side-stepping ambulation with BUE support for 3 minutes in each direction with speed at 0.4-0.5 mph and close SBA for balance. - not today      Home Exercises Provided and Patient Education Provided     Education provided:   Cont HEP      Written Home Exercises Provided: Not today  Exercises were reviewed and Luis was able to demonstrate them prior to the end of the session.  Luis demonstrated good  understanding of the education provided.     See EMR under Patient Instructions for exercises provided 9/23/2019 and 10/28/2019.    Assessment     Patient ambulates without assistive device. Performed all therex with no reports of pain. Added use of cybex multi hip therex today.       Luis is progressing well towards his goals.   Pt prognosis is Good.   Pt will continue to benefit from skilled outpatient physical therapy to address the deficits listed in the problem list box on initial evaluation, provide pt/family education and to maximize pt's level of independence in the home and community environment.     Pt's spiritual, cultural and educational needs considered and pt agreeable to plan of care and goals.  Anticipated barriers to physical therapy: Co-morbidities    Goals:  Long Term Goals (8 Weeks):   3. Pt will be able to perform TUG in less than or equal to 17 seconds, safely, without use of AD demonstrating overall improved functional mobility. PROGRESSING, NOT MET 3/6/2020  6. Pt will walk > than or equal to 230 feet on the 2 minute walk test indoor with AD with 0 LOB and minimal gait deviations for improved safety in home ambulation and safety. PROGRESSING, NOT MET 3/6/2020  7. Pt will be able to safely perform and tolerate high level ADL's without LOB. PROGRESSING, NOT MET 3/6/2020  8. Pt  will have 0 falls from 11/6/19. PROGRESSING, NOT MET 3/6/2020  10. Pt will have MMT score of 3+/5 in all major ms groups in Left LE.  PROGRESSING, NOT MET 3/6/2020  11. Pt will ambulate on TM x 6 minutes with use of UE support with 0 LOB at greater than or equal to 1.3 mph. PROGRESSING, NOT MET 3/6/2020  12. Pt will begin some form of community fitness to begin regular and consistent performance of exercise to continue maintenance of gains made in PT. PROGRESSING, NOT MET 3/6/2020        Plan   Cont progress PT as per POC  Continue LE strengthening and balance as tolerated.        Robin Davis, PTA

## 2020-03-06 NOTE — PROGRESS NOTES
"  Occupational Therapy Daily Treatment Note     Name: Luis Wong  Clinic Number: [id number]    Therapy Diagnosis:    Encounter Diagnoses   Name Primary?    Impaired mobility and activities of daily living     Muscle weakness     Decreased coordination      Physician: Carla Altman, HALLE Lopez MD    Visit Date: 3/6/2020  Physician Orders: Eval and tx  Medical Diagnosis: CVA L sided weakness Recrudecesnt  Onset Date: 8/28/19 most recent CVA  Evaluation Date: 9/4/2019  Plan of Care Expiration Period: 4/17/2020  Insurance Authorization period Expiration: 12/31/19  Date of Return to MD: NA  Visit # / Visits Authortized: 26/50 change in insurance  FOTO: CVA UE hand / 35% limitation down from 75% with the hand survey.      Time In: 9:30 AM    Time Out:  10:15 AM  Total Billable (one on one) Time: 45 minutes 1 TE 2 NMR     Precautions: Standard and Fall, no weightbearing and  with R hand due to carpal tunnel surgery    Subjective     Pt reports:   "My arm feels a lot better today"  he was compliant with home exercise program given last session.   Response to previous treatment: good   Functional change:     Pain:   Location:      Objective      Luis Wong  received TE to develop GM/FM coordination, balance, activity tolerance and strength in order to increase ADL/IADL independence with replicated home management/self care tasks, for 15 minutes with heat applied to L shoulder.    Exercises    UBE  6 minutes   PROM In all planes of movement   Red theraputty L  Green theraputty R 30x   Dowel Stretches  -ER  -horizontal abduction  -FF   10x     Red theraband exercises  -rows  -tricep extensions   15x     Luis Wong participated in neuromuscular re-education for 30 minutes:  Pt completed the following exercises below in order to increase ROM, strength and tolerance of affected UE in order to increase IND and functional use:    Exercises Date 3/6/2020   Reaching in low plane 10 cones   Reaching " across 10 cones   Reaching high plane 10 cones   Shoulder arc 2x   Impingement exercises 10x   Cup to mouth 2/10   Supination   -supinate no weight  -supinate with hammer 2/10   Hand extension  -supine extension  -prone extension 10x       Home Exercises and Education Provided     Education provided:   - Previous hand out   - Progress towards goals     Written Home Exercises Provided: Patient instructed to cont prior HEP. Updated putty exercises to molding,  and pinch Red putty provided.  Exercises were reviewed and Luis was able to demonstrate them prior to the end of the session.  Luis demonstrated good  understanding of the HEP provided.   .   See EMR under Patient Instructions for exercises provided prior visit.        Assessment     Pt tolerated session well today with continued progress in arm movements and therapeutic exercises. Less pain today with arm exercises and improvement with normal flexor patterns. Main limitations are still pain, decrease AROM, and spasticity. Supination and wrist extensions are less than full range. Pt would benefit from cont'd OT to progress towards goals and increase functional level of performance.     Luis is progressing well towards his goals and there are no updates to goals at this time. Pt prognosis is Fair.      Pt will continue to benefit from skilled outpatient occupational therapy to address the deficits listed in the problem list on initial evaluation provide pt/family education and to maximize pt's level of independence in the home and community environment.      Anticipated barriers to occupational therapy:      Pt's spiritual, cultural and educational needs considered and pt agreeable to plan of care and goals.     Previous Short Term Goals Status:   Goals:  Short Term Goals:  1) Initiate Hep Met 10/30/2019  2) Pt to increase LUE  strength by 5 # in order to A in self care task of feeding by 4 weeks. MET 10/30/2019  3) Pt to increase Quick dash Score  by 5 points increasing self care IND by 4 weeks. Progressing 3/6/2020  4) Pt to increase L UE AROM by 10 degrees in order to A in UB dressing by 4 weeks.MET 3/6/2020  5) Patient will be able to achieve less than or equal to 75% on the FOTO, demonstrating overall improved functional ability with upper extremity. (self-care category) Progressing 3/6/2020        New Short Term Goals Status:     Long Term Goals:  1) Pt to be IND with HEP in order to maintain ROM and strength needed for self care IND by d.c. Progressing 3/6/2020  2) Pt to increase L UE  strength to WNL as compared to unaffected extremity in order to open items for self feeding by d.c. Progressing 3/6/2020  3) Pt to increase Quick dash Score by 10 points increasing self care IND at home by d.c. Progressing 3/6/2020  4) Pt to increase L UE AROM to WFL in order to A in UB dressing by d/c. Progressing 3/6/2020  5) Patient will be able to achieve less than or equal to 50% on the FOTO, demonstrating overall improved functional ability with upper extremity. (self-care category) Progressing 3/6/2020      Long Term Goal Status:   continue per initial plan of care.  Reasons for Recertification of Therapy:   Pt continues to benefit from skilled services and has made good progress thus far with good future rehab potential. Pt remains limited with LUE strength, ROM and coordination at this time which all still impact their performance of ADLs , IADLs affecting her habits, roles and routines.     Plan      Continue per [facility].   Progress as tolerated.     Tran Bejarano, OTS

## 2020-03-09 ENCOUNTER — CLINICAL SUPPORT (OUTPATIENT)
Dept: REHABILITATION | Facility: HOSPITAL | Age: 65
End: 2020-03-09
Payer: MEDICARE

## 2020-03-09 DIAGNOSIS — M62.81 MUSCLE WEAKNESS: ICD-10-CM

## 2020-03-09 DIAGNOSIS — R29.898 DECREASED STRENGTH OF LOWER EXTREMITY: ICD-10-CM

## 2020-03-09 DIAGNOSIS — Z74.09 IMPAIRED FUNCTIONAL MOBILITY, BALANCE, GAIT, AND ENDURANCE: ICD-10-CM

## 2020-03-09 DIAGNOSIS — Z74.09 IMPAIRED MOBILITY AND ACTIVITIES OF DAILY LIVING: ICD-10-CM

## 2020-03-09 DIAGNOSIS — R27.8 DECREASED COORDINATION: ICD-10-CM

## 2020-03-09 DIAGNOSIS — Z78.9 IMPAIRED MOBILITY AND ACTIVITIES OF DAILY LIVING: ICD-10-CM

## 2020-03-09 PROCEDURE — 97110 THERAPEUTIC EXERCISES: CPT | Mod: PN

## 2020-03-09 PROCEDURE — 97112 NEUROMUSCULAR REEDUCATION: CPT | Mod: PN

## 2020-03-09 NOTE — PROGRESS NOTES
Physical Therapy Daily Treatment Note     Name: Luis Wong  Clinic Number: [id number]    Therapy Diagnosis:   Encounter Diagnoses   Name Primary?    Impaired functional mobility, balance, gait, and endurance     Decreased strength of lower extremity      Physician: Carla Altman PA-C     Visit Date: 3/9/2020    Physician Orders: PT Eval and Treat  Medical Diagnosis from Referral: I63.9 (ICD-10-CM) - CVA (cerebral vascular accident)  Evaluation Date: 9/13/2019  Authorization Period Expiration: 12/31/2020  Plan of Care Expiration: 3/06/2020  Visit # / Visits authorized: 19/50 (45 prior visits)  FOTO: next at discharge     Time In: 9:42 AM  Time Out: 10:15 AM  Total Billable Time: 33 minutes (2 TE)    Precautions: Standard; history of of CVA's    Subjective     Pt reports: that he's returning to his MD tomorrow for diagnostic testing for possible Botox injections in his LLE. He will also be calling [employer] to possibly set up appointment for his custom AFO.    Pt was compliant with home exercise program.   Response to previous treatment: No adverse reactions.   Functional change: Continued ambulation within the home and community without SPC      Objective     Luis participated in therapeutic exercises for 27 minutes including objective measurements:    - Cybex Leg Press:  8.5 plates, 2x20, B      6.5 plates, 2x20, LLE only        Home Exercises Provided and Patient Education Provided     Education provided:   Cont HEP      Written Home Exercises Provided: Not today  Exercises were reviewed and Luis was able to demonstrate them prior to the end of the session.  Luis demonstrated good  understanding of the education provided.     See EMR under Patient Instructions for exercises provided 9/23/2019 and 10/28/2019.    Assessment     See updated POC.      Luis is progressing well towards his goals.   Pt prognosis is Good.   Pt will continue to benefit from skilled outpatient  physical therapy to address the deficits listed in the problem list box on initial evaluation, provide pt/family education and to maximize pt's level of independence in the home and community environment.     Pt's spiritual, cultural and educational needs considered and pt agreeable to plan of care and goals.  Anticipated barriers to physical therapy: Co-morbidities    Goals:  See updated POC.      Plan   See updated POC.     Kandy Gatica, PT

## 2020-03-09 NOTE — PROGRESS NOTES
"  Occupational Therapy Daily Treatment Note     Name: Luis Wong  Clinic Number: [id number]    Therapy Diagnosis:    Encounter Diagnoses   Name Primary?    Impaired mobility and activities of daily living     Muscle weakness     Decreased coordination      Physician: Carla Altman, HALLE Lopez MD    Visit Date: 3/9/2020  Physician Orders: Eval and tx  Medical Diagnosis: CVA L sided weakness Recrudecesnt  Onset Date: 8/28/19 most recent CVA  Evaluation Date: 9/4/2019  Plan of Care Expiration Period: 4/17/2020  Insurance Authorization period Expiration: 12/31/19  Date of Return to MD: NA  Visit # / Visits Authortized: 27/50 change in insurance  FOTO: CVA UE hand / 35% limitation down from 75% with the hand survey.      Time In:  10:17 AM  Time Out: 11:00 AM  Total Billable (one on one) Time:  43 min 1 TE 2 NMR     Precautions: Standard and Fall, no weightbearing and  with R hand due to carpal tunnel surgery    Subjective     Pt reports:   "Practice makes better"  he was compliant with home exercise program given last session.   Response to previous treatment: good   Functional change:     Pain:   Location:      Objective      Luis Wong  received TE to develop GM/FM coordination, balance, activity tolerance and strength in order to increase ADL/IADL independence with replicated home management/self care tasks, for 15 minutes with heat applied to L shoulder.    Exercises    UBE  120   6 minutes   Dowel stretches  -FF  -chest press  -ER  -horizontal abduction   10x     Luis Wong participated in neuromuscular re-education for 30 minutes:  Pt completed the following exercises below in order to increase ROM, strength and tolerance of affected UE in order to increase IND and functional use:    Exercises Date 3/9/2020   Reaching in low plane 10 cones   Reaching across 10 cones   Reaching high plane 10 cones   Shoulder arc 2x   Supination cone stacking 2/10   Cup to mouth 2/10   Supination "   -supinate with hammer 2/10   Bicep curls 2# 2/10   Hand extension  -supine extension  -prone extension 10x       Home Exercises and Education Provided     Education provided:   - New stretching HEP  - Progress towards goals     Written Home Exercises Provided: Patient instructed to cont prior HEP. New stretching HEP provided   Exercises were reviewed and Luis was able to demonstrate them prior to the end of the session.  Luis demonstrated good  understanding of the HEP provided.   .   See EMR under Patient Instructions for exercises provided prior visit.        Assessment     Pt participated in therapeutic exercises and NMR today to address UE ROM and UE strength. Heat was applied and stretches were performed first due to tightness and pain from over the weekend. New stretching HEP was provided and encouraged to maintain throughout the week. Pt participated in cone stacking activities with smoother movements and decrease compensatory patterns. Supination and wrist extension still remain less than full range. Main limitations are pain, decrease AROM, and spasticity. Pt would benefit from cont'd OT to progress towards goals and increase functional level of performance.     Luis is progressing well towards his goals and there are no updates to goals at this time. Pt prognosis is Fair.      Pt will continue to benefit from skilled outpatient occupational therapy to address the deficits listed in the problem list on initial evaluation provide pt/family education and to maximize pt's level of independence in the home and community environment.      Anticipated barriers to occupational therapy:      Pt's spiritual, cultural and educational needs considered and pt agreeable to plan of care and goals.     Previous Short Term Goals Status:   Goals:  Short Term Goals:  1) Initiate Hep Met 10/30/2019  2) Pt to increase LUE  strength by 5 # in order to A in self care task of feeding by 4 weeks. MET 10/30/2019  3) Pt  to increase Quick dash Score by 5 points increasing self care IND by 4 weeks. Progressing 3/9/2020  4) Pt to increase L UE AROM by 10 degrees in order to A in UB dressing by 4 weeks.MET 3/9/2020  5) Patient will be able to achieve less than or equal to 75% on the FOTO, demonstrating overall improved functional ability with upper extremity. (self-care category) Progressing 3/9/2020        New Short Term Goals Status:     Long Term Goals:  1) Pt to be IND with HEP in order to maintain ROM and strength needed for self care IND by d.c. Progressing 3/9/2020  2) Pt to increase L UE  strength to WNL as compared to unaffected extremity in order to open items for self feeding by d.c. Progressing 3/9/2020  3) Pt to increase Quick dash Score by 10 points increasing self care IND at home by d.c. Progressing 3/9/2020  4) Pt to increase L UE AROM to WFL in order to A in UB dressing by d/c. Progressing 3/9/2020  5) Patient will be able to achieve less than or equal to 50% on the FOTO, demonstrating overall improved functional ability with upper extremity. (self-care category) Progressing 3/9/2020      Long Term Goal Status:   continue per initial plan of care.  Reasons for Recertification of Therapy:   Pt continues to benefit from skilled services and has made good progress thus far with good future rehab potential. Pt remains limited with LUE strength, ROM and coordination at this time which all still impact their performance of ADLs , IADLs affecting her habits, roles and routines.     Plan      Continue per [facility].   Progress as tolerated.     Tran Bejarano, OTS

## 2020-03-09 NOTE — PLAN OF CARE
Outpatient Therapy Updated Plan of Care     Visit Date: 3/9/2020    Name: Luis Wong  Clinic Number: [id number]    Therapy Diagnosis:   Encounter Diagnoses   Name Primary?    Impaired functional mobility, balance, gait, and endurance     Decreased strength of lower extremity      Physician: Carla Altman PA-C    Physician Orders: PT Eval and Treat  Medical Diagnosis from Referral: I63.9 (ICD-10-CM) - CVA (cerebral vascular accident)  Evaluation Date: 2019  Current Certification Period:  2019 to 3/6/2020  Authorization Period Expiration: 2020  Plan of Care Expiration: 3/06/2020  Visit # / Visits authorized:  (45 prior visits)    Precautions: Standard; history of of CVA's  Functional Level Prior to Evaluation:  Independent    Subjective     Update:   Pt reports: that he's returning to his MD tomorrow for diagnostic testing for possible Botox injections in his LLE. He will also be calling [employer] to possibly set up appointment for his custom AFO.    Pt was compliant with home exercise program.   Response to previous treatment: No adverse reactions.   Functional change: Continued ambulation within the home and community without SPC      Objective     Update:     Improvements bolded below, regressions in red:     Lower Extremity Strength    RLE LLE   Hip Flexion: 5/5 4-/5   Hip Extension:  4+/5 3-/5   Hip Abduction: 5/5 4+/5   Hip Adduction: 4+/5 4+/5   Knee Extension: 5/5 5/5   Knee Flexion: 5/5 3+/5 in sitting  2+/5 in prone   Ankle Dorsiflexion: 5/5 3+/5   Ankle Plantarflexion: 5/5 3/5   Ankle Inversion: 5/5 4/5   Ankle Eversion: 5/5 3-/5         TU.84 seconds without AD     2 minute walk test: 230 feet without AD      Assessment     Update: Luis continues to make small improvements with the continuation of therapy. LE extremity strength and control continue to improve, however are limited by spasticity. Patient is currently scheduled to receive treatment to decrease  spasticity as well as getting fitted for a custom AFO to assist with left ankle inversion and foot drop. Upon receiving treatment and custom AFO, patient will participate in strengthening and gait training. Currently, patient has improved from walking 218 feet to 230 feet on 2 min walk test. He also improved from 19 seconds to 17.74 seconds on TUG.     Previous Long Term Goals (8 Weeks):   3. Pt will be able to perform TUG in less than or equal to 17 seconds, safely, without use of AD demonstrating overall improved functional mobility. PROGRESSING, NOT MET 3/9/2020; 17.74 seconds  6. Pt will walk > than or equal to 230 feet on the 2 minute walk test indoor with AD with 0 LOB and minimal gait deviations for improved safety in home ambulation and safety. MET 3/9/2020; 230 feet  7. Pt will be able to safely perform and tolerate high level ADL's without LOB. PROGRESSING, NOT MET 3/9/2020  8. Pt will have 0 falls from 11/6/19. PROGRESSING, NOT MET 3/9/2020  10. Pt will have MMT score of 3+/5 in all major ms groups in Left LE.  PARTIALLY MET 3/9/2020  11. Pt will ambulate on TM x 6 minutes with use of UE support with 0 LOB at greater than or equal to 1.3 mph. PROGRESSING, NOT MET 3/9/2020  12. Pt will begin some form of community fitness to begin regular and consistent performance of exercise to continue maintenance of gains made in PT. PROGRESSING, NOT MET 3/9/2020     Long Term Goal Status:   modified: Modify goal 6 below, continue all remaining goals:  6. Pt will walk > than or equal to 260 feet on the 2 minute walk test indoor with AD with 0 LOB and minimal gait deviations for improved safety in home ambulation and safety.        Reasons for Recertification of Therapy:   Expiration of current POC.     Plan     Updated Certification Period: 3/9/2020 to 5/1/2020.  Recommended Treatment Plan: 2 times per week for 6 weeks: Electrical Stimulation , FDN prn, Gait Training, Manual Therapy, Moist Heat/ Ice, Neuromuscular  Re-ed, Patient Education, Therapeutic Activites, Therapeutic Exercise, Ultrasound and Kinesiotape prn.  Other Recommendations: LE Strengthening following injections to decrease spasticity. Gait training with AFO once customized.     Kandy Gatica, PT  3/9/2020      I CERTIFY THE NEED FOR THESE SERVICES FURNISHED UNDER THIS PLAN OF TREATMENT AND WHILE UNDER MY CARE    Physician's comments:        Physician's Signature: ___________________________________________________

## 2020-03-11 ENCOUNTER — CLINICAL SUPPORT (OUTPATIENT)
Dept: CARDIOLOGY | Facility: HOSPITAL | Age: 65
End: 2020-03-11
Payer: MEDICARE

## 2020-03-11 PROCEDURE — G2066 INTER DEVC REMOTE 30D: HCPCS | Performed by: INTERNAL MEDICINE

## 2020-03-11 PROCEDURE — 93298 REM INTERROG DEV EVAL SCRMS: CPT | Mod: ,,, | Performed by: INTERNAL MEDICINE

## 2020-03-11 PROCEDURE — 93298 CARDIAC DEVICE CHECK - REMOTE: ICD-10-PCS | Mod: ,,, | Performed by: INTERNAL MEDICINE

## 2020-03-12 NOTE — PROGRESS NOTES
"  Occupational Therapy Daily Treatment/Discharge Note     Name: Luis Wong  Clinic Number: [id number]    Therapy Diagnosis:    Encounter Diagnoses   Name Primary?    Impaired mobility and activities of daily living     Muscle weakness     Decreased coordination      Physician: Carla Altman, HALLE Lopez MD    Visit Date: 3/16/2020  Physician Orders: Eval and tx  Medical Diagnosis: CVA L sided weakness Recrudecesnt  Onset Date: 8/28/19 most recent CVA  Evaluation Date: 9/4/2019  Plan of Care Expiration Period: 4/17/2020  Insurance Authorization period Expiration: 12/31/19  Date of Return to MD: NA  Visit # / Visits Authortized: 28/50 change in insurance  FOTO: CVA UE hand / 60%     Time In:  10:15 AM  Time Out:  11:08 AM  Total Billable (one on one) Time: 53 min 4 TE     Precautions: Standard and Fall, no weightbearing and  with R hand due to carpal tunnel surgery    Subjective     Pt reports:  "I think this would be a good time to take a break"  he was compliant with home exercise program given last session.   Response to previous treatment: good   Functional change:     Pain: 4/10  Location: L upper arm during shoulder ROM     Objective      Luis Wong  received TE to develop GM/FM coordination, balance, activity tolerance and strength in order to increase ADL/IADL independence with replicated home management/self care tasks, for 45 minutes along with re-measurements for discharge.    Exercises    UBE  120   6 minutes   Dowel stretches  -FF  -chest press  -ER  -horizontal abduction   10x     Update:   Joint Evaluation  AROM  9/4/2019 PROM   9/4/2019 AROM  10/8/2019    AROM  10/30/2019    AROM  12/23/2019       AROM  2/19/2020 AROM  2/16/2020 PROM  2/16/2020     Left Left Left Left Left Left Left Left   Shoulder flex 0-180 54 130 125 122  128 115 125 145   Shoulder Abd 0-180 54 125 115 110  115 110 105 93   Shoulder ER 0-90 Neutral WNL Neutral  Neutral   38 25 24 40   Shoulder IR 0-90 Trace " WNL S4 S4  S4 Waistline Waistline WNL   Shoulder Extension 0-80 51 65 52 60  62 63 53 70   Shoulder Horizontal adduction 0-90 Trace WNL 45 WNl  WNL WNL WNL WNL   Elbow flex/ext 0-150 WNL WNL Ext -20 WNL/Ext -28  145 ext  155 ext 155 ext WNL   Wrist flex 0-80 WNL WNL WNL    WNL WNL WNL WNL   Wrist ext 0-70 Neutral WNL 32 35  30 30 WNL WNL   Supination 0-80 Trace easier WNL WnL WNL  WNL WNL WNL WNL   Pronation 0-80 trace WNl WNL WNL  WNL WNL WNL WNL   UD Trace WNL 32 32  28 WNL WNL WNL   RD Trace WNL 22 22  22 WNL WNL WNL             Strength: (ANTONIO Dynamometer in lbs.) Average 3 trials, Position II:       10/30/2019    12/3/2019    12/23/2019    2/19/2020 3/16/2020     Left Left Left Left Left   Rung II 35# (+3) 40# 36# 30# 30#      Pinch Strength (Measured in psi)       10/30/2019    12/3/2019    12/23/2019    2/19/2020 3/16/2020     Left Left Left Left Left   Carr Pinch 11 11 12 13 12   3pt Pinch 9 9 9 9 8   2pt Pinch 8 7 8 8 5      Fine Motor Coordination: 9 Hole Peg Test  9 Peg Test Left 10/8/2019    Left  10/30/2019    Left  12/23/2019    Left   2/19/2020 Left  3/16/2020   Removed 9/9 dropped to towel 9/9 dropped to towel 9/9 dropped to towel no R hand A 9/9  Dropped 4  No R hand A 9/9  Dropped to towel no R hand A   Replaced 9/9 used R hand to assist pegs into finger tips 9/9 using R hand to A pegs into finger tips almost able to use L only 9/9 using R hand for wrist A 9/9 using R hand to move pegs from palm to fingertips  Dropped 5 9/9  occasional R hand A to position pegs to L hand     Time 2:55  1:35 1:45  2:46 1:44      Box and Blocks:  12/23/2019 L hand:18 blocks  2/19/2020 L hand: 20 blocks  3/16/2020 L hand: 21 blocks      Home Exercises and Education Provided     Education provided:   - new AROM HEP  - Continue stretching HEP   - Progress towards goals     Written Home Exercises Provided: Patient instructed to cont prior HEP. New stretching HEP provided   Exercises were reviewed and Luis was able  to demonstrate them prior to the end of the session.  Luis demonstrated good  understanding of the HEP provided.   .   See EMR under Patient Instructions for exercises provided prior visit.        Assessment     Pt participated in therapeutic exercises and re-measurements today for discharge. Pt participated in PROM and AAROM with dowel to decrease stiffness and increase ROM. Re-measurements were taken with most similar to most recent measurements demonstrating good maintenance of strength and ROM. Pt was provided with new HEP and encouraged to participate in those exercises consistently. Pt mentioned he would like to come back after new Botox injection in a few months to maximize rehab potential. Pt to discharge at this time due to plateu with progress and expressed desire to take a break from therapy from patient.     Goals:  Short Term Goals:  1) Initiate Hep Met 10/30/2019  2) Pt to increase LUE  strength by 5 # in order to A in self care task of feeding by 4 weeks. MET 10/30/2019  3) Pt to increase Quick dash Score by 5 points increasing self care IND by 4 weeks. MET 3/16/2020  4) Pt to increase L UE AROM by 10 degrees in order to A in UB dressing by 4 weeks .MET 3/16/2020  5) Patient will be able to achieve less than or equal to 75% on the FOTO, demonstrating overall improved functional ability with upper extremity. (self-care category) MET 3/16/2020    Long Term Goals:  1) Pt to be IND with HEP in order to maintain ROM and strength needed for self care IND by d.c. MET 3/16/2020  2) Pt to increase L UE  strength to WNL as compared to unaffected extremity in order to open items for self feeding by d.c. NOT MET 3/16/2020  3) Pt to increase Quick dash Score by 10 points increasing self care IND at home by d.c. MET 3/16/2020  4) Pt to increase L UE AROM to WFL in order to A in UB dressing by d/c. NOT MET 3/16/2020  5) Patient will be able to achieve less than or equal to 50% on the FOTO, demonstrating  overall improved functional ability with upper extremity. (self-care category) NOT MET 3/16/2020    Reasons for Recertification of Therapy:   Pt continues to benefit from skilled services and has made good progress thus far with good future rehab potential. Pt remains limited with LUE strength, ROM and coordination at this time which all still impact their performance of ADLs , IADLs affecting her habits, roles and routines.     Plan      Pt to discharge at this time.    Tran Bejarano, OTS

## 2020-03-13 ENCOUNTER — CLINICAL SUPPORT (OUTPATIENT)
Dept: REHABILITATION | Facility: HOSPITAL | Age: 65
End: 2020-03-13
Payer: MEDICARE

## 2020-03-13 DIAGNOSIS — Z74.09 IMPAIRED FUNCTIONAL MOBILITY, BALANCE, GAIT, AND ENDURANCE: ICD-10-CM

## 2020-03-13 DIAGNOSIS — R29.898 DECREASED STRENGTH OF LOWER EXTREMITY: ICD-10-CM

## 2020-03-13 PROCEDURE — 97110 THERAPEUTIC EXERCISES: CPT | Mod: PN,CQ

## 2020-03-13 NOTE — PROGRESS NOTES
Physical Therapy Daily Treatment Note     Name: Luis Wong  Clinic Number: [id number]    Therapy Diagnosis:   Encounter Diagnoses   Name Primary?    Impaired functional mobility, balance, gait, and endurance     Decreased strength of lower extremity      Physician: Carla Altman PA-C     Visit Date: 3/13/2020    Physician Orders: PT Eval and Treat  Medical Diagnosis from Referral: I63.9 (ICD-10-CM) - CVA (cerebral vascular accident)  Evaluation Date: 9/13/2019  Authorization Period Expiration: 12/31/2020  Plan of Care Expiration: 5/1/2020  Visit # / Visits authorized: 20/50 (46 prior visits)  FOTO: next at discharge     Time In: 10:15 AM  Time Out: 11:00 AM  Total Billable Time: 45 minutes (3 TE)    Precautions: Standard; history of of CVA's    Subjective     Pt reports:agreeable to PT session. He sates he currently has no pain.   Pt was compliant with home exercise program.   Response to previous treatment: No adverse reactions.   Functional change: Continued ambulation within the home and community without SPC      Objective     Luis participated in therapeutic exercises for 30 minutes including objective measurements:    - Cybex Leg Press:  8.5 plates, 2x20, B      6.5 plates, 2x20, LLE only  -Nustep for B UE./LE reciprocal AROM x 10 min at level 9 with no rest breaks  -Cybex Multi Hip                      2 plates     2x20 BLE ABD,FLX, EXT  -Cybex Ham Curls  1.5 pl 2x10 B  Home Exercises Provided and Patient Education Provided     Education provided:   Cont HEP      Written Home Exercises Provided: Not today  Exercises were reviewed and Luis was able to demonstrate them prior to the end of the session.  Luis demonstrated good  understanding of the education provided.     See EMR under Patient Instructions for exercises provided 9/23/2019 and 10/28/2019.    Assessment     Pt able to complete session with no reports of pain and no adverse reactions noted.     Luis is  progressing well towards his goals.   Pt prognosis is Good.   Pt will continue to benefit from skilled outpatient physical therapy to address the deficits listed in the problem list box on initial evaluation, provide pt/family education and to maximize pt's level of independence in the home and community environment.     Pt's spiritual, cultural and educational needs considered and pt agreeable to plan of care and goals.  Anticipated barriers to physical therapy: Co-morbidities    Goals:  Previous Long Term Goals (8 Weeks):   3. Pt will be able to perform TUG in less than or equal to 17 seconds, safely, without use of AD demonstrating overall improved functional mobility. PROGRESSING, NOT MET 3/9/2020; 17.74 seconds  6. Pt will walk > than or equal to 230 feet on the 2 minute walk test indoor with AD with 0 LOB and minimal gait deviations for improved safety in home ambulation and safety. MET 3/9/2020; 230 feet  7. Pt will be able to safely perform and tolerate high level ADL's without LOB. PROGRESSING, NOT MET 3/9/2020  8. Pt will have 0 falls from 11/6/19. PROGRESSING, NOT MET 3/9/2020  10. Pt will have MMT score of 3+/5 in all major ms groups in Left LE.  PARTIALLY MET 3/9/2020  11. Pt will ambulate on TM x 6 minutes with use of UE support with 0 LOB at greater than or equal to 1.3 mph. PROGRESSING, NOT MET 3/9/2020  12. Pt will begin some form of community fitness to begin regular and consistent performance of exercise to continue maintenance of gains made in PT. PROGRESSING, NOT MET 3/9/2020     Long Term Goal Status:   modified: Modify goal 6 below, continue all remaining goals: (Ongoing, Not Met)  6. Pt will walk > than or equal to 260 feet on the 2 minute walk test indoor with AD with 0 LOB and minimal gait deviations for improved safety in home ambulation and safety. (Ongoing, Not Met)          Plan   Cont to advance PT as per TESSIE Davis PTA

## 2020-03-16 ENCOUNTER — CLINICAL SUPPORT (OUTPATIENT)
Dept: REHABILITATION | Facility: HOSPITAL | Age: 65
End: 2020-03-16
Payer: MEDICARE

## 2020-03-16 DIAGNOSIS — Z74.09 IMPAIRED FUNCTIONAL MOBILITY, BALANCE, GAIT, AND ENDURANCE: ICD-10-CM

## 2020-03-16 DIAGNOSIS — M62.81 MUSCLE WEAKNESS: ICD-10-CM

## 2020-03-16 DIAGNOSIS — Z74.09 IMPAIRED MOBILITY AND ACTIVITIES OF DAILY LIVING: ICD-10-CM

## 2020-03-16 DIAGNOSIS — R29.898 DECREASED STRENGTH OF LOWER EXTREMITY: ICD-10-CM

## 2020-03-16 DIAGNOSIS — R27.8 DECREASED COORDINATION: ICD-10-CM

## 2020-03-16 DIAGNOSIS — Z78.9 IMPAIRED MOBILITY AND ACTIVITIES OF DAILY LIVING: ICD-10-CM

## 2020-03-16 PROCEDURE — 97110 THERAPEUTIC EXERCISES: CPT | Mod: PN

## 2020-03-16 NOTE — PROGRESS NOTES
"                            Physical Therapy Daily Treatment Note     Name: Luis Wong  Clinic Number: [id number]    Therapy Diagnosis:   Encounter Diagnoses   Name Primary?    Impaired functional mobility, balance, gait, and endurance     Decreased strength of lower extremity      Physician: Carla Altman PA-C     Visit Date: 3/16/2020    Physician Orders: PT Eval and Treat  Medical Diagnosis from Referral: I63.9 (ICD-10-CM) - CVA (cerebral vascular accident)  Evaluation Date: 9/13/2019  Authorization Period Expiration: 12/31/2020  Plan of Care Expiration: 5/1/2020  Visit # / Visits authorized: 21/50 (46 prior visits)  FOTO: next at discharge     Time In:  9:30 AM  Time Out: 10:15 AM  Total Billable Time: 45 minutes (3 TE)    Precautions: Standard; history of of CVA's    Subjective     Pt reports: that his diagnostic tests for receiving Botox in his LLE went well, he was able to move his ankle really well. Unfortunately he has to wait a period of time in between injections and will not be receiving the injection in his LLE until Mid-May. He would like to be placed on hold in the meantime and will be calling the facility back in May to resume therapy.   Pt was compliant with home exercise program.   Response to previous treatment: No adverse reactions.   Functional change: Continued ambulation within the home and community without SPC      Objective     Luis participated in therapeutic exercises for 45 minutes including:    -Nustep for B UE./LE reciprocal AROM x 10 min at level 9 with no rest breaks  - Cybex Leg Press:  8.5 plates, 2x20, B      6.5 plates, 2x20, LLE only  -Cybex Multi Hip                      2 plates     2x20 BLE ABD,FLX, EXT  -Cybex Ham Curls  1.5 pl 2x10 B  - Cybex knee extension:         30# plus dial turned to "15", 2x15, B                                                  20# 2x15, DL up, LLE down      Home Exercises Provided and Patient Education Provided     Education provided: "   Cont HEP      Written Home Exercises Provided: Not today  Exercises were reviewed and Luis was able to demonstrate them prior to the end of the session.  Luis demonstrated good  understanding of the education provided.     See EMR under Patient Instructions for exercises provided 9/23/2019 and 10/28/2019.    Assessment     Patient placed on hold today. Patient is scheduled to receive botox injections in LLE in Mid-May and will be returning to therapy then. Issued updated HEP for independent performance during break.      Luis is progressing well towards his goals.   Pt prognosis is Good.   Pt will continue to benefit from skilled outpatient physical therapy to address the deficits listed in the problem list box on initial evaluation, provide pt/family education and to maximize pt's level of independence in the home and community environment.     Pt's spiritual, cultural and educational needs considered and pt agreeable to plan of care and goals.  Anticipated barriers to physical therapy: Co-morbidities    Goals:  Previous Long Term Goals (8 Weeks):   3. Pt will be able to perform TUG in less than or equal to 17 seconds, safely, without use of AD demonstrating overall improved functional mobility. PROGRESSING, NOT MET 3/9/2020; 17.74 seconds  6. Pt will walk > than or equal to 260 feet on the 2 minute walk test indoor with AD with 0 LOB and minimal gait deviations for improved safety in home ambulation and safety.PROGRESSING, NOT MET  7. Pt will be able to safely perform and tolerate high level ADL's without LOB. PROGRESSING, NOT MET 3/9/2020  8. Pt will have 0 falls from 11/6/19. PROGRESSING, NOT MET 3/9/2020  10. Pt will have MMT score of 3+/5 in all major ms groups in Left LE.  PARTIALLY MET 3/9/2020  11. Pt will ambulate on TM x 6 minutes with use of UE support with 0 LOB at greater than or equal to 1.3 mph. PROGRESSING, NOT MET 3/9/2020  12. Pt will begin some form of community fitness to begin regular  and consistent performance of exercise to continue maintenance of gains made in PT. PROGRESSING, NOT MET 3/9/2020       Plan   Patient placed on hold today.       Kandy Gatica, PT

## 2020-03-16 NOTE — PATIENT INSTRUCTIONS
Ankle Pump    With left leg elevated, gently flex and extend ankle. Move through full range of motion. Avoid pain. Perform more frequently if having increased swelling.  Repeat 10 times left side per set. Do 3 sets per session. Do 2 sessions per day.      Ankle Alphabet    Sit with leg straight out in front of you and heel off edge of bed or propped up on towel roll. Using ankle and foot only, trace the letters of the alphabet. Perform A to Z. Do not let the knee move too much side to side.  Repeat 2 times left side per set. Do 2 sessions per day.    Towel Scrunches    Place left foot flat on towel, knee pointed forward. Use forefoot and toes to pull towel backward.  Do not allow heel or knee to move. Repetitions should be slow and controlled.  Repeat 10 times left side per set. Do 3 sets per session. Do 2 sessions per day.      Gastroc, Sitting (Passive)    Sit with leg straight out in front of you and a towel under your heel and around ball of foot. Gently pull toward body. Hold 30 seconds.   Repeat 3 times left side per set. Do 1 sets per session. Do 2 sessions per day.    Toe Lift    Sit with your foot flat on the floor and your finger under your big toe. Without rolling in/out or lifting your heel/toes, push down into your finger with your big toe joint. Maintain the pressure of the ball of your foot on your finger, keep the four little toes relaxed and in contact with the ground, then slowly lift the big toe up and down again. Do slowly and take breaks as needed.  Repeat 10 times left side per set. Do 3 sets per session. Do 2 sessions per day.    Resistance Band Ankle Movements, 4 ways    1. Wrap band around foot and attach below the foot. Pull foot up against resistance band. Slowly release for 3-5 seconds.  2. Wrap band around foot and hold band in your hand. Push foot down against resistance band. Slowly release for 3-5 seconds.  3. Wrap band around foot and loop around other foot and hold the end of the  band. Pull foot out to side against resistance band. Slowly release for 3-5 seconds.   4. Cross one leg over the other, wrap band around bottom foot, step top of foot on band and hold the end of the band. Pull foot to the inside against resistance band. Slowly release for 3-5 seconds.     Use appropriate color resistance band.  Repeat 10 times left side per set. Do 3 sets per session. Do 2 sessions per day.        Supine Hamstring Stretch    Straight right leg with belt around heel of foot. Raise leg until a stretch is felt in the back of the thigh. Keep knee straight. Hold 30 seconds.   Repeat 3 times each side per set. Do 1 sets per session. Do 2 sessions per day.    Hamstring Stretch (Seated)    Sit on a raised flat surface where you can prop your affected leg up on it such as a treatment table, couch or bed.      While keeping your knee straight to slightly bent, slowly lean forward and reach your hands towards your foot until a gentle stretch is felt along the back of your knee/thigh. Hold for 30 seconds and then return to starting position and repeat.        Strengthening: Quadriceps Set    Tighten muscles on top of thighs by pushing knees down into surface. Hold 5 seconds.  Repeat 10 times left leg per set. Do 3 sets per session. Do 2 sessions per day.          Straight Leg Raise     With left leg straight, other leg bent, raise straight leg until knees are even. Slowly lower. Roll on your side and repeat lifting top leg up, and on stomach kicking back (squeezing your rear end before you kick back).  Repeat 10 times each leg per set. Do 3 sets per session. Do 2 sessions per day.    Hamstring Curl: (Prone)        [place] behind, leg straight, bend knee.  Repeat 10 times per set. Do 3 sets per session. Do 2 sessions per day.             Glute Squeeze, supine    Lie face up and squeeze your rear end. Do not tighten your abdominals or hold your breath. Hold 5 seconds. Relax.  Repeat 10 times per set. Do 2 sets per  session. Do 2 sessions per day.        Strengthening: Hip Adduction - Isometric    Sit back or lie down with ball or folded pillow between knees, and squeeze knees together. Hold 5 seconds.  Repeat 10 times per set. Do 2 sets per session. Do 2 sessions per day.        Strengthening: Hip Abductor - Resisted    Lie flat with band looped around both legs above knees, and push thighs apart, ensuring right and left move together.  Repeat 10 times per set. Do 2 sets per session. Do 2 sessions per day.      Clam Shells    Lie on side with knees bent. Raise top leg, keeping knee bent and ankles together. Do not let your hips roll back as your raise your leg.  Repeat 10  times each leg per set. Do 3 sets per session. Do 2 sessions per day.          Bridging    Flatten back against floor then slowly raise buttocks from floor, keeping stomach tight. Hold 3-5 seconds.  Repeat 10 times per set. Do 3 sets per session. Do 2 sessions per day.    Knee Extension (Sitting)        Straighten knee fully, lower slowly.  Repeat 10 times per set. Do 3 sets per session. Do 2 sessions per day.        Slow Stand to Sit    Stand up, using hands if needed. Keep chest and head upright, bend your knees, don't use hands, and slowly lower back to the chair. Move as slowly as you can.  Repeat 10 times per set. Do 2 sets per session. Do 2 sessions per day.    Calf Raise: Bilateral (Standing)    Stand on both feet and rise on balls of feet. Do not let ankles roll out. Slowly return to start and repeat.  Repeat 10 times per set. Do 3 sets per session. Do 2 sessions per day.      Mini Squats    Stand at countertop and bend knees for a mini squat. Remember to keep chest up and to hold on for support. Repeat for 3 sets of 10: a total of 30 repetitions.      Step-Down / Step-Up        Stand on stair step or 4 inch stool. Slowly bend left leg, lowering other foot to floor. Return by straightening front leg.  Repeat 10 times per set. Do 2 sets per session.  Do 2 sessions per day.            Medial Step-Down     Stand with both feet on 4 inch step. Step down to the side with right foot facing forward, lightly touching heel to the floor and return. Maintain all body weight on the step the entire time.  Repeat 10 times each leg per set. Do 2 sets per session. Do 2 sessions per day.    Tandem Stand    Stand with right foot in front of the other. Hold 30 seconds. Repeat with other leg forward.  Repeat 5 times each side per session. Do 2 sessions per day.      Strengthening: Hip Abduction - Resisted        With tubing around ankles, extend leg out from side.  Repeat 10 times per set. Do 3 sets per session. Do 1 sessions per day.        https://orth.[employer].us/634     Copyright © I. All rights reserved.      Strengthening: Hip Extension - Resisted        With tubing around ankles, pull leg straight back.  Repeat 10 times per set. Do 3 sets per session. Do 1 sessions per day.      Trunk Twists/Rotations    While in the standing position and holding a medicine ball in front of you, rotate your trunk towards the side and then quickly return to the forward position. Right and left count as 1 repetition. Repeat 3 sets of 10.        STANDING MARCHING       While standing, draw up your knee, set it down and then alternate to your other side.    Use your arms for support if needed for balance and safety.  Perform 30 times per leg.

## 2020-03-16 NOTE — PATIENT INSTRUCTIONS
[facility], OCCUPATIONAL THERAPY  HOME EXERCISE PROGRAM     Complete the following exercises with 10 repetitions each, 3x/day.     AROM: Elbow Flexion / Extension          Bend and straighten elbow in 3 different positions: thumb up, palm up, palm down.      AROM: Supination / Pronation   With your elbow by your side, turn your palm up then turn your palm down.    Copyright © [employer]. All rights reserved.

## 2020-03-23 ENCOUNTER — PATIENT OUTREACH (OUTPATIENT)
Dept: OTHER | Facility: OTHER | Age: 65
End: 2020-03-23

## 2020-04-01 ENCOUNTER — PATIENT MESSAGE (OUTPATIENT)
Dept: ORTHOPEDICS | Facility: CLINIC | Age: 65
End: 2020-04-01

## 2020-04-10 ENCOUNTER — CLINICAL SUPPORT (OUTPATIENT)
Dept: CARDIOLOGY | Facility: HOSPITAL | Age: 65
End: 2020-04-10
Payer: MEDICARE

## 2020-04-10 PROCEDURE — 93298 REM INTERROG DEV EVAL SCRMS: CPT | Mod: ,,, | Performed by: INTERNAL MEDICINE

## 2020-04-10 PROCEDURE — G2066 INTER DEVC REMOTE 30D: HCPCS | Performed by: INTERNAL MEDICINE

## 2020-04-10 PROCEDURE — 93298 CARDIAC DEVICE CHECK - REMOTE: ICD-10-PCS | Mod: ,,, | Performed by: INTERNAL MEDICINE

## 2020-04-14 DIAGNOSIS — I10 ESSENTIAL HYPERTENSION: ICD-10-CM

## 2020-04-14 RX ORDER — POTASSIUM CHLORIDE 600 MG/1
8 CAPSULE, EXTENDED RELEASE ORAL DAILY
Qty: 30 CAPSULE | Refills: 11 | Status: SHIPPED | OUTPATIENT
Start: 2020-04-14 | End: 2021-04-07

## 2020-05-10 ENCOUNTER — CLINICAL SUPPORT (OUTPATIENT)
Dept: CARDIOLOGY | Facility: HOSPITAL | Age: 65
End: 2020-05-10
Payer: MEDICARE

## 2020-05-10 PROCEDURE — 93298 REM INTERROG DEV EVAL SCRMS: CPT | Mod: ,,, | Performed by: INTERNAL MEDICINE

## 2020-05-10 PROCEDURE — G2066 INTER DEVC REMOTE 30D: HCPCS | Performed by: INTERNAL MEDICINE

## 2020-05-10 PROCEDURE — 93298 CARDIAC DEVICE CHECK - REMOTE: ICD-10-PCS | Mod: ,,, | Performed by: INTERNAL MEDICINE

## 2020-05-11 ENCOUNTER — LAB VISIT (OUTPATIENT)
Dept: LAB | Facility: HOSPITAL | Age: 65
End: 2020-05-11
Attending: FAMILY MEDICINE
Payer: MEDICARE

## 2020-05-11 DIAGNOSIS — E11.65 TYPE 2 DIABETES MELLITUS WITH HYPERGLYCEMIA, WITHOUT LONG-TERM CURRENT USE OF INSULIN: ICD-10-CM

## 2020-05-11 DIAGNOSIS — I10 ESSENTIAL HYPERTENSION: ICD-10-CM

## 2020-05-11 LAB
ALBUMIN SERPL BCP-MCNC: 3.9 G/DL (ref 3.5–5.2)
ALP SERPL-CCNC: 80 U/L (ref 55–135)
ALT SERPL W/O P-5'-P-CCNC: 12 U/L (ref 10–44)
ANION GAP SERPL CALC-SCNC: 8 MMOL/L (ref 8–16)
AST SERPL-CCNC: 18 U/L (ref 10–40)
BILIRUB SERPL-MCNC: 0.5 MG/DL (ref 0.1–1)
BUN SERPL-MCNC: 21 MG/DL (ref 8–23)
CALCIUM SERPL-MCNC: 9.6 MG/DL (ref 8.7–10.5)
CHLORIDE SERPL-SCNC: 107 MMOL/L (ref 95–110)
CHOLEST SERPL-MCNC: 187 MG/DL (ref 120–199)
CHOLEST/HDLC SERPL: 2.2 {RATIO} (ref 2–5)
CO2 SERPL-SCNC: 28 MMOL/L (ref 23–29)
CREAT SERPL-MCNC: 1.1 MG/DL (ref 0.5–1.4)
EST. GFR  (AFRICAN AMERICAN): >60 ML/MIN/1.73 M^2
EST. GFR  (NON AFRICAN AMERICAN): >60 ML/MIN/1.73 M^2
ESTIMATED AVG GLUCOSE: 117 MG/DL (ref 68–131)
GLUCOSE SERPL-MCNC: 127 MG/DL (ref 70–110)
HBA1C MFR BLD HPLC: 5.7 % (ref 4–5.6)
HDLC SERPL-MCNC: 86 MG/DL (ref 40–75)
HDLC SERPL: 46 % (ref 20–50)
LDLC SERPL CALC-MCNC: 88.8 MG/DL (ref 63–159)
NONHDLC SERPL-MCNC: 101 MG/DL
POTASSIUM SERPL-SCNC: 3.7 MMOL/L (ref 3.5–5.1)
PROT SERPL-MCNC: 7.1 G/DL (ref 6–8.4)
SODIUM SERPL-SCNC: 143 MMOL/L (ref 136–145)
TRIGL SERPL-MCNC: 61 MG/DL (ref 30–150)

## 2020-05-11 PROCEDURE — 80053 COMPREHEN METABOLIC PANEL: CPT

## 2020-05-11 PROCEDURE — 36415 COLL VENOUS BLD VENIPUNCTURE: CPT | Mod: PO

## 2020-05-11 PROCEDURE — 80061 LIPID PANEL: CPT

## 2020-05-11 PROCEDURE — 83036 HEMOGLOBIN GLYCOSYLATED A1C: CPT

## 2020-05-13 ENCOUNTER — OFFICE VISIT (OUTPATIENT)
Dept: FAMILY MEDICINE | Facility: CLINIC | Age: 65
End: 2020-05-13
Payer: MEDICARE

## 2020-05-13 ENCOUNTER — TELEPHONE (OUTPATIENT)
Dept: REHABILITATION | Facility: HOSPITAL | Age: 65
End: 2020-05-13

## 2020-05-13 VITALS — SYSTOLIC BLOOD PRESSURE: 130 MMHG | BODY MASS INDEX: 22.15 KG/M2 | WEIGHT: 150 LBS | DIASTOLIC BLOOD PRESSURE: 85 MMHG

## 2020-05-13 DIAGNOSIS — I10 ESSENTIAL HYPERTENSION: Primary | ICD-10-CM

## 2020-05-13 DIAGNOSIS — E11.65 TYPE 2 DIABETES MELLITUS WITH HYPERGLYCEMIA, WITHOUT LONG-TERM CURRENT USE OF INSULIN: ICD-10-CM

## 2020-05-13 DIAGNOSIS — Z12.5 SCREENING FOR PROSTATE CANCER: ICD-10-CM

## 2020-05-13 PROCEDURE — 99214 OFFICE O/P EST MOD 30 MIN: CPT | Mod: 95,,, | Performed by: FAMILY MEDICINE

## 2020-05-13 PROCEDURE — 3044F HG A1C LEVEL LT 7.0%: CPT | Mod: CPTII,,, | Performed by: FAMILY MEDICINE

## 2020-05-13 PROCEDURE — 3044F PR MOST RECENT HEMOGLOBIN A1C LEVEL <7.0%: ICD-10-PCS | Mod: CPTII,,, | Performed by: FAMILY MEDICINE

## 2020-05-13 PROCEDURE — 3008F BODY MASS INDEX DOCD: CPT | Mod: CPTII,,, | Performed by: FAMILY MEDICINE

## 2020-05-13 PROCEDURE — 1101F PR PT FALLS ASSESS DOC 0-1 FALLS W/OUT INJ PAST YR: ICD-10-PCS | Mod: CPTII,,, | Performed by: FAMILY MEDICINE

## 2020-05-13 PROCEDURE — 99214 PR OFFICE/OUTPT VISIT, EST, LEVL IV, 30-39 MIN: ICD-10-PCS | Mod: 95,,, | Performed by: FAMILY MEDICINE

## 2020-05-13 PROCEDURE — 1101F PT FALLS ASSESS-DOCD LE1/YR: CPT | Mod: CPTII,,, | Performed by: FAMILY MEDICINE

## 2020-05-13 PROCEDURE — 3075F PR MOST RECENT SYSTOLIC BLOOD PRESS GE 130-139MM HG: ICD-10-PCS | Mod: CPTII,,, | Performed by: FAMILY MEDICINE

## 2020-05-13 PROCEDURE — 3008F PR BODY MASS INDEX (BMI) DOCUMENTED: ICD-10-PCS | Mod: CPTII,,, | Performed by: FAMILY MEDICINE

## 2020-05-13 PROCEDURE — 3075F SYST BP GE 130 - 139MM HG: CPT | Mod: CPTII,,, | Performed by: FAMILY MEDICINE

## 2020-05-13 PROCEDURE — 3079F PR MOST RECENT DIASTOLIC BLOOD PRESSURE 80-89 MM HG: ICD-10-PCS | Mod: CPTII,,, | Performed by: FAMILY MEDICINE

## 2020-05-13 PROCEDURE — 3079F DIAST BP 80-89 MM HG: CPT | Mod: CPTII,,, | Performed by: FAMILY MEDICINE

## 2020-05-13 NOTE — PROGRESS NOTES
The patient location is:  [place]  The chief complaint leading to consultation is:  Hypertension  Visit type: audio only  Total time spent with patient: 15 min  Each patient to whom he or she provides medical services by telemedicine is:  (1) informed of the relationship between the physician and patient and the respective role of any other health care provider with respect to management of the patient; and (2) notified that he or she may decline to receive medical services by telemedicine and may withdraw from such care at any time.    Notes:  64 years old male who was evaluated by telemedicine.  Blood pressure at home is stable.  No chest pain, palpitation, orthopnea or PND.  Last A1c was stable.  No polyuria, polydipsia or polyphagia.  Patient with significant improvement of the blood sugar level.  Last cholesterol was normal.    Diagnoses and all orders for this visit:    Essential hypertension  -     Comprehensive metabolic panel; Future  -     Lipid Panel; Future  -     TSH; Future    Type 2 diabetes mellitus with hyperglycemia, without long-term current use of insulin  -     Comprehensive metabolic panel; Future  -     Lipid Panel; Future  -     Hemoglobin A1C; Future  -     Microalbumin/creatinine urine ratio; Future    Screening for prostate cancer  -     PSA, Screening; Future      Continue monitoring blood pressure at home, low sodium diet.  Continue monitoring blood sugar at home,ADA diet.              Answers for HPI/ROS submitted by the patient on 5/11/2020   activity change: No  unexpected weight change: No  neck pain: No  hearing loss: No  rhinorrhea: No  trouble swallowing: No  eye discharge: No  visual disturbance: No  chest tightness: No  wheezing: No  chest pain: No  palpitations: No  blood in stool: No  constipation: No  vomiting: No  diarrhea: No  polydipsia: No  polyuria: No  difficulty urinating: No  urgency: No  hematuria: No  joint swelling: No  arthralgias: No  headaches: No  weakness:  No  confusion: No  dysphoric mood: No

## 2020-05-13 NOTE — TELEPHONE ENCOUNTER
Resume Appointments    Patient: Lius Wong  Date: 5/13/2020  MRN: [id number]    Called pt on behalf of treating therapist, Rosa.  Spoke with patient following updates regarding COVID-19.  Offered resuming in person therapy visits.  Pt agreeable.   will call to schedule.     5/13/2020  Malgorzata Canales, JEREMIAS-SLP

## 2020-05-19 ENCOUNTER — OFFICE VISIT (OUTPATIENT)
Dept: ORTHOPEDICS | Facility: CLINIC | Age: 65
End: 2020-05-19
Payer: MEDICARE

## 2020-05-19 VITALS — BODY MASS INDEX: 22.22 KG/M2 | HEIGHT: 69 IN | WEIGHT: 150 LBS

## 2020-05-19 DIAGNOSIS — G56.03 BILATERAL CARPAL TUNNEL SYNDROME: Primary | ICD-10-CM

## 2020-05-19 PROCEDURE — 99024 POSTOP FOLLOW-UP VISIT: CPT | Mod: S$GLB,,, | Performed by: ORTHOPAEDIC SURGERY

## 2020-05-19 PROCEDURE — 99999 PR PBB SHADOW E&M-EST. PATIENT-LVL III: CPT | Mod: PBBFAC,,, | Performed by: ORTHOPAEDIC SURGERY

## 2020-05-19 PROCEDURE — 20526 PR INJECT CARPAL TUNNEL: ICD-10-PCS | Mod: 79,LT,S$GLB, | Performed by: ORTHOPAEDIC SURGERY

## 2020-05-19 PROCEDURE — 20526 THER INJECTION CARP TUNNEL: CPT | Mod: 79,LT,S$GLB, | Performed by: ORTHOPAEDIC SURGERY

## 2020-05-19 PROCEDURE — 99024 PR POST-OP FOLLOW-UP VISIT: ICD-10-PCS | Mod: S$GLB,,, | Performed by: ORTHOPAEDIC SURGERY

## 2020-05-19 PROCEDURE — 99999 PR PBB SHADOW E&M-EST. PATIENT-LVL III: ICD-10-PCS | Mod: PBBFAC,,, | Performed by: ORTHOPAEDIC SURGERY

## 2020-05-19 RX ORDER — DICLOFENAC SODIUM 10 MG/G
GEL TOPICAL
COMMUNITY
Start: 2020-04-06 | End: 2020-10-08

## 2020-05-19 RX ORDER — TRIAMCINOLONE ACETONIDE 40 MG/ML
20 INJECTION, SUSPENSION INTRA-ARTICULAR; INTRAMUSCULAR
Status: COMPLETED | OUTPATIENT
Start: 2020-05-19 | End: 2020-05-19

## 2020-05-19 RX ADMIN — TRIAMCINOLONE ACETONIDE 20 MG: 40 INJECTION, SUSPENSION INTRA-ARTICULAR; INTRAMUSCULAR at 10:05

## 2020-05-19 NOTE — PROGRESS NOTES
Subjective:      Patient ID: Luis Wong is a 64 y.o. male.  Chief Complaint: Post-op Evaluation of the Right Wrist and Pain of the Left Wrist      HPI  Luis Wong is a  64 y.o. male presenting today for post op visit.  He is s/p right carpal tunnel release doing well but now having symptoms the opposite left hand  He would like injection of the left hand where he has ongoing numbness and pain.     Review of patient's allergies indicates:  No Known Allergies      Current Outpatient Medications   Medication Sig Dispense Refill    aspirin (ECOTRIN) 81 MG EC tablet TAKE 1 TABLET BY MOUTH EVERY DAY 90 tablet 3    atorvastatin (LIPITOR) 40 MG tablet Take 1 tablet (40 mg total) by mouth once daily. 90 tablet 3    clopidogrel (PLAVIX) 75 mg tablet Take 1 tablet (75 mg total) by mouth once daily. 90 tablet 3    diclofenac sodium (VOLTAREN) 1 % Gel       hydroCHLOROthiazide (MICROZIDE) 12.5 mg capsule TAKE 1 CAPSULE BY MOUTH EVERY DAY 90 capsule 3    metFORMIN (GLUCOPHAGE) 500 MG tablet Take 1 tablet (500 mg total) by mouth daily with breakfast. 90 tablet 3    NIFEdipine (PROCARDIA-XL) 30 MG (OSM) 24 hr tablet TAKE 1 TABLET BY MOUTH EVERY DAY 90 tablet 3    potassium chloride (MICRO-K) 8 mEq CpSR TAKE 1 CAPSULE (8 MEQ TOTAL) BY MOUTH ONCE DAILY. 30 capsule 11    tiZANidine (ZANAFLEX) 4 MG tablet Take 4 mg by mouth daily as needed.       No current facility-administered medications for this visit.        Past Medical History:   Diagnosis Date    Aneurysm 10/30/2012    Diabetes mellitus     Fever blister     Herpes infection     Hypertension     ICH (intracerebral hemorrhage)     Mixed hyperlipidemia 9/5/2013    Nontraumatic thalamic hemorrhage 8/31/2016    JAQUAN (obstructive sleep apnea)     Right-sided lacunar stroke 9/11/2014    SDH (subdural hematoma)     Special screening for malignant neoplasms, colon     Stroke        Past Surgical History:   Procedure Laterality Date    CARPAL TUNNEL  "RELEASE Right 2/21/2020    Procedure: RELEASE, CARPAL TUNNEL;  Surgeon: Barry Dutton Jr., MD;  Location: [facility] OR;  Service: Orthopedics;  Laterality: Right;    INSERTION OF IMPLANTABLE LOOP RECORDER N/A 12/12/2019    Procedure: Insertion, Implantable Loop Recorder;  Surgeon: Efren Sanford MD;  Location: [facility] CATH LAB/EP;  Service: Cardiology;  Laterality: N/A;  Crytogenic CVA, LOOP,  MDT, Local,  DM    Knee arthroscopic surgery         OBJECTIVE:   PHYSICAL EXAM:  Height: 5' 9" (175.3 cm) Weight: 68 kg (150 lb)  Vitals:    05/19/20 0955   Weight: 68 kg (150 lb)   Height: 5' 9" (1.753 m)   PainSc:   5     Ortho/SPM Exam  Examination right hand the incision well-healed swelling minimal range of motion fingers full  strength slightly decreased sensation intact  Examination left hand demonstrates positive Tinel sign at the wrist mildly positive Phalen's test limited range of motion left arm due to stiffness and contracture    RADIOGRAPHS:  None  Comments: I have personally reviewed the imaging and I agree with the above radiologist's report.    ASSESSMENT/PLAN:     IMPRESSION:  Stat 1.  Status post right carpal tunnel release doing well.  2.  Left carpal tunnel syndrome    PLAN:  For the right hand advance activities as tolerated strengthening with a squeeze ball  Left hand after pause for time-out identified the left carpal tunnel injected with Kenalog 20 mg 0.5 cc xylocaine sterile technique  Tolerated the procedure well without complication  Follow-up 2-3 months    FOLLOW UP:  2-3 months    Disclaimer: This note has been generated using voice-recognition software. There may be typographical errors that have been missed during proof-reading.  "

## 2020-05-20 ENCOUNTER — PATIENT MESSAGE (OUTPATIENT)
Dept: NEUROLOGY | Facility: CLINIC | Age: 65
End: 2020-05-20

## 2020-05-20 DIAGNOSIS — G20.A1 PARKINSON DISEASE: Primary | ICD-10-CM

## 2020-05-20 NOTE — PROGRESS NOTES
"Digital Medicine: Health  Follow-Up    Patient reports he bought a new device. Patient states, "I am doing well".     The history is provided by the patient. No  was used.     Follow Up  Follow-up reason(s): routine education      Routine Education Topics: eating patterns and physical activity      INTERVENTION(S)  encouragement/support, denied resources and denied questions    PLAN  patient verbalizes understanding, patient amenable to changes, Clinician follow-up and continue monitoring          Topic    Eye Exam        Last 5 Patient Entered Readings                                      Current 30 Day Average: 128/83     Recent Readings 5/18/2020 5/18/2020 5/16/2020 5/13/2020 5/8/2020    SBP (mmHg) 114 137 125 130 129    DBP (mmHg) 78 94 83 79 84    Pulse 93 88 88 88 95              Diet Screening   He has the following dietary restrictions: low sodium diet    Patient is no longer doing plant base meal plan. Patient reports he no longer saw the benefit and was losing to much weight. Patient reports he is eating "normal" but still eating healthy. Patient is continuing to monitor sodium intake.     Assigning the following patient goals: maintain low sodium diet    Physical Activity Screening     Patient has limited mobility: recovering from surgery/rehab    Patient was in therapy due to his recent Stroke in August of 2019. Patient had to stop therapy in March due to Covid-19. Patient will be starting again this Friday (5/22/20). Patient also recently recovered from carpal tunnel surgery in February of 2020.       Intervention(s): goal setting     Assigning the following patient goal(s): increase physical activity        "

## 2020-05-22 ENCOUNTER — CLINICAL SUPPORT (OUTPATIENT)
Dept: REHABILITATION | Facility: HOSPITAL | Age: 65
End: 2020-05-22
Payer: MEDICARE

## 2020-05-22 DIAGNOSIS — R29.898 DECREASED STRENGTH OF LOWER EXTREMITY: ICD-10-CM

## 2020-05-22 DIAGNOSIS — Z74.09 IMPAIRED FUNCTIONAL MOBILITY, BALANCE, GAIT, AND ENDURANCE: ICD-10-CM

## 2020-05-22 PROCEDURE — 97112 NEUROMUSCULAR REEDUCATION: CPT | Mod: PN

## 2020-05-22 PROCEDURE — 97110 THERAPEUTIC EXERCISES: CPT | Mod: PN

## 2020-05-22 PROCEDURE — 97116 GAIT TRAINING THERAPY: CPT | Mod: PN

## 2020-05-22 NOTE — PROGRESS NOTES
"                            Physical Therapy Daily Treatment Note     Name: Luis Wong  Clinic Number: [id number]    Therapy Diagnosis:   Encounter Diagnoses   Name Primary?    Impaired functional mobility, balance, gait, and endurance     Decreased strength of lower extremity      Physician: Carla Altman PA-C     Visit Date: 5/22/2020    Physician Orders: PT Eval and Treat  Medical Diagnosis from Referral: I63.9 (ICD-10-CM) - CVA (cerebral vascular accident)  Evaluation Date: 9/13/2019  Authorization Period Expiration: 12/31/2020  Plan of Care Expiration: 7/3/2020  Visit # / Visits authorized: 22/50 (47 prior visits)  FOTO: done today for POC    Time In:  1118 AM  Time Out: 1200 AM  Total Billable Time: 43 minutes (1, gait, 1NMR, 1TE)    Precautions: Standard; history of of CVA's    Subjective     Pt reports: I have been active in and out of the home lately during COVID stay at home orders.  I also receive my ankle AFO one month and Iraida been wearing it everyday.  "it gives me more stability.  Also I had botox in my calf last Tuesday for the 1st time in my leg.  My next injection in my leg is in [place].    Pt was compliant with home exercise program.   Response to previous treatment: assess next session   Functional change: not using SPC at all in the last 3 months able to do yard work with riding mower, self propel mower and edge.     Objective     Neuro re-ed and endurance training with B UE/LE extremities for reciprocal motion of all limbs on sci-fit x 8 min at level 6 at > or equal to 50 spm w/o rest.      Improvements bolded below, regressions in red:     Lower Extremity Strength    RLE LLE   Hip Flexion: 5/5 4/5   Hip Extension:  4+/5 3/5   Hip Abduction: 5/5 4+/5   Hip Adduction: 4+/5 4+/5   Knee Extension: 5/5 5/5   Knee Flexion: 5/5 3-/5 in prone   Ankle Dorsiflexion: 5/5 3+/5   Ankle Plantarflexion: 5/5 3/5   Ankle Inversion: 5/5 4/5   Ankle Eversion: 5/5 3-/5      TUG: 15.65 seconds without " AD     2 minute walk test: 235 feet without AD with L AFO on    6 minute walk test:  640 feet without AD with L AFO on    Dynamic Gait Index  1. Gait on level surface: 2. Mild impairment: Walks 20', uses assistive devices, slower speed, mild gait deviations.   2. Change in Gait speed: 2. Mild impairment: Is able to change speed, but demonstrates mild gait deviations, or no gait deviations but unable to achieve a signifcant change in velocity, or uses an assistive device.  3. Gait with Horizontal Head Turns: 2. Mild impairment: performs head turns smoothly with slight change in gait velocity, i.e. minor distuption to smooth gait path or uses walking aid.   4. Gait with Vertical Head Turns: 2. Mild impairment: Perform task with slight change in gait velocity ie minor disruption in smooth gait path or uses walking aid  5. Gait and Pivot turn: 2. Mild impairment: Pivot turns safely in > 3 seconds and stops with no loss of balance  6. Step Over Obstacle: 2. Mild impairment: Is able to step over box, but must slow down and adjust steps to clear box safely.  7. Step around obstacles: 2. Mild impairment: Is able to step around both cones, but must slow down and adjust steps to clear cones.  8. Steps: 2. Mild impairment: Alternating feet, must use rail.    Score: 16/24    Interpretation: < 19/24 = predictive of falls in the elderly  > 22/24 = safe ambulators    Home Exercises Provided and Patient Education Provided     Education provided:   Cont HEP      Written Home Exercises Provided: Not today  Exercises were reviewed and Luis was able to demonstrate them prior to the end of the session.  Luis demonstrated good  understanding of the education provided.     See EMR under Patient Instructions for exercises provided 9/23/2019 and 10/28/2019.    Assessment   See POC update     Luis is progressing well towards his goals.   Pt prognosis is Good.   Pt will continue to benefit from skilled outpatient physical therapy to  address the deficits listed in the problem list box on initial evaluation, provide pt/family education and to maximize pt's level of independence in the home and community environment.     Pt's spiritual, cultural and educational needs considered and pt agreeable to plan of care and goals.  Anticipated barriers to physical therapy: Co-morbidities    Goals:  Previous Long Term Goals (8 Weeks):   1. Pt will be able to perform TUG in less than or equal to 17 seconds, safely, without use of AD demonstrating overall improved functional mobility. MET 15.65 5/22/2020  2. Pt will walk > than or equal to 260 feet on the 2 minute walk test indoor with AD with 0 LOB and minimal gait deviations for improved safety in home ambulation and safety.PROGRESSING, NOT  on 5/22/2020  3. Pt will be able to safely perform and tolerate high level ADL's without LOB. MET mowing outdoors  4. Pt will have 0 falls from 11/6/19. PROGRESSING, NOT MET no falls as of 5/22/2020  5. Pt will have MMT score of 3+/5 in all major ms groups in Left LE.  PARTIALLY MET 3/9/2020  6. Pt will ambulate on TM x 6 minutes with use of UE support with 0 LOB at greater than or equal to 1.3 mph. PROGRESSING, NOT MET 3/9/2020  7. Pt will begin some form of community fitness to begin regular and consistent performance of exercise to continue maintenance of gains made in PT. PROGRESSING, NOT MET 3/9/2020       Plan   See updated POC    Adia Diaz, PT

## 2020-05-22 NOTE — PLAN OF CARE
Outpatient Therapy Updated Plan of Care     Visit Date: 5/22/2020  Name: Luis Wong  Clinic Number: [id number]    Therapy Diagnosis:   Encounter Diagnoses   Name Primary?    Impaired functional mobility, balance, gait, and endurance     Decreased strength of lower extremity      Physician: Carla Altman PA-C      Physician Orders: PT Eval and Treat  Medical Diagnosis from Referral: I63.9 (ICD-10-CM) - CVA (cerebral vascular accident)  Evaluation Date: 9/13/2019    Total Visits Received: 22  Cancelled Visits: several due to COVID-19 stay at home order  No Show Visits: 0    Current Certification Period:  3/30/2020 to 5/1/2020  Precautions:  falls  Visits from Evaluation Date:  22  Functional Level Prior to Evaluation:  See initial eval    Subjective     Update: see tx note    Objective     Update:     Neuro re-ed and endurance training with B UE/LE extremities for reciprocal motion of all limbs on sci-fit x 8 min at level 6 at > or equal to 50 spm w/o rest.      Improvements bolded below, regressions in red:     Lower Extremity Strength    RLE LLE   Hip Flexion: 5/5 4/5   Hip Extension:  4+/5 3/5   Hip Abduction: 5/5 4+/5   Hip Adduction: 4+/5 4+/5   Knee Extension: 5/5 5/5   Knee Flexion: 5/5 3-/5 in prone   Ankle Dorsiflexion: 5/5 3+/5   Ankle Plantarflexion: 5/5 3/5   Ankle Inversion: 5/5 4/5   Ankle Eversion: 5/5 3-/5      TUG: 15.65 seconds without AD     2 minute walk test: 235 feet without AD with L AFO on    6 minute walk test:  640 feet without AD with L AFO on    Dynamic Gait Index  1. Gait on level surface: 2. Mild impairment: Walks 20', uses assistive devices, slower speed, mild gait deviations.   2. Change in Gait speed: 2. Mild impairment: Is able to change speed, but demonstrates mild gait deviations, or no gait deviations but unable to achieve a signifcant change in velocity, or uses an assistive device.  3. Gait with Horizontal Head Turns: 2. Mild impairment: performs head turns smoothly  with slight change in gait velocity, i.e. minor distuption to smooth gait path or uses walking aid.   4. Gait with Vertical Head Turns: 2. Mild impairment: Perform task with slight change in gait velocity ie minor disruption in smooth gait path or uses walking aid  5. Gait and Pivot turn: 2. Mild impairment: Pivot turns safely in > 3 seconds and stops with no loss of balance  6. Step Over Obstacle: 2. Mild impairment: Is able to step over box, but must slow down and adjust steps to clear box safely.  7. Step around obstacles: 2. Mild impairment: Is able to step around both cones, but must slow down and adjust steps to clear cones.  8. Steps: 2. Mild impairment: Alternating feet, must use rail.    Score: 16/24    Interpretation: < 19/24 = predictive of falls in the elderly  > 22/24 = safe ambulators    Assessment     Update:   Pt is a 64 year old male s/p his 3rd CVA.  He was recovering well prior to COVID-19 outbreak but was placed on hold to receive AFO and baclofen injection to L LE.  He has since received his AFO and one injection to his L LE.  He improved on his TUG 4 secs since eval and improved in gait to no AD needed.       Goals:  Previous Long Term Goals (8 Weeks):   1. Pt will be able to perform TUG in less than or equal to 17 seconds, safely, without use of AD demonstrating overall improved functional mobility. MET 15.65 5/22/2020  2. Pt will walk > than or equal to 260 feet on the 2 minute walk test indoor with AD with 0 LOB and minimal gait deviations for improved safety in home ambulation and safety.PROGRESSING, NOT  on 5/22/2020  3. Pt will be able to safely perform and tolerate high level ADL's without LOB. MET mowing outdoors  4. Pt will have 0 falls from 11/6/19. PROGRESSING, NOT MET no falls as of 5/22/2020  5. Pt will have MMT score of 3+/5 in all major ms groups in Left LE.  PARTIALLY MET 3/9/2020  6. Pt will ambulate on TM x 6 minutes with use of UE support with 0 LOB at greater than or  equal to 1.3 mph. PROGRESSING, NOT MET 3/9/2020  7. Pt will begin some form of community fitness to begin regular and consistent performance of exercise to continue maintenance of gains made in PT. PROGRESSING, NOT MET 3/9/2020    Reasons for Recertification of Therapy:   Exp of current POC    Plan     Updated Certification Period: 5/22/2020 to 7/17/2020  Recommended Treatment Plan: 2 times per week for 6 weeks: Gait Training, Manual Therapy, Moist Heat/ Ice, Neuromuscular Re-ed, Patient Education, Self Care, Therapeutic Activites and Therapeutic Exercise  Other Recommendations: continue OT and botox injections as recommended    Adia Diaz, PT  5/22/2020      I CERTIFY THE NEED FOR THESE SERVICES FURNISHED UNDER THIS PLAN OF TREATMENT AND WHILE UNDER MY CARE    Physician's comments:        Physician's Signature: ___________________________________________________

## 2020-05-29 ENCOUNTER — CLINICAL SUPPORT (OUTPATIENT)
Dept: REHABILITATION | Facility: HOSPITAL | Age: 65
End: 2020-05-29
Payer: MEDICARE

## 2020-05-29 DIAGNOSIS — R29.898 DECREASED STRENGTH OF LOWER EXTREMITY: ICD-10-CM

## 2020-05-29 DIAGNOSIS — Z74.09 IMPAIRED FUNCTIONAL MOBILITY, BALANCE, GAIT, AND ENDURANCE: ICD-10-CM

## 2020-05-29 PROCEDURE — 97112 NEUROMUSCULAR REEDUCATION: CPT | Mod: PN

## 2020-05-29 PROCEDURE — 97110 THERAPEUTIC EXERCISES: CPT | Mod: PN

## 2020-05-29 NOTE — PROGRESS NOTES
Physical Therapy Daily Treatment Note     Name: Luis Wong  Clinic Number: [id number]    Therapy Diagnosis:   Encounter Diagnoses   Name Primary?    Impaired functional mobility, balance, gait, and endurance     Decreased strength of lower extremity      Physician: Carla Altman PA-C     Visit Date: 5/29/2020    Physician Orders: PT Eval and Treat  Medical Diagnosis from Referral: I63.9 (ICD-10-CM) - CVA (cerebral vascular accident)  Evaluation Date: 9/13/2019  Authorization Period Expiration: 12/31/2020  Plan of Care Expiration: 7/3/2020  Visit # / Visits authorized: 23/50 (49 prior visits)  FOTO: next at d/c    Time In:  1002 AM  Time Out: 1045 AM  Total Billable Time: 47 minutes ( 1NMR, 2TE)    Precautions: Standard; history of of CVA's    Subjective     Pt reports: no new complaints or falls.    Pt was compliant with home exercise program.   Response to previous treatment: assess next session   Functional change: not using SPC at all in the last 3 months able to do yard work with riding mower, self propel mower and edge.     Objective     Pt performed gait training and neuromuscular reducation on treadmill x 20 total consisting of forward gait x 8' at 1.0-1.2 speed setting with B UE support f/b sidestepping B x 2.5' each way at 0.4 speed setting with 1 UE support f/b backward walking x 2.5' each way at 0.6' speed setting with 1 UE support and 1 LOB with cues as needed for heel strike with forward walking and hip extension on L with backward walking.    Pt performed therapeutic exercise to increase flexibility and strength x 25' total consisting of:  -- gastroc stretches     3 x 30'' B on slant board  -- hamstring stretch     3 x 30'' B on stairs  -- knee extension cybex    3 x 10 B knee extension and SL eccentric lowering with L LE  -- knee flexion cybex     3 x 10 B knee flexion and SL eccentric raise for L LE  -- Leg press      3 x 10 B 7.5 plates         3 x 10 L 5.5  plates      Home Exercises Provided and Patient Education Provided     Education provided:   Cont HEP      Written Home Exercises Provided: Not today  Exercises were reviewed and Luis was able to demonstrate them prior to the end of the session.  Luis demonstrated good  understanding of the education provided.     See EMR under Patient Instructions for exercises provided 9/23/2019 and 10/28/2019.    Assessment   Pt was able to ambulated 1.2 m/s on TM with B UE support with AFO on.  He was also able to demo quality him extension with backward walking on TM.  TM training will help increase gait speed and quality over ground.  He tolerated LE strength training well without complaint of increased pain.      Luis is progressing well towards his goals.   Pt prognosis is Good.   Pt will continue to benefit from skilled outpatient physical therapy to address the deficits listed in the problem list box on initial evaluation, provide pt/family education and to maximize pt's level of independence in the home and community environment.     Pt's spiritual, cultural and educational needs considered and pt agreeable to plan of care and goals.  Anticipated barriers to physical therapy: Co-morbidities    Goals:  Previous Long Term Goals (8 Weeks):   1. Pt will be able to perform TUG in less than or equal to 17 seconds, safely, without use of AD demonstrating overall improved functional mobility. MET 15.65 5/22/2020  2. Pt will walk > than or equal to 260 feet on the 2 minute walk test indoor with AD with 0 LOB and minimal gait deviations for improved safety in home ambulation and safety.PROGRESSING, NOT  on 5/22/2020  3. Pt will be able to safely perform and tolerate high level ADL's without LOB. MET mowing outdoors  4. Pt will have 0 falls from 11/6/19. PROGRESSING, NOT MET no falls as of 5/22/2020  5. Pt will have MMT score of 3+/5 in all major ms groups in Left LE.  PARTIALLY MET 3/9/2020  6. Pt will ambulate on TM  x 6 minutes with use of UE support with 0 LOB at greater than or equal to 1.3 mph. PROGRESSING, NOT MET 3/9/2020  7. Pt will begin some form of community fitness to begin regular and consistent performance of exercise to continue maintenance of gains made in PT. PROGRESSING, NOT MET 3/9/2020  8. Patient will be able to achieve greater than or equal to 18/24 on the Dynamic Gait Index decreasing patient's risk for falling.  (PROGRESSING, NOT MET)    Plan   4 weeks to improve gait and speed and LE strength.      Adia Diaz, PT

## 2020-06-09 ENCOUNTER — CLINICAL SUPPORT (OUTPATIENT)
Dept: CARDIOLOGY | Facility: HOSPITAL | Age: 65
End: 2020-06-09
Payer: MEDICARE

## 2020-06-09 DIAGNOSIS — Z95.818 PRESENCE OF OTHER CARDIAC IMPLANTS AND GRAFTS: ICD-10-CM

## 2020-06-09 PROCEDURE — G2066 INTER DEVC REMOTE 30D: HCPCS | Performed by: INTERNAL MEDICINE

## 2020-06-09 PROCEDURE — 93298 CARDIAC DEVICE CHECK - REMOTE: ICD-10-PCS | Mod: ,,, | Performed by: INTERNAL MEDICINE

## 2020-06-09 PROCEDURE — 93298 REM INTERROG DEV EVAL SCRMS: CPT | Mod: ,,, | Performed by: INTERNAL MEDICINE

## 2020-06-26 ENCOUNTER — PATIENT OUTREACH (OUTPATIENT)
Dept: OTHER | Facility: OTHER | Age: 65
End: 2020-06-26

## 2020-06-26 ENCOUNTER — CLINICAL SUPPORT (OUTPATIENT)
Dept: REHABILITATION | Facility: HOSPITAL | Age: 65
End: 2020-06-26
Payer: MEDICARE

## 2020-06-26 DIAGNOSIS — Z74.09 IMPAIRED FUNCTIONAL MOBILITY, BALANCE, GAIT, AND ENDURANCE: ICD-10-CM

## 2020-06-26 DIAGNOSIS — R29.898 DECREASED STRENGTH OF LOWER EXTREMITY: ICD-10-CM

## 2020-06-26 PROCEDURE — 97116 GAIT TRAINING THERAPY: CPT | Mod: PN

## 2020-06-26 PROCEDURE — 97110 THERAPEUTIC EXERCISES: CPT | Mod: PN

## 2020-06-26 NOTE — PROGRESS NOTES
Physical Therapy Daily Treatment Note     Name: Luis Wong  Clinic Number: [id number]    Therapy Diagnosis:   Encounter Diagnoses   Name Primary?    Impaired functional mobility, balance, gait, and endurance     Decreased strength of lower extremity      Physician: Carla Altman PA-C     Visit Date: 6/26/2020    Physician Orders: PT Eval and Treat  Medical Diagnosis from Referral: I63.9 (ICD-10-CM) - CVA (cerebral vascular accident)  Evaluation Date: 9/13/2019  Authorization Period Expiration: 12/31/2020  Plan of Care Expiration: 7/3/2020  Visit # / Visits authorized: 25/50 (50 prior visits)  FOTO: next at d/c    Time In:  11:14AM  Time Out: 12:00 PM  Total Billable Time: 46 minutes (1 gait, 2TE)    Precautions: Standard; history of of CVA's    Subjective     Pt reports: He fell for the first time yesterday due to his shoelace getting caught. He reports that he was not injured and used a safe fall technique he learned in the . Pt reports no injury to head nor other body parts.  Pt was compliant with home exercise program.   Response to previous treatment: No adverse response  Functional change: Pt has been mowing lawn again and has found that going up and down his stairs at home has gotten easier    Objective     Pt performed gait training on treadmill x 15 min total consisting of forward gait x 8' at 1.0-1.2 speed setting with B UE support f/b sidestepping B x 2.5' each way at 0.3-0.5 speed setting with BUE support f/b backward walking x 2.5' at 0.3-0.4' speed setting with BUE support and no LOB with cues as needed for heel strike/avoidance of excess L hip IR with forward walking and hip extension on L with backward walking. Training performed for improved endurance, functional mobility, and mechanics during gait. AFO worn throughout session    Pt performed therapeutic exercise to increase flexibility and strength x 30' total consisting of:  -- gastroc stretches     3 x  30'' B on slant board  -- hamstring stretch     3 x 30'' B on stairs  -- knee extension cybex     3 x 10 (35lbs) [BLE concentric; LLE eccentric lowering; 1 plate+ 5lbs]  -- Leg press      3 x 10 B 7.5 plates         2 x 10 L 5.5 plates  --Toe tapping on steps     2 x 10; up 3 stairs w/ RLE; up 2 stairs w/ LLE      Home Exercises Provided and Patient Education Provided     Education provided:   - Cont HEP   - Proper body mechanics during exercise     Written Home Exercises Provided: Not today  Exercises were reviewed and Luis was able to demonstrate them prior to the end of the session.  Luis demonstrated good  understanding of the education provided.     See EMR under Patient Instructions for exercises provided 9/23/2019 and 10/28/2019.    Assessment   Despite fall yesterday and several weeks since last session, pt tolerated program well today. No injuries noted with fall, and pt reports he used safe fall technique. Pt performed safe stepping on TM at slightly decreased speeds today. Mod-high levels of fatigue achieved during exercise, especially on leg press. Pt remains appropriate for PT to continue progressing toward goals and decrease risk for falls     Luis is progressing well towards his goals.   Pt prognosis is Good.   Pt will continue to benefit from skilled outpatient physical therapy to address the deficits listed in the problem list box on initial evaluation, provide pt/family education and to maximize pt's level of independence in the home and community environment.     Pt's spiritual, cultural and educational needs considered and pt agreeable to plan of care and goals.  Anticipated barriers to physical therapy: Co-morbidities    Goals:  Previous Long Term Goals (8 Weeks):   1. Pt will be able to perform TUG in less than or equal to 17 seconds, safely, without use of AD demonstrating overall improved functional mobility. MET 15.65 5/22/2020  2. Pt will walk > than or equal to 260 feet on the 2  minute walk test indoor with AD with 0 LOB and minimal gait deviations for improved safety in home ambulation and safety.PROGRESSING, NOT  on 5/22/2020  3. Pt will be able to safely perform and tolerate high level ADL's without LOB. MET mowing outdoors  4. Pt will have 0 falls from 11/6/19. PROGRESSING, NOT MET no falls as of 5/22/2020  5. Pt will have MMT score of 3+/5 in all major ms groups in Left LE.  PARTIALLY MET 3/9/2020  6. Pt will ambulate on TM x 6 minutes with use of UE support with 0 LOB at greater than or equal to 1.3 mph. PROGRESSING, NOT MET 3/9/2020  7. Pt will begin some form of community fitness to begin regular and consistent performance of exercise to continue maintenance of gains made in PT. PROGRESSING, NOT MET 3/9/2020  8. Patient will be able to achieve greater than or equal to 18/24 on the Dynamic Gait Index decreasing patient's risk for falling.  (PROGRESSING, NOT MET)    Plan   Reassess/update POC at next visit. Monitor response to today's session.    JARED STACK, PT

## 2020-07-02 ENCOUNTER — CLINICAL SUPPORT (OUTPATIENT)
Dept: REHABILITATION | Facility: HOSPITAL | Age: 65
End: 2020-07-02
Payer: MEDICARE

## 2020-07-02 DIAGNOSIS — R29.898 DECREASED STRENGTH OF LOWER EXTREMITY: ICD-10-CM

## 2020-07-02 DIAGNOSIS — Z74.09 IMPAIRED FUNCTIONAL MOBILITY, BALANCE, GAIT, AND ENDURANCE: ICD-10-CM

## 2020-07-02 PROCEDURE — 97110 THERAPEUTIC EXERCISES: CPT | Mod: PN

## 2020-07-02 PROCEDURE — 97116 GAIT TRAINING THERAPY: CPT | Mod: PN

## 2020-07-02 NOTE — PROGRESS NOTES
Physical Therapy Daily Treatment Note     Name: Luis Wong  Clinic Number: [id number]    Therapy Diagnosis:   Encounter Diagnoses   Name Primary?    Impaired functional mobility, balance, gait, and endurance     Decreased strength of lower extremity      Physician: Carla Altman PA-C     Visit Date: 7/2/2020    Physician Orders: PT Eval and Treat  Medical Diagnosis from Referral: I63.9 (ICD-10-CM) - CVA (cerebral vascular accident)  Evaluation Date: 9/13/2019  Authorization Period Expiration: 12/31/2020  Plan of Care Expiration: 7/3/2020 updated on 7/7/2020  Visit # / Visits authorized: 26/50 (50 prior visits)  FOTO: next at d/c    Time In:  0847 AM  Time Out: 0932AM  Total Billable Time: 44 minutes (1 gait, 2TE)    Precautions: Standard; history of of CVA's    Subjective     Pt reports: no pain still after fall last week.    Pt was compliant with home exercise program.   Response to previous treatment: No adverse response  Functional change: Pt has been mowing lawn again and has found that going up and down his stairs at home has gotten easier    Objective     Pt performed gait training on treadmill x 15 min total consisting of forward gait x 8' at 1.0-1.1 speed setting with B UE support f/b sidestepping B x 2.5' each way at 0.4-0.5 speed setting with BUE support.Training performed for improved endurance, functional mobility, and mechanics during gait. AFO worn throughout session    Pt performed therapeutic exercise to increase flexibility and strength x 29' total consisting of:  -- gastroc stretches     3 x 30'' B on slant board  -- knee flexion cybex     2 x 15 3 plates  [BLE concentric; LLE eccentric lowering)  -- knee extension cybex     2 x 15 (40lbs) [BLE concentric; LLE eccentric lowering; 1 plate+ 5lbs]  -- Leg press      3 x 10 B 7.5 plates         3 x 10 L 5.5 plates    Home Exercises Provided and Patient Education Provided     Education provided:   - Cont HEP   -  Proper body mechanics during exercise     Written Home Exercises Provided: not today  Exercises were reviewed and Luis was able to demonstrate them prior to the end of the session.  Luis demonstrated good  understanding of the education provided.     See EMR under Patient Instructions for exercises provided 9/23/2019 and 10/28/2019.    Assessment   Because pt had 3 weeks without therapy after last POC, he would benefit from 3 additional week of PT with updated POC next visit.       Luis is progressing well towards his goals.   Pt prognosis is Good.   Pt will continue to benefit from skilled outpatient physical therapy to address the deficits listed in the problem list box on initial evaluation, provide pt/family education and to maximize pt's level of independence in the home and community environment.     Pt's spiritual, cultural and educational needs considered and pt agreeable to plan of care and goals.  Anticipated barriers to physical therapy: Co-morbidities    Goals:  Previous Long Term Goals (8 Weeks):   1. Pt will be able to perform TUG in less than or equal to 17 seconds, safely, without use of AD demonstrating overall improved functional mobility. MET 15.65 5/22/2020  2. Pt will walk > than or equal to 260 feet on the 2 minute walk test indoor with AD with 0 LOB and minimal gait deviations for improved safety in home ambulation and safety.PROGRESSING, NOT  on 5/22/2020  3. Pt will be able to safely perform and tolerate high level ADL's without LOB. MET mowing outdoors  4. Pt will have 0 falls from 11/6/19. PROGRESSING, NOT MET no falls as of 5/22/2020  5. Pt will have MMT score of 3+/5 in all major ms groups in Left LE.  PARTIALLY MET 3/9/2020  6. Pt will ambulate on TM x 6 minutes with use of UE support with 0 LOB at greater than or equal to 1.3 mph. PROGRESSING, NOT MET 3/9/2020  7. Pt will begin some form of community fitness to begin regular and consistent performance of exercise to  continue maintenance of gains made in PT. PROGRESSING, NOT MET 3/9/2020  8. Patient will be able to achieve greater than or equal to 18/24 on the Dynamic Gait Index decreasing patient's risk for falling.  (PROGRESSING, NOT MET)    Plan   Reassess/update POC at next visit. Monitor response to today's session.  Need OT orders or reassessment    Adia Diaz, PT

## 2020-07-07 ENCOUNTER — CLINICAL SUPPORT (OUTPATIENT)
Dept: REHABILITATION | Facility: HOSPITAL | Age: 65
End: 2020-07-07
Payer: MEDICARE

## 2020-07-07 DIAGNOSIS — R29.898 DECREASED STRENGTH OF LOWER EXTREMITY: Primary | ICD-10-CM

## 2020-07-07 DIAGNOSIS — Z74.09 IMPAIRED FUNCTIONAL MOBILITY, BALANCE, GAIT, AND ENDURANCE: ICD-10-CM

## 2020-07-07 PROCEDURE — 97116 GAIT TRAINING THERAPY: CPT | Mod: PN

## 2020-07-07 PROCEDURE — 97110 THERAPEUTIC EXERCISES: CPT | Mod: PN

## 2020-07-07 NOTE — PLAN OF CARE
Outpatient Therapy Updated Plan of Care     Visit Date: 7/7/2020  Name: Luis Wong  Clinic Number: [id number]    Therapy Diagnosis:   Encounter Diagnoses   Name Primary?    Decreased strength of lower extremity Yes    Impaired functional mobility, balance, gait, and endurance      Physician: Carla Altman PA-C    Physician Orders: PT Eval and Treat  Medical Diagnosis from Referral: I63.9 (ICD-10-CM) - CVA (cerebral vascular accident)  Evaluation Date: 9/13/2019    Total Visits Received: 27  Cancelled Visits: 0  No Show Visits: 0    Current Certification Period:  5/22/2020 to 7/3/2020  Precautions:  H/o several CVAs  Visits from Evaluation Date:  27  Functional Level Prior to Evaluation:  See prior evaluation by Simba Gatica    Subjective     Update:   See treatment    Objective     Update:   Pt performed gait training on treadmill x 10 min total consisting of forward gait x 8' at 1.0-1.2 speed setting with B UE support.Training performed for improved endurance, functional mobility, and mechanics during gait. AFO worn throughout session   Pt performed therapeutic exercise to increase flexibility and strength x 35' total consisting of:   -- gastroc stretches 3 x 30'' B on slant board   -- hamstring stretches 3 x 30'' B on stairs   -- Leg press 3 x 10 B 7.5 plates   3 x 10 L 5.5 plates     Dynamic Gait Index   1. Gait on level surface: 2. Mild impairment: Walks 20', uses assistive devices, slower speed, mild gait deviations.   2. Change in Gait speed: 2. Mild impairment: Is able to change speed, but demonstrates mild gait deviations, or no gait deviations but unable to achieve a signifcant change in velocity, or uses an assistive device.   3. Gait with Horizontal Head Turns: 3. Normal: Performs head turns smoothly with no change in gait.   4. Gait with Vertical Head Turns: 3. Normal: Performs head turns with no change in gait   5. Gait and Pivot turn: 3. Normal: Pivot turns safely within 3 seconds and stops  quickly with no loss of balance   6. Step Over Obstacle: 2. Mild impairment: Is able to step over box, but must slow down and adjust steps to clear box safely.   7. Step around obstacles: 3. Normal: Is able to walk around cones safely without changing gait speed; no evidence of imbalance.   8. Steps: 2. Mild impairment: Alternating feet, must use rail.   Score: 19/24   Interpretation: < 19/24 = predictive of falls in the elderly   > 22/24 = safe ambulators     Lower Extremity Strength    RLE LLE   Hip Flexion: 5/5 4+/5   Hip Extension:  4+/5 4/5   Hip Abduction: 5/5 4+/5   Hip Adduction: 4+/5 4+/5   Knee Extension: 5/5 5/5   Knee Flexion: 5/5 3-/5 in prone   Ankle Dorsiflexion: 5/5 3+/5   Ankle Plantarflexion: 5/5 3/5   Ankle Inversion: 5/5 4/5   Ankle Eversion: 5/5 3-/5     2 minutes walk test: 265 ft with AFO and no AD and no LOB     TU.6 secs without LOB or AD    Assessment     Update: Pt has progressed in gait speed, balance and strength.  He has met 4/8 LTGs and he is progressing toward the remainder.     Goals:  Previous Long Term Goals (8 Weeks):   1. Pt will be able to perform TUG in less than or equal to 17 seconds, safely, without use of AD demonstrating overall improved functional mobility. MET 14.6 2020  2. Pt will walk > than or equal to 260 feet on the 2 minute walk test indoor with AD with 0 LOB and minimal gait deviations for improved safety in home ambulation and safety. ft without AD with AFO on  2020  3. Pt will be able to safely perform and tolerate high level ADL's without LOB. MET mowing outdoors  4. Pt will have 0 falls from 19. one non-balance related fall in . Pt will have MMT score of 3+/5 in all major ms groups in Left LE.  PARTIALLY MET 2020 with exception ankle and Hamstrings  6. Pt will ambulate on TM x 6 minutes with use of UE support with 0 LOB at greater than or equal to 1.3 mph. PROGRESSING, NOT MET 1.2 x 8' 2020  7. Pt will begin some form  of community fitness to begin regular and consistent performance of exercise to continue maintenance of gains made in PT. PROGRESSING, NOT MET 3/9/2020  8. Patient will be able to achieve greater than or equal to 18/24 on the Dynamic Gait Index decreasing patient's risk for falling.  MET 7/7/2020   Reasons for Recertification of Therapy:   Exp of current care plan with pause in therapy in the month of June    Plan     Updated Certification Period: 7/7/2020 to 7/30/2020  Recommended Treatment Plan: 1 times per week for 4 weeks: Gait Training, Manual Therapy, Moist Heat/ Ice, Neuromuscular Re-ed, Patient Education, Self Care, Therapeutic Activites and Therapeutic Exercise  Other Recommendations: none    Adia Diaz, PT  7/7/2020      I CERTIFY THE NEED FOR THESE SERVICES FURNISHED UNDER THIS PLAN OF TREATMENT AND WHILE UNDER MY CARE    Physician's comments:        Physician's Signature: ___________________________________________________

## 2020-07-07 NOTE — PROGRESS NOTES
Physical Therapy Daily Treatment Note     Name: Luis Wong  Clinic Number: [id number]    Therapy Diagnosis:   Encounter Diagnoses   Name Primary?    Decreased strength of lower extremity Yes    Impaired functional mobility, balance, gait, and endurance      Physician: Carla Altman PA-C     Visit Date: 7/7/2020    Physician Orders: PT Eval and Treat  Medical Diagnosis from Referral: I63.9 (ICD-10-CM) - CVA (cerebral vascular accident)  Evaluation Date: 9/13/2019  Authorization Period Expiration: 12/31/2020  Plan of Care Expiration: 7/3/2020 updated on 7/7/2020  Visit # / Visits authorized: 27/50 (50 prior visits)  FOTO: next at d/c    Time In:  1015 AM  Time Out: 1100 AM  Total Billable Time: 45 minutes (1 gait, 2TE)    Precautions: Standard; history of of CVA's    Subjective     Pt reports: no pain still after fall last week.    Pt was compliant with home exercise program.   Response to previous treatment: No adverse response  Functional change: Pt has been mowing lawn again and has found that going up and down his stairs at home has gotten easier    Objective     Pt performed gait training on treadmill x 10 min total consisting of forward gait x 8' at 1.0-1.2 speed setting with B UE support.Training performed for improved endurance, functional mobility, and mechanics during gait. AFO worn throughout session    Pt performed therapeutic exercise to increase flexibility and strength x 35' total consisting of:  -- gastroc stretches     3 x 30'' B on slant board  -- hamstring stretches    3 x 30'' B on stairs    -- Leg press      3 x 10 B 7.5 plates         3 x 10 L 5.5 plates  Dynamic Gait Index  1. Gait on level surface: 2. Mild impairment: Walks 20', uses assistive devices, slower speed, mild gait deviations.   2. Change in Gait speed: 2. Mild impairment: Is able to change speed, but demonstrates mild gait deviations, or no gait deviations but unable to achieve a signifcant  change in velocity, or uses an assistive device.  3. Gait with Horizontal Head Turns: 3. Normal: Performs head turns smoothly with no change in gait.  4. Gait with Vertical Head Turns: 3. Normal: Performs head turns with no change in gait  5. Gait and Pivot turn: 3. Normal: Pivot turns safely within 3 seconds and stops quickly with no loss of balance  6. Step Over Obstacle: 2. Mild impairment: Is able to step over box, but must slow down and adjust steps to clear box safely.  7. Step around obstacles: 3. Normal: Is able to walk around cones safely without changing gait speed; no evidence of imbalance.  8. Steps: 2. Mild impairment: Alternating feet, must use rail.    Score: 19/24    Interpretation: < 19/24 = predictive of falls in the elderly  > 22/24 = safe ambulators    Lower Extremity Strength    RLE LLE   Hip Flexion: 5/5 4+/5   Hip Extension:  4+/5 4/5   Hip Abduction: 5/5 4+/5   Hip Adduction: 4+/5 4+/5   Knee Extension: 5/5 5/5   Knee Flexion: 5/5 3-/5 in prone   Ankle Dorsiflexion: 5/5 3+/5   Ankle Plantarflexion: 5/5 3/5   Ankle Inversion: 5/5 4/5   Ankle Eversion: 5/5 3-/5      2 minutes walk test: 265 ft with AFO and no AD and no LOB    TU.6 secs without LOB or AD    Home Exercises Provided and Patient Education Provided     Education provided:   - Cont HEP   - Proper body mechanics during exercise     Written Home Exercises Provided: not today  Exercises were reviewed and Luis was able to demonstrate them prior to the end of the session.  Luis demonstrated good  understanding of the education provided.     See EMR under Patient Instructions for exercises provided 2019 and 10/28/2019.    Assessment   See updated POC     Luis is progressing well towards his goals.   Pt prognosis is Good.   Pt will continue to benefit from skilled outpatient physical therapy to address the deficits listed in the problem list box on initial evaluation, provide pt/family education and to maximize pt's level  of independence in the home and community environment.     Pt's spiritual, cultural and educational needs considered and pt agreeable to plan of care and goals.  Anticipated barriers to physical therapy: Co-morbidities    Goals:  Previous Long Term Goals (8 Weeks):   1. Pt will be able to perform TUG in less than or equal to 17 seconds, safely, without use of AD demonstrating overall improved functional mobility. MET 14.6 7/7/2020  2. Pt will walk > than or equal to 260 feet on the 2 minute walk test indoor with AD with 0 LOB and minimal gait deviations for improved safety in home ambulation and safety. ft without AD with AFO on  7/7/2020  3. Pt will be able to safely perform and tolerate high level ADL's without LOB. MET mowing outdoors  4. Pt will have 0 falls from 11/6/19. one non-balance related fall in June   5. Pt will have MMT score of 3+/5 in all major ms groups in Left LE.  PARTIALLY MET 7/7/2020 with exception ankle and Hamstrings  6. Pt will ambulate on TM x 6 minutes with use of UE support with 0 LOB at greater than or equal to 1.3 mph. PROGRESSING, NOT MET 1.2 x 8' 7/7/2020  7. Pt will begin some form of community fitness to begin regular and consistent performance of exercise to continue maintenance of gains made in PT. PROGRESSING, NOT MET 3/9/2020  8. Patient will be able to achieve greater than or equal to 18/24 on the Dynamic Gait Index decreasing patient's risk for falling.  MET 7/7/2020    Plan   See updated POC, progress TM and cybex strengthening    Adia Diaz, PT

## 2020-07-09 ENCOUNTER — CLINICAL SUPPORT (OUTPATIENT)
Dept: CARDIOLOGY | Facility: HOSPITAL | Age: 65
End: 2020-07-09
Attending: OTOLARYNGOLOGY
Payer: MEDICARE

## 2020-07-09 DIAGNOSIS — Z95.818 PRESENCE OF OTHER CARDIAC IMPLANTS AND GRAFTS: ICD-10-CM

## 2020-07-09 PROCEDURE — 93298 CARDIAC DEVICE CHECK - REMOTE: ICD-10-PCS | Mod: ,,, | Performed by: INTERNAL MEDICINE

## 2020-07-09 PROCEDURE — 93298 REM INTERROG DEV EVAL SCRMS: CPT | Mod: ,,, | Performed by: INTERNAL MEDICINE

## 2020-07-09 PROCEDURE — G2066 INTER DEVC REMOTE 30D: HCPCS | Performed by: INTERNAL MEDICINE

## 2020-07-14 ENCOUNTER — CLINICAL SUPPORT (OUTPATIENT)
Dept: REHABILITATION | Facility: HOSPITAL | Age: 65
End: 2020-07-14
Payer: MEDICARE

## 2020-07-14 ENCOUNTER — CLINICAL SUPPORT (OUTPATIENT)
Dept: REHABILITATION | Facility: HOSPITAL | Age: 65
End: 2020-07-14
Attending: PSYCHIATRY & NEUROLOGY
Payer: MEDICARE

## 2020-07-14 DIAGNOSIS — Z74.09 IMPAIRED MOBILITY AND ACTIVITIES OF DAILY LIVING: ICD-10-CM

## 2020-07-14 DIAGNOSIS — R29.898 DECREASED STRENGTH OF LOWER EXTREMITY: ICD-10-CM

## 2020-07-14 DIAGNOSIS — M62.81 MUSCLE WEAKNESS: ICD-10-CM

## 2020-07-14 DIAGNOSIS — Z74.09 IMPAIRED FUNCTIONAL MOBILITY, BALANCE, GAIT, AND ENDURANCE: ICD-10-CM

## 2020-07-14 DIAGNOSIS — G20.A1 PARKINSON DISEASE: ICD-10-CM

## 2020-07-14 DIAGNOSIS — R27.8 DECREASED COORDINATION: ICD-10-CM

## 2020-07-14 DIAGNOSIS — Z78.9 IMPAIRED MOBILITY AND ACTIVITIES OF DAILY LIVING: ICD-10-CM

## 2020-07-14 DIAGNOSIS — M25.60 STIFFNESS IN JOINT: ICD-10-CM

## 2020-07-14 PROCEDURE — 97116 GAIT TRAINING THERAPY: CPT | Mod: PN

## 2020-07-14 PROCEDURE — 97166 OT EVAL MOD COMPLEX 45 MIN: CPT | Mod: PN

## 2020-07-14 PROCEDURE — 97110 THERAPEUTIC EXERCISES: CPT | Mod: PN

## 2020-07-14 NOTE — PLAN OF CARE
[facility] Occupational Therapy  Initial Neurological Evaluation     Date: 7/14/2020  Patient: Luis Wong  Chart Number: [id number]    Therapy Diagnosis:   Encounter Diagnoses   Name Primary?    Parkinson disease     Muscle weakness     Impaired mobility and activities of daily living     Stiffness in joint     Decreased coordination      Physician: Brien Lopez MD    Physician Orders: Eval and treat  Medical Diagnosis: History of stroke    Onset Date: 12/12/2019  Evaluation Date: 7/14/2020  Plan of Care Expiration Period: 9/11/2020  Insurance Authorization period Expiration: 12/31/2020  Date of Return to MD: October 2020  Visit # / Visits Authortized: 1 / 30   FOTO:  UE CVA / 40%    Time In:10:55 AM  Time Out: 11:45 AM   Total Billable (one on one) Time: 50 minutes    Precautions: Standard and Fall    Subjective     History of Current Condition: Pt has history of multiple strokes with most recent stroke end of last year. Pt states that this stroke is different from prior ones because recovery is taking longer. Pt began botox treatments and says it helps with pain.    Involved Side: L side  Dominant Side: Right  Date of Onset: 8/28/2019 and second CVA 12/12/019  Surgical Procedure: Carpal Tunnel Surgery in R wrist 2/21/2020  Imaging: MRI studies, CT scan films   Previous Therapy: Yes, OT and PT    Patient's Goals for Therapy: Strengthen LUE and use LUE to perform functional tasks such as eating and household activities    Pain:  Pain Related Behaviors Observed: no   Functional Pain Scale Rating 0-10:   4/10 on average  0/10 at best  4/10 at worst  Location: Left shoulderr  Description: Tight  Aggravating Factors: Lifting and Stretching  Easing Factors: relaxation and rest    Occupation:  Retired   Working presently: Retired  Duties: Yard work, make the bed, sweep, vacuum, house duties, take garbage out    Functional Limitations/Social History:    Prior Level of Function:  Independent  Current Level of Function: SVN    Home/Living environment : lives with their spouse  Home Access: Two stories, upstairs bedroom  DME: single point cane and transfer tub bench     Leisure: Gardening and Golfing    Past Medical History/Physical Systems Review:   Luis Wong  has a past medical history of Aneurysm, Diabetes mellitus, Fever blister, Herpes infection, Hypertension, ICH (intracerebral hemorrhage), Mixed hyperlipidemia, Nontraumatic thalamic hemorrhage, JAQUAN (obstructive sleep apnea), Right-sided lacunar stroke, SDH (subdural hematoma), Special screening for malignant neoplasms, colon, and Stroke.    Luis Wong  has a past surgical history that includes Knee arthroscopic surgery; Insertion of implantable loop recorder (N/A, 12/12/2019); and Carpal tunnel release (Right, 2/21/2020).    Luis has a current medication list which includes the following prescription(s): aspirin, atorvastatin, clopidogrel, diclofenac sodium, hydrochlorothiazide, metformin, nifedipine, potassium chloride, and tizanidine.    Review of patient's allergies indicates:  No Known Allergies     Other:      Objective     Cognitive Exam:  Oriented: Person, Place, Time and Situation  Behaviors: normal, cooperative  Follows Commands/attention: Follows multistep  commands  Communication: clear/fluent  Memory: Impaired STM  Safety awareness/insight to disability: aware of diagnosis, treatment, and prognosis  Coping skills/emotional control: Appropriate to situation    Visual/Perceptual:  Tracking: intact  Saccades: intact  Acuity: intact  R/L discrimination: intact  Visual field: intact  Motor Planning Praxis: intact  Comments:     Physical Exam:  Postural examination/scapula alignment: Slouched posture  Joint integrity: Spasticity Noted  Skin integrity: WNL  Edema: None   Palpation: None      Joint Evaluation  AROM  7/14/2020 PROM   7/14/2020 AROM  7/14/2020 PROM   7/14/2020    Right Right Left Left   Shoulder flex  0-180 WNL WNL 86 115   Shoulder Abd 0-180 WNL WNL 88 90   Shoulder ER 0-90 WNL WNL 24 41   Shoulder IR 0-90 WNL WNL Waist 74   Shoulder Extension 0-80 WNL WNL 47 NT   Shoulder Horizontal adduction 0-90 WNL WNL 5 NT   Elbow flex/ext 0-150 WNL WNL WNL/32 WNL   Wrist flex 0-80 WNL WNL 72 WNL   Wrist ext 0-70 WNL WNL 40 WNL   Supination 0-80 WNL WNL NT WNL   Pronation 0-80 WNL WNL NT WNL   UD WNL WNL 10 WNL   RD WNL WNL 20 WNL     Fist: loose      Strength 7/14/2020 7/14/2020   **within available ROM** Right Left   Shoulder flex 5/5 3-/5   Shoulder abd 5/5 3-/5   Shoulder ER 5/5 3-/5   Shoulder IR 5/5 2-/5   Shoulder Extension 5/5 2-/5   Shoulder Horizontal adduction 5/5 2-/5   Elbow flex 5/5 3+/5   Elbow ext 5/5 3-/5   Wrist flex 5/5 3/5   Wrist ext 5/5 3/5   Supination 5/5 3-/5   Pronation 5/5 3-/5   UD 5/5 3/5   RD 5/5 3/5      Strength: (ANTONIO Dynamometer in lbs.) Average 3 trials, Position II:     7/14/2020 7/14/2020    Right Left   Rung II 65# 27#     Pinch Strength (Measured in psi)     7/14/2020 7/14/2020    Right Left   Key Pinch 21psi 11 psi   3pt Pinch 17 psi 10 psi   2pt Pinch 11 psi 8 psi       QuickDash:  63% disability     Gross motor coordination:   - EVANGELINA: NT  - Finger to Nose: NT  - Finger Flicks: NT    Tone:  Modified Mindi Scale:   3 Considerable increase in muscle tone, passive movement difficult    Comments:     Sensation:  Luis  reports some tingling in L hand  Light touch: bilateral intact  Sharp/Dull: bilateral intact  Kinesthesia: bilateralintact  Proprioception: bilateral intact  Temperature: bilateral intact    Balance:   Static Sit - GOOD: Takes MODERATE challenges from all directions  Dynamic sit- GOOD-: Takes MODERATE challenges from all directions but inconsistently  Static Stand - FAIR+: Able to take MINIMAL challenges from all directions  Dynamic stand - FAIR: Maintains without assist, but unable to take any challenges     Endurance Deficit: moderate                     Functional Status      Functional Mobility:  Bed mobility: Mod I  Roll to left: Mod I  Roll to right: Mod I  Supine to sit: Mod I  Sit to supine: Mod I  Transfers to bed: Mod I  Transfers to toilet: Mod I  Car transfers: Mod I  Wheelchair mobility: no w/c used    ADL's:  Feeding: Mod I  Grooming: Mod I  Hygiene: Mod I  UB Dressing: Mod I  LB Dressing: Mod I  Toileting: Mod I  Bathing: Mod I    IADL's:  Homecare: Mod I  Cooking: Mod I  Laundry: Mod I  Yard work: Mod I  Use of telephone: I  Money management: I  Medication management: I  Handwriting:I  Technology Use:I    Comments:      CMS Impairment/Limitation/Restriction for FOTO CVA UE Survey    Therapist reviewed FOTO scores for Luis Wong on 7/14/2020.   FOTO documents entered into [employer] - see Media section.    Limitation Score: 40%  Category: Self Care    Current : CK = at least 40% but < 60% impaired, limited or restricted  Goal: CL = least 60% but < 80% impaired, limited or restricted  Discharge: CL = least 60% but < 80% impaired, limited or restricted         Treatment       Education provided:   -role of OT, goals for OT, scheduling/cancellations, insurance limitations with patient.  -Additional Education provided: continue HEP from previous round of therapy    Written Home Exercises Provided: Patient instructed to cont prior HEP.  Exercises were reviewed and Luis was able to demonstrate them prior to the end of the session.    Luis demonstrated good  understanding of the education provided.     See EMR under Patient Instructions for exercises provided prior visit.    Assessment     Luis Wong is a 64 y.o. male referred to outpatient occupational therapy and presents with a medical diagnosis of CVA, resulting in pain, decreased flexibility, decreased range of motion, decreased muscle strength, impaired function and decreased work ability and demonstrates limitations as described in the chart below. Following medical record review it is  determined that pt will benefit from occupational therapy services in order to maximize pain free and/or functional use of left UE.    Pt prognosis is Fair due to nature of diagnosis and time passed since onset.  Pt will benefit from skilled outpatient Occupational Therapy to address the deficits stated above and in the chart below, provide pt/family education, and to maximize pt's level of independence.     Plan of care discussed with patient: Yes  Pt's spiritual, cultural and educational needs considered and patient is agreeable to the plan of care and goals as stated below:     Anticipated Barriers for therapy: nature of diagnosis and time passed since onset    Medical Necessity is demonstrated by the following  Profile and History Assessment of Occupational Performance Level of Clinical Decision Making Complexity Score   Occupational Profile:   Luis Wong is a 64 y.o. male who lives with their spouse and is currently retired. Luis Wong has difficulty with  feeding  driving/transportation management, shopping and housework/household chores  affecting his/her daily functional abilities. His/her main goal for therapy is use his LUE to perform daily acitvities.     Comorbidities:   advanced age, history of CVA and HTN    Medical and Therapy History Review:   Brief               Performance Deficits    Physical:  Joint Mobility  Joint Stability  Muscle Power/Strength  Muscle Endurance  Control of Voluntary Movement   Strength  Pinch Strength  Gross Motor Coordination  Fine Motor Coordination  Muscle Tone  Pain    Cognitive:  No Deficits    Psychosocial:    Habits  Routines  Rituals     Clinical Decision Making:  moderate    Assessment Process:  Problem-Focused Assessments    Modification/Need for Assistance:  Not Necessary    Intervention Selection:  Several Treatment Options       moderate  Based on PMHX, co morbidities , data from assessments and functional level of assistance required with task  and clinical presentation directly impacting function.       The following goals were discussed with the patient and patient is in agreement with them as to be addressed in the treatment plan.     Goals:  Short Term Goals:  1) Initiate Hep   2) Pt to increase LUE  strength by 5 # in order to A in self care task of feeding by 4 weeks.   3) Pt will participate in bilateral self-feeding tasks with mod A and AE PRN by 4 weeks  4) Pt to increase LUE AROM by 5 degrees in order to A in UB dressing by 4 weeks.  5) Patient will be able to achieve less than or equal to 35% on the FOTO, demonstrating overall improved functional ability with upper extremity. (self-care category)  6) Pt will increase QuickDASH score by 5% by 4 weeks, demonstrating improved functional ability to participate in household chores.       Long Term Goals:  1) Pt to be IND with HEP in order to maintain ROM and strength needed for self care IND by d/c.  2) Pt to increase LUE  strength to WNL as compared to unaffected extremity in order to open items for self feeding by d/c.  3) Pt will participate in bilateral self-feeding tasks with SVN and AE PRN by d/c.  4) Pt to increase LUE AROM by 10 degrees in order to A in UB dressing by d/c.  5) Patient will be able to achieve less than or equal to 30% on the FOTO, demonstrating overall improved functional ability with upper extremity. (self-care category)  6) Pt will increase QuickDASH score by 10% by d/c, demonstrating improved functional ability to participate in household chores.       Plan   Certification Period/Plan of care expiration: 7/14/2020 to 9/11/2020.    Outpatient Occupational Therapy 2 times weekly for 8 weeks to include the following interventions: Electrical Stimulation PRN, Manual Therapy, Moist Heat/ Ice, Neuromuscular Re-ed, Patient Education, Self Care, Therapeutic Activites and Therapeutic Exercise.    TABITHA GUSTAFSON, OT

## 2020-07-14 NOTE — PROGRESS NOTES
Physical Therapy Daily Treatment Note     Name: Luis Wong  Clinic Number: [id number]    Therapy Diagnosis:   Encounter Diagnoses   Name Primary?    Impaired functional mobility, balance, gait, and endurance     Decreased strength of lower extremity      Physician: Carla Altman PA-C     Visit Date: 7/14/2020    Physician Orders: PT Eval and Treat  Medical Diagnosis from Referral: I63.9 (ICD-10-CM) - CVA (cerebral vascular accident)  Evaluation Date: 9/13/2019  Authorization Period Expiration: 12/31/2020  Plan of Care Expiration:  Visit # / Visits authorized: 28/50 (50 prior visits)  FOTO: next at d/c    Time In:  1015 AM  Time Out: 1100 AM  Total Billable Time: 45 minutes (1 gait, 2TE)    Precautions: Standard; history of of CVA's    Subjective     Pt reports: he feels his botox is wearing off at this point.   Pt was compliant with home exercise program.   Response to previous treatment: No adverse response  Functional change: Pt has been mowing lawn again and has found that going up and down his stairs at home has gotten easier    Objective     Pt performed gait training on treadmill x 10 min total consisting of forward gait x 8' at 1.0-1.2 speed setting with B UE support.Training performed for improved endurance, functional mobility, and mechanics during gait. AFO worn throughout session    Pt performed therapeutic exercise to increase flexibility and strength x 35' total consisting of:  -- gastroc stretches     3 x 30'' B on slant board  -- hamstring stretches    3 x 30'' B at TM  -- standing ER stretch     2 x 30'' B at TM    -- knee flexion cybex                                                   2 x 15 3 plates  [BLE concentric; LLE eccentric lowering)  -- knee extension cybex                                              2 x 15 (40lbs) [BLE concentric; LLE eccentric lowering; 1 plate+ 5lbs]  -- standing hip abduction    2 x 10 1.5 plates B LE  -- Leg press                                                                  3 x 10 B 7.5 plates                                                                                      3 x 10 each LE 5.5 plates      Home Exercises Provided and Patient Education Provided     Education provided:   - Cont HEP   - Proper body mechanics during exercise     Written Home Exercises Provided: not today  Exercises were reviewed and Luis was able to demonstrate them prior to the end of the session.  Luis demonstrated good  understanding of the education provided.     See EMR under Patient Instructions for exercises provided 9/23/2019 and 10/28/2019.    Assessment     Mr Wong's L hip was tight today but he was able to increase flexibility with walking in TM and standing stretches.  He is scheduled to get more botox in one month and his hip tightness will likely reduce.      Luis is progressing well towards his goals.   Pt prognosis is Good.   Pt will continue to benefit from skilled outpatient physical therapy to address the deficits listed in the problem list box on initial evaluation, provide pt/family education and to maximize pt's level of independence in the home and community environment.     Pt's spiritual, cultural and educational needs considered and pt agreeable to plan of care and goals.  Anticipated barriers to physical therapy: Co-morbidities    Goals:  Previous Long Term Goals (8 Weeks):   1. Pt will be able to perform TUG in less than or equal to 17 seconds, safely, without use of AD demonstrating overall improved functional mobility. MET 14.6 7/7/2020  2. Pt will walk > than or equal to 260 feet on the 2 minute walk test indoor with AD with 0 LOB and minimal gait deviations for improved safety in home ambulation and safety. ft without AD with AFO on  7/7/2020  3. Pt will be able to safely perform and tolerate high level ADL's without LOB. MET mowing outdoors  4. Pt will have 0 falls from 11/6/19. one non-balance related fall in  June 5. Pt will have MMT score of 3+/5 in all major ms groups in Left LE.  PARTIALLY MET 7/7/2020 with exception ankle and Hamstrings  6. Pt will ambulate on TM x 6 minutes with use of UE support with 0 LOB at greater than or equal to 1.3 mph. PROGRESSING, NOT MET 1.2 x 8' 7/7/2020  7. Pt will begin some form of community fitness to begin regular and consistent performance of exercise to continue maintenance of gains made in PT. PROGRESSING, NOT MET 3/9/2020  8. Patient will be able to achieve greater than or equal to 18/24 on the Dynamic Gait Index decreasing patient's risk for falling.  MET 7/7/2020    Plan   Progress TM and cybex strengthening    Adia Diaz, PT

## 2020-07-14 NOTE — PATIENT INSTRUCTIONS
Strengthening (Resistive Putty)        Squeeze putty using thumb and all fingers.  Repeat __30__ times. Do ___2-3_ sessions per day.       Copyright © [facility]. All rights reserved.       Roll putty back and forth, being sure to use all fingertips.  Repeat __10__ times. Do ___2-3_ sessions per day.     Lateral Pinch Strengthening (Resistive Putty)        Squeeze between thumb and side of each finger in turn.  Repeat __30__ times. Do __2-3 sessions per day.    Copyright © [facility]. All rights reserved.   MP Flexion (Resistive Putty)        Bending only at large knuckles, press putty down against thumb. Keep fingertips straight.  Repeat ___30_ times. Do ___2-3_ sessions per day.    Copyright © [facility]. All rights reserved.   Palmar Pinch Strengthening (Resistive Putty)      Pinch putty between thumb and each fingertip in turn.  Repeat __30__ times. Do __2-3__ sessions per day.  Extension (Assistive Putty)      Copyright © [facility]. All rights reserved.           COIN FLIP     Place various coins or cotton balls on a table. Try and put as many in your hand that you can.        Elevation (Active)        Shrug shoulders up, breathing in. Relax, breathing out.  Repeat __30__ times. Do __2__ sessions per day.    Copyright © [employer]. All rights reserved.   Adduction (Active)        Maintaining erect posture, draw shoulders back while bringing elbows back and inward.  Repeat ___30_ times. Do _2___ sessions per day.    Copyright © [facility]. All rights reserved.   Patient Instructions         ROM: Flexion - Wand (Supine)        Lie on back holding wand. Raise arms over head.   Repeat 15 times per set. Do 2 sets per session. Do 3-5 sessions per day.     https://[employer] [place]928      Abduction (Side-Lying)        Lie on left side. Raise arm above head. Keep palm forward.  Repeat 15 times per set. Do 2 sets per session. Do 3-5 sessions per day.     https://[employer] [place]/937     Copyright © [employer]. All rights reserved.     External Rotation: Side-Lying  (Dumbbell)        Lie with neck supported, left elbow bent to 90°, forearm across stomach. Raise forearm, keeping elbow at side.  Repeat ___10_ times per set. Do __3__ sets per session. Do __2__ sessions per week. Use _0___ lb weight.      Copyright © [facility]. All rights reserved.   Elevation (Active)        Shrug shoulders up, breathing in. Relax, breathing out.  Repeat __30__ times. Do __2__ sessions per day.    Copyright © [employer]. All rights reserved.   Adduction (Active)        Maintaining erect posture, draw shoulders back while bringing elbows back and inward.  Repeat ___30_ times. Do _2___ sessions per day.    Copyright © [employer]. All rights reserved.   Patient Instructions          Lie on back, arms at side. With assistance, raise left arm up and back over head, keeping elbow straight.  Complete __3_ sets of _10__ repetitions. Perform _2-3__ sessions per day.  ELBOW: Bend and Straighten - Supported        Copyright © [facility]. All rights reserved.   ELBOW: Bicep Curl        Begin with elbow straight and palm facing forward. Bend elbow.  _10__ reps per set, _3__ sets per day, _5__ days per week          Copyright © [employer]. All rights reserved.   Arm Sweep: [place]        Get ON TARGET. Lie on flat up roller, feet on full roller. Keeping arms on floor, sweep out and up beyond head. Stop and stretch as tightness develops.  Hold __3_ seconds. Repeat _10__ times. Do _2-3__ sessions per day.         Copyright © [employer]. All rights reserved

## 2020-07-16 ENCOUNTER — PATIENT OUTREACH (OUTPATIENT)
Dept: ADMINISTRATIVE | Facility: OTHER | Age: 65
End: 2020-07-16

## 2020-07-16 NOTE — PROGRESS NOTES
"  Occupational Therapy Treatment Note     Date: 7/17/2020  Name: Luis Wong  Clinic Number: [id number]    Therapy Diagnosis:   Encounter Diagnoses   Name Primary?    Muscle weakness     Impaired mobility and activities of daily living     Decreased coordination      Physician: Brien Lopez MD     Physician Orders: Eval and treat  Medical Diagnosis: History of stroke     Onset Date: 12/12/2019  Evaluation Date: 7/14/2020  Plan of Care Expiration Period: 9/11/2020  Insurance Authorization period Expiration: 12/31/2020  Date of Return to MD: October 2020  Visit # / Visits Authortized: 1 / 30   FOTO:  UE CVA / 40%    Visit # / Visits authorized: 1 / 30  Time In:11:53 AM  Time Out:12:37 PM  Total Billable Time: 44 minutes    Precautions:  Standard and Fall      Subjective     Pt reports: "After the exercises we did last time, I was able to reach down to tie my shoes"  he was compliant with home exercise program given last session.   Response to previous treatment good  Functional change: able to reach down to tie shoes    Pain: 0/10  Location: N/A    Objective       Luis received therapeutic exercises for 15 minutes including:  Pt completed the following exercises below in order to increase ROM, strength and tolerance of affected UE in order to increase IND and functional use:    Exercises Date 7/17/2020    Visit #2    RPM 6 min 3 min forwards 3 backwards   PROM All planes of movement   Supine Dowel  FF 10x  ER 2/10  Chest press 2/10   Seated trunk twist with red ball 3/10   Biceps 2# 2/10       Luis Wong participated in neuromuscular re-education for 20 minutes:  Pt completed the following exercises below in order to increase ROM, strength and tolerance of affected UE in order to increase IND and functional use:     Exercises Date 3/9/2020   Reaching in low plane 10 cones x2   Reaching across 10 cones x2   Reaching high plane 10 cones x2   Supination cone stacking 3/10       Luis participated in " dynamic functional therapeutic activities to improve functional performance for 9  minutes, including:  Cup to mouth 2/10   Fishing activity 5 fish       Home Exercises and Education Provided     Education provided:   - HEP  - Progress towards goals     Written Home Exercises Provided: Patient instructed to cont prior HEP.  Exercises were reviewed and Luis was able to demonstrate them prior to the end of the session.  Luis demonstrated good  understanding of the HEP provided.   .   See EMR under Patient Instructions for exercises provided prior visit.        Assessment     Pt participated in therapeutic ex, NMR, and therapeutic ax to address UE ROM and UE strength. There was similar PROM since last round of therapy, which demonstrates good maintenance of stretches at home. Pt participated in cone stacking activities in various planes of movement to normalize UE movement and decrease compensatory patterns. Noted progress in strength and ROM in cup to mouth activity and supine dowel FF, ER, and chest press exercises. Main limitations are still decreased AROM due to pain and spasticity. Pt would benefit from skilled OT to continue to progress towards goals and increase functional level of performance.      Luis is progressing well towards his goals and there are no updates to goals at this time. Pt prognosis is Good.     Pt will continue to benefit from skilled outpatient occupational therapy to address the deficits listed in the problem list on initial evaluation provide pt/family education and to maximize pt's level of independence in the home and community environment.     Anticipated barriers to occupational therapy: COVID-19, transportation    Pt's spiritual, cultural and educational needs considered and pt agreeable to plan of care and goals.    Goals:  Short Term Goals:  1) Initiate Hep MET 7/15/2020  2) Pt to increase LUE  strength by 5 # in order to A in self care task of feeding by 4 weeks.  Progressing 7/17/2020  3) Pt will participate in bilateral self-feeding tasks with mod A and AE PRN by 4 weeks  Progressing 7/17/2020  4) Pt to increase LUE AROM by 5 degrees in order to A in UB dressing by 4 weeks.  Progressing 7/17/2020  5) Patient will be able to achieve less than or equal to 35% on the FOTO, demonstrating overall improved functional ability with upper extremity. (self-care category)  Progressing 7/17/2020  6) Pt will increase QuickDASH score by 5% by 4 weeks, demonstrating improved functional ability to participate in household chores.  Progressing 7/17/2020        Long Term Goals:  1) Pt to be IND with HEP in order to maintain ROM and strength needed for self care IND by d/c.  Progressing 7/17/2020  2) Pt to increase LUE  strength to WNL as compared to unaffected extremity in order to open items for self feeding by d/c.  Progressing 7/17/2020  3) Pt will participate in bilateral self-feeding tasks with SVN and AE PRN by d/c.  Progressing 7/17/2020  4) Pt to increase LUE AROM by 10 degrees in order to A in UB dressing by d/c.  Progressing 7/17/2020  5) Patient will be able to achieve less than or equal to 30% on the FOTO, demonstrating overall improved functional ability with upper extremity. (self-care category)  Progressing 7/17/2020  6) Pt will increase QuickDASH score by 10% by d/c, demonstrating improved functional ability to participate in household chores.  Progressing 7/17/2020    Plan     Continue per POC  Updates/Grading for next session: Grade as tolerated      TABITHA GUSTAFSON, OT

## 2020-07-16 NOTE — PROGRESS NOTES
Requested updates within Care Everywhere.  Patient's chart was reviewed for overdue YOEL topics.  Immunizations reconciled.    Orders placed:  Tasked appts:  Labs Linked:

## 2020-07-17 ENCOUNTER — CLINICAL SUPPORT (OUTPATIENT)
Dept: REHABILITATION | Facility: HOSPITAL | Age: 65
End: 2020-07-17
Payer: MEDICARE

## 2020-07-17 DIAGNOSIS — Z78.9 IMPAIRED MOBILITY AND ACTIVITIES OF DAILY LIVING: ICD-10-CM

## 2020-07-17 DIAGNOSIS — R27.8 DECREASED COORDINATION: ICD-10-CM

## 2020-07-17 DIAGNOSIS — Z74.09 IMPAIRED MOBILITY AND ACTIVITIES OF DAILY LIVING: ICD-10-CM

## 2020-07-17 DIAGNOSIS — M62.81 MUSCLE WEAKNESS: ICD-10-CM

## 2020-07-17 PROCEDURE — 97112 NEUROMUSCULAR REEDUCATION: CPT | Mod: PN

## 2020-07-17 PROCEDURE — 97530 THERAPEUTIC ACTIVITIES: CPT | Mod: PN

## 2020-07-17 PROCEDURE — 97110 THERAPEUTIC EXERCISES: CPT | Mod: PN

## 2020-07-20 ENCOUNTER — OFFICE VISIT (OUTPATIENT)
Dept: ORTHOPEDICS | Facility: CLINIC | Age: 65
End: 2020-07-20
Payer: MEDICARE

## 2020-07-20 VITALS — BODY MASS INDEX: 22.22 KG/M2 | HEIGHT: 69 IN | WEIGHT: 150 LBS

## 2020-07-20 DIAGNOSIS — G56.02 LEFT CARPAL TUNNEL SYNDROME: Primary | ICD-10-CM

## 2020-07-20 PROCEDURE — 20526 THER INJECTION CARP TUNNEL: CPT | Mod: LT,S$GLB,, | Performed by: ORTHOPAEDIC SURGERY

## 2020-07-20 PROCEDURE — 99999 PR PBB SHADOW E&M-EST. PATIENT-LVL III: CPT | Mod: PBBFAC,,, | Performed by: ORTHOPAEDIC SURGERY

## 2020-07-20 PROCEDURE — 3008F BODY MASS INDEX DOCD: CPT | Mod: CPTII,S$GLB,, | Performed by: ORTHOPAEDIC SURGERY

## 2020-07-20 PROCEDURE — 3288F FALL RISK ASSESSMENT DOCD: CPT | Mod: CPTII,S$GLB,, | Performed by: ORTHOPAEDIC SURGERY

## 2020-07-20 PROCEDURE — 20526 CARPAL TUNNEL: ICD-10-PCS | Mod: LT,S$GLB,, | Performed by: ORTHOPAEDIC SURGERY

## 2020-07-20 PROCEDURE — 99999 PR PBB SHADOW E&M-EST. PATIENT-LVL III: ICD-10-PCS | Mod: PBBFAC,,, | Performed by: ORTHOPAEDIC SURGERY

## 2020-07-20 PROCEDURE — 99213 OFFICE O/P EST LOW 20 MIN: CPT | Mod: 25,S$GLB,, | Performed by: ORTHOPAEDIC SURGERY

## 2020-07-20 PROCEDURE — 99213 PR OFFICE/OUTPT VISIT, EST, LEVL III, 20-29 MIN: ICD-10-PCS | Mod: 25,S$GLB,, | Performed by: ORTHOPAEDIC SURGERY

## 2020-07-20 PROCEDURE — 3288F PR FALLS RISK ASSESSMENT DOCUMENTED: ICD-10-PCS | Mod: CPTII,S$GLB,, | Performed by: ORTHOPAEDIC SURGERY

## 2020-07-20 PROCEDURE — 1100F PR PT FALLS ASSESS DOC 2+ FALLS/FALL W/INJURY/YR: ICD-10-PCS | Mod: CPTII,S$GLB,, | Performed by: ORTHOPAEDIC SURGERY

## 2020-07-20 PROCEDURE — 3008F PR BODY MASS INDEX (BMI) DOCUMENTED: ICD-10-PCS | Mod: CPTII,S$GLB,, | Performed by: ORTHOPAEDIC SURGERY

## 2020-07-20 PROCEDURE — 1100F PTFALLS ASSESS-DOCD GE2>/YR: CPT | Mod: CPTII,S$GLB,, | Performed by: ORTHOPAEDIC SURGERY

## 2020-07-20 RX ORDER — TRIAMCINOLONE ACETONIDE 40 MG/ML
40 INJECTION, SUSPENSION INTRA-ARTICULAR; INTRAMUSCULAR
Status: DISCONTINUED | OUTPATIENT
Start: 2020-07-20 | End: 2020-07-20 | Stop reason: HOSPADM

## 2020-07-20 RX ADMIN — TRIAMCINOLONE ACETONIDE 40 MG: 40 INJECTION, SUSPENSION INTRA-ARTICULAR; INTRAMUSCULAR at 10:07

## 2020-07-20 NOTE — PROGRESS NOTES
"  Occupational Therapy Treatment Note     Date: 7/21/2020  Name: Luis Wong  Clinic Number: [id number]    Therapy Diagnosis:   Encounter Diagnoses   Name Primary?    Muscle weakness     Impaired mobility and activities of daily living     Decreased coordination      Physician: Brien Lopez MD     Physician Orders: Eval and treat  Medical Diagnosis: History of stroke     Onset Date: 12/12/2019  Evaluation Date: 7/14/2020  Plan of Care Expiration Period: 9/11/2020  Insurance Authorization period Expiration: 12/31/2020  Date of Return to MD: October 2020  Visit # / Visits Authortized: 3 / 30   FOTO:  UE CVA / 40%    Visit # / Visits authorized: 3 / 30  Time In: 9:25 AM  Time Out:10:18 AM  Total Billable Time: 53 min    Precautions:  Standard and Fall      Subjective     Pt reports: "I need to start using my putty"  he was compliant with home exercise program given last session.   Response to previous treatment good  Functional change: able to reach down to tie shoes    Pain: 0/10  Location: N/A    Objective       Luis received therapeutic exercises for 30 minutes including:  Pt completed the following exercises below in order to increase ROM, strength and tolerance of affected UE in order to increase IND and functional use:    Exercises Date 7/21/2020    Visit #3    RPM 6 min 3 min forwards 3 backwards   Towel Stretch 2/10  FF and Rainbows   PROM All planes of movement   Supine Dowel  FF 10x  ER 2/10  Chest press 2/10   Seated trunk twist with yellow ball 3/10   Biceps 2# 2/10   Putty Punch outs       Luis Wong participated in neuromuscular re-education for 23 minutes:  Pt completed the following exercises below in order to increase ROM, strength and tolerance of affected UE in order to increase IND and functional use:     Exercises Date 7/21/2020   Reaching in low plane 10 cones x2   Reaching across 10 cones x2   Putty Weightbearing/flattening       Home Exercises and Education Provided "     Education provided:   - HEP  - Progress towards goals     Written Home Exercises Provided: Patient instructed to cont prior HEP.  Exercises were reviewed and Luis was able to demonstrate them prior to the end of the session.  Luis demonstrated good  understanding of the HEP provided.   .   See EMR under Patient Instructions for exercises provided prior visit.        Assessment     Pt participated in therapeutic ex and NMR to address UE ROM and UE strength. Pt demonstrates steady progress with PROM and UE exercises with limitations mainly still due to pain and spasticity. Pt participated in cone stacking activities in various planes of movement to normalize UE movement and decrease compensatory patterns. Hand  strength exercises were performed today with good tolerance and limitations due to inability to fully extend fingers actively. Pt would benefit from skilled OT to continue to progress towards goals and increase functional level of performance.    Luis is progressing well towards his goals and there are no updates to goals at this time. Pt prognosis is Good.     Pt will continue to benefit from skilled outpatient occupational therapy to address the deficits listed in the problem list on initial evaluation provide pt/family education and to maximize pt's level of independence in the home and community environment.     Anticipated barriers to occupational therapy: COVID-19, transportation    Pt's spiritual, cultural and educational needs considered and pt agreeable to plan of care and goals.    Goals:  Short Term Goals:  1) Initiate Hep MET 7/15/2020  2) Pt to increase LUE  strength by 5 # in order to A in self care task of feeding by 4 weeks. Progressing 7/21/2020  3) Pt will participate in bilateral self-feeding tasks with mod A and AE PRN by 4 weeks  Progressing 7/21/2020  4) Pt to increase LUE AROM by 5 degrees in order to A in UB dressing by 4 weeks.  Progressing 7/21/2020  5) Patient will  be able to achieve less than or equal to 35% on the FOTO, demonstrating overall improved functional ability with upper extremity. (self-care category)  Progressing 7/21/2020  6) Pt will increase QuickDASH score by 5% by 4 weeks, demonstrating improved functional ability to participate in household chores.  Progressing 7/21/2020        Long Term Goals:  1) Pt to be IND with HEP in order to maintain ROM and strength needed for self care IND by d/c.  Progressing 7/21/2020  2) Pt to increase LUE  strength to WNL as compared to unaffected extremity in order to open items for self feeding by d/c.  Progressing 7/21/2020  3) Pt will participate in bilateral self-feeding tasks with SVN and AE PRN by d/c.  Progressing 7/21/2020  4) Pt to increase LUE AROM by 10 degrees in order to A in UB dressing by d/c.  Progressing 7/21/2020  5) Patient will be able to achieve less than or equal to 30% on the FOTO, demonstrating overall improved functional ability with upper extremity. (self-care category)  Progressing 7/21/2020  6) Pt will increase QuickDASH score by 10% by d/c, demonstrating improved functional ability to participate in household chores.  Progressing 7/21/2020    Plan     Continue per POC  Updates/Grading for next session: Grade as tolerated      TABITHA GUSTAFSON, OT

## 2020-07-20 NOTE — PROCEDURES
Carpal Tunnel    Date/Time: 7/20/2020 10:30 AM  Performed by: Erica Myles PA-C  Authorized by: Erica Myles PA-C     Medications:  40 mg triamcinolone acetonide 40 mg/mL    PROCEDURE:  I have explained the risks, benefits, and alternatives of the procedure in detail.  The patient voices understanding and all questions have been answered.  The patient agrees to proceed as planned. So after I performed a sterile prep of the skin in the normal fashion the left carpal tunnel is injected from the volar approach using a 25 gauge needle with a combination of 0.5 cc 1% plain xylocaine and 20 mg of Kenolog. The patient is cautioned and immediate relief of pain is secondary to the local anesthetic and will be temporary.  After the anesthetic wears off there may be a increase in pain that may last for a few hours or a few days and they should use ice to help alleviate this flair up of pain. Patient tolerated the procedure well.

## 2020-07-20 NOTE — PROGRESS NOTES
Physical Therapy Daily Treatment Note     Name: Luis Wong  Clinic Number: [id number]    Therapy Diagnosis:   Encounter Diagnoses   Name Primary?    Impaired functional mobility, balance, gait, and endurance     Decreased strength of lower extremity      Physician: Carla Altman PA-C     Visit Date: 7/21/2020    Physician Orders: PT Eval and Treat  Medical Diagnosis from Referral: I63.9 (ICD-10-CM) - CVA (cerebral vascular accident)  Evaluation Date: 9/13/2019  Authorization Period Expiration: 12/31/2020  Plan of Care Expiration: 7/30/2020  Visit # / Visits authorized: 28/50 (50 prior visits)  FOTO: next at d/c    Time In:  1015 AM  Time Out: 1100 AM  Total Billable Time: 45 minutes (1 gait, 2TE)    Precautions: Standard; history of of CVA's    Subjective     Pt reports: he likes to focus on strength training.   Pt was compliant with home exercise program.   Response to previous treatment: No adverse response  Functional change: Pt has been mowing lawn again and has found that going up and down his stairs at home has gotten easier    Objective     Pt performed gait training on treadmill x 10 min total consisting of forward gait x 8' at 1.2 speed setting with B UE support.Training performed for improved endurance, functional mobility, and mechanics during gait. AFO worn throughout session    Pt performed therapeutic exercise to increase flexibility and strength x 35' total consisting of:  -- gastroc stretches     3 x 30'' B on slant board  -- hamstring stretches    3 x 30'' B at TM  -- standing ER stretch     2 x 30'' B at TM    -- knee flexion cybex                                                   2 x 15 3 plates  [BLE concentric; LLE eccentric lowering)  -- knee extension cybex                                              2 x 15 (40lbs) [BLE concentric; LLE eccentric lowering; 1 plate+ 5lbs]  -- standing hip abduction    2 x 10 1.5 plates B LE  -- standing hip extension    2 x  10 1.5 plates B LE      Home Exercises Provided and Patient Education Provided     Education provided:   - Cont HEP   - Proper body mechanics during exercise     Written Home Exercises Provided: not today  Exercises were reviewed and Luis was able to demonstrate them prior to the end of the session.  Luis demonstrated good  understanding of the education provided.     See EMR under Patient Instructions for exercises provided 9/23/2019 and 10/28/2019.    Assessment     Mr Fulton is now safe and competent with using exercise eqiupment and TM for future gym fitness. He is showing that he is reaching max potential in PT at this time.       Luis is progressing well towards his goals.   Pt prognosis is Good.   Pt will continue to benefit from skilled outpatient physical therapy to address the deficits listed in the problem list box on initial evaluation, provide pt/family education and to maximize pt's level of independence in the home and community environment.     Pt's spiritual, cultural and educational needs considered and pt agreeable to plan of care and goals.  Anticipated barriers to physical therapy: Co-morbidities    Goals:  Previous Long Term Goals (8 Weeks):   1. Pt will be able to perform TUG in less than or equal to 17 seconds, safely, without use of AD demonstrating overall improved functional mobility. MET 14.6 7/7/2020  2. Pt will walk > than or equal to 260 feet on the 2 minute walk test indoor with AD with 0 LOB and minimal gait deviations for improved safety in home ambulation and safety. ft without AD with AFO on  7/7/2020  3. Pt will be able to safely perform and tolerate high level ADL's without LOB. MET mowing outdoors  4. Pt will have 0 falls from 11/6/19. one non-balance related fall in June 5. Pt will have MMT score of 3+/5 in all major ms groups in Left LE.  PARTIALLY MET 7/7/2020 with exception ankle and Hamstrings  6. Pt will ambulate on TM x 6 minutes with use of UE support  with 0 LOB at greater than or equal to 1.3 mph. PROGRESSING, NOT MET 1.2 x 8' 7/7/2020  7. Pt will begin some form of community fitness to begin regular and consistent performance of exercise to continue maintenance of gains made in PT. PROGRESSING, NOT MET 3/9/2020  8. Patient will be able to achieve greater than or equal to 18/24 on the Dynamic Gait Index decreasing patient's risk for falling.  MET 7/7/2020    Plan   D/c next week 7/28/2020  Progress TM and cybex strengthening  -- Leg press                                                                 3 x 10 B 7.5 plates                                                                                      3 x 10 each LE 5.5 plates  Adia Diaz, PT

## 2020-07-20 NOTE — PROGRESS NOTES
Subjective:      Patient ID: Luis Wong is a 64 y.o. male.    Chief Complaint: Follow-up of the Left Hand and Follow-up of the Right Hand      HPI: Luis Wong is here in follow-up of bilateral carpal tunnel syndrome. He is about 5 months s/p right carpal tunnel release and doing very well. Numbness and tingling is resolved. However, he continues to have left sided symptoms. He was last seen and treated by Dr. Dutton with CSI of the left carpal tunnel about 2 months ago. Injection provided some relief but symptoms have returned. Today, symptoms are worse in the small and ring finger.     Past Medical History:   Diagnosis Date    Aneurysm 10/30/2012    Diabetes mellitus     Fever blister     Herpes infection     Hypertension     ICH (intracerebral hemorrhage)     Mixed hyperlipidemia 9/5/2013    Nontraumatic thalamic hemorrhage 8/31/2016    JAQUAN (obstructive sleep apnea)     Right-sided lacunar stroke 9/11/2014    SDH (subdural hematoma)     Special screening for malignant neoplasms, colon     Stroke        Current Outpatient Medications:     aspirin (ECOTRIN) 81 MG EC tablet, TAKE 1 TABLET BY MOUTH EVERY DAY, Disp: 30 tablet, Rfl: 11    atorvastatin (LIPITOR) 40 MG tablet, Take 1 tablet (40 mg total) by mouth once daily., Disp: 90 tablet, Rfl: 3    clopidogrel (PLAVIX) 75 mg tablet, Take 1 tablet (75 mg total) by mouth once daily., Disp: 90 tablet, Rfl: 3    diclofenac sodium (VOLTAREN) 1 % Gel, , Disp: , Rfl:     hydroCHLOROthiazide (MICROZIDE) 12.5 mg capsule, TAKE 1 CAPSULE BY MOUTH EVERY DAY, Disp: 30 capsule, Rfl: 11    metFORMIN (GLUCOPHAGE) 500 MG tablet, TAKE 1 TABLET BY MOUTH EVERY DAY WITH BREAKFAST, Disp: 30 tablet, Rfl: 11    NIFEdipine (PROCARDIA-XL) 30 MG (OSM) 24 hr tablet, TAKE 1 TABLET BY MOUTH EVERY DAY, Disp: 90 tablet, Rfl: 3    potassium chloride (MICRO-K) 8 mEq CpSR, TAKE 1 CAPSULE (8 MEQ TOTAL) BY MOUTH ONCE DAILY., Disp: 30 capsule, Rfl: 11  Review of patient's  "allergies indicates:  No Known Allergies    Ht 5' 9" (1.753 m)   Wt 68 kg (150 lb)   BMI 22.15 kg/m²     Review of Systems   Constitution: Negative for chills and fever.   Cardiovascular: Negative for chest pain and palpitations.   Respiratory: Negative for shortness of breath and wheezing.    Skin: Negative for poor wound healing and rash.   Musculoskeletal: Positive for muscle weakness (Are related to previous strokes). Negative for joint pain.   Gastrointestinal: Negative for nausea and vomiting.   Genitourinary: Negative for dysuria and hematuria.   Neurological: Positive for numbness and paresthesias. Negative for seizures and tremors.   Psychiatric/Behavioral: Negative for altered mental status.   Allergic/Immunologic: Negative for environmental allergies and persistent infections.         Objective:    Ortho Exam       Right upper extremity:  Significant for very well-healed carpal tunnel incision.  No tenderness.  Range of motion wrist and fingers full.  Sensation intact.  Pulses present.  Cap refill brisk.  Left upper extremity:  Strength, pronation supination, and extension of the left arm is limited secondary to previous strokes.  Positive Tinel's at the wrist.  Mildly positive Tinel's at the elbow.  Decreased sensation the hand.  Range of motion fingers limited, again this is a chronic problem.  Pulses present.  Cap refill brisk.  GEN: Well developed, well nourished female. AAOX3. No acute distress.   Normocephalic, atraumatic.   TORSTEN  Breathing unlabored.  Mood and affect appropriate.     Assessment:     Imaging:  No new imaging        1. Left carpal tunnel syndrome          Plan:       I explained my concern for ulnar tunnel syndrome.   Previous EMG negative for ulnar latency.  If symptoms continue predominantly in the ulnar distribution, may consider repeat EMG.   He has done well with previous injections and would like this repeated today.   Restart night splinting.   Consider surgical release in " the future.   Orders Placed This Encounter    Carpal Tunnel     Follow up in about 4 weeks (around 8/17/2020).

## 2020-07-21 ENCOUNTER — CLINICAL SUPPORT (OUTPATIENT)
Dept: REHABILITATION | Facility: HOSPITAL | Age: 65
End: 2020-07-21
Payer: MEDICARE

## 2020-07-21 DIAGNOSIS — R29.898 DECREASED STRENGTH OF LOWER EXTREMITY: ICD-10-CM

## 2020-07-21 DIAGNOSIS — Z74.09 IMPAIRED MOBILITY AND ACTIVITIES OF DAILY LIVING: ICD-10-CM

## 2020-07-21 DIAGNOSIS — R27.8 DECREASED COORDINATION: ICD-10-CM

## 2020-07-21 DIAGNOSIS — Z74.09 IMPAIRED FUNCTIONAL MOBILITY, BALANCE, GAIT, AND ENDURANCE: ICD-10-CM

## 2020-07-21 DIAGNOSIS — Z78.9 IMPAIRED MOBILITY AND ACTIVITIES OF DAILY LIVING: ICD-10-CM

## 2020-07-21 DIAGNOSIS — M62.81 MUSCLE WEAKNESS: ICD-10-CM

## 2020-07-21 PROCEDURE — 97116 GAIT TRAINING THERAPY: CPT | Mod: PN

## 2020-07-21 PROCEDURE — 97110 THERAPEUTIC EXERCISES: CPT | Mod: PN

## 2020-07-21 PROCEDURE — 97112 NEUROMUSCULAR REEDUCATION: CPT | Mod: PN

## 2020-07-22 NOTE — PROGRESS NOTES
"  Occupational Therapy Treatment Note     Date: 7/24/2020  Name: Luis Wong  Clinic Number: [id number]    Therapy Diagnosis:   Encounter Diagnoses   Name Primary?    Muscle weakness     Impaired mobility and activities of daily living     Decreased coordination      Physician: Brien Lopez MD     Physician Orders: Eval and treat  Medical Diagnosis: History of stroke     Onset Date: 12/12/2019  Evaluation Date: 7/14/2020  Plan of Care Expiration Period: 9/11/2020  Insurance Authorization period Expiration: 12/31/2020  Date of Return to MD: October 2020  Visit # / Visits Authortized: 4 / 30   FOTO:  UE CVA / 40%    Time In: 10:25 AM  Time Out: 11:20 AM  Total Billable Time: 55 min    Precautions:  Standard and Fall      Subjective     Pt reports: "I used to not be able to do this at all" - referring to an exercise  he was compliant with home exercise program given last session.   Response to previous treatment good  Functional change: able to reach down to tie shoes    Pain: 0  Location: N/A    Objective     Luis received therapeutic exercises for 25 minutes including:  Pt completed the following exercises below in order to increase ROM, strength and tolerance of affected UE in order to increase IND and functional use:    Exercises Date 7/24/2020    Visit #4    RPM 6 min 3 min forwards 3 backwards   Towel Stretch 2/10  FF and Rainbows   PROM All planes of movement   Supine Dowel  FF 10x  ER 2/10   5# dowel Chest press 2/15   Seated trunk twist with yellow ball 3/10   Biceps 2# 2/10   Supination 3# 2/10       Luis Wong participated in neuromuscular re-education for 30 minutes:  Pt completed the following exercises below in order to increase ROM, strength and tolerance of affected UE in order to increase IND and functional use:     Exercises Date 7/24/2020   Reaching in low plane 10 cones x2   Reaching across 10 cones x2   Black Gripper 2 yellow, 2 red 2/10       Home Exercises and Education " Provided     Education provided:   - HEP  - Progress towards goals     Written Home Exercises Provided: Patient instructed to cont prior HEP.  Exercises were reviewed and Luis was able to demonstrate them prior to the end of the session.  Luis demonstrated good  understanding of the HEP provided.   .   See EMR under Patient Instructions for exercises provided prior visit.        Assessment     Pt participated in therapeutic ex and NMR to address UE ROM and UE strength to participate in ADLs and IADLs. Pt demonstrates steady progress with PROM and UE exercises with limitations mainly still due to pain and spasticity. Pt participated in cone stacking activities in various planes of movement to normalize UE movement and decrease compensatory patterns. Pt demonstrates good progress with exercises through increased resistance and increased ROM compared to previous sessions. Hand  strength exercises were performed today with good tolerance, but still demonstrates limited control of hand grasp due to spasticity. Pt would benefit from skilled OT to continue to progress towards goals and increase functional level of performance.    Luis is progressing well towards his goals and there are no updates to goals at this time. Pt prognosis is Good.     Pt will continue to benefit from skilled outpatient occupational therapy to address the deficits listed in the problem list on initial evaluation provide pt/family education and to maximize pt's level of independence in the home and community environment.     Anticipated barriers to occupational therapy: COVID-19, transportation    Pt's spiritual, cultural and educational needs considered and pt agreeable to plan of care and goals.    Goals:  Short Term Goals:  1) Initiate Hep MET 7/15/2020  2) Pt to increase LUE  strength by 5 # in order to A in self care task of feeding by 4 weeks. Progressing 7/24/2020  3) Pt will participate in bilateral self-feeding tasks with mod  A and AE PRN by 4 weeks  Progressing 7/24/2020  4) Pt to increase LUE AROM by 5 degrees in order to A in UB dressing by 4 weeks.  Progressing 7/24/2020  5) Patient will be able to achieve less than or equal to 35% on the FOTO, demonstrating overall improved functional ability with upper extremity. (self-care category)  Progressing 7/24/2020  6) Pt will increase QuickDASH score by 5% by 4 weeks, demonstrating improved functional ability to participate in household chores.  Progressing 7/24/2020        Long Term Goals:  1) Pt to be IND with HEP in order to maintain ROM and strength needed for self care IND by d/c.  Progressing 7/24/2020  2) Pt to increase LUE  strength to WNL as compared to unaffected extremity in order to open items for self feeding by d/c.  Progressing 7/24/2020  3) Pt will participate in bilateral self-feeding tasks with SVN and AE PRN by d/c.  Progressing 7/24/2020  4) Pt to increase LUE AROM by 10 degrees in order to A in UB dressing by d/c.  Progressing 7/24/2020  5) Patient will be able to achieve less than or equal to 30% on the FOTO, demonstrating overall improved functional ability with upper extremity. (self-care category)  Progressing 7/24/2020  6) Pt will increase QuickDASH score by 10% by d/c, demonstrating improved functional ability to participate in household chores.  Progressing 7/24/2020    Plan     Continue per POC  Updates/Grading for next session: Grade as tolerated    TABITHA AN, OT

## 2020-07-24 ENCOUNTER — CLINICAL SUPPORT (OUTPATIENT)
Dept: REHABILITATION | Facility: HOSPITAL | Age: 65
End: 2020-07-24
Payer: MEDICARE

## 2020-07-24 DIAGNOSIS — Z74.09 IMPAIRED MOBILITY AND ACTIVITIES OF DAILY LIVING: ICD-10-CM

## 2020-07-24 DIAGNOSIS — Z78.9 IMPAIRED MOBILITY AND ACTIVITIES OF DAILY LIVING: ICD-10-CM

## 2020-07-24 DIAGNOSIS — R27.8 DECREASED COORDINATION: ICD-10-CM

## 2020-07-24 DIAGNOSIS — M62.81 MUSCLE WEAKNESS: ICD-10-CM

## 2020-07-24 PROCEDURE — 97112 NEUROMUSCULAR REEDUCATION: CPT | Mod: PN

## 2020-07-24 PROCEDURE — 97110 THERAPEUTIC EXERCISES: CPT | Mod: PN

## 2020-07-27 NOTE — PROGRESS NOTES
"  Occupational Therapy Treatment Note     Date: 7/28/2020  Name: Luis Wong  Clinic Number: [id number]    Therapy Diagnosis:   Encounter Diagnoses   Name Primary?    Muscle weakness     Impaired mobility and activities of daily living     Decreased coordination      Physician: Brien Lopez MD     Physician Orders: Eval and treat  Medical Diagnosis: History of stroke     Onset Date: 12/12/2019  Evaluation Date: 7/14/2020  Plan of Care Expiration Period: 9/11/2020  Insurance Authorization period Expiration: 12/31/2020  Date of Return to MD: October 2020  Visit # / Visits Authortized: 5 / 30   FOTO:  UE CVA / 40%    Time In: 9:26 AM  Time Out: 10:17 AM  Total Billable Time: 51 min    Precautions:  Standard and Fall      Subjective     Pt reports: "I used not be able to do this"  he was compliant with home exercise program given last session.   Response to previous treatment good  Functional change: able to do more functional activities with affected arm    Pain: 0  Location: N/A    Objective     Luis received therapeutic exercises for 20 minutes including:  Pt completed the following exercises below in order to increase ROM, strength and tolerance of affected UE in order to increase IND and functional use:    Exercises Date 7/28/2020    Visit #5    RPM 6 min 3 min forwards 3 backwards   PROM All planes of movement   Supine Dowel  FF 10x  ER 2/10   7# dowel Chest press 2/15   Seated trunk twist with yellow ball 3/10   Biceps 2# 2/10   Supination cones 2/10     Luis Wong participated in neuromuscular re-education for 26 minutes:  Pt completed the following exercises below in order to increase ROM, strength and tolerance of affected UE in order to increase IND and functional use:     Exercises Date 7/28/2020   Reaching in low plane 10 cones    Reaching across 10 cones   Reaching high 10 cones   Black Gripper 2 yellow, 2 red 10x   Peach putty  -squeezes  -PVC punch outs    15x     David Wong " participated in therapeutic activities for 5 minutes:  Cup to mouth with water 10x       Home Exercises and Education Provided     Education provided:   - HEP  - Progress towards goals     Written Home Exercises Provided: Patient instructed to cont prior HEP.  Exercises were reviewed and Luis was able to demonstrate them prior to the end of the session.  Luis demonstrated good  understanding of the HEP provided.   .   See EMR under Patient Instructions for exercises provided prior visit.        Assessment     Pt participated in therapeutic ex and NMR to address UE ROM and UE strength to participate in ADLs and IADLs. Pt demonstrates steady progress with PROM and UE exercises with limitations mainly still due to pain and spasticity. Pt participated in cone stacking activities in various planes of movement to normalize UE movement and decrease compensatory patterns. Pt demonstrates good progress with exercises through increased resistance and increased ROM compared to previous sessions. Hand  strength exercises were performed today with good tolerance, but still demonstrates limited control of hand grasp due to spasticity. Pt would benefit from skilled OT to continue to progress towards goals and increase functional level of performance.    Luis is progressing well towards his goals and there are no updates to goals at this time. Pt prognosis is Good.     Pt will continue to benefit from skilled outpatient occupational therapy to address the deficits listed in the problem list on initial evaluation provide pt/family education and to maximize pt's level of independence in the home and community environment.     Anticipated barriers to occupational therapy: COVID-19, transportation    Pt's spiritual, cultural and educational needs considered and pt agreeable to plan of care and goals.    Goals:  Short Term Goals:  1) Initiate Hep MET 7/15/2020  2) Pt to increase LUE  strength by 5 # in order to A in self  care task of feeding by 4 weeks. Progressing 7/28/2020  3) Pt will participate in bilateral self-feeding tasks with mod A and AE PRN by 4 weeks  Progressing 7/28/2020  4) Pt to increase LUE AROM by 5 degrees in order to A in UB dressing by 4 weeks.  Progressing 7/28/2020  5) Patient will be able to achieve less than or equal to 35% on the FOTO, demonstrating overall improved functional ability with upper extremity. (self-care category)  Progressing 7/28/2020  6) Pt will increase QuickDASH score by 5% by 4 weeks, demonstrating improved functional ability to participate in household chores.  Progressing 7/28/2020        Long Term Goals:  1) Pt to be IND with HEP in order to maintain ROM and strength needed for self care IND by d/c.  Progressing 7/28/2020  2) Pt to increase LUE  strength to WNL as compared to unaffected extremity in order to open items for self feeding by d/c.  Progressing 7/28/2020  3) Pt will participate in bilateral self-feeding tasks with SVN and AE PRN by d/c.  Progressing 7/28/2020  4) Pt to increase LUE AROM by 10 degrees in order to A in UB dressing by d/c.  Progressing 7/28/2020  5) Patient will be able to achieve less than or equal to 30% on the FOTO, demonstrating overall improved functional ability with upper extremity. (self-care category)  Progressing 7/28/2020  6) Pt will increase QuickDASH score by 10% by d/c, demonstrating improved functional ability to participate in household chores.  Progressing 7/28/2020    Plan     Continue per POC  Updates/Grading for next session: Grade as tolerated    TABITHA AN, OT

## 2020-07-28 ENCOUNTER — CLINICAL SUPPORT (OUTPATIENT)
Dept: REHABILITATION | Facility: HOSPITAL | Age: 65
End: 2020-07-28
Payer: MEDICARE

## 2020-07-28 DIAGNOSIS — M62.81 MUSCLE WEAKNESS: ICD-10-CM

## 2020-07-28 DIAGNOSIS — R27.8 DECREASED COORDINATION: ICD-10-CM

## 2020-07-28 DIAGNOSIS — R29.898 DECREASED STRENGTH OF LOWER EXTREMITY: ICD-10-CM

## 2020-07-28 DIAGNOSIS — Z74.09 IMPAIRED MOBILITY AND ACTIVITIES OF DAILY LIVING: ICD-10-CM

## 2020-07-28 DIAGNOSIS — Z78.9 IMPAIRED MOBILITY AND ACTIVITIES OF DAILY LIVING: ICD-10-CM

## 2020-07-28 DIAGNOSIS — Z74.09 IMPAIRED FUNCTIONAL MOBILITY, BALANCE, GAIT, AND ENDURANCE: ICD-10-CM

## 2020-07-28 PROCEDURE — 97110 THERAPEUTIC EXERCISES: CPT | Mod: PN

## 2020-07-28 PROCEDURE — 97112 NEUROMUSCULAR REEDUCATION: CPT | Mod: PN

## 2020-07-28 PROCEDURE — 97116 GAIT TRAINING THERAPY: CPT | Mod: PN

## 2020-07-28 NOTE — PROGRESS NOTES
Physical Therapy Daily Treatment Note/ Discharge Summary     Name: Luis Wong  Clinic Number: [id number]    Therapy Diagnosis:   Encounter Diagnoses   Name Primary?    Impaired functional mobility, balance, gait, and endurance     Decreased strength of lower extremity      Physician: Carla Altman PA-C     Visit Date: 7/28/2020    Physician Orders: PT Eval and Treat  Medical Diagnosis from Referral: I63.9 (ICD-10-CM) - CVA (cerebral vascular accident)  Evaluation Date: 9/13/2019  Authorization Period Expiration: 12/31/2020  Plan of Care Expiration: 7/30/2020  Visit # / Visits authorized: 29/50 (50 prior visits)  FOTO: done today    Time In:  1020 AM  Time Out: 1100 AM  Total Billable Time: 40 minutes (1 gait, 2TE)    Precautions: Standard; history of of CVA's    Subjective     Pt reports: he will seriously consider going to the [facility] [place].   Pt was compliant with home exercise program.   Response to previous treatment: No adverse response  Functional change: Pt has been mowing lawn again and has found that going up and down his stairs at home has gotten easier    Objective     Pt performed gait training on treadmill x 8 min total consisting of forward gait x 8' at 1.3 speed setting with B UE support.Training performed for improved endurance, functional mobility, and mechanics during gait. AFO worn throughout session    Lower Extremity Strength     RLE LLE   Hip Flexion: 5/5 5/5   Hip Extension:  5/5 4/5   Hip Abduction: 5/5 4+/5   Hip Adduction: 4+/5 4+/5   Knee Extension: 5/5 5/5   Knee Flexion: 5/5 3-/5 in prone   Ankle Dorsiflexion: 5/5 4-/5   Ankle Plantarflexion: 5/5 4-/5   Ankle Inversion: 5/5 5/5   Ankle Eversion: 5/5 3+/5     -- Leg press                                                                 3 x 10 B 7.5 plates                                                                                      3 x 10 each LE 5.5    Review of gym fitness HEP  issued today    Home Exercises Provided and Patient Education Provided     Education provided:   - Cont HEP   - Proper body mechanics during exercise     Written Home Exercises Provided:  today  Exercises were reviewed and Luis was able to demonstrate them prior to the end of the session.  Luis demonstrated good  understanding of the education provided.     See EMR under Patient Instructions for exercises provided 9/23/2019 and 10/28/2019, and 7/29/2020    Assessment     Pt has met most goals and he has progressed well to improved mobility in and out of the home.  He still has significant L LE spasticity with weakness. He has increased gait speed on TM and over ground with increased balance and not falls due to balance impairments.  He has reached max potential at this time.       Luis is progressing well towards his goals.   Pt prognosis is Good.   Pt will continue to benefit from skilled outpatient physical therapy to address the deficits listed in the problem list box on initial evaluation, provide pt/family education and to maximize pt's level of independence in the home and community environment.     Pt's spiritual, cultural and educational needs considered and pt agreeable to plan of care and goals.  Anticipated barriers to physical therapy: Co-morbidities    Goals:  Previous Long Term Goals (8 Weeks):   1. Pt will be able to perform TUG in less than or equal to 17 seconds, safely, without use of AD demonstrating overall improved functional mobility. MET 14.6 7/7/2020  2. Pt will walk > than or equal to 260 feet on the 2 minute walk test indoor with AD with 0 LOB and minimal gait deviations for improved safety in home ambulation and safety. ft without AD with AFO on  7/7/2020  3. Pt will be able to safely perform and tolerate high level ADL's without LOB. MET mowing outdoors  4. Pt will have 0 falls from 11/6/19. one non-balance related fall in June 5. Pt will have MMT score of 3+/5 in all major  ms groups in Left LE.  PARTIALLY MET 7/7/2020 with Hamstrings  6. Pt will ambulate on TM x 6 minutes with use of UE support with 0 LOB at greater than or equal to 1.3 mph.  MET 1.3 x 8' 7/28/2020  7. Pt will begin some form of community fitness to begin regular and consistent performance of exercise to continue maintenance of gains made in PT. PROGRESSING, NOT MET 3/9/2020  8. Patient will be able to achieve greater than or equal to 18/24 on the Dynamic Gait Index decreasing patient's risk for falling.  MET 7/7/2020    Plan   D/c today    Adia Diaz, PT

## 2020-07-30 NOTE — PROGRESS NOTES
"  Occupational Therapy Treatment Note     Date: 7/31/2020  Name: Luis Wong  Clinic Number: [id number]    Therapy Diagnosis:   Encounter Diagnoses   Name Primary?    Muscle weakness     Impaired mobility and activities of daily living     Decreased coordination      Physician: Brien Lopez MD     Physician Orders: Eval and treat  Medical Diagnosis: History of stroke     Onset Date: 12/12/2019  Evaluation Date: 7/14/2020  Plan of Care Expiration Period: 9/11/2020  Insurance Authorization period Expiration: 12/31/2020  Date of Return to MD: October 2020  Visit # / Visits Authortized: 6 / 30   FOTO:  UE CVA / 40%    Time In: 11:08 AM  Time Out: 12:01 PM  Total Billable Time: 53 min    Precautions:  Standard and Fall      Subjective     Pt reports: "I'm going to cut my grass this weekend"  he was compliant with home exercise program given last session.   Response to previous treatment good  Functional change: able to do more functional activities with affected arm    Pain: 0  Location: N/A    Objective     Luis received therapeutic exercises for 25 minutes including:  Pt completed the following exercises below in order to increase ROM, strength and tolerance of affected UE in order to increase IND and functional use:    Exercises Date 7/31/2020    Visit #6    RPM 6 min 3 min forwards 3 backwards   PROM All planes of movement   Supine Dowel  FF 2/10  ER 2/10   7# dowel Chest press 2/15   Seated trunk twist with blue ball 3/10   Biceps 2# 2/10   Peach putty  -squeezes 15x     Luis Wong participated in neuromuscular re-education for 28 minutes:  Pt completed the following exercises below in order to increase ROM, strength and tolerance of affected UE in order to increase IND and functional use:     Exercises Date 7/31/2020   Reaching in low plane 2/10   Reaching across 2/10   Reaching high 10 cones   Supination cones 2/10   Supination wheel 2/10   Putty weightbearing 2 flat       Home Exercises and " Education Provided     Education provided:   - HEP  - Progress towards goals     Written Home Exercises Provided: Patient instructed to cont prior HEP.  Exercises were reviewed and Luis was able to demonstrate them prior to the end of the session.  Luis demonstrated good  understanding of the HEP provided.   .   See EMR under Patient Instructions for exercises provided prior visit.        Assessment     Pt participated in therapeutic ex and NMR to address UE ROM and UE strength to participate in ADLs and IADLs. Pt demonstrates steady progress with PROM and UE exercises with limitations mainly still due to pain and spasticity. Increased resistance and weight with chest press and trunk twists today. Pt participated in cone stacking activities in various planes of movement to normalize UE movement and decrease compensatory patterns. Weightbearing exercises were also performed with good tolerance, but limited by hand spasticity and inability to straighten LUE without A. Focused on supination activities today with good progress towards functional movement. Pt would benefit from skilled OT to continue to progress towards goals and increase functional level of performance.    Luis is progressing well towards his goals and there are no updates to goals at this time. Pt prognosis is Good.     Pt will continue to benefit from skilled outpatient occupational therapy to address the deficits listed in the problem list on initial evaluation provide pt/family education and to maximize pt's level of independence in the home and community environment.     Anticipated barriers to occupational therapy: COVID-19, transportation    Pt's spiritual, cultural and educational needs considered and pt agreeable to plan of care and goals.    Goals:  Short Term Goals:  1) Initiate Hep MET 7/15/2020  2) Pt to increase LUE  strength by 5 # in order to A in self care task of feeding by 4 weeks. Progressing 7/31/2020  3) Pt will  participate in bilateral self-feeding tasks with mod A and AE PRN by 4 weeks  Progressing 7/31/2020  4) Pt to increase LUE AROM by 5 degrees in order to A in UB dressing by 4 weeks.  Progressing 7/31/2020  5) Patient will be able to achieve less than or equal to 35% on the FOTO, demonstrating overall improved functional ability with upper extremity. (self-care category)  Progressing 7/31/2020  6) Pt will increase QuickDASH score by 5% by 4 weeks, demonstrating improved functional ability to participate in household chores.  Progressing 7/31/2020        Long Term Goals:  1) Pt to be IND with HEP in order to maintain ROM and strength needed for self care IND by d/c.  Progressing 7/31/2020  2) Pt to increase LUE  strength to WNL as compared to unaffected extremity in order to open items for self feeding by d/c.  Progressing 7/31/2020  3) Pt will participate in bilateral self-feeding tasks with SVN and AE PRN by d/c.  Progressing 7/31/2020  4) Pt to increase LUE AROM by 10 degrees in order to A in UB dressing by d/c.  Progressing 7/31/2020  5) Patient will be able to achieve less than or equal to 30% on the FOTO, demonstrating overall improved functional ability with upper extremity. (self-care category)  Progressing 7/31/2020  6) Pt will increase QuickDASH score by 10% by d/c, demonstrating improved functional ability to participate in household chores.  Progressing 7/31/2020    Plan     Continue per POC  Updates/Grading for next session: Grade as tolerated    TABITHA AN, OT

## 2020-07-31 ENCOUNTER — CLINICAL SUPPORT (OUTPATIENT)
Dept: REHABILITATION | Facility: HOSPITAL | Age: 65
End: 2020-07-31
Payer: MEDICARE

## 2020-07-31 DIAGNOSIS — R27.8 DECREASED COORDINATION: ICD-10-CM

## 2020-07-31 DIAGNOSIS — M62.81 MUSCLE WEAKNESS: ICD-10-CM

## 2020-07-31 DIAGNOSIS — Z74.09 IMPAIRED MOBILITY AND ACTIVITIES OF DAILY LIVING: ICD-10-CM

## 2020-07-31 DIAGNOSIS — Z78.9 IMPAIRED MOBILITY AND ACTIVITIES OF DAILY LIVING: ICD-10-CM

## 2020-07-31 PROCEDURE — 97110 THERAPEUTIC EXERCISES: CPT | Mod: PN

## 2020-07-31 PROCEDURE — 97112 NEUROMUSCULAR REEDUCATION: CPT | Mod: PN

## 2020-08-04 ENCOUNTER — CLINICAL SUPPORT (OUTPATIENT)
Dept: REHABILITATION | Facility: HOSPITAL | Age: 65
End: 2020-08-04
Payer: MEDICARE

## 2020-08-04 DIAGNOSIS — M62.81 MUSCLE WEAKNESS: ICD-10-CM

## 2020-08-04 DIAGNOSIS — R27.8 DECREASED COORDINATION: ICD-10-CM

## 2020-08-04 DIAGNOSIS — Z78.9 IMPAIRED MOBILITY AND ACTIVITIES OF DAILY LIVING: ICD-10-CM

## 2020-08-04 DIAGNOSIS — Z74.09 IMPAIRED MOBILITY AND ACTIVITIES OF DAILY LIVING: ICD-10-CM

## 2020-08-04 PROCEDURE — 97112 NEUROMUSCULAR REEDUCATION: CPT | Mod: PN

## 2020-08-04 PROCEDURE — 97110 THERAPEUTIC EXERCISES: CPT | Mod: PN

## 2020-08-04 NOTE — PROGRESS NOTES
"  Occupational Therapy Treatment Note     Date: 8/4/2020  Name: Luis Wong  Clinic Number: [id number]    Therapy Diagnosis:   Encounter Diagnoses   Name Primary?    Muscle weakness     Impaired mobility and activities of daily living     Decreased coordination      Physician: Brien Lopez MD     Physician Orders: Eval and treat  Medical Diagnosis: History of stroke     Onset Date: 12/12/2019  Evaluation Date: 7/14/2020  Plan of Care Expiration Period: 9/11/2020  Insurance Authorization period Expiration: 12/31/2020  Date of Return to MD: October 2020  Visit # / Visits Authortized: 7 / 30   FOTO:  UE CVA / 40%    Time In:  10:12 AM  Time Out:  10:55 AM  Total Billable Time:  43 min     Precautions:  Standard and Fall      Subjective     Pt reports: "I can reach the steering wheel better with my L arm"  he was compliant with home exercise program given last session.   Response to previous treatment good  Functional change: able to drive easier    Pain: 0  Location: N/A    Objective     Luis received therapeutic exercises for 20 minutes including:  Pt completed the following exercises below in order to increase ROM, strength and tolerance of affected UE in order to increase IND and functional use:    Exercises Date 8/4/2020    Visit #7    RPM 6 min 3 min forwards 3 backwards   PROM All planes of movement   Supine Dowel  FF 2/10   8# dowel Chest press 2/15   Seated trunk twist with blue ball 3/10   Biceps 2# 2/10      Luis Wong participated in neuromuscular re-education for 23 minutes:  Pt completed the following exercises below in order to increase ROM, strength and tolerance of affected UE in order to increase IND and functional use:     Exercises Date 8/4/2020   Reaching in low plane 2/10   Supination cones 2/10   Supination wheel 2/10   Wrist Extensions On ball 10x  With yellow band resistance 2/10       Home Exercises and Education Provided     Education provided:   - HEP  - Progress towards " goals     Written Home Exercises Provided: Patient instructed to cont prior HEP.  Exercises were reviewed and Luis was able to demonstrate them prior to the end of the session.  Luis demonstrated good  understanding of the HEP provided.   .   See EMR under Patient Instructions for exercises provided prior visit.        Assessment     Pt participated in therapeutic ex and NMR to address UE ROM and UE strength to participate in ADLs and IADLs. Pt demonstrates steady progress with PROM and UE exercises with limitations mainly still due to pain and spasticity.Continued focus on supination activities today with good progress towards functional movement. Main limitation is tightness in forearm and weakness in biceps preventing supination. Pt would benefit from skilled OT to continue to progress towards goals and increase functional level of performance.    Luis is progressing well towards his goals and there are no updates to goals at this time. Pt prognosis is Good.     Pt will continue to benefit from skilled outpatient occupational therapy to address the deficits listed in the problem list on initial evaluation provide pt/family education and to maximize pt's level of independence in the home and community environment.     Anticipated barriers to occupational therapy: COVID-19, transportation    Pt's spiritual, cultural and educational needs considered and pt agreeable to plan of care and goals.    Goals:  Short Term Goals:  1) Initiate Hep MET 7/15/2020  2) Pt to increase LUE  strength by 5 # in order to A in self care task of feeding by 4 weeks. Progressing 8/4/2020  3) Pt will participate in bilateral self-feeding tasks with mod A and AE PRN by 4 weeks  Progressing 8/4/2020  4) Pt to increase LUE AROM by 5 degrees in order to A in UB dressing by 4 weeks.  Progressing 8/4/2020  5) Patient will be able to achieve less than or equal to 35% on the FOTO, demonstrating overall improved functional ability with  upper extremity. (self-care category)  Progressing 8/4/2020  6) Pt will increase QuickDASH score by 5% by 4 weeks, demonstrating improved functional ability to participate in household chores.  Progressing 8/4/2020        Long Term Goals:  1) Pt to be IND with HEP in order to maintain ROM and strength needed for self care IND by d/c.  Progressing 8/4/2020  2) Pt to increase LUE  strength to WNL as compared to unaffected extremity in order to open items for self feeding by d/c.  Progressing 8/4/2020  3) Pt will participate in bilateral self-feeding tasks with SVN and AE PRN by d/c.  Progressing 8/4/2020  4) Pt to increase LUE AROM by 10 degrees in order to A in UB dressing by d/c.  Progressing 8/4/2020  5) Patient will be able to achieve less than or equal to 30% on the FOTO, demonstrating overall improved functional ability with upper extremity. (self-care category)  Progressing 8/4/2020  6) Pt will increase QuickDASH score by 10% by d/c, demonstrating improved functional ability to participate in household chores.  Progressing 8/4/2020    Plan     Continue per POC  Updates/Grading for next session: Grade as tolerated    SONG AN, OT

## 2020-08-07 ENCOUNTER — CLINICAL SUPPORT (OUTPATIENT)
Dept: REHABILITATION | Facility: HOSPITAL | Age: 65
End: 2020-08-07
Payer: MEDICARE

## 2020-08-07 DIAGNOSIS — Z78.9 IMPAIRED MOBILITY AND ACTIVITIES OF DAILY LIVING: ICD-10-CM

## 2020-08-07 DIAGNOSIS — Z74.09 IMPAIRED MOBILITY AND ACTIVITIES OF DAILY LIVING: ICD-10-CM

## 2020-08-07 DIAGNOSIS — R27.8 DECREASED COORDINATION: ICD-10-CM

## 2020-08-07 DIAGNOSIS — M62.81 MUSCLE WEAKNESS: ICD-10-CM

## 2020-08-07 PROCEDURE — 97110 THERAPEUTIC EXERCISES: CPT | Mod: PN

## 2020-08-07 PROCEDURE — 97112 NEUROMUSCULAR REEDUCATION: CPT | Mod: PN

## 2020-08-07 NOTE — PROGRESS NOTES
"    Occupational Therapy Treatment Note     Date: 8/7/2020  Name: Luis Wong  Clinic Number: [id number]    Therapy Diagnosis:   Encounter Diagnoses   Name Primary?    Muscle weakness     Impaired mobility and activities of daily living     Decreased coordination      Physician: Brien Lopez MD     Physician Orders: Eval and treat  Medical Diagnosis: History of stroke     Onset Date: 12/12/2019  Evaluation Date: 7/14/2020  Plan of Care Expiration Period: 9/11/2020  Insurance Authorization period Expiration: 12/31/2020  Date of Return to MD: October 2020  Visit # / Visits Authortized: 8 / 30   FOTO:  UE CVA / 40%    Time In:  10:23 AM  Time Out:  11:16 AM  Total Billable Time: 53 minutes     Precautions:  Standard and Fall      Subjective     Pt reports: "I cut my grass this week"  he was compliant with home exercise program given last session.   Response to previous treatment good  Functional change: able to drive easier    Pain: 0  Location: N/A    Objective     Luis received therapeutic exercises for 25 minutes including:  Pt completed the following exercises below in order to increase ROM, strength and tolerance of affected UE in order to increase IND and functional use:    Exercises Date 8/7/2020    Visit #8   UBE 90 RPM 6 min 3 min forwards 3 backwards   PROM All planes of movement   Supine Dowel  FF 2/10   9# dowel Chest press 2/15   Seated trunk twist with yellow ball 3/10   Biceps 2# 2/10      Luis Wong participated in neuromuscular re-education for 28 minutes:  Pt completed the following exercises below in order to increase ROM, strength and tolerance of affected UE in order to increase IND and functional use:     Exercises Date 8/7/2020   Reaching in low plane 2/10   Supination cones 2/10   Supination wheel 2/10   Wrist Extensions With yellow band resistance 3/10       Home Exercises and Education Provided     Education provided:   - HEP  - Progress towards goals     Written Home Exercises " Provided: Patient instructed to cont prior HEP.  Exercises were reviewed and Luis was able to demonstrate them prior to the end of the session.  Luis demonstrated good  understanding of the HEP provided.   .   See EMR under Patient Instructions for exercises provided prior visit.        Assessment     Pt participated in therapeutic ex and NMR to address UE ROM and UE strength to participate in ADLs and IADLs. Pt demonstrates steady progress with PROM and in UE exercises with limitations mainly still due to pain and spasticity. Increased weights and resistance this session with good tolerance. Continued focus on supination activities today with good progress towards functional movement. Main limitation is tightness in forearm and weakness in biceps preventing supination. Pt would benefit from skilled OT to continue to progress towards goals and increase functional level of performance.    Luis is progressing well towards his goals and there are no updates to goals at this time. Pt prognosis is Good.     Pt will continue to benefit from skilled outpatient occupational therapy to address the deficits listed in the problem list on initial evaluation provide pt/family education and to maximize pt's level of independence in the home and community environment.     Anticipated barriers to occupational therapy: COVID-19, transportation    Pt's spiritual, cultural and educational needs considered and pt agreeable to plan of care and goals.    Goals:  Short Term Goals:  1) Initiate Hep MET 7/15/2020  2) Pt to increase LUE  strength by 5 # in order to A in self care task of feeding by 4 weeks. Progressing 8/7/2020  3) Pt will participate in bilateral self-feeding tasks with mod A and AE PRN by 4 weeks  Progressing 8/7/2020  4) Pt to increase LUE AROM by 5 degrees in order to A in UB dressing by 4 weeks.  Progressing 8/7/2020  5) Patient will be able to achieve less than or equal to 35% on the FOTO, demonstrating  overall improved functional ability with upper extremity. (self-care category)  Progressing 8/7/2020  6) Pt will increase QuickDASH score by 5% by 4 weeks, demonstrating improved functional ability to participate in household chores.  Progressing 8/7/2020        Long Term Goals:  1) Pt to be IND with HEP in order to maintain ROM and strength needed for self care IND by d/c.  Progressing 8/7/2020  2) Pt to increase LUE  strength to WNL as compared to unaffected extremity in order to open items for self feeding by d/c.  Progressing 8/7/2020  3) Pt will participate in bilateral self-feeding tasks with SVN and AE PRN by d/c.  Progressing 8/7/2020  4) Pt to increase LUE AROM by 10 degrees in order to A in UB dressing by d/c.  Progressing 8/7/2020  5) Patient will be able to achieve less than or equal to 30% on the FOTO, demonstrating overall improved functional ability with upper extremity. (self-care category)  Progressing 8/7/2020  6) Pt will increase QuickDASH score by 10% by d/c, demonstrating improved functional ability to participate in household chores.  Progressing 8/7/2020    Plan     Continue per POC  Updates/Grading for next session: Grade as tolerated    TABITHA GUSTAFSON, OT

## 2020-08-08 ENCOUNTER — CLINICAL SUPPORT (OUTPATIENT)
Dept: CARDIOLOGY | Facility: HOSPITAL | Age: 65
End: 2020-08-08
Payer: MEDICARE

## 2020-08-08 DIAGNOSIS — Z95.818 PRESENCE OF OTHER CARDIAC IMPLANTS AND GRAFTS: ICD-10-CM

## 2020-08-08 PROCEDURE — 93298 CARDIAC DEVICE CHECK - REMOTE: ICD-10-PCS | Mod: ,,, | Performed by: INTERNAL MEDICINE

## 2020-08-08 PROCEDURE — G2066 INTER DEVC REMOTE 30D: HCPCS | Performed by: INTERNAL MEDICINE

## 2020-08-08 PROCEDURE — 93298 REM INTERROG DEV EVAL SCRMS: CPT | Mod: ,,, | Performed by: INTERNAL MEDICINE

## 2020-08-10 ENCOUNTER — PATIENT OUTREACH (OUTPATIENT)
Dept: ADMINISTRATIVE | Facility: OTHER | Age: 65
End: 2020-08-10

## 2020-08-10 NOTE — PROGRESS NOTES
"    Occupational Therapy Treatment Note     Date: 8/11/2020  Name: Luis Wong  Clinic Number: [id number]    Therapy Diagnosis:   Encounter Diagnoses   Name Primary?    Muscle weakness     Impaired mobility and activities of daily living     Decreased coordination      Physician: Brien Lopez MD     Physician Orders: Eval and treat  Medical Diagnosis: History of stroke     Onset Date: 12/12/2019  Evaluation Date: 7/14/2020  Plan of Care Expiration Period: 9/11/2020  Insurance Authorization period Expiration: 12/31/2020  Date of Return to MD: October 2020  Visit # / Visits Authortized: 9 / 30   FOTO:  UE CVA / 40%    Time In: 10:10 AM  Time Out:  10:58 AM  Total Billable Time:  48 minutes    Precautions:  Standard and Fall      Subjective     Pt reports: "I'm supposed to get botox soon"  he was compliant with home exercise program given last session.   Response to previous treatment good  Functional change: able to drive easier    Pain: 0  Location: N/A    Objective     Luis received therapeutic exercises for 25 minutes including:  Pt completed the following exercises below in order to increase ROM, strength and tolerance of affected UE in order to increase IND and functional use:    Exercises Date 8/11/2020    Visit #9   UBE 90 RPM 6 min 3 min forwards 3 backwards   PROM All planes of movement   Supine Dowel  FF 2/10   9# dowel Chest press 2/15   Seated trunk twist with yellow ball 3/10   Biceps 2# 2/10      Luis Wong participated in neuromuscular re-education for 23 minutes:  Pt completed the following exercises below in order to increase ROM, strength and tolerance of affected UE in order to increase IND and functional use:     Exercises Date 8/11/2020   Reaching in low, middle, and high plane 2/10   Supination cones 2/10   Supination wheel 2/10   Wrist Extensions With yellow band resistance 3/10       Home Exercises and Education Provided     Education provided:   - HEP  - Progress towards goals "     Written Home Exercises Provided: Patient instructed to cont prior HEP.  Exercises were reviewed and Luis was able to demonstrate them prior to the end of the session.  Luis demonstrated good  understanding of the HEP provided.   .   See EMR under Patient Instructions for exercises provided prior visit.        Assessment     Pt participated in therapeutic ex and NMR to address UE ROM and UE strength to participate in ADLs and IADLs. Pt demonstrates steady progress with PROM and in UE exercises with limitations mainly still due to pain and spasticity. Increased weights and resistance this session with good tolerance. Continued focus on supination activities today with good progress towards functional movement. Main limitation is tightness in forearm and weakness in biceps preventing supination. Pt would benefit from skilled OT to continue to progress towards goals and increase functional level of performance.    Luis is progressing well towards his goals and there are no updates to goals at this time. Pt prognosis is Good.     Pt will continue to benefit from skilled outpatient occupational therapy to address the deficits listed in the problem list on initial evaluation provide pt/family education and to maximize pt's level of independence in the home and community environment.     Anticipated barriers to occupational therapy: COVID-19, transportation    Pt's spiritual, cultural and educational needs considered and pt agreeable to plan of care and goals.    Goals:  Short Term Goals:  1) Initiate Hep MET 7/15/2020  2) Pt to increase LUE  strength by 5 # in order to A in self care task of feeding by 4 weeks. Progressing 8/11/2020  3) Pt will participate in bilateral self-feeding tasks with mod A and AE PRN by 4 weeks  Progressing 8/11/2020  4) Pt to increase LUE AROM by 5 degrees in order to A in UB dressing by 4 weeks.  Progressing 8/11/2020  5) Patient will be able to achieve less than or equal to 35% on  the FOTO, demonstrating overall improved functional ability with upper extremity. (self-care category)  Progressing 8/11/2020  6) Pt will increase QuickDASH score by 5% by 4 weeks, demonstrating improved functional ability to participate in household chores.  Progressing 8/11/2020        Long Term Goals:  1) Pt to be IND with HEP in order to maintain ROM and strength needed for self care IND by d/c.  Progressing 8/11/2020  2) Pt to increase LUE  strength to WNL as compared to unaffected extremity in order to open items for self feeding by d/c.  Progressing 8/11/2020  3) Pt will participate in bilateral self-feeding tasks with SVN and AE PRN by d/c.  Progressing 8/11/2020  4) Pt to increase LUE AROM by 10 degrees in order to A in UB dressing by d/c.  Progressing 8/11/2020  5) Patient will be able to achieve less than or equal to 30% on the FOTO, demonstrating overall improved functional ability with upper extremity. (self-care category)  Progressing 8/11/2020  6) Pt will increase QuickDASH score by 10% by d/c, demonstrating improved functional ability to participate in household chores.  Progressing 8/11/2020    Plan     Continue per POC  Updates/Grading for next session: Grade as tolerated    TABITHA GUSTAFSON OT

## 2020-08-11 ENCOUNTER — CLINICAL SUPPORT (OUTPATIENT)
Dept: REHABILITATION | Facility: HOSPITAL | Age: 65
End: 2020-08-11
Payer: MEDICARE

## 2020-08-11 ENCOUNTER — OFFICE VISIT (OUTPATIENT)
Dept: CARDIOLOGY | Facility: CLINIC | Age: 65
End: 2020-08-11
Payer: MEDICARE

## 2020-08-11 VITALS
SYSTOLIC BLOOD PRESSURE: 156 MMHG | DIASTOLIC BLOOD PRESSURE: 78 MMHG | HEART RATE: 94 BPM | BODY MASS INDEX: 22.55 KG/M2 | OXYGEN SATURATION: 97 % | HEIGHT: 69 IN | WEIGHT: 152.25 LBS

## 2020-08-11 DIAGNOSIS — I10 ESSENTIAL HYPERTENSION: Primary | ICD-10-CM

## 2020-08-11 DIAGNOSIS — Z78.9 IMPAIRED MOBILITY AND ACTIVITIES OF DAILY LIVING: ICD-10-CM

## 2020-08-11 DIAGNOSIS — E78.2 MIXED HYPERLIPIDEMIA: ICD-10-CM

## 2020-08-11 DIAGNOSIS — R27.8 DECREASED COORDINATION: ICD-10-CM

## 2020-08-11 DIAGNOSIS — M62.81 MUSCLE WEAKNESS: ICD-10-CM

## 2020-08-11 DIAGNOSIS — Z74.09 IMPAIRED MOBILITY AND ACTIVITIES OF DAILY LIVING: ICD-10-CM

## 2020-08-11 DIAGNOSIS — I63.9 CRYPTOGENIC STROKE: ICD-10-CM

## 2020-08-11 PROCEDURE — 3008F BODY MASS INDEX DOCD: CPT | Mod: CPTII,S$GLB,, | Performed by: INTERNAL MEDICINE

## 2020-08-11 PROCEDURE — 99214 PR OFFICE/OUTPT VISIT, EST, LEVL IV, 30-39 MIN: ICD-10-PCS | Mod: S$GLB,,, | Performed by: INTERNAL MEDICINE

## 2020-08-11 PROCEDURE — 3288F FALL RISK ASSESSMENT DOCD: CPT | Mod: CPTII,S$GLB,, | Performed by: INTERNAL MEDICINE

## 2020-08-11 PROCEDURE — 3078F DIAST BP <80 MM HG: CPT | Mod: CPTII,S$GLB,, | Performed by: INTERNAL MEDICINE

## 2020-08-11 PROCEDURE — 97112 NEUROMUSCULAR REEDUCATION: CPT | Mod: PN

## 2020-08-11 PROCEDURE — 99999 PR PBB SHADOW E&M-EST. PATIENT-LVL IV: CPT | Mod: PBBFAC,,, | Performed by: INTERNAL MEDICINE

## 2020-08-11 PROCEDURE — 3078F PR MOST RECENT DIASTOLIC BLOOD PRESSURE < 80 MM HG: ICD-10-PCS | Mod: CPTII,S$GLB,, | Performed by: INTERNAL MEDICINE

## 2020-08-11 PROCEDURE — 99999 PR PBB SHADOW E&M-EST. PATIENT-LVL IV: ICD-10-PCS | Mod: PBBFAC,,, | Performed by: INTERNAL MEDICINE

## 2020-08-11 PROCEDURE — 1100F PR PT FALLS ASSESS DOC 2+ FALLS/FALL W/INJURY/YR: ICD-10-PCS | Mod: CPTII,S$GLB,, | Performed by: INTERNAL MEDICINE

## 2020-08-11 PROCEDURE — 97110 THERAPEUTIC EXERCISES: CPT | Mod: PN

## 2020-08-11 PROCEDURE — 99214 OFFICE O/P EST MOD 30 MIN: CPT | Mod: S$GLB,,, | Performed by: INTERNAL MEDICINE

## 2020-08-11 PROCEDURE — 3077F PR MOST RECENT SYSTOLIC BLOOD PRESSURE >= 140 MM HG: ICD-10-PCS | Mod: CPTII,S$GLB,, | Performed by: INTERNAL MEDICINE

## 2020-08-11 PROCEDURE — 3008F PR BODY MASS INDEX (BMI) DOCUMENTED: ICD-10-PCS | Mod: CPTII,S$GLB,, | Performed by: INTERNAL MEDICINE

## 2020-08-11 PROCEDURE — 3077F SYST BP >= 140 MM HG: CPT | Mod: CPTII,S$GLB,, | Performed by: INTERNAL MEDICINE

## 2020-08-11 PROCEDURE — 1100F PTFALLS ASSESS-DOCD GE2>/YR: CPT | Mod: CPTII,S$GLB,, | Performed by: INTERNAL MEDICINE

## 2020-08-11 PROCEDURE — 3288F PR FALLS RISK ASSESSMENT DOCUMENTED: ICD-10-PCS | Mod: CPTII,S$GLB,, | Performed by: INTERNAL MEDICINE

## 2020-08-11 NOTE — PROGRESS NOTES
Subjective:   @Patient ID:  Luis Wong is a 64 y.o. male who presents for follow-up of possible cardio embolic source of stroke.       HPI:     Patient here for follow up   Post ILR placement for cryptogenic stroke  He stated that he is doing well  No more recurrent CVA  He denies any chest pain, or palpitations.   No a.fib or sig arrhythmias per ILR     EM, no evidence of atrial fibrillation.     Historically:  Unfortunately patient had recurrent episodes of stroke.  Last event was 8/2019 resulted in significant left side weakness. Problem started in 2012,  since there he had about 3-4 event. Last event is the strongest.  Echo done no evidence of interatrial shunt. No lV thrombus detected.           MRA 8/2019 normal carotids.  He stated that he doesn't think that he had monitor or ILR in the past.        Pertinent hx, DM, and HTN    Prior cardiovascular  Hx  --------------------------------       - ECHO 6/2018    1 - Normal left ventricular systolic function (EF 60-65%).     2 - Impaired LV relaxation, normal LAP (grade 1 diastolic dysfunction).     3 - Normal right ventricular systolic function .     4 - The estimated PA systolic pressure is 29 mmHg.     5 - No evidence of intracardiac shunt.    CAC 18 in 2014     - EKG SR, no evidence of A.fib         Patient Active Problem List    Diagnosis Date Noted    Muscle weakness 07/14/2020    Impaired mobility and activities of daily living 07/14/2020    Decreased coordination 07/14/2020    Carpal tunnel syndrome of right wrist 02/21/2020    Impaired functional mobility, balance, gait, and endurance 09/19/2019    Decreased strength of lower extremity 09/13/2019    Cryptogenic stroke 08/29/2019    Bilateral carpal tunnel syndrome 01/08/2019    Dysarthria 06/26/2018    Normocytic anemia 06/26/2018    Pre-diabetes 09/25/2017    History of cerebral parenchymal hemorrhage 08/31/2016     R thalamus, 2012      History of stroke 09/11/2014     R corona  radiata, small artery infarct 6/2018      Special screening for malignant neoplasms, colon 12/14/2013    Hyperglycemia 09/05/2013    Mixed hyperlipidemia 09/05/2013    Cataracts, bilateral 09/05/2013    Nuclear sclerosis 11/13/2012    HTN (hypertension) 11/07/2012        LAST HbA1c  Lab Results   Component Value Date    HGBA1C 5.7 (H) 05/11/2020       Lipid panel  Lab Results   Component Value Date    CHOL 187 05/11/2020    CHOL 170 10/21/2019    CHOL 214 (H) 08/29/2019     Lab Results   Component Value Date    HDL 86 (H) 05/11/2020    HDL 69 10/21/2019    HDL 82 (H) 08/29/2019     Lab Results   Component Value Date    LDLCALC 88.8 05/11/2020    LDLCALC 80.4 10/21/2019    LDLCALC 113.0 08/29/2019     Lab Results   Component Value Date    TRIG 61 05/11/2020    TRIG 103 10/21/2019    TRIG 95 08/29/2019     Lab Results   Component Value Date    CHOLHDL 46.0 05/11/2020    CHOLHDL 40.6 10/21/2019    CHOLHDL 38.3 08/29/2019            Review of Systems   Constitution: Negative for chills and fever.   HENT: Negative for hearing loss and nosebleeds.    Eyes: Negative for blurred vision.   Cardiovascular: Negative for chest pain and palpitations.   Respiratory: Negative for hemoptysis and shortness of breath.    Hematologic/Lymphatic: Negative for bleeding problem.   Skin: Negative for itching.   Musculoskeletal:        On wheelchair now.    Gastrointestinal: Negative for abdominal pain and hematochezia.   Genitourinary: Negative for hematuria.   Neurological: Positive for focal weakness (Left side ). Negative for dizziness.   Psychiatric/Behavioral: Negative for altered mental status and depression.       Objective:   Physical Exam   Constitutional: He is oriented to person, place, and time. He appears well-developed and well-nourished.   HENT:   Head: Normocephalic and atraumatic.   Eyes: Conjunctivae are normal.   Neck: Neck supple. No JVD present.   Cardiovascular: Normal rate, regular rhythm and normal heart  sounds. Exam reveals no gallop and no friction rub.   No murmur heard.  Pulmonary/Chest: Effort normal and breath sounds normal. No stridor. No respiratory distress. He has no wheezes.   Neurological: He is alert and oriented to person, place, and time. He exhibits abnormal muscle tone (left UE and LE).   Skin: Skin is warm and dry.   Psychiatric: He has a normal mood and affect. His behavior is normal.       Assessment:     1. Essential hypertension    2. Mixed hyperlipidemia    3. Cryptogenic stroke        Plan:     - Patient with recurrent stroke, unknown source.  No evidence of PFO per echo.     - ILR in place to detect any arrhythmias as potential cause for recurrent strokes.     - F/U with Neurology team.     - ASA/Plavix/Statin      Continue with current medical plan and lifestyle changes.  Return sooner for concerns or questions. If symptoms persist go to the ED  I have reviewed all pertinent data including patient's medical history in detail and updated the computerized patient record.     No orders of the defined types were placed in this encounter.      Follow up as scheduled.     He expressed verbal understanding and agreed with the plan    Patient's Medications   New Prescriptions    No medications on file   Previous Medications    ASPIRIN (ECOTRIN) 81 MG EC TABLET    TAKE 1 TABLET BY MOUTH EVERY DAY    ATORVASTATIN (LIPITOR) 40 MG TABLET    Take 1 tablet (40 mg total) by mouth once daily.    CLOPIDOGREL (PLAVIX) 75 MG TABLET    Take 1 tablet (75 mg total) by mouth once daily.    DICLOFENAC SODIUM (VOLTAREN) 1 % GEL        HYDROCHLOROTHIAZIDE (MICROZIDE) 12.5 MG CAPSULE    TAKE 1 CAPSULE BY MOUTH EVERY DAY    METFORMIN (GLUCOPHAGE) 500 MG TABLET    TAKE 1 TABLET BY MOUTH EVERY DAY WITH BREAKFAST    NIFEDIPINE (PROCARDIA-XL) 30 MG (OSM) 24 HR TABLET    TAKE 1 TABLET BY MOUTH EVERY DAY    POTASSIUM CHLORIDE (MICRO-K) 8 MEQ CPSR    TAKE 1 CAPSULE (8 MEQ TOTAL) BY MOUTH ONCE DAILY.   Modified Medications     No medications on file   Discontinued Medications    No medications on file

## 2020-08-11 NOTE — PATIENT INSTRUCTIONS
Eating Heart-Healthy Foods  Eating has a big impact on your heart health. In fact, eating healthier can improve several of your heart risks at once. For instance, it helps you manage weight, cholesterol, and blood pressure. Here are ideas to help you make heart-healthy changes without giving up all the foods and flavors you love.  Getting started  · Talk with your health care provider about eating plans, such as the DASH or Mediterranean diet. You may also be referred to a dietitian.  · Change a few things at a time. Give yourself time to get used to a few eating changes before adding more.  · Work to create a tasty, healthy eating plan that you can stick to for the rest of your life.    Goals for healthy eating  Below are some tips to improve your eating habits:  · Limit saturated fats and trans fats. Saturated fats raise your levels of cholesterol, so keep these fats to a minimum. They are found in foods such as fatty meats, whole milk, cheese, and palm and coconut oils. Avoid trans fats because they lower good cholesterol as well as raise bad cholesterol. Trans fats are most often found in processed foods.  · Reduce sodium (salt) intake. Eating too much salt may increase your blood pressure. Limit your sodium intake to 2,300 milligrams (mg) per day, or less if your health care provider recommends it. Dining out less often and eating fewer processed foods are two great ways to decrease the amount of salt you consume.  · Managing calories. A calorie is a unit of energy. Your body burns calories for fuel, but if you eat more calories than your body burns, the extras are stored as fat. Your health care provider can help you create a diet plan to manage your calories. This will likely include eating healthier foods as well as exercising regularly. To help you track your progress, keep a diary to record what you eat and how often you exercise.  Choose the right foods  Aim to make these foods staples of your diet. If  you have diabetes, you may have different recommendations than what is listed here:  · Fruits and vegetable provide plenty of nutrients without a lot of calories. At meals, fill half your plate with these foods. Split the other half of your plate between whole grains and lean protein.  · Whole grains are high in fiber and rich in vitamins and nutrients. Good choices include whole-wheat bread, pasta, and brown rice.  · Lean proteins give you nutrition with less fat. Good choices include fish, skinless chicken, and beans.  · Low-fat or nonfat dairy provides nutrients without a lot of fat. Try low-fat or nonfat milk, cheese, or yogurt.  · Healthy fats can be good for you in small amounts. These are unsaturated fats, such as olive oil, nuts, and fish. Try to have at least 2 servings per week of fatty fish such as salmon, sardines, mackerel, rainbow trout, and albacore tuna. These contain omega-3 fatty acids, which are good for your heart. Flaxseed is another source of a heart-healthy fat.  More on heart healthy eating    Read food labels  Healthy eating starts at the grocery store. Be sure to pay attention to food labels on packaged foods. Look for products that are high in fiber and protein, and low in saturated fat, cholesterol, and sodium. Avoid products that contain trans fat. And pay close attention to serving size. For instance, if you plan to eat two servings, double all the numbers on the label.  Prepare food right  A key part of healthy cooking is cutting down on added fat and salt. Look on the internet for lower-fat, lower-sodium recipes. Also, try these tips:  · Remove fat from meat and skin from poultry before cooking.  · Skim fat from the surface of soups and sauces.  · Broil, boil, bake, steam, grill, and microwave food without added fats.  · Choose ingredients that spice up your food without adding calories, fat, or sodium. Try these items: horseradish, hot sauce, lemon, mustard, nonfat salad dressings,  and vinegar. For salt-free herbs and spices, try basil, cilantro, cinnamon, pepper, and rosemary.  Date Last Reviewed: 6/25/2015  © 1648-5379 [employer]. [address] [place] [address]. All rights reserved. This information is not intended as a substitute for professional medical care. Always follow your healthcare professional's instructions.          Aerobic Exercise for a Healthy Heart  Exercise is a lot more than an energy booster and a stress reliever. It also strengthens your heart muscle, lowers your blood pressure and cholesterol, and burns calories. It can also improve your resting muscle tone, and your mood.     Remember, some activity is better than none.    Choose an aerobic activity  Choose an activity that makes your heart and lungs work harder than they do when you rest or walk normally. This aerobic exercise can improve the way your heart and other muscles use oxygen. Make it fun by exercising with a friend and choosing an activity you enjoy. Here are some ideas:  · Walking  · Swimming  · Bicycling  · Stair climbing  · Dancing  · Jogging  · Gardening  Exercise regularly  If you havent been exercising regularly,  get your doctors OK first. Then start slowly.  Here are some tips:  · Begin exercising 3 times a week for 5 to 10 minutes at a time.  · When you feel comfortable, add a few minutes each session.  · Slowly build up to exercising 3 to 4 times each week. Each session should last for 40 minutes, on average, and involve moderate- to vigorous-intensity physical activity.  · If you have been given nitroglycerin, be sure to carry it when you exercise.  · If you get chest pain (angina) when youre exercising, stop what youre doing, take your nitroglycerin, and call your doctor.  Date Last Reviewed: 6/2/2016  © 7928-6673 [employer]. [address] [place] [id number]. All rights reserved. This information is not intended as a substitute for  professional medical care. Always follow your healthcare professional's instructions.          Established High Blood Pressure    High blood pressure (hypertension) is a chronic disease. Often, healthcare providers dont know what causes it. But it can be caused by certain health conditions and medicines.  If you have high blood pressure, you may not have any symptoms. If you do have symptoms, they may include headache, dizziness, changes in your vision, chest pain, and shortness of breath. But even without symptoms, high blood pressure thats not treated raises your risk for heart attack and stroke. High blood pressure is a serious health risk and shouldnt be ignored.  A blood pressure reading is made up of two numbers: a higher number over a lower number. The top number is the systolic pressure. The bottom number is the diastolic pressure. A normal blood pressure is a systolic pressure of  less than 120 over a diastolic pressure of less than 80. You will see your blood pressure readings written together. For example, a person with a systolic pressure of 188 and a diastolic pressure of 78 will have 118/78 written in the medical record.  High blood pressure is when either the top number is 140 or higher, or the bottom number is 90 or higher. This must be the result when taking your blood pressure a number of times. The blood pressures between normal and high are called prehypertension.  Home care  If you have high blood pressure, you should do what is listed below to lower your blood pressure. If you are taking medicines for high blood pressure, these methods may reduce or end your need for medicines in the future.  · Begin a weight-loss program if you are overweight.  · Cut back on how much salt you get in your diet. Heres how to do this:  ¨ Dont eat foods that have a lot of salt. These include olives, pickles, smoked meats, and salted potato chips.  ¨ Dont add salt to your food at the table.  ¨ Use only small  amounts of salt when cooking.  · Start an exercise program. Talk with your healthcare provider about the type of exercise program that would be best for you. It doesn't have to be hard. Even brisk walking for 20 minutes 3 times a week is a good form of exercise.  · Dont take medicines that stimulate the heart. This includes many over-the-counter cold and sinus decongestant pills and sprays, as well as diet pills. Check the warnings about hypertension on the label. Before buying any over-the-counter medicines or supplements, always ask the pharmacist about the product's potential interaction with your high blood pressure and your high blood pressure medicines.  · Stimulants such as amphetamine or cocaine could be deadly for someone with high blood pressure. Never take these.  · Limit how much caffeine you get in your diet. Switch to caffeine-free products.  · Stop smoking. If you are a long-time smoker, this can be hard. Talk to your healthcare provider about medicines and nicotine replacement options to help you. Also, enroll in a stop-smoking program to make it more likely that you will quit for good.  · Learn how to handle stress. This is an important part of any program to lower blood pressure. Learn about relaxation methods like meditation, yoga, or biofeedback.  · If your provider prescribed medicines, take them exactly as directed. Missing doses may cause your blood pressure get out of control.  · If you miss a dose or doses, check with your healthcare provider or pharmacist about what to do.  · Consider buying an automatic blood pressure machine. Ask your provider for a recommendation. You can get one of these at most pharmacies.     The American Heart Association recommends the following guidelines for home blood pressure monitoring:  · Don't smoke or drink coffee for 30 minutes before taking your blood pressure.  · Go to the bathroom before the test.  · Relax for 5 minutes before taking the  measurement.  · Sit with your back supported (don't sit on a couch or soft chair); keep your feet on the floor uncrossed. Place your arm on a solid flat surface (like a table) with the upper part of the arm at heart level. Place the middle of the cuff directly above the eye of the elbow. Check the monitor's instruction manual for an illustration.  · Take multiple readings. When you measure, take 2 to 3 readings one minute apart and record all of the results.  · Take your blood pressure at the same time every day, or as your healthcare provider recommends.  · Record the date, time, and blood pressure reading.  · Take the record with you to your next medical appointment. If your blood pressure monitor has a built-in memory, simply take the monitor with you to your next appointment.  · Call your provider if you have several high readings. Don't be frightened by a single high blood pressure reading, but if you get several high readings, check in with your healthcare provider.  · Note: When blood pressure reaches a systolic (top number) of 180 or higher OR diastolic (bottom number) of 110 or higher, seek emergency medical treatment.  Follow-up care  You will need to see your healthcare provider regularly. This is to check your blood pressure and to make changes to your medicines. Make a follow-up appointment as directed. Bring the record of your home blood pressure readings to the appointment.  When to seek medical advice  Call your healthcare provider right away if any of these occur:  · Blood pressure reaches a systolic (upper number) of 180 or higher OR a diastolic (bottom number) of 110 or higher  · Chest pain or shortness of breath  · Severe headache  · Throbbing or rushing sound in the ears  · Nosebleed  · Sudden severe pain in your belly (abdomen)  · Extreme drowsiness, confusion, or fainting  · Dizziness or spinning sensation (vertigo)  · Weakness of an arm or leg or one side of the face  · You have problems  speaking or seeing   Date Last Reviewed: 12/1/2016  © 2988-8210 [employer]. [address] [place] [address], [place] [address]. All rights reserved. This information is not intended as a substitute for professional medical care. Always follow your healthcare professional's instructions.

## 2020-08-14 ENCOUNTER — CLINICAL SUPPORT (OUTPATIENT)
Dept: REHABILITATION | Facility: HOSPITAL | Age: 65
End: 2020-08-14
Payer: MEDICARE

## 2020-08-14 DIAGNOSIS — R27.8 DECREASED COORDINATION: ICD-10-CM

## 2020-08-14 DIAGNOSIS — Z78.9 IMPAIRED MOBILITY AND ACTIVITIES OF DAILY LIVING: ICD-10-CM

## 2020-08-14 DIAGNOSIS — Z74.09 IMPAIRED MOBILITY AND ACTIVITIES OF DAILY LIVING: ICD-10-CM

## 2020-08-14 DIAGNOSIS — M62.81 MUSCLE WEAKNESS: ICD-10-CM

## 2020-08-14 PROCEDURE — 97112 NEUROMUSCULAR REEDUCATION: CPT | Mod: PN

## 2020-08-14 PROCEDURE — 97110 THERAPEUTIC EXERCISES: CPT | Mod: PN

## 2020-08-14 NOTE — PROGRESS NOTES
"    Occupational Therapy Treatment Note     Date: 8/14/2020  Name: Luis Wong  Clinic Number: [id number]    Therapy Diagnosis:   Encounter Diagnoses   Name Primary?    Muscle weakness     Impaired mobility and activities of daily living     Decreased coordination      Physician: Brien Lopez MD     Physician Orders: Eval and treat  Medical Diagnosis: History of stroke     Onset Date: 12/12/2019  Evaluation Date: 7/14/2020  Plan of Care Expiration Period: 9/11/2020  Insurance Authorization period Expiration: 12/31/2020  Date of Return to MD: October 2020  Visit # / Visits Authortized: 10 / 30   FOTO:  UE CVA / 40%    Time In: 10:30 AM  Time Out:  11:23 AM  Total Billable Time:  53 minutes     Precautions:  Standard and Fall      Subjective     Pt reports: "I got my botox shots Wednesday"  he was compliant with home exercise program given last session.   Response to previous treatment good  Functional change: able to drive easier    Pain: 0  Location: N/A    Objective     Luis received therapeutic exercises for 25 minutes including:  Pt completed the following exercises below in order to increase ROM, strength and tolerance of affected UE in order to increase IND and functional use:    Exercises Date 8/14/2020    Visit #10   UBE 90 RPM 6 min 3 min forwards 3 backwards   PROM All planes of movement   Supine Dowel 5# FF 3/10   9# dowel Chest press 3/10   Seated trunk twist with yellow ball 3/10   Biceps 2# 3/10      Luis Wong participated in neuromuscular re-education for 28 minutes:  Pt completed the following exercises below in order to increase ROM, strength and tolerance of affected UE in order to increase IND and functional use:     Exercises Date 8/14/2020   Reaching in low, middle, and high plane 2/10   Supination cones 2/10   Supination hammer 3/10   Wrist Extensions With yellow band resistance 3/10     Putty   Extension with cone grab  Black gripper    Home Exercises and Education Provided "     Education provided:   - HEP  - Progress towards goals     Written Home Exercises Provided: Patient instructed to cont prior HEP.  Exercises were reviewed and Luis was able to demonstrate them prior to the end of the session.  Luis demonstrated good  understanding of the HEP provided.   .   See EMR under Patient Instructions for exercises provided prior visit.        Assessment     Pt participated in therapeutic ex and NMR to address UE ROM and UE strength to participate in ADLs and IADLs. Pt demonstrates steady progress with PROM and in UE exercises with limitations mainly still due to pain and spasticity. Pt received botox treatment this week at top of the wrist, extensors in forearms, and biceps. Continued focus on supination activities today with good progress towards functional movement. Main limitation is tightness in forearm and weakness in biceps preventing supination. Overall, however, supination movement is improving. Flexor patterns during cone reaching activities also improving. Pt would benefit from skilled OT to continue to progress towards goals and increase functional level of performance.    Luis is progressing well towards his goals and there are no updates to goals at this time. Pt prognosis is Good.     Pt will continue to benefit from skilled outpatient occupational therapy to address the deficits listed in the problem list on initial evaluation provide pt/family education and to maximize pt's level of independence in the home and community environment.     Anticipated barriers to occupational therapy: COVID-19, transportation    Pt's spiritual, cultural and educational needs considered and pt agreeable to plan of care and goals.    Goals:  Short Term Goals:  1) Initiate Hep MET 7/15/2020  2) Pt to increase LUE  strength by 5 # in order to A in self care task of feeding by 4 weeks. Progressing 8/14/2020  3) Pt will participate in bilateral self-feeding tasks with mod A and AE PRN by  4 weeks  MET 8/14/2020  4) Pt to increase LUE AROM by 5 degrees in order to A in UB dressing by 4 weeks.  Progressing 8/14/2020  5) Patient will be able to achieve less than or equal to 35% on the FOTO, demonstrating overall improved functional ability with upper extremity. (self-care category)  Progressing 8/14/2020  6) Pt will increase QuickDASH score by 5% by 4 weeks, demonstrating improved functional ability to participate in household chores.  Progressing 8/14/2020        Long Term Goals:  1) Pt to be IND with HEP in order to maintain ROM and strength needed for self care IND by d/c.  Progressing 8/14/2020  2) Pt to increase LUE  strength to WNL as compared to unaffected extremity in order to open items for self feeding by d/c.  Progressing 8/14/2020  3) Pt will participate in bilateral self-feeding tasks with SVN and AE PRN by d/c.  Progressing 8/14/2020  4) Pt to increase LUE AROM by 10 degrees in order to A in UB dressing by d/c.  Progressing 8/14/2020  5) Patient will be able to achieve less than or equal to 30% on the FOTO, demonstrating overall improved functional ability with upper extremity. (self-care category)  Progressing 8/14/2020  6) Pt will increase QuickDASH score by 10% by d/c, demonstrating improved functional ability to participate in household chores.  Progressing 8/14/2020    Plan     Continue per POC  Updates/Grading for next session: Grade as tolerated    TABITHA AN, OT

## 2020-08-18 ENCOUNTER — CLINICAL SUPPORT (OUTPATIENT)
Dept: REHABILITATION | Facility: HOSPITAL | Age: 65
End: 2020-08-18
Payer: MEDICARE

## 2020-08-18 DIAGNOSIS — M62.81 MUSCLE WEAKNESS: ICD-10-CM

## 2020-08-18 DIAGNOSIS — Z74.09 IMPAIRED MOBILITY AND ACTIVITIES OF DAILY LIVING: ICD-10-CM

## 2020-08-18 DIAGNOSIS — R27.8 DECREASED COORDINATION: ICD-10-CM

## 2020-08-18 DIAGNOSIS — Z78.9 IMPAIRED MOBILITY AND ACTIVITIES OF DAILY LIVING: ICD-10-CM

## 2020-08-18 PROCEDURE — 97110 THERAPEUTIC EXERCISES: CPT | Mod: PN

## 2020-08-18 PROCEDURE — 97112 NEUROMUSCULAR REEDUCATION: CPT | Mod: PN

## 2020-08-18 NOTE — PROGRESS NOTES
"    Occupational Therapy Treatment Note     Date: 8/18/2020  Name: Luis Wong  Clinic Number: [id number]    Therapy Diagnosis:   Encounter Diagnoses   Name Primary?    Muscle weakness     Impaired mobility and activities of daily living     Decreased coordination      Physician: Brien Lopez MD     Physician Orders: Eval and treat  Medical Diagnosis: History of stroke     Onset Date: 12/12/2019  Evaluation Date: 7/14/2020  Plan of Care Expiration Period: 9/11/2020  Insurance Authorization period Expiration: 12/31/2020  Date of Return to MD: October 2020  Visit # / Visits Authortized: 11 / 30   FOTO:  UE CVA / 40%    Time In: 10:18 AM  Time Out:  11:02 AM  Total Billable Time:  44 minutes     Precautions:  Standard and Fall      Subjective     Pt reports: "My wife and I hedged the lawn yesterday."  he was compliant with home exercise program given last session.   Response to previous treatment good  Functional change: able to drive easier    Pain: 0  Location: N/A    Objective     Luis received therapeutic exercises for 20 minutes including:  Pt completed the following exercises below in order to increase ROM, strength and tolerance of affected UE in order to increase IND and functional use:    Exercises Date 8/18/2020    Visit #11   UBE 6 0 RPM 6 min 3 min forwards 3 backwards   PROM All planes of movement   Supine Dowel 5# FF 3/10   9# dowel Chest press 3/10   Biceps 2# 3/10      Luis Wong participated in neuromuscular re-education for 24 minutes:  Pt completed the following exercises below in order to increase ROM, strength and tolerance of affected UE in order to increase IND and functional use:     Exercises Date 8/18/2020   Reaching in low, middle, and high plane 2/10   Supination cones 2/10   Supination hammer 3/10   Wrist Extensions With yellow band resistance 3/10       Home Exercises and Education Provided     Education provided:   - HEP  - Progress towards goals     Written Home Exercises " Provided: Patient instructed to cont prior HEP.  Exercises were reviewed and Luis was able to demonstrate them prior to the end of the session.  Luis demonstrated good  understanding of the HEP provided.   .   See EMR under Patient Instructions for exercises provided prior visit.        Assessment     Pt participated in therapeutic ex and NMR to address UE ROM and UE strength to participate in ADLs and IADLs. Pt demonstrates steady progress with PROM and in UE exercises with limitations mainly still due to pain and spasticity. Continued focus on supination activities today with good progress towards functional movement. Main limitation is tightness in forearm and weakness in biceps preventing supination. Overall, however, supination movement is improving. Flexor patterns during cone reaching activities also improving. Pt would benefit from skilled OT to continue to progress towards goals and increase functional level of performance.    Luis is progressing well towards his goals and there are no updates to goals at this time. Pt prognosis is Good.     Pt will continue to benefit from skilled outpatient occupational therapy to address the deficits listed in the problem list on initial evaluation provide pt/family education and to maximize pt's level of independence in the home and community environment.     Anticipated barriers to occupational therapy: COVID-19, transportation    Pt's spiritual, cultural and educational needs considered and pt agreeable to plan of care and goals.    Goals:  Short Term Goals:  1) Initiate Hep MET 7/15/2020  2) Pt to increase LUE  strength by 5 # in order to A in self care task of feeding by 4 weeks. Progressing 8/18/2020  3) Pt will participate in bilateral self-feeding tasks with mod A and AE PRN by 4 weeks  MET 8/14/2020  4) Pt to increase LUE AROM by 5 degrees in order to A in UB dressing by 4 weeks.  Progressing 8/18/2020  5) Patient will be able to achieve less than or  equal to 35% on the FOTO, demonstrating overall improved functional ability with upper extremity. (self-care category)  Progressing 8/18/2020  6) Pt will increase QuickDASH score by 5% by 4 weeks, demonstrating improved functional ability to participate in household chores.  Progressing 8/18/2020        Long Term Goals:  1) Pt to be IND with HEP in order to maintain ROM and strength needed for self care IND by d/c.  Progressing 8/18/2020  2) Pt to increase LUE  strength to WNL as compared to unaffected extremity in order to open items for self feeding by d/c.  Progressing 8/18/2020  3) Pt will participate in bilateral self-feeding tasks with SVN and AE PRN by d/c.  Progressing 8/18/2020  4) Pt to increase LUE AROM by 10 degrees in order to A in UB dressing by d/c.  Progressing 8/18/2020  5) Patient will be able to achieve less than or equal to 30% on the FOTO, demonstrating overall improved functional ability with upper extremity. (self-care category)  Progressing 8/18/2020  6) Pt will increase QuickDASH score by 10% by d/c, demonstrating improved functional ability to participate in household chores.  Progressing 8/18/2020    Plan     Continue per POC  Updates/Grading for next session: Grade as tolerated    TABITHA GUSTAFSON, OT

## 2020-08-18 NOTE — PROGRESS NOTES
"   Occupational Therapy Treatment Note     Date: 8/21/2020  Name: Luis Wong  Clinic Number: [id number]    Therapy Diagnosis:   Encounter Diagnoses   Name Primary?    Muscle weakness     Impaired mobility and activities of daily living     Decreased coordination      Physician: Brien Lopez MD     Physician Orders: Eval and treat  Medical Diagnosis: History of stroke     Onset Date: 12/12/2019  Evaluation Date: 7/14/2020  Plan of Care Expiration Period: 9/11/2020  Insurance Authorization period Expiration: 12/31/2020  Date of Return to MD: October 2020  Visit # / Visits Authortized: 12 / 30   FOTO:  UE CVA / 40%    Time In: 10:22 AM   Time Out:  11:15 AM   Total Billable Time:  53 minutes    Precautions:  Standard and Fall      Subjective     Pt reports: "I'm going to make an appointment with my neurologist soon."  he was compliant with home exercise program given last session.   Response to previous treatment good  Functional change: able to drive easier    Pain: 0  Location: N/A    Objective     Luis received therapeutic exercises for 23 minutes including:  Pt completed the following exercises below in order to increase ROM, strength and tolerance of affected UE in order to increase IND and functional use:    Exercises Date 8/21/2020    Visit #12   UBE 90 RPM 6 min 3 min forwards 3 backwards   PROM All planes of movement   Supine Dowel 9# FF 3/10   9# dowel Chest press 3/10   Biceps 2# 3/10   Red Theraband  Rows  IR  ER 3/10     Shoulder pulleys 3 minutes      Luis Wong participated in neuromuscular re-education for 30 minutes:  Pt completed the following exercises below in order to increase ROM, strength and tolerance of affected UE in order to increase IND and functional use:     Exercises Date 8/21/2020   Reaching in low and middle plane 10x   Supination book retreival 3/10   Supination hammer 3/10   Supination activities Wringing towel  Carry books   Wrist Extensions With red band resistance " 3/10       Home Exercises and Education Provided     Education provided:   - HEP  - Progress towards goals     Written Home Exercises Provided: Patient instructed to cont prior HEP.  Exercises were reviewed and Luis was able to demonstrate them prior to the end of the session.  Luis demonstrated good  understanding of the HEP provided.   .   See EMR under Patient Instructions for exercises provided prior visit.        Assessment     Pt participated in therapeutic ex and NMR to address UE ROM and UE strength to participate in ADLs and IADLs. Pt demonstrates steady progress with PROM and in UE exercises with limitations mainly still due to pain and spasticity. Theraband exercises reintroduced today with good tolerance, but limitations in bicep flexion. Continued focus on supination activities today with good progress towards functional movement. Main limitation is tightness in forearm and weakness in biceps preventing supination, but movement is improving overall. Flexor patterns during cone reaching activities also improving. Pt would benefit from skilled OT to continue to progress towards goals and increase functional level of performance.    Luis is progressing well towards his goals and there are no updates to goals at this time. Pt prognosis is Good.     Pt will continue to benefit from skilled outpatient occupational therapy to address the deficits listed in the problem list on initial evaluation provide pt/family education and to maximize pt's level of independence in the home and community environment.     Anticipated barriers to occupational therapy: COVID-19, transportation    Pt's spiritual, cultural and educational needs considered and pt agreeable to plan of care and goals.    Goals:  Short Term Goals:  1) Initiate Hep MET 7/15/2020  2) Pt to increase LUE  strength by 5 # in order to A in self care task of feeding by 4 weeks. Progressing 8/21/2020  3) Pt will participate in bilateral  self-feeding tasks with mod A and AE PRN by 4 weeks  MET 8/14/2020  4) Pt to increase LUE AROM by 5 degrees in order to A in UB dressing by 4 weeks.  Progressing 8/21/2020  5) Patient will be able to achieve less than or equal to 35% on the FOTO, demonstrating overall improved functional ability with upper extremity. (self-care category)  Progressing 8/21/2020  6) Pt will increase QuickDASH score by 5% by 4 weeks, demonstrating improved functional ability to participate in household chores.  Progressing 8/21/2020        Long Term Goals:  1) Pt to be IND with HEP in order to maintain ROM and strength needed for self care IND by d/c.  Progressing 8/21/2020  2) Pt to increase LUE  strength to WNL as compared to unaffected extremity in order to open items for self feeding by d/c.  Progressing 8/21/2020  3) Pt will participate in bilateral self-feeding tasks with SVN and AE PRN by d/c.  Progressing 8/21/2020  4) Pt to increase LUE AROM by 10 degrees in order to A in UB dressing by d/c.  Progressing 8/21/2020  5) Patient will be able to achieve less than or equal to 30% on the FOTO, demonstrating overall improved functional ability with upper extremity. (self-care category)  Progressing 8/21/2020  6) Pt will increase QuickDASH score by 10% by d/c, demonstrating improved functional ability to participate in household chores.  Progressing 8/21/2020    Plan     Continue per POC  Updates/Grading for next session: Grade as tolerated    TABITHA AN, OT

## 2020-08-20 RX ORDER — ATORVASTATIN CALCIUM 40 MG/1
40 TABLET, FILM COATED ORAL DAILY
Qty: 90 TABLET | Refills: 3 | Status: SHIPPED | OUTPATIENT
Start: 2020-08-20 | End: 2020-08-21 | Stop reason: SDUPTHER

## 2020-08-21 ENCOUNTER — CLINICAL SUPPORT (OUTPATIENT)
Dept: REHABILITATION | Facility: HOSPITAL | Age: 65
End: 2020-08-21
Payer: MEDICARE

## 2020-08-21 DIAGNOSIS — Z74.09 IMPAIRED MOBILITY AND ACTIVITIES OF DAILY LIVING: ICD-10-CM

## 2020-08-21 DIAGNOSIS — M62.81 MUSCLE WEAKNESS: ICD-10-CM

## 2020-08-21 DIAGNOSIS — R27.8 DECREASED COORDINATION: ICD-10-CM

## 2020-08-21 DIAGNOSIS — Z78.9 IMPAIRED MOBILITY AND ACTIVITIES OF DAILY LIVING: ICD-10-CM

## 2020-08-21 PROCEDURE — 97110 THERAPEUTIC EXERCISES: CPT | Mod: PN

## 2020-08-21 PROCEDURE — 97112 NEUROMUSCULAR REEDUCATION: CPT | Mod: PN

## 2020-08-21 RX ORDER — ATORVASTATIN CALCIUM 40 MG/1
40 TABLET, FILM COATED ORAL DAILY
Qty: 90 TABLET | Refills: 0 | Status: SHIPPED | OUTPATIENT
Start: 2020-08-21 | End: 2021-05-11

## 2020-08-21 NOTE — TELEPHONE ENCOUNTER
----- Message from Claudia Smalls sent at 8/20/2020  1:37 PM CDT -----  Hey patient would like a refill on his Lipitor 40mg sent to [facility] please

## 2020-08-25 ENCOUNTER — CLINICAL SUPPORT (OUTPATIENT)
Dept: REHABILITATION | Facility: HOSPITAL | Age: 65
End: 2020-08-25
Payer: MEDICARE

## 2020-08-25 DIAGNOSIS — Z78.9 IMPAIRED MOBILITY AND ACTIVITIES OF DAILY LIVING: ICD-10-CM

## 2020-08-25 DIAGNOSIS — R27.8 DECREASED COORDINATION: ICD-10-CM

## 2020-08-25 DIAGNOSIS — M62.81 MUSCLE WEAKNESS: ICD-10-CM

## 2020-08-25 DIAGNOSIS — Z74.09 IMPAIRED MOBILITY AND ACTIVITIES OF DAILY LIVING: ICD-10-CM

## 2020-08-25 PROCEDURE — 97110 THERAPEUTIC EXERCISES: CPT | Mod: PN

## 2020-08-25 PROCEDURE — 97112 NEUROMUSCULAR REEDUCATION: CPT | Mod: PN

## 2020-08-25 NOTE — PROGRESS NOTES
"   Occupational Therapy Treatment Note     Date: 8/25/2020  Name: Luis Wong  Clinic Number: [id number]    Therapy Diagnosis:   Encounter Diagnoses   Name Primary?    Muscle weakness     Impaired mobility and activities of daily living     Decreased coordination      Physician: Brien Lopez MD     Physician Orders: Eval and treat  Medical Diagnosis: History of stroke     Onset Date: 12/12/2019  Evaluation Date: 7/14/2020  Plan of Care Expiration Period: 9/11/2020  Insurance Authorization period Expiration: 12/31/2020  Date of Return to MD: October 2020  Visit # / Visits Authortized: 13 / 30   FOTO:  UE CVA / 40%    Time In:  10:17 AM  Time Out:  11:01 AM   Total Billable Time: 45 minutes      Precautions:  Standard and Fall      Subjective     Pt reports: "This is getting easier" - referring to shoulder pulleys  he was compliant with home exercise program given last session.   Response to previous treatment good  Functional change: able to drive easier    Pain: 0  Location: N/A    Objective     Luis received therapeutic exercises for 20 minutes including:  Pt completed the following exercises below in order to increase ROM, strength and tolerance of affected UE in order to increase IND and functional use:    Exercises Date 8/25/2020    Visit #13   UBE 60 RPM 6 min 3 min forwards 3 backwards   PROM All planes of movement   Supine Dowel 9# FF 3/10   9# dowel Chest press 3/10   Biceps 2# 3/10   Shoulder pulleys 3 minutes      Luis Wong participated in neuromuscular re-education for 25 minutes:  Pt completed the following exercises below in order to increase ROM, strength and tolerance of affected UE in order to increase IND and functional use:     Exercises Date 8/25/2020   Reaching in low, middle, and high plane 2/10   Supination book retreival 3/10   Supination hammer 3/10   Supination activities Wringing towel  Carry books   Wrist Extensions With flexed elbow       Home Exercises and Education " Provided     Education provided:   - HEP  - Progress towards goals     Written Home Exercises Provided: Patient instructed to cont prior HEP.  Exercises were reviewed and Luis was able to demonstrate them prior to the end of the session.  Luis demonstrated good  understanding of the HEP provided.   .   See EMR under Patient Instructions for exercises provided prior visit.        Assessment     Pt participated in therapeutic ex and NMR to address UE ROM and UE strength to participate in ADLs and IADLs. Pt demonstrates steady progress with PROM and in UE exercises with limitations mainly still due to pain and spasticity. Continued focus on supination activities today with good progress towards functional movement. Main limitation is tightness in forearm and weakness in biceps preventing supination, but movement is improving overall. Flexor patterns during cone reaching activities also improving significantly. Pt would benefit from skilled OT to continue to progress towards goals and increase functional level of performance.    Luis is progressing well towards his goals and there are no updates to goals at this time. Pt prognosis is Good.     Pt will continue to benefit from skilled outpatient occupational therapy to address the deficits listed in the problem list on initial evaluation provide pt/family education and to maximize pt's level of independence in the home and community environment.     Anticipated barriers to occupational therapy: COVID-19, transportation    Pt's spiritual, cultural and educational needs considered and pt agreeable to plan of care and goals.    Goals:  Short Term Goals:  1) Initiate Hep MET 7/15/2020  2) Pt to increase LUE  strength by 5 # in order to A in self care task of feeding by 4 weeks. Progressing 8/25/2020  3) Pt will participate in bilateral self-feeding tasks with mod A and AE PRN by 4 weeks  MET 8/14/2020  4) Pt to increase LUE AROM by 5 degrees in order to A in UB  dressing by 4 weeks.  Progressing 8/25/2020  5) Patient will be able to achieve less than or equal to 35% on the FOTO, demonstrating overall improved functional ability with upper extremity. (self-care category)  Progressing 8/25/2020  6) Pt will increase QuickDASH score by 5% by 4 weeks, demonstrating improved functional ability to participate in household chores.  Progressing 8/25/2020        Long Term Goals:  1) Pt to be IND with HEP in order to maintain ROM and strength needed for self care IND by d/c.  Progressing 8/25/2020  2) Pt to increase LUE  strength to WNL as compared to unaffected extremity in order to open items for self feeding by d/c.  Progressing 8/25/2020  3) Pt will participate in bilateral self-feeding tasks with SVN and AE PRN by d/c.  Progressing 8/25/2020  4) Pt to increase LUE AROM by 10 degrees in order to A in UB dressing by d/c.  Progressing 8/25/2020  5) Patient will be able to achieve less than or equal to 30% on the FOTO, demonstrating overall improved functional ability with upper extremity. (self-care category)  Progressing 8/25/2020  6) Pt will increase QuickDASH score by 10% by d/c, demonstrating improved functional ability to participate in household chores.  Progressing 8/25/2020    Plan     Continue per POC  Updates/Grading for next session: Grade as tolerated    TABITHA GUSTAFSON, OT

## 2020-08-28 ENCOUNTER — CLINICAL SUPPORT (OUTPATIENT)
Dept: REHABILITATION | Facility: HOSPITAL | Age: 65
End: 2020-08-28
Payer: MEDICARE

## 2020-08-28 DIAGNOSIS — Z78.9 IMPAIRED MOBILITY AND ACTIVITIES OF DAILY LIVING: ICD-10-CM

## 2020-08-28 DIAGNOSIS — Z74.09 IMPAIRED MOBILITY AND ACTIVITIES OF DAILY LIVING: ICD-10-CM

## 2020-08-28 DIAGNOSIS — R27.8 DECREASED COORDINATION: ICD-10-CM

## 2020-08-28 DIAGNOSIS — M62.81 MUSCLE WEAKNESS: ICD-10-CM

## 2020-08-28 PROCEDURE — 97110 THERAPEUTIC EXERCISES: CPT | Mod: PN

## 2020-08-28 PROCEDURE — 97530 THERAPEUTIC ACTIVITIES: CPT | Mod: PN

## 2020-08-28 NOTE — PROGRESS NOTES
"   Occupational Therapy Treatment Note     Date: 8/28/2020  Name: Luis Wong  Clinic Number: [id number]    Therapy Diagnosis:   Encounter Diagnoses   Name Primary?    Muscle weakness     Impaired mobility and activities of daily living     Decreased coordination      Physician: Brien Lopez MD     Physician Orders: Eval and treat  Medical Diagnosis: History of stroke     Onset Date: 12/12/2019  Evaluation Date: 7/14/2020  Plan of Care Expiration Period: 9/11/2020  Insurance Authorization period Expiration: 12/31/2020  Date of Return to MD: October 2020  Visit # / Visits Authortized: 14 / 30   FOTO:  UE CVA / 40%    Time In:  10:23 AM   Time Out:   11:18 AM  Total Billable Time:  55 minutes    Precautions:  Standard and Fall      Subjective     Pt reports:  "It's just hard to get my arm to turn"  he was compliant with home exercise program given last session.   Response to previous treatment good  Functional change: able to drive easier    Pain: 0  Location: N/A    Objective     Luis received therapeutic exercises for 20 minutes including:  Pt completed the following exercises below in order to increase ROM, strength and tolerance of affected UE in order to increase IND and functional use:    Exercises Date 8/28/2020    Visit #14   UBE 60 RPM 6 min 3 min forwards 3 backwards   Biceps 2# 2/10   PROM Biceps and supination 10x   Shoulder pulleys 3 minutes   Red Theraband  Pull downs  ER/IR  Rows 3/10    ER 2/10        Luis Wong participated in therapeutic activites for 35 minutes:  Pt completed the following exercises below in order to increase ROM, strength and tolerance of affected UE in order to increase IND and functional use:     Exercises Date 8/28/2020   Stacking cans in cubboard High and low planes 2x   Tennis Racket Balance ball  Bouncing ball  Hitting ball back   Golfing Putting and practice driving   Supination activities Carry books       Home Exercises and Education Provided     Education " provided:   - HEP  - Progress towards goals   - Self stretching with dowel assist    Written Home Exercises Provided: Patient instructed to cont prior HEP.  Exercises were reviewed and Luis was able to demonstrate them prior to the end of the session.  Luis demonstrated good  understanding of the HEP provided.   .   See EMR under Patient Instructions for exercises provided prior visit.        Assessment     Pt participated in therapeutic exercises and activities to address UE ROM and UE strength to participate in ADLs and IADLs. Pt demonstrates steady progress with PROM and UE exercises and activities with main limitations of pain in certain ROM and spasticity. Main limitations with putting cans away were due to lack of supination, requiring modified grasp of cans, and compensatory shoulder flexion patterns still present. Functional supination activities introduced today with good tolerance. Difficulty with tennis activities due to limited active supination, and limited with golf club swings due to tightness in anterior shoulders. Pt educated on self ranging and stretching exercises to perform with golf club assist, and he returned good understanding. Pt would benefit from skilled OT to continue to progress towards goals and increase functional level of performance.    Luis is progressing well towards his goals and there are no updates to goals at this time. Pt prognosis is Good.     Pt will continue to benefit from skilled outpatient occupational therapy to address the deficits listed in the problem list on initial evaluation provide pt/family education and to maximize pt's level of independence in the home and community environment.     Anticipated barriers to occupational therapy: COVID-19, transportation    Pt's spiritual, cultural and educational needs considered and pt agreeable to plan of care and goals.    Goals:  Short Term Goals:  1) Initiate Hep MET 7/15/2020  2) Pt to increase LUE  strength by  5 # in order to A in self care task of feeding by 4 weeks. Progressing 8/28/2020  3) Pt will participate in bilateral self-feeding tasks with mod A and AE PRN by 4 weeks  MET 8/14/2020  4) Pt to increase LUE AROM by 5 degrees in order to A in UB dressing by 4 weeks.  Progressing 8/28/2020  5) Patient will be able to achieve less than or equal to 35% on the FOTO, demonstrating overall improved functional ability with upper extremity. (self-care category)  Progressing 8/28/2020  6) Pt will increase QuickDASH score by 5% by 4 weeks, demonstrating improved functional ability to participate in household chores.  Progressing 8/28/2020        Long Term Goals:  1) Pt to be IND with HEP in order to maintain ROM and strength needed for self care IND by d/c.  Progressing 8/28/2020  2) Pt to increase LUE  strength to WNL as compared to unaffected extremity in order to open items for self feeding by d/c.  Progressing 8/28/2020  3) Pt will participate in bilateral self-feeding tasks with SVN and AE PRN by d/c.  Progressing 8/28/2020  4) Pt to increase LUE AROM by 10 degrees in order to A in UB dressing by d/c.  Progressing 8/28/2020  5) Patient will be able to achieve less than or equal to 30% on the FOTO, demonstrating overall improved functional ability with upper extremity. (self-care category)  Progressing 8/28/2020  6) Pt will increase QuickDASH score by 10% by d/c, demonstrating improved functional ability to participate in household chores.  Progressing 8/28/2020    Plan     Continue per POC  Updates/Grading for next session: Grade as tolerated    TABITHA GUSTAFSON, OT

## 2020-08-31 NOTE — PROGRESS NOTES
Occupational Therapy Treatment Note     Date: 9/1/2020  Name: Luis Wong  Clinic Number: [id number]    Therapy Diagnosis:   Encounter Diagnoses   Name Primary?    Muscle weakness     Impaired mobility and activities of daily living     Decreased coordination      Physician: Brien Lopez MD     Physician Orders: Eval and treat  Medical Diagnosis: History of stroke     Onset Date: 12/12/2019  Evaluation Date: 7/14/2020  Plan of Care Expiration Period: 9/11/2020  Insurance Authorization period Expiration: 12/31/2020  Date of Return to MD: October 2020  Visit # / Visits Authortized: 15 / 30   FOTO:  UE CVA / 40%    Time In:    11:00 AM  Time Out:    11:46 AM  Total Billable Time:   46 minutes    Precautions:  Standard and Fall      Subjective     Pt reports:     he was compliant with home exercise program given last session.   Response to previous treatment good  Functional change: able to drive easier    Pain: 0  Location: N/A    Objective     Luis received therapeutic exercises for 20 minutes including:  Pt completed the following exercises below in order to increase ROM, strength and tolerance of affected UE in order to increase IND and functional use:    Exercises Date 9/1/2020    Visit #15   UBE 60 RPM 6 min 3 min forwards 3 backwards   Shoulder pulleys 3 minutes   Red Theraband  Pull downs  ER/IR  Rows 3/10    ER 3/10     Modified pushups 10x      Luis Wong participated in therapeutic activites for 26 minutes:  Pt completed the following exercises below in order to increase ROM, strength and tolerance of affected UE in order to increase IND and functional use:     Exercises Date 9/1/2020   Stacking cans and cones in cubboard High plane and mid plane     Golfing Practice driving   Supination activities Carry books   Bowling Activity        Home Exercises and Education Provided     Education provided:   - HEP  - Progress towards goals   - Self stretching with dowel assist    Written Home Exercises  Provided: Patient instructed to cont prior HEP.  Exercises were reviewed and Luis was able to demonstrate them prior to the end of the session.  Luis demonstrated good  understanding of the HEP provided.   .   See EMR under Patient Instructions for exercises provided prior visit.        Assessment     Pt participated in therapeutic exercises and activities to address UE ROM and UE strength to participate in ADLs and IADLs. Pt demonstrates steady progress with PROM and UE exercises and activities with main limitations of pain in certain ROM and spasticity. Main limitations with stacking cans and cones remain to be lack of supination and compensatory shoulder flexion patterns due to tightness in anterior shoulder. Bowling and golfing activities also limited due to limited wrist extension and shoulder stiffness, but pt demonstrates good range and progressed with control throughout session. Pt would benefit from skilled OT to continue to progress towards goals and increase functional level of performance.    Luis is progressing well towards his goals and there are no updates to goals at this time. Pt prognosis is Good.     Pt will continue to benefit from skilled outpatient occupational therapy to address the deficits listed in the problem list on initial evaluation provide pt/family education and to maximize pt's level of independence in the home and community environment.     Anticipated barriers to occupational therapy: COVID-19, transportation    Pt's spiritual, cultural and educational needs considered and pt agreeable to plan of care and goals.    Goals:  Short Term Goals:  1) Initiate Hep MET 7/15/2020  2) Pt to increase LUE  strength by 5 # in order to A in self care task of feeding by 4 weeks. Progressing 9/1/2020  3) Pt will participate in bilateral self-feeding tasks with mod A and AE PRN by 4 weeks  MET 8/14/2020  4) Pt to increase LUE AROM by 5 degrees in order to A in UB dressing by 4 weeks.   Progressing 9/1/2020  5) Patient will be able to achieve less than or equal to 35% on the FOTO, demonstrating overall improved functional ability with upper extremity. (self-care category)  Progressing 9/1/2020  6) Pt will increase QuickDASH score by 5% by 4 weeks, demonstrating improved functional ability to participate in household chores.  Progressing 9/1/2020        Long Term Goals:  1) Pt to be IND with HEP in order to maintain ROM and strength needed for self care IND by d/c.  Progressing 9/1/2020  2) Pt to increase LUE  strength to WNL as compared to unaffected extremity in order to open items for self feeding by d/c.  Progressing 9/1/2020  3) Pt will participate in bilateral self-feeding tasks with SVN and AE PRN by d/c.  Progressing 9/1/2020  4) Pt to increase LUE AROM by 10 degrees in order to A in UB dressing by d/c.  Progressing 9/1/2020  5) Patient will be able to achieve less than or equal to 30% on the FOTO, demonstrating overall improved functional ability with upper extremity. (self-care category)  Progressing 9/1/2020  6) Pt will increase QuickDASH score by 10% by d/c, demonstrating improved functional ability to participate in household chores.  Progressing 9/1/2020    Plan     Continue per POC  Updates/Grading for next session: Grade as tolerated    TABITHA GUSTAFSON, OT

## 2020-09-01 ENCOUNTER — CLINICAL SUPPORT (OUTPATIENT)
Dept: REHABILITATION | Facility: HOSPITAL | Age: 65
End: 2020-09-01
Payer: MEDICARE

## 2020-09-01 DIAGNOSIS — Z74.09 IMPAIRED MOBILITY AND ACTIVITIES OF DAILY LIVING: ICD-10-CM

## 2020-09-01 DIAGNOSIS — R27.8 DECREASED COORDINATION: ICD-10-CM

## 2020-09-01 DIAGNOSIS — Z78.9 IMPAIRED MOBILITY AND ACTIVITIES OF DAILY LIVING: ICD-10-CM

## 2020-09-01 DIAGNOSIS — M62.81 MUSCLE WEAKNESS: ICD-10-CM

## 2020-09-01 PROCEDURE — 97110 THERAPEUTIC EXERCISES: CPT | Mod: KX,PN

## 2020-09-01 PROCEDURE — 97530 THERAPEUTIC ACTIVITIES: CPT | Mod: KX,PN

## 2020-09-07 ENCOUNTER — CLINICAL SUPPORT (OUTPATIENT)
Dept: CARDIOLOGY | Facility: HOSPITAL | Age: 65
End: 2020-09-07
Payer: MEDICARE

## 2020-09-07 DIAGNOSIS — Z95.818 PRESENCE OF OTHER CARDIAC IMPLANTS AND GRAFTS: ICD-10-CM

## 2020-09-07 PROCEDURE — 93298 CARDIAC DEVICE CHECK - REMOTE: ICD-10-PCS | Mod: ,,, | Performed by: INTERNAL MEDICINE

## 2020-09-07 PROCEDURE — G2066 INTER DEVC REMOTE 30D: HCPCS | Performed by: INTERNAL MEDICINE

## 2020-09-07 PROCEDURE — 93298 REM INTERROG DEV EVAL SCRMS: CPT | Mod: ,,, | Performed by: INTERNAL MEDICINE

## 2020-09-08 ENCOUNTER — CLINICAL SUPPORT (OUTPATIENT)
Dept: REHABILITATION | Facility: HOSPITAL | Age: 65
End: 2020-09-08
Payer: MEDICARE

## 2020-09-08 DIAGNOSIS — R27.8 DECREASED COORDINATION: ICD-10-CM

## 2020-09-08 DIAGNOSIS — Z74.09 IMPAIRED MOBILITY AND ACTIVITIES OF DAILY LIVING: ICD-10-CM

## 2020-09-08 DIAGNOSIS — M62.81 MUSCLE WEAKNESS: ICD-10-CM

## 2020-09-08 DIAGNOSIS — Z78.9 IMPAIRED MOBILITY AND ACTIVITIES OF DAILY LIVING: ICD-10-CM

## 2020-09-08 PROCEDURE — 97530 THERAPEUTIC ACTIVITIES: CPT | Mod: KX,PN

## 2020-09-08 PROCEDURE — 97110 THERAPEUTIC EXERCISES: CPT | Mod: KX,PN

## 2020-09-08 NOTE — PROGRESS NOTES
"   Occupational Therapy Treatment Note     Date: 9/8/2020  Name: Luis Wong  Clinic Number: [id number]    Therapy Diagnosis:   Encounter Diagnoses   Name Primary?    Muscle weakness     Impaired mobility and activities of daily living     Decreased coordination      Physician: Brien Lopez MD     Physician Orders: Eval and treat  Medical Diagnosis: History of stroke     Onset Date: 12/12/2019  Evaluation Date: 7/14/2020  Plan of Care Expiration Period: 9/11/2020  Insurance Authorization period Expiration: 12/31/2020  Date of Return to MD: October 2020  Visit # / Visits Authortized: 16 / 30   FOTO:  UE CVA / 40%    Time In:  11:02 AM    Time Out:  11:45 AM     Total Billable Time:    43 minutes    Precautions:  Standard and Fall      Subjective     Pt reports:   "I mowed the lawn this weekend."  he was compliant with home exercise program given last session.   Response to previous treatment good  Functional change: able to drive easier    Pain: 0  Location: N/A    Objective     Luis received therapeutic exercises for 20 minutes including:  Pt completed the following exercises below in order to increase ROM, strength and tolerance of affected UE in order to increase IND and functional use:    Exercises Date 9/8/2020    Visit #16   UBE 60 RPM 6 min 3 min forwards 3 backwards   Shoulder pulleys 3 minutes   Red Theraband  Pull downs  Rows 3/10    ER 3/10     Modified pushups 10x   Seated chess press  Bicep Curls 4# dowel 2/15        Luis Wong participated in therapeutic activites for 23 minutes:  Pt completed the following exercises below in order to increase ROM, strength and tolerance of affected UE in order to increase IND and functional use:     Exercises Date 9/8/2020   Stacking cans and cones in cubboard High plane and mid plane     Supination Hammer 2/15   Supination activities Carry books    Tennis racket Ball balance       Home Exercises and Education Provided     Education provided:   - HEP  - " Progress towards goals   - Self stretching with dowel assist    Written Home Exercises Provided: Patient instructed to cont prior HEP.  Exercises were reviewed and Luis was able to demonstrate them prior to the end of the session.  Luis demonstrated good  understanding of the HEP provided.   .   See EMR under Patient Instructions for exercises provided prior visit.        Assessment     Pt participated in therapeutic exercises and activities to address UE ROM and UE strength to participate in ADLs and IADLs. Pt demonstrates steady progress with PROM and UE exercises and activities with main limitations of pain in certain ROM and spasticity. Main limitations with stacking cans and cones remain to be lack of supination and compensatory shoulder flexion patterns due to tightness in anterior shoulder. Supination activities are progressing however, with demonstrated control and increase in supination AROM. Pt would benefit from skilled OT to continue to progress towards goals and increase functional level of performance.    Luis is progressing well towards his goals and there are no updates to goals at this time. Pt prognosis is Good.     Pt will continue to benefit from skilled outpatient occupational therapy to address the deficits listed in the problem list on initial evaluation provide pt/family education and to maximize pt's level of independence in the home and community environment.     Anticipated barriers to occupational therapy: COVID-19, transportation    Pt's spiritual, cultural and educational needs considered and pt agreeable to plan of care and goals.    Goals:  Short Term Goals:  1) Initiate Hep MET 7/15/2020  2) Pt to increase LUE  strength by 5 # in order to A in self care task of feeding by 4 weeks. Progressing 9/8/2020  3) Pt will participate in bilateral self-feeding tasks with mod A and AE PRN by 4 weeks  MET 8/14/2020  4) Pt to increase LUE AROM by 5 degrees in order to A in UB dressing  by 4 weeks.  Progressing 9/8/2020  5) Patient will be able to achieve less than or equal to 35% on the FOTO, demonstrating overall improved functional ability with upper extremity. (self-care category)  Progressing 9/8/2020  6) Pt will increase QuickDASH score by 5% by 4 weeks, demonstrating improved functional ability to participate in household chores.  Progressing 9/8/2020        Long Term Goals:  1) Pt to be IND with HEP in order to maintain ROM and strength needed for self care IND by d/c.  Progressing 9/8/2020  2) Pt to increase LUE  strength to WNL as compared to unaffected extremity in order to open items for self feeding by d/c.  Progressing 9/8/2020  3) Pt will participate in bilateral self-feeding tasks with SVN and AE PRN by d/c.  Progressing 9/8/2020  4) Pt to increase LUE AROM by 10 degrees in order to A in UB dressing by d/c.  Progressing 9/8/2020  5) Patient will be able to achieve less than or equal to 30% on the FOTO, demonstrating overall improved functional ability with upper extremity. (self-care category)  Progressing 9/8/2020  6) Pt will increase QuickDASH score by 10% by d/c, demonstrating improved functional ability to participate in household chores.  Progressing 9/8/2020    Plan     Continue per POC  Updates/Grading for next session: Grade as tolerated    TABITHA GUSTAFSON, OT

## 2020-09-10 ENCOUNTER — CLINICAL SUPPORT (OUTPATIENT)
Dept: REHABILITATION | Facility: HOSPITAL | Age: 65
End: 2020-09-10
Payer: MEDICARE

## 2020-09-10 DIAGNOSIS — Z78.9 IMPAIRED MOBILITY AND ACTIVITIES OF DAILY LIVING: ICD-10-CM

## 2020-09-10 DIAGNOSIS — R27.8 DECREASED COORDINATION: ICD-10-CM

## 2020-09-10 DIAGNOSIS — M62.81 MUSCLE WEAKNESS: ICD-10-CM

## 2020-09-10 DIAGNOSIS — Z74.09 IMPAIRED MOBILITY AND ACTIVITIES OF DAILY LIVING: ICD-10-CM

## 2020-09-10 PROCEDURE — 97110 THERAPEUTIC EXERCISES: CPT | Mod: KX,PN

## 2020-09-10 NOTE — PROGRESS NOTES
"     Occupational Therapy Treatment Note     Date: 9/10/2020  Name: Luis Wong  Clinic Number: [id number]    Therapy Diagnosis:   Encounter Diagnoses   Name Primary?    Muscle weakness     Impaired mobility and activities of daily living     Decreased coordination      Physician: Brien Lopez MD     Physician Orders: Eval and treat  Medical Diagnosis: History of stroke     Onset Date: 12/12/2019  Evaluation Date: 7/14/2020  Plan of Care Expiration Period: 9/11/2020  Insurance Authorization period Expiration: 12/31/2020  Date of Return to MD: October 2020  Visit # / Visits Authortized: 17 / 30   FOTO:  UE CVA / 47%     Time In: 9:26 AM     Time Out:  10:20 AM     Total Billable Time:  54 minutes        Precautions:  Standard and Fall      Subjective     Pt reports:    "I got a resistance machine at home to work out"  he was compliant with home exercise program given last session.   Response to previous treatment good  Functional change: able to drive easier    Pain: 0  Location: N/A    Objective     Luis received therapeutic exercises for 15 minutes including:  Pt completed the following exercises below in order to increase ROM, strength and tolerance of affected UE in order to increase IND and functional use:    Exercises Date 9/10/2020    Visit #17   UBE 60 RPM 6 min 3 min forwards 3 backwards   PROM all planes of movement 10x   [place] theraputy Pinch and pulls  Roll and pinch  Flatten               39 minutes were spent taking remeasurements for updated plan of care.    Home Exercises and Education Provided     Education provided:   - HEP  - Progress towards goals   - putty exercises    Written Home Exercises Provided: Patient instructed to cont prior HEP.  Exercises were reviewed and Luis was able to demonstrate them prior to the end of the session.  Luis demonstrated good  understanding of the HEP provided.   .   See EMR under Patient Instructions for exercises provided prior visit.    "     Assessment     Pt participated in therapeutic exercisesto address UE ROM and UE strength to participate in ADLs and IADLs. Pt demonstrates improved range with LUE, but still not at full ROM due to spasticity and weakness. Putty exercises emphasized today to increase  strength to participate in ADLs and IADLs with good tolerance. Pt encouraged to continue exercises at home and maintain stretching at home as well. He demonstrated good understanding of information provided. Remeasurements were taken today for updated plan of care with noted improvement in all areas except similar measurements with shoulder extension and L pinch strength. Pt would benefit from skilled OT to continue to progress towards goals and increase functional level of performance.    Luis is progressing well towards his goals and there are no updates to goals at this time. Pt prognosis is Good.     Pt will continue to benefit from skilled outpatient occupational therapy to address the deficits listed in the problem list on initial evaluation provide pt/family education and to maximize pt's level of independence in the home and community environment.     Anticipated barriers to occupational therapy: COVID-19, transportation    Pt's spiritual, cultural and educational needs considered and pt agreeable to plan of care and goals.    Goals:  Short Term Goals:  1) Initiate Hep MET 7/15/2020  2) Pt to increase LUE  strength by 5 # in order to A in self care task of feeding by 4 weeks. MET 9/10/2020  3) Pt will participate in bilateral self-feeding tasks with mod A and AE PRN by 4 weeks  MET 8/14/2020  4) Pt to increase LUE AROM by 5 degrees in order to A in UB dressing by 4 weeks.  Progressing 9/10/2020  5) Patient will be able to achieve less than or equal to 35% on the FOTO, demonstrating overall improved functional ability with upper extremity. (self-care category)  Progressing 9/10/2020  6) Pt will increase QuickDASH score by 5% by 4  weeks, demonstrating improved functional ability to participate in household chores.  MET 9/10/2020      Long Term Goals:  1) Pt to be IND with HEP in order to maintain ROM and strength needed for self care IND by d/c.  MET 9/10/2020  2) Pt to increase LUE  strength to WNL as compared to unaffected extremity in order to open items for self feeding by d/c.  Progressing 9/10/2020  3) Pt will participate in bilateral self-feeding tasks with SVN and AE PRN by d/c.  Progressing 9/10/2020  4) Pt to increase LUE AROM by 10 degrees in order to A in UB dressing by d/c.  Progressing 9/10/2020  5) Patient will be able to achieve less than or equal to 30% on the FOTO, demonstrating overall improved functional ability with upper extremity. (self-care category)  Progressing 9/10/2020  6) Pt will increase QuickDASH score by 10% by d/c, demonstrating improved functional ability to participate in household chores.  MET 9/10/2020    Plan     Continue per POC  Updates/Grading for next session: Grade as tolerated    TABITHA GUSTAFSON, OT

## 2020-09-10 NOTE — PLAN OF CARE
"  Outpatient Therapy Updated Plan of Care     Visit Date: 9/10/2020  Name: Luis Wong  Clinic Number: [id number]    Therapy Diagnosis:   Encounter Diagnoses   Name Primary?    Muscle weakness     Impaired mobility and activities of daily living     Decreased coordination      Physician: Brien Lopez MD    Physician Orders: Eval and treat  Medical Diagnosis: History of Stroke  Evaluation Date: 7/14/2020    Total Visits Received: 16  Cancelled Visits: 0  No Show Visits: 0    Current Certification Period:  5/21/2020 to 12/31/2020  Precautions:  Standard and Fall  Visits from Evaluation Date:  17  Functional Level Prior to Evaluation:  Mod I    Subjective     Update: "I got a resistance machine at home to work out"    Objective     Update:  Joint Evaluation  AROM  7/14/2020 PROM   7/14/2020 AROM  7/14/2020 PROM   7/14/2020 AROM  9/10/2020 PROM  9/10/2020     Right Right Left Left Left Left   Shoulder flex 0-180 WNL WNL 86 115 115 145   Shoulder Abd 0-180 WNL WNL 88 90 110 100   Shoulder ER 0-90 WNL WNL 24 41 30 53   Shoulder IR 0-90 WNL WNL Waist 74 Waistline 90   Shoulder Extension 0-80 WNL WNL 47 NT 50 80   Shoulder Horizontal adduction 0-90 WNL WNL 5 NT 10 WNL   Elbow flex/ext 0-150 WNL WNL WNL/32 WNL WNL/170 ext WNL   Wrist flex 0-80 WNL WNL 72 WNL 68 WNL   Wrist ext 0-70 WNL WNL 40 WNL 48 WNL   Supination 0-80 WNL WNL NT WNL 62 WNL   Pronation 0-80 WNL WNL NT WNL WNL WNL   UD WNL WNL 10 WNL 24 WNL   RD WNL WNL 20 WNL 32 WNL      Fist: loose     Strength 7/14/2020 7/14/2020 9/10/2020   **within available ROM** Right Left Left   Shoulder flex 5/5 3-/5 3/5   Shoulder abd 5/5 3-/5 3/5   Shoulder ER 5/5 3-/5 3-/5   Shoulder IR 5/5 2-/5 3-/5   Shoulder Extension 5/5 2-/5 3/5   Shoulder Horizontal adduction 5/5 2-/5 3-/5   Elbow flex 5/5 3+/5 4/5   Elbow ext 5/5 3-/5 3/5   Wrist flex 5/5 3/5 3/5   Wrist ext 5/5 3/5 3/5   Supination 5/5 3-/5 3/5   Pronation 5/5 3-/5 3/5   UD 5/5 3/5 3/5   RD 5/5 3/5 3/5       " Strength: (ANTONIO Dynamometer in lbs.) Average 3 trials, Position II:       7/14/2020 7/14/2020 9/10/2020 9/10/2020     Right Left Right Left   Rung II 65# 27# 65# 35#      Pinch Strength (Measured in psi)       7/14/2020 7/14/2020 9/10/2020 9/10/2020     Right Left Right Left   Key Pinch 21psi 11 psi  16 psi 10 psi   3pt Pinch 17 psi 10 psi 15 psi 7 psi   2pt Pinch 11 psi 8 psi 10 psi 5 psi          Assessment     Update: Pt participated in remeasurements today for updated plan of care. All measurements have improved, except shoulder extension and pincher strength. Today's assessment demonstrates good progress towards goals, and pt also reports increased participation in functional activities at home and in the community. Main limitations remain to be spasticity in LUE and lack of hand coordination, but pt is motivated and engages LUE with everyday activities at home.    Previous Short Term Goals Status:  5/6 MET  New Short Term Goals Status:   Short Term Goals:  1) Initiate Hep MET 7/15/2020  2) Pt to increase LUE  strength by 5 # in order to A in self care task of feeding by 4 weeks. MET  9/10/2020  3) Pt will participate in bilateral self-feeding tasks with mod A and AE PRN by 4 weeks  MET 8/14/2020  4) Pt to increase LUE AROM by 5 degrees in order to A in UB dressing by 4 weeks.  MET 9/10/2020  5) Patient will be able to achieve less than or equal to 35% on the FOTO, demonstrating overall improved functional ability with upper extremity. (self-care category)  Progressing 9/10/2020  6) Pt will increase QuickDASH score by 5% by 4 weeks, demonstrating improved functional ability to participate in household chores. MET 9/10/2020     Long Term Goal Status:   continue per initial plan of care.  Long Term Goals:  1) Pt to be IND with HEP in order to maintain ROM and strength needed for self care IND by d/c.  Progressing 9/10/2020  2) Pt to increase LUE  strength to WNL as compared to unaffected extremity in  order to open items for self feeding by d/c.  NOT MET  3) Pt will participate in bilateral self-feeding tasks with SVN and AE PRN by d/c.  NOT MET  4) Pt to increase LUE AROM by 10 degrees in order to A in UB dressing by d/c.  NOT MET  5) Patient will be able to achieve less than or equal to 30% on the FOTO, demonstrating overall improved functional ability with upper extremity. (self-care category)  Progressing 9/10/2020  6) Pt will increase QuickDASH score by 10% by d/c, demonstrating improved functional ability to participate in household chores.  MET 9/10/2020    Reasons for Recertification of Therapy:   Pt would benefit from continued skilled OT to continue progress towards goals and increasing engagement and independence with ADLs and IADLs.    Plan     Updated Certification Period: 9/10/2020 to 11/6/2020  Recommended Treatment Plan: 2 times per week for 8 weeks: Electrical Stimulation PRN, Manual Therapy, Moist Heat/ Ice, Neuromuscular Re-ed, Patient Education, Self Care, Therapeutic Activites and Therapeutic Exercise  Other Recommendations: None    TABITHA AN, OT  9/10/2020      I CERTIFY THE NEED FOR THESE SERVICES FURNISHED UNDER THIS PLAN OF TREATMENT AND WHILE UNDER MY CARE    Physician's comments:        Physician's Signature: ___________________________________________________

## 2020-09-15 ENCOUNTER — CLINICAL SUPPORT (OUTPATIENT)
Dept: REHABILITATION | Facility: HOSPITAL | Age: 65
End: 2020-09-15
Payer: MEDICARE

## 2020-09-15 DIAGNOSIS — Z78.9 IMPAIRED MOBILITY AND ACTIVITIES OF DAILY LIVING: ICD-10-CM

## 2020-09-15 DIAGNOSIS — Z74.09 IMPAIRED MOBILITY AND ACTIVITIES OF DAILY LIVING: ICD-10-CM

## 2020-09-15 DIAGNOSIS — M62.81 MUSCLE WEAKNESS: ICD-10-CM

## 2020-09-15 DIAGNOSIS — R27.8 DECREASED COORDINATION: ICD-10-CM

## 2020-09-15 PROCEDURE — 97530 THERAPEUTIC ACTIVITIES: CPT | Mod: PN

## 2020-09-15 PROCEDURE — 97110 THERAPEUTIC EXERCISES: CPT | Mod: PN

## 2020-09-15 PROCEDURE — 97112 NEUROMUSCULAR REEDUCATION: CPT | Mod: PN

## 2020-09-15 NOTE — PROGRESS NOTES
"     Occupational Therapy Treatment Note     Date: 9/15/2020  Name: Luis Wong  Clinic Number: [id number]    Therapy Diagnosis:   Encounter Diagnoses   Name Primary?    Muscle weakness     Impaired mobility and activities of daily living     Decreased coordination      Physician: Brien Lopez MD     Physician Orders: Eval and treat  Medical Diagnosis: History of stroke     Onset Date: 12/12/2019  Evaluation Date: 7/14/2020  Plan of Care Expiration Period: 11/6/20  Insurance Authorization period Expiration: 12/31/2020  Date of Return to MD: October 2020  Visit # / Visits Authortized: 18 / 30   FOTO:  UE CVA / 47%     Time In: 11:30  AM     Time Out:  12:15 AM     Total Billable Time:  45 minutes        Precautions:  Standard and Fall      Subjective     Pt reports:    "Its going good I been doing more at home and everything at home is good. "  he was compliant with home exercise program given last session.   Response to previous treatment good  Functional change: able to drive easier    Pain: 0  Location: N/A    Objective     Luis received therapeutic exercises for 10 minutes including:  Pt completed the following exercises below in order to increase ROM, strength and tolerance of affected UE in order to increase IND and functional use:    Exercises Date 9/15/2020    Visit #17   UBE 60 RPM 6 min 3 min forwards 3 backwards   PROM all planes of movement 10x                  Luis Wong participated in therapeutic activites for 10 minutes:  Pt completed the following exercises below in order to increase ROM, strength and tolerance of affected UE in order to increase IND and functional use:     Exercises Date 9/15/2020     Cone stacking challenge with focus on NDT movements ( PNF patterns D1 and D2)    Supination Hammer 2/15        Tennis racket Ball balance        Luis participated in neuromuscular re-education activities to improve: Coordination for 15 minutes. The following activities were " included:  Supine NDT movements Large circles  CW/CCW  X 20    Supine NDT PNF pattern D1 and D2 X 20 each    Estim to extensor bundle of L forearm  5 minutes  150 hz 300 WL intensity of 5 bars.          Home Exercises and Education Provided     Education provided:   - HEP  - Progress towards goals   - putty exercises    Written Home Exercises Provided: Patient instructed to cont prior HEP.  Exercises were reviewed and Luis was able to demonstrate them prior to the end of the session.  Luis demonstrated good  understanding of the HEP provided.   .   See EMR under Patient Instructions for exercises provided prior visit.        Assessment     Pt tolerated session well. Main focus being proximal muscle strengthening and GM control as well as functional grasp and release in the hand. He is doing well with only minor tone, stiffness and weakness in the LUE limiting performance. He did well with supine exercises focusing on GM movements in all planes. No pain noted with these exercises only weakness with specifically ER and abduction of the shoulder. Supine exercises translated to standing NDT PNF movements reaching shoulder height and crossing midline in multiple planes. Main limitation with this was compensatory tech in the shoulder with FF and decreased supination and synergistic control of movements of the shoulder. Tolerated estim well with good muscle response focusing on extension. Pt continued focus on LUE strengthening, ROM and coordination in future sessions.      Luis is progressing well towards his goals and there are no updates to goals at this time. Pt prognosis is Good.     Pt will continue to benefit from skilled outpatient occupational therapy to address the deficits listed in the problem list on initial evaluation provide pt/family education and to maximize pt's level of independence in the home and community environment.     Anticipated barriers to occupational therapy: COVID-19,  transportation    Pt's spiritual, cultural and educational needs considered and pt agreeable to plan of care and goals.    Goals:  Short Term Goals:  1) Initiate Hep MET 7/15/2020  2) Pt to increase LUE  strength by 5 # in order to A in self care task of feeding by 4 weeks. MET 9/10/2020  3) Pt will participate in bilateral self-feeding tasks with mod A and AE PRN by 4 weeks  MET 8/14/2020  4) Pt to increase LUE AROM by 5 degrees in order to A in UB dressing by 4 weeks.  Progressing 9/15/2020  5) Patient will be able to achieve less than or equal to 35% on the FOTO, demonstrating overall improved functional ability with upper extremity. (self-care category)  Progressing 9/15/2020  6) Pt will increase QuickDASH score by 5% by 4 weeks, demonstrating improved functional ability to participate in household chores.  MET 9/10/2020      Long Term Goals:  1) Pt to be IND with HEP in order to maintain ROM and strength needed for self care IND by d/c.  MET 9/10/2020  2) Pt to increase LUE  strength to WNL as compared to unaffected extremity in order to open items for self feeding by d/c.  Progressing 9/15/2020  3) Pt will participate in bilateral self-feeding tasks with SVN and AE PRN by d/c.  Progressing 9/15/2020  4) Pt to increase LUE AROM by 10 degrees in order to A in UB dressing by d/c.  Progressing 9/15/2020  5) Patient will be able to achieve less than or equal to 30% on the FOTO, demonstrating overall improved functional ability with upper extremity. (self-care category)  Progressing 9/15/2020  6) Pt will increase QuickDASH score by 10% by d/c, demonstrating improved functional ability to participate in household chores.  MET 9/10/2020    Plan     Continue per POC  Updates/Grading for next session: Grade as tolerated    Christian Teran OT

## 2020-09-17 ENCOUNTER — CLINICAL SUPPORT (OUTPATIENT)
Dept: REHABILITATION | Facility: HOSPITAL | Age: 65
End: 2020-09-17
Payer: MEDICARE

## 2020-09-17 DIAGNOSIS — Z74.09 IMPAIRED MOBILITY AND ACTIVITIES OF DAILY LIVING: ICD-10-CM

## 2020-09-17 DIAGNOSIS — R27.8 DECREASED COORDINATION: ICD-10-CM

## 2020-09-17 DIAGNOSIS — Z78.9 IMPAIRED MOBILITY AND ACTIVITIES OF DAILY LIVING: ICD-10-CM

## 2020-09-17 DIAGNOSIS — M62.81 MUSCLE WEAKNESS: ICD-10-CM

## 2020-09-17 PROCEDURE — 97112 NEUROMUSCULAR REEDUCATION: CPT | Mod: KX,PN

## 2020-09-17 PROCEDURE — 97110 THERAPEUTIC EXERCISES: CPT | Mod: KX,PN

## 2020-09-17 NOTE — PROGRESS NOTES
"     Occupational Therapy Treatment Note     Date: 9/17/2020  Name: Luis Wong  Clinic Number: [id number]    Therapy Diagnosis:   Encounter Diagnoses   Name Primary?    Muscle weakness     Impaired mobility and activities of daily living     Decreased coordination      Physician: Brien Lopez MD     Physician Orders: Eval and treat  Medical Diagnosis: History of stroke     Onset Date: 12/12/2019  Evaluation Date: 7/14/2020  Plan of Care Expiration Period: 11/6/20  Insurance Authorization period Expiration: 12/31/2020  Date of Return to MD: October 2020  Visit # / Visits Authortized: 18 / 30   FOTO:  UE CVA / 47%     Time In:   10:52 AM  Time Out:    11:40 AM    Total Billable Time:   48 minutes    Precautions:  Standard and Fall      Subjective     Pt reports:  "I want to work more on my wrist and hands"  he was compliant with home exercise program given last session.   Response to previous treatment good  Functional change: able to drive easier    Pain: 0  Location: N/A    Objective     Luis received therapeutic exercises for 20 minutes including:  Pt completed the following exercises below in order to increase ROM, strength and tolerance of affected UE in order to increase IND and functional use:    Exercises Date 9/17/2020    Visit #18   UBE 60 RPM 6 min 3 min forwards 3 backwards   PROM all planes of movement 10x   Shoulder pulleys 3 minutes   Bicep Curls 2# 2/15   Seated FF  Seated Chest Press 2# dowel         Luis participated in neuromuscular re-education activities to improve: Coordination for 28 minutes. The following activities were included:    PNF D1 and D2 2/10   Estim to extensor bundle of L forearm     supinators 5 minutes  150 hz 300 WL intensity of 5 bars.   Hand gripper  75 Hz 300 WL, level 5  Hammer supination   Cone reaching with focus on keeping wrist in neutral 2/10         Home Exercises and Education Provided     Education provided:   - HEP  - Progress towards goals     Written " Home Exercises Provided: Patient instructed to cont prior HEP.  Exercises were reviewed and Luis was able to demonstrate them prior to the end of the session.  Luis demonstrated good  understanding of the HEP provided.   .   See EMR under Patient Instructions for exercises provided prior visit.        Assessment     Pt participated in therapeutic exercises and NMR to address functional shoulder strength and range and functional hand grasp and release. Main limitations are tone and stiffness in LUE. However, pt demonstrates good progress with exercises with increased supination and smoother shoulder movement patterns from previous sessions. Estim exercises were tolerated well. Continued focus on LUE strengthening, ROM and coordination in future sessions.      Luis is progressing well towards his goals and there are no updates to goals at this time. Pt prognosis is Good.     Pt will continue to benefit from skilled outpatient occupational therapy to address the deficits listed in the problem list on initial evaluation provide pt/family education and to maximize pt's level of independence in the home and community environment.     Anticipated barriers to occupational therapy: COVID-19, transportation    Pt's spiritual, cultural and educational needs considered and pt agreeable to plan of care and goals.    Goals:  Short Term Goals:  1) Initiate Hep MET 7/15/2020  2) Pt to increase LUE  strength by 5 # in order to A in self care task of feeding by 4 weeks. MET 9/10/2020  3) Pt will participate in bilateral self-feeding tasks with mod A and AE PRN by 4 weeks  MET 8/14/2020  4) Pt to increase LUE AROM by 5 degrees in order to A in UB dressing by 4 weeks.  Progressing 9/17/2020  5) Patient will be able to achieve less than or equal to 35% on the FOTO, demonstrating overall improved functional ability with upper extremity. (self-care category)  Progressing 9/17/2020  6) Pt will increase QuickDASH score by 5% by 4  weeks, demonstrating improved functional ability to participate in household chores.  MET 9/10/2020      Long Term Goals:  1) Pt to be IND with HEP in order to maintain ROM and strength needed for self care IND by d/c.  MET 9/10/2020  2) Pt to increase LUE  strength to WNL as compared to unaffected extremity in order to open items for self feeding by d/c.  Progressing 9/17/2020  3) Pt will participate in bilateral self-feeding tasks with SVN and AE PRN by d/c.  Progressing 9/17/2020  4) Pt to increase LUE AROM by 10 degrees in order to A in UB dressing by d/c.  Progressing 9/17/2020  5) Patient will be able to achieve less than or equal to 30% on the FOTO, demonstrating overall improved functional ability with upper extremity. (self-care category)  Progressing 9/17/2020  6) Pt will increase QuickDASH score by 10% by d/c, demonstrating improved functional ability to participate in household chores.  MET 9/10/2020    Plan     Continue per POC  Updates/Grading for next session: Grade as tolerated    TABITHA GUSTAFSON, OT

## 2020-09-18 NOTE — DISCHARGE SUMMARY
[facility] Discharge Summary    Primary Team: [facility] Team A  Attending Physician: Darya Evans  Resident: Harley Tinoco  Intern: Meron Thornton    Date of Admit: 6/26/2018  Date of Discharge: 6/27/18    Discharge to: home  Condition: stable     Discharge Diagnoses     Patient Active Problem List   Diagnosis    HTN (hypertension)    Nuclear sclerosis    Abnormal gait    Hyperglycemia    Mixed hyperlipidemia    Cataracts, bilateral    Special screening for malignant neoplasms, colon    Left leg weakness    Right-sided lacunar stroke    Nontraumatic thalamic hemorrhage    Pre-diabetes    Stroke    Dysarthria    Normocytic anemia       Consultants and Procedures     Consultants:  Vascular Neurology    Procedures:   TTE    Brief History of Present Illness      Pt is a 61 yo male, PMH of HTN, HLD, ICH 2012, DMII presenting with Left leg weakness and slurred speech, L facial droop x 1 day. Pt reports yesterday around 6 pm he developed left leg weakness that persisted for 2 hrs. Pt reports he was dragging his leg.  Pt reports he had no LE numbness. When he stood up it was resolved. On day of admit he reports he woke up with left lower facial droop, slurred speech and numbness around his left lip. Also reported numbness in his left arm. Reports slight vertigo as well. Pt taken to [facility] ED. Stroke activated, [facility] neuro consulted. CT head chronic changes but no acute bleed. Of note, pt reports ICH 2012 though at that time had full unilateral L sided facial numbness. He reports compliance with his BP meds and does not take ASA or statin. No cp, palpitations, shortness of breath, fevers, Ur sx.     For the full HPI please refer to the History & Physical from this admission.    Hospital Course By Problem with Pertinent Findings     Acute CVA of R Parietal Lobe  -Upon admission, 1 day of LE weakness, dysarthria, facial/L UE numbness  -Initially hypertensive, 's  which resolved without meds  -CT head  Report given to TOSHIA Blackburn.   with chronic changes, no acute bleed, EKG sinus karen  -Vasc neuro recs include ASA 81 and load plavix if + stroke however NIHSS 5  -MRI brain revealing acute infarct of R parietal lobe/right corona radiata, sequela of multifocal infarcts with remote hemosiderin deposition.  -MRA of Head/Neck revealing stable narrowing of the P1 segment of the left PCA   -TTE demonstrating EF 60-65%; grade 1 diastolic dysfunction.  -PT/OT recs pending  -Allowing for permissive HTN, will likely start BP meds upon discharge  -Continuing ASA 81mg qD  -Increasing atorvastatin to 80mg qD     Benign Essential HTN  -On amlodipine 5mg and lisinopril 20 mg qd  -Holding as above  -BP this /79     Controlled DM II with no complications  -A1C 6.4 (1/9)  -Repeat A1C 5.8%  -SSI/accuchecks     HLD  -Per lipid profile  -Started high intensity statin as above     Normocytic anemia  -Iron Profile WNL  -Folate WNL  -B12 >2000     Leukopenia  -WBC 3.58  -Will monitor     HM  -Refuses flu, Tdap UTD, CSC UTD    Discharge Medications        Medication List      START taking these medications    aspirin 81 MG EC tablet  Commonly known as:  ECOTRIN  Take 1 tablet (81 mg total) by mouth once daily.     atorvastatin 80 MG tablet  Commonly known as:  LIPITOR  Take 1 tablet (80 mg total) by mouth once daily.     hydroCHLOROthiazide 12.5 mg capsule  Commonly known as:  MICROZIDE  Take 1 capsule (12.5 mg total) by mouth once daily.        CONTINUE taking these medications    gabapentin 300 MG capsule  Commonly known as:  NEURONTIN  TAKE 1 CAPSULE (300 MG TOTAL) BY MOUTH EVERY EVENING.     lisinopril 20 MG tablet  Commonly known as:  PRINIVIL,ZESTRIL  TAKE 1 TABLET BY MOUTH DAILY        STOP taking these medications    amLODIPine 5 MG tablet  Commonly known as:  NORVASC           Where to Get Your Medications      These medications were sent to [facility] [address], [place] [address]    Hours:  Mon-Fri, 8a-5:30p Phone:   [phone]   · aspirin 81 MG EC tablet  · atorvastatin 80 MG tablet  · hydroCHLOROthiazide 12.5 mg capsule         Discharge Information:   Diet:  regular    Physical Activity:  As tolerated     Instructions:  1. Take all medications as prescribed  2. Keep all follow-up appointments  3. Return to the hospital or call your primary care physicians if any worsening symptoms such as fever, chills, persistent nausea/vomiting, shortness of breath or chest pain occur.    Follow-Up Appointments:  Follow-up Information     Juan Eduardo MD In 2 weeks.    Specialty:  Family Medicine  Contact information:  [address] [facility] [place] [address] [phone]             [facility] VASCULAR NEUROLOGY In 1 month.    Specialty:  Vascular Neurology  Contact information:  [address]  [facility] [id number] [phone]           Ted Yost MD.    Specialty:  Neurology  Contact information:  [address]  [facility] [id number] [phone]                     Meron Thornton  [facility], -1

## 2020-09-22 ENCOUNTER — CLINICAL SUPPORT (OUTPATIENT)
Dept: REHABILITATION | Facility: HOSPITAL | Age: 65
End: 2020-09-22
Payer: MEDICARE

## 2020-09-22 DIAGNOSIS — Z74.09 IMPAIRED MOBILITY AND ACTIVITIES OF DAILY LIVING: ICD-10-CM

## 2020-09-22 DIAGNOSIS — R27.8 DECREASED COORDINATION: ICD-10-CM

## 2020-09-22 DIAGNOSIS — M62.81 MUSCLE WEAKNESS: ICD-10-CM

## 2020-09-22 DIAGNOSIS — Z78.9 IMPAIRED MOBILITY AND ACTIVITIES OF DAILY LIVING: ICD-10-CM

## 2020-09-22 PROCEDURE — 97112 NEUROMUSCULAR REEDUCATION: CPT | Mod: PN

## 2020-09-22 PROCEDURE — 97110 THERAPEUTIC EXERCISES: CPT | Mod: PN

## 2020-09-22 NOTE — PROGRESS NOTES
"     Occupational Therapy Treatment Note     Date: 9/22/2020  Name: Luis Wong  Clinic Number: [id number]    Therapy Diagnosis:   Encounter Diagnoses   Name Primary?    Muscle weakness     Impaired mobility and activities of daily living     Decreased coordination      Physician: Brien Lopez MD     Physician Orders: Eval and treat  Medical Diagnosis: History of stroke     Onset Date: 12/12/2019  Evaluation Date: 7/14/2020  Plan of Care Expiration Period: 11/6/20  Insurance Authorization period Expiration: 12/31/2020  Date of Return to MD: October 2020  Visit # / Visits Authortized: 20 / 30   FOTO:  UE CVA / 47%     Time In:  10:54 AM  Time Out:  11:43 AM     Total Billable Time:  49 minutes    Precautions:  Standard and Fall      Subjective     Pt reports: "I didn't get to mow my lawn this week"   he was compliant with home exercise program given last session.   Response to previous treatment good  Functional change: able to drive easier    Pain: 0  Location: N/A    Objective     Luis received therapeutic exercises for 29 minutes including:  Pt completed the following exercises below in order to increase ROM, strength and tolerance of affected UE in order to increase IND and functional use:    Exercises Date 9/22/2020    Visit #20   UBE 60 RPM 6 min 3 min forwards 3 backwards   PROM all planes of movement 10x   Shoulder pulleys 3 minutes   Theraband Green  Rows 2/15  Lats 2/15   Bicep Curls 2# 2/15   Seated FF  Seated Chest Press 5# dowel         Luis participated in neuromuscular re-education activities to improve: Coordination for 20 minutes. The following activities were included:    PNF D1 and D2 2/10 first set with 2# db   Hammer sup/pro 2/10   Cone reaching with focus on keeping wrist in neutral 2/10       Home Exercises and Education Provided     Education provided:   - HEP  - Progress towards goals     Written Home Exercises Provided: Patient instructed to cont prior HEP.  Exercises were " reviewed and Luis was able to demonstrate them prior to the end of the session.  Luis demonstrated good  understanding of the HEP provided.   .   See EMR under Patient Instructions for exercises provided prior visit.        Assessment     Pt participated in therapeutic exercises and NMR to address functional shoulder strength and range. Main limitations remain to be tone and stiffness in LUE. Focus on shoulder and elbow today with good progress in decreased compensatory flexion movements. Continued focus on LUE strengthening, ROM and coordination in future sessions.      Luis is progressing well towards his goals and there are no updates to goals at this time. Pt prognosis is Good.     Pt will continue to benefit from skilled outpatient occupational therapy to address the deficits listed in the problem list on initial evaluation provide pt/family education and to maximize pt's level of independence in the home and community environment.     Anticipated barriers to occupational therapy: COVID-19, transportation    Pt's spiritual, cultural and educational needs considered and pt agreeable to plan of care and goals.    Goals:  Short Term Goals:  1) Initiate Hep MET 7/15/2020  2) Pt to increase LUE  strength by 5 # in order to A in self care task of feeding by 4 weeks. MET 9/10/2020  3) Pt will participate in bilateral self-feeding tasks with mod A and AE PRN by 4 weeks  MET 8/14/2020  4) Pt to increase LUE AROM by 5 degrees in order to A in UB dressing by 4 weeks.  Progressing 9/22/2020  5) Patient will be able to achieve less than or equal to 35% on the FOTO, demonstrating overall improved functional ability with upper extremity. (self-care category)  Progressing 9/22/2020  6) Pt will increase QuickDASH score by 5% by 4 weeks, demonstrating improved functional ability to participate in household chores.  MET 9/10/2020      Long Term Goals:  1) Pt to be IND with HEP in order to maintain ROM and strength  needed for self care IND by d/c.  MET 9/10/2020  2) Pt to increase LUE  strength to WNL as compared to unaffected extremity in order to open items for self feeding by d/c.  Progressing 9/22/2020  3) Pt will participate in bilateral self-feeding tasks with SVN and AE PRN by d/c.  Progressing 9/22/2020  4) Pt to increase LUE AROM by 10 degrees in order to A in UB dressing by d/c.  Progressing 9/22/2020  5) Patient will be able to achieve less than or equal to 30% on the FOTO, demonstrating overall improved functional ability with upper extremity. (self-care category)  Progressing 9/22/2020  6) Pt will increase QuickDASH score by 10% by d/c, demonstrating improved functional ability to participate in household chores.  MET 9/10/2020    Plan     Continue per POC  Updates/Grading for next session: Grade as tolerated    TABITHA AN, OT

## 2020-09-24 ENCOUNTER — CLINICAL SUPPORT (OUTPATIENT)
Dept: REHABILITATION | Facility: HOSPITAL | Age: 65
End: 2020-09-24
Payer: MEDICARE

## 2020-09-24 DIAGNOSIS — R27.8 DECREASED COORDINATION: ICD-10-CM

## 2020-09-24 DIAGNOSIS — M62.81 MUSCLE WEAKNESS: ICD-10-CM

## 2020-09-24 DIAGNOSIS — Z78.9 IMPAIRED MOBILITY AND ACTIVITIES OF DAILY LIVING: ICD-10-CM

## 2020-09-24 DIAGNOSIS — Z74.09 IMPAIRED MOBILITY AND ACTIVITIES OF DAILY LIVING: ICD-10-CM

## 2020-09-24 PROCEDURE — 97112 NEUROMUSCULAR REEDUCATION: CPT | Mod: PN

## 2020-09-24 PROCEDURE — 97110 THERAPEUTIC EXERCISES: CPT | Mod: PN

## 2020-09-24 NOTE — PROGRESS NOTES
"     Occupational Therapy Treatment Note     Date: 9/24/2020  Name: Luis Wong  Clinic Number: [id number]    Therapy Diagnosis:   Encounter Diagnoses   Name Primary?    Muscle weakness     Impaired mobility and activities of daily living     Decreased coordination      Physician: Brien Lopez MD     Physician Orders: Eval and treat  Medical Diagnosis: History of stroke     Onset Date: 12/12/2019  Evaluation Date: 7/14/2020  Plan of Care Expiration Period: 11/6/20  Insurance Authorization period Expiration: 12/31/2020  Date of Return to MD: October 2020  Visit # / Visits Authortized: 21 / 30   FOTO:  UE CVA / 47%     Time In: 10:56 AM  Time Out: 11:45 AM      Total Billable Time:   49 minutes    Precautions:  Standard and Fall      Subjective     Pt reports:  "I am able to do what I need to do"  he was compliant with home exercise program given last session.   Response to previous treatment good  Functional change: able to paint house    Pain: 0  Location: N/A    Objective     Luis received therapeutic exercises for 20 minutes including:  Pt completed the following exercises below in order to increase ROM, strength and tolerance of affected UE in order to increase IND and functional use:    Exercises Date 9/24/2020    Visit #21   UBE 60 RPM 6 min 3 min forwards 3 backwards   PROM all planes of movement 10x   Shoulder pulleys 3 minutes     Theraband Red  Supination   Bicep Curls 2# 2/15             Luis participated in neuromuscular re-education activities to improve: Coordination for 29 minutes. The following activities were included:    Tennis racket Ball balance   Hammer sup/pro 2/10   Cone reaching with focus on keeping wrist in neutral 2/10   Stacking books Supinators        Home Exercises and Education Provided     Education provided:   - HEP  - Progress towards goals     Written Home Exercises Provided: Patient instructed to cont prior HEP.  Exercises were reviewed and Luis was able to " demonstrate them prior to the end of the session.  Luis demonstrated good  understanding of the HEP provided.   .   See EMR under Patient Instructions for exercises provided prior visit.        Assessment     Pt participated in therapeutic exercises and NMR with focus on supinators and elbow extensors today. Main limitations are tone and stiffness in LUE, but overall good progress demonstrated with increase ROM and strength. Pt participated in cone reaching and stacking book activities with emphasis on bringing wrist to at least neutral or supinated. Pt tolerated exercises well. Continued focus on LUE strengthening, ROM and coordination in future sessions.      Luis is progressing well towards his goals and there are no updates to goals at this time. Pt prognosis is Good.     Pt will continue to benefit from skilled outpatient occupational therapy to address the deficits listed in the problem list on initial evaluation provide pt/family education and to maximize pt's level of independence in the home and community environment.     Anticipated barriers to occupational therapy: COVID-19, transportation    Pt's spiritual, cultural and educational needs considered and pt agreeable to plan of care and goals.    Goals:  Short Term Goals:  1) Initiate Hep MET 7/15/2020  2) Pt to increase LUE  strength by 5 # in order to A in self care task of feeding by 4 weeks. MET 9/10/2020  3) Pt will participate in bilateral self-feeding tasks with mod A and AE PRN by 4 weeks  MET 8/14/2020  4) Pt to increase LUE AROM by 5 degrees in order to A in UB dressing by 4 weeks.  Progressing 9/24/2020  5) Patient will be able to achieve less than or equal to 35% on the FOTO, demonstrating overall improved functional ability with upper extremity. (self-care category)  Progressing 9/24/2020  6) Pt will increase QuickDASH score by 5% by 4 weeks, demonstrating improved functional ability to participate in household chores.  MET 9/10/2020       Long Term Goals:  1) Pt to be IND with HEP in order to maintain ROM and strength needed for self care IND by d/c.  MET 9/10/2020  2) Pt to increase LUE  strength to WNL as compared to unaffected extremity in order to open items for self feeding by d/c.  Progressing 9/24/2020  3) Pt will participate in bilateral self-feeding tasks with SVN and AE PRN by d/c.  Progressing 9/24/2020  4) Pt to increase LUE AROM by 10 degrees in order to A in UB dressing by d/c.  Progressing 9/24/2020  5) Patient will be able to achieve less than or equal to 30% on the FOTO, demonstrating overall improved functional ability with upper extremity. (self-care category)  Progressing 9/24/2020  6) Pt will increase QuickDASH score by 10% by d/c, demonstrating improved functional ability to participate in household chores.  MET 9/10/2020    Plan     Continue per POC  Updates/Grading for next session: Grade as tolerated    TABITHA AN, OT

## 2020-09-29 ENCOUNTER — CLINICAL SUPPORT (OUTPATIENT)
Dept: REHABILITATION | Facility: HOSPITAL | Age: 65
End: 2020-09-29
Payer: MEDICARE

## 2020-09-29 DIAGNOSIS — M62.81 MUSCLE WEAKNESS: ICD-10-CM

## 2020-09-29 DIAGNOSIS — R27.8 DECREASED COORDINATION: ICD-10-CM

## 2020-09-29 DIAGNOSIS — Z74.09 IMPAIRED MOBILITY AND ACTIVITIES OF DAILY LIVING: ICD-10-CM

## 2020-09-29 DIAGNOSIS — Z78.9 IMPAIRED MOBILITY AND ACTIVITIES OF DAILY LIVING: ICD-10-CM

## 2020-09-29 PROCEDURE — 97110 THERAPEUTIC EXERCISES: CPT | Mod: KX,PN

## 2020-09-29 PROCEDURE — 97112 NEUROMUSCULAR REEDUCATION: CPT | Mod: KX,PN

## 2020-09-29 NOTE — PROGRESS NOTES
"       Occupational Therapy Treatment Note     Date: 9/29/2020  Name: Luis Wong  Clinic Number: [id number]    Therapy Diagnosis:   Encounter Diagnoses   Name Primary?    Muscle weakness     Impaired mobility and activities of daily living     Decreased coordination      Physician: Brien Lopez MD     Physician Orders: Eval and treat  Medical Diagnosis: History of stroke     Onset Date: 12/12/2019  Evaluation Date: 7/14/2020  Plan of Care Expiration Period: 11/6/20  Insurance Authorization period Expiration: 12/31/2020  Date of Return to MD: October 2020  Visit # / Visits Authortized: 22 / 30   FOTO:  UE CVA / 47%     Time In:  10:55 AM  Time Out: 11:40 AM      Total Billable Time:  45 minutes    Precautions:  Standard and Fall      Subjective     Pt reports:  "I see my neurologist in a couple of weeks"  he was compliant with home exercise program given last session.   Response to previous treatment good  Functional change: able to paint house    Pain: 0  Location: N/A    Objective     Luis received therapeutic exercises for 30 minutes including:  Pt completed the following exercises below in order to increase ROM, strength and tolerance of affected UE in order to increase IND and functional use:    Exercises Date 9/29/2020    Visit #22   UBE 60 RPM 6 min 3 min forwards 3 backwards       Shoulder pulleys 3 minutes     Green Theraband Rows 2/15  Extensions 2/15  ER/IR        Modified Pushups 3/10        Luis participated in neuromuscular re-education activities to improve: Coordination for 15 minutes. The following activities were included:    Cone stacking Mid level and high shelf           Home Exercises and Education Provided     Education provided:   - HEP  - Progress towards goals     Written Home Exercises Provided: Patient instructed to cont prior HEP.  Exercises were reviewed and Luis was able to demonstrate them prior to the end of the session.  Luis demonstrated good  understanding of the " HEP provided.   .   See EMR under Patient Instructions for exercises provided prior visit.        Assessment     Pt participated in therapeutic exercises and NMR with focus on shoulder flexion and UE strength today. Pt still mainly limited by tightness and spasticity, resulting in compensatory flexion patterns, but overall pt is progressing well with improved AROM. Pt also able to reach higher in household chore simulation activity with stacking cones in cabinet. Increased wrist extension and forearm supination noted today for a more functional grasp, but still not performing at prior level of function. Good tolerance with increase in band resistance today. Next session to focus on supinators and wrist extension to increase participate in ADLs and IADLs.    Luis is progressing well towards his goals and there are no updates to goals at this time. Pt prognosis is Good.     Pt will continue to benefit from skilled outpatient occupational therapy to address the deficits listed in the problem list on initial evaluation provide pt/family education and to maximize pt's level of independence in the home and community environment.     Anticipated barriers to occupational therapy: COVID-19, transportation    Pt's spiritual, cultural and educational needs considered and pt agreeable to plan of care and goals.    Goals:  Short Term Goals:  1) Initiate Hep MET 7/15/2020  2) Pt to increase LUE  strength by 5 # in order to A in self care task of feeding by 4 weeks. MET 9/10/2020  3) Pt will participate in bilateral self-feeding tasks with mod A and AE PRN by 4 weeks  MET 8/14/2020  4) Pt to increase LUE AROM by 5 degrees in order to A in UB dressing by 4 weeks.  Progressing 9/29/2020  5) Patient will be able to achieve less than or equal to 35% on the FOTO, demonstrating overall improved functional ability with upper extremity. (self-care category)  Progressing 9/29/2020  6) Pt will increase QuickDASH score by 5% by 4  weeks, demonstrating improved functional ability to participate in household chores.  MET 9/10/2020      Long Term Goals:  1) Pt to be IND with HEP in order to maintain ROM and strength needed for self care IND by d/c.  MET 9/10/2020  2) Pt to increase LUE  strength to WNL as compared to unaffected extremity in order to open items for self feeding by d/c.  Progressing 9/29/2020  3) Pt will participate in bilateral self-feeding tasks with SVN and AE PRN by d/c.  Progressing 9/29/2020  4) Pt to increase LUE AROM by 10 degrees in order to A in UB dressing by d/c.  Progressing 9/29/2020  5) Patient will be able to achieve less than or equal to 30% on the FOTO, demonstrating overall improved functional ability with upper extremity. (self-care category)  Progressing 9/29/2020  6) Pt will increase QuickDASH score by 10% by d/c, demonstrating improved functional ability to participate in household chores.  MET 9/10/2020    Plan     Continue per POC  Updates/Grading for next session: Grade as tolerated    TABITHA GUSTAFSON, OT

## 2020-10-01 ENCOUNTER — CLINICAL SUPPORT (OUTPATIENT)
Dept: REHABILITATION | Facility: HOSPITAL | Age: 65
End: 2020-10-01
Payer: MEDICARE

## 2020-10-01 DIAGNOSIS — Z74.09 IMPAIRED MOBILITY AND ACTIVITIES OF DAILY LIVING: ICD-10-CM

## 2020-10-01 DIAGNOSIS — M62.81 MUSCLE WEAKNESS: ICD-10-CM

## 2020-10-01 DIAGNOSIS — R27.8 DECREASED COORDINATION: ICD-10-CM

## 2020-10-01 DIAGNOSIS — Z78.9 IMPAIRED MOBILITY AND ACTIVITIES OF DAILY LIVING: ICD-10-CM

## 2020-10-01 PROCEDURE — 97112 NEUROMUSCULAR REEDUCATION: CPT | Mod: KX,PN

## 2020-10-01 PROCEDURE — 97110 THERAPEUTIC EXERCISES: CPT | Mod: KX,PN

## 2020-10-01 NOTE — PROGRESS NOTES
"       Occupational Therapy Treatment Note     Date: 10/1/2020  Name: Luis Wong  Clinic Number: [id number]    Therapy Diagnosis:   Encounter Diagnoses   Name Primary?    Muscle weakness     Impaired mobility and activities of daily living     Decreased coordination      Physician: Brien Lopez MD     Physician Orders: Eval and treat  Medical Diagnosis: History of stroke     Onset Date: 12/12/2019  Evaluation Date: 7/14/2020  Plan of Care Expiration Period: 11/6/20  Insurance Authorization period Expiration: 12/31/2020  Date of Return to MD: October 2020  Visit # / Visits Authortized: 23 / 30   FOTO:  UE CVA / 47%     Time In:  10:55 AM  Time Out:    11:42 AM  Total Billable Time:   47 minutes    Precautions:  Standard and Fall      Subjective     Pt reports:  "I finished painting the room"  he was compliant with home exercise program given last session.   Response to previous treatment good  Functional change: able to paint house    Pain: 0  Location: N/A    Objective     Luis received therapeutic exercises for 32 minutes including:  Pt completed the following exercises below in order to increase ROM, strength and tolerance of affected UE in order to increase IND and functional use:    Exercises Date 10/1/2020    Visit #23   UBE 60 RPM 6 min 3 min forwards 3 backwards   Shoulder pulleys 3 minutes     2# db Bicep Curls  3/10   Modified Pushups 3/10   Yellow theraband Supination   Red therabar 2/15  Smileys and Frowns and Twists   Supination Hammers 2/15   Shoulder arc 3/10        Luis participated in neuromuscular re-education activities to improve: Coordination for 10 minutes. The following activities were included:    Cone stacking Wrist in neutral and supination placement          Luis participated in therapeutic activities for 5 minutes    Door knob        Home Exercises and Education Provided     Education provided:   - HEP  - Progress towards goals     Written Home Exercises Provided: Patient " instructed to cont prior HEP.  Exercises were reviewed and Luis was able to demonstrate them prior to the end of the session.  Luis demonstrated good  understanding of the HEP provided.   .   See EMR under Patient Instructions for exercises provided prior visit.        Assessment     Pt participated in therapeutic exercises and NMR with focus on wrist extension and supination today. Main limitations are tightness and spasticity resulting in less than fluid movements and weakness with this movement. Pt reports being able to functionally participate in activities at home with compensatory strategies and help from R hand. Pt demonstrated improvement with modified pushups today with increased wrist extension. During bicep curls, pt attempted 3# db, but wrist was unable to supinate under weight, so db graded back down to 2#. Shoulder arc activity was tolerated well with good supination and minimum cueing to control rings. Pt participated in door knob opening activity with good tolerance. Next session to continue with similar exercises and increase in challenge as tolerated.    Luis is progressing well towards his goals and there are no updates to goals at this time. Pt prognosis is Good.     Pt will continue to benefit from skilled outpatient occupational therapy to address the deficits listed in the problem list on initial evaluation provide pt/family education and to maximize pt's level of independence in the home and community environment.     Anticipated barriers to occupational therapy: COVID-19, transportation    Pt's spiritual, cultural and educational needs considered and pt agreeable to plan of care and goals.    Goals:  Short Term Goals:  1) Initiate Hep MET 7/15/2020  2) Pt to increase LUE  strength by 5 # in order to A in self care task of feeding by 4 weeks. MET 9/10/2020  3) Pt will participate in bilateral self-feeding tasks with mod A and AE PRN by 4 weeks  MET 8/14/2020  4) Pt to increase LUE  AROM by 5 degrees in order to A in UB dressing by 4 weeks.  Progressing 10/1/2020  5) Patient will be able to achieve less than or equal to 35% on the FOTO, demonstrating overall improved functional ability with upper extremity. (self-care category)  Progressing 10/1/2020  6) Pt will increase QuickDASH score by 5% by 4 weeks, demonstrating improved functional ability to participate in household chores.  MET 9/10/2020      Long Term Goals:  1) Pt to be IND with HEP in order to maintain ROM and strength needed for self care IND by d/c.  MET 9/10/2020  2) Pt to increase LUE  strength to WNL as compared to unaffected extremity in order to open items for self feeding by d/c.  Progressing 10/1/2020  3) Pt will participate in bilateral self-feeding tasks with SVN and AE PRN by d/c.  Progressing 10/1/2020  4) Pt to increase LUE AROM by 10 degrees in order to A in UB dressing by d/c.  Progressing 10/1/2020  5) Patient will be able to achieve less than or equal to 30% on the FOTO, demonstrating overall improved functional ability with upper extremity. (self-care category)  Progressing 10/1/2020  6) Pt will increase QuickDASH score by 10% by d/c, demonstrating improved functional ability to participate in household chores.  MET 9/10/2020    Plan     Continue per POC  Updates/Grading for next session: Grade as tolerated    TABITHA AN, OT

## 2020-10-06 ENCOUNTER — PATIENT OUTREACH (OUTPATIENT)
Dept: ADMINISTRATIVE | Facility: OTHER | Age: 65
End: 2020-10-06

## 2020-10-06 ENCOUNTER — CLINICAL SUPPORT (OUTPATIENT)
Dept: REHABILITATION | Facility: HOSPITAL | Age: 65
End: 2020-10-06
Payer: MEDICARE

## 2020-10-06 DIAGNOSIS — Z74.09 IMPAIRED MOBILITY AND ACTIVITIES OF DAILY LIVING: ICD-10-CM

## 2020-10-06 DIAGNOSIS — R27.8 DECREASED COORDINATION: ICD-10-CM

## 2020-10-06 DIAGNOSIS — M62.81 MUSCLE WEAKNESS: ICD-10-CM

## 2020-10-06 DIAGNOSIS — Z78.9 IMPAIRED MOBILITY AND ACTIVITIES OF DAILY LIVING: ICD-10-CM

## 2020-10-06 PROCEDURE — 97110 THERAPEUTIC EXERCISES: CPT | Mod: KX,PN

## 2020-10-06 NOTE — PROGRESS NOTES
Health Maintenance Due   Topic Date Due    Shingles Vaccine (1 of 2) 11/16/2004    Eye Exam  11/13/2013    Pneumococcal Vaccine (65+ Low/Medium Risk) (1 of 2 - PCV13) 11/16/2019    Foot Exam  06/26/2020    Influenza Vaccine (1) 08/01/2020    Urine Microalbumin  10/21/2020     Updates were requested from care everywhere.  Chart was reviewed for overdue Proactive [facility] Encounters ([facility]) topics (CRS, Breast Cancer Screening, Eye exam)  Health Maintenance has been updated.  [facility] immunization registry triggered.  Immunizations were reconciled.

## 2020-10-06 NOTE — PROGRESS NOTES
"         Occupational Therapy Treatment Note     Date: 10/6/2020  Name: Luis Wong  Clinic Number: [id number]    Therapy Diagnosis:   Encounter Diagnoses   Name Primary?    Muscle weakness     Impaired mobility and activities of daily living     Decreased coordination      Physician: Brien Lopez MD     Physician Orders: Eval and treat  Medical Diagnosis: History of stroke     Onset Date: 12/12/2019  Evaluation Date: 7/14/2020  Plan of Care Expiration Period: 11/6/20  Insurance Authorization period Expiration: 12/31/2020  Date of Return to MD: October 2020  Visit # / Visits Authortized: 24 / 30   FOTO:  UE CVA / 47%     Time In:   10:10 AM  Time Out:     11:04 AM  Total Billable Time:    54 minutes    Precautions:  Standard and Fall      Subjective     Pt reports:  "It feels a lot better"  he was compliant with home exercise program given last session.   Response to previous treatment good  Functional change: engage in more functional tasks at home    Pain: 0  Location: N/A    Objective     Luis received therapeutic exercises for 54 minutes including:  Pt completed the following exercises below in order to increase ROM, strength and tolerance of affected UE in order to increase IND and functional use:    Exercises Date 10/6/2020    Visit #24   UBE 60 RPM 6 min 3 min forwards 3 backwards   Shoulder pulleys 3 minutes     Dowel exercises  Seated FF/Chest Press  4# 3/10   Supination Hammers 3/10   2# db Bicep Curls  3/10   Modified Pushups 3/10       Red therabar 2/15  Smileys and Frowns and Twists       Shoulder arc 3/10          Home Exercises and Education Provided     Education provided:   - HEP  - Progress towards goals     Written Home Exercises Provided: Patient instructed to cont prior HEP.  Exercises were reviewed and Luis was able to demonstrate them prior to the end of the session.  Luis demonstrated good  understanding of the HEP provided.   .   See EMR under Patient Instructions for exercises " provided prior visit.        Assessment     Pt participated in therapeutic exercises with focus on shoulder and wrist today. Tightness and spasticity are main limitations, but he is demonstrating progress every session with increased movement and engagement of LUE in functional activities. Shoulder arc activity and modified pushups were tolerated well again today. Next session to continue with similar exercises and increase in challenge as tolerated.    Luis is progressing well towards his goals and there are no updates to goals at this time. Pt prognosis is Good.     Pt will continue to benefit from skilled outpatient occupational therapy to address the deficits listed in the problem list on initial evaluation provide pt/family education and to maximize pt's level of independence in the home and community environment.     Anticipated barriers to occupational therapy: COVID-19, transportation    Pt's spiritual, cultural and educational needs considered and pt agreeable to plan of care and goals.    Goals:  Short Term Goals:  1) Initiate Hep MET 7/15/2020  2) Pt to increase LUE  strength by 5 # in order to A in self care task of feeding by 4 weeks. MET 9/10/2020  3) Pt will participate in bilateral self-feeding tasks with mod A and AE PRN by 4 weeks  MET 8/14/2020  4) Pt to increase LUE AROM by 5 degrees in order to A in UB dressing by 4 weeks.  Progressing 10/6/2020  5) Patient will be able to achieve less than or equal to 35% on the FOTO, demonstrating overall improved functional ability with upper extremity. (self-care category)  Progressing 10/6/2020  6) Pt will increase QuickDASH score by 5% by 4 weeks, demonstrating improved functional ability to participate in household chores.  MET 9/10/2020      Long Term Goals:  1) Pt to be IND with HEP in order to maintain ROM and strength needed for self care IND by d/c.  MET 9/10/2020  2) Pt to increase LUE  strength to WNL as compared to unaffected extremity  in order to open items for self feeding by d/c.  Progressing 10/6/2020  3) Pt will participate in bilateral self-feeding tasks with SVN and AE PRN by d/c.  Progressing 10/6/2020  4) Pt to increase LUE AROM by 10 degrees in order to A in UB dressing by d/c.  Progressing 10/6/2020  5) Patient will be able to achieve less than or equal to 30% on the FOTO, demonstrating overall improved functional ability with upper extremity. (self-care category)  Progressing 10/6/2020  6) Pt will increase QuickDASH score by 10% by d/c, demonstrating improved functional ability to participate in household chores.  MET 9/10/2020    Plan     Continue per POC  Updates/Grading for next session: Grade as tolerated    TABITHA GUSTAFSON, OT

## 2020-10-07 ENCOUNTER — CLINICAL SUPPORT (OUTPATIENT)
Dept: CARDIOLOGY | Facility: HOSPITAL | Age: 65
End: 2020-10-07
Payer: MEDICARE

## 2020-10-07 DIAGNOSIS — Z95.818 PRESENCE OF OTHER CARDIAC IMPLANTS AND GRAFTS: ICD-10-CM

## 2020-10-07 PROCEDURE — G2066 INTER DEVC REMOTE 30D: HCPCS | Performed by: INTERNAL MEDICINE

## 2020-10-07 PROCEDURE — 93298 CARDIAC DEVICE CHECK - REMOTE: ICD-10-PCS | Mod: ,,, | Performed by: INTERNAL MEDICINE

## 2020-10-07 PROCEDURE — 93298 REM INTERROG DEV EVAL SCRMS: CPT | Mod: ,,, | Performed by: INTERNAL MEDICINE

## 2020-10-08 ENCOUNTER — CLINICAL SUPPORT (OUTPATIENT)
Dept: REHABILITATION | Facility: HOSPITAL | Age: 65
End: 2020-10-08
Payer: MEDICARE

## 2020-10-08 ENCOUNTER — OFFICE VISIT (OUTPATIENT)
Dept: NEUROLOGY | Facility: CLINIC | Age: 65
End: 2020-10-08
Payer: MEDICARE

## 2020-10-08 VITALS
TEMPERATURE: 98 F | HEART RATE: 104 BPM | BODY MASS INDEX: 21.62 KG/M2 | DIASTOLIC BLOOD PRESSURE: 79 MMHG | WEIGHT: 146.38 LBS | SYSTOLIC BLOOD PRESSURE: 152 MMHG

## 2020-10-08 DIAGNOSIS — R27.8 DECREASED COORDINATION: ICD-10-CM

## 2020-10-08 DIAGNOSIS — Z86.73 HISTORY OF ISCHEMIC STROKE: ICD-10-CM

## 2020-10-08 DIAGNOSIS — Z74.09 IMPAIRED MOBILITY AND ACTIVITIES OF DAILY LIVING: ICD-10-CM

## 2020-10-08 DIAGNOSIS — Z78.9 IMPAIRED MOBILITY AND ACTIVITIES OF DAILY LIVING: ICD-10-CM

## 2020-10-08 DIAGNOSIS — M62.81 MUSCLE WEAKNESS: ICD-10-CM

## 2020-10-08 PROCEDURE — 3288F PR FALLS RISK ASSESSMENT DOCUMENTED: ICD-10-PCS | Mod: CPTII,S$GLB,, | Performed by: PSYCHIATRY & NEUROLOGY

## 2020-10-08 PROCEDURE — 3078F PR MOST RECENT DIASTOLIC BLOOD PRESSURE < 80 MM HG: ICD-10-PCS | Mod: CPTII,S$GLB,, | Performed by: PSYCHIATRY & NEUROLOGY

## 2020-10-08 PROCEDURE — 3077F PR MOST RECENT SYSTOLIC BLOOD PRESSURE >= 140 MM HG: ICD-10-PCS | Mod: CPTII,S$GLB,, | Performed by: PSYCHIATRY & NEUROLOGY

## 2020-10-08 PROCEDURE — 99999 PR PBB SHADOW E&M-EST. PATIENT-LVL III: CPT | Mod: PBBFAC,,, | Performed by: PSYCHIATRY & NEUROLOGY

## 2020-10-08 PROCEDURE — 3078F DIAST BP <80 MM HG: CPT | Mod: CPTII,S$GLB,, | Performed by: PSYCHIATRY & NEUROLOGY

## 2020-10-08 PROCEDURE — 3008F PR BODY MASS INDEX (BMI) DOCUMENTED: ICD-10-PCS | Mod: CPTII,S$GLB,, | Performed by: PSYCHIATRY & NEUROLOGY

## 2020-10-08 PROCEDURE — 99213 PR OFFICE/OUTPT VISIT, EST, LEVL III, 20-29 MIN: ICD-10-PCS | Mod: S$GLB,,, | Performed by: PSYCHIATRY & NEUROLOGY

## 2020-10-08 PROCEDURE — 99999 PR PBB SHADOW E&M-EST. PATIENT-LVL III: ICD-10-PCS | Mod: PBBFAC,,, | Performed by: PSYCHIATRY & NEUROLOGY

## 2020-10-08 PROCEDURE — 3288F FALL RISK ASSESSMENT DOCD: CPT | Mod: CPTII,S$GLB,, | Performed by: PSYCHIATRY & NEUROLOGY

## 2020-10-08 PROCEDURE — 1100F PTFALLS ASSESS-DOCD GE2>/YR: CPT | Mod: CPTII,S$GLB,, | Performed by: PSYCHIATRY & NEUROLOGY

## 2020-10-08 PROCEDURE — 99213 OFFICE O/P EST LOW 20 MIN: CPT | Mod: S$GLB,,, | Performed by: PSYCHIATRY & NEUROLOGY

## 2020-10-08 PROCEDURE — 97530 THERAPEUTIC ACTIVITIES: CPT | Mod: KX,PN

## 2020-10-08 PROCEDURE — 3008F BODY MASS INDEX DOCD: CPT | Mod: CPTII,S$GLB,, | Performed by: PSYCHIATRY & NEUROLOGY

## 2020-10-08 PROCEDURE — 1100F PR PT FALLS ASSESS DOC 2+ FALLS/FALL W/INJURY/YR: ICD-10-PCS | Mod: CPTII,S$GLB,, | Performed by: PSYCHIATRY & NEUROLOGY

## 2020-10-08 PROCEDURE — 97110 THERAPEUTIC EXERCISES: CPT | Mod: KX,PN

## 2020-10-08 PROCEDURE — 3077F SYST BP >= 140 MM HG: CPT | Mod: CPTII,S$GLB,, | Performed by: PSYCHIATRY & NEUROLOGY

## 2020-10-08 NOTE — PROGRESS NOTES
[facility] NEUROLOGY  [facility], [place]    Date: 10/8/20  Patient Name: Luis Wong   MRN: [id number]   PCP: Juan Eduardo  Referring Provider: Self, Aaareferral    Assessment:   Luis Wong is a 64 y.o. male Presenting in follow up for ischemic stroke. Patient is currently doing well. Reviewed stroke education today. MRI brain from 9/2019 reviewed with known R corona radiata infarct and L parietal infarct. Patient may follow up as needed.   Plan:     Problem List Items Addressed This Visit        Other    History of ischemic stroke    Overview     R corona radiata, small artery infarct 6/2018         Current Assessment & Plan     -- continue ASA/plavix             Brien Lopez MD  [facility]   Department of Neurology    Patient note was created using MModal Dictation.  Any errors in syntax or even information may not have been identified and edited on initial review prior to signing this note.  Subjective:        HPI:   Mr. Luis Wong is a 64 y.o. male presenting in follow up for ischemic stroke. The patient reports he has been doing well since his last visit with no new focal neurologic signs or symptoms. He states he is compliant with aspirin and plavix and continues to work with OT regularly. He states his spasticity and pain are well controlled with Botox.  He has no other complaints today.       PAST MEDICAL HISTORY:  Past Medical History:   Diagnosis Date    Aneurysm 10/30/2012    Diabetes mellitus     Fever blister     Herpes infection     Hypertension     ICH (intracerebral hemorrhage)     Mixed hyperlipidemia 9/5/2013    Nontraumatic thalamic hemorrhage 8/31/2016    JAQUAN (obstructive sleep apnea)     Right-sided lacunar stroke 9/11/2014    SDH (subdural hematoma)     Special screening for malignant neoplasms, colon     Stroke        PAST SURGICAL HISTORY:  Past Surgical History:   Procedure Laterality Date    CARPAL TUNNEL  RELEASE Right 2/21/2020    Procedure: RELEASE, CARPAL TUNNEL;  Surgeon: Barry Dutton Jr., MD;  Location: [facility] OR;  Service: Orthopedics;  Laterality: Right;    INSERTION OF IMPLANTABLE LOOP RECORDER N/A 12/12/2019    Procedure: Insertion, Implantable Loop Recorder;  Surgeon: Efren Sanford MD;  Location: [facility] CATH LAB/EP;  Service: Cardiology;  Laterality: N/A;  Crytogenic CVA, LOOP,  MDT, Local,  DM    Knee arthroscopic surgery         CURRENT MEDS:  Current Outpatient Medications   Medication Sig Dispense Refill    aspirin (ECOTRIN) 81 MG EC tablet TAKE 1 TABLET BY MOUTH EVERY DAY 30 tablet 11    atorvastatin (LIPITOR) 40 MG tablet Take 1 tablet (40 mg total) by mouth once daily. 90 tablet 0    clopidogreL (PLAVIX) 75 mg tablet TAKE 1 TABLET BY MOUTH EVERY DAY 90 tablet 0    hydroCHLOROthiazide (MICROZIDE) 12.5 mg capsule TAKE 1 CAPSULE BY MOUTH EVERY DAY 30 capsule 11    metFORMIN (GLUCOPHAGE) 500 MG tablet TAKE 1 TABLET BY MOUTH EVERY DAY WITH BREAKFAST 30 tablet 11    NIFEdipine (PROCARDIA-XL) 30 MG (OSM) 24 hr tablet TAKE 1 TABLET BY MOUTH EVERY DAY 90 tablet 3    potassium chloride (MICRO-K) 8 mEq CpSR TAKE 1 CAPSULE (8 MEQ TOTAL) BY MOUTH ONCE DAILY. 30 capsule 11    diclofenac sodium (VOLTAREN) 1 % Gel        No current facility-administered medications for this visit.        ALLERGIES:  Review of patient's allergies indicates:  No Known Allergies    FAMILY HISTORY:  Family History   Problem Relation Age of Onset    Heart disease Mother     Diabetes Father     Cancer Sister         breast    Diabetes Paternal Grandmother     Diabetes Paternal Grandfather     Melanoma Neg Hx        SOCIAL HISTORY:  Social History     Tobacco Use    Smoking status: Never Smoker    Smokeless tobacco: Never Used   Substance Use Topics    Alcohol use: No     Frequency: Never     Drinks per session: Patient refused     Binge frequency: Never    Drug use: No       Review of Systems:  12 system review of  systems is negative except for the symptoms mentioned in HPI.      Objective:     Vitals:    10/08/20 1001   BP: (!) 152/79   Pulse: 104   Temp: 98 °F (36.7 °C)   Weight: 66.4 kg (146 lb 6.2 oz)     General: NAD, well nourished   Eyes: no tearing, discharge, no erythema   ENT: moist mucous membranes of the oral cavity, nares patent    Neck: Supple, full range of motion  Cardiovascular: Warm and well perfused, pulses equal and symmetrical  Lungs: Normal work of breathing, normal chest wall excursions  Skin: No rash, lesions, or breakdown on exposed skin  Psychiatry: Mood and affect are appropriate   Abdomen: soft, non tender, non distended  Extremeties: No cyanosis, clubbing or edema.    Neurological   MENTAL STATUS: Alert and oriented to person, place, and time. Attention and concentration within normal limits. Speech without dysarthria,. Recent and remote memory within normal limits   CRANIAL NERVES: Visual fields intact. PERRL. EOMI. Facial sensation intact. Face symmetrical. Hearing grossly intact. Full shoulder shrug bilaterally. Tongue protrudes midline   SENSORY: Sensation is intact to light touch throughout.    MOTOR: Normal bulk increased tone in LUE.  5/5 R, 4-/5 L deltoid, biceps, triceps, interosseous, hand  . 5/5 R and 4/5 iliopsoas, knee extension/flexion, foot dorsi/plantarflexion .    REFLEXES: Symmetric and 1+ throughout.   CEREBELLAR/COORDINATION/GAIT: Gait hemiparetic. Finger to nose intact. Normal rapid alternating movements.

## 2020-10-08 NOTE — PROGRESS NOTES
"   Occupational Therapy Treatment Note     Date: 10/8/2020  Name: Luis Wong  Clinic Number: [id number]    Therapy Diagnosis:   Encounter Diagnoses   Name Primary?    Muscle weakness     Impaired mobility and activities of daily living     Decreased coordination      Physician: Brien Lopez MD     Physician Orders: Eval and treat  Medical Diagnosis: History of stroke     Onset Date: 12/12/2019  Evaluation Date: 7/14/2020  Plan of Care Expiration Period: 11/6/20  Insurance Authorization period Expiration: 12/31/2020  Date of Return to MD: October 2020  Visit # / Visits Authortized: 25 / 30   FOTO:  UE CVA / 47%     Time In: 3:27 PM  Time Out: 4:10 PM        Total Billable Time:  43 minutes       Precautions:  Standard and Fall      Subjective     Pt reports: "I've been doing some stretches at home"  he was compliant with home exercise program given last session.   Response to previous treatment good  Functional change: engage in more functional tasks at home    Pain: 0  Location: N/A    Objective     Luis participated in therapeutic exercises for 35 minutes including:  Pt completed the following exercises below in order to increase ROM, strength and tolerance of affected UE in order to increase IND and functional use:    Exercises Date 10/8/2020    Visit #25   UBE 60 RPM 6 min 3 min forwards 3 backwards   Shoulder pulleys 3 minutes     Weightbearing Flattening putty    Supination Wheel Two ways  2 min   Modified Pushups 3/10   Red therabar 2/15  Smileys and Frowns and Twists                   Luis participated in therapeutic activities for 8 minutes including:    Card flipping        Home Exercises and Education Provided     Education provided:   - HEP  - Progress towards goals     Written Home Exercises Provided: Patient instructed to cont prior HEP.  Exercises were reviewed and Luis was able to demonstrate them prior to the end of the session.  Luis demonstrated good  understanding of the HEP " provided.   .   See EMR under Patient Instructions for exercises provided prior visit.        Assessment     Pt participated in therapeutic exercises with focus on wrist and supination today. Tightness and spasticity are main limitations, but he is demonstrating progress every session with increased movement and engagement of LUE in functional activities. Wrist wheel extensions were challenging due to tightness in wrist preventing full extension. Pt also requiring min A with weight bearing putty exercises to keep hand flat. Next session to continue with similar exercises and increase in challenge as tolerated.    Luis is progressing well towards his goals and there are no updates to goals at this time. Pt prognosis is Good.     Pt will continue to benefit from skilled outpatient occupational therapy to address the deficits listed in the problem list on initial evaluation provide pt/family education and to maximize pt's level of independence in the home and community environment.     Anticipated barriers to occupational therapy: COVID-19, transportation    Pt's spiritual, cultural and educational needs considered and pt agreeable to plan of care and goals.    Goals:  Short Term Goals:  1) Initiate Hep MET 7/15/2020  2) Pt to increase LUE  strength by 5 # in order to A in self care task of feeding by 4 weeks. MET 9/10/2020  3) Pt will participate in bilateral self-feeding tasks with mod A and AE PRN by 4 weeks  MET 8/14/2020  4) Pt to increase LUE AROM by 5 degrees in order to A in UB dressing by 4 weeks.  Progressing 10/8/2020  5) Patient will be able to achieve less than or equal to 35% on the FOTO, demonstrating overall improved functional ability with upper extremity. (self-care category)  Progressing 10/8/2020  6) Pt will increase QuickDASH score by 5% by 4 weeks, demonstrating improved functional ability to participate in household chores.  MET 9/10/2020      Long Term Goals:  1) Pt to be IND with HEP in  order to maintain ROM and strength needed for self care IND by d/c.  MET 9/10/2020  2) Pt to increase LUE  strength to WNL as compared to unaffected extremity in order to open items for self feeding by d/c.  Progressing 10/8/2020  3) Pt will participate in bilateral self-feeding tasks with SVN and AE PRN by d/c.  Progressing 10/8/2020  4) Pt to increase LUE AROM by 10 degrees in order to A in UB dressing by d/c.  Progressing 10/8/2020  5) Patient will be able to achieve less than or equal to 30% on the FOTO, demonstrating overall improved functional ability with upper extremity. (self-care category)  Progressing 10/8/2020  6) Pt will increase QuickDASH score by 10% by d/c, demonstrating improved functional ability to participate in household chores.  MET 9/10/2020    Plan     Continue per POC  Updates/Grading for next session: Grade as tolerated    TABITHA AN, OT

## 2020-10-13 ENCOUNTER — CLINICAL SUPPORT (OUTPATIENT)
Dept: REHABILITATION | Facility: HOSPITAL | Age: 65
End: 2020-10-13
Payer: MEDICARE

## 2020-10-13 DIAGNOSIS — Z74.09 IMPAIRED MOBILITY AND ACTIVITIES OF DAILY LIVING: ICD-10-CM

## 2020-10-13 DIAGNOSIS — R27.8 DECREASED COORDINATION: ICD-10-CM

## 2020-10-13 DIAGNOSIS — M62.81 MUSCLE WEAKNESS: ICD-10-CM

## 2020-10-13 DIAGNOSIS — Z78.9 IMPAIRED MOBILITY AND ACTIVITIES OF DAILY LIVING: ICD-10-CM

## 2020-10-13 PROCEDURE — 97110 THERAPEUTIC EXERCISES: CPT | Mod: KX,PN

## 2020-10-13 NOTE — PROGRESS NOTES
"   Occupational Therapy Treatment Note     Date: 10/13/2020  Name: Luis Wong  Clinic Number: [id number]    Therapy Diagnosis:   Encounter Diagnoses   Name Primary?    Muscle weakness     Impaired mobility and activities of daily living     Decreased coordination      Physician: Brien Lopez MD     Physician Orders: Eval and treat  Medical Diagnosis: History of stroke     Onset Date: 12/12/2019  Evaluation Date: 7/14/2020  Plan of Care Expiration Period: 11/6/20  Insurance Authorization period Expiration: 12/31/2020  Date of Return to MD: October 2020  Visit # / Visits Authortized: 26 / 30   FOTO:  UE CVA / 47%     Time In: 10:15 AM  Time Out:  11:00 AM        Total Billable Time: 45 minutes        Precautions:  Standard and Fall      Subjective     Pt reports: "I made gumbo this weekend"  he was compliant with home exercise program given last session.   Response to previous treatment good  Functional change: engage in more functional tasks at home    Pain: 0  Location: N/A    Objective     Luis participated in therapeutic exercises for 40 minutes including:  Pt completed the following exercises below in order to increase ROM, strength and tolerance of affected UE in order to increase IND and functional use:    Exercises Date 10/13/2020    Visit #26   UBE 60 RPM 6 min 3 min forwards 3 backwards   Shoulder pulleys 3 minutes     Supination hammer 2/10   Therasquish retreival 3x   Supination Wheel Two ways  2 min   Modified Pushups 3/10   Green and Red therabar 2/15  Smileys and Frowns and Twists   Shoulder Arc 3x   Extension dowel stretch 3/30"       Luis participated in therapeutic activities for 5 minutes including:    Connect 4  FM manipulation        Home Exercises and Education Provided     Education provided:   - HEP  - Progress towards goals     Written Home Exercises Provided: Patient instructed to cont prior HEP.  Exercises were reviewed and Luis was able to demonstrate them prior to the end of " the session.  Luis demonstrated good  understanding of the HEP provided.   .   See EMR under Patient Instructions for exercises provided prior visit.        Assessment     Pt participated in therapeutic exercises with focus on wrist and supination today. Tightness and spasticity are main limitation affecting smooth reaching movement due to compensatory shoulder flexion and difficulty opening and closing the hand. However, he is demonstrating progress every session with increased movement and engagement of LUE in functional activities. Some FM manipulation exercises were performed with difficulty due to tightness in wrist and hand limiting dexterity. Next session to continue with similar exercises and increase in challenge as tolerated.    Luis is progressing well towards his goals and there are no updates to goals at this time. Pt prognosis is Good.     Pt will continue to benefit from skilled outpatient occupational therapy to address the deficits listed in the problem list on initial evaluation provide pt/family education and to maximize pt's level of independence in the home and community environment.     Anticipated barriers to occupational therapy: COVID-19, transportation    Pt's spiritual, cultural and educational needs considered and pt agreeable to plan of care and goals.    Goals:  Short Term Goals:  1) Initiate Hep MET 7/15/2020  2) Pt to increase LUE  strength by 5 # in order to A in self care task of feeding by 4 weeks. MET 9/10/2020  3) Pt will participate in bilateral self-feeding tasks with mod A and AE PRN by 4 weeks  MET 8/14/2020  4) Pt to increase LUE AROM by 5 degrees in order to A in UB dressing by 4 weeks.  Progressing 10/13/2020  5) Patient will be able to achieve less than or equal to 35% on the FOTO, demonstrating overall improved functional ability with upper extremity. (self-care category)  Progressing 10/13/2020  6) Pt will increase QuickDASH score by 5% by 4 weeks, demonstrating  improved functional ability to participate in household chores.  MET 9/10/2020      Long Term Goals:  1) Pt to be IND with HEP in order to maintain ROM and strength needed for self care IND by d/c.  MET 9/10/2020  2) Pt to increase LUE  strength to WNL as compared to unaffected extremity in order to open items for self feeding by d/c.  Progressing 10/13/2020  3) Pt will participate in bilateral self-feeding tasks with SVN and AE PRN by d/c.  Progressing 10/13/2020  4) Pt to increase LUE AROM by 10 degrees in order to A in UB dressing by d/c.  Progressing 10/13/2020  5) Patient will be able to achieve less than or equal to 30% on the FOTO, demonstrating overall improved functional ability with upper extremity. (self-care category)  Progressing 10/13/2020  6) Pt will increase QuickDASH score by 10% by d/c, demonstrating improved functional ability to participate in household chores.  MET 9/10/2020    Plan     Continue per POC  Updates/Grading for next session: Grade as tolerated    TABITHA GUSTAFSON, OT

## 2020-10-16 ENCOUNTER — CLINICAL SUPPORT (OUTPATIENT)
Dept: REHABILITATION | Facility: HOSPITAL | Age: 65
End: 2020-10-16
Payer: MEDICARE

## 2020-10-16 DIAGNOSIS — Z78.9 IMPAIRED MOBILITY AND ACTIVITIES OF DAILY LIVING: ICD-10-CM

## 2020-10-16 DIAGNOSIS — M62.81 MUSCLE WEAKNESS: ICD-10-CM

## 2020-10-16 DIAGNOSIS — R27.8 DECREASED COORDINATION: ICD-10-CM

## 2020-10-16 DIAGNOSIS — Z74.09 IMPAIRED MOBILITY AND ACTIVITIES OF DAILY LIVING: ICD-10-CM

## 2020-10-16 PROCEDURE — 97530 THERAPEUTIC ACTIVITIES: CPT | Mod: KX,PN

## 2020-10-16 PROCEDURE — 97110 THERAPEUTIC EXERCISES: CPT | Mod: KX,PN

## 2020-10-16 NOTE — PROGRESS NOTES
"   Occupational Therapy Treatment Note     Date: 10/16/2020  Name: Luis Wong  Clinic Number: [id number]    Therapy Diagnosis:   Encounter Diagnoses   Name Primary?    Muscle weakness     Impaired mobility and activities of daily living     Decreased coordination      Physician: Brien Lopez MD     Physician Orders: Eval and treat  Medical Diagnosis: History of stroke     Onset Date: 12/12/2019  Evaluation Date: 7/14/2020  Plan of Care Expiration Period: 11/6/20  Insurance Authorization period Expiration: 12/31/2020  Date of Return to MD: October 2020  Visit # / Visits Authortized: 27 / 30   FOTO:  UE CVA / 47%     Time In:  10:22 AM  Time Out: 11:10 AM        Total Billable Time:  48 minutes       Precautions:  Standard and Fall      Subjective     Pt reports:  "I got a shoulder pulley at home"  he was compliant with home exercise program given last session.   Response to previous treatment good  Functional change: engage in more functional tasks at home    Pain: 0  Location: N/A    Objective     Luis participated in therapeutic exercises for 40 minutes including:  Pt completed the following exercises below in order to increase ROM, strength and tolerance of affected UE in order to increase IND and functional use:    Exercises Date 10/16/2020    Visit #27   UBE 60 RPM 6 min 3 min forwards 3 backwards   Shoulder pulleys 3 minutes     Shoulder extension dowel stretch 10/5"   IR Green Rope stretch 3/30"   Ball Walls 2/10   Flattening putty Peach putty   Black grippper 2/10   2 yellow/2 red   Wrist extension 1#   Green and Red therabar 2/15  Smileys and Frowns and Rolls  Supination   Modified Pushups 3/10      Luis participated in therapeutic activities for 8 minutes including:    Connect 4  FM manipulation        Home Exercises and Education Provided     Education provided:   - HEP  - Progress towards goals     Written Home Exercises Provided: Patient instructed to cont prior HEP.  Exercises were reviewed " and Luis was able to demonstrate them prior to the end of the session.  Luis demonstrated good  understanding of the HEP provided.   .   See EMR under Patient Instructions for exercises provided prior visit.        Assessment     Pt participated in therapeutic exercises with focus on wrist and supination today. Tightness and spasticity are main limitation affecting smooth reaching movement due to compensatory shoulder flexion and difficulty opening and closing the hand. Continued progress demonstrated today. FM manipulation exercise was challenging due to wrist and hand tightness affecting dexterity, but he completed the activity successfully. Next session to continue with similar exercises and increase in challenge as tolerated.    Luis is progressing well towards his goals and there are no updates to goals at this time. Pt prognosis is Good.     Pt will continue to benefit from skilled outpatient occupational therapy to address the deficits listed in the problem list on initial evaluation provide pt/family education and to maximize pt's level of independence in the home and community environment.     Anticipated barriers to occupational therapy: COVID-19, transportation    Pt's spiritual, cultural and educational needs considered and pt agreeable to plan of care and goals.    Goals:  Short Term Goals:  1) Initiate Hep MET 7/15/2020  2) Pt to increase LUE  strength by 5 # in order to A in self care task of feeding by 4 weeks. MET 9/10/2020  3) Pt will participate in bilateral self-feeding tasks with mod A and AE PRN by 4 weeks  MET 8/14/2020  4) Pt to increase LUE AROM by 5 degrees in order to A in UB dressing by 4 weeks.  Progressing 10/16/2020  5) Patient will be able to achieve less than or equal to 35% on the FOTO, demonstrating overall improved functional ability with upper extremity. (self-care category)  Progressing 10/16/2020  6) Pt will increase QuickDASH score by 5% by 4 weeks, demonstrating  improved functional ability to participate in household chores.  MET 9/10/2020      Long Term Goals:  1) Pt to be IND with HEP in order to maintain ROM and strength needed for self care IND by d/c.  MET 9/10/2020  2) Pt to increase LUE  strength to WNL as compared to unaffected extremity in order to open items for self feeding by d/c.  Progressing 10/16/2020  3) Pt will participate in bilateral self-feeding tasks with SVN and AE PRN by d/c.  Progressing 10/16/2020  4) Pt to increase LUE AROM by 10 degrees in order to A in UB dressing by d/c.  Progressing 10/16/2020  5) Patient will be able to achieve less than or equal to 30% on the FOTO, demonstrating overall improved functional ability with upper extremity. (self-care category)  Progressing 10/16/2020  6) Pt will increase QuickDASH score by 10% by d/c, demonstrating improved functional ability to participate in household chores.  MET 9/10/2020    Plan     Continue per POC  Updates/Grading for next session: Grade as tolerated    TABITHA GUSTAFSON, OT

## 2020-10-19 ENCOUNTER — CLINICAL SUPPORT (OUTPATIENT)
Dept: REHABILITATION | Facility: HOSPITAL | Age: 65
End: 2020-10-19
Payer: MEDICARE

## 2020-10-19 DIAGNOSIS — Z78.9 IMPAIRED MOBILITY AND ACTIVITIES OF DAILY LIVING: ICD-10-CM

## 2020-10-19 DIAGNOSIS — Z74.09 IMPAIRED MOBILITY AND ACTIVITIES OF DAILY LIVING: ICD-10-CM

## 2020-10-19 DIAGNOSIS — M62.81 MUSCLE WEAKNESS: ICD-10-CM

## 2020-10-19 DIAGNOSIS — R27.8 DECREASED COORDINATION: ICD-10-CM

## 2020-10-19 PROCEDURE — 97530 THERAPEUTIC ACTIVITIES: CPT | Mod: KX,PN

## 2020-10-19 PROCEDURE — 97110 THERAPEUTIC EXERCISES: CPT | Mod: KX,PN

## 2020-10-19 NOTE — PROGRESS NOTES
"   Occupational Therapy Treatment Note     Date: 10/19/2020  Name: Luis Wong  Clinic Number: [id number]    Therapy Diagnosis:   Encounter Diagnoses   Name Primary?    Muscle weakness     Impaired mobility and activities of daily living     Decreased coordination      Physician: Brien Lopez MD     Physician Orders: Eval and treat  Medical Diagnosis: History of stroke     Onset Date: 12/12/2019  Evaluation Date: 7/14/2020  Plan of Care Expiration Period: 11/6/20  Insurance Authorization period Expiration: 12/31/2020  Date of Return to MD: October 2020  Visit # / Visits Authortized: 28 / 30   FOTO:  UE CVA / 47%     Time In: 3:23 PM   Time Out: 4:16 PM         Total Billable Time:  53 minutes     Precautions:  Standard and Fall      Subjective     Pt reports: "I need to do those stretches at home"    he was compliant with home exercise program given last session.   Response to previous treatment good  Functional change: engage in more functional tasks at home    Pain: 0  Location: N/A    Objective     Luis participated in therapeutic exercises for 45 minutes including:  Pt completed the following exercises below in order to increase ROM, strength and tolerance of affected UE in order to increase IND and functional use:    Exercises Date 10/19/2020    Visit #28   UBE 60 RPM 6 min 3 min forwards 3 backwards   Shoulder pulleys 3 minutes     Shoulder extension dowel stretch 10/5"   IR Green Rope stretch 3/30"   Modified Pushups 3/10   Shoulder arc 2x   Bicep Curls 2# 2/15   Supination Wheel     Supination Hammer       Luis participated in therapeutic activities for 8 minutes including:    Golf swing        Home Exercises and Education Provided     Education provided:   - HEP  - Progress towards goals     Written Home Exercises Provided: Patient instructed to cont prior HEP.  Exercises were reviewed and Luis was able to demonstrate them prior to the end of the session.  Luis demonstrated good  " understanding of the HEP provided.   .   See EMR under Patient Instructions for exercises provided prior visit.        Assessment     Pt participated in therapeutic exercises with focus on wrist and supination today. Main limitations are tightness in shoulder, elbow, and wrist, but pt successfully performs exercises with good concentration. He demonstrates improvement in movement every session and remains motivated. He reports good participation in ADLs and IADLs at home. Next session to continue with similar exercises and increase in challenge as tolerated.    Luis is progressing well towards his goals and there are no updates to goals at this time. Pt prognosis is Good.     Pt will continue to benefit from skilled outpatient occupational therapy to address the deficits listed in the problem list on initial evaluation provide pt/family education and to maximize pt's level of independence in the home and community environment.     Anticipated barriers to occupational therapy: COVID-19, transportation    Pt's spiritual, cultural and educational needs considered and pt agreeable to plan of care and goals.    Goals:  Short Term Goals:  1) Initiate Hep MET 7/15/2020  2) Pt to increase LUE  strength by 5 # in order to A in self care task of feeding by 4 weeks. MET 9/10/2020  3) Pt will participate in bilateral self-feeding tasks with mod A and AE PRN by 4 weeks  MET 8/14/2020  4) Pt to increase LUE AROM by 5 degrees in order to A in UB dressing by 4 weeks.  Progressing 10/19/2020  5) Patient will be able to achieve less than or equal to 35% on the FOTO, demonstrating overall improved functional ability with upper extremity. (self-care category)  Progressing 10/19/2020  6) Pt will increase QuickDASH score by 5% by 4 weeks, demonstrating improved functional ability to participate in household chores.  MET 9/10/2020      Long Term Goals:  1) Pt to be IND with HEP in order to maintain ROM and strength needed for self  care IND by d/c.  MET 9/10/2020  2) Pt to increase LUE  strength to WNL as compared to unaffected extremity in order to open items for self feeding by d/c.  Progressing 10/19/2020  3) Pt will participate in bilateral self-feeding tasks with SVN and AE PRN by d/c.  Progressing 10/19/2020  4) Pt to increase LUE AROM by 10 degrees in order to A in UB dressing by d/c.  Progressing 10/19/2020  5) Patient will be able to achieve less than or equal to 30% on the FOTO, demonstrating overall improved functional ability with upper extremity. (self-care category)  Progressing 10/19/2020  6) Pt will increase QuickDASH score by 10% by d/c, demonstrating improved functional ability to participate in household chores.  MET 9/10/2020    Plan     Continue per POC  Updates/Grading for next session: Grade as tolerated    TABITHA GUSTAFSON, OT

## 2020-10-21 NOTE — PROGRESS NOTES
"   Occupational Therapy Treatment Note     Date: 10/22/2020  Name: Luis Wong  Clinic Number: [id number]    Therapy Diagnosis:   Encounter Diagnoses   Name Primary?    Muscle weakness     Impaired mobility and activities of daily living     Decreased coordination      Physician: Brien Lopez MD     Physician Orders: Eval and treat  Medical Diagnosis: History of stroke     Onset Date: 12/12/2019  Evaluation Date: 7/14/2020  Plan of Care Expiration Period: 11/6/20  Insurance Authorization period Expiration: 12/31/2020  Date of Return to MD: October 2020  Visit # / Visits Authortized: 29 / 30   FOTO:  UE CVA / 47%     Time In: 12:20 PM  Time Out:  1:15 PM         Total Billable Time: 55 minutes        Precautions:  Standard and Fall      Subjective     Pt reports:  "I want to work on my hand'  he was compliant with home exercise program given last session.   Response to previous treatment good  Functional change: engage in more functional tasks at home    Pain: 0  Location: N/A    Objective      Luis participated in therapeutic exercises for 55 minutes including:  Pt completed the following exercises below in order to increase ROM, strength and tolerance of affected UE in order to increase IND and functional use:    Exercises Date 10/22/2020    Visit #29   UBE 60 RPM 6 min 3 min forwards 3 backwards   Shoulder pulleys 3 minutes     Green therabar  Red therabar  2/15 frowns  2/15 smileys   Wrist AROM 2/15  Extension  Flexion at neutral   Bicep Curls 2/15  2#   Supination Wheel 2 ways  2 min each   Supination Hammer 2/15   Orange web Finger flexion        Update:  Joint Evaluation  AROM  7/14/2020 PROM   7/14/2020 AROM  7/14/2020 PROM   7/14/2020 AROM  9/10/2020 PROM  9/10/2020 AROM  10/22/2020 PROM  10/22/2020     Right Right Left Left Left Left Left Left   Shoulder flex 0-180 WNL WNL 86 115 115 145 129 140   Shoulder Abd 0-180 WNL WNL 88 90 110 100 113 105   Shoulder ER 0-90 WNL WNL 24 41 30 53 20 60 "   Shoulder IR 0-90 WNL WNL Waist 74 Waistline 90 L4 80   Shoulder Extension 0-80 WNL WNL 47 NT 50 80 52 NT   Shoulder Horizontal adduction 0-90 WNL WNL 5 NT 10 WNL 15 WNL   Elbow flex/ext 0-150 WNL WNL WNL/32 WNL WNL/170 ext WNL WNL/170 ext WNL   Wrist flex 0-80 WNL WNL 72 WNL 68 WNL 70 WNL   Wrist ext 0-70 WNL WNL 40 WNL 48 WNL 44 WNL   Supination 0-80 WNL WNL NT WNL 62 WNL 76 WNL   Pronation 0-80 WNL WNL NT WNL WNL WNL WNL WNL   UD WNL WNL 10 WNL 24 WNL NT WNL   RD WNL WNL 20 WNL 32 WNL NT WNL      Fist: loose     Strength 7/14/2020 7/14/2020 9/10/2020 10/22/2020   **within available ROM** Right Left Left Left   Shoulder flex 5/5 3-/5 3/5 4/5   Shoulder abd 5/5 3-/5 3/5 4/5   Shoulder ER 5/5 3-/5 3-/5 4+/5   Shoulder IR 5/5 2-/5 3-/5 4+/5   Shoulder Extension 5/5 2-/5 3/5 4+/5   Shoulder Horizontal adduction 5/5 2-/5 3-/5 4+/5   Elbow flex 5/5 3+/5 4/5 4+/5   Elbow ext 5/5 3-/5 3/5 4+/5   Wrist flex 5/5 3/5 3/5 4+/5   Wrist ext 5/5 3/5 3/5 4+/5   Supination 5/5 3-/5 3/5 4+/5   Pronation 5/5 3-/5 3/5 4+/5   UD 5/5 3/5 3/5 4+/5   RD 5/5 3/5 3/5 4+/5       Home Exercises and Education Provided     Education provided:   - HEP  - Progress towards goals     Written Home Exercises Provided: Patient instructed to cont prior HEP.  Exercises were reviewed and Luis was able to demonstrate them prior to the end of the session.  Luis demonstrated good  understanding of the HEP provided.   .   See EMR under Patient Instructions for exercises provided prior visit.        Assessment     Pt participated in therapeutic exercises with focus on wrist and supination today. Spasticity still remains to be main limitation, but pt is able to perform all exercises with occasional min A from OT. Re-measurements taken today with some improvements with shoulder flexion and other ranges of motion. Pt's strength has improvement since initial evaluation. Overall he is progressing well and reports more and more functional engagement in  necessary and desired activities at home and in the community. Next session to continue with similar exercises and increase in challenge as tolerated.    Luis is progressing well towards his goals and there are no updates to goals at this time. Pt prognosis is Good.     Pt will continue to benefit from skilled outpatient occupational therapy to address the deficits listed in the problem list on initial evaluation provide pt/family education and to maximize pt's level of independence in the home and community environment.     Anticipated barriers to occupational therapy: COVID-19, transportation    Pt's spiritual, cultural and educational needs considered and pt agreeable to plan of care and goals.    Goals:  Short Term Goals:  1) Initiate Hep MET 7/15/2020  2) Pt to increase LUE  strength by 5 # in order to A in self care task of feeding by 4 weeks. MET 9/10/2020  3) Pt will participate in bilateral self-feeding tasks with mod A and AE PRN by 4 weeks  MET 8/14/2020  4) Pt to increase LUE AROM by 5 degrees in order to A in UB dressing by 4 weeks.  Progressing 10/22/2020  5) Patient will be able to achieve less than or equal to 35% on the FOTO, demonstrating overall improved functional ability with upper extremity. (self-care category)  Progressing 10/22/2020  6) Pt will increase QuickDASH score by 5% by 4 weeks, demonstrating improved functional ability to participate in household chores.  MET 9/10/2020      Long Term Goals:  1) Pt to be IND with HEP in order to maintain ROM and strength needed for self care IND by d/c.  MET 9/10/2020  2) Pt to increase LUE  strength to WNL as compared to unaffected extremity in order to open items for self feeding by d/c.  Progressing 10/22/2020  3) Pt will participate in bilateral self-feeding tasks with SVN and AE PRN by d/c.  Progressing 10/22/2020  4) Pt to increase LUE AROM by 10 degrees in order to A in UB dressing by d/c.  Progressing 10/22/2020  5) Patient will be  able to achieve less than or equal to 30% on the FOTO, demonstrating overall improved functional ability with upper extremity. (self-care category)  Progressing 10/22/2020  6) Pt will increase QuickDASH score by 10% by d/c, demonstrating improved functional ability to participate in household chores.  MET 9/10/2020    Plan     Continue per POC  Updates/Grading for next session: Grade as tolerated    TABITHA GUSTAFSON, OT

## 2020-10-22 ENCOUNTER — CLINICAL SUPPORT (OUTPATIENT)
Dept: REHABILITATION | Facility: HOSPITAL | Age: 65
End: 2020-10-22
Payer: MEDICARE

## 2020-10-22 DIAGNOSIS — Z78.9 IMPAIRED MOBILITY AND ACTIVITIES OF DAILY LIVING: ICD-10-CM

## 2020-10-22 DIAGNOSIS — Z74.09 IMPAIRED MOBILITY AND ACTIVITIES OF DAILY LIVING: ICD-10-CM

## 2020-10-22 DIAGNOSIS — M62.81 MUSCLE WEAKNESS: ICD-10-CM

## 2020-10-22 DIAGNOSIS — R27.8 DECREASED COORDINATION: ICD-10-CM

## 2020-10-22 PROCEDURE — 97110 THERAPEUTIC EXERCISES: CPT | Mod: KX,PN

## 2020-10-30 ENCOUNTER — CLINICAL SUPPORT (OUTPATIENT)
Dept: REHABILITATION | Facility: HOSPITAL | Age: 65
End: 2020-10-30
Payer: MEDICARE

## 2020-10-30 DIAGNOSIS — Z74.09 IMPAIRED MOBILITY AND ACTIVITIES OF DAILY LIVING: ICD-10-CM

## 2020-10-30 DIAGNOSIS — R27.8 DECREASED COORDINATION: ICD-10-CM

## 2020-10-30 DIAGNOSIS — Z78.9 IMPAIRED MOBILITY AND ACTIVITIES OF DAILY LIVING: ICD-10-CM

## 2020-10-30 DIAGNOSIS — M62.81 MUSCLE WEAKNESS: ICD-10-CM

## 2020-10-30 PROCEDURE — 97110 THERAPEUTIC EXERCISES: CPT | Mod: KX,PN

## 2020-10-30 NOTE — PROGRESS NOTES
"   Occupational Therapy Treatment Note     Date: 10/30/2020  Name: Luis Wong  Clinic Number: [id number]    Therapy Diagnosis:   Encounter Diagnoses   Name Primary?    Muscle weakness     Impaired mobility and activities of daily living     Decreased coordination      Physician: Brien Lopez MD     Physician Orders: Eval and treat  Medical Diagnosis: History of stroke     Onset Date: 12/12/2019  Evaluation Date: 7/14/2020  Plan of Care Expiration Period: 11/6/20  Insurance Authorization period Expiration: 12/31/2020  Date of Return to MD: October 2020  Visit # / Visits Authortized: 30/50   FOTO:  UE CVA / 47%     Time In:  2:12 PM  Time Out: 2:57 PM           Total Billable Time:   45 minutes    Precautions:  Standard and Fall      Subjective     Pt reports:  "I haven't stretched the past couple days"  he was compliant with home exercise program given last session.   Response to previous treatment good  Functional change: engage in more functional tasks at home    Pain: 0  Location: N/A    Objective      Luis participated in therapeutic exercises for 45 minutes including:  Pt completed the following exercises below in order to increase ROM, strength and tolerance of affected UE in order to increase IND and functional use:    Exercises Date 10/30/2020    Visit #30   UBE 60 RPM 6 min 3 min forwards 3 backwards   Shoulder pulleys 3 minutes     Shoulder extension  10x   Modified push ups 2/10   5# dowel Curls 2/10  FF 2/10   Peach theraputty Flatten  Finger extension   Green therabar  Red therabar  2/15 frowns  2/15 smileys    Supination wheel 2 min   1# wrist weight Flexion and extension at neutral   Supination Hammer 2/15        Home Exercises and Education Provided     Education provided:   - HEP  - Progress towards goals     Written Home Exercises Provided: Patient instructed to cont prior HEP.  Exercises were reviewed and Luis was able to demonstrate them prior to the end of the session.  Luis " demonstrated good  understanding of the HEP provided.   .   See EMR under Patient Instructions for exercises provided prior visit.        Assessment     Pt tolerated session well today. Increased movement and strength noted in RUE, but main limitations remain to be wrist supination and extension due to spasticity. Pt reports good participation in ADLs and IADLs at home, but decreased wrist movement decreases quality of participation. Exercises today focused on wrist strength and ROM. Overall, he is progressing well, and next session to continue with similar exercises and increase in challenge as tolerated.    Luis is progressing well towards his goals and there are no updates to goals at this time. Pt prognosis is Good.     Pt will continue to benefit from skilled outpatient occupational therapy to address the deficits listed in the problem list on initial evaluation provide pt/family education and to maximize pt's level of independence in the home and community environment.     Anticipated barriers to occupational therapy: COVID-19, transportation    Pt's spiritual, cultural and educational needs considered and pt agreeable to plan of care and goals.    Goals:  Short Term Goals:  1) Initiate Hep MET 7/15/2020  2) Pt to increase LUE  strength by 5 # in order to A in self care task of feeding by 4 weeks. MET 9/10/2020  3) Pt will participate in bilateral self-feeding tasks with mod A and AE PRN by 4 weeks  MET 8/14/2020  4) Pt to increase LUE AROM by 5 degrees in order to A in UB dressing by 4 weeks.  Progressing 10/30/2020  5) Patient will be able to achieve less than or equal to 35% on the FOTO, demonstrating overall improved functional ability with upper extremity. (self-care category)  Progressing 10/30/2020  6) Pt will increase QuickDASH score by 5% by 4 weeks, demonstrating improved functional ability to participate in household chores.  MET 9/10/2020      Long Term Goals:  1) Pt to be IND with HEP in  order to maintain ROM and strength needed for self care IND by d/c.  MET 9/10/2020  2) Pt to increase LUE  strength to WNL as compared to unaffected extremity in order to open items for self feeding by d/c.  Progressing 10/30/2020  3) Pt will participate in bilateral self-feeding tasks with SVN and AE PRN by d/c.  Progressing 10/30/2020  4) Pt to increase LUE AROM by 10 degrees in order to A in UB dressing by d/c.  Progressing 10/30/2020  5) Patient will be able to achieve less than or equal to 30% on the FOTO, demonstrating overall improved functional ability with upper extremity. (self-care category)  Progressing 10/30/2020  6) Pt will increase QuickDASH score by 10% by d/c, demonstrating improved functional ability to participate in household chores.  MET 9/10/2020    Plan     Continue per POC  Updates/Grading for next session: Grade as tolerated    TABITHA AN, OT

## 2020-11-03 ENCOUNTER — CLINICAL SUPPORT (OUTPATIENT)
Dept: REHABILITATION | Facility: HOSPITAL | Age: 65
End: 2020-11-03
Payer: MEDICARE

## 2020-11-03 DIAGNOSIS — M62.81 MUSCLE WEAKNESS: ICD-10-CM

## 2020-11-03 DIAGNOSIS — Z78.9 IMPAIRED MOBILITY AND ACTIVITIES OF DAILY LIVING: ICD-10-CM

## 2020-11-03 DIAGNOSIS — R27.8 DECREASED COORDINATION: ICD-10-CM

## 2020-11-03 DIAGNOSIS — Z74.09 IMPAIRED MOBILITY AND ACTIVITIES OF DAILY LIVING: ICD-10-CM

## 2020-11-03 PROCEDURE — 97110 THERAPEUTIC EXERCISES: CPT | Mod: KX,PN

## 2020-11-03 NOTE — PROGRESS NOTES
"   Occupational Therapy Treatment Note     Date: 11/3/2020  Name: Luis Wong  Clinic Number: [id number]    Therapy Diagnosis:   Encounter Diagnoses   Name Primary?    Muscle weakness     Impaired mobility and activities of daily living     Decreased coordination      Physician: Brien Lopez MD     Physician Orders: Eval and treat  Medical Diagnosis: History of stroke     Onset Date: 12/12/2019  Evaluation Date: 7/14/2020  Plan of Care Expiration Period: 11/6/20  Insurance Authorization period Expiration: 12/31/2020  Date of Return to MD: October 2020  Visit # / Visits Authortized: 31/50   FOTO:  UE CVA / 47%     Time In: 10:10 AM     Time Out: 10:55 AM            Total Billable Time:  45 minutes    Precautions:  Standard and Fall      Subjective     Pt reports: "I cut my daughter's grass this weekend'  he was compliant with home exercise program given last session.   Response to previous treatment good  Functional change: engage in more functional tasks at home    Pain: 0  Location: N/A    Objective      Luis participated in therapeutic exercises for 45 minutes including:  Pt completed the following exercises below in order to increase ROM, strength and tolerance of affected UE in order to increase IND and functional use:    Exercises Date 11/3/2020    Visit #31   UBE 60 RPM 6 min 3 min forwards 3 backwards   Shoulder pulleys 3 minutes     Shoulder extension  10x   Modified push ups 2/10   4# dowel Curls 2/15  FF 2/10   Red theraband Rows   Pull downs   Supination wheel 2 min   1# wrist weight Flexion and extension at neutral   Supination Hammer 2/15      Update:  Joint Evaluation  AROM  7/14/2020 PROM   7/14/2020 AROM  7/14/2020 PROM   7/14/2020 AROM  9/10/2020 PROM  9/10/2020 AROM  11/3/2020     Right Right Left Left Left Left Left   Wrist flex 0-80 WNL WNL 72 WNL 68 WNL 68   Wrist ext 0-70 WNL WNL 40 WNL 48 WNL 41   Supination 0-80 WNL WNL NT WNL 62 WNL 61   Pronation 0-80 WNL WNL NT WNL WNL WNL WNL "   UD WNL WNL 10 WNL 24 WNL 30   RD WNL WNL 20 WNL 32 WNL 40          Strength: (ANTONIO Dynamometer in lbs.) Average 3 trials, Position II:       7/14/2020 7/14/2020 9/10/2020 9/10/2020 11/3/2020     Right Left Right Left Left   Rung II 65# 27# 65# 35# 40#      Pinch Strength (Measured in psi)       7/14/2020 7/14/2020 9/10/2020 9/10/2020 11/3/2020     Right Left Right Left Left   Key Pinch 21psi 11 psi  16 psi 10 psi 14 psi   3pt Pinch 17 psi 10 psi 15 psi 7 psi 10 psi   2pt Pinch 11 psi 8 psi 10 psi 5 psi 8 psi       Home Exercises and Education Provided     Education provided:   - HEP  - Progress towards goals     Written Home Exercises Provided: Patient instructed to cont prior HEP.  Exercises were reviewed and Luis was able to demonstrate them prior to the end of the session.  Luis demonstrated good  understanding of the HEP provided.   .   See EMR under Patient Instructions for exercises provided prior visit.        Assessment     Pt tolerated session well today. He continues to demonstrate good progress with exercises every session. Exercises focused on UE strength and wrist supination today. Re-measurements were taken with increase in pinch strength and UD/RD ROM. Supination and wrist flexion/extension measurements are similar to last POC measurements. Pt reports good participation in desired activities at home. Lack of full supination and wrist movement are main complaints and limitations in quality of participation. Pt, however, to discharge next session due to plateau in function, good compliance with HEP, and met functional level of independence.    Luis is progressing well towards his goals and there are no updates to goals at this time. Pt prognosis is Good.     Pt will continue to benefit from skilled outpatient occupational therapy to address the deficits listed in the problem list on initial evaluation provide pt/family education and to maximize pt's level of independence in the home and  community environment.     Anticipated barriers to occupational therapy: COVID-19, transportation    Pt's spiritual, cultural and educational needs considered and pt agreeable to plan of care and goals.    Goals:  Short Term Goals:  1) Initiate Hep MET 7/15/2020  2) Pt to increase LUE  strength by 5 # in order to A in self care task of feeding by 4 weeks. MET 9/10/2020  3) Pt will participate in bilateral self-feeding tasks with mod A and AE PRN by 4 weeks  MET 8/14/2020  4) Pt to increase LUE AROM by 5 degrees in order to A in UB dressing by 4 weeks.  Progressing 11/3/2020  5) Patient will be able to achieve less than or equal to 35% on the FOTO, demonstrating overall improved functional ability with upper extremity. (self-care category)  Progressing 11/3/2020  6) Pt will increase QuickDASH score by 5% by 4 weeks, demonstrating improved functional ability to participate in household chores.  MET 9/10/2020      Long Term Goals:  1) Pt to be IND with HEP in order to maintain ROM and strength needed for self care IND by d/c.  MET 9/10/2020  2) Pt to increase LUE  strength to WNL as compared to unaffected extremity in order to open items for self feeding by d/c.  Progressing 11/3/2020  3) Pt will participate in bilateral self-feeding tasks with SVN and AE PRN by d/c.  Progressing 11/3/2020  4) Pt to increase LUE AROM by 10 degrees in order to A in UB dressing by d/c.  Progressing 11/3/2020  5) Patient will be able to achieve less than or equal to 30% on the FOTO, demonstrating overall improved functional ability with upper extremity. (self-care category)  Progressing 11/3/2020  6) Pt will increase QuickDASH score by 10% by d/c, demonstrating improved functional ability to participate in household chores.  MET 9/10/2020    Plan     Continue per POC  Updates/Grading for next session: Grade as tolerated    TABITHA AN, OT      Spontaneous, unlabored and symmetrical

## 2020-11-05 ENCOUNTER — PATIENT MESSAGE (OUTPATIENT)
Dept: FAMILY MEDICINE | Facility: CLINIC | Age: 65
End: 2020-11-05

## 2020-11-05 ENCOUNTER — TELEPHONE (OUTPATIENT)
Dept: FAMILY MEDICINE | Facility: CLINIC | Age: 65
End: 2020-11-05

## 2020-11-05 ENCOUNTER — CLINICAL SUPPORT (OUTPATIENT)
Dept: REHABILITATION | Facility: HOSPITAL | Age: 65
End: 2020-11-05
Payer: MEDICARE

## 2020-11-05 DIAGNOSIS — I10 ESSENTIAL HYPERTENSION: Primary | ICD-10-CM

## 2020-11-05 DIAGNOSIS — R27.8 DECREASED COORDINATION: ICD-10-CM

## 2020-11-05 DIAGNOSIS — E11.65 TYPE 2 DIABETES MELLITUS WITH HYPERGLYCEMIA, WITHOUT LONG-TERM CURRENT USE OF INSULIN: ICD-10-CM

## 2020-11-05 DIAGNOSIS — Z78.9 IMPAIRED MOBILITY AND ACTIVITIES OF DAILY LIVING: ICD-10-CM

## 2020-11-05 DIAGNOSIS — Z74.09 IMPAIRED MOBILITY AND ACTIVITIES OF DAILY LIVING: ICD-10-CM

## 2020-11-05 DIAGNOSIS — M62.81 MUSCLE WEAKNESS: ICD-10-CM

## 2020-11-05 PROCEDURE — 97110 THERAPEUTIC EXERCISES: CPT | Mod: KX,PN

## 2020-11-05 NOTE — PROGRESS NOTES
"   Occupational Therapy Treatment/Discharge Note     Date: 11/5/2020  Name: Luis Wong  Clinic Number: [id number]    Therapy Diagnosis:   Encounter Diagnoses   Name Primary?    Muscle weakness     Impaired mobility and activities of daily living     Decreased coordination      Physician: Brien Lopez MD     Physician Orders: Eval and treat  Medical Diagnosis: History of stroke     Onset Date: 12/12/2019  Evaluation Date: 7/14/2020  Plan of Care Expiration Period: 11/6/20  Insurance Authorization period Expiration: 12/31/2020  Date of Return to MD: October 2020  Visit # / Visits Authortized: 32/50   FOTO:  UE CVA / 47%     Time In: 10:56 AM       Time Out:  11:38 AM            Total Billable Time:   42 minutes    Precautions:  Standard and Fall      Subjective     Pt reports: "I felt good after last session"  he was compliant with home exercise program given last session.   Response to previous treatment good  Functional change: engage in more functional tasks at home    Pain: 0  Location: N/A    Objective      Luis participated in therapeutic exercises for 42 minutes including:  Pt completed the following exercises below in order to increase ROM, strength and tolerance of affected UE in order to increase IND and functional use:    Exercises Date 11/5/2020    Visit #32   UBE 60 RPM 6 min 3 min forwards 3 backwards   Shoulder pulleys 3 minutes       Re-measurements were taken for discharge.     Update:  Joint Evaluation  AROM  7/14/2020 PROM   7/14/2020 AROM  7/14/2020 PROM   7/14/2020 AROM  9/10/2020 PROM  9/10/2020 AROM  11/3/2020 and 11/5/2020 PROM  11/3/2020 and 11/5/2020     Right Right Left Left Left Left Left Left   Shoulder flex 0-180 WNL WNL 86 115 115 145 125 143   Shoulder Abd 0-180 WNL WNL 88 90 110 100 108 105   Shoulder ER 0-90 WNL WNL 24 41 30 53 20 60   Shoulder IR 0-90 WNL WNL Waist 74 Waistline 90 S1 WNL   Shoulder Extension 0-80 WNL WNL 47 NT 50 80 58 72   Shoulder Horizontal adduction " 0-90 WNL WNL 5 NT 10 WNL 17 WNL   Elbow flex/ext 0-150 WNL WNL WNL/32 WNL WNL/170 ext WNL WNL/160 ext    Wrist flex 0-80 WNL WNL 72 WNL 68 WNL 68 WNL   Wrist ext 0-70 WNL WNL 40 WNL 48 WNL 41 WNL   Supination 0-80 WNL WNL NT WNL 62 WNL 61 WNL   Pronation 0-80 WNL WNL NT WNL WNL WNL WNL WNL   UD WNL WNL 10 WNL 24 WNL 30 WNL   RD WNL WNL 20 WNL 32 WNL 40 WNL      Fist: loose     Strength 7/14/2020 7/14/2020 9/10/2020 11/5/2020   **within available ROM** Right Left Left Left   Shoulder flex 5/5 3-/5 3/5 4-/5   Shoulder abd 5/5 3-/5 3/5 4-/5   Shoulder ER 5/5 3-/5 3-/5 4/5   Shoulder IR 5/5 2-/5 3-/5 4/5    Shoulder Extension 5/5 2-/5 3/5 4-/5   Shoulder Horizontal adduction 5/5 2-/5 3-/5 4/5   Elbow flex 5/5 3+/5 4/5 5/5   Elbow ext 5/5 3-/5 3/5 5/5   Wrist flex 5/5 3/5 3/5 5/5   Wrist ext 5/5 3/5 3/5 5/5   Supination 5/5 3-/5 3/5 5/5   Pronation 5/5 3-/5 3/5 5/5   UD 5/5 3/5 3/5 5/5   RD 5/5 3/5 3/5 5/5    Comments: 5/5 in available plane of movement       Strength: (ANTONIO Dynamometer in lbs.) Average 3 trials, Position II:       7/14/2020 7/14/2020 9/10/2020 9/10/2020 11/5/2020     Right Left Right Left Left   Rung II 65# 27# 65# 35# 40#      Pinch Strength (Measured in psi)       7/14/2020 7/14/2020 9/10/2020 9/10/2020 11/5/2020     Right Left Right Left Left   Key Pinch 21psi 11 psi  16 psi 10 psi 14 psi   3pt Pinch 17 psi 10 psi 15 psi 7 psi 10 psi   2pt Pinch 11 psi 8 psi 10 psi 5 psi 8 psi       Home Exercises and Education Provided     Education provided:   - HEP  - Progress towards goals     Written Home Exercises Provided: Patient instructed to cont prior HEP.  Exercises were reviewed and Luis was able to demonstrate them prior to the end of the session.  Luis demonstrated good  understanding of the HEP provided.   .   See EMR under Patient Instructions for exercises provided prior visit.        Assessment     Re-measurements taken today for discharge today. AROM generally improved 5-10 degrees.   strength also improved significantly since initial session. Pt compliant with exercises at home, and he was provided with extra bands and more resistive theraputty to continue with exercises at home. Pt reports good independence with desired and necessary activities at home and has good support. Pt to discharge today due to met functional level of independence.    Luis is progressing well towards his goals and there are no updates to goals at this time. Pt prognosis is Good.     Pt will continue to benefit from skilled outpatient occupational therapy to address the deficits listed in the problem list on initial evaluation provide pt/family education and to maximize pt's level of independence in the home and community environment.     Anticipated barriers to occupational therapy: COVID-19, transportation    Pt's spiritual, cultural and educational needs considered and pt agreeable to plan of care and goals.    Goals:  Short Term Goals:  1) Initiate Hep MET 7/15/2020  2) Pt to increase LUE  strength by 5 # in order to A in self care task of feeding by 4 weeks. MET 9/10/2020  3) Pt will participate in bilateral self-feeding tasks with mod A and AE PRN by 4 weeks  MET 8/14/2020  4) Pt to increase LUE AROM by 5 degrees in order to A in UB dressing by 4 weeks.  Met 11/5/2020  5) Patient will be able to achieve less than or equal to 35% on the FOTO, demonstrating overall improved functional ability with upper extremity. (self-care category)  Progressing 11/5/2020  6) Pt will increase QuickDASH score by 5% by 4 weeks, demonstrating improved functional ability to participate in household chores.  MET 9/10/2020      Long Term Goals:  1) Pt to be IND with HEP in order to maintain ROM and strength needed for self care IND by d/c.  MET 9/10/2020  2) Pt to increase LUE  strength to WNL as compared to unaffected extremity in order to open items for self feeding by d/c.  Not Met  3) Pt will participate in bilateral  self-feeding tasks with SVN and AE PRN by d/c. Met 11/5/2020  4) Pt to increase LUE AROM by 10 degrees in order to A in UB dressing by d/c.  Met 11/5/2020  5) Patient will be able to achieve less than or equal to 30% on the FOTO, demonstrating overall improved functional ability with upper extremity. (self-care category)  Not Met   6) Pt will increase QuickDASH score by 10% by d/c, demonstrating improved functional ability to participate in household chores.  MET 9/10/2020    Plan     Pt to discharge today due to plateau with function and met functional level of independence.    TABITHA GUSTAFSON, OT

## 2020-11-06 ENCOUNTER — CLINICAL SUPPORT (OUTPATIENT)
Dept: CARDIOLOGY | Facility: HOSPITAL | Age: 65
End: 2020-11-06
Payer: MEDICARE

## 2020-11-06 DIAGNOSIS — Z95.818 PRESENCE OF OTHER CARDIAC IMPLANTS AND GRAFTS: ICD-10-CM

## 2020-11-06 PROCEDURE — 93298 REM INTERROG DEV EVAL SCRMS: CPT | Mod: ,,, | Performed by: INTERNAL MEDICINE

## 2020-11-06 PROCEDURE — 93298 CARDIAC DEVICE CHECK - REMOTE: ICD-10-PCS | Mod: ,,, | Performed by: INTERNAL MEDICINE

## 2020-11-06 PROCEDURE — G2066 INTER DEVC REMOTE 30D: HCPCS | Performed by: INTERNAL MEDICINE

## 2020-11-06 NOTE — TELEPHONE ENCOUNTER
Spoke with patient and I scheduled labs for Monday 11/9/2020 at 10:40am Patient voiced understanding

## 2020-11-09 ENCOUNTER — LAB VISIT (OUTPATIENT)
Dept: LAB | Facility: HOSPITAL | Age: 65
End: 2020-11-09
Attending: FAMILY MEDICINE
Payer: MEDICARE

## 2020-11-09 DIAGNOSIS — I10 ESSENTIAL HYPERTENSION: ICD-10-CM

## 2020-11-09 DIAGNOSIS — E11.65 TYPE 2 DIABETES MELLITUS WITH HYPERGLYCEMIA, WITHOUT LONG-TERM CURRENT USE OF INSULIN: ICD-10-CM

## 2020-11-09 PROCEDURE — 85025 COMPLETE CBC W/AUTO DIFF WBC: CPT

## 2020-11-09 PROCEDURE — 80053 COMPREHEN METABOLIC PANEL: CPT

## 2020-11-09 PROCEDURE — 83036 HEMOGLOBIN GLYCOSYLATED A1C: CPT

## 2020-11-09 PROCEDURE — 80061 LIPID PANEL: CPT

## 2020-11-09 PROCEDURE — 36415 COLL VENOUS BLD VENIPUNCTURE: CPT | Mod: PO

## 2020-11-09 PROCEDURE — 84443 ASSAY THYROID STIM HORMONE: CPT

## 2020-11-10 LAB
ALBUMIN SERPL BCP-MCNC: 4.3 G/DL (ref 3.5–5.2)
ALP SERPL-CCNC: 78 U/L (ref 55–135)
ALT SERPL W/O P-5'-P-CCNC: 14 U/L (ref 10–44)
ANION GAP SERPL CALC-SCNC: 11 MMOL/L (ref 8–16)
AST SERPL-CCNC: 18 U/L (ref 10–40)
BASOPHILS # BLD AUTO: 0.02 K/UL (ref 0–0.2)
BASOPHILS NFR BLD: 0.4 % (ref 0–1.9)
BILIRUB SERPL-MCNC: 0.7 MG/DL (ref 0.1–1)
BUN SERPL-MCNC: 22 MG/DL (ref 8–23)
CALCIUM SERPL-MCNC: 9.8 MG/DL (ref 8.7–10.5)
CHLORIDE SERPL-SCNC: 104 MMOL/L (ref 95–110)
CHOLEST SERPL-MCNC: 191 MG/DL (ref 120–199)
CHOLEST/HDLC SERPL: 2 {RATIO} (ref 2–5)
CO2 SERPL-SCNC: 29 MMOL/L (ref 23–29)
CREAT SERPL-MCNC: 1.1 MG/DL (ref 0.5–1.4)
DIFFERENTIAL METHOD: ABNORMAL
EOSINOPHIL # BLD AUTO: 0.1 K/UL (ref 0–0.5)
EOSINOPHIL NFR BLD: 2.1 % (ref 0–8)
ERYTHROCYTE [DISTWIDTH] IN BLOOD BY AUTOMATED COUNT: 13 % (ref 11.5–14.5)
EST. GFR  (AFRICAN AMERICAN): >60 ML/MIN/1.73 M^2
EST. GFR  (NON AFRICAN AMERICAN): >60 ML/MIN/1.73 M^2
ESTIMATED AVG GLUCOSE: 114 MG/DL (ref 68–131)
GLUCOSE SERPL-MCNC: 107 MG/DL (ref 70–110)
HBA1C MFR BLD HPLC: 5.6 % (ref 4–5.6)
HCT VFR BLD AUTO: 43.8 % (ref 40–54)
HDLC SERPL-MCNC: 95 MG/DL (ref 40–75)
HDLC SERPL: 49.7 % (ref 20–50)
HGB BLD-MCNC: 13.9 G/DL (ref 14–18)
IMM GRANULOCYTES # BLD AUTO: 0.01 K/UL (ref 0–0.04)
IMM GRANULOCYTES NFR BLD AUTO: 0.2 % (ref 0–0.5)
LDLC SERPL CALC-MCNC: 86.4 MG/DL (ref 63–159)
LYMPHOCYTES # BLD AUTO: 1.7 K/UL (ref 1–4.8)
LYMPHOCYTES NFR BLD: 34.1 % (ref 18–48)
MCH RBC QN AUTO: 28 PG (ref 27–31)
MCHC RBC AUTO-ENTMCNC: 31.7 G/DL (ref 32–36)
MCV RBC AUTO: 88 FL (ref 82–98)
MONOCYTES # BLD AUTO: 0.5 K/UL (ref 0.3–1)
MONOCYTES NFR BLD: 9.7 % (ref 4–15)
NEUTROPHILS # BLD AUTO: 2.6 K/UL (ref 1.8–7.7)
NEUTROPHILS NFR BLD: 53.5 % (ref 38–73)
NONHDLC SERPL-MCNC: 96 MG/DL
NRBC BLD-RTO: 0 /100 WBC
PLATELET # BLD AUTO: 188 K/UL (ref 150–350)
PMV BLD AUTO: 14.4 FL (ref 9.2–12.9)
POTASSIUM SERPL-SCNC: 3.9 MMOL/L (ref 3.5–5.1)
PROT SERPL-MCNC: 7.5 G/DL (ref 6–8.4)
RBC # BLD AUTO: 4.96 M/UL (ref 4.6–6.2)
SODIUM SERPL-SCNC: 144 MMOL/L (ref 136–145)
TRIGL SERPL-MCNC: 48 MG/DL (ref 30–150)
TSH SERPL DL<=0.005 MIU/L-ACNC: 0.45 UIU/ML (ref 0.4–4)
WBC # BLD AUTO: 4.87 K/UL (ref 3.9–12.7)

## 2020-11-20 ENCOUNTER — PATIENT OUTREACH (OUTPATIENT)
Dept: OTHER | Facility: OTHER | Age: 65
End: 2020-11-20

## 2020-12-03 ENCOUNTER — PATIENT OUTREACH (OUTPATIENT)
Dept: ADMINISTRATIVE | Facility: OTHER | Age: 65
End: 2020-12-03

## 2020-12-03 NOTE — PROGRESS NOTES
Updates were requested from care everywhere.  Chart was reviewed for overdue Proactive [facility] Encounters ([facility]) topics (CRS, Breast Cancer Screening, Eye exam)  Health Maintenance has been updated.  LINKS not responding.

## 2020-12-06 ENCOUNTER — CLINICAL SUPPORT (OUTPATIENT)
Dept: CARDIOLOGY | Facility: HOSPITAL | Age: 65
End: 2020-12-06
Payer: MEDICARE

## 2020-12-06 DIAGNOSIS — Z95.818 PRESENCE OF OTHER CARDIAC IMPLANTS AND GRAFTS: ICD-10-CM

## 2020-12-06 PROCEDURE — G2066 INTER DEVC REMOTE 30D: HCPCS | Performed by: INTERNAL MEDICINE

## 2020-12-06 PROCEDURE — 93298 CARDIAC DEVICE CHECK - REMOTE: ICD-10-PCS | Mod: ,,, | Performed by: INTERNAL MEDICINE

## 2020-12-06 PROCEDURE — 93298 REM INTERROG DEV EVAL SCRMS: CPT | Mod: ,,, | Performed by: INTERNAL MEDICINE

## 2020-12-07 ENCOUNTER — OFFICE VISIT (OUTPATIENT)
Dept: ORTHOPEDICS | Facility: CLINIC | Age: 65
End: 2020-12-07
Payer: MEDICARE

## 2020-12-07 VITALS — HEIGHT: 69 IN | TEMPERATURE: 98 F | BODY MASS INDEX: 21.68 KG/M2 | WEIGHT: 146.38 LBS

## 2020-12-07 DIAGNOSIS — G56.03 BILATERAL CARPAL TUNNEL SYNDROME: Primary | ICD-10-CM

## 2020-12-07 PROCEDURE — 20526 PR INJECT CARPAL TUNNEL: ICD-10-PCS | Mod: LT,S$GLB,, | Performed by: ORTHOPAEDIC SURGERY

## 2020-12-07 PROCEDURE — 1125F PR PAIN SEVERITY QUANTIFIED, PAIN PRESENT: ICD-10-PCS | Mod: S$GLB,,, | Performed by: ORTHOPAEDIC SURGERY

## 2020-12-07 PROCEDURE — 1125F AMNT PAIN NOTED PAIN PRSNT: CPT | Mod: S$GLB,,, | Performed by: ORTHOPAEDIC SURGERY

## 2020-12-07 PROCEDURE — 3288F FALL RISK ASSESSMENT DOCD: CPT | Mod: CPTII,S$GLB,, | Performed by: ORTHOPAEDIC SURGERY

## 2020-12-07 PROCEDURE — 99213 PR OFFICE/OUTPT VISIT, EST, LEVL III, 20-29 MIN: ICD-10-PCS | Mod: 25,S$GLB,, | Performed by: ORTHOPAEDIC SURGERY

## 2020-12-07 PROCEDURE — 99213 OFFICE O/P EST LOW 20 MIN: CPT | Mod: 25,S$GLB,, | Performed by: ORTHOPAEDIC SURGERY

## 2020-12-07 PROCEDURE — 20526 THER INJECTION CARP TUNNEL: CPT | Mod: LT,S$GLB,, | Performed by: ORTHOPAEDIC SURGERY

## 2020-12-07 PROCEDURE — 1159F PR MEDICATION LIST DOCUMENTED IN MEDICAL RECORD: ICD-10-PCS | Mod: S$GLB,,, | Performed by: ORTHOPAEDIC SURGERY

## 2020-12-07 PROCEDURE — 99999 PR PBB SHADOW E&M-EST. PATIENT-LVL III: CPT | Mod: PBBFAC,,, | Performed by: ORTHOPAEDIC SURGERY

## 2020-12-07 PROCEDURE — 1101F PT FALLS ASSESS-DOCD LE1/YR: CPT | Mod: CPTII,S$GLB,, | Performed by: ORTHOPAEDIC SURGERY

## 2020-12-07 PROCEDURE — 3288F PR FALLS RISK ASSESSMENT DOCUMENTED: ICD-10-PCS | Mod: CPTII,S$GLB,, | Performed by: ORTHOPAEDIC SURGERY

## 2020-12-07 PROCEDURE — 1159F MED LIST DOCD IN RCRD: CPT | Mod: S$GLB,,, | Performed by: ORTHOPAEDIC SURGERY

## 2020-12-07 PROCEDURE — 1101F PR PT FALLS ASSESS DOC 0-1 FALLS W/OUT INJ PAST YR: ICD-10-PCS | Mod: CPTII,S$GLB,, | Performed by: ORTHOPAEDIC SURGERY

## 2020-12-07 PROCEDURE — 3008F PR BODY MASS INDEX (BMI) DOCUMENTED: ICD-10-PCS | Mod: CPTII,S$GLB,, | Performed by: ORTHOPAEDIC SURGERY

## 2020-12-07 PROCEDURE — 99999 PR PBB SHADOW E&M-EST. PATIENT-LVL III: ICD-10-PCS | Mod: PBBFAC,,, | Performed by: ORTHOPAEDIC SURGERY

## 2020-12-07 PROCEDURE — 3008F BODY MASS INDEX DOCD: CPT | Mod: CPTII,S$GLB,, | Performed by: ORTHOPAEDIC SURGERY

## 2020-12-07 RX ORDER — TRIAMCINOLONE ACETONIDE 40 MG/ML
20 INJECTION, SUSPENSION INTRA-ARTICULAR; INTRAMUSCULAR
Status: COMPLETED | OUTPATIENT
Start: 2020-12-07 | End: 2020-12-07

## 2020-12-07 RX ADMIN — TRIAMCINOLONE ACETONIDE 20 MG: 40 INJECTION, SUSPENSION INTRA-ARTICULAR; INTRAMUSCULAR at 09:12

## 2020-12-07 NOTE — PROGRESS NOTES
Subjective:      Patient ID: Luis Wong is a 65 y.o. male.  Chief Complaint: Follow-up (left wrist pain)      HPI  Luis Wong i  Not interested in surgery at the present time although he did well after the surgery on the right hand in the past s a  65 y.o. male presenting today for follow up of left carpal tunnel syndrome.  He reports that he is having a flare-up in the left hand  Would like injection.    Review of patient's allergies indicates:  No Known Allergies      Current Outpatient Medications   Medication Sig Dispense Refill    aspirin (ECOTRIN) 81 MG EC tablet TAKE 1 TABLET BY MOUTH EVERY DAY 30 tablet 11    atorvastatin (LIPITOR) 40 MG tablet Take 1 tablet (40 mg total) by mouth once daily. 90 tablet 0    clopidogreL (PLAVIX) 75 mg tablet TAKE 1 TABLET BY MOUTH EVERY DAY 90 tablet 0    hydroCHLOROthiazide (MICROZIDE) 12.5 mg capsule TAKE 1 CAPSULE BY MOUTH EVERY DAY 30 capsule 11    metFORMIN (GLUCOPHAGE) 500 MG tablet TAKE 1 TABLET BY MOUTH EVERY DAY WITH BREAKFAST 30 tablet 11    NIFEdipine (PROCARDIA-XL) 30 MG (OSM) 24 hr tablet TAKE 1 TABLET BY MOUTH EVERY DAY 90 tablet 3    potassium chloride (MICRO-K) 8 mEq CpSR TAKE 1 CAPSULE (8 MEQ TOTAL) BY MOUTH ONCE DAILY. 30 capsule 11     No current facility-administered medications for this visit.        Past Medical History:   Diagnosis Date    Aneurysm 10/30/2012    Diabetes mellitus     Fever blister     Herpes infection     Hypertension     ICH (intracerebral hemorrhage)     Mixed hyperlipidemia 9/5/2013    Nontraumatic thalamic hemorrhage 8/31/2016    JAQUAN (obstructive sleep apnea)     Right-sided lacunar stroke 9/11/2014    SDH (subdural hematoma)     Special screening for malignant neoplasms, colon     Stroke        Past Surgical History:   Procedure Laterality Date    CARPAL TUNNEL RELEASE Right 2/21/2020    Procedure: RELEASE, CARPAL TUNNEL;  Surgeon: Barry Dutton Jr., MD;  Location: [facility] OR;  Service: Orthopedics;  " Laterality: Right;    INSERTION OF IMPLANTABLE LOOP RECORDER N/A 12/12/2019    Procedure: Insertion, Implantable Loop Recorder;  Surgeon: Efren Sanford MD;  Location: [facility] CATH LAB/EP;  Service: Cardiology;  Laterality: N/A;  Crytogenic CVA, LOOP,  MDT, Local,  DM    Knee arthroscopic surgery         OBJECTIVE:   PHYSICAL EXAM:  Height: 5' 9" (175.3 cm) Weight: 66.4 kg (146 lb 6.2 oz)  Vitals:    12/07/20 0913   Temp: 98.3 °F (36.8 °C)   Weight: 66.4 kg (146 lb 6.2 oz)   Height: 5' 9" (1.753 m)   PainSc:   5   PainLoc: Wrist     Ortho/SPM Exam positive Tinel sign  Positive Phalen's test  Range of motion fingers full  Sensation intact  No atrophy    Examination left hand mild swelling    RADIOGRAPHS:  None  Comments: I have personally reviewed the imaging and I agree with the above radiologist's report.    ASSESSMENT/PLAN:     IMPRESSION:  Left carpal tunnel syndrome    PLAN:  After pause for time-out identified the left wrist  Injected left carpal tunnel with combination Kenalog 20 mg 0.5 cc xylocaine sterile technique  No complications  Continue splint at night new    FOLLOW UP:  2-3 months or as needed    Disclaimer: This note has been generated using voice-recognition software. There may be typographical errors that have been missed during proof-reading.    "

## 2020-12-16 ENCOUNTER — PATIENT MESSAGE (OUTPATIENT)
Dept: NEUROLOGY | Facility: CLINIC | Age: 65
End: 2020-12-16

## 2020-12-16 RX ORDER — CLOPIDOGREL BISULFATE 75 MG/1
75 TABLET ORAL DAILY
Qty: 90 TABLET | Refills: 3 | Status: SHIPPED | OUTPATIENT
Start: 2020-12-16 | End: 2021-05-11

## 2021-01-05 ENCOUNTER — CLINICAL SUPPORT (OUTPATIENT)
Dept: CARDIOLOGY | Facility: HOSPITAL | Age: 66
End: 2021-01-05
Payer: MEDICARE

## 2021-01-05 DIAGNOSIS — Z95.818 PRESENCE OF OTHER CARDIAC IMPLANTS AND GRAFTS: ICD-10-CM

## 2021-01-05 PROCEDURE — 93298 CARDIAC DEVICE CHECK - REMOTE: ICD-10-PCS | Mod: ,,, | Performed by: INTERNAL MEDICINE

## 2021-01-05 PROCEDURE — 93298 REM INTERROG DEV EVAL SCRMS: CPT | Mod: ,,, | Performed by: INTERNAL MEDICINE

## 2021-01-05 PROCEDURE — G2066 INTER DEVC REMOTE 30D: HCPCS | Performed by: INTERNAL MEDICINE

## 2021-01-20 ENCOUNTER — PATIENT MESSAGE (OUTPATIENT)
Dept: ADMINISTRATIVE | Facility: OTHER | Age: 66
End: 2021-01-20

## 2021-02-04 ENCOUNTER — PATIENT OUTREACH (OUTPATIENT)
Dept: ADMINISTRATIVE | Facility: OTHER | Age: 66
End: 2021-02-04

## 2021-02-04 ENCOUNTER — CLINICAL SUPPORT (OUTPATIENT)
Dept: CARDIOLOGY | Facility: HOSPITAL | Age: 66
End: 2021-02-04
Payer: MEDICARE

## 2021-02-04 DIAGNOSIS — Z95.818 PRESENCE OF OTHER CARDIAC IMPLANTS AND GRAFTS: ICD-10-CM

## 2021-02-04 DIAGNOSIS — E11.9 DIABETES MELLITUS WITHOUT COMPLICATION: Primary | ICD-10-CM

## 2021-02-04 PROCEDURE — 93298 REM INTERROG DEV EVAL SCRMS: CPT | Mod: ,,, | Performed by: INTERNAL MEDICINE

## 2021-02-04 PROCEDURE — G2066 INTER DEVC REMOTE 30D: HCPCS | Performed by: INTERNAL MEDICINE

## 2021-02-04 PROCEDURE — 93298 CARDIAC DEVICE CHECK - REMOTE: ICD-10-PCS | Mod: ,,, | Performed by: INTERNAL MEDICINE

## 2021-02-08 ENCOUNTER — OFFICE VISIT (OUTPATIENT)
Dept: ORTHOPEDICS | Facility: CLINIC | Age: 66
End: 2021-02-08
Payer: MEDICARE

## 2021-02-08 VITALS — WEIGHT: 146 LBS | BODY MASS INDEX: 21.62 KG/M2 | HEIGHT: 69 IN

## 2021-02-08 DIAGNOSIS — G56.03 BILATERAL CARPAL TUNNEL SYNDROME: Primary | ICD-10-CM

## 2021-02-08 PROCEDURE — 99999 PR PBB SHADOW E&M-EST. PATIENT-LVL III: CPT | Mod: PBBFAC,,, | Performed by: ORTHOPAEDIC SURGERY

## 2021-02-08 PROCEDURE — 1159F MED LIST DOCD IN RCRD: CPT | Mod: S$GLB,,, | Performed by: ORTHOPAEDIC SURGERY

## 2021-02-08 PROCEDURE — 1101F PR PT FALLS ASSESS DOC 0-1 FALLS W/OUT INJ PAST YR: ICD-10-PCS | Mod: CPTII,S$GLB,, | Performed by: ORTHOPAEDIC SURGERY

## 2021-02-08 PROCEDURE — 1125F PR PAIN SEVERITY QUANTIFIED, PAIN PRESENT: ICD-10-PCS | Mod: S$GLB,,, | Performed by: ORTHOPAEDIC SURGERY

## 2021-02-08 PROCEDURE — 1159F PR MEDICATION LIST DOCUMENTED IN MEDICAL RECORD: ICD-10-PCS | Mod: S$GLB,,, | Performed by: ORTHOPAEDIC SURGERY

## 2021-02-08 PROCEDURE — 20526 THER INJECTION CARP TUNNEL: CPT | Mod: LT,S$GLB,, | Performed by: ORTHOPAEDIC SURGERY

## 2021-02-08 PROCEDURE — 99213 OFFICE O/P EST LOW 20 MIN: CPT | Mod: 25,S$GLB,, | Performed by: ORTHOPAEDIC SURGERY

## 2021-02-08 PROCEDURE — 99213 PR OFFICE/OUTPT VISIT, EST, LEVL III, 20-29 MIN: ICD-10-PCS | Mod: 25,S$GLB,, | Performed by: ORTHOPAEDIC SURGERY

## 2021-02-08 PROCEDURE — 1101F PT FALLS ASSESS-DOCD LE1/YR: CPT | Mod: CPTII,S$GLB,, | Performed by: ORTHOPAEDIC SURGERY

## 2021-02-08 PROCEDURE — 1125F AMNT PAIN NOTED PAIN PRSNT: CPT | Mod: S$GLB,,, | Performed by: ORTHOPAEDIC SURGERY

## 2021-02-08 PROCEDURE — 3288F FALL RISK ASSESSMENT DOCD: CPT | Mod: CPTII,S$GLB,, | Performed by: ORTHOPAEDIC SURGERY

## 2021-02-08 PROCEDURE — 20526 PR INJECT CARPAL TUNNEL: ICD-10-PCS | Mod: LT,S$GLB,, | Performed by: ORTHOPAEDIC SURGERY

## 2021-02-08 PROCEDURE — 3008F PR BODY MASS INDEX (BMI) DOCUMENTED: ICD-10-PCS | Mod: CPTII,S$GLB,, | Performed by: ORTHOPAEDIC SURGERY

## 2021-02-08 PROCEDURE — 99999 PR PBB SHADOW E&M-EST. PATIENT-LVL III: ICD-10-PCS | Mod: PBBFAC,,, | Performed by: ORTHOPAEDIC SURGERY

## 2021-02-08 PROCEDURE — 3288F PR FALLS RISK ASSESSMENT DOCUMENTED: ICD-10-PCS | Mod: CPTII,S$GLB,, | Performed by: ORTHOPAEDIC SURGERY

## 2021-02-08 PROCEDURE — 3008F BODY MASS INDEX DOCD: CPT | Mod: CPTII,S$GLB,, | Performed by: ORTHOPAEDIC SURGERY

## 2021-02-08 RX ORDER — TRIAMCINOLONE ACETONIDE 40 MG/ML
20 INJECTION, SUSPENSION INTRA-ARTICULAR; INTRAMUSCULAR
Status: COMPLETED | OUTPATIENT
Start: 2021-02-08 | End: 2021-02-08

## 2021-02-08 RX ADMIN — TRIAMCINOLONE ACETONIDE 20 MG: 40 INJECTION, SUSPENSION INTRA-ARTICULAR; INTRAMUSCULAR at 09:02

## 2021-02-28 ENCOUNTER — TELEPHONE (OUTPATIENT)
Dept: FAMILY MEDICINE | Facility: CLINIC | Age: 66
End: 2021-02-28

## 2021-03-06 ENCOUNTER — CLINICAL SUPPORT (OUTPATIENT)
Dept: CARDIOLOGY | Facility: HOSPITAL | Age: 66
End: 2021-03-06
Payer: MEDICARE

## 2021-03-06 DIAGNOSIS — Z95.818 PRESENCE OF OTHER CARDIAC IMPLANTS AND GRAFTS: ICD-10-CM

## 2021-03-06 PROCEDURE — 93298 CARDIAC DEVICE CHECK - REMOTE: ICD-10-PCS | Mod: ,,, | Performed by: INTERNAL MEDICINE

## 2021-03-06 PROCEDURE — G2066 INTER DEVC REMOTE 30D: HCPCS | Performed by: INTERNAL MEDICINE

## 2021-03-06 PROCEDURE — 93298 REM INTERROG DEV EVAL SCRMS: CPT | Mod: ,,, | Performed by: INTERNAL MEDICINE

## 2021-03-25 ENCOUNTER — PATIENT MESSAGE (OUTPATIENT)
Dept: CARDIOLOGY | Facility: CLINIC | Age: 66
End: 2021-03-25

## 2021-04-05 ENCOUNTER — CLINICAL SUPPORT (OUTPATIENT)
Dept: CARDIOLOGY | Facility: HOSPITAL | Age: 66
End: 2021-04-05
Payer: MEDICARE

## 2021-04-05 DIAGNOSIS — Z95.818 PRESENCE OF OTHER CARDIAC IMPLANTS AND GRAFTS: ICD-10-CM

## 2021-04-05 PROCEDURE — 93298 REM INTERROG DEV EVAL SCRMS: CPT | Mod: ,,, | Performed by: INTERNAL MEDICINE

## 2021-04-05 PROCEDURE — G2066 INTER DEVC REMOTE 30D: HCPCS | Performed by: INTERNAL MEDICINE

## 2021-04-05 PROCEDURE — 93298 CARDIAC DEVICE CHECK - REMOTE: ICD-10-PCS | Mod: ,,, | Performed by: INTERNAL MEDICINE

## 2021-04-28 ENCOUNTER — PATIENT MESSAGE (OUTPATIENT)
Dept: FAMILY MEDICINE | Facility: CLINIC | Age: 66
End: 2021-04-28

## 2021-05-04 ENCOUNTER — PATIENT MESSAGE (OUTPATIENT)
Dept: RESEARCH | Facility: HOSPITAL | Age: 66
End: 2021-05-04

## 2021-05-05 ENCOUNTER — CLINICAL SUPPORT (OUTPATIENT)
Dept: CARDIOLOGY | Facility: HOSPITAL | Age: 66
End: 2021-05-05
Payer: MEDICARE

## 2021-05-05 DIAGNOSIS — Z95.818 PRESENCE OF OTHER CARDIAC IMPLANTS AND GRAFTS: ICD-10-CM

## 2021-05-05 PROCEDURE — G2066 INTER DEVC REMOTE 30D: HCPCS | Performed by: INTERNAL MEDICINE

## 2021-05-05 PROCEDURE — 93298 REM INTERROG DEV EVAL SCRMS: CPT | Mod: ,,, | Performed by: INTERNAL MEDICINE

## 2021-05-05 PROCEDURE — 93298 CARDIAC DEVICE CHECK - REMOTE: ICD-10-PCS | Mod: ,,, | Performed by: INTERNAL MEDICINE

## 2021-05-11 ENCOUNTER — LAB VISIT (OUTPATIENT)
Dept: LAB | Facility: HOSPITAL | Age: 66
End: 2021-05-11
Attending: FAMILY MEDICINE
Payer: MEDICARE

## 2021-05-11 ENCOUNTER — OFFICE VISIT (OUTPATIENT)
Dept: FAMILY MEDICINE | Facility: CLINIC | Age: 66
End: 2021-05-11
Payer: MEDICARE

## 2021-05-11 VITALS
HEART RATE: 86 BPM | SYSTOLIC BLOOD PRESSURE: 140 MMHG | HEIGHT: 69 IN | RESPIRATION RATE: 16 BRPM | BODY MASS INDEX: 22.85 KG/M2 | OXYGEN SATURATION: 98 % | DIASTOLIC BLOOD PRESSURE: 96 MMHG | WEIGHT: 154.31 LBS

## 2021-05-11 DIAGNOSIS — Z12.5 SCREENING FOR PROSTATE CANCER: ICD-10-CM

## 2021-05-11 DIAGNOSIS — E78.5 DYSLIPIDEMIA: ICD-10-CM

## 2021-05-11 DIAGNOSIS — G81.14 SPASTIC HEMIPLEGIA AFFECTING LEFT NONDOMINANT SIDE, UNSPECIFIED ETIOLOGY: ICD-10-CM

## 2021-05-11 DIAGNOSIS — I10 ESSENTIAL HYPERTENSION: ICD-10-CM

## 2021-05-11 DIAGNOSIS — R53.82 CHRONIC FATIGUE: ICD-10-CM

## 2021-05-11 DIAGNOSIS — M79.18 MYALGIA, OTHER SITE: ICD-10-CM

## 2021-05-11 DIAGNOSIS — E11.65 TYPE 2 DIABETES MELLITUS WITH HYPERGLYCEMIA, WITHOUT LONG-TERM CURRENT USE OF INSULIN: ICD-10-CM

## 2021-05-11 DIAGNOSIS — I10 ESSENTIAL HYPERTENSION: Primary | ICD-10-CM

## 2021-05-11 DIAGNOSIS — Z86.73 HISTORY OF ISCHEMIC STROKE: ICD-10-CM

## 2021-05-11 PROCEDURE — 3077F PR MOST RECENT SYSTOLIC BLOOD PRESSURE >= 140 MM HG: ICD-10-PCS | Mod: CPTII,S$GLB,, | Performed by: FAMILY MEDICINE

## 2021-05-11 PROCEDURE — 3080F PR MOST RECENT DIASTOLIC BLOOD PRESSURE >= 90 MM HG: ICD-10-PCS | Mod: CPTII,S$GLB,, | Performed by: FAMILY MEDICINE

## 2021-05-11 PROCEDURE — 3288F PR FALLS RISK ASSESSMENT DOCUMENTED: ICD-10-PCS | Mod: CPTII,S$GLB,, | Performed by: FAMILY MEDICINE

## 2021-05-11 PROCEDURE — 1101F PT FALLS ASSESS-DOCD LE1/YR: CPT | Mod: CPTII,S$GLB,, | Performed by: FAMILY MEDICINE

## 2021-05-11 PROCEDURE — 3080F DIAST BP >= 90 MM HG: CPT | Mod: CPTII,S$GLB,, | Performed by: FAMILY MEDICINE

## 2021-05-11 PROCEDURE — 99999 PR PBB SHADOW E&M-EST. PATIENT-LVL III: CPT | Mod: PBBFAC,,, | Performed by: FAMILY MEDICINE

## 2021-05-11 PROCEDURE — 99215 PR OFFICE/OUTPT VISIT, EST, LEVL V, 40-54 MIN: ICD-10-PCS | Mod: S$GLB,,, | Performed by: FAMILY MEDICINE

## 2021-05-11 PROCEDURE — 99215 OFFICE O/P EST HI 40 MIN: CPT | Mod: S$GLB,,, | Performed by: FAMILY MEDICINE

## 2021-05-11 PROCEDURE — 83036 HEMOGLOBIN GLYCOSYLATED A1C: CPT | Performed by: FAMILY MEDICINE

## 2021-05-11 PROCEDURE — 3077F SYST BP >= 140 MM HG: CPT | Mod: CPTII,S$GLB,, | Performed by: FAMILY MEDICINE

## 2021-05-11 PROCEDURE — 84443 ASSAY THYROID STIM HORMONE: CPT | Performed by: FAMILY MEDICINE

## 2021-05-11 PROCEDURE — 3008F PR BODY MASS INDEX (BMI) DOCUMENTED: ICD-10-PCS | Mod: CPTII,S$GLB,, | Performed by: FAMILY MEDICINE

## 2021-05-11 PROCEDURE — 85025 COMPLETE CBC W/AUTO DIFF WBC: CPT | Performed by: FAMILY MEDICINE

## 2021-05-11 PROCEDURE — 1101F PR PT FALLS ASSESS DOC 0-1 FALLS W/OUT INJ PAST YR: ICD-10-PCS | Mod: CPTII,S$GLB,, | Performed by: FAMILY MEDICINE

## 2021-05-11 PROCEDURE — 84153 ASSAY OF PSA TOTAL: CPT | Performed by: FAMILY MEDICINE

## 2021-05-11 PROCEDURE — 3288F FALL RISK ASSESSMENT DOCD: CPT | Mod: CPTII,S$GLB,, | Performed by: FAMILY MEDICINE

## 2021-05-11 PROCEDURE — 1159F MED LIST DOCD IN RCRD: CPT | Mod: S$GLB,,, | Performed by: FAMILY MEDICINE

## 2021-05-11 PROCEDURE — 36415 COLL VENOUS BLD VENIPUNCTURE: CPT | Mod: PO | Performed by: FAMILY MEDICINE

## 2021-05-11 PROCEDURE — 82306 VITAMIN D 25 HYDROXY: CPT | Performed by: FAMILY MEDICINE

## 2021-05-11 PROCEDURE — 99999 PR PBB SHADOW E&M-EST. PATIENT-LVL III: ICD-10-PCS | Mod: PBBFAC,,, | Performed by: FAMILY MEDICINE

## 2021-05-11 PROCEDURE — 80061 LIPID PANEL: CPT | Performed by: FAMILY MEDICINE

## 2021-05-11 PROCEDURE — 1159F PR MEDICATION LIST DOCUMENTED IN MEDICAL RECORD: ICD-10-PCS | Mod: S$GLB,,, | Performed by: FAMILY MEDICINE

## 2021-05-11 PROCEDURE — 3008F BODY MASS INDEX DOCD: CPT | Mod: CPTII,S$GLB,, | Performed by: FAMILY MEDICINE

## 2021-05-11 PROCEDURE — 80053 COMPREHEN METABOLIC PANEL: CPT | Performed by: FAMILY MEDICINE

## 2021-05-12 LAB
25(OH)D3+25(OH)D2 SERPL-MCNC: 70 NG/ML (ref 30–96)
ALBUMIN SERPL BCP-MCNC: 4.1 G/DL (ref 3.5–5.2)
ALP SERPL-CCNC: 74 U/L (ref 55–135)
ALT SERPL W/O P-5'-P-CCNC: 15 U/L (ref 10–44)
ANION GAP SERPL CALC-SCNC: 9 MMOL/L (ref 8–16)
AST SERPL-CCNC: 21 U/L (ref 10–40)
BASOPHILS # BLD AUTO: 0.02 K/UL (ref 0–0.2)
BASOPHILS NFR BLD: 0.5 % (ref 0–1.9)
BILIRUB SERPL-MCNC: 0.6 MG/DL (ref 0.1–1)
BUN SERPL-MCNC: 18 MG/DL (ref 8–23)
CALCIUM SERPL-MCNC: 10.2 MG/DL (ref 8.7–10.5)
CHLORIDE SERPL-SCNC: 103 MMOL/L (ref 95–110)
CHOLEST SERPL-MCNC: 258 MG/DL (ref 120–199)
CHOLEST/HDLC SERPL: 2.8 {RATIO} (ref 2–5)
CO2 SERPL-SCNC: 30 MMOL/L (ref 23–29)
COMPLEXED PSA SERPL-MCNC: 0.46 NG/ML (ref 0–4)
CREAT SERPL-MCNC: 0.9 MG/DL (ref 0.5–1.4)
DIFFERENTIAL METHOD: ABNORMAL
EOSINOPHIL # BLD AUTO: 0.1 K/UL (ref 0–0.5)
EOSINOPHIL NFR BLD: 3 % (ref 0–8)
ERYTHROCYTE [DISTWIDTH] IN BLOOD BY AUTOMATED COUNT: 13 % (ref 11.5–14.5)
EST. GFR  (AFRICAN AMERICAN): >60 ML/MIN/1.73 M^2
EST. GFR  (NON AFRICAN AMERICAN): >60 ML/MIN/1.73 M^2
ESTIMATED AVG GLUCOSE: 126 MG/DL (ref 68–131)
GLUCOSE SERPL-MCNC: 127 MG/DL (ref 70–110)
HBA1C MFR BLD: 6 % (ref 4–5.6)
HCT VFR BLD AUTO: 44 % (ref 40–54)
HDLC SERPL-MCNC: 92 MG/DL (ref 40–75)
HDLC SERPL: 35.7 % (ref 20–50)
HGB BLD-MCNC: 14.4 G/DL (ref 14–18)
IMM GRANULOCYTES # BLD AUTO: 0.01 K/UL (ref 0–0.04)
IMM GRANULOCYTES NFR BLD AUTO: 0.3 % (ref 0–0.5)
LDLC SERPL CALC-MCNC: 152.6 MG/DL (ref 63–159)
LYMPHOCYTES # BLD AUTO: 1.2 K/UL (ref 1–4.8)
LYMPHOCYTES NFR BLD: 33.1 % (ref 18–48)
MCH RBC QN AUTO: 28 PG (ref 27–31)
MCHC RBC AUTO-ENTMCNC: 32.7 G/DL (ref 32–36)
MCV RBC AUTO: 86 FL (ref 82–98)
MONOCYTES # BLD AUTO: 0.4 K/UL (ref 0.3–1)
MONOCYTES NFR BLD: 10.6 % (ref 4–15)
NEUTROPHILS # BLD AUTO: 1.9 K/UL (ref 1.8–7.7)
NEUTROPHILS NFR BLD: 52.5 % (ref 38–73)
NONHDLC SERPL-MCNC: 166 MG/DL
NRBC BLD-RTO: 0 /100 WBC
PLATELET # BLD AUTO: 207 K/UL (ref 150–450)
PMV BLD AUTO: 14.4 FL (ref 9.2–12.9)
POTASSIUM SERPL-SCNC: 4 MMOL/L (ref 3.5–5.1)
PROT SERPL-MCNC: 7.6 G/DL (ref 6–8.4)
RBC # BLD AUTO: 5.14 M/UL (ref 4.6–6.2)
SODIUM SERPL-SCNC: 142 MMOL/L (ref 136–145)
TRIGL SERPL-MCNC: 67 MG/DL (ref 30–150)
TSH SERPL DL<=0.005 MIU/L-ACNC: 0.77 UIU/ML (ref 0.4–4)
WBC # BLD AUTO: 3.69 K/UL (ref 3.9–12.7)

## 2021-05-22 ENCOUNTER — HOSPITAL ENCOUNTER (INPATIENT)
Facility: HOSPITAL | Age: 66
LOS: 3 days | Discharge: HOME HEALTH | DRG: 481 | End: 2021-05-26
Attending: EMERGENCY MEDICINE | Admitting: FAMILY MEDICINE
Payer: MEDICARE

## 2021-05-22 DIAGNOSIS — S79.912A HIP INJURY, LEFT, INITIAL ENCOUNTER: ICD-10-CM

## 2021-05-22 DIAGNOSIS — S72.142A CLOSED 2-PART INTERTROCHANTERIC FRACTURE OF LEFT FEMUR: ICD-10-CM

## 2021-05-22 DIAGNOSIS — R07.9 CHEST PAIN: ICD-10-CM

## 2021-05-22 DIAGNOSIS — S72.142A DISPLACED INTERTROCHANTERIC FRACTURE OF LEFT FEMUR, INITIAL ENCOUNTER FOR CLOSED FRACTURE: Primary | ICD-10-CM

## 2021-05-22 DIAGNOSIS — S72.142A CLOSED 2-PART INTERTROCHANTERIC FRACTURE OF LEFT FEMUR, INITIAL ENCOUNTER: ICD-10-CM

## 2021-05-22 PROCEDURE — U0002 COVID-19 LAB TEST NON-CDC: HCPCS | Performed by: EMERGENCY MEDICINE

## 2021-05-22 PROCEDURE — 96374 THER/PROPH/DIAG INJ IV PUSH: CPT

## 2021-05-22 PROCEDURE — 99285 EMERGENCY DEPT VISIT HI MDM: CPT | Mod: 25

## 2021-05-22 PROCEDURE — 63600175 PHARM REV CODE 636 W HCPCS: Performed by: EMERGENCY MEDICINE

## 2021-05-22 RX ORDER — MORPHINE SULFATE 4 MG/ML
4 INJECTION, SOLUTION INTRAMUSCULAR; INTRAVENOUS
Status: COMPLETED | OUTPATIENT
Start: 2021-05-22 | End: 2021-05-22

## 2021-05-22 RX ADMIN — MORPHINE SULFATE 4 MG: 4 INJECTION INTRAVENOUS at 11:05

## 2021-05-23 ENCOUNTER — ANESTHESIA EVENT (OUTPATIENT)
Dept: SURGERY | Facility: HOSPITAL | Age: 66
DRG: 481 | End: 2021-05-23
Payer: MEDICARE

## 2021-05-23 ENCOUNTER — ANESTHESIA (OUTPATIENT)
Dept: SURGERY | Facility: HOSPITAL | Age: 66
DRG: 481 | End: 2021-05-23
Payer: MEDICARE

## 2021-05-23 PROBLEM — S72.142A CLOSED 2-PART INTERTROCHANTERIC FRACTURE OF LEFT FEMUR: Status: ACTIVE | Noted: 2021-05-23

## 2021-05-23 PROBLEM — S79.912A HIP INJURY, LEFT, INITIAL ENCOUNTER: Status: ACTIVE | Noted: 2021-05-23

## 2021-05-23 LAB
ABO + RH BLD: NORMAL
ALBUMIN SERPL BCP-MCNC: 4 G/DL (ref 3.5–5.2)
ALP SERPL-CCNC: 75 U/L (ref 55–135)
ALT SERPL W/O P-5'-P-CCNC: 13 U/L (ref 10–44)
ANION GAP SERPL CALC-SCNC: 12 MMOL/L (ref 8–16)
AST SERPL-CCNC: 18 U/L (ref 10–40)
BASOPHILS # BLD AUTO: 0.02 K/UL (ref 0–0.2)
BASOPHILS NFR BLD: 0.2 % (ref 0–1.9)
BILIRUB SERPL-MCNC: 0.7 MG/DL (ref 0.1–1)
BILIRUB UR QL STRIP: NEGATIVE
BLD GP AB SCN CELLS X3 SERPL QL: NORMAL
BUN SERPL-MCNC: 17 MG/DL (ref 8–23)
CALCIUM SERPL-MCNC: 8.7 MG/DL (ref 8.7–10.5)
CHLORIDE SERPL-SCNC: 104 MMOL/L (ref 95–110)
CLARITY UR: ABNORMAL
CO2 SERPL-SCNC: 24 MMOL/L (ref 23–29)
COLOR UR: YELLOW
CREAT SERPL-MCNC: 1 MG/DL (ref 0.5–1.4)
CTP QC/QA: YES
DIFFERENTIAL METHOD: ABNORMAL
EOSINOPHIL # BLD AUTO: 0 K/UL (ref 0–0.5)
EOSINOPHIL NFR BLD: 0.1 % (ref 0–8)
ERYTHROCYTE [DISTWIDTH] IN BLOOD BY AUTOMATED COUNT: 12.9 % (ref 11.5–14.5)
EST. GFR  (AFRICAN AMERICAN): >60 ML/MIN/1.73 M^2
EST. GFR  (NON AFRICAN AMERICAN): >60 ML/MIN/1.73 M^2
ESTIMATED AVG GLUCOSE: 114 MG/DL (ref 68–131)
GLUCOSE SERPL-MCNC: 159 MG/DL (ref 70–110)
GLUCOSE UR QL STRIP: ABNORMAL
HBA1C MFR BLD: 5.6 % (ref 4–5.6)
HCT VFR BLD AUTO: 34.9 % (ref 40–54)
HGB BLD-MCNC: 12.1 G/DL (ref 14–18)
HGB UR QL STRIP: NEGATIVE
IMM GRANULOCYTES # BLD AUTO: 0.03 K/UL (ref 0–0.04)
IMM GRANULOCYTES NFR BLD AUTO: 0.4 % (ref 0–0.5)
INR PPP: 1.1 (ref 0.8–1.2)
KETONES UR QL STRIP: NEGATIVE
LEUKOCYTE ESTERASE UR QL STRIP: NEGATIVE
LYMPHOCYTES # BLD AUTO: 0.5 K/UL (ref 1–4.8)
LYMPHOCYTES NFR BLD: 6.6 % (ref 18–48)
MAGNESIUM SERPL-MCNC: 1.7 MG/DL (ref 1.6–2.6)
MCH RBC QN AUTO: 28.6 PG (ref 27–31)
MCHC RBC AUTO-ENTMCNC: 34.7 G/DL (ref 32–36)
MCV RBC AUTO: 83 FL (ref 82–98)
MONOCYTES # BLD AUTO: 0.6 K/UL (ref 0.3–1)
MONOCYTES NFR BLD: 7.1 % (ref 4–15)
NEUTROPHILS # BLD AUTO: 6.9 K/UL (ref 1.8–7.7)
NEUTROPHILS NFR BLD: 85.6 % (ref 38–73)
NITRITE UR QL STRIP: NEGATIVE
NRBC BLD-RTO: 0 /100 WBC
PH UR STRIP: >8 [PH] (ref 5–8)
PLATELET # BLD AUTO: 166 K/UL (ref 150–450)
PMV BLD AUTO: 13.6 FL (ref 9.2–12.9)
POCT GLUCOSE: 158 MG/DL (ref 70–110)
POTASSIUM SERPL-SCNC: 3.7 MMOL/L (ref 3.5–5.1)
PROT SERPL-MCNC: 6.9 G/DL (ref 6–8.4)
PROT UR QL STRIP: NEGATIVE
PROTHROMBIN TIME: 11.3 SEC (ref 9–12.5)
RBC # BLD AUTO: 4.23 M/UL (ref 4.6–6.2)
SARS-COV-2 RDRP RESP QL NAA+PROBE: NEGATIVE
SODIUM SERPL-SCNC: 140 MMOL/L (ref 136–145)
SP GR UR STRIP: 1.01 (ref 1–1.03)
URN SPEC COLLECT METH UR: ABNORMAL
UROBILINOGEN UR STRIP-ACNC: NEGATIVE EU/DL
WBC # BLD AUTO: 8.04 K/UL (ref 3.9–12.7)

## 2021-05-23 PROCEDURE — 37000008 HC ANESTHESIA 1ST 15 MINUTES: Performed by: ORTHOPAEDIC SURGERY

## 2021-05-23 PROCEDURE — 63600175 PHARM REV CODE 636 W HCPCS: Performed by: STUDENT IN AN ORGANIZED HEALTH CARE EDUCATION/TRAINING PROGRAM

## 2021-05-23 PROCEDURE — 63600175 PHARM REV CODE 636 W HCPCS: Performed by: NURSE PRACTITIONER

## 2021-05-23 PROCEDURE — 85025 COMPLETE CBC W/AUTO DIFF WBC: CPT | Performed by: NURSE PRACTITIONER

## 2021-05-23 PROCEDURE — C1713 ANCHOR/SCREW BN/BN,TIS/BN: HCPCS | Performed by: ORTHOPAEDIC SURGERY

## 2021-05-23 PROCEDURE — 71000033 HC RECOVERY, INTIAL HOUR: Performed by: ORTHOPAEDIC SURGERY

## 2021-05-23 PROCEDURE — 85610 PROTHROMBIN TIME: CPT | Performed by: NURSE PRACTITIONER

## 2021-05-23 PROCEDURE — 36000711: Performed by: ORTHOPAEDIC SURGERY

## 2021-05-23 PROCEDURE — 80053 COMPREHEN METABOLIC PANEL: CPT | Performed by: NURSE PRACTITIONER

## 2021-05-23 PROCEDURE — 83036 HEMOGLOBIN GLYCOSYLATED A1C: CPT | Performed by: NURSE PRACTITIONER

## 2021-05-23 PROCEDURE — 83735 ASSAY OF MAGNESIUM: CPT | Performed by: NURSE PRACTITIONER

## 2021-05-23 PROCEDURE — 27245 TREAT THIGH FRACTURE: CPT | Mod: LT,,, | Performed by: ORTHOPAEDIC SURGERY

## 2021-05-23 PROCEDURE — 99233 SBSQ HOSP IP/OBS HIGH 50: CPT | Mod: 57,,, | Performed by: ORTHOPAEDIC SURGERY

## 2021-05-23 PROCEDURE — 27245 PR OPEN FIX INTER/SUBTROCH FX,IMPLNT: ICD-10-PCS | Mod: LT,,, | Performed by: ORTHOPAEDIC SURGERY

## 2021-05-23 PROCEDURE — 36415 COLL VENOUS BLD VENIPUNCTURE: CPT | Performed by: STUDENT IN AN ORGANIZED HEALTH CARE EDUCATION/TRAINING PROGRAM

## 2021-05-23 PROCEDURE — 25000003 PHARM REV CODE 250: Performed by: ORTHOPAEDIC SURGERY

## 2021-05-23 PROCEDURE — 25000003 PHARM REV CODE 250: Performed by: STUDENT IN AN ORGANIZED HEALTH CARE EDUCATION/TRAINING PROGRAM

## 2021-05-23 PROCEDURE — 25000003 PHARM REV CODE 250: Performed by: FAMILY MEDICINE

## 2021-05-23 PROCEDURE — 99233 PR SUBSEQUENT HOSPITAL CARE,LEVL III: ICD-10-PCS | Mod: 57,,, | Performed by: ORTHOPAEDIC SURGERY

## 2021-05-23 PROCEDURE — 94761 N-INVAS EAR/PLS OXIMETRY MLT: CPT

## 2021-05-23 PROCEDURE — 36000710: Performed by: ORTHOPAEDIC SURGERY

## 2021-05-23 PROCEDURE — 71000039 HC RECOVERY, EACH ADD'L HOUR: Performed by: ORTHOPAEDIC SURGERY

## 2021-05-23 PROCEDURE — 37000009 HC ANESTHESIA EA ADD 15 MINS: Performed by: ORTHOPAEDIC SURGERY

## 2021-05-23 PROCEDURE — C1769 GUIDE WIRE: HCPCS | Performed by: ORTHOPAEDIC SURGERY

## 2021-05-23 PROCEDURE — 99900035 HC TECH TIME PER 15 MIN (STAT)

## 2021-05-23 PROCEDURE — 27000221 HC OXYGEN, UP TO 24 HOURS

## 2021-05-23 PROCEDURE — 81003 URINALYSIS AUTO W/O SCOPE: CPT | Performed by: NURSE PRACTITIONER

## 2021-05-23 PROCEDURE — 27201423 OPTIME MED/SURG SUP & DEVICES STERILE SUPPLY: Performed by: ORTHOPAEDIC SURGERY

## 2021-05-23 PROCEDURE — 86900 BLOOD TYPING SEROLOGIC ABO: CPT | Performed by: STUDENT IN AN ORGANIZED HEALTH CARE EDUCATION/TRAINING PROGRAM

## 2021-05-23 PROCEDURE — 36415 COLL VENOUS BLD VENIPUNCTURE: CPT | Performed by: FAMILY MEDICINE

## 2021-05-23 PROCEDURE — 21400001 HC TELEMETRY ROOM

## 2021-05-23 DEVICE — NAIL GAMMA 3 125D ANG 11X180: Type: IMPLANTABLE DEVICE | Site: FEMUR | Status: FUNCTIONAL

## 2021-05-23 DEVICE — SCREW PROXIMAL LOCK 5X37.5MM: Type: IMPLANTABLE DEVICE | Site: FEMUR | Status: FUNCTIONAL

## 2021-05-23 DEVICE — GUIDE WIRE 3.0X1000MM BALL TIP: Type: IMPLANTABLE DEVICE | Site: FEMUR | Status: FUNCTIONAL

## 2021-05-23 DEVICE — SCREW GAMMA3 10.5 THRD 100MM: Type: IMPLANTABLE DEVICE | Site: FEMUR | Status: FUNCTIONAL

## 2021-05-23 DEVICE — GUIDEWIRE 3.2 X 450: Type: IMPLANTABLE DEVICE | Site: FEMUR | Status: FUNCTIONAL

## 2021-05-23 DEVICE — WIRE KIRSCHNER T2 3X285MM SS: Type: IMPLANTABLE DEVICE | Site: FEMUR | Status: FUNCTIONAL

## 2021-05-23 RX ORDER — ROCURONIUM BROMIDE 10 MG/ML
INJECTION, SOLUTION INTRAVENOUS
Status: DISCONTINUED | OUTPATIENT
Start: 2021-05-23 | End: 2021-05-23

## 2021-05-23 RX ORDER — LIDOCAINE HYDROCHLORIDE AND EPINEPHRINE 10; 10 MG/ML; UG/ML
INJECTION, SOLUTION INFILTRATION; PERINEURAL
Status: DISCONTINUED | OUTPATIENT
Start: 2021-05-23 | End: 2021-05-23 | Stop reason: HOSPADM

## 2021-05-23 RX ORDER — SODIUM CHLORIDE 0.9 % (FLUSH) 0.9 %
10 SYRINGE (ML) INJECTION
Status: DISCONTINUED | OUTPATIENT
Start: 2021-05-23 | End: 2021-05-26 | Stop reason: HOSPADM

## 2021-05-23 RX ORDER — LIDOCAINE HYDROCHLORIDE 20 MG/ML
INJECTION INTRAVENOUS
Status: DISCONTINUED | OUTPATIENT
Start: 2021-05-23 | End: 2021-05-23

## 2021-05-23 RX ORDER — SUCCINYLCHOLINE CHLORIDE 20 MG/ML
INJECTION INTRAMUSCULAR; INTRAVENOUS
Status: DISCONTINUED | OUTPATIENT
Start: 2021-05-23 | End: 2021-05-23

## 2021-05-23 RX ORDER — MUPIROCIN 20 MG/G
OINTMENT TOPICAL 2 TIMES DAILY
Status: DISCONTINUED | OUTPATIENT
Start: 2021-05-23 | End: 2021-05-26 | Stop reason: HOSPADM

## 2021-05-23 RX ORDER — PROPOFOL 10 MG/ML
VIAL (ML) INTRAVENOUS
Status: DISCONTINUED | OUTPATIENT
Start: 2021-05-23 | End: 2021-05-23

## 2021-05-23 RX ORDER — METOPROLOL TARTRATE 1 MG/ML
INJECTION, SOLUTION INTRAVENOUS
Status: DISCONTINUED | OUTPATIENT
Start: 2021-05-23 | End: 2021-05-23

## 2021-05-23 RX ORDER — ONDANSETRON 2 MG/ML
4 INJECTION INTRAMUSCULAR; INTRAVENOUS EVERY 8 HOURS PRN
Status: DISCONTINUED | OUTPATIENT
Start: 2021-05-23 | End: 2021-05-26 | Stop reason: HOSPADM

## 2021-05-23 RX ORDER — FENTANYL CITRATE 50 UG/ML
INJECTION, SOLUTION INTRAMUSCULAR; INTRAVENOUS
Status: DISCONTINUED | OUTPATIENT
Start: 2021-05-23 | End: 2021-05-23

## 2021-05-23 RX ORDER — HYDRALAZINE HYDROCHLORIDE 20 MG/ML
10 INJECTION INTRAMUSCULAR; INTRAVENOUS ONCE
Status: COMPLETED | OUTPATIENT
Start: 2021-05-23 | End: 2021-05-23

## 2021-05-23 RX ORDER — OXYCODONE HYDROCHLORIDE 5 MG/1
10 TABLET ORAL EVERY 6 HOURS PRN
Status: DISCONTINUED | OUTPATIENT
Start: 2021-05-23 | End: 2021-05-26 | Stop reason: HOSPADM

## 2021-05-23 RX ORDER — ENOXAPARIN SODIUM 100 MG/ML
40 INJECTION SUBCUTANEOUS EVERY 24 HOURS
Status: DISCONTINUED | OUTPATIENT
Start: 2021-05-24 | End: 2021-05-26 | Stop reason: HOSPADM

## 2021-05-23 RX ORDER — GLUCAGON 1 MG
1 KIT INJECTION
Status: DISCONTINUED | OUTPATIENT
Start: 2021-05-23 | End: 2021-05-26 | Stop reason: HOSPADM

## 2021-05-23 RX ORDER — ONDANSETRON 2 MG/ML
INJECTION INTRAMUSCULAR; INTRAVENOUS
Status: DISCONTINUED | OUTPATIENT
Start: 2021-05-23 | End: 2021-05-23

## 2021-05-23 RX ORDER — MORPHINE SULFATE 2 MG/ML
2 INJECTION, SOLUTION INTRAMUSCULAR; INTRAVENOUS EVERY 6 HOURS PRN
Status: DISCONTINUED | OUTPATIENT
Start: 2021-05-23 | End: 2021-05-23

## 2021-05-23 RX ORDER — CEFAZOLIN SODIUM 1 G/3ML
INJECTION, POWDER, FOR SOLUTION INTRAMUSCULAR; INTRAVENOUS
Status: DISCONTINUED | OUTPATIENT
Start: 2021-05-23 | End: 2021-05-23

## 2021-05-23 RX ORDER — IBUPROFEN 200 MG
16 TABLET ORAL
Status: DISCONTINUED | OUTPATIENT
Start: 2021-05-23 | End: 2021-05-26 | Stop reason: HOSPADM

## 2021-05-23 RX ORDER — IBUPROFEN 200 MG
24 TABLET ORAL
Status: DISCONTINUED | OUTPATIENT
Start: 2021-05-23 | End: 2021-05-26 | Stop reason: HOSPADM

## 2021-05-23 RX ORDER — SODIUM CHLORIDE, SODIUM LACTATE, POTASSIUM CHLORIDE, CALCIUM CHLORIDE 600; 310; 30; 20 MG/100ML; MG/100ML; MG/100ML; MG/100ML
INJECTION, SOLUTION INTRAVENOUS CONTINUOUS
Status: DISCONTINUED | OUTPATIENT
Start: 2021-05-23 | End: 2021-05-25

## 2021-05-23 RX ORDER — OXYCODONE HYDROCHLORIDE 5 MG/1
5 TABLET ORAL EVERY 4 HOURS PRN
Status: DISCONTINUED | OUTPATIENT
Start: 2021-05-23 | End: 2021-05-26 | Stop reason: HOSPADM

## 2021-05-23 RX ORDER — SODIUM CHLORIDE, SODIUM LACTATE, POTASSIUM CHLORIDE, CALCIUM CHLORIDE 600; 310; 30; 20 MG/100ML; MG/100ML; MG/100ML; MG/100ML
INJECTION, SOLUTION INTRAVENOUS CONTINUOUS PRN
Status: DISCONTINUED | OUTPATIENT
Start: 2021-05-23 | End: 2021-05-23

## 2021-05-23 RX ORDER — CEFAZOLIN SODIUM 2 G/50ML
2 SOLUTION INTRAVENOUS
Status: COMPLETED | OUTPATIENT
Start: 2021-05-23 | End: 2021-05-24

## 2021-05-23 RX ORDER — MORPHINE SULFATE 2 MG/ML
6 INJECTION, SOLUTION INTRAMUSCULAR; INTRAVENOUS EVERY 4 HOURS PRN
Status: DISCONTINUED | OUTPATIENT
Start: 2021-05-23 | End: 2021-05-24

## 2021-05-23 RX ORDER — PHENYLEPHRINE HYDROCHLORIDE 10 MG/ML
INJECTION INTRAVENOUS
Status: DISCONTINUED | OUTPATIENT
Start: 2021-05-23 | End: 2021-05-23

## 2021-05-23 RX ORDER — TALC
6 POWDER (GRAM) TOPICAL NIGHTLY PRN
Status: DISCONTINUED | OUTPATIENT
Start: 2021-05-23 | End: 2021-05-26 | Stop reason: HOSPADM

## 2021-05-23 RX ORDER — MORPHINE SULFATE 4 MG/ML
4 INJECTION, SOLUTION INTRAMUSCULAR; INTRAVENOUS EVERY 4 HOURS PRN
Status: DISCONTINUED | OUTPATIENT
Start: 2021-05-23 | End: 2021-05-23

## 2021-05-23 RX ORDER — NEOSTIGMINE METHYLSULFATE 1 MG/ML
INJECTION, SOLUTION INTRAVENOUS
Status: DISCONTINUED | OUTPATIENT
Start: 2021-05-23 | End: 2021-05-23

## 2021-05-23 RX ORDER — ACETAMINOPHEN 325 MG/1
650 TABLET ORAL EVERY 4 HOURS PRN
Status: DISCONTINUED | OUTPATIENT
Start: 2021-05-23 | End: 2021-05-23 | Stop reason: SDUPTHER

## 2021-05-23 RX ORDER — HEPARIN SODIUM 5000 [USP'U]/ML
5000 INJECTION, SOLUTION INTRAVENOUS; SUBCUTANEOUS EVERY 8 HOURS
Status: DISCONTINUED | OUTPATIENT
Start: 2021-05-23 | End: 2021-05-23

## 2021-05-23 RX ORDER — HYDROMORPHONE HYDROCHLORIDE 2 MG/ML
0.5 INJECTION, SOLUTION INTRAMUSCULAR; INTRAVENOUS; SUBCUTANEOUS EVERY 5 MIN PRN
Status: COMPLETED | OUTPATIENT
Start: 2021-05-23 | End: 2021-05-23

## 2021-05-23 RX ORDER — CEFAZOLIN SODIUM 2 G/50ML
2 SOLUTION INTRAVENOUS ONCE
Status: DISCONTINUED | OUTPATIENT
Start: 2021-05-23 | End: 2021-05-24

## 2021-05-23 RX ORDER — ACETAMINOPHEN 325 MG/1
650 TABLET ORAL EVERY 6 HOURS PRN
Status: DISCONTINUED | OUTPATIENT
Start: 2021-05-23 | End: 2021-05-26 | Stop reason: HOSPADM

## 2021-05-23 RX ORDER — CYCLOBENZAPRINE HCL 5 MG
5 TABLET ORAL 3 TIMES DAILY PRN
Status: DISCONTINUED | OUTPATIENT
Start: 2021-05-23 | End: 2021-05-26 | Stop reason: HOSPADM

## 2021-05-23 RX ADMIN — SUCCINYLCHOLINE CHLORIDE 120 MG: 20 INJECTION, SOLUTION INTRAMUSCULAR; INTRAVENOUS at 04:05

## 2021-05-23 RX ADMIN — HYDRALAZINE HYDROCHLORIDE 10 MG: 20 INJECTION, SOLUTION INTRAMUSCULAR; INTRAVENOUS at 09:05

## 2021-05-23 RX ADMIN — METOPROLOL TARTRATE 3 MG: 1 INJECTION, SOLUTION INTRAVENOUS at 06:05

## 2021-05-23 RX ADMIN — CEFAZOLIN SODIUM 2 G: 2 SOLUTION INTRAVENOUS at 09:05

## 2021-05-23 RX ADMIN — CYCLOBENZAPRINE HYDROCHLORIDE 5 MG: 5 TABLET, FILM COATED ORAL at 03:05

## 2021-05-23 RX ADMIN — MORPHINE SULFATE 2 MG: 2 INJECTION, SOLUTION INTRAMUSCULAR; INTRAVENOUS at 01:05

## 2021-05-23 RX ADMIN — PROPOFOL 200 MG: 10 INJECTION, EMULSION INTRAVENOUS at 04:05

## 2021-05-23 RX ADMIN — NEOSTIGMINE METHYLSULFATE 5 MG: 1 INJECTION INTRAVENOUS at 06:05

## 2021-05-23 RX ADMIN — ONDANSETRON 4 MG: 2 INJECTION, SOLUTION INTRAMUSCULAR; INTRAVENOUS at 06:05

## 2021-05-23 RX ADMIN — OXYCODONE 10 MG: 5 TABLET ORAL at 11:05

## 2021-05-23 RX ADMIN — LIDOCAINE HYDROCHLORIDE 100 MG: 20 INJECTION, SOLUTION INTRAVENOUS at 04:05

## 2021-05-23 RX ADMIN — FENTANYL CITRATE 25 MCG: 50 INJECTION, SOLUTION INTRAMUSCULAR; INTRAVENOUS at 05:05

## 2021-05-23 RX ADMIN — SODIUM CHLORIDE, SODIUM LACTATE, POTASSIUM CHLORIDE, AND CALCIUM CHLORIDE: .6; .31; .03; .02 INJECTION, SOLUTION INTRAVENOUS at 06:05

## 2021-05-23 RX ADMIN — FENTANYL CITRATE 25 MCG: 50 INJECTION, SOLUTION INTRAMUSCULAR; INTRAVENOUS at 06:05

## 2021-05-23 RX ADMIN — HYDROMORPHONE HYDROCHLORIDE 0.5 MG: 2 INJECTION, SOLUTION INTRAMUSCULAR; INTRAVENOUS; SUBCUTANEOUS at 07:05

## 2021-05-23 RX ADMIN — GLYCOPYRROLATE 0.6 MG: 0.2 INJECTION, SOLUTION INTRAMUSCULAR; INTRAVITREAL at 06:05

## 2021-05-23 RX ADMIN — PHENYLEPHRINE HYDROCHLORIDE 200 MCG: 10 INJECTION INTRAVENOUS at 05:05

## 2021-05-23 RX ADMIN — MORPHINE SULFATE 4 MG: 4 INJECTION INTRAVENOUS at 08:05

## 2021-05-23 RX ADMIN — METOPROLOL TARTRATE 2 MG: 1 INJECTION, SOLUTION INTRAVENOUS at 06:05

## 2021-05-23 RX ADMIN — MUPIROCIN: 20 OINTMENT TOPICAL at 08:05

## 2021-05-23 RX ADMIN — ESMOLOL HYDROCHLORIDE 40 MG: 10 INJECTION INTRAVENOUS at 05:05

## 2021-05-23 RX ADMIN — FENTANYL CITRATE 50 MCG: 50 INJECTION, SOLUTION INTRAMUSCULAR; INTRAVENOUS at 05:05

## 2021-05-23 RX ADMIN — SODIUM CHLORIDE, SODIUM LACTATE, POTASSIUM CHLORIDE, AND CALCIUM CHLORIDE: .6; .31; .03; .02 INJECTION, SOLUTION INTRAVENOUS at 04:05

## 2021-05-23 RX ADMIN — SODIUM CHLORIDE, SODIUM LACTATE, POTASSIUM CHLORIDE, AND CALCIUM CHLORIDE: .6; .31; .03; .02 INJECTION, SOLUTION INTRAVENOUS at 02:05

## 2021-05-23 RX ADMIN — MORPHINE SULFATE 4 MG: 4 INJECTION INTRAVENOUS at 02:05

## 2021-05-23 RX ADMIN — FENTANYL CITRATE 100 MCG: 50 INJECTION, SOLUTION INTRAMUSCULAR; INTRAVENOUS at 04:05

## 2021-05-23 RX ADMIN — OXYCODONE 10 MG: 5 TABLET ORAL at 03:05

## 2021-05-23 RX ADMIN — MUPIROCIN: 20 OINTMENT TOPICAL at 09:05

## 2021-05-23 RX ADMIN — ROCURONIUM BROMIDE 10 MG: 10 INJECTION, SOLUTION INTRAVENOUS at 05:05

## 2021-05-23 RX ADMIN — CEFAZOLIN 2 G: 330 INJECTION, POWDER, FOR SOLUTION INTRAMUSCULAR; INTRAVENOUS at 05:05

## 2021-05-24 LAB
ALBUMIN SERPL BCP-MCNC: 3.3 G/DL (ref 3.5–5.2)
ALP SERPL-CCNC: 62 U/L (ref 55–135)
ALT SERPL W/O P-5'-P-CCNC: 13 U/L (ref 10–44)
ANION GAP SERPL CALC-SCNC: 11 MMOL/L (ref 8–16)
AST SERPL-CCNC: 22 U/L (ref 10–40)
BASOPHILS # BLD AUTO: 0.01 K/UL (ref 0–0.2)
BASOPHILS NFR BLD: 0.1 % (ref 0–1.9)
BILIRUB SERPL-MCNC: 0.7 MG/DL (ref 0.1–1)
BUN SERPL-MCNC: 11 MG/DL (ref 8–23)
CALCIUM SERPL-MCNC: 8.3 MG/DL (ref 8.7–10.5)
CHLORIDE SERPL-SCNC: 102 MMOL/L (ref 95–110)
CO2 SERPL-SCNC: 23 MMOL/L (ref 23–29)
CREAT SERPL-MCNC: 0.9 MG/DL (ref 0.5–1.4)
DIFFERENTIAL METHOD: ABNORMAL
EOSINOPHIL # BLD AUTO: 0 K/UL (ref 0–0.5)
EOSINOPHIL NFR BLD: 0.3 % (ref 0–8)
ERYTHROCYTE [DISTWIDTH] IN BLOOD BY AUTOMATED COUNT: 13 % (ref 11.5–14.5)
EST. GFR  (AFRICAN AMERICAN): >60 ML/MIN/1.73 M^2
EST. GFR  (NON AFRICAN AMERICAN): >60 ML/MIN/1.73 M^2
GLUCOSE SERPL-MCNC: 117 MG/DL (ref 70–110)
HCT VFR BLD AUTO: 34.2 % (ref 40–54)
HGB BLD-MCNC: 11.4 G/DL (ref 14–18)
IMM GRANULOCYTES # BLD AUTO: 0.03 K/UL (ref 0–0.04)
IMM GRANULOCYTES NFR BLD AUTO: 0.4 % (ref 0–0.5)
LYMPHOCYTES # BLD AUTO: 0.9 K/UL (ref 1–4.8)
LYMPHOCYTES NFR BLD: 13.2 % (ref 18–48)
MAGNESIUM SERPL-MCNC: 1.6 MG/DL (ref 1.6–2.6)
MCH RBC QN AUTO: 28 PG (ref 27–31)
MCHC RBC AUTO-ENTMCNC: 33.3 G/DL (ref 32–36)
MCV RBC AUTO: 84 FL (ref 82–98)
MONOCYTES # BLD AUTO: 1.2 K/UL (ref 0.3–1)
MONOCYTES NFR BLD: 16.3 % (ref 4–15)
NEUTROPHILS # BLD AUTO: 4.9 K/UL (ref 1.8–7.7)
NEUTROPHILS NFR BLD: 69.7 % (ref 38–73)
NRBC BLD-RTO: 0 /100 WBC
PLATELET # BLD AUTO: 136 K/UL (ref 150–450)
PLATELET BLD QL SMEAR: ABNORMAL
PMV BLD AUTO: 14.3 FL (ref 9.2–12.9)
POCT GLUCOSE: 100 MG/DL (ref 70–110)
POCT GLUCOSE: 122 MG/DL (ref 70–110)
POCT GLUCOSE: 129 MG/DL (ref 70–110)
POCT GLUCOSE: 146 MG/DL (ref 70–110)
POTASSIUM SERPL-SCNC: 3.8 MMOL/L (ref 3.5–5.1)
PROT SERPL-MCNC: 6.1 G/DL (ref 6–8.4)
RBC # BLD AUTO: 4.07 M/UL (ref 4.6–6.2)
SODIUM SERPL-SCNC: 136 MMOL/L (ref 136–145)
WBC # BLD AUTO: 7 K/UL (ref 3.9–12.7)

## 2021-05-24 PROCEDURE — 97110 THERAPEUTIC EXERCISES: CPT

## 2021-05-24 PROCEDURE — 94799 UNLISTED PULMONARY SVC/PX: CPT

## 2021-05-24 PROCEDURE — 97166 OT EVAL MOD COMPLEX 45 MIN: CPT

## 2021-05-24 PROCEDURE — 97161 PT EVAL LOW COMPLEX 20 MIN: CPT

## 2021-05-24 PROCEDURE — 21400001 HC TELEMETRY ROOM

## 2021-05-24 PROCEDURE — 25000003 PHARM REV CODE 250: Performed by: STUDENT IN AN ORGANIZED HEALTH CARE EDUCATION/TRAINING PROGRAM

## 2021-05-24 PROCEDURE — 94761 N-INVAS EAR/PLS OXIMETRY MLT: CPT

## 2021-05-24 PROCEDURE — 99900035 HC TECH TIME PER 15 MIN (STAT)

## 2021-05-24 PROCEDURE — 85025 COMPLETE CBC W/AUTO DIFF WBC: CPT | Performed by: NURSE PRACTITIONER

## 2021-05-24 PROCEDURE — 63600175 PHARM REV CODE 636 W HCPCS: Performed by: NURSE PRACTITIONER

## 2021-05-24 PROCEDURE — 97530 THERAPEUTIC ACTIVITIES: CPT

## 2021-05-24 PROCEDURE — 27000221 HC OXYGEN, UP TO 24 HOURS

## 2021-05-24 PROCEDURE — 80053 COMPREHEN METABOLIC PANEL: CPT | Performed by: NURSE PRACTITIONER

## 2021-05-24 PROCEDURE — 36415 COLL VENOUS BLD VENIPUNCTURE: CPT | Performed by: NURSE PRACTITIONER

## 2021-05-24 PROCEDURE — 63600175 PHARM REV CODE 636 W HCPCS: Performed by: FAMILY MEDICINE

## 2021-05-24 PROCEDURE — 83735 ASSAY OF MAGNESIUM: CPT | Performed by: NURSE PRACTITIONER

## 2021-05-24 PROCEDURE — 63600175 PHARM REV CODE 636 W HCPCS: Performed by: STUDENT IN AN ORGANIZED HEALTH CARE EDUCATION/TRAINING PROGRAM

## 2021-05-24 RX ORDER — KETOROLAC TROMETHAMINE 30 MG/ML
15 INJECTION, SOLUTION INTRAMUSCULAR; INTRAVENOUS EVERY 6 HOURS PRN
Status: COMPLETED | OUTPATIENT
Start: 2021-05-24 | End: 2021-05-25

## 2021-05-24 RX ADMIN — SODIUM CHLORIDE, SODIUM LACTATE, POTASSIUM CHLORIDE, AND CALCIUM CHLORIDE: .6; .31; .03; .02 INJECTION, SOLUTION INTRAVENOUS at 09:05

## 2021-05-24 RX ADMIN — ENOXAPARIN SODIUM 40 MG: 40 INJECTION SUBCUTANEOUS at 06:05

## 2021-05-24 RX ADMIN — KETOROLAC TROMETHAMINE 15 MG: 30 INJECTION, SOLUTION INTRAMUSCULAR; INTRAVENOUS at 12:05

## 2021-05-24 RX ADMIN — MUPIROCIN: 20 OINTMENT TOPICAL at 09:05

## 2021-05-24 RX ADMIN — OXYCODONE 10 MG: 5 TABLET ORAL at 09:05

## 2021-05-24 RX ADMIN — MORPHINE SULFATE 6 MG: 2 INJECTION, SOLUTION INTRAMUSCULAR; INTRAVENOUS at 02:05

## 2021-05-24 RX ADMIN — CEFAZOLIN SODIUM 2 G: 2 SOLUTION INTRAVENOUS at 02:05

## 2021-05-24 RX ADMIN — KETOROLAC TROMETHAMINE 15 MG: 30 INJECTION, SOLUTION INTRAMUSCULAR; INTRAVENOUS at 06:05

## 2021-05-25 PROBLEM — D62 ACUTE POSTOPERATIVE ANEMIA DUE TO EXPECTED BLOOD LOSS: Status: ACTIVE | Noted: 2021-05-25

## 2021-05-25 PROBLEM — K59.03 DRUG-INDUCED CONSTIPATION: Status: ACTIVE | Noted: 2021-05-25

## 2021-05-25 PROBLEM — R53.81 DEBILITY: Status: ACTIVE | Noted: 2021-05-25

## 2021-05-25 PROBLEM — I69.30 HISTORY OF STROKE WITH RESIDUAL DEFICIT: Status: ACTIVE | Noted: 2021-05-25

## 2021-05-25 LAB
ALBUMIN SERPL BCP-MCNC: 2.9 G/DL (ref 3.5–5.2)
ALP SERPL-CCNC: 59 U/L (ref 55–135)
ALT SERPL W/O P-5'-P-CCNC: 11 U/L (ref 10–44)
ANION GAP SERPL CALC-SCNC: 9 MMOL/L (ref 8–16)
AST SERPL-CCNC: 18 U/L (ref 10–40)
BASOPHILS # BLD AUTO: 0.02 K/UL (ref 0–0.2)
BASOPHILS NFR BLD: 0.3 % (ref 0–1.9)
BILIRUB SERPL-MCNC: 0.8 MG/DL (ref 0.1–1)
BUN SERPL-MCNC: 13 MG/DL (ref 8–23)
CALCIUM SERPL-MCNC: 8.5 MG/DL (ref 8.7–10.5)
CHLORIDE SERPL-SCNC: 104 MMOL/L (ref 95–110)
CO2 SERPL-SCNC: 27 MMOL/L (ref 23–29)
CREAT SERPL-MCNC: 0.9 MG/DL (ref 0.5–1.4)
DIFFERENTIAL METHOD: ABNORMAL
EOSINOPHIL # BLD AUTO: 0.2 K/UL (ref 0–0.5)
EOSINOPHIL NFR BLD: 2.9 % (ref 0–8)
ERYTHROCYTE [DISTWIDTH] IN BLOOD BY AUTOMATED COUNT: 12.7 % (ref 11.5–14.5)
EST. GFR  (AFRICAN AMERICAN): >60 ML/MIN/1.73 M^2
EST. GFR  (NON AFRICAN AMERICAN): >60 ML/MIN/1.73 M^2
GLUCOSE SERPL-MCNC: 104 MG/DL (ref 70–110)
HCT VFR BLD AUTO: 31 % (ref 40–54)
HGB BLD-MCNC: 10.4 G/DL (ref 14–18)
IMM GRANULOCYTES # BLD AUTO: 0.02 K/UL (ref 0–0.04)
IMM GRANULOCYTES NFR BLD AUTO: 0.3 % (ref 0–0.5)
LYMPHOCYTES # BLD AUTO: 1 K/UL (ref 1–4.8)
LYMPHOCYTES NFR BLD: 17.3 % (ref 18–48)
MAGNESIUM SERPL-MCNC: 1.8 MG/DL (ref 1.6–2.6)
MCH RBC QN AUTO: 28.3 PG (ref 27–31)
MCHC RBC AUTO-ENTMCNC: 33.5 G/DL (ref 32–36)
MCV RBC AUTO: 84 FL (ref 82–98)
MONOCYTES # BLD AUTO: 1 K/UL (ref 0.3–1)
MONOCYTES NFR BLD: 17 % (ref 4–15)
NEUTROPHILS # BLD AUTO: 3.6 K/UL (ref 1.8–7.7)
NEUTROPHILS NFR BLD: 62.2 % (ref 38–73)
NRBC BLD-RTO: 0 /100 WBC
PLATELET # BLD AUTO: 130 K/UL (ref 150–450)
PLATELET BLD QL SMEAR: ABNORMAL
PMV BLD AUTO: 14 FL (ref 9.2–12.9)
POCT GLUCOSE: 100 MG/DL (ref 70–110)
POCT GLUCOSE: 133 MG/DL (ref 70–110)
POTASSIUM SERPL-SCNC: 3.5 MMOL/L (ref 3.5–5.1)
PROT SERPL-MCNC: 5.8 G/DL (ref 6–8.4)
RBC # BLD AUTO: 3.68 M/UL (ref 4.6–6.2)
SODIUM SERPL-SCNC: 140 MMOL/L (ref 136–145)
WBC # BLD AUTO: 5.97 K/UL (ref 3.9–12.7)

## 2021-05-25 PROCEDURE — 83735 ASSAY OF MAGNESIUM: CPT | Performed by: NURSE PRACTITIONER

## 2021-05-25 PROCEDURE — 85025 COMPLETE CBC W/AUTO DIFF WBC: CPT | Performed by: NURSE PRACTITIONER

## 2021-05-25 PROCEDURE — 36415 COLL VENOUS BLD VENIPUNCTURE: CPT | Performed by: NURSE PRACTITIONER

## 2021-05-25 PROCEDURE — 94799 UNLISTED PULMONARY SVC/PX: CPT

## 2021-05-25 PROCEDURE — 25000003 PHARM REV CODE 250: Performed by: HOSPITALIST

## 2021-05-25 PROCEDURE — 80053 COMPREHEN METABOLIC PANEL: CPT | Performed by: NURSE PRACTITIONER

## 2021-05-25 PROCEDURE — 97530 THERAPEUTIC ACTIVITIES: CPT | Mod: CQ

## 2021-05-25 PROCEDURE — 97530 THERAPEUTIC ACTIVITIES: CPT | Mod: CO

## 2021-05-25 PROCEDURE — 21400001 HC TELEMETRY ROOM

## 2021-05-25 PROCEDURE — 25000003 PHARM REV CODE 250: Performed by: STUDENT IN AN ORGANIZED HEALTH CARE EDUCATION/TRAINING PROGRAM

## 2021-05-25 PROCEDURE — 63600175 PHARM REV CODE 636 W HCPCS: Performed by: STUDENT IN AN ORGANIZED HEALTH CARE EDUCATION/TRAINING PROGRAM

## 2021-05-25 PROCEDURE — 94761 N-INVAS EAR/PLS OXIMETRY MLT: CPT

## 2021-05-25 RX ORDER — NAPROXEN SODIUM 220 MG/1
81 TABLET, FILM COATED ORAL DAILY
Status: DISCONTINUED | OUTPATIENT
Start: 2021-05-26 | End: 2021-05-26 | Stop reason: HOSPADM

## 2021-05-25 RX ORDER — NIFEDIPINE 30 MG/1
30 TABLET, EXTENDED RELEASE ORAL DAILY
Status: DISCONTINUED | OUTPATIENT
Start: 2021-05-25 | End: 2021-05-26 | Stop reason: HOSPADM

## 2021-05-25 RX ORDER — LACTULOSE 10 G/15ML
30 SOLUTION ORAL ONCE
Status: COMPLETED | OUTPATIENT
Start: 2021-05-25 | End: 2021-05-25

## 2021-05-25 RX ORDER — LISINOPRIL 5 MG/1
5 TABLET ORAL DAILY
Status: DISCONTINUED | OUTPATIENT
Start: 2021-05-26 | End: 2021-05-25

## 2021-05-25 RX ORDER — POLYETHYLENE GLYCOL 3350 17 G/17G
17 POWDER, FOR SOLUTION ORAL DAILY
Status: DISCONTINUED | OUTPATIENT
Start: 2021-05-25 | End: 2021-05-26 | Stop reason: HOSPADM

## 2021-05-25 RX ADMIN — MUPIROCIN: 20 OINTMENT TOPICAL at 08:05

## 2021-05-25 RX ADMIN — POLYETHYLENE GLYCOL 3350 17 G: 17 POWDER, FOR SOLUTION ORAL at 02:05

## 2021-05-25 RX ADMIN — ENOXAPARIN SODIUM 40 MG: 40 INJECTION SUBCUTANEOUS at 04:05

## 2021-05-25 RX ADMIN — KETOROLAC TROMETHAMINE 15 MG: 30 INJECTION, SOLUTION INTRAMUSCULAR; INTRAVENOUS at 06:05

## 2021-05-25 RX ADMIN — OXYCODONE 10 MG: 5 TABLET ORAL at 08:05

## 2021-05-25 RX ADMIN — NIFEDIPINE 30 MG: 30 TABLET, FILM COATED, EXTENDED RELEASE ORAL at 02:05

## 2021-05-25 RX ADMIN — CYCLOBENZAPRINE HYDROCHLORIDE 5 MG: 5 TABLET, FILM COATED ORAL at 08:05

## 2021-05-25 RX ADMIN — OXYCODONE 5 MG: 5 TABLET ORAL at 03:05

## 2021-05-25 RX ADMIN — OXYCODONE 5 MG: 5 TABLET ORAL at 08:05

## 2021-05-25 RX ADMIN — LACTULOSE 30 G: 20 SOLUTION ORAL at 02:05

## 2021-05-26 ENCOUNTER — TELEPHONE (OUTPATIENT)
Dept: ORTHOPEDICS | Facility: CLINIC | Age: 66
End: 2021-05-26

## 2021-05-26 VITALS
DIASTOLIC BLOOD PRESSURE: 73 MMHG | HEART RATE: 104 BPM | OXYGEN SATURATION: 95 % | SYSTOLIC BLOOD PRESSURE: 134 MMHG | HEIGHT: 69 IN | RESPIRATION RATE: 17 BRPM | TEMPERATURE: 98 F | WEIGHT: 165.38 LBS | BODY MASS INDEX: 24.5 KG/M2

## 2021-05-26 DIAGNOSIS — M79.605 LEFT LEG PAIN: Primary | ICD-10-CM

## 2021-05-26 LAB
ALBUMIN SERPL BCP-MCNC: 3 G/DL (ref 3.5–5.2)
ALP SERPL-CCNC: 63 U/L (ref 55–135)
ALT SERPL W/O P-5'-P-CCNC: 9 U/L (ref 10–44)
ANION GAP SERPL CALC-SCNC: 10 MMOL/L (ref 8–16)
AST SERPL-CCNC: 18 U/L (ref 10–40)
BASOPHILS # BLD AUTO: 0.02 K/UL (ref 0–0.2)
BASOPHILS NFR BLD: 0.4 % (ref 0–1.9)
BILIRUB SERPL-MCNC: 0.8 MG/DL (ref 0.1–1)
BUN SERPL-MCNC: 12 MG/DL (ref 8–23)
CALCIUM SERPL-MCNC: 8.5 MG/DL (ref 8.7–10.5)
CHLORIDE SERPL-SCNC: 103 MMOL/L (ref 95–110)
CO2 SERPL-SCNC: 28 MMOL/L (ref 23–29)
CREAT SERPL-MCNC: 0.8 MG/DL (ref 0.5–1.4)
DIFFERENTIAL METHOD: ABNORMAL
EOSINOPHIL # BLD AUTO: 0.3 K/UL (ref 0–0.5)
EOSINOPHIL NFR BLD: 4.7 % (ref 0–8)
ERYTHROCYTE [DISTWIDTH] IN BLOOD BY AUTOMATED COUNT: 12.6 % (ref 11.5–14.5)
EST. GFR  (AFRICAN AMERICAN): >60 ML/MIN/1.73 M^2
EST. GFR  (NON AFRICAN AMERICAN): >60 ML/MIN/1.73 M^2
GLUCOSE SERPL-MCNC: 131 MG/DL (ref 70–110)
HCT VFR BLD AUTO: 32 % (ref 40–54)
HGB BLD-MCNC: 11 G/DL (ref 14–18)
IMM GRANULOCYTES # BLD AUTO: 0.02 K/UL (ref 0–0.04)
IMM GRANULOCYTES NFR BLD AUTO: 0.4 % (ref 0–0.5)
LYMPHOCYTES # BLD AUTO: 1.1 K/UL (ref 1–4.8)
LYMPHOCYTES NFR BLD: 19 % (ref 18–48)
MAGNESIUM SERPL-MCNC: 1.6 MG/DL (ref 1.6–2.6)
MCH RBC QN AUTO: 28.8 PG (ref 27–31)
MCHC RBC AUTO-ENTMCNC: 34.4 G/DL (ref 32–36)
MCV RBC AUTO: 84 FL (ref 82–98)
MONOCYTES # BLD AUTO: 1 K/UL (ref 0.3–1)
MONOCYTES NFR BLD: 17.7 % (ref 4–15)
NEUTROPHILS # BLD AUTO: 3.2 K/UL (ref 1.8–7.7)
NEUTROPHILS NFR BLD: 57.8 % (ref 38–73)
NRBC BLD-RTO: 0 /100 WBC
PLATELET # BLD AUTO: 144 K/UL (ref 150–450)
PMV BLD AUTO: 14.2 FL (ref 9.2–12.9)
POTASSIUM SERPL-SCNC: 3.3 MMOL/L (ref 3.5–5.1)
PROT SERPL-MCNC: 6.2 G/DL (ref 6–8.4)
RBC # BLD AUTO: 3.82 M/UL (ref 4.6–6.2)
SODIUM SERPL-SCNC: 141 MMOL/L (ref 136–145)
WBC # BLD AUTO: 5.59 K/UL (ref 3.9–12.7)

## 2021-05-26 PROCEDURE — 97535 SELF CARE MNGMENT TRAINING: CPT | Mod: CO

## 2021-05-26 PROCEDURE — 99900035 HC TECH TIME PER 15 MIN (STAT)

## 2021-05-26 PROCEDURE — 97116 GAIT TRAINING THERAPY: CPT | Mod: CQ

## 2021-05-26 PROCEDURE — 97530 THERAPEUTIC ACTIVITIES: CPT | Mod: CQ

## 2021-05-26 PROCEDURE — 25000003 PHARM REV CODE 250: Performed by: STUDENT IN AN ORGANIZED HEALTH CARE EDUCATION/TRAINING PROGRAM

## 2021-05-26 PROCEDURE — 94761 N-INVAS EAR/PLS OXIMETRY MLT: CPT

## 2021-05-26 PROCEDURE — 80053 COMPREHEN METABOLIC PANEL: CPT | Performed by: NURSE PRACTITIONER

## 2021-05-26 PROCEDURE — 36415 COLL VENOUS BLD VENIPUNCTURE: CPT | Performed by: NURSE PRACTITIONER

## 2021-05-26 PROCEDURE — 85025 COMPLETE CBC W/AUTO DIFF WBC: CPT | Performed by: NURSE PRACTITIONER

## 2021-05-26 PROCEDURE — 83735 ASSAY OF MAGNESIUM: CPT | Performed by: NURSE PRACTITIONER

## 2021-05-26 PROCEDURE — 94799 UNLISTED PULMONARY SVC/PX: CPT

## 2021-05-26 PROCEDURE — 97530 THERAPEUTIC ACTIVITIES: CPT | Mod: CO

## 2021-05-26 PROCEDURE — 25000003 PHARM REV CODE 250: Performed by: HOSPITALIST

## 2021-05-26 RX ORDER — POTASSIUM CHLORIDE 20 MEQ/1
40 TABLET, EXTENDED RELEASE ORAL
Status: COMPLETED | OUTPATIENT
Start: 2021-05-26 | End: 2021-05-26

## 2021-05-26 RX ORDER — OXYCODONE HYDROCHLORIDE 5 MG/1
5 TABLET ORAL EVERY 6 HOURS PRN
Qty: 10 TABLET | Refills: 0 | Status: SHIPPED | OUTPATIENT
Start: 2021-05-26 | End: 2022-01-25

## 2021-05-26 RX ORDER — LACTULOSE 10 G/15ML
30 SOLUTION ORAL ONCE
Status: COMPLETED | OUTPATIENT
Start: 2021-05-26 | End: 2021-05-26

## 2021-05-26 RX ORDER — NAPROXEN SODIUM 220 MG/1
81 TABLET, FILM COATED ORAL DAILY
Refills: 0
Start: 2021-05-26 | End: 2021-05-26

## 2021-05-26 RX ORDER — CYCLOBENZAPRINE HCL 5 MG
5 TABLET ORAL 3 TIMES DAILY PRN
Start: 2021-05-26 | End: 2021-05-26

## 2021-05-26 RX ORDER — POLYETHYLENE GLYCOL 3350 17 G/17G
17 POWDER, FOR SOLUTION ORAL 2 TIMES DAILY PRN
Qty: 238 G | Refills: 0 | Status: SHIPPED | OUTPATIENT
Start: 2021-05-26 | End: 2021-06-25

## 2021-05-26 RX ORDER — ACETAMINOPHEN 325 MG/1
650 TABLET ORAL EVERY 6 HOURS PRN
Refills: 0
Start: 2021-05-26 | End: 2022-01-25

## 2021-05-26 RX ORDER — CYCLOBENZAPRINE HCL 5 MG
5 TABLET ORAL 3 TIMES DAILY PRN
Qty: 30 TABLET | Refills: 0 | Status: SHIPPED | OUTPATIENT
Start: 2021-05-26 | End: 2021-06-05

## 2021-05-26 RX ORDER — NAPROXEN SODIUM 220 MG/1
81 TABLET, FILM COATED ORAL 2 TIMES DAILY
Qty: 52 TABLET | Refills: 0 | Status: SHIPPED | OUTPATIENT
Start: 2021-05-26 | End: 2022-01-25

## 2021-05-26 RX ORDER — SENNOSIDES 8.6 MG/1
8.6 TABLET ORAL DAILY
Status: DISCONTINUED | OUTPATIENT
Start: 2021-05-26 | End: 2021-05-26 | Stop reason: HOSPADM

## 2021-05-26 RX ORDER — POLYETHYLENE GLYCOL 3350 17 G/17G
17 POWDER, FOR SOLUTION ORAL 2 TIMES DAILY PRN
Refills: 0
Start: 2021-05-26 | End: 2021-05-26

## 2021-05-26 RX ORDER — LANOLIN ALCOHOL/MO/W.PET/CERES
400 CREAM (GRAM) TOPICAL 2 TIMES DAILY
Status: DISCONTINUED | OUTPATIENT
Start: 2021-05-26 | End: 2021-05-26 | Stop reason: HOSPADM

## 2021-05-26 RX ORDER — ENOXAPARIN SODIUM 100 MG/ML
40 INJECTION SUBCUTANEOUS DAILY
Start: 2021-05-26 | End: 2021-05-26 | Stop reason: HOSPADM

## 2021-05-26 RX ADMIN — POLYETHYLENE GLYCOL 3350 17 G: 17 POWDER, FOR SOLUTION ORAL at 07:05

## 2021-05-26 RX ADMIN — Medication 400 MG: at 09:05

## 2021-05-26 RX ADMIN — SENNOSIDES 8.6 MG: 8.6 TABLET ORAL at 12:05

## 2021-05-26 RX ADMIN — POTASSIUM CHLORIDE 40 MEQ: 1500 TABLET, EXTENDED RELEASE ORAL at 12:05

## 2021-05-26 RX ADMIN — MUPIROCIN: 20 OINTMENT TOPICAL at 07:05

## 2021-05-26 RX ADMIN — POTASSIUM CHLORIDE 40 MEQ: 1500 TABLET, EXTENDED RELEASE ORAL at 09:05

## 2021-05-26 RX ADMIN — NIFEDIPINE 30 MG: 30 TABLET, FILM COATED, EXTENDED RELEASE ORAL at 07:05

## 2021-05-26 RX ADMIN — ASPIRIN 81 MG CHEWABLE TABLET 81 MG: 81 TABLET CHEWABLE at 09:05

## 2021-05-26 RX ADMIN — OXYCODONE 10 MG: 5 TABLET ORAL at 07:05

## 2021-05-26 RX ADMIN — LACTULOSE 30 G: 20 SOLUTION ORAL at 12:05

## 2021-05-27 ENCOUNTER — PATIENT OUTREACH (OUTPATIENT)
Dept: ADMINISTRATIVE | Facility: CLINIC | Age: 66
End: 2021-05-27

## 2021-05-27 ENCOUNTER — TELEPHONE (OUTPATIENT)
Dept: ADMINISTRATIVE | Facility: OTHER | Age: 66
End: 2021-05-27

## 2021-05-27 ENCOUNTER — PATIENT MESSAGE (OUTPATIENT)
Dept: ADMINISTRATIVE | Facility: CLINIC | Age: 66
End: 2021-05-27

## 2021-05-27 PROCEDURE — G0180 MD CERTIFICATION HHA PATIENT: HCPCS | Mod: ,,, | Performed by: HOSPITALIST

## 2021-05-27 PROCEDURE — G0180 PR HOME HEALTH MD CERTIFICATION: ICD-10-PCS | Mod: ,,, | Performed by: HOSPITALIST

## 2021-06-02 ENCOUNTER — OFFICE VISIT (OUTPATIENT)
Dept: FAMILY MEDICINE | Facility: CLINIC | Age: 66
End: 2021-06-02
Payer: MEDICARE

## 2021-06-02 VITALS — SYSTOLIC BLOOD PRESSURE: 124 MMHG | OXYGEN SATURATION: 98 % | DIASTOLIC BLOOD PRESSURE: 70 MMHG | HEART RATE: 105 BPM

## 2021-06-02 DIAGNOSIS — I10 ESSENTIAL HYPERTENSION: Primary | ICD-10-CM

## 2021-06-02 DIAGNOSIS — S72.142A CLOSED 2-PART INTERTROCHANTERIC FRACTURE OF LEFT FEMUR, INITIAL ENCOUNTER: ICD-10-CM

## 2021-06-02 DIAGNOSIS — E87.6 HYPOKALEMIA: ICD-10-CM

## 2021-06-02 DIAGNOSIS — Z74.09 IMPAIRED MOBILITY AND ACTIVITIES OF DAILY LIVING: ICD-10-CM

## 2021-06-02 DIAGNOSIS — Z78.9 IMPAIRED MOBILITY AND ACTIVITIES OF DAILY LIVING: ICD-10-CM

## 2021-06-02 DIAGNOSIS — D50.8 OTHER IRON DEFICIENCY ANEMIA: ICD-10-CM

## 2021-06-02 PROCEDURE — 1100F PTFALLS ASSESS-DOCD GE2>/YR: CPT | Mod: CPTII,S$GLB,, | Performed by: FAMILY MEDICINE

## 2021-06-02 PROCEDURE — 3288F PR FALLS RISK ASSESSMENT DOCUMENTED: ICD-10-PCS | Mod: CPTII,S$GLB,, | Performed by: FAMILY MEDICINE

## 2021-06-02 PROCEDURE — 1111F DSCHRG MED/CURRENT MED MERGE: CPT | Mod: CPTII,S$GLB,, | Performed by: FAMILY MEDICINE

## 2021-06-02 PROCEDURE — 99999 PR PBB SHADOW E&M-EST. PATIENT-LVL III: ICD-10-PCS | Mod: PBBFAC,,, | Performed by: FAMILY MEDICINE

## 2021-06-02 PROCEDURE — 1159F MED LIST DOCD IN RCRD: CPT | Mod: S$GLB,,, | Performed by: FAMILY MEDICINE

## 2021-06-02 PROCEDURE — 1100F PR PT FALLS ASSESS DOC 2+ FALLS/FALL W/INJURY/YR: ICD-10-PCS | Mod: CPTII,S$GLB,, | Performed by: FAMILY MEDICINE

## 2021-06-02 PROCEDURE — 3044F HG A1C LEVEL LT 7.0%: CPT | Mod: CPTII,S$GLB,, | Performed by: FAMILY MEDICINE

## 2021-06-02 PROCEDURE — 99214 OFFICE O/P EST MOD 30 MIN: CPT | Mod: S$GLB,,, | Performed by: FAMILY MEDICINE

## 2021-06-02 PROCEDURE — 1125F AMNT PAIN NOTED PAIN PRSNT: CPT | Mod: S$GLB,,, | Performed by: FAMILY MEDICINE

## 2021-06-02 PROCEDURE — 99214 PR OFFICE/OUTPT VISIT, EST, LEVL IV, 30-39 MIN: ICD-10-PCS | Mod: S$GLB,,, | Performed by: FAMILY MEDICINE

## 2021-06-02 PROCEDURE — 3288F FALL RISK ASSESSMENT DOCD: CPT | Mod: CPTII,S$GLB,, | Performed by: FAMILY MEDICINE

## 2021-06-02 PROCEDURE — 3074F SYST BP LT 130 MM HG: CPT | Mod: CPTII,S$GLB,, | Performed by: FAMILY MEDICINE

## 2021-06-02 PROCEDURE — 1125F PR PAIN SEVERITY QUANTIFIED, PAIN PRESENT: ICD-10-PCS | Mod: S$GLB,,, | Performed by: FAMILY MEDICINE

## 2021-06-02 PROCEDURE — 3078F DIAST BP <80 MM HG: CPT | Mod: CPTII,S$GLB,, | Performed by: FAMILY MEDICINE

## 2021-06-02 PROCEDURE — 3044F PR MOST RECENT HEMOGLOBIN A1C LEVEL <7.0%: ICD-10-PCS | Mod: CPTII,S$GLB,, | Performed by: FAMILY MEDICINE

## 2021-06-02 PROCEDURE — 3074F PR MOST RECENT SYSTOLIC BLOOD PRESSURE < 130 MM HG: ICD-10-PCS | Mod: CPTII,S$GLB,, | Performed by: FAMILY MEDICINE

## 2021-06-02 PROCEDURE — 1111F PR DISCHARGE MEDS RECONCILED W/ CURRENT OUTPATIENT MED LIST: ICD-10-PCS | Mod: CPTII,S$GLB,, | Performed by: FAMILY MEDICINE

## 2021-06-02 PROCEDURE — 3078F PR MOST RECENT DIASTOLIC BLOOD PRESSURE < 80 MM HG: ICD-10-PCS | Mod: CPTII,S$GLB,, | Performed by: FAMILY MEDICINE

## 2021-06-02 PROCEDURE — 1159F PR MEDICATION LIST DOCUMENTED IN MEDICAL RECORD: ICD-10-PCS | Mod: S$GLB,,, | Performed by: FAMILY MEDICINE

## 2021-06-02 PROCEDURE — 99999 PR PBB SHADOW E&M-EST. PATIENT-LVL III: CPT | Mod: PBBFAC,,, | Performed by: FAMILY MEDICINE

## 2021-06-02 RX ORDER — POTASSIUM CHLORIDE 600 MG/1
CAPSULE, EXTENDED RELEASE ORAL
COMMUNITY
End: 2021-06-02

## 2021-06-02 RX ORDER — FERROUS SULFATE 325(65) MG
325 TABLET ORAL
Qty: 90 TABLET | Refills: 0 | Status: SHIPPED | OUTPATIENT
Start: 2021-06-02 | End: 2021-08-24

## 2021-06-02 RX ORDER — METFORMIN HYDROCHLORIDE 500 MG/1
TABLET ORAL
COMMUNITY
End: 2022-05-02

## 2021-06-02 RX ORDER — POTASSIUM CHLORIDE 20 MEQ/1
20 TABLET, EXTENDED RELEASE ORAL DAILY
Qty: 90 TABLET | Refills: 3 | Status: SHIPPED | OUTPATIENT
Start: 2021-06-02 | End: 2022-07-19

## 2021-06-02 RX ORDER — HYDROCHLOROTHIAZIDE 12.5 MG/1
CAPSULE ORAL
COMMUNITY
End: 2022-01-11

## 2021-06-04 ENCOUNTER — CLINICAL SUPPORT (OUTPATIENT)
Dept: CARDIOLOGY | Facility: HOSPITAL | Age: 66
End: 2021-06-04
Payer: MEDICARE

## 2021-06-04 DIAGNOSIS — Z95.818 PRESENCE OF OTHER CARDIAC IMPLANTS AND GRAFTS: ICD-10-CM

## 2021-06-04 PROCEDURE — 93298 REM INTERROG DEV EVAL SCRMS: CPT | Mod: ,,, | Performed by: INTERNAL MEDICINE

## 2021-06-04 PROCEDURE — G2066 INTER DEVC REMOTE 30D: HCPCS | Performed by: INTERNAL MEDICINE

## 2021-06-04 PROCEDURE — 93298 CARDIAC DEVICE CHECK - REMOTE: ICD-10-PCS | Mod: ,,, | Performed by: INTERNAL MEDICINE

## 2021-06-07 ENCOUNTER — PATIENT OUTREACH (OUTPATIENT)
Dept: ADMINISTRATIVE | Facility: HOSPITAL | Age: 66
End: 2021-06-07

## 2021-06-07 ENCOUNTER — HOSPITAL ENCOUNTER (OUTPATIENT)
Dept: RADIOLOGY | Facility: HOSPITAL | Age: 66
Discharge: HOME | End: 2021-06-07
Attending: ORTHOPAEDIC SURGERY
Payer: MEDICARE

## 2021-06-07 ENCOUNTER — OFFICE VISIT (OUTPATIENT)
Dept: ORTHOPEDICS | Facility: CLINIC | Age: 66
End: 2021-06-07
Payer: MEDICARE

## 2021-06-07 ENCOUNTER — EXTERNAL HOME HEALTH (OUTPATIENT)
Dept: HOME HEALTH SERVICES | Facility: HOSPITAL | Age: 66
End: 2021-06-07
Payer: MEDICARE

## 2021-06-07 VITALS
HEART RATE: 99 BPM | SYSTOLIC BLOOD PRESSURE: 156 MMHG | BODY MASS INDEX: 24.5 KG/M2 | DIASTOLIC BLOOD PRESSURE: 81 MMHG | WEIGHT: 165.38 LBS | HEIGHT: 69 IN

## 2021-06-07 DIAGNOSIS — S72.142A DISPLACED INTERTROCHANTERIC FRACTURE OF LEFT FEMUR, INITIAL ENCOUNTER FOR CLOSED FRACTURE: ICD-10-CM

## 2021-06-07 DIAGNOSIS — I69.30 HISTORY OF STROKE WITH RESIDUAL DEFICIT: Primary | ICD-10-CM

## 2021-06-07 DIAGNOSIS — M79.605 LEFT LEG PAIN: ICD-10-CM

## 2021-06-07 PROCEDURE — 3008F PR BODY MASS INDEX (BMI) DOCUMENTED: ICD-10-PCS | Mod: CPTII,S$GLB,, | Performed by: ORTHOPAEDIC SURGERY

## 2021-06-07 PROCEDURE — 99999 PR PBB SHADOW E&M-EST. PATIENT-LVL IV: ICD-10-PCS | Mod: PBBFAC,,, | Performed by: ORTHOPAEDIC SURGERY

## 2021-06-07 PROCEDURE — 73552 X-RAY EXAM OF FEMUR 2/>: CPT | Mod: TC,FY,LT

## 2021-06-07 PROCEDURE — 1101F PT FALLS ASSESS-DOCD LE1/YR: CPT | Mod: CPTII,S$GLB,, | Performed by: ORTHOPAEDIC SURGERY

## 2021-06-07 PROCEDURE — 99024 PR POST-OP FOLLOW-UP VISIT: ICD-10-PCS | Mod: S$GLB,,, | Performed by: ORTHOPAEDIC SURGERY

## 2021-06-07 PROCEDURE — 73552 X-RAY EXAM OF FEMUR 2/>: CPT | Mod: 26,LT,, | Performed by: RADIOLOGY

## 2021-06-07 PROCEDURE — 1101F PR PT FALLS ASSESS DOC 0-1 FALLS W/OUT INJ PAST YR: ICD-10-PCS | Mod: CPTII,S$GLB,, | Performed by: ORTHOPAEDIC SURGERY

## 2021-06-07 PROCEDURE — 73552 XR FEMUR 2 VIEW LEFT: ICD-10-PCS | Mod: 26,LT,, | Performed by: RADIOLOGY

## 2021-06-07 PROCEDURE — 99024 POSTOP FOLLOW-UP VISIT: CPT | Mod: S$GLB,,, | Performed by: ORTHOPAEDIC SURGERY

## 2021-06-07 PROCEDURE — 1125F PR PAIN SEVERITY QUANTIFIED, PAIN PRESENT: ICD-10-PCS | Mod: S$GLB,,, | Performed by: ORTHOPAEDIC SURGERY

## 2021-06-07 PROCEDURE — 3288F FALL RISK ASSESSMENT DOCD: CPT | Mod: CPTII,S$GLB,, | Performed by: ORTHOPAEDIC SURGERY

## 2021-06-07 PROCEDURE — 99999 PR PBB SHADOW E&M-EST. PATIENT-LVL IV: CPT | Mod: PBBFAC,,, | Performed by: ORTHOPAEDIC SURGERY

## 2021-06-07 PROCEDURE — 3288F PR FALLS RISK ASSESSMENT DOCUMENTED: ICD-10-PCS | Mod: CPTII,S$GLB,, | Performed by: ORTHOPAEDIC SURGERY

## 2021-06-07 PROCEDURE — 1125F AMNT PAIN NOTED PAIN PRSNT: CPT | Mod: S$GLB,,, | Performed by: ORTHOPAEDIC SURGERY

## 2021-06-07 PROCEDURE — 3008F BODY MASS INDEX DOCD: CPT | Mod: CPTII,S$GLB,, | Performed by: ORTHOPAEDIC SURGERY

## 2021-06-09 ENCOUNTER — TELEPHONE (OUTPATIENT)
Dept: FAMILY MEDICINE | Facility: CLINIC | Age: 66
End: 2021-06-09

## 2021-06-09 DIAGNOSIS — N39.0 URINARY TRACT INFECTION WITHOUT HEMATURIA, SITE UNSPECIFIED: Primary | ICD-10-CM

## 2021-06-15 ENCOUNTER — DOCUMENT SCAN (OUTPATIENT)
Dept: HOME HEALTH SERVICES | Facility: HOSPITAL | Age: 66
End: 2021-06-15
Payer: MEDICARE

## 2021-07-04 ENCOUNTER — CLINICAL SUPPORT (OUTPATIENT)
Dept: CARDIOLOGY | Facility: HOSPITAL | Age: 66
End: 2021-07-04
Payer: MEDICARE

## 2021-07-04 DIAGNOSIS — Z95.818 PRESENCE OF OTHER CARDIAC IMPLANTS AND GRAFTS: ICD-10-CM

## 2021-07-04 PROCEDURE — 93298 CARDIAC DEVICE CHECK - REMOTE: ICD-10-PCS | Mod: ,,, | Performed by: INTERNAL MEDICINE

## 2021-07-04 PROCEDURE — 93298 REM INTERROG DEV EVAL SCRMS: CPT | Mod: ,,, | Performed by: INTERNAL MEDICINE

## 2021-07-04 PROCEDURE — G2066 INTER DEVC REMOTE 30D: HCPCS | Performed by: INTERNAL MEDICINE

## 2021-07-08 ENCOUNTER — OFFICE VISIT (OUTPATIENT)
Dept: ORTHOPEDICS | Facility: CLINIC | Age: 66
End: 2021-07-08
Payer: MEDICARE

## 2021-07-08 VITALS
BODY MASS INDEX: 21.48 KG/M2 | HEIGHT: 69 IN | SYSTOLIC BLOOD PRESSURE: 131 MMHG | HEART RATE: 90 BPM | WEIGHT: 145 LBS | DIASTOLIC BLOOD PRESSURE: 78 MMHG

## 2021-07-08 DIAGNOSIS — S72.142A DISPLACED INTERTROCHANTERIC FRACTURE OF LEFT FEMUR, INITIAL ENCOUNTER FOR CLOSED FRACTURE: Primary | ICD-10-CM

## 2021-07-08 DIAGNOSIS — I69.30 HISTORY OF STROKE WITH RESIDUAL DEFICIT: ICD-10-CM

## 2021-07-08 PROCEDURE — 3288F FALL RISK ASSESSMENT DOCD: CPT | Mod: CPTII,S$GLB,, | Performed by: ORTHOPAEDIC SURGERY

## 2021-07-08 PROCEDURE — 1125F AMNT PAIN NOTED PAIN PRSNT: CPT | Mod: S$GLB,,, | Performed by: ORTHOPAEDIC SURGERY

## 2021-07-08 PROCEDURE — 3008F BODY MASS INDEX DOCD: CPT | Mod: CPTII,S$GLB,, | Performed by: ORTHOPAEDIC SURGERY

## 2021-07-08 PROCEDURE — 99999 PR PBB SHADOW E&M-EST. PATIENT-LVL III: ICD-10-PCS | Mod: PBBFAC,,, | Performed by: ORTHOPAEDIC SURGERY

## 2021-07-08 PROCEDURE — 3288F PR FALLS RISK ASSESSMENT DOCUMENTED: ICD-10-PCS | Mod: CPTII,S$GLB,, | Performed by: ORTHOPAEDIC SURGERY

## 2021-07-08 PROCEDURE — 3008F PR BODY MASS INDEX (BMI) DOCUMENTED: ICD-10-PCS | Mod: CPTII,S$GLB,, | Performed by: ORTHOPAEDIC SURGERY

## 2021-07-08 PROCEDURE — 1101F PR PT FALLS ASSESS DOC 0-1 FALLS W/OUT INJ PAST YR: ICD-10-PCS | Mod: CPTII,S$GLB,, | Performed by: ORTHOPAEDIC SURGERY

## 2021-07-08 PROCEDURE — 99024 PR POST-OP FOLLOW-UP VISIT: ICD-10-PCS | Mod: S$GLB,,, | Performed by: ORTHOPAEDIC SURGERY

## 2021-07-08 PROCEDURE — 99999 PR PBB SHADOW E&M-EST. PATIENT-LVL III: CPT | Mod: PBBFAC,,, | Performed by: ORTHOPAEDIC SURGERY

## 2021-07-08 PROCEDURE — 99024 POSTOP FOLLOW-UP VISIT: CPT | Mod: S$GLB,,, | Performed by: ORTHOPAEDIC SURGERY

## 2021-07-08 PROCEDURE — 1101F PT FALLS ASSESS-DOCD LE1/YR: CPT | Mod: CPTII,S$GLB,, | Performed by: ORTHOPAEDIC SURGERY

## 2021-07-08 PROCEDURE — 1125F PR PAIN SEVERITY QUANTIFIED, PAIN PRESENT: ICD-10-PCS | Mod: S$GLB,,, | Performed by: ORTHOPAEDIC SURGERY

## 2021-07-15 PROBLEM — R26.89 GAIT, ANTALGIC: Status: ACTIVE | Noted: 2021-07-15

## 2021-07-15 PROBLEM — R68.89 DECREASED STRENGTH, ENDURANCE, AND MOBILITY: Status: ACTIVE | Noted: 2021-07-15

## 2021-07-15 PROBLEM — R53.1 DECREASED STRENGTH, ENDURANCE, AND MOBILITY: Status: ACTIVE | Noted: 2021-07-15

## 2021-07-15 PROBLEM — Z74.09 IMPAIRED FUNCTIONAL MOBILITY, BALANCE, AND ENDURANCE: Status: ACTIVE | Noted: 2021-07-15

## 2021-07-15 PROBLEM — Z74.09 DECREASED STRENGTH, ENDURANCE, AND MOBILITY: Status: ACTIVE | Noted: 2021-07-15

## 2021-07-19 ENCOUNTER — DOCUMENT SCAN (OUTPATIENT)
Dept: HOME HEALTH SERVICES | Facility: HOSPITAL | Age: 66
End: 2021-07-19
Payer: MEDICARE

## 2021-07-28 ENCOUNTER — PATIENT MESSAGE (OUTPATIENT)
Dept: CARDIOLOGY | Facility: CLINIC | Age: 66
End: 2021-07-28

## 2021-08-03 ENCOUNTER — CLINICAL SUPPORT (OUTPATIENT)
Dept: CARDIOLOGY | Facility: HOSPITAL | Age: 66
End: 2021-08-03
Payer: MEDICARE

## 2021-08-03 DIAGNOSIS — Z95.818 PRESENCE OF OTHER CARDIAC IMPLANTS AND GRAFTS: ICD-10-CM

## 2021-08-03 PROCEDURE — G2066 INTER DEVC REMOTE 30D: HCPCS | Performed by: INTERNAL MEDICINE

## 2021-08-03 PROCEDURE — 93298 CARDIAC DEVICE CHECK - REMOTE: ICD-10-PCS | Mod: ,,, | Performed by: INTERNAL MEDICINE

## 2021-08-03 PROCEDURE — 93298 REM INTERROG DEV EVAL SCRMS: CPT | Mod: ,,, | Performed by: INTERNAL MEDICINE

## 2021-08-04 ENCOUNTER — TELEPHONE (OUTPATIENT)
Dept: ELECTROPHYSIOLOGY | Facility: CLINIC | Age: 66
End: 2021-08-04

## 2021-08-04 ENCOUNTER — TELEPHONE (OUTPATIENT)
Dept: CARDIOLOGY | Facility: HOSPITAL | Age: 66
End: 2021-08-04

## 2021-08-18 ENCOUNTER — PATIENT MESSAGE (OUTPATIENT)
Dept: ORTHOPEDICS | Facility: CLINIC | Age: 66
End: 2021-08-18

## 2021-08-19 ENCOUNTER — PATIENT MESSAGE (OUTPATIENT)
Dept: ADMINISTRATIVE | Facility: HOSPITAL | Age: 66
End: 2021-08-19

## 2021-08-19 ENCOUNTER — PATIENT OUTREACH (OUTPATIENT)
Dept: ADMINISTRATIVE | Facility: HOSPITAL | Age: 66
End: 2021-08-19

## 2021-08-19 ENCOUNTER — OFFICE VISIT (OUTPATIENT)
Dept: ORTHOPEDICS | Facility: CLINIC | Age: 66
End: 2021-08-19
Payer: MEDICARE

## 2021-08-19 VITALS
SYSTOLIC BLOOD PRESSURE: 151 MMHG | HEART RATE: 89 BPM | DIASTOLIC BLOOD PRESSURE: 75 MMHG | HEIGHT: 69 IN | BODY MASS INDEX: 21.48 KG/M2 | WEIGHT: 145 LBS

## 2021-08-19 DIAGNOSIS — I69.30 HISTORY OF STROKE WITH RESIDUAL DEFICIT: ICD-10-CM

## 2021-08-19 DIAGNOSIS — S72.142A DISPLACED INTERTROCHANTERIC FRACTURE OF LEFT FEMUR, INITIAL ENCOUNTER FOR CLOSED FRACTURE: Primary | ICD-10-CM

## 2021-08-19 PROCEDURE — 1100F PR PT FALLS ASSESS DOC 2+ FALLS/FALL W/INJURY/YR: ICD-10-PCS | Mod: CPTII,S$GLB,, | Performed by: ORTHOPAEDIC SURGERY

## 2021-08-19 PROCEDURE — 1100F PTFALLS ASSESS-DOCD GE2>/YR: CPT | Mod: CPTII,S$GLB,, | Performed by: ORTHOPAEDIC SURGERY

## 2021-08-19 PROCEDURE — 3078F DIAST BP <80 MM HG: CPT | Mod: CPTII,S$GLB,, | Performed by: ORTHOPAEDIC SURGERY

## 2021-08-19 PROCEDURE — 99999 PR PBB SHADOW E&M-EST. PATIENT-LVL III: CPT | Mod: PBBFAC,,, | Performed by: ORTHOPAEDIC SURGERY

## 2021-08-19 PROCEDURE — 99999 PR PBB SHADOW E&M-EST. PATIENT-LVL III: ICD-10-PCS | Mod: PBBFAC,,, | Performed by: ORTHOPAEDIC SURGERY

## 2021-08-19 PROCEDURE — 1126F PR PAIN SEVERITY QUANTIFIED, NO PAIN PRESENT: ICD-10-PCS | Mod: CPTII,S$GLB,, | Performed by: ORTHOPAEDIC SURGERY

## 2021-08-19 PROCEDURE — 1159F MED LIST DOCD IN RCRD: CPT | Mod: CPTII,S$GLB,, | Performed by: ORTHOPAEDIC SURGERY

## 2021-08-19 PROCEDURE — 1126F AMNT PAIN NOTED NONE PRSNT: CPT | Mod: CPTII,S$GLB,, | Performed by: ORTHOPAEDIC SURGERY

## 2021-08-19 PROCEDURE — 3044F HG A1C LEVEL LT 7.0%: CPT | Mod: CPTII,S$GLB,, | Performed by: ORTHOPAEDIC SURGERY

## 2021-08-19 PROCEDURE — 3044F PR MOST RECENT HEMOGLOBIN A1C LEVEL <7.0%: ICD-10-PCS | Mod: CPTII,S$GLB,, | Performed by: ORTHOPAEDIC SURGERY

## 2021-08-19 PROCEDURE — 3008F BODY MASS INDEX DOCD: CPT | Mod: CPTII,S$GLB,, | Performed by: ORTHOPAEDIC SURGERY

## 2021-08-19 PROCEDURE — 1159F PR MEDICATION LIST DOCUMENTED IN MEDICAL RECORD: ICD-10-PCS | Mod: CPTII,S$GLB,, | Performed by: ORTHOPAEDIC SURGERY

## 2021-08-19 PROCEDURE — 3078F PR MOST RECENT DIASTOLIC BLOOD PRESSURE < 80 MM HG: ICD-10-PCS | Mod: CPTII,S$GLB,, | Performed by: ORTHOPAEDIC SURGERY

## 2021-08-19 PROCEDURE — 3288F FALL RISK ASSESSMENT DOCD: CPT | Mod: CPTII,S$GLB,, | Performed by: ORTHOPAEDIC SURGERY

## 2021-08-19 PROCEDURE — 3288F PR FALLS RISK ASSESSMENT DOCUMENTED: ICD-10-PCS | Mod: CPTII,S$GLB,, | Performed by: ORTHOPAEDIC SURGERY

## 2021-08-19 PROCEDURE — 99024 PR POST-OP FOLLOW-UP VISIT: ICD-10-PCS | Mod: S$GLB,,, | Performed by: ORTHOPAEDIC SURGERY

## 2021-08-19 PROCEDURE — 3008F PR BODY MASS INDEX (BMI) DOCUMENTED: ICD-10-PCS | Mod: CPTII,S$GLB,, | Performed by: ORTHOPAEDIC SURGERY

## 2021-08-19 PROCEDURE — 3077F PR MOST RECENT SYSTOLIC BLOOD PRESSURE >= 140 MM HG: ICD-10-PCS | Mod: CPTII,S$GLB,, | Performed by: ORTHOPAEDIC SURGERY

## 2021-08-19 PROCEDURE — 99024 POSTOP FOLLOW-UP VISIT: CPT | Mod: S$GLB,,, | Performed by: ORTHOPAEDIC SURGERY

## 2021-08-19 PROCEDURE — 3077F SYST BP >= 140 MM HG: CPT | Mod: CPTII,S$GLB,, | Performed by: ORTHOPAEDIC SURGERY

## 2021-09-02 ENCOUNTER — CLINICAL SUPPORT (OUTPATIENT)
Dept: CARDIOLOGY | Facility: HOSPITAL | Age: 66
End: 2021-09-02
Payer: MEDICARE

## 2021-09-02 DIAGNOSIS — Z95.818 PRESENCE OF OTHER CARDIAC IMPLANTS AND GRAFTS: ICD-10-CM

## 2021-09-02 PROCEDURE — 93298 REM INTERROG DEV EVAL SCRMS: CPT | Mod: ,,, | Performed by: INTERNAL MEDICINE

## 2021-09-02 PROCEDURE — G2066 INTER DEVC REMOTE 30D: HCPCS | Performed by: INTERNAL MEDICINE

## 2021-09-02 PROCEDURE — 93298 CARDIAC DEVICE CHECK - REMOTE: ICD-10-PCS | Mod: ,,, | Performed by: INTERNAL MEDICINE

## 2021-10-02 ENCOUNTER — CLINICAL SUPPORT (OUTPATIENT)
Dept: CARDIOLOGY | Facility: HOSPITAL | Age: 66
End: 2021-10-02
Payer: MEDICARE

## 2021-10-02 DIAGNOSIS — Z95.818 PRESENCE OF OTHER CARDIAC IMPLANTS AND GRAFTS: ICD-10-CM

## 2021-10-02 PROCEDURE — G2066 INTER DEVC REMOTE 30D: HCPCS | Performed by: INTERNAL MEDICINE

## 2021-10-02 PROCEDURE — 93298 CARDIAC DEVICE CHECK - REMOTE: ICD-10-PCS | Mod: ,,, | Performed by: INTERNAL MEDICINE

## 2021-10-02 PROCEDURE — 93298 REM INTERROG DEV EVAL SCRMS: CPT | Mod: ,,, | Performed by: INTERNAL MEDICINE

## 2021-11-01 ENCOUNTER — CLINICAL SUPPORT (OUTPATIENT)
Dept: CARDIOLOGY | Facility: HOSPITAL | Age: 66
End: 2021-11-01
Payer: MEDICARE

## 2021-11-01 DIAGNOSIS — Z95.818 PRESENCE OF OTHER CARDIAC IMPLANTS AND GRAFTS: ICD-10-CM

## 2021-11-01 PROCEDURE — 93298 REM INTERROG DEV EVAL SCRMS: CPT | Mod: ,,, | Performed by: INTERNAL MEDICINE

## 2021-11-01 PROCEDURE — 93298 CARDIAC DEVICE CHECK - REMOTE: ICD-10-PCS | Mod: ,,, | Performed by: INTERNAL MEDICINE

## 2021-11-01 PROCEDURE — G2066 INTER DEVC REMOTE 30D: HCPCS | Performed by: INTERNAL MEDICINE

## 2021-12-01 ENCOUNTER — CLINICAL SUPPORT (OUTPATIENT)
Dept: CARDIOLOGY | Facility: HOSPITAL | Age: 66
End: 2021-12-01
Attending: FAMILY MEDICINE
Payer: MEDICARE

## 2021-12-01 DIAGNOSIS — Z95.818 PRESENCE OF OTHER CARDIAC IMPLANTS AND GRAFTS: ICD-10-CM

## 2021-12-01 PROCEDURE — G2066 INTER DEVC REMOTE 30D: HCPCS | Performed by: INTERNAL MEDICINE

## 2021-12-01 PROCEDURE — 93298 REM INTERROG DEV EVAL SCRMS: CPT | Mod: ,,, | Performed by: INTERNAL MEDICINE

## 2021-12-01 PROCEDURE — 93298 CARDIAC DEVICE CHECK - REMOTE: ICD-10-PCS | Mod: ,,, | Performed by: INTERNAL MEDICINE

## 2021-12-17 ENCOUNTER — PATIENT OUTREACH (OUTPATIENT)
Dept: ADMINISTRATIVE | Facility: HOSPITAL | Age: 66
End: 2021-12-17
Payer: MEDICARE

## 2021-12-27 ENCOUNTER — PATIENT MESSAGE (OUTPATIENT)
Dept: ADMINISTRATIVE | Facility: HOSPITAL | Age: 66
End: 2021-12-27
Payer: MEDICARE

## 2021-12-31 ENCOUNTER — CLINICAL SUPPORT (OUTPATIENT)
Dept: CARDIOLOGY | Facility: HOSPITAL | Age: 66
End: 2021-12-31
Payer: MEDICARE

## 2021-12-31 DIAGNOSIS — Z95.818 PRESENCE OF OTHER CARDIAC IMPLANTS AND GRAFTS: ICD-10-CM

## 2021-12-31 PROCEDURE — 93298 REM INTERROG DEV EVAL SCRMS: CPT | Mod: ,,, | Performed by: INTERNAL MEDICINE

## 2021-12-31 PROCEDURE — G2066 INTER DEVC REMOTE 30D: HCPCS | Performed by: INTERNAL MEDICINE

## 2021-12-31 PROCEDURE — 93298 CARDIAC DEVICE CHECK - REMOTE: ICD-10-PCS | Mod: ,,, | Performed by: INTERNAL MEDICINE

## 2022-01-11 DIAGNOSIS — I10 ESSENTIAL HYPERTENSION: ICD-10-CM

## 2022-01-11 RX ORDER — NIFEDIPINE 30 MG/1
TABLET, EXTENDED RELEASE ORAL
Qty: 90 TABLET | Refills: 3 | Status: SHIPPED | OUTPATIENT
Start: 2022-01-11 | End: 2023-01-02

## 2022-01-11 NOTE — TELEPHONE ENCOUNTER
No new care gaps identified.  Powered by [employer]. Reference number: [id number].   1/11/2022 12:13:12 AM CST

## 2022-01-18 ENCOUNTER — TELEPHONE (OUTPATIENT)
Dept: CARDIOLOGY | Facility: HOSPITAL | Age: 67
End: 2022-01-18
Payer: MEDICARE

## 2022-01-18 ENCOUNTER — PATIENT MESSAGE (OUTPATIENT)
Dept: CARDIOLOGY | Facility: CLINIC | Age: 67
End: 2022-01-18
Payer: MEDICARE

## 2022-01-18 NOTE — TELEPHONE ENCOUNTER
LVM for patient re: [employer] [facility] ILR alert for 3 second pause event on 1/9/2022 @ 6:27pm.  Will await patient call back to determine if he had any symptoms.

## 2022-01-24 ENCOUNTER — TELEPHONE (OUTPATIENT)
Dept: CARDIOLOGY | Facility: HOSPITAL | Age: 67
End: 2022-01-24
Payer: MEDICARE

## 2022-01-24 NOTE — TELEPHONE ENCOUNTER
----- Message from Eloise Gramajo sent at 1/18/2022  2:45 PM CST -----  Patient stated he has not had any symptoms on date and time of his pause noted on his ILR. 01/09/2022 @ 6:27 PM.  He stated he has not had any recent symptoms.

## 2022-01-25 ENCOUNTER — OFFICE VISIT (OUTPATIENT)
Dept: FAMILY MEDICINE | Facility: CLINIC | Age: 67
End: 2022-01-25
Payer: MEDICARE

## 2022-01-25 VITALS
OXYGEN SATURATION: 96 % | WEIGHT: 156.5 LBS | HEART RATE: 97 BPM | HEIGHT: 69 IN | BODY MASS INDEX: 23.18 KG/M2 | SYSTOLIC BLOOD PRESSURE: 138 MMHG | DIASTOLIC BLOOD PRESSURE: 82 MMHG

## 2022-01-25 DIAGNOSIS — M79.18 MYALGIA, OTHER SITE: ICD-10-CM

## 2022-01-25 DIAGNOSIS — B35.6 TINEA CRURIS: ICD-10-CM

## 2022-01-25 DIAGNOSIS — I10 ESSENTIAL HYPERTENSION: ICD-10-CM

## 2022-01-25 DIAGNOSIS — E11.65 TYPE 2 DIABETES MELLITUS WITH HYPERGLYCEMIA, WITHOUT LONG-TERM CURRENT USE OF INSULIN: Primary | ICD-10-CM

## 2022-01-25 DIAGNOSIS — G81.14 SPASTIC HEMIPLEGIA AFFECTING LEFT NONDOMINANT SIDE, UNSPECIFIED ETIOLOGY: ICD-10-CM

## 2022-01-25 DIAGNOSIS — D69.6 THROMBOCYTOPENIA, UNSPECIFIED: ICD-10-CM

## 2022-01-25 PROCEDURE — 3008F PR BODY MASS INDEX (BMI) DOCUMENTED: ICD-10-PCS | Mod: CPTII,S$GLB,, | Performed by: FAMILY MEDICINE

## 2022-01-25 PROCEDURE — 1126F PR PAIN SEVERITY QUANTIFIED, NO PAIN PRESENT: ICD-10-PCS | Mod: CPTII,S$GLB,, | Performed by: FAMILY MEDICINE

## 2022-01-25 PROCEDURE — 99999 PR PBB SHADOW E&M-EST. PATIENT-LVL III: CPT | Mod: PBBFAC,,, | Performed by: FAMILY MEDICINE

## 2022-01-25 PROCEDURE — 1126F AMNT PAIN NOTED NONE PRSNT: CPT | Mod: CPTII,S$GLB,, | Performed by: FAMILY MEDICINE

## 2022-01-25 PROCEDURE — 1159F MED LIST DOCD IN RCRD: CPT | Mod: CPTII,S$GLB,, | Performed by: FAMILY MEDICINE

## 2022-01-25 PROCEDURE — 3288F FALL RISK ASSESSMENT DOCD: CPT | Mod: CPTII,S$GLB,, | Performed by: FAMILY MEDICINE

## 2022-01-25 PROCEDURE — 3075F SYST BP GE 130 - 139MM HG: CPT | Mod: CPTII,S$GLB,, | Performed by: FAMILY MEDICINE

## 2022-01-25 PROCEDURE — 1160F PR REVIEW ALL MEDS BY PRESCRIBER/CLIN PHARMACIST DOCUMENTED: ICD-10-PCS | Mod: CPTII,S$GLB,, | Performed by: FAMILY MEDICINE

## 2022-01-25 PROCEDURE — 3075F PR MOST RECENT SYSTOLIC BLOOD PRESS GE 130-139MM HG: ICD-10-PCS | Mod: CPTII,S$GLB,, | Performed by: FAMILY MEDICINE

## 2022-01-25 PROCEDURE — 3008F BODY MASS INDEX DOCD: CPT | Mod: CPTII,S$GLB,, | Performed by: FAMILY MEDICINE

## 2022-01-25 PROCEDURE — 1159F PR MEDICATION LIST DOCUMENTED IN MEDICAL RECORD: ICD-10-PCS | Mod: CPTII,S$GLB,, | Performed by: FAMILY MEDICINE

## 2022-01-25 PROCEDURE — 1101F PT FALLS ASSESS-DOCD LE1/YR: CPT | Mod: CPTII,S$GLB,, | Performed by: FAMILY MEDICINE

## 2022-01-25 PROCEDURE — 3288F PR FALLS RISK ASSESSMENT DOCUMENTED: ICD-10-PCS | Mod: CPTII,S$GLB,, | Performed by: FAMILY MEDICINE

## 2022-01-25 PROCEDURE — 1101F PR PT FALLS ASSESS DOC 0-1 FALLS W/OUT INJ PAST YR: ICD-10-PCS | Mod: CPTII,S$GLB,, | Performed by: FAMILY MEDICINE

## 2022-01-25 PROCEDURE — 3079F DIAST BP 80-89 MM HG: CPT | Mod: CPTII,S$GLB,, | Performed by: FAMILY MEDICINE

## 2022-01-25 PROCEDURE — 1160F RVW MEDS BY RX/DR IN RCRD: CPT | Mod: CPTII,S$GLB,, | Performed by: FAMILY MEDICINE

## 2022-01-25 PROCEDURE — 3079F PR MOST RECENT DIASTOLIC BLOOD PRESSURE 80-89 MM HG: ICD-10-PCS | Mod: CPTII,S$GLB,, | Performed by: FAMILY MEDICINE

## 2022-01-25 PROCEDURE — 99214 OFFICE O/P EST MOD 30 MIN: CPT | Mod: S$GLB,,, | Performed by: FAMILY MEDICINE

## 2022-01-25 PROCEDURE — 99214 PR OFFICE/OUTPT VISIT, EST, LEVL IV, 30-39 MIN: ICD-10-PCS | Mod: S$GLB,,, | Performed by: FAMILY MEDICINE

## 2022-01-25 PROCEDURE — 99999 PR PBB SHADOW E&M-EST. PATIENT-LVL III: ICD-10-PCS | Mod: PBBFAC,,, | Performed by: FAMILY MEDICINE

## 2022-01-25 RX ORDER — KETOCONAZOLE 20 MG/G
CREAM TOPICAL DAILY
Qty: 60 G | Refills: 1 | Status: SHIPPED | OUTPATIENT
Start: 2022-01-25 | End: 2022-07-26

## 2022-01-25 NOTE — PROGRESS NOTES
Subjective:       Patient ID: Luis Wong is a 66 y.o. male.    Chief Complaint: Follow-up and Hypertension    66 years old male came to the clinic for diabetes follow-up.  No recent A1c.  No polyuria, polydipsia or polyphagia.  Patient with stable thrombocytopenia.  Patient is requesting physical therapy for left hemiplegia associated with muscle pain.  Patient also with rash over the inguinal area associated with itching.    Review of Systems   Constitutional: Negative.  Negative for activity change and unexpected weight change.   HENT: Negative.  Negative for hearing loss, rhinorrhea and trouble swallowing.    Eyes: Negative.  Negative for discharge and visual disturbance.   Respiratory: Negative.  Negative for chest tightness and wheezing.    Cardiovascular: Negative.  Negative for chest pain and palpitations.   Gastrointestinal: Negative.  Negative for blood in stool, constipation, diarrhea and vomiting.   Endocrine: Negative for polydipsia and polyuria.   Genitourinary: Negative.  Negative for difficulty urinating, hematuria and urgency.   Musculoskeletal: Negative.  Negative for arthralgias, joint swelling and neck pain.   Integumentary:  Negative.   Neurological: Negative.  Negative for weakness and headaches.   Psychiatric/Behavioral: Negative.  Negative for confusion and dysphoric mood.         Objective:      Physical Exam  Vitals and nursing note reviewed.   Constitutional:       General: He is not in acute distress.     Appearance: He is well-developed and well-nourished. He is not diaphoretic.   HENT:      Head: Normocephalic and atraumatic.      Right Ear: External ear normal.      Left Ear: External ear normal.      Nose: Nose normal.      Mouth/Throat:      Mouth: Oropharynx is clear and moist.      Pharynx: No oropharyngeal exudate.   Eyes:      General: No scleral icterus.        Right eye: No discharge.         Left eye: No discharge.      Extraocular Movements: EOM normal.       Conjunctiva/sclera: Conjunctivae normal.      Pupils: Pupils are equal, round, and reactive to light.   Neck:      Thyroid: No thyromegaly.      Vascular: No JVD.      Trachea: No tracheal deviation.   Cardiovascular:      Rate and Rhythm: Normal rate and regular rhythm.      Pulses: Intact distal pulses.      Heart sounds: Normal heart sounds. No murmur heard.  No friction rub. No gallop.    Pulmonary:      Effort: Pulmonary effort is normal. No respiratory distress.      Breath sounds: Normal breath sounds. No stridor. No wheezing or rales.   Chest:      Chest wall: No tenderness.   Abdominal:      General: Bowel sounds are normal. There is no distension.      Palpations: Abdomen is soft. There is no mass.      Tenderness: There is no abdominal tenderness. There is no guarding or rebound.   Musculoskeletal:         General: No tenderness or edema. Normal range of motion.      Cervical back: Normal range of motion and neck supple.   Lymphadenopathy:      Cervical: No cervical adenopathy.   Skin:     General: Skin is warm and dry.      Coloration: Skin is not pale.      Findings: No erythema or rash.   Neurological:      Mental Status: He is alert and oriented to person, place, and time.      Cranial Nerves: No cranial nerve deficit.      Motor: No abnormal muscle tone.      Coordination: Coordination normal.      Deep Tendon Reflexes: Reflexes are normal and symmetric. Reflexes normal.   Psychiatric:         Mood and Affect: Mood and affect normal.         Behavior: Behavior normal.         Thought Content: Thought content normal.         Judgment: Judgment normal.         Assessment:       Problem List Items Addressed This Visit     Type 2 diabetes mellitus with hyperglycemia, without long-term current use of insulin - Primary    Relevant Orders    Comprehensive Metabolic Panel    Lipid Panel    Hemoglobin A1C    Microalbumin/Creatinine Ratio, Urine    Thrombocytopenia, unspecified    Relevant Orders    CBC Auto  Differential    Spastic hemiplegia affecting left nondominant side, unspecified etiology    Relevant Orders    CALCITRIOL    Ambulatory referral/consult to Physical/Occupational Therapy      Other Visit Diagnoses     Essential hypertension        Relevant Orders    CBC Auto Differential    Comprehensive Metabolic Panel    Lipid Panel    TSH    Myalgia, other site         Relevant Orders    CALCITRIOL    Ambulatory referral/consult to Physical/Occupational Therapy    Tinea cruris        Relevant Medications    ketoconazole (NIZORAL) 2 % cream          Plan:         Luis was seen today for follow-up and hypertension.    Diagnoses and all orders for this visit:    Type 2 diabetes mellitus with hyperglycemia, without long-term current use of insulin  -     Comprehensive Metabolic Panel; Future  -     Lipid Panel; Future  -     Hemoglobin A1C; Future  -     Microalbumin/Creatinine Ratio, Urine; Future    Thrombocytopenia, unspecified  -     CBC Auto Differential; Future    Spastic hemiplegia affecting left nondominant side, unspecified etiology  -     CALCITRIOL; Future  -     Ambulatory referral/consult to Physical/Occupational Therapy; Future    Essential hypertension  -     CBC Auto Differential; Future  -     Comprehensive Metabolic Panel; Future  -     Lipid Panel; Future  -     TSH; Future    Myalgia, other site   -     CALCITRIOL; Future  -     Ambulatory referral/consult to Physical/Occupational Therapy; Future    Tinea cruris  -     ketoconazole (NIZORAL) 2 % cream; Apply topically once daily.    Continue monitoring blood sugar at home,ADA diet.  Continue monitoring blood pressure at home, low sodium diet.      Answers for HPI/ROS submitted by the patient on 1/25/2022  activity change: No  unexpected weight change: No  neck pain: No  hearing loss: No  rhinorrhea: No  trouble swallowing: No  eye discharge: No  visual disturbance: No  chest tightness: No  wheezing: No  chest pain: No  palpitations: No  blood in  stool: No  constipation: No  vomiting: No  diarrhea: No  polydipsia: No  polyuria: No  difficulty urinating: No  urgency: No  hematuria: No  joint swelling: No  arthralgias: No  headaches: No  weakness: No  confusion: No  dysphoric mood: No

## 2022-01-26 ENCOUNTER — LAB VISIT (OUTPATIENT)
Dept: LAB | Facility: HOSPITAL | Age: 67
End: 2022-01-26
Attending: FAMILY MEDICINE
Payer: MEDICARE

## 2022-01-26 DIAGNOSIS — M79.18 MYALGIA, OTHER SITE: ICD-10-CM

## 2022-01-26 DIAGNOSIS — G81.14 SPASTIC HEMIPLEGIA AFFECTING LEFT NONDOMINANT SIDE, UNSPECIFIED ETIOLOGY: ICD-10-CM

## 2022-01-26 DIAGNOSIS — I10 ESSENTIAL HYPERTENSION: ICD-10-CM

## 2022-01-26 DIAGNOSIS — D69.6 THROMBOCYTOPENIA, UNSPECIFIED: ICD-10-CM

## 2022-01-26 DIAGNOSIS — E11.65 TYPE 2 DIABETES MELLITUS WITH HYPERGLYCEMIA, WITHOUT LONG-TERM CURRENT USE OF INSULIN: ICD-10-CM

## 2022-01-26 LAB
ALBUMIN SERPL BCP-MCNC: 4 G/DL (ref 3.5–5.2)
ALP SERPL-CCNC: 119 U/L (ref 55–135)
ALT SERPL W/O P-5'-P-CCNC: 14 U/L (ref 10–44)
ANION GAP SERPL CALC-SCNC: 7 MMOL/L (ref 8–16)
AST SERPL-CCNC: 19 U/L (ref 10–40)
BASOPHILS # BLD AUTO: 0.02 K/UL (ref 0–0.2)
BASOPHILS NFR BLD: 0.6 % (ref 0–1.9)
BILIRUB SERPL-MCNC: 0.8 MG/DL (ref 0.1–1)
BUN SERPL-MCNC: 20 MG/DL (ref 8–23)
CALCIUM SERPL-MCNC: 10.2 MG/DL (ref 8.7–10.5)
CHLORIDE SERPL-SCNC: 106 MMOL/L (ref 95–110)
CHOLEST SERPL-MCNC: 222 MG/DL (ref 120–199)
CHOLEST/HDLC SERPL: 2.6 {RATIO} (ref 2–5)
CO2 SERPL-SCNC: 28 MMOL/L (ref 23–29)
CREAT SERPL-MCNC: 0.9 MG/DL (ref 0.5–1.4)
DIFFERENTIAL METHOD: ABNORMAL
EOSINOPHIL # BLD AUTO: 0.2 K/UL (ref 0–0.5)
EOSINOPHIL NFR BLD: 4.1 % (ref 0–8)
ERYTHROCYTE [DISTWIDTH] IN BLOOD BY AUTOMATED COUNT: 12.9 % (ref 11.5–14.5)
EST. GFR  (AFRICAN AMERICAN): >60 ML/MIN/1.73 M^2
EST. GFR  (NON AFRICAN AMERICAN): >60 ML/MIN/1.73 M^2
ESTIMATED AVG GLUCOSE: 117 MG/DL (ref 68–131)
GLUCOSE SERPL-MCNC: 128 MG/DL (ref 70–110)
HBA1C MFR BLD: 5.7 % (ref 4–5.6)
HCT VFR BLD AUTO: 43.4 % (ref 40–54)
HDLC SERPL-MCNC: 84 MG/DL (ref 40–75)
HDLC SERPL: 37.8 % (ref 20–50)
HGB BLD-MCNC: 14.1 G/DL (ref 14–18)
IMM GRANULOCYTES # BLD AUTO: 0 K/UL (ref 0–0.04)
IMM GRANULOCYTES NFR BLD AUTO: 0 % (ref 0–0.5)
LDLC SERPL CALC-MCNC: 124.4 MG/DL (ref 63–159)
LYMPHOCYTES # BLD AUTO: 1.5 K/UL (ref 1–4.8)
LYMPHOCYTES NFR BLD: 42.4 % (ref 18–48)
MCH RBC QN AUTO: 27.6 PG (ref 27–31)
MCHC RBC AUTO-ENTMCNC: 32.5 G/DL (ref 32–36)
MCV RBC AUTO: 85 FL (ref 82–98)
MONOCYTES # BLD AUTO: 0.4 K/UL (ref 0.3–1)
MONOCYTES NFR BLD: 10.7 % (ref 4–15)
NEUTROPHILS # BLD AUTO: 1.5 K/UL (ref 1.8–7.7)
NEUTROPHILS NFR BLD: 42.2 % (ref 38–73)
NONHDLC SERPL-MCNC: 138 MG/DL
NRBC BLD-RTO: 0 /100 WBC
PLATELET # BLD AUTO: 170 K/UL (ref 150–450)
PMV BLD AUTO: ABNORMAL FL (ref 9.2–12.9)
POTASSIUM SERPL-SCNC: 4.3 MMOL/L (ref 3.5–5.1)
PROT SERPL-MCNC: 7.4 G/DL (ref 6–8.4)
RBC # BLD AUTO: 5.1 M/UL (ref 4.6–6.2)
SODIUM SERPL-SCNC: 141 MMOL/L (ref 136–145)
TRIGL SERPL-MCNC: 68 MG/DL (ref 30–150)
TSH SERPL DL<=0.005 MIU/L-ACNC: 0.7 UIU/ML (ref 0.4–4)
WBC # BLD AUTO: 3.63 K/UL (ref 3.9–12.7)

## 2022-01-26 PROCEDURE — 84443 ASSAY THYROID STIM HORMONE: CPT | Performed by: FAMILY MEDICINE

## 2022-01-26 PROCEDURE — 85025 COMPLETE CBC W/AUTO DIFF WBC: CPT | Performed by: FAMILY MEDICINE

## 2022-01-26 PROCEDURE — 82652 VIT D 1 25-DIHYDROXY: CPT | Performed by: FAMILY MEDICINE

## 2022-01-26 PROCEDURE — 36415 COLL VENOUS BLD VENIPUNCTURE: CPT | Mod: PO | Performed by: FAMILY MEDICINE

## 2022-01-26 PROCEDURE — 80053 COMPREHEN METABOLIC PANEL: CPT | Performed by: FAMILY MEDICINE

## 2022-01-26 PROCEDURE — 80061 LIPID PANEL: CPT | Performed by: FAMILY MEDICINE

## 2022-01-26 PROCEDURE — 83036 HEMOGLOBIN GLYCOSYLATED A1C: CPT | Performed by: FAMILY MEDICINE

## 2022-01-27 ENCOUNTER — PATIENT MESSAGE (OUTPATIENT)
Dept: FAMILY MEDICINE | Facility: CLINIC | Age: 67
End: 2022-01-27
Payer: MEDICARE

## 2022-01-28 ENCOUNTER — PATIENT MESSAGE (OUTPATIENT)
Dept: FAMILY MEDICINE | Facility: CLINIC | Age: 67
End: 2022-01-28
Payer: MEDICARE

## 2022-01-28 LAB — 1,25(OH)2D3 SERPL-MCNC: 105 PG/ML (ref 20–79)

## 2022-01-30 ENCOUNTER — CLINICAL SUPPORT (OUTPATIENT)
Dept: CARDIOLOGY | Facility: HOSPITAL | Age: 67
End: 2022-01-30
Payer: MEDICARE

## 2022-01-30 DIAGNOSIS — Z95.818 PRESENCE OF OTHER CARDIAC IMPLANTS AND GRAFTS: ICD-10-CM

## 2022-01-30 PROCEDURE — G2066 INTER DEVC REMOTE 30D: HCPCS | Performed by: INTERNAL MEDICINE

## 2022-01-30 PROCEDURE — 93298 CARDIAC DEVICE CHECK - REMOTE: ICD-10-PCS | Mod: ,,, | Performed by: INTERNAL MEDICINE

## 2022-01-30 PROCEDURE — 93298 REM INTERROG DEV EVAL SCRMS: CPT | Mod: ,,, | Performed by: INTERNAL MEDICINE

## 2022-02-01 ENCOUNTER — TELEPHONE (OUTPATIENT)
Dept: FAMILY MEDICINE | Facility: CLINIC | Age: 67
End: 2022-02-01
Payer: MEDICARE

## 2022-02-01 DIAGNOSIS — Z86.73 HISTORY OF ISCHEMIC STROKE: Primary | ICD-10-CM

## 2022-03-01 ENCOUNTER — CLINICAL SUPPORT (OUTPATIENT)
Dept: CARDIOLOGY | Facility: HOSPITAL | Age: 67
End: 2022-03-01
Payer: MEDICARE

## 2022-03-01 DIAGNOSIS — Z95.818 PRESENCE OF OTHER CARDIAC IMPLANTS AND GRAFTS: ICD-10-CM

## 2022-03-01 PROCEDURE — G2066 INTER DEVC REMOTE 30D: HCPCS | Performed by: INTERNAL MEDICINE

## 2022-03-18 ENCOUNTER — PATIENT MESSAGE (OUTPATIENT)
Dept: ADMINISTRATIVE | Facility: OTHER | Age: 67
End: 2022-03-18
Payer: MEDICARE

## 2022-03-31 ENCOUNTER — CLINICAL SUPPORT (OUTPATIENT)
Dept: CARDIOLOGY | Facility: HOSPITAL | Age: 67
End: 2022-03-31
Payer: MEDICARE

## 2022-03-31 DIAGNOSIS — Z95.818 PRESENCE OF OTHER CARDIAC IMPLANTS AND GRAFTS: ICD-10-CM

## 2022-03-31 PROCEDURE — G2066 INTER DEVC REMOTE 30D: HCPCS | Performed by: INTERNAL MEDICINE

## 2022-03-31 PROCEDURE — 93298 CARDIAC DEVICE CHECK - REMOTE: ICD-10-PCS | Mod: ,,, | Performed by: INTERNAL MEDICINE

## 2022-03-31 PROCEDURE — 93298 REM INTERROG DEV EVAL SCRMS: CPT | Mod: ,,, | Performed by: INTERNAL MEDICINE

## 2022-04-26 PROBLEM — Z74.09 IMPAIRED MOBILITY AND ACTIVITIES OF DAILY LIVING: Status: RESOLVED | Noted: 2020-07-14 | Resolved: 2022-04-26

## 2022-04-26 PROBLEM — R27.8 DECREASED COORDINATION: Status: RESOLVED | Noted: 2020-07-14 | Resolved: 2022-04-26

## 2022-04-26 PROBLEM — M62.81 MUSCLE WEAKNESS: Status: RESOLVED | Noted: 2020-07-14 | Resolved: 2022-04-26

## 2022-04-26 PROBLEM — Z78.9 IMPAIRED MOBILITY AND ACTIVITIES OF DAILY LIVING: Status: RESOLVED | Noted: 2020-07-14 | Resolved: 2022-04-26

## 2022-04-30 ENCOUNTER — CLINICAL SUPPORT (OUTPATIENT)
Dept: CARDIOLOGY | Facility: HOSPITAL | Age: 67
End: 2022-04-30
Payer: MEDICARE

## 2022-04-30 DIAGNOSIS — E11.65 TYPE 2 DIABETES MELLITUS WITH HYPERGLYCEMIA: ICD-10-CM

## 2022-04-30 DIAGNOSIS — Z95.818 PRESENCE OF OTHER CARDIAC IMPLANTS AND GRAFTS: ICD-10-CM

## 2022-04-30 PROCEDURE — G2066 INTER DEVC REMOTE 30D: HCPCS | Performed by: INTERNAL MEDICINE

## 2022-04-30 NOTE — TELEPHONE ENCOUNTER
No new care gaps identified.  Powered by [employer]. Reference number: [id number].   4/30/2022 7:14:30 AM CDT

## 2022-05-02 RX ORDER — METFORMIN HYDROCHLORIDE 500 MG/1
TABLET ORAL
Qty: 90 TABLET | Refills: 1 | Status: SHIPPED | OUTPATIENT
Start: 2022-05-02 | End: 2022-10-24

## 2022-05-02 NOTE — TELEPHONE ENCOUNTER
Refill Authorization Note   Luis Wong  is requesting a refill authorization.  Brief Assessment and Rationale for Refill:  Approve     Medication Therapy Plan:       Medication Reconciliation Completed: No   Comments:     No Care Gaps recommended.     Note composed:7:22 AM 05/02/2022

## 2022-05-16 NOTE — PROGRESS NOTES
Hafsa from [facility] called and states ready to do reassessment via computer. INTERVAL HISTORY:    12/02/2019:  The patient has not presented any new complaints since the previous visit. He has not used his machine for years now. Still found FFM very cumbersome; he could not find the way to sleep through the night with it. Had several TIAs since then. OTher risk factors appear controlled. Still snores, at times gasping for air. Denies interrupted sleep. Lost 30 lbs. He is wondering if JAQUAN improved.    EPWORTH SLEEPINESS SCALE 11/29/2019   Sitting and reading 1   Watching TV 1   Sitting, inactive in a public place (e.g. a theatre or a meeting) 0   As a passenger in a car for an hour without a break 0   Lying down to rest in the afternoon when circumstances permit 0   Sitting and talking to someone 0   Sitting quietly after a lunch without alcohol 1   In a car, while stopped for a few minutes in traffic 0   Total score 3           PAST HISTORY    HISTORY OF PRESENT ILLNESS:  Luis Wong  was seen at the request of  Dr. Elam for the evaluation of  sleep apnea.      HISTORY OF PRESENT ILLNESS: Luis Wong is a 64 y.o. male is here for sleep evaluation to rule out JAQUAN (the patient suffers from HTN and has a recent  history of stroke).       CHIEF COMPLAINT:      The patient's complaints include snoring,  witnessed breathing pauses and , interrupted sleep  occurring for a number of years (5 to 10). He has also mentioned excessive daytime sleepiness.      denies  dry mouth and sore throat  denies nasal congestion   denies  morning headaches  denies  interrupted sleep  denies RLS symptoms  denies symptoms concerning for parasomnia    The ESS ([place] Sleepiness Score) taken on initial visit  /24. He states that he sleeps in 2-3 hour segments.    INTERVAL HISTORY:    3/12/13: The patient has not presented any new complaints since the previous visit. The patient comes to discuss the results of his sleep study.  4/18/13: He was using CPAP 10 cm for about a month. Denied break through snoring.  Denies dry mouth on 5/5 humidifier. Complaines on difficulty with FFM Comfort Full M sealing. Data download from his machine reveals 4-5 hours use/7 days and 5 hours use/30 days on an average, and use of more than 4 hours 5/7days and 25/30 days . He feels better waking up in AM. ESS today 4/24.  7/18/13: Recently got a new FFM (did not bring his equipment in today). Uses Moleskin. Denied break through snoring, leaks, nasal irritation (uses moleskin). Still reports an improvement in fatigue and sleepiness. ESS today is 6/24.     SLEEP ROUTINE:      Bed partner: his wife  Time to bed: 10:30 Pm  Sleep onset latency: 30-60 min (sometimes watches news within this time)  Disruptions or awakenings: rare  Time to fall back into sleep: 5-15 min  Wakeup time: 5-5:30 in the morning   Out of bed: same   Perceived sleep quality: 3-4/5  Perceived total sleep time:  5-6  hours.  Daytime naps: 0  Weekend sleep routine: 10-11 pm tp 6 AM  Exercise routine: no        SPLIT NIGHT STUDY on 2/6/13: Significant JAQUAN (Obstructive Sleep Apnea) with AHI of 31 hour and SaO2 domenic of 80% ([id number]). Effective control of respiratory events was reached at 10 cm H2O CPAP.      PAST MEDICAL HISTORY:    Active Ambulatory Problems     Diagnosis Date Noted    CVA (cerebrovascular accident due to intracerebral hemorrhage) 11/07/2012    HTN (hypertension) 11/07/2012    Nuclear sclerosis 11/13/2012    Abnormal gait 11/16/2012     Resolved Ambulatory Problems     Diagnosis Date Noted    No Resolved Ambulatory Problems     Past Medical History   Diagnosis Date    Hypertension     Herpes infection     Aneurysm 10/30/2012    Stroke                 PAST SURGICAL HISTORY:    No past surgical history on file.      FAMILY HISTORY:                Family History   Problem Relation Age of Onset    Heart disease Mother     Diabetes Father        SOCIAL HISTORY:          Tobacco:   History   Smoking status    Never Smoker    Smokeless tobacco    Never  "Used       alcohol use:    History   Alcohol Use No                 Occupation: He has a [employer] business.    ALLERGIES:  No Known Allergies    CURRENT MEDICATIONS:    Current Outpatient Prescriptions   Medication Sig Dispense Refill    acyclovir (ZOVIRAX) 400 MG tablet TAKE 1 TABLET BY MOUTH 3 TIMES A DAY  30 tablet  0    gabapentin (NEURONTIN) 300 MG capsule TAKE ONE CAPSULE BY MOUTH TWICE A DAY  20 capsule  0    lisinopril (PRINIVIL,ZESTRIL) 20 MG tablet 20 mg Daily.        meloxicam (MOBIC) 15 MG tablet Take 15 mg by mouth Daily. 1 Tablet Oral Every day                          REVIEW OF SYSTEMS:   Sleep related symptoms as per HPI    denies weight gain  denies dyspnea  denies palpitations  denies acid reflux   denies polyuria  denies  mood diturbance  denies  anemia  denies  muscle pain    Otherwise, a balance of 10 systems reviewed is negative.    PHYSICAL EXAM:  /88  Pulse 80  Ht 5' 9" (1.753 m)  Wt 177 lb (80.287 kg)  BMI 26.14 kg/m2  GENERAL: "Normal development, well groomed.  HEENT:   HEENT:  Conjunctivae are non-erythematous; Pupils equal, round, and reactive to light; Nose is symmetrical; Nasal mucosa is pink and moist; Septum is midline: Inferior turbinates are normal; Nasal airflow is normal; Posterior pharynx is pink; Modified Mallampati:IV; Posterior palate is low; Tonsils not visualized; Uvula is normal and pink;Tongue is enlarged; Dentition is fair; No TMJ tenderness; Jaw opening and protrusion without click and without discomfort.  NECK: Supple. Neck circumference is 14 inches. No thyromegaly. No palpable nodes.     SKIN: On face and neck: No abrasions, no rashes, no lesions.  No subcutaneous nodules are palpable.  RESPIRATORY: Chest is clear to auscultation.  Normal chest expansion and non-labored breathing at rest.  CARDIOVASCULAR: Normal S1, S2.  No murmurs, gallops or rubs. No carotid bruits bilaterally.  EXTREMITIES: No edema. No clubbing. No cyanosis. Station normal. Gait " "normal.        NEURO/PSYCH: Oriented to time, place and person. Normal attention span and concentration. Affect is full. Mood is normal      ASSESSMENT:    1. Severe Obstructive sleep apnea (JAQUAN) The patient symptomatically has  snoring,  witnessed breathing pauses and , excessive daytime fatigue  with exam findings of "a crowded oral airway  The patient has medical co-morbidities of history of stroke , hypertension       which can be worsened by JAQUAN. Lost 30 lbs since last visit. Several CVAs since last time. Has not been using CPAP due to mask discomfort.     2. Chronic sleep initiation and sleep maintenance insomnia. Multi-factorial -  excess time in bed, poor sleep hygiene,  sleep disordered breathing and likely paradoxical insomnia play a role. Improved on CPAP.          PLAN:      Treatment: Resume CPAP 10 cm - hopefully he will be more comfortable with a "modern' mask. Will update supplies order.  We will also order PSG for requal since he lost 30 lbs and had a break in CPAP use. Split if AHI>10  30 days rule was mentioned for CPAP mask exchange.     Regular replacement of CPAP mask, tubing and filter was recommended. He was advised to bring his machine and mask to his next visit.    Precautions: The patient was advised to abstain from driving should he feel sleepy or drowsy.    Follow up: I will see the patient back after 6 month of CPAP use.    Thank you for allowing me the opportunity to participate in the care of your patient.        "

## 2022-05-30 ENCOUNTER — CLINICAL SUPPORT (OUTPATIENT)
Dept: CARDIOLOGY | Facility: HOSPITAL | Age: 67
End: 2022-05-30
Payer: MEDICARE

## 2022-05-30 DIAGNOSIS — Z95.818 PRESENCE OF OTHER CARDIAC IMPLANTS AND GRAFTS: ICD-10-CM

## 2022-05-30 PROCEDURE — 93298 CARDIAC DEVICE CHECK - REMOTE: ICD-10-PCS | Mod: ,,, | Performed by: INTERNAL MEDICINE

## 2022-05-30 PROCEDURE — G2066 INTER DEVC REMOTE 30D: HCPCS | Performed by: INTERNAL MEDICINE

## 2022-05-30 PROCEDURE — 93298 REM INTERROG DEV EVAL SCRMS: CPT | Mod: ,,, | Performed by: INTERNAL MEDICINE

## 2022-06-11 LAB
LEFT EYE DM RETINOPATHY: NEGATIVE
RIGHT EYE DM RETINOPATHY: NEGATIVE

## 2022-06-29 ENCOUNTER — CLINICAL SUPPORT (OUTPATIENT)
Dept: CARDIOLOGY | Facility: HOSPITAL | Age: 67
End: 2022-06-29
Payer: MEDICARE

## 2022-06-29 DIAGNOSIS — Z95.818 PRESENCE OF OTHER CARDIAC IMPLANTS AND GRAFTS: ICD-10-CM

## 2022-06-29 PROCEDURE — G2066 INTER DEVC REMOTE 30D: HCPCS | Performed by: INTERNAL MEDICINE

## 2022-07-12 ENCOUNTER — PATIENT OUTREACH (OUTPATIENT)
Dept: ADMINISTRATIVE | Facility: HOSPITAL | Age: 67
End: 2022-07-12
Payer: MEDICARE

## 2022-07-12 ENCOUNTER — PATIENT MESSAGE (OUTPATIENT)
Dept: ADMINISTRATIVE | Facility: HOSPITAL | Age: 67
End: 2022-07-12
Payer: MEDICARE

## 2022-07-12 NOTE — PROGRESS NOTES
2022 Care Everywhere updates requested and reviewed.  Immunizations reconciled. Media reports reviewed.  Duplicate HM overrides and  orders removed.  Overdue HM topic chart audit and/or requested.  Overdue lab testing linked to upcoming lab appointments if applies.  Portal outreached regarding Health maintenance   Lab [facility], and [employer] reviewed       Health Maintenance Due   Topic Date Due    Eye Exam  2013    PROSTATE-SPECIFIC ANTIGEN  2022    Diabetes Urine Screening  2022    Foot Exam  2022

## 2022-07-14 ENCOUNTER — PATIENT MESSAGE (OUTPATIENT)
Dept: ADMINISTRATIVE | Facility: OTHER | Age: 67
End: 2022-07-14
Payer: MEDICARE

## 2022-07-26 ENCOUNTER — OFFICE VISIT (OUTPATIENT)
Dept: FAMILY MEDICINE | Facility: CLINIC | Age: 67
End: 2022-07-26
Payer: MEDICARE

## 2022-07-26 VITALS
SYSTOLIC BLOOD PRESSURE: 132 MMHG | WEIGHT: 145.94 LBS | OXYGEN SATURATION: 98 % | HEART RATE: 87 BPM | DIASTOLIC BLOOD PRESSURE: 82 MMHG | BODY MASS INDEX: 21.62 KG/M2 | HEIGHT: 69 IN

## 2022-07-26 DIAGNOSIS — R63.0 LOSS OF APPETITE: ICD-10-CM

## 2022-07-26 DIAGNOSIS — E11.65 TYPE 2 DIABETES MELLITUS WITH HYPERGLYCEMIA, WITHOUT LONG-TERM CURRENT USE OF INSULIN: ICD-10-CM

## 2022-07-26 DIAGNOSIS — I10 ESSENTIAL HYPERTENSION: ICD-10-CM

## 2022-07-26 DIAGNOSIS — Z12.5 SCREENING FOR PROSTATE CANCER: Primary | ICD-10-CM

## 2022-07-26 DIAGNOSIS — M77.11 RIGHT TENNIS ELBOW: ICD-10-CM

## 2022-07-26 DIAGNOSIS — R63.4 WEIGHT LOSS: ICD-10-CM

## 2022-07-26 PROCEDURE — 3044F HG A1C LEVEL LT 7.0%: CPT | Mod: CPTII,S$GLB,, | Performed by: FAMILY MEDICINE

## 2022-07-26 PROCEDURE — 3075F SYST BP GE 130 - 139MM HG: CPT | Mod: CPTII,S$GLB,, | Performed by: FAMILY MEDICINE

## 2022-07-26 PROCEDURE — 3008F BODY MASS INDEX DOCD: CPT | Mod: CPTII,S$GLB,, | Performed by: FAMILY MEDICINE

## 2022-07-26 PROCEDURE — 1159F MED LIST DOCD IN RCRD: CPT | Mod: CPTII,S$GLB,, | Performed by: FAMILY MEDICINE

## 2022-07-26 PROCEDURE — 1126F AMNT PAIN NOTED NONE PRSNT: CPT | Mod: CPTII,S$GLB,, | Performed by: FAMILY MEDICINE

## 2022-07-26 PROCEDURE — 99999 PR PBB SHADOW E&M-EST. PATIENT-LVL III: ICD-10-PCS | Mod: PBBFAC,,, | Performed by: FAMILY MEDICINE

## 2022-07-26 PROCEDURE — 3008F PR BODY MASS INDEX (BMI) DOCUMENTED: ICD-10-PCS | Mod: CPTII,S$GLB,, | Performed by: FAMILY MEDICINE

## 2022-07-26 PROCEDURE — 1101F PR PT FALLS ASSESS DOC 0-1 FALLS W/OUT INJ PAST YR: ICD-10-PCS | Mod: CPTII,S$GLB,, | Performed by: FAMILY MEDICINE

## 2022-07-26 PROCEDURE — 3044F PR MOST RECENT HEMOGLOBIN A1C LEVEL <7.0%: ICD-10-PCS | Mod: CPTII,S$GLB,, | Performed by: FAMILY MEDICINE

## 2022-07-26 PROCEDURE — 99214 OFFICE O/P EST MOD 30 MIN: CPT | Mod: S$GLB,,, | Performed by: FAMILY MEDICINE

## 2022-07-26 PROCEDURE — 3079F DIAST BP 80-89 MM HG: CPT | Mod: CPTII,S$GLB,, | Performed by: FAMILY MEDICINE

## 2022-07-26 PROCEDURE — 1159F PR MEDICATION LIST DOCUMENTED IN MEDICAL RECORD: ICD-10-PCS | Mod: CPTII,S$GLB,, | Performed by: FAMILY MEDICINE

## 2022-07-26 PROCEDURE — 1126F PR PAIN SEVERITY QUANTIFIED, NO PAIN PRESENT: ICD-10-PCS | Mod: CPTII,S$GLB,, | Performed by: FAMILY MEDICINE

## 2022-07-26 PROCEDURE — 3079F PR MOST RECENT DIASTOLIC BLOOD PRESSURE 80-89 MM HG: ICD-10-PCS | Mod: CPTII,S$GLB,, | Performed by: FAMILY MEDICINE

## 2022-07-26 PROCEDURE — 3288F PR FALLS RISK ASSESSMENT DOCUMENTED: ICD-10-PCS | Mod: CPTII,S$GLB,, | Performed by: FAMILY MEDICINE

## 2022-07-26 PROCEDURE — 99214 PR OFFICE/OUTPT VISIT, EST, LEVL IV, 30-39 MIN: ICD-10-PCS | Mod: S$GLB,,, | Performed by: FAMILY MEDICINE

## 2022-07-26 PROCEDURE — 3075F PR MOST RECENT SYSTOLIC BLOOD PRESS GE 130-139MM HG: ICD-10-PCS | Mod: CPTII,S$GLB,, | Performed by: FAMILY MEDICINE

## 2022-07-26 PROCEDURE — 3288F FALL RISK ASSESSMENT DOCD: CPT | Mod: CPTII,S$GLB,, | Performed by: FAMILY MEDICINE

## 2022-07-26 PROCEDURE — 1101F PT FALLS ASSESS-DOCD LE1/YR: CPT | Mod: CPTII,S$GLB,, | Performed by: FAMILY MEDICINE

## 2022-07-26 PROCEDURE — 99999 PR PBB SHADOW E&M-EST. PATIENT-LVL III: CPT | Mod: PBBFAC,,, | Performed by: FAMILY MEDICINE

## 2022-07-26 NOTE — PROGRESS NOTES
Subjective:       Patient ID: Luis Wong is a 66 y.o. male.    Chief Complaint: Follow-up, Hypertension, and Diabetes    66 years old male came the clinic for diabetes follow-up.  Last A1c was stable.  No polyuria polydipsia or polyphagia.  Blood pressure today was stable.  No chest pain, palpitation, orthopnea or PND.  He reports weight loss for the last 6 months associated with loss of appetite.  Last colonoscopy was normal.  Patient with right elbow pain for the last week.  The pain is localized over the lateral part of the elbow.  The pain is 3/10 intensity on and off aggravated with activity and better with rest.    Review of Systems   Constitutional: Positive for unexpected weight change.   HENT: Negative.    Eyes: Negative.    Respiratory: Negative.    Cardiovascular: Negative.  Negative for chest pain, palpitations, leg swelling and claudication.   Gastrointestinal: Negative.    Endocrine: Negative for polydipsia, polyphagia and polyuria.   Genitourinary: Negative.    Musculoskeletal: Positive for arthralgias and gait problem. Negative for joint swelling.   Integumentary:  Negative.   Psychiatric/Behavioral: Negative.          Objective:      Physical Exam  Musculoskeletal:      Right elbow: Tenderness present in lateral epicondyle.   Feet:      Right foot:      Protective Sensation: 10 sites tested. 10 sites sensed.      Skin integrity: No ulcer, blister, skin breakdown, erythema, warmth, callus, dry skin or fissure.      Toenail Condition: Right toenails are normal.      Left foot:      Protective Sensation: 10 sites tested. 10 sites sensed.      Skin integrity: No ulcer, blister, skin breakdown, erythema, warmth, callus, dry skin or fissure.      Toenail Condition: Left toenails are normal.   Neurological:      Motor: Weakness present.      Coordination: Coordination abnormal.      Gait: Gait abnormal.      Comments:  left hemiparesis.         Assessment:       Problem List Items Addressed This  Visit     Type 2 diabetes mellitus with hyperglycemia, without long-term current use of insulin    Relevant Orders    Comprehensive Metabolic Panel    Lipid Panel    Microalbumin/Creatinine Ratio, Urine    Hemoglobin A1C      Other Visit Diagnoses     Screening for prostate cancer    -  Primary    Relevant Orders    PSA, Screening    Essential hypertension        Relevant Orders    Comprehensive Metabolic Panel    Lipid Panel    Weight loss        Right tennis elbow        Relevant Orders    Ambulatory referral/consult to Orthopedics    Loss of appetite              Plan:         Luis was seen today for follow-up, hypertension and diabetes.    Diagnoses and all orders for this visit:    Screening for prostate cancer  -     PSA, Screening; Future    Type 2 diabetes mellitus with hyperglycemia, without long-term current use of insulin  -     Comprehensive Metabolic Panel; Future  -     Lipid Panel; Future  -     Microalbumin/Creatinine Ratio, Urine; Future  -     Hemoglobin A1C; Future    Essential hypertension  -     Comprehensive Metabolic Panel; Future  -     Lipid Panel; Future    Weight loss    Right tennis elbow  -     Ambulatory referral/consult to Orthopedics; Future    Loss of appetite    Continue monitoring blood pressure at home, low sodium diet.  Continue monitoring blood sugar at home,ADA diet.

## 2022-07-27 ENCOUNTER — LAB VISIT (OUTPATIENT)
Dept: LAB | Facility: HOSPITAL | Age: 67
End: 2022-07-27
Attending: FAMILY MEDICINE
Payer: MEDICARE

## 2022-07-27 DIAGNOSIS — E11.65 TYPE 2 DIABETES MELLITUS WITH HYPERGLYCEMIA, WITHOUT LONG-TERM CURRENT USE OF INSULIN: ICD-10-CM

## 2022-07-27 DIAGNOSIS — I10 ESSENTIAL HYPERTENSION: ICD-10-CM

## 2022-07-27 DIAGNOSIS — Z12.5 SCREENING FOR PROSTATE CANCER: ICD-10-CM

## 2022-07-27 LAB
ALBUMIN SERPL BCP-MCNC: 4.3 G/DL (ref 3.5–5.2)
ALP SERPL-CCNC: 72 U/L (ref 55–135)
ALT SERPL W/O P-5'-P-CCNC: 8 U/L (ref 10–44)
ANION GAP SERPL CALC-SCNC: 7 MMOL/L (ref 8–16)
AST SERPL-CCNC: 15 U/L (ref 10–40)
BILIRUB SERPL-MCNC: 1 MG/DL (ref 0.1–1)
BUN SERPL-MCNC: 16 MG/DL (ref 8–23)
CALCIUM SERPL-MCNC: 10.1 MG/DL (ref 8.7–10.5)
CHLORIDE SERPL-SCNC: 104 MMOL/L (ref 95–110)
CHOLEST SERPL-MCNC: 214 MG/DL (ref 120–199)
CHOLEST/HDLC SERPL: 2.3 {RATIO} (ref 2–5)
CO2 SERPL-SCNC: 29 MMOL/L (ref 23–29)
COMPLEXED PSA SERPL-MCNC: 0.44 NG/ML (ref 0–4)
CREAT SERPL-MCNC: 1 MG/DL (ref 0.5–1.4)
EST. GFR  (AFRICAN AMERICAN): >60 ML/MIN/1.73 M^2
EST. GFR  (NON AFRICAN AMERICAN): >60 ML/MIN/1.73 M^2
ESTIMATED AVG GLUCOSE: 105 MG/DL (ref 68–131)
GLUCOSE SERPL-MCNC: 106 MG/DL (ref 70–110)
HBA1C MFR BLD: 5.3 % (ref 4–5.6)
HDLC SERPL-MCNC: 92 MG/DL (ref 40–75)
HDLC SERPL: 43 % (ref 20–50)
LDLC SERPL CALC-MCNC: 110.8 MG/DL (ref 63–159)
NONHDLC SERPL-MCNC: 122 MG/DL
POTASSIUM SERPL-SCNC: 4.1 MMOL/L (ref 3.5–5.1)
PROT SERPL-MCNC: 7.2 G/DL (ref 6–8.4)
SODIUM SERPL-SCNC: 140 MMOL/L (ref 136–145)
TRIGL SERPL-MCNC: 56 MG/DL (ref 30–150)

## 2022-07-27 PROCEDURE — 83036 HEMOGLOBIN GLYCOSYLATED A1C: CPT | Performed by: FAMILY MEDICINE

## 2022-07-27 PROCEDURE — 80061 LIPID PANEL: CPT | Performed by: FAMILY MEDICINE

## 2022-07-27 PROCEDURE — 84153 ASSAY OF PSA TOTAL: CPT | Performed by: FAMILY MEDICINE

## 2022-07-27 PROCEDURE — 36415 COLL VENOUS BLD VENIPUNCTURE: CPT | Mod: PO | Performed by: FAMILY MEDICINE

## 2022-07-27 PROCEDURE — 80053 COMPREHEN METABOLIC PANEL: CPT | Performed by: FAMILY MEDICINE

## 2022-07-28 ENCOUNTER — PATIENT OUTREACH (OUTPATIENT)
Dept: ADMINISTRATIVE | Facility: HOSPITAL | Age: 67
End: 2022-07-28
Payer: MEDICARE

## 2022-07-28 NOTE — PROGRESS NOTES
2022 Care Everywhere updates requested and reviewed.  Immunizations reconciled. Media reports reviewed.  Duplicate HM overrides and  orders removed.  Overdue HM topic chart audit and/or requested.  Overdue lab testing linked to upcoming lab appointments if applies.   updated with external * report  Dm eye exam .       There are no preventive care reminders to display for this patient.

## 2022-07-29 ENCOUNTER — CLINICAL SUPPORT (OUTPATIENT)
Dept: CARDIOLOGY | Facility: HOSPITAL | Age: 67
End: 2022-07-29
Payer: MEDICARE

## 2022-07-29 DIAGNOSIS — Z95.818 PRESENCE OF OTHER CARDIAC IMPLANTS AND GRAFTS: ICD-10-CM

## 2022-07-29 PROCEDURE — G2066 INTER DEVC REMOTE 30D: HCPCS | Performed by: INTERNAL MEDICINE

## 2022-07-29 PROCEDURE — 93298 CARDIAC DEVICE CHECK - REMOTE: ICD-10-PCS | Mod: ,,, | Performed by: INTERNAL MEDICINE

## 2022-07-29 PROCEDURE — 93298 REM INTERROG DEV EVAL SCRMS: CPT | Mod: ,,, | Performed by: INTERNAL MEDICINE

## 2022-08-28 ENCOUNTER — CLINICAL SUPPORT (OUTPATIENT)
Dept: CARDIOLOGY | Facility: HOSPITAL | Age: 67
End: 2022-08-28
Payer: MEDICARE

## 2022-08-28 DIAGNOSIS — Z95.818 PRESENCE OF OTHER CARDIAC IMPLANTS AND GRAFTS: ICD-10-CM

## 2022-08-28 PROCEDURE — G2066 INTER DEVC REMOTE 30D: HCPCS | Performed by: INTERNAL MEDICINE

## 2022-09-06 ENCOUNTER — TELEPHONE (OUTPATIENT)
Dept: ORTHOPEDICS | Facility: CLINIC | Age: 67
End: 2022-09-06
Payer: MEDICARE

## 2022-09-06 DIAGNOSIS — M25.529 ELBOW PAIN, UNSPECIFIED LATERALITY: Primary | ICD-10-CM

## 2022-09-07 ENCOUNTER — OFFICE VISIT (OUTPATIENT)
Dept: ORTHOPEDICS | Facility: CLINIC | Age: 67
End: 2022-09-07
Payer: MEDICARE

## 2022-09-07 VITALS — WEIGHT: 143 LBS | HEIGHT: 69 IN | BODY MASS INDEX: 21.18 KG/M2

## 2022-09-07 DIAGNOSIS — M77.11 RIGHT TENNIS ELBOW: ICD-10-CM

## 2022-09-07 PROCEDURE — 3044F PR MOST RECENT HEMOGLOBIN A1C LEVEL <7.0%: ICD-10-PCS | Mod: CPTII,S$GLB,, | Performed by: ORTHOPAEDIC SURGERY

## 2022-09-07 PROCEDURE — 1159F PR MEDICATION LIST DOCUMENTED IN MEDICAL RECORD: ICD-10-PCS | Mod: CPTII,S$GLB,, | Performed by: ORTHOPAEDIC SURGERY

## 2022-09-07 PROCEDURE — 3066F NEPHROPATHY DOC TX: CPT | Mod: CPTII,S$GLB,, | Performed by: ORTHOPAEDIC SURGERY

## 2022-09-07 PROCEDURE — 1101F PT FALLS ASSESS-DOCD LE1/YR: CPT | Mod: CPTII,S$GLB,, | Performed by: ORTHOPAEDIC SURGERY

## 2022-09-07 PROCEDURE — 99213 OFFICE O/P EST LOW 20 MIN: CPT | Mod: 25,S$GLB,, | Performed by: ORTHOPAEDIC SURGERY

## 2022-09-07 PROCEDURE — 20551 NJX 1 TENDON ORIGIN/INSJ: CPT | Mod: RT,S$GLB,, | Performed by: ORTHOPAEDIC SURGERY

## 2022-09-07 PROCEDURE — 1101F PR PT FALLS ASSESS DOC 0-1 FALLS W/OUT INJ PAST YR: ICD-10-PCS | Mod: CPTII,S$GLB,, | Performed by: ORTHOPAEDIC SURGERY

## 2022-09-07 PROCEDURE — 1159F MED LIST DOCD IN RCRD: CPT | Mod: CPTII,S$GLB,, | Performed by: ORTHOPAEDIC SURGERY

## 2022-09-07 PROCEDURE — 3008F BODY MASS INDEX DOCD: CPT | Mod: CPTII,S$GLB,, | Performed by: ORTHOPAEDIC SURGERY

## 2022-09-07 PROCEDURE — 20551 PR INJECT TENDON ORIGIN/INSERT: ICD-10-PCS | Mod: RT,S$GLB,, | Performed by: ORTHOPAEDIC SURGERY

## 2022-09-07 PROCEDURE — 3044F HG A1C LEVEL LT 7.0%: CPT | Mod: CPTII,S$GLB,, | Performed by: ORTHOPAEDIC SURGERY

## 2022-09-07 PROCEDURE — 99999 PR PBB SHADOW E&M-EST. PATIENT-LVL III: ICD-10-PCS | Mod: PBBFAC,,, | Performed by: ORTHOPAEDIC SURGERY

## 2022-09-07 PROCEDURE — 3066F PR DOCUMENTATION OF TREATMENT FOR NEPHROPATHY: ICD-10-PCS | Mod: CPTII,S$GLB,, | Performed by: ORTHOPAEDIC SURGERY

## 2022-09-07 PROCEDURE — 3061F PR NEG MICROALBUMINURIA RESULT DOCUMENTED/REVIEW: ICD-10-PCS | Mod: CPTII,S$GLB,, | Performed by: ORTHOPAEDIC SURGERY

## 2022-09-07 PROCEDURE — 3288F FALL RISK ASSESSMENT DOCD: CPT | Mod: CPTII,S$GLB,, | Performed by: ORTHOPAEDIC SURGERY

## 2022-09-07 PROCEDURE — 99999 PR PBB SHADOW E&M-EST. PATIENT-LVL III: CPT | Mod: PBBFAC,,, | Performed by: ORTHOPAEDIC SURGERY

## 2022-09-07 PROCEDURE — 1126F AMNT PAIN NOTED NONE PRSNT: CPT | Mod: CPTII,S$GLB,, | Performed by: ORTHOPAEDIC SURGERY

## 2022-09-07 PROCEDURE — 3008F PR BODY MASS INDEX (BMI) DOCUMENTED: ICD-10-PCS | Mod: CPTII,S$GLB,, | Performed by: ORTHOPAEDIC SURGERY

## 2022-09-07 PROCEDURE — 99213 PR OFFICE/OUTPT VISIT, EST, LEVL III, 20-29 MIN: ICD-10-PCS | Mod: 25,S$GLB,, | Performed by: ORTHOPAEDIC SURGERY

## 2022-09-07 PROCEDURE — 3288F PR FALLS RISK ASSESSMENT DOCUMENTED: ICD-10-PCS | Mod: CPTII,S$GLB,, | Performed by: ORTHOPAEDIC SURGERY

## 2022-09-07 PROCEDURE — 1126F PR PAIN SEVERITY QUANTIFIED, NO PAIN PRESENT: ICD-10-PCS | Mod: CPTII,S$GLB,, | Performed by: ORTHOPAEDIC SURGERY

## 2022-09-07 PROCEDURE — 3061F NEG MICROALBUMINURIA REV: CPT | Mod: CPTII,S$GLB,, | Performed by: ORTHOPAEDIC SURGERY

## 2022-09-07 RX ORDER — TRIAMCINOLONE ACETONIDE 40 MG/ML
20 INJECTION, SUSPENSION INTRA-ARTICULAR; INTRAMUSCULAR
Status: COMPLETED | OUTPATIENT
Start: 2022-09-07 | End: 2022-09-07

## 2022-09-07 RX ADMIN — TRIAMCINOLONE ACETONIDE 20 MG: 40 INJECTION, SUSPENSION INTRA-ARTICULAR; INTRAMUSCULAR at 04:09

## 2022-09-07 NOTE — PROGRESS NOTES
Subjective:      Patient ID: Luis Wong is a 66 y.o. male.  Chief Complaint: Pain of the Right Elbow      HPI  Luis Wong is a  66 y.o. male presenting today for follow up of hand symptoms.  He reports that he is improved with the left hand but now having pain in the opposite right elbow have been present for about a month or 2 worse with use localized laterally no numbness or tingling reported   Symptoms.    Review of patient's allergies indicates:  No Known Allergies      Current Outpatient Medications   Medication Sig Dispense Refill    metFORMIN (GLUCOPHAGE) 500 MG tablet TAKE 1 TABLET BY MOUTH EVERY DAY WITH BREAKFAST 90 tablet 1    NIFEdipine (PROCARDIA-XL) 30 MG (OSM) 24 hr tablet TAKE 1 TABLET BY MOUTH EVERY DAY 90 tablet 3    potassium chloride SA (K-DUR,KLOR-CON) 20 MEQ tablet TAKE 1 TABLET BY MOUTH ONCE DAILY 90 tablet 0     No current facility-administered medications for this visit.       Past Medical History:   Diagnosis Date    Aneurysm 10/30/2012    Diabetes mellitus     Fever blister     Herpes infection     Hypertension     ICH (intracerebral hemorrhage)     Mixed hyperlipidemia 9/5/2013    Nontraumatic thalamic hemorrhage 8/31/2016    JAQUAN (obstructive sleep apnea)     Prediabetes     Right-sided lacunar stroke 9/11/2014    SDH (subdural hematoma)     Special screening for malignant neoplasms, colon     Stroke     Type 2 diabetes mellitus with hyperglycemia, without long-term current use of insulin 1/25/2022       Past Surgical History:   Procedure Laterality Date    CARPAL TUNNEL RELEASE Right 2/21/2020    Procedure: RELEASE, CARPAL TUNNEL;  Surgeon: Barry Dutton Jr., MD;  Location: [facility] OR;  Service: Orthopedics;  Laterality: Right;    INSERTION OF IMPLANTABLE LOOP RECORDER N/A 12/12/2019    Procedure: Insertion, Implantable Loop Recorder;  Surgeon: Efren Sanford MD;  Location: [facility] CATH LAB/EP;  Service: Cardiology;  Laterality: N/A;  Crytogenic CVA, LOOP,  MDT, Local,  DM     "INTRAMEDULLARY RODDING OF FEMUR Left 5/23/2021    Procedure: INSERTION, INTRAMEDULLARY SURY, FEMUR;  Surgeon: Watson Jean-Baptiste IV, MD;  Location: [facility];  Service: Orthopedics;  Laterality: Left;    Knee arthroscopic surgery         OBJECTIVE:   PHYSICAL EXAM:  Height: 5' 9" (175.3 cm) Weight: 64.9 kg (143 lb)  Vitals:    09/07/22 1535   Weight: 64.9 kg (143 lb)   Height: 5' 9" (1.753 m)   PainSc: 0-No pain     Ortho/SPM Exam  Mild tenderness laterally over the lateral epicondylar area   No swelling  Full range of motion elbow without pain   No instability  Some pain with resisted wrist extension   Tinel sign negative at the elbow and wrist    RADIOGRAPHS:  None  Comments: I have personally reviewed the imaging and I agree with the above radiologist's report.    ASSESSMENT/PLAN:     IMPRESSION:  Lateral epicondylitis right elbow    PLAN:  I explained the nature of the problem to the patient recommended injection today   After pause for time-out identified the right elbow injected laterally with combination Kenalog 20 mg 0.5 cc xylocaine sterile technique  Tolerated the procedure well without complication   I have also given him a tennis elbow strap   Instructed him in stretching strengthening exercises   Advil or Motrin by mouth    FOLLOW UP:  4-6 weeks    Disclaimer: This note has been generated using voice-recognition software. There may be typographical errors that have been missed during proof-reading.     "

## 2022-09-27 ENCOUNTER — CLINICAL SUPPORT (OUTPATIENT)
Dept: CARDIOLOGY | Facility: HOSPITAL | Age: 67
End: 2022-09-27
Payer: MEDICARE

## 2022-09-27 DIAGNOSIS — Z95.818 PRESENCE OF OTHER CARDIAC IMPLANTS AND GRAFTS: ICD-10-CM

## 2022-09-27 PROCEDURE — G2066 INTER DEVC REMOTE 30D: HCPCS | Performed by: INTERNAL MEDICINE

## 2022-09-27 PROCEDURE — 93298 REM INTERROG DEV EVAL SCRMS: CPT | Mod: ,,, | Performed by: INTERNAL MEDICINE

## 2022-09-27 PROCEDURE — 93298 CARDIAC DEVICE CHECK - REMOTE: ICD-10-PCS | Mod: ,,, | Performed by: INTERNAL MEDICINE

## 2022-10-20 ENCOUNTER — TELEPHONE (OUTPATIENT)
Dept: CARDIOLOGY | Facility: HOSPITAL | Age: 67
End: 2022-10-20
Payer: MEDICARE

## 2022-10-20 NOTE — TELEPHONE ENCOUNTER
[facility] ILR alert for sinus pause 10/19/2022 @ 8:43pm; pt confirms he was awake at this time and asymptomatic  ILR for stroke, not currently on OAC  Will notify MD

## 2022-10-27 ENCOUNTER — CLINICAL SUPPORT (OUTPATIENT)
Dept: CARDIOLOGY | Facility: HOSPITAL | Age: 67
End: 2022-10-27
Payer: MEDICARE

## 2022-10-27 DIAGNOSIS — Z95.818 PRESENCE OF OTHER CARDIAC IMPLANTS AND GRAFTS: ICD-10-CM

## 2022-10-27 PROCEDURE — G2066 INTER DEVC REMOTE 30D: HCPCS | Performed by: INTERNAL MEDICINE

## 2022-11-10 ENCOUNTER — TELEPHONE (OUTPATIENT)
Dept: CARDIOLOGY | Facility: HOSPITAL | Age: 67
End: 2022-11-10
Payer: MEDICARE

## 2022-11-26 ENCOUNTER — CLINICAL SUPPORT (OUTPATIENT)
Dept: CARDIOLOGY | Facility: HOSPITAL | Age: 67
End: 2022-11-26
Payer: MEDICARE

## 2022-11-26 DIAGNOSIS — Z95.818 PRESENCE OF OTHER CARDIAC IMPLANTS AND GRAFTS: ICD-10-CM

## 2022-11-26 PROCEDURE — G2066 INTER DEVC REMOTE 30D: HCPCS | Performed by: INTERNAL MEDICINE

## 2022-11-26 PROCEDURE — 93298 REM INTERROG DEV EVAL SCRMS: CPT | Mod: ,,, | Performed by: INTERNAL MEDICINE

## 2022-11-26 PROCEDURE — 93298 CARDIAC DEVICE CHECK - REMOTE: ICD-10-PCS | Mod: ,,, | Performed by: INTERNAL MEDICINE

## 2022-11-28 ENCOUNTER — HOSPITAL ENCOUNTER (OUTPATIENT)
Dept: RADIOLOGY | Facility: HOSPITAL | Age: 67
Discharge: HOME | End: 2022-11-28
Attending: ORTHOPAEDIC SURGERY
Payer: MEDICARE

## 2022-11-28 ENCOUNTER — OFFICE VISIT (OUTPATIENT)
Dept: ORTHOPEDICS | Facility: CLINIC | Age: 67
End: 2022-11-28
Payer: MEDICARE

## 2022-11-28 VITALS — HEIGHT: 69 IN | BODY MASS INDEX: 21.18 KG/M2 | WEIGHT: 143 LBS

## 2022-11-28 DIAGNOSIS — M25.519 SHOULDER PAIN, UNSPECIFIED CHRONICITY, UNSPECIFIED LATERALITY: ICD-10-CM

## 2022-11-28 DIAGNOSIS — M25.519 SHOULDER PAIN, UNSPECIFIED CHRONICITY, UNSPECIFIED LATERALITY: Primary | ICD-10-CM

## 2022-11-28 DIAGNOSIS — M77.11 LATERAL EPICONDYLITIS OF RIGHT ELBOW: ICD-10-CM

## 2022-11-28 PROCEDURE — 20610 DRAIN/INJ JOINT/BURSA W/O US: CPT | Mod: RT,S$GLB,, | Performed by: ORTHOPAEDIC SURGERY

## 2022-11-28 PROCEDURE — 73030 XR SHOULDER COMPLETE 2 OR MORE VIEWS RIGHT: ICD-10-PCS | Mod: 26,RT,, | Performed by: RADIOLOGY

## 2022-11-28 PROCEDURE — 1101F PT FALLS ASSESS-DOCD LE1/YR: CPT | Mod: CPTII,S$GLB,, | Performed by: ORTHOPAEDIC SURGERY

## 2022-11-28 PROCEDURE — 99999 PR PBB SHADOW E&M-EST. PATIENT-LVL II: CPT | Mod: PBBFAC,,, | Performed by: ORTHOPAEDIC SURGERY

## 2022-11-28 PROCEDURE — 20551 NJX 1 TENDON ORIGIN/INSJ: CPT | Mod: 51,59,RT,S$GLB | Performed by: ORTHOPAEDIC SURGERY

## 2022-11-28 PROCEDURE — 73030 X-RAY EXAM OF SHOULDER: CPT | Mod: 26,RT,, | Performed by: RADIOLOGY

## 2022-11-28 PROCEDURE — 99213 OFFICE O/P EST LOW 20 MIN: CPT | Mod: 25,S$GLB,, | Performed by: ORTHOPAEDIC SURGERY

## 2022-11-28 PROCEDURE — 1125F AMNT PAIN NOTED PAIN PRSNT: CPT | Mod: CPTII,S$GLB,, | Performed by: ORTHOPAEDIC SURGERY

## 2022-11-28 PROCEDURE — 1159F PR MEDICATION LIST DOCUMENTED IN MEDICAL RECORD: ICD-10-PCS | Mod: CPTII,S$GLB,, | Performed by: ORTHOPAEDIC SURGERY

## 2022-11-28 PROCEDURE — 1125F PR PAIN SEVERITY QUANTIFIED, PAIN PRESENT: ICD-10-PCS | Mod: CPTII,S$GLB,, | Performed by: ORTHOPAEDIC SURGERY

## 2022-11-28 PROCEDURE — 3061F PR NEG MICROALBUMINURIA RESULT DOCUMENTED/REVIEW: ICD-10-PCS | Mod: CPTII,S$GLB,, | Performed by: ORTHOPAEDIC SURGERY

## 2022-11-28 PROCEDURE — 20551 PR INJECT TENDON ORIGIN/INSERT: ICD-10-PCS | Mod: 51,59,RT,S$GLB | Performed by: ORTHOPAEDIC SURGERY

## 2022-11-28 PROCEDURE — 3044F HG A1C LEVEL LT 7.0%: CPT | Mod: CPTII,S$GLB,, | Performed by: ORTHOPAEDIC SURGERY

## 2022-11-28 PROCEDURE — 1101F PR PT FALLS ASSESS DOC 0-1 FALLS W/OUT INJ PAST YR: ICD-10-PCS | Mod: CPTII,S$GLB,, | Performed by: ORTHOPAEDIC SURGERY

## 2022-11-28 PROCEDURE — 3061F NEG MICROALBUMINURIA REV: CPT | Mod: CPTII,S$GLB,, | Performed by: ORTHOPAEDIC SURGERY

## 2022-11-28 PROCEDURE — 3066F NEPHROPATHY DOC TX: CPT | Mod: CPTII,S$GLB,, | Performed by: ORTHOPAEDIC SURGERY

## 2022-11-28 PROCEDURE — 1159F MED LIST DOCD IN RCRD: CPT | Mod: CPTII,S$GLB,, | Performed by: ORTHOPAEDIC SURGERY

## 2022-11-28 PROCEDURE — 73030 X-RAY EXAM OF SHOULDER: CPT | Mod: TC,PN,RT

## 2022-11-28 PROCEDURE — 3288F PR FALLS RISK ASSESSMENT DOCUMENTED: ICD-10-PCS | Mod: CPTII,S$GLB,, | Performed by: ORTHOPAEDIC SURGERY

## 2022-11-28 PROCEDURE — 3008F BODY MASS INDEX DOCD: CPT | Mod: CPTII,S$GLB,, | Performed by: ORTHOPAEDIC SURGERY

## 2022-11-28 PROCEDURE — 99999 PR PBB SHADOW E&M-EST. PATIENT-LVL II: ICD-10-PCS | Mod: PBBFAC,,, | Performed by: ORTHOPAEDIC SURGERY

## 2022-11-28 PROCEDURE — 99213 PR OFFICE/OUTPT VISIT, EST, LEVL III, 20-29 MIN: ICD-10-PCS | Mod: 25,S$GLB,, | Performed by: ORTHOPAEDIC SURGERY

## 2022-11-28 PROCEDURE — 3044F PR MOST RECENT HEMOGLOBIN A1C LEVEL <7.0%: ICD-10-PCS | Mod: CPTII,S$GLB,, | Performed by: ORTHOPAEDIC SURGERY

## 2022-11-28 PROCEDURE — 20610 PR DRAIN/INJECT LARGE JOINT/BURSA: ICD-10-PCS | Mod: RT,S$GLB,, | Performed by: ORTHOPAEDIC SURGERY

## 2022-11-28 PROCEDURE — 3066F PR DOCUMENTATION OF TREATMENT FOR NEPHROPATHY: ICD-10-PCS | Mod: CPTII,S$GLB,, | Performed by: ORTHOPAEDIC SURGERY

## 2022-11-28 PROCEDURE — 3288F FALL RISK ASSESSMENT DOCD: CPT | Mod: CPTII,S$GLB,, | Performed by: ORTHOPAEDIC SURGERY

## 2022-11-28 PROCEDURE — 3008F PR BODY MASS INDEX (BMI) DOCUMENTED: ICD-10-PCS | Mod: CPTII,S$GLB,, | Performed by: ORTHOPAEDIC SURGERY

## 2022-11-28 RX ORDER — TRIAMCINOLONE ACETONIDE 40 MG/ML
20 INJECTION, SUSPENSION INTRA-ARTICULAR; INTRAMUSCULAR
Status: COMPLETED | OUTPATIENT
Start: 2022-11-28 | End: 2022-11-28

## 2022-11-28 RX ORDER — TRIAMCINOLONE ACETONIDE 40 MG/ML
40 INJECTION, SUSPENSION INTRA-ARTICULAR; INTRAMUSCULAR
Status: COMPLETED | OUTPATIENT
Start: 2022-11-28 | End: 2022-11-28

## 2022-11-28 RX ADMIN — TRIAMCINOLONE ACETONIDE 20 MG: 40 INJECTION, SUSPENSION INTRA-ARTICULAR; INTRAMUSCULAR at 09:11

## 2022-11-28 RX ADMIN — TRIAMCINOLONE ACETONIDE 40 MG: 40 INJECTION, SUSPENSION INTRA-ARTICULAR; INTRAMUSCULAR at 09:11

## 2022-11-28 NOTE — PROGRESS NOTES
Subjective:      Patient ID: Luis Wong is a 67 y.o. male.  Chief Complaint: Follow-up of the Right Elbow      HPI  Luis Wong is a  67 y.o. male presenting today for follow up of lateral epicondylitis right elbow.  He reports that he is having a flare-up in the right elbow but also some pain in the right shoulder   He is hemiplegic she uses his right arm for quite a bit of activities   Recently symptoms have gotten worse no numbness or tingling reported  He would like injections for both possible   .    Review of patient's allergies indicates:  No Known Allergies      Current Outpatient Medications   Medication Sig Dispense Refill    metFORMIN (GLUCOPHAGE) 500 MG tablet TAKE 1 TABLET BY MOUTH EVERY DAY WITH BREAKFAST 90 tablet 1    NIFEdipine (PROCARDIA-XL) 30 MG (OSM) 24 hr tablet TAKE 1 TABLET BY MOUTH EVERY DAY 90 tablet 3    potassium chloride SA (K-DUR,KLOR-CON) 20 MEQ tablet TAKE 1 TABLET BY MOUTH EVERY DAY 90 tablet 3     No current facility-administered medications for this visit.       Past Medical History:   Diagnosis Date    Aneurysm 10/30/2012    Diabetes mellitus     Fever blister     Herpes infection     Hypertension     ICH (intracerebral hemorrhage)     Lateral epicondylitis of right elbow 11/28/2022    Mixed hyperlipidemia 9/5/2013    Nontraumatic thalamic hemorrhage 8/31/2016    JAQUAN (obstructive sleep apnea)     Prediabetes     Right-sided lacunar stroke 9/11/2014    SDH (subdural hematoma)     Special screening for malignant neoplasms, colon     Stroke     Type 2 diabetes mellitus with hyperglycemia, without long-term current use of insulin 1/25/2022       Past Surgical History:   Procedure Laterality Date    CARPAL TUNNEL RELEASE Right 2/21/2020    Procedure: RELEASE, CARPAL TUNNEL;  Surgeon: Barry Dutton Jr., MD;  Location: [facility];  Service: Orthopedics;  Laterality: Right;    INSERTION OF IMPLANTABLE LOOP RECORDER N/A 12/12/2019    Procedure: Insertion, Implantable Loop  "Recorder;  Surgeon: Efren Sanford MD;  Location: [facility] CATH LAB/EP;  Service: Cardiology;  Laterality: N/A;  Crytogenic CVA, LOOP,  MDT, Local,  DM    INTRAMEDULLARY RODDING OF FEMUR Left 5/23/2021    Procedure: INSERTION, INTRAMEDULLARY SURY, FEMUR;  Surgeon: Watson Jean-Baptiste IV, MD;  Location: [facility] OR;  Service: Orthopedics;  Laterality: Left;    Knee arthroscopic surgery         OBJECTIVE:   PHYSICAL EXAM:  Height: 5' 9" (175.3 cm) Weight: 64.9 kg (143 lb)  Vitals:    11/28/22 0821   Weight: 64.9 kg (143 lb)   Height: 5' 9" (1.753 m)   PainSc:   6     Ortho/SPM Exam  Examination right shoulder no tenderness no swelling no bruising   Range of motion full  Positive impingement sign  Rotator cuff strength intact   Examination right elbow mild tenderness laterally no swelling full range of motion       RADIOGRAPHS:  Previous x-rays of the right elbow showed no abnormalities    AP lateral x-ray of the right shoulder shows mild spurring anterolateral acromion  Comments: I have personally reviewed the imaging and I agree with the above radiologist's report.    ASSESSMENT/PLAN:     IMPRESSION:  1. Lateral epicondylitis right elbow.      2. Right shoulder impingement    PLAN:  Explained the nature of these problems the patient   He would like injections for both   After pause for time-out identified the right shoulder injected with combination Kenalog 40 mg 2 cc xylocaine sterile technique   He then identified the right elbow injected with Kenalog 20 mg 0.5 cc xylocaine sterile technique tolerated both injections well without complication  Recommended Advil or Motrin  Stretching and strengthening exercises for the shoulder and elbow   If symptoms persist consider physical therapy       FOLLOW UP:  6-8 weeks    Disclaimer: This note has been generated using voice-recognition software. There may be typographical errors that have been missed during proof-reading.     "

## 2022-12-26 ENCOUNTER — CLINICAL SUPPORT (OUTPATIENT)
Dept: CARDIOLOGY | Facility: HOSPITAL | Age: 67
End: 2022-12-26
Payer: MEDICARE

## 2022-12-26 DIAGNOSIS — Z95.818 PRESENCE OF OTHER CARDIAC IMPLANTS AND GRAFTS: ICD-10-CM

## 2022-12-26 PROCEDURE — G2066 INTER DEVC REMOTE 30D: HCPCS | Performed by: INTERNAL MEDICINE

## 2023-01-25 ENCOUNTER — CLINICAL SUPPORT (OUTPATIENT)
Dept: CARDIOLOGY | Facility: HOSPITAL | Age: 68
End: 2023-01-25
Payer: MEDICARE

## 2023-01-25 DIAGNOSIS — Z95.818 PRESENCE OF OTHER CARDIAC IMPLANTS AND GRAFTS: ICD-10-CM

## 2023-01-25 PROCEDURE — G2066 INTER DEVC REMOTE 30D: HCPCS | Performed by: INTERNAL MEDICINE

## 2023-01-25 PROCEDURE — 93298 CARDIAC DEVICE CHECK - REMOTE: ICD-10-PCS | Mod: ,,, | Performed by: INTERNAL MEDICINE

## 2023-01-25 PROCEDURE — 93298 REM INTERROG DEV EVAL SCRMS: CPT | Mod: ,,, | Performed by: INTERNAL MEDICINE

## 2023-01-26 ENCOUNTER — OFFICE VISIT (OUTPATIENT)
Dept: FAMILY MEDICINE | Facility: CLINIC | Age: 68
End: 2023-01-26
Payer: MEDICARE

## 2023-01-26 VITALS
HEIGHT: 69 IN | WEIGHT: 148.38 LBS | HEART RATE: 97 BPM | DIASTOLIC BLOOD PRESSURE: 82 MMHG | BODY MASS INDEX: 21.98 KG/M2 | OXYGEN SATURATION: 99 % | SYSTOLIC BLOOD PRESSURE: 150 MMHG

## 2023-01-26 DIAGNOSIS — E87.6 HYPOKALEMIA: ICD-10-CM

## 2023-01-26 DIAGNOSIS — G81.14 SPASTIC HEMIPLEGIA AFFECTING LEFT NONDOMINANT SIDE, UNSPECIFIED ETIOLOGY: ICD-10-CM

## 2023-01-26 DIAGNOSIS — Z95.818 PRESENCE OF OTHER CARDIAC IMPLANTS AND GRAFTS: ICD-10-CM

## 2023-01-26 DIAGNOSIS — D69.6 THROMBOCYTOPENIA, UNSPECIFIED: ICD-10-CM

## 2023-01-26 DIAGNOSIS — I10 ESSENTIAL HYPERTENSION: ICD-10-CM

## 2023-01-26 DIAGNOSIS — E11.65 TYPE 2 DIABETES MELLITUS WITH HYPERGLYCEMIA, WITHOUT LONG-TERM CURRENT USE OF INSULIN: Primary | ICD-10-CM

## 2023-01-26 PROCEDURE — 99215 PR OFFICE/OUTPT VISIT, EST, LEVL V, 40-54 MIN: ICD-10-PCS | Mod: S$GLB,,, | Performed by: FAMILY MEDICINE

## 2023-01-26 PROCEDURE — 99999 PR PBB SHADOW E&M-EST. PATIENT-LVL IV: ICD-10-PCS | Mod: PBBFAC,,, | Performed by: FAMILY MEDICINE

## 2023-01-26 PROCEDURE — 99215 OFFICE O/P EST HI 40 MIN: CPT | Mod: S$GLB,,, | Performed by: FAMILY MEDICINE

## 2023-01-26 PROCEDURE — 3077F PR MOST RECENT SYSTOLIC BLOOD PRESSURE >= 140 MM HG: ICD-10-PCS | Mod: CPTII,S$GLB,, | Performed by: FAMILY MEDICINE

## 2023-01-26 PROCEDURE — 1160F PR REVIEW ALL MEDS BY PRESCRIBER/CLIN PHARMACIST DOCUMENTED: ICD-10-PCS | Mod: CPTII,S$GLB,, | Performed by: FAMILY MEDICINE

## 2023-01-26 PROCEDURE — 1159F MED LIST DOCD IN RCRD: CPT | Mod: CPTII,S$GLB,, | Performed by: FAMILY MEDICINE

## 2023-01-26 PROCEDURE — 99999 PR PBB SHADOW E&M-EST. PATIENT-LVL IV: CPT | Mod: PBBFAC,,, | Performed by: FAMILY MEDICINE

## 2023-01-26 PROCEDURE — 3079F PR MOST RECENT DIASTOLIC BLOOD PRESSURE 80-89 MM HG: ICD-10-PCS | Mod: CPTII,S$GLB,, | Performed by: FAMILY MEDICINE

## 2023-01-26 PROCEDURE — 3079F DIAST BP 80-89 MM HG: CPT | Mod: CPTII,S$GLB,, | Performed by: FAMILY MEDICINE

## 2023-01-26 PROCEDURE — 3288F PR FALLS RISK ASSESSMENT DOCUMENTED: ICD-10-PCS | Mod: CPTII,S$GLB,, | Performed by: FAMILY MEDICINE

## 2023-01-26 PROCEDURE — 1126F PR PAIN SEVERITY QUANTIFIED, NO PAIN PRESENT: ICD-10-PCS | Mod: CPTII,S$GLB,, | Performed by: FAMILY MEDICINE

## 2023-01-26 PROCEDURE — 3008F BODY MASS INDEX DOCD: CPT | Mod: CPTII,S$GLB,, | Performed by: FAMILY MEDICINE

## 2023-01-26 PROCEDURE — 3008F PR BODY MASS INDEX (BMI) DOCUMENTED: ICD-10-PCS | Mod: CPTII,S$GLB,, | Performed by: FAMILY MEDICINE

## 2023-01-26 PROCEDURE — 1126F AMNT PAIN NOTED NONE PRSNT: CPT | Mod: CPTII,S$GLB,, | Performed by: FAMILY MEDICINE

## 2023-01-26 PROCEDURE — 3077F SYST BP >= 140 MM HG: CPT | Mod: CPTII,S$GLB,, | Performed by: FAMILY MEDICINE

## 2023-01-26 PROCEDURE — 3288F FALL RISK ASSESSMENT DOCD: CPT | Mod: CPTII,S$GLB,, | Performed by: FAMILY MEDICINE

## 2023-01-26 PROCEDURE — 1101F PR PT FALLS ASSESS DOC 0-1 FALLS W/OUT INJ PAST YR: ICD-10-PCS | Mod: CPTII,S$GLB,, | Performed by: FAMILY MEDICINE

## 2023-01-26 PROCEDURE — 1159F PR MEDICATION LIST DOCUMENTED IN MEDICAL RECORD: ICD-10-PCS | Mod: CPTII,S$GLB,, | Performed by: FAMILY MEDICINE

## 2023-01-26 PROCEDURE — 1160F RVW MEDS BY RX/DR IN RCRD: CPT | Mod: CPTII,S$GLB,, | Performed by: FAMILY MEDICINE

## 2023-01-26 PROCEDURE — 1101F PT FALLS ASSESS-DOCD LE1/YR: CPT | Mod: CPTII,S$GLB,, | Performed by: FAMILY MEDICINE

## 2023-01-26 RX ORDER — POTASSIUM CHLORIDE 20 MEQ/1
20 TABLET, EXTENDED RELEASE ORAL 2 TIMES DAILY
Qty: 90 TABLET | Refills: 3 | Status: SHIPPED | OUTPATIENT
Start: 2023-01-26 | End: 2023-06-26

## 2023-01-26 RX ORDER — CHLORTHALIDONE 25 MG/1
25 TABLET ORAL DAILY
Qty: 90 TABLET | Refills: 3 | Status: SHIPPED | OUTPATIENT
Start: 2023-01-26 | End: 2023-06-26

## 2023-01-26 NOTE — PROGRESS NOTES
Subjective:       Patient ID: Luis Wong is a 67 y.o. male.    Chief Complaint: Follow-up    67 years old male came to the clinic for blood pressure check.  Blood pressure was elevated today and also at home.  Patient is willing to start other medicine to help control the blood pressure.  No chest pain, palpitation, orthopnea or PND.  Patient with low platelets before last testing was normal.  Patient with residual left hemiplegia.  No Recent A1c.  No polyuria, polydipsia or polyphagia.  Patient is requesting follow-up with his arrhythmia specialist.    Follow-up  Associated symptoms include weakness. Pertinent negatives include no chest pain.   Review of Systems   Constitutional: Negative.    HENT: Negative.     Eyes: Negative.    Respiratory: Negative.     Cardiovascular: Negative.  Negative for chest pain, palpitations, leg swelling and claudication.   Gastrointestinal: Negative.    Endocrine: Negative for polydipsia, polyphagia and polyuria.   Genitourinary: Negative.    Musculoskeletal: Negative.    Integumentary:  Negative.   Neurological:  Positive for weakness.   Psychiatric/Behavioral: Negative.         Objective:      Physical Exam  Vitals and nursing note reviewed.   Constitutional:       General: He is not in acute distress.     Appearance: He is well-developed. He is not diaphoretic.   HENT:      Head: Normocephalic and atraumatic.      Right Ear: External ear normal.      Left Ear: External ear normal.      Nose: Nose normal.      Mouth/Throat:      Pharynx: No oropharyngeal exudate.   Eyes:      General: No scleral icterus.        Right eye: No discharge.         Left eye: No discharge.      Conjunctiva/sclera: Conjunctivae normal.      Pupils: Pupils are equal, round, and reactive to light.   Neck:      Thyroid: No thyromegaly.      Vascular: No JVD.      Trachea: No tracheal deviation.   Cardiovascular:      Rate and Rhythm: Normal rate and regular rhythm.      Heart sounds: Normal heart  sounds. No murmur heard.    No friction rub. No gallop.   Pulmonary:      Effort: Pulmonary effort is normal. No respiratory distress.      Breath sounds: Normal breath sounds. No stridor. No wheezing or rales.   Chest:      Chest wall: No tenderness.   Abdominal:      General: Bowel sounds are normal. There is no distension.      Palpations: Abdomen is soft. There is no mass.      Tenderness: There is no abdominal tenderness. There is no guarding or rebound.   Musculoskeletal:         General: No tenderness. Normal range of motion.      Cervical back: Normal range of motion and neck supple.   Lymphadenopathy:      Cervical: No cervical adenopathy.   Skin:     General: Skin is warm and dry.      Coloration: Skin is not pale.      Findings: No erythema or rash.   Neurological:      Mental Status: He is alert and oriented to person, place, and time.      Cranial Nerves: No cranial nerve deficit.      Motor: Weakness present. No abnormal muscle tone.      Coordination: Coordination abnormal.      Gait: Gait abnormal.      Deep Tendon Reflexes: Reflexes are normal and symmetric. Reflexes normal.      Comments: Left hemiparesis.   Psychiatric:         Behavior: Behavior normal.         Thought Content: Thought content normal.         Judgment: Judgment normal.       Assessment:       Problem List Items Addressed This Visit       Type 2 diabetes mellitus with hyperglycemia, without long-term current use of insulin - Primary    Relevant Orders    CBC Auto Differential    Comprehensive Metabolic Panel    Hemoglobin A1C    Lipid Panel    Microalbumin/Creatinine Ratio, Urine    Thrombocytopenia, unspecified    Spastic hemiplegia affecting left nondominant side, unspecified etiology     Other Visit Diagnoses       Essential hypertension        Relevant Medications    potassium chloride SA (K-DUR,KLOR-CON) 20 MEQ tablet    chlorthalidone (HYGROTEN) 25 MG Tab    Other Relevant Orders    CBC Auto Differential    Comprehensive  Metabolic Panel    Hemoglobin A1C    Lipid Panel    Microalbumin/Creatinine Ratio, Urine    Hypokalemia        Relevant Medications    potassium chloride SA (K-DUR,KLOR-CON) 20 MEQ tablet    Presence of other cardiac implants and grafts        Relevant Orders    Ambulatory referral/consult to Cardiology              Plan:         Luis was seen today for follow-up.    Diagnoses and all orders for this visit:    Type 2 diabetes mellitus with hyperglycemia, without long-term current use of insulin  -     CBC Auto Differential; Future  -     Comprehensive Metabolic Panel; Future  -     Hemoglobin A1C; Future  -     Lipid Panel; Future  -     Microalbumin/Creatinine Ratio, Urine; Future    Essential hypertension  -     potassium chloride SA (K-DUR,KLOR-CON) 20 MEQ tablet; Take 1 tablet (20 mEq total) by mouth 2 (two) times daily.  -     chlorthalidone (HYGROTEN) 25 MG Tab; Take 1 tablet (25 mg total) by mouth once daily.  -     CBC Auto Differential; Future  -     Comprehensive Metabolic Panel; Future  -     Hemoglobin A1C; Future  -     Lipid Panel; Future  -     Microalbumin/Creatinine Ratio, Urine; Future    Hypokalemia  -     potassium chloride SA (K-DUR,KLOR-CON) 20 MEQ tablet; Take 1 tablet (20 mEq total) by mouth 2 (two) times daily.    Presence of other cardiac implants and grafts  -     Ambulatory referral/consult to Cardiology; Future    Thrombocytopenia, unspecified    Spastic hemiplegia affecting left nondominant side, unspecified etiology     Continue monitoring blood sugar at home,ADA diet.   Continue monitoring blood pressure at home, low sodium diet.

## 2023-02-16 ENCOUNTER — OFFICE VISIT (OUTPATIENT)
Dept: ORTHOPEDICS | Facility: CLINIC | Age: 68
End: 2023-02-16
Payer: MEDICARE

## 2023-02-16 VITALS — HEIGHT: 69 IN | BODY MASS INDEX: 21.92 KG/M2 | WEIGHT: 148 LBS

## 2023-02-16 DIAGNOSIS — M77.11 LATERAL EPICONDYLITIS OF RIGHT ELBOW: Primary | ICD-10-CM

## 2023-02-16 PROCEDURE — 99999 PR PBB SHADOW E&M-EST. PATIENT-LVL III: CPT | Mod: PBBFAC,,, | Performed by: ORTHOPAEDIC SURGERY

## 2023-02-16 PROCEDURE — 99999 PR PBB SHADOW E&M-EST. PATIENT-LVL III: ICD-10-PCS | Mod: PBBFAC,,, | Performed by: ORTHOPAEDIC SURGERY

## 2023-02-16 PROCEDURE — 1159F MED LIST DOCD IN RCRD: CPT | Mod: CPTII,S$GLB,, | Performed by: ORTHOPAEDIC SURGERY

## 2023-02-16 PROCEDURE — 1101F PR PT FALLS ASSESS DOC 0-1 FALLS W/OUT INJ PAST YR: ICD-10-PCS | Mod: CPTII,S$GLB,, | Performed by: ORTHOPAEDIC SURGERY

## 2023-02-16 PROCEDURE — 1125F AMNT PAIN NOTED PAIN PRSNT: CPT | Mod: CPTII,S$GLB,, | Performed by: ORTHOPAEDIC SURGERY

## 2023-02-16 PROCEDURE — 1125F PR PAIN SEVERITY QUANTIFIED, PAIN PRESENT: ICD-10-PCS | Mod: CPTII,S$GLB,, | Performed by: ORTHOPAEDIC SURGERY

## 2023-02-16 PROCEDURE — 3061F NEG MICROALBUMINURIA REV: CPT | Mod: CPTII,S$GLB,, | Performed by: ORTHOPAEDIC SURGERY

## 2023-02-16 PROCEDURE — 3288F PR FALLS RISK ASSESSMENT DOCUMENTED: ICD-10-PCS | Mod: CPTII,S$GLB,, | Performed by: ORTHOPAEDIC SURGERY

## 2023-02-16 PROCEDURE — 3044F PR MOST RECENT HEMOGLOBIN A1C LEVEL <7.0%: ICD-10-PCS | Mod: CPTII,S$GLB,, | Performed by: ORTHOPAEDIC SURGERY

## 2023-02-16 PROCEDURE — 3008F PR BODY MASS INDEX (BMI) DOCUMENTED: ICD-10-PCS | Mod: CPTII,S$GLB,, | Performed by: ORTHOPAEDIC SURGERY

## 2023-02-16 PROCEDURE — 3288F FALL RISK ASSESSMENT DOCD: CPT | Mod: CPTII,S$GLB,, | Performed by: ORTHOPAEDIC SURGERY

## 2023-02-16 PROCEDURE — 3008F BODY MASS INDEX DOCD: CPT | Mod: CPTII,S$GLB,, | Performed by: ORTHOPAEDIC SURGERY

## 2023-02-16 PROCEDURE — 20551 PR INJECT TENDON ORIGIN/INSERT: ICD-10-PCS | Mod: RT,S$GLB,, | Performed by: ORTHOPAEDIC SURGERY

## 2023-02-16 PROCEDURE — 20551 NJX 1 TENDON ORIGIN/INSJ: CPT | Mod: RT,S$GLB,, | Performed by: ORTHOPAEDIC SURGERY

## 2023-02-16 PROCEDURE — 1159F PR MEDICATION LIST DOCUMENTED IN MEDICAL RECORD: ICD-10-PCS | Mod: CPTII,S$GLB,, | Performed by: ORTHOPAEDIC SURGERY

## 2023-02-16 PROCEDURE — 1101F PT FALLS ASSESS-DOCD LE1/YR: CPT | Mod: CPTII,S$GLB,, | Performed by: ORTHOPAEDIC SURGERY

## 2023-02-16 PROCEDURE — 3044F HG A1C LEVEL LT 7.0%: CPT | Mod: CPTII,S$GLB,, | Performed by: ORTHOPAEDIC SURGERY

## 2023-02-16 PROCEDURE — 3066F PR DOCUMENTATION OF TREATMENT FOR NEPHROPATHY: ICD-10-PCS | Mod: CPTII,S$GLB,, | Performed by: ORTHOPAEDIC SURGERY

## 2023-02-16 PROCEDURE — 3061F PR NEG MICROALBUMINURIA RESULT DOCUMENTED/REVIEW: ICD-10-PCS | Mod: CPTII,S$GLB,, | Performed by: ORTHOPAEDIC SURGERY

## 2023-02-16 PROCEDURE — 99213 PR OFFICE/OUTPT VISIT, EST, LEVL III, 20-29 MIN: ICD-10-PCS | Mod: 25,S$GLB,, | Performed by: ORTHOPAEDIC SURGERY

## 2023-02-16 PROCEDURE — 99213 OFFICE O/P EST LOW 20 MIN: CPT | Mod: 25,S$GLB,, | Performed by: ORTHOPAEDIC SURGERY

## 2023-02-16 PROCEDURE — 3066F NEPHROPATHY DOC TX: CPT | Mod: CPTII,S$GLB,, | Performed by: ORTHOPAEDIC SURGERY

## 2023-02-16 RX ORDER — TRIAMCINOLONE ACETONIDE 40 MG/ML
20 INJECTION, SUSPENSION INTRA-ARTICULAR; INTRAMUSCULAR
Status: COMPLETED | OUTPATIENT
Start: 2023-02-16 | End: 2023-02-16

## 2023-02-16 RX ADMIN — TRIAMCINOLONE ACETONIDE 20 MG: 40 INJECTION, SUSPENSION INTRA-ARTICULAR; INTRAMUSCULAR at 09:02

## 2023-02-16 NOTE — PROGRESS NOTES
Subjective:      Patient ID: Luis Wong is a 67 y.o. male.  Chief Complaint: Follow-up of the Right Elbow      HPI  Luis Wong is a  67 y.o. male presenting today for follow up of lateral epicondylitis of the right elbow.  He reports that he is having a flare-up of pain in the right elbow   The shoulder is better   .    Review of patient's allergies indicates:  No Known Allergies      Current Outpatient Medications   Medication Sig Dispense Refill    chlorthalidone (HYGROTEN) 25 MG Tab Take 1 tablet (25 mg total) by mouth once daily. 90 tablet 3    metFORMIN (GLUCOPHAGE) 500 MG tablet TAKE 1 TABLET BY MOUTH EVERY DAY WITH BREAKFAST 90 tablet 1    NIFEdipine (PROCARDIA-XL) 30 MG (OSM) 24 hr tablet TAKE 1 TABLET BY MOUTH EVERY DAY 90 tablet 1    potassium chloride SA (K-DUR,KLOR-CON) 20 MEQ tablet Take 1 tablet (20 mEq total) by mouth 2 (two) times daily. 90 tablet 3     No current facility-administered medications for this visit.       Past Medical History:   Diagnosis Date    Aneurysm 10/30/2012    Diabetes mellitus     Fever blister     Herpes infection     Hypertension     ICH (intracerebral hemorrhage)     Lateral epicondylitis of right elbow 11/28/2022    Mixed hyperlipidemia 9/5/2013    Nontraumatic thalamic hemorrhage 8/31/2016    JAQUAN (obstructive sleep apnea)     Prediabetes     Right-sided lacunar stroke 9/11/2014    SDH (subdural hematoma)     Special screening for malignant neoplasms, colon     Stroke     Type 2 diabetes mellitus with hyperglycemia, without long-term current use of insulin 1/25/2022       Past Surgical History:   Procedure Laterality Date    CARPAL TUNNEL RELEASE Right 2/21/2020    Procedure: RELEASE, CARPAL TUNNEL;  Surgeon: Barry Dutton Jr., MD;  Location: [facility] OR;  Service: Orthopedics;  Laterality: Right;    INSERTION OF IMPLANTABLE LOOP RECORDER N/A 12/12/2019    Procedure: Insertion, Implantable Loop Recorder;  Surgeon: Efren Sanford MD;  Location: [facility] CATH  "LAB/EP;  Service: Cardiology;  Laterality: N/A;  Crytogenic CVA, LOOP,  MDT, Local,  DM    INTRAMEDULLARY RODDING OF FEMUR Left 5/23/2021    Procedure: INSERTION, INTRAMEDULLARY SURY, FEMUR;  Surgeon: Watson Jean-Baptiste IV, MD;  Location: [facility];  Service: Orthopedics;  Laterality: Left;    Knee arthroscopic surgery         OBJECTIVE:   PHYSICAL EXAM:  Height: 5' 9" (175.3 cm) Weight: 67.1 kg (148 lb)  Vitals:    02/16/23 0851   Weight: 67.1 kg (148 lb)   Height: 5' 9" (1.753 m)   PainSc:   3     Ortho/SPM Exam  Examination right elbow shows no swelling   There is some tenderness laterally  Range of motion full  No instability       RADIOGRAPHS:  None  Comments: I have personally reviewed the imaging and I agree with the above radiologist's report.    ASSESSMENT/PLAN:     IMPRESSION:  Lateral epicondylitis right elbow     PLAN:  Recommended that he continue with the tennis elbow strap stretching strengthening exercises   Continue anti-inflammatory medication by mouth  Would also like injection   After pause for time-out identified the right elbow injected laterally with combination Kenalog 20 mg 0.5 cc xylocaine sterile technique  He tolerated the procedure well without complication   If symptoms persist we will consider physical therapy in the future    FOLLOW UP:  2-3 months    Disclaimer: This note has been generated using voice-recognition software. There may be typographical errors that have been missed during proof-reading.     "

## 2023-02-24 ENCOUNTER — CLINICAL SUPPORT (OUTPATIENT)
Dept: CARDIOLOGY | Facility: HOSPITAL | Age: 68
End: 2023-02-24
Payer: MEDICARE

## 2023-02-24 DIAGNOSIS — Z95.818 PRESENCE OF OTHER CARDIAC IMPLANTS AND GRAFTS: ICD-10-CM

## 2023-02-24 PROCEDURE — G2066 INTER DEVC REMOTE 30D: HCPCS | Performed by: INTERNAL MEDICINE

## 2023-03-26 ENCOUNTER — CLINICAL SUPPORT (OUTPATIENT)
Dept: CARDIOLOGY | Facility: HOSPITAL | Age: 68
End: 2023-03-26
Payer: MEDICARE

## 2023-03-26 DIAGNOSIS — Z95.818 PRESENCE OF OTHER CARDIAC IMPLANTS AND GRAFTS: ICD-10-CM

## 2023-03-26 PROCEDURE — 93298 REM INTERROG DEV EVAL SCRMS: CPT | Mod: ,,, | Performed by: INTERNAL MEDICINE

## 2023-03-26 PROCEDURE — G2066 INTER DEVC REMOTE 30D: HCPCS | Performed by: INTERNAL MEDICINE

## 2023-03-26 PROCEDURE — 93298 CARDIAC DEVICE CHECK - REMOTE: ICD-10-PCS | Mod: ,,, | Performed by: INTERNAL MEDICINE

## 2023-04-11 ENCOUNTER — TELEPHONE (OUTPATIENT)
Dept: FAMILY MEDICINE | Facility: CLINIC | Age: 68
End: 2023-04-11
Payer: MEDICARE

## 2023-04-11 NOTE — TELEPHONE ENCOUNTER
Contacted pt  to reschedule appt due to PCP not being in office. Left voice message to call back to reschedule.

## 2023-04-22 DIAGNOSIS — E11.65 TYPE 2 DIABETES MELLITUS WITH HYPERGLYCEMIA: ICD-10-CM

## 2023-04-22 NOTE — TELEPHONE ENCOUNTER
No new care gaps identified.  [facility] Embedded Care Gaps. Reference number: [id number]. 4/22/2023   7:58:04 AM ASHLEYT

## 2023-04-23 RX ORDER — METFORMIN HYDROCHLORIDE 500 MG/1
500 TABLET ORAL
Qty: 90 TABLET | Refills: 1 | Status: SHIPPED | OUTPATIENT
Start: 2023-04-23 | End: 2023-06-26

## 2023-04-25 ENCOUNTER — CLINICAL SUPPORT (OUTPATIENT)
Dept: CARDIOLOGY | Facility: HOSPITAL | Age: 68
End: 2023-04-25
Payer: MEDICARE

## 2023-04-25 DIAGNOSIS — Z95.818 PRESENCE OF OTHER CARDIAC IMPLANTS AND GRAFTS: ICD-10-CM

## 2023-04-25 PROCEDURE — G2066 INTER DEVC REMOTE 30D: HCPCS | Performed by: INTERNAL MEDICINE

## 2023-05-04 ENCOUNTER — OFFICE VISIT (OUTPATIENT)
Dept: CARDIOLOGY | Facility: CLINIC | Age: 68
End: 2023-05-04
Payer: MEDICARE

## 2023-05-04 VITALS
BODY MASS INDEX: 21.44 KG/M2 | HEART RATE: 88 BPM | WEIGHT: 144.75 LBS | DIASTOLIC BLOOD PRESSURE: 77 MMHG | HEIGHT: 69 IN | SYSTOLIC BLOOD PRESSURE: 137 MMHG

## 2023-05-04 DIAGNOSIS — I63.9 CRYPTOGENIC STROKE: Primary | ICD-10-CM

## 2023-05-04 DIAGNOSIS — Z86.79 HISTORY OF CEREBRAL PARENCHYMAL HEMORRHAGE: ICD-10-CM

## 2023-05-04 DIAGNOSIS — I69.30 HISTORY OF STROKE WITH RESIDUAL DEFICIT: ICD-10-CM

## 2023-05-04 DIAGNOSIS — Z86.73 HISTORY OF ISCHEMIC STROKE: ICD-10-CM

## 2023-05-04 DIAGNOSIS — E11.65 TYPE 2 DIABETES MELLITUS WITH HYPERGLYCEMIA, WITHOUT LONG-TERM CURRENT USE OF INSULIN: ICD-10-CM

## 2023-05-04 DIAGNOSIS — I10 PRIMARY HYPERTENSION: ICD-10-CM

## 2023-05-04 DIAGNOSIS — D69.6 THROMBOCYTOPENIA, UNSPECIFIED: ICD-10-CM

## 2023-05-04 DIAGNOSIS — Z95.818 PRESENCE OF OTHER CARDIAC IMPLANTS AND GRAFTS: ICD-10-CM

## 2023-05-04 DIAGNOSIS — I48.0 PAROXYSMAL ATRIAL FIBRILLATION: ICD-10-CM

## 2023-05-04 DIAGNOSIS — E78.2 MIXED HYPERLIPIDEMIA: ICD-10-CM

## 2023-05-04 PROCEDURE — 3078F DIAST BP <80 MM HG: CPT | Mod: CPTII,S$GLB,, | Performed by: INTERNAL MEDICINE

## 2023-05-04 PROCEDURE — 3066F NEPHROPATHY DOC TX: CPT | Mod: CPTII,S$GLB,, | Performed by: INTERNAL MEDICINE

## 2023-05-04 PROCEDURE — 1159F MED LIST DOCD IN RCRD: CPT | Mod: CPTII,S$GLB,, | Performed by: INTERNAL MEDICINE

## 2023-05-04 PROCEDURE — 3078F PR MOST RECENT DIASTOLIC BLOOD PRESSURE < 80 MM HG: ICD-10-PCS | Mod: CPTII,S$GLB,, | Performed by: INTERNAL MEDICINE

## 2023-05-04 PROCEDURE — 1126F AMNT PAIN NOTED NONE PRSNT: CPT | Mod: CPTII,S$GLB,, | Performed by: INTERNAL MEDICINE

## 2023-05-04 PROCEDURE — 3288F PR FALLS RISK ASSESSMENT DOCUMENTED: ICD-10-PCS | Mod: CPTII,S$GLB,, | Performed by: INTERNAL MEDICINE

## 2023-05-04 PROCEDURE — 93010 RHYTHM STRIP: ICD-10-PCS | Mod: S$GLB,,, | Performed by: INTERNAL MEDICINE

## 2023-05-04 PROCEDURE — 1101F PR PT FALLS ASSESS DOC 0-1 FALLS W/OUT INJ PAST YR: ICD-10-PCS | Mod: CPTII,S$GLB,, | Performed by: INTERNAL MEDICINE

## 2023-05-04 PROCEDURE — 1160F PR REVIEW ALL MEDS BY PRESCRIBER/CLIN PHARMACIST DOCUMENTED: ICD-10-PCS | Mod: CPTII,S$GLB,, | Performed by: INTERNAL MEDICINE

## 2023-05-04 PROCEDURE — 99205 PR OFFICE/OUTPT VISIT, NEW, LEVL V, 60-74 MIN: ICD-10-PCS | Mod: S$GLB,,, | Performed by: INTERNAL MEDICINE

## 2023-05-04 PROCEDURE — 3061F PR NEG MICROALBUMINURIA RESULT DOCUMENTED/REVIEW: ICD-10-PCS | Mod: CPTII,S$GLB,, | Performed by: INTERNAL MEDICINE

## 2023-05-04 PROCEDURE — 99999 PR PBB SHADOW E&M-EST. PATIENT-LVL III: ICD-10-PCS | Mod: PBBFAC,,, | Performed by: INTERNAL MEDICINE

## 2023-05-04 PROCEDURE — 3066F PR DOCUMENTATION OF TREATMENT FOR NEPHROPATHY: ICD-10-PCS | Mod: CPTII,S$GLB,, | Performed by: INTERNAL MEDICINE

## 2023-05-04 PROCEDURE — 1160F RVW MEDS BY RX/DR IN RCRD: CPT | Mod: CPTII,S$GLB,, | Performed by: INTERNAL MEDICINE

## 2023-05-04 PROCEDURE — 99205 OFFICE O/P NEW HI 60 MIN: CPT | Mod: S$GLB,,, | Performed by: INTERNAL MEDICINE

## 2023-05-04 PROCEDURE — 3061F NEG MICROALBUMINURIA REV: CPT | Mod: CPTII,S$GLB,, | Performed by: INTERNAL MEDICINE

## 2023-05-04 PROCEDURE — 93010 ELECTROCARDIOGRAM REPORT: CPT | Mod: S$GLB,,, | Performed by: INTERNAL MEDICINE

## 2023-05-04 PROCEDURE — 3044F PR MOST RECENT HEMOGLOBIN A1C LEVEL <7.0%: ICD-10-PCS | Mod: CPTII,S$GLB,, | Performed by: INTERNAL MEDICINE

## 2023-05-04 PROCEDURE — 3008F BODY MASS INDEX DOCD: CPT | Mod: CPTII,S$GLB,, | Performed by: INTERNAL MEDICINE

## 2023-05-04 PROCEDURE — 1159F PR MEDICATION LIST DOCUMENTED IN MEDICAL RECORD: ICD-10-PCS | Mod: CPTII,S$GLB,, | Performed by: INTERNAL MEDICINE

## 2023-05-04 PROCEDURE — 99999 PR PBB SHADOW E&M-EST. PATIENT-LVL III: CPT | Mod: PBBFAC,,, | Performed by: INTERNAL MEDICINE

## 2023-05-04 PROCEDURE — 3044F HG A1C LEVEL LT 7.0%: CPT | Mod: CPTII,S$GLB,, | Performed by: INTERNAL MEDICINE

## 2023-05-04 PROCEDURE — 1101F PT FALLS ASSESS-DOCD LE1/YR: CPT | Mod: CPTII,S$GLB,, | Performed by: INTERNAL MEDICINE

## 2023-05-04 PROCEDURE — 93005 RHYTHM STRIP: ICD-10-PCS | Mod: S$GLB,,, | Performed by: INTERNAL MEDICINE

## 2023-05-04 PROCEDURE — 1126F PR PAIN SEVERITY QUANTIFIED, NO PAIN PRESENT: ICD-10-PCS | Mod: CPTII,S$GLB,, | Performed by: INTERNAL MEDICINE

## 2023-05-04 PROCEDURE — 3008F PR BODY MASS INDEX (BMI) DOCUMENTED: ICD-10-PCS | Mod: CPTII,S$GLB,, | Performed by: INTERNAL MEDICINE

## 2023-05-04 PROCEDURE — 93005 ELECTROCARDIOGRAM TRACING: CPT | Mod: S$GLB,,, | Performed by: INTERNAL MEDICINE

## 2023-05-04 PROCEDURE — 3075F PR MOST RECENT SYSTOLIC BLOOD PRESS GE 130-139MM HG: ICD-10-PCS | Mod: CPTII,S$GLB,, | Performed by: INTERNAL MEDICINE

## 2023-05-04 PROCEDURE — 3075F SYST BP GE 130 - 139MM HG: CPT | Mod: CPTII,S$GLB,, | Performed by: INTERNAL MEDICINE

## 2023-05-04 PROCEDURE — 3288F FALL RISK ASSESSMENT DOCD: CPT | Mod: CPTII,S$GLB,, | Performed by: INTERNAL MEDICINE

## 2023-05-04 NOTE — PROGRESS NOTES
Subjective:   Patient ID:  Luis Wong is a 67 y.o. male who presents for evaluation of ILR    HPI  67 y.o. M  HTN  DM  CVA with residual L hemiplegia (R thalamic hemorrhage 2012, and R parietal CVA 6/2018)    ILR placed 12/2019.  No AF seen. Nocturnal pauses in past. One 3.1-second pause 11/8/22. No sx (unclear wake/sleep status then; not contacted successfully then).    My interpretation of today's ECG is NSR    Review of Systems   Constitutional: Negative. Negative for malaise/fatigue.   HENT: Negative.  Negative for ear pain and tinnitus.    Eyes:  Negative for blurred vision.   Cardiovascular: Negative.  Negative for chest pain, dyspnea on exertion, near-syncope, palpitations and syncope.   Respiratory: Negative.  Negative for shortness of breath.    Endocrine: Negative.  Negative for polyuria.   Hematologic/Lymphatic: Does not bruise/bleed easily.   Skin: Negative.  Negative for rash.   Musculoskeletal:  Positive for muscle weakness. Negative for joint pain.   Gastrointestinal: Negative.  Negative for abdominal pain and change in bowel habit.   Genitourinary:  Negative for frequency.   Neurological:  Positive for focal weakness. Negative for dizziness and weakness.   Psychiatric/Behavioral: Negative.  Negative for depression. The patient is not nervous/anxious.    Allergic/Immunologic: Negative for environmental allergies.     Objective:   Physical Exam  Vitals and nursing note reviewed.   Constitutional:       Appearance: He is well-developed.   HENT:      Head: Normocephalic and atraumatic.   Eyes:      General: Lids are normal. No scleral icterus.     Conjunctiva/sclera: Conjunctivae normal.   Neck:      Thyroid: No thyromegaly.      Vascular: No JVD.      Trachea: No tracheal deviation.   Cardiovascular:      Rate and Rhythm: Normal rate and regular rhythm.      Pulses:           Radial pulses are 2+ on the right side and 2+ on the left side.   Pulmonary:      Effort: Pulmonary effort is normal. No  tachypnea, accessory muscle usage or respiratory distress.      Breath sounds: Normal breath sounds. No wheezing.   Abdominal:      General: There is no distension.   Musculoskeletal:         General: Normal range of motion.      Cervical back: Normal range of motion.   Skin:     General: Skin is warm and dry.   Neurological:      Mental Status: He is alert and oriented to person, place, and time.      Motor: No abnormal muscle tone.      Deep Tendon Reflexes: Reflexes are normal and symmetric.   Psychiatric:         Behavior: Behavior normal.       Assessment:      1. Cryptogenic stroke    2. Presence of other cardiac implants and grafts    3. History of ischemic stroke    4. History of cerebral parenchymal hemorrhage    5. History of stroke with residual deficit    6. Primary hypertension    7. Mixed hyperlipidemia    8. Thrombocytopenia, unspecified    9. Type 2 diabetes mellitus with hyperglycemia, without long-term current use of insulin        Plan:     Crytpogenic CVA.  No evidence of AF/AFL.  Asx sinus pauses; continue to monitor.    Will remove ILR when battery depleted.

## 2023-05-05 DIAGNOSIS — I48.0 PAROXYSMAL ATRIAL FIBRILLATION: Primary | ICD-10-CM

## 2023-05-22 ENCOUNTER — OFFICE VISIT (OUTPATIENT)
Dept: ORTHOPEDICS | Facility: CLINIC | Age: 68
End: 2023-05-22
Payer: MEDICARE

## 2023-05-22 VITALS — HEIGHT: 69 IN | BODY MASS INDEX: 21.37 KG/M2

## 2023-05-22 DIAGNOSIS — M77.11 LATERAL EPICONDYLITIS OF RIGHT ELBOW: ICD-10-CM

## 2023-05-22 DIAGNOSIS — M25.519 SHOULDER PAIN, UNSPECIFIED CHRONICITY, UNSPECIFIED LATERALITY: Primary | ICD-10-CM

## 2023-05-22 PROCEDURE — 1159F PR MEDICATION LIST DOCUMENTED IN MEDICAL RECORD: ICD-10-PCS | Mod: CPTII,S$GLB,, | Performed by: ORTHOPAEDIC SURGERY

## 2023-05-22 PROCEDURE — 1159F MED LIST DOCD IN RCRD: CPT | Mod: CPTII,S$GLB,, | Performed by: ORTHOPAEDIC SURGERY

## 2023-05-22 PROCEDURE — 3008F BODY MASS INDEX DOCD: CPT | Mod: CPTII,S$GLB,, | Performed by: ORTHOPAEDIC SURGERY

## 2023-05-22 PROCEDURE — 3066F PR DOCUMENTATION OF TREATMENT FOR NEPHROPATHY: ICD-10-PCS | Mod: CPTII,S$GLB,, | Performed by: ORTHOPAEDIC SURGERY

## 2023-05-22 PROCEDURE — 20551 PR INJECT TENDON ORIGIN/INSERT: ICD-10-PCS | Mod: RT,S$GLB,, | Performed by: ORTHOPAEDIC SURGERY

## 2023-05-22 PROCEDURE — 3288F PR FALLS RISK ASSESSMENT DOCUMENTED: ICD-10-PCS | Mod: CPTII,S$GLB,, | Performed by: ORTHOPAEDIC SURGERY

## 2023-05-22 PROCEDURE — 99213 OFFICE O/P EST LOW 20 MIN: CPT | Mod: 25,S$GLB,, | Performed by: ORTHOPAEDIC SURGERY

## 2023-05-22 PROCEDURE — 1125F AMNT PAIN NOTED PAIN PRSNT: CPT | Mod: CPTII,S$GLB,, | Performed by: ORTHOPAEDIC SURGERY

## 2023-05-22 PROCEDURE — 3061F PR NEG MICROALBUMINURIA RESULT DOCUMENTED/REVIEW: ICD-10-PCS | Mod: CPTII,S$GLB,, | Performed by: ORTHOPAEDIC SURGERY

## 2023-05-22 PROCEDURE — 1101F PR PT FALLS ASSESS DOC 0-1 FALLS W/OUT INJ PAST YR: ICD-10-PCS | Mod: CPTII,S$GLB,, | Performed by: ORTHOPAEDIC SURGERY

## 2023-05-22 PROCEDURE — 99213 PR OFFICE/OUTPT VISIT, EST, LEVL III, 20-29 MIN: ICD-10-PCS | Mod: 25,S$GLB,, | Performed by: ORTHOPAEDIC SURGERY

## 2023-05-22 PROCEDURE — 3061F NEG MICROALBUMINURIA REV: CPT | Mod: CPTII,S$GLB,, | Performed by: ORTHOPAEDIC SURGERY

## 2023-05-22 PROCEDURE — 3008F PR BODY MASS INDEX (BMI) DOCUMENTED: ICD-10-PCS | Mod: CPTII,S$GLB,, | Performed by: ORTHOPAEDIC SURGERY

## 2023-05-22 PROCEDURE — 1101F PT FALLS ASSESS-DOCD LE1/YR: CPT | Mod: CPTII,S$GLB,, | Performed by: ORTHOPAEDIC SURGERY

## 2023-05-22 PROCEDURE — 3044F PR MOST RECENT HEMOGLOBIN A1C LEVEL <7.0%: ICD-10-PCS | Mod: CPTII,S$GLB,, | Performed by: ORTHOPAEDIC SURGERY

## 2023-05-22 PROCEDURE — 99999 PR PBB SHADOW E&M-EST. PATIENT-LVL III: CPT | Mod: PBBFAC,,, | Performed by: ORTHOPAEDIC SURGERY

## 2023-05-22 PROCEDURE — 3044F HG A1C LEVEL LT 7.0%: CPT | Mod: CPTII,S$GLB,, | Performed by: ORTHOPAEDIC SURGERY

## 2023-05-22 PROCEDURE — 20551 NJX 1 TENDON ORIGIN/INSJ: CPT | Mod: RT,S$GLB,, | Performed by: ORTHOPAEDIC SURGERY

## 2023-05-22 PROCEDURE — 3066F NEPHROPATHY DOC TX: CPT | Mod: CPTII,S$GLB,, | Performed by: ORTHOPAEDIC SURGERY

## 2023-05-22 PROCEDURE — 1125F PR PAIN SEVERITY QUANTIFIED, PAIN PRESENT: ICD-10-PCS | Mod: CPTII,S$GLB,, | Performed by: ORTHOPAEDIC SURGERY

## 2023-05-22 PROCEDURE — 99999 PR PBB SHADOW E&M-EST. PATIENT-LVL III: ICD-10-PCS | Mod: PBBFAC,,, | Performed by: ORTHOPAEDIC SURGERY

## 2023-05-22 PROCEDURE — 3288F FALL RISK ASSESSMENT DOCD: CPT | Mod: CPTII,S$GLB,, | Performed by: ORTHOPAEDIC SURGERY

## 2023-05-22 RX ORDER — IBUPROFEN 600 MG/1
600 TABLET ORAL EVERY 6 HOURS PRN
Qty: 40 TABLET | Refills: 1 | Status: SHIPPED | OUTPATIENT
Start: 2023-05-22 | End: 2023-11-01

## 2023-05-22 RX ORDER — TRIAMCINOLONE ACETONIDE 40 MG/ML
20 INJECTION, SUSPENSION INTRA-ARTICULAR; INTRAMUSCULAR
Status: COMPLETED | OUTPATIENT
Start: 2023-05-22 | End: 2023-05-22

## 2023-05-22 RX ADMIN — TRIAMCINOLONE ACETONIDE 20 MG: 40 INJECTION, SUSPENSION INTRA-ARTICULAR; INTRAMUSCULAR at 01:05

## 2023-05-22 NOTE — PROGRESS NOTES
Subjective:      Patient ID: Luis Wong is a 67 y.o. male.  Chief Complaint: Pain of the Right Shoulder and Pain of the Right Elbow      HPI  Luis Wong is a  67 y.o. male presenting today for follow up of right shoulder and right elbow pain.  He reports that he is having a flare-up of both areas   He has had injections in both areas in the past   .    Review of patient's allergies indicates:  No Known Allergies      Current Outpatient Medications   Medication Sig Dispense Refill    chlorthalidone (HYGROTEN) 25 MG Tab Take 1 tablet (25 mg total) by mouth once daily. 90 tablet 3    metFORMIN (GLUCOPHAGE) 500 MG tablet Take 1 tablet (500 mg total) by mouth daily with breakfast. 90 tablet 1    NIFEdipine (PROCARDIA-XL) 30 MG (OSM) 24 hr tablet TAKE 1 TABLET BY MOUTH EVERY DAY 90 tablet 1    potassium chloride SA (K-DUR,KLOR-CON) 20 MEQ tablet Take 1 tablet (20 mEq total) by mouth 2 (two) times daily. 90 tablet 3     No current facility-administered medications for this visit.       Past Medical History:   Diagnosis Date    Aneurysm 10/30/2012    Diabetes mellitus     Fever blister     Herpes infection     Hypertension     ICH (intracerebral hemorrhage)     Lateral epicondylitis of right elbow 11/28/2022    Mixed hyperlipidemia 9/5/2013    Nontraumatic thalamic hemorrhage 8/31/2016    JAQUAN (obstructive sleep apnea)     Prediabetes     Right-sided lacunar stroke 9/11/2014    SDH (subdural hematoma)     Special screening for malignant neoplasms, colon     Stroke     Type 2 diabetes mellitus with hyperglycemia, without long-term current use of insulin 1/25/2022       Past Surgical History:   Procedure Laterality Date    CARPAL TUNNEL RELEASE Right 2/21/2020    Procedure: RELEASE, CARPAL TUNNEL;  Surgeon: Barry Dutton Jr., MD;  Location: [facility];  Service: Orthopedics;  Laterality: Right;    INSERTION OF IMPLANTABLE LOOP RECORDER N/A 12/12/2019    Procedure: Insertion, Implantable Loop Recorder;  Surgeon:  "Efren Sanford MD;  Location: [facility] [place] CATH LAB/EP;  Service: Cardiology;  Laterality: N/A;  Crytogenic CVA, LOOP,  MDT, Local,  DM    INTRAMEDULLARY RODDING OF FEMUR Left 5/23/2021    Procedure: INSERTION, INTRAMEDULLARY SURY, FEMUR;  Surgeon: Watson Jean-Baptiste IV, MD;  Location: [facility] [place] OR;  Service: Orthopedics;  Laterality: Left;    Knee arthroscopic surgery         OBJECTIVE:   PHYSICAL EXAM:  Height: 5' 9" (175.3 cm)    Vitals:    05/22/23 1301   Height: 5' 9" (1.753 m)   PainSc:   7   PainLoc: Shoulder     Ortho/SPM Exam  Examination right shoulder no tenderness no swelling  Range of motion slightly decreased due to pain  Positive impingement sign slight weakness no instability  Examination right elbow demonstrates lateral tenderness   No swelling range of motion full except for some pain on terminal extension       RADIOGRAPHS:  None  Comments: I have personally reviewed the imaging and I agree with the above radiologist's report.    ASSESSMENT/PLAN:     IMPRESSION:  1. Right shoulder pain chronic.      2. Lateral epicondylitis right elbow    PLAN:  Regarding the right shoulder I have ordered an MRI scan to check the rotator cuff   He would like injection for the elbow   After pause for time-out identified the right elbow injected laterally with combination Kenalog 20 mg 0.5 cc xylocaine sterile technique  Tolerated the procedure well without complication  Continue tennis elbow strap follow up after the MRI is complete    FOLLOW UP:  Follow up after the MRI    Disclaimer: This note has been generated using voice-recognition software. There may be typographical errors that have been missed during proof-reading.     "

## 2023-05-25 ENCOUNTER — CLINICAL SUPPORT (OUTPATIENT)
Dept: CARDIOLOGY | Facility: HOSPITAL | Age: 68
End: 2023-05-25
Payer: MEDICARE

## 2023-05-25 DIAGNOSIS — Z95.818 PRESENCE OF OTHER CARDIAC IMPLANTS AND GRAFTS: ICD-10-CM

## 2023-05-25 PROCEDURE — 93298 CARDIAC DEVICE CHECK - REMOTE: ICD-10-PCS | Mod: ,,, | Performed by: INTERNAL MEDICINE

## 2023-05-25 PROCEDURE — G2066 INTER DEVC REMOTE 30D: HCPCS | Performed by: INTERNAL MEDICINE

## 2023-05-25 PROCEDURE — 93298 REM INTERROG DEV EVAL SCRMS: CPT | Mod: ,,, | Performed by: INTERNAL MEDICINE

## 2023-06-01 ENCOUNTER — HOSPITAL ENCOUNTER (OUTPATIENT)
Dept: RADIOLOGY | Facility: HOSPITAL | Age: 68
Discharge: HOME | End: 2023-06-01
Attending: ORTHOPAEDIC SURGERY
Payer: MEDICARE

## 2023-06-01 DIAGNOSIS — M25.519 SHOULDER PAIN, UNSPECIFIED CHRONICITY, UNSPECIFIED LATERALITY: ICD-10-CM

## 2023-06-01 PROCEDURE — 73221 MRI JOINT UPR EXTREM W/O DYE: CPT | Mod: TC,RT

## 2023-06-01 PROCEDURE — 73221 MRI SHOULDER WITHOUT CONTRAST RIGHT: ICD-10-PCS | Mod: 26,RT,, | Performed by: RADIOLOGY

## 2023-06-01 PROCEDURE — 73221 MRI JOINT UPR EXTREM W/O DYE: CPT | Mod: 26,RT,, | Performed by: RADIOLOGY

## 2023-06-07 ENCOUNTER — OFFICE VISIT (OUTPATIENT)
Dept: ORTHOPEDICS | Facility: CLINIC | Age: 68
End: 2023-06-07
Payer: MEDICARE

## 2023-06-07 DIAGNOSIS — M75.111 NONTRAUMATIC INCOMPLETE TEAR OF RIGHT ROTATOR CUFF: ICD-10-CM

## 2023-06-07 DIAGNOSIS — G81.14 SPASTIC HEMIPLEGIA AFFECTING LEFT NONDOMINANT SIDE, UNSPECIFIED ETIOLOGY: Primary | ICD-10-CM

## 2023-06-07 PROCEDURE — 3066F PR DOCUMENTATION OF TREATMENT FOR NEPHROPATHY: ICD-10-PCS | Mod: CPTII,95,, | Performed by: ORTHOPAEDIC SURGERY

## 2023-06-07 PROCEDURE — 3044F HG A1C LEVEL LT 7.0%: CPT | Mod: CPTII,95,, | Performed by: ORTHOPAEDIC SURGERY

## 2023-06-07 PROCEDURE — 3061F PR NEG MICROALBUMINURIA RESULT DOCUMENTED/REVIEW: ICD-10-PCS | Mod: CPTII,95,, | Performed by: ORTHOPAEDIC SURGERY

## 2023-06-07 PROCEDURE — 3044F PR MOST RECENT HEMOGLOBIN A1C LEVEL <7.0%: ICD-10-PCS | Mod: CPTII,95,, | Performed by: ORTHOPAEDIC SURGERY

## 2023-06-07 PROCEDURE — 99442 PR PHYSICIAN TELEPHONE EVALUATION 11-20 MIN: ICD-10-PCS | Mod: 95,,, | Performed by: ORTHOPAEDIC SURGERY

## 2023-06-07 PROCEDURE — 3061F NEG MICROALBUMINURIA REV: CPT | Mod: CPTII,95,, | Performed by: ORTHOPAEDIC SURGERY

## 2023-06-07 PROCEDURE — 99442 PR PHYSICIAN TELEPHONE EVALUATION 11-20 MIN: CPT | Mod: 95,,, | Performed by: ORTHOPAEDIC SURGERY

## 2023-06-07 PROCEDURE — 3066F NEPHROPATHY DOC TX: CPT | Mod: CPTII,95,, | Performed by: ORTHOPAEDIC SURGERY

## 2023-06-07 NOTE — PROGRESS NOTES
Established Patient - Audio Only Telehealth Visit     The patient location is: home  The chief complaint leading to consultation is: MRI results review  Visit type: Virtual visit with audio only (telephone)  Total time spent with patient: 15 minutes       The reason for the audio only service rather than synchronous audio and video virtual visit was related to technical difficulties or patient preference/necessity.     Each patient to whom I provide medical services by telemedicine is:  (1) informed of the relationship between the physician and patient and the respective role of any other health care provider with respect to management of the patient; and (2) notified that they may decline to receive medical services by telemedicine and may withdraw from such care at any time. Patient verbally consented to receive this service via voice-only telephone call.       HPI: Luis Wong has a chronic h/o right shoulder pain. He has been treated periodically with CSI for this. Recently, Dr. Dutton ordered MRI to evaluate the rotator cuff. MRI is significant for high-grade partial-thickness rotator cuff tear.  I went over his MRI findings today.  All questions were answered.    Assessment and plan:  I discussed with the patient at length all the different treatment options available for their right shoulder including anti-inflammatories, acetaminophen, rest, ice, physical therapy to include strengthening exercise, and corticosteroid injections versus arthroscopy.    He does have left-sided weakness/hemiparalysis due to previous stroke.  We discussed how this may make his recovery a little more difficult.   He reports the injections are currently very helpful and would like to continue with this treatment for now.  I also recommended a short course of physical therapy for both upper extremities.  If symptoms persist, we may consider arthroscopy in the future.    Patient should follow-up with an in clinic visit for  corticosteroid injection of his right shoulder.    Orders Placed This Encounter   Procedures    Ambulatory referral/consult to Physical/Occupational Therapy                              This service was not originating from a related E/M service provided within the previous 7 days nor will  to an E/M service or procedure within the next 24 hours or my soonest available appointment.  Prevailing standard of care was able to be met in this audio-only visit.

## 2023-06-19 ENCOUNTER — TELEPHONE (OUTPATIENT)
Dept: CARDIOLOGY | Facility: HOSPITAL | Age: 68
End: 2023-06-19
Payer: MEDICARE

## 2023-06-19 PROBLEM — Z78.9 IMPAIRED MOBILITY AND ADLS: Status: ACTIVE | Noted: 2023-06-19

## 2023-06-19 PROBLEM — Z74.09 IMPAIRED MOBILITY AND ADLS: Status: ACTIVE | Noted: 2023-06-19

## 2023-06-19 PROBLEM — Z78.9 IMPAIRED MOBILITY AND ACTIVITIES OF DAILY LIVING: Status: RESOLVED | Noted: 2020-07-14 | Resolved: 2023-06-19

## 2023-06-19 PROBLEM — Z74.09 IMPAIRED MOBILITY AND ACTIVITIES OF DAILY LIVING: Status: RESOLVED | Noted: 2020-07-14 | Resolved: 2023-06-19

## 2023-06-19 NOTE — TELEPHONE ENCOUNTER
[facility] alert received for orderbird AGtronic ILR:  RRT reached on 6/17/2023.      ILR implanted on 12/12/2019 for CVA.

## 2023-06-24 ENCOUNTER — CLINICAL SUPPORT (OUTPATIENT)
Dept: CARDIOLOGY | Facility: HOSPITAL | Age: 68
End: 2023-06-24
Payer: MEDICARE

## 2023-06-24 DIAGNOSIS — Z95.818 PRESENCE OF OTHER CARDIAC IMPLANTS AND GRAFTS: ICD-10-CM

## 2023-06-24 PROCEDURE — G2066 INTER DEVC REMOTE 30D: HCPCS | Performed by: INTERNAL MEDICINE

## 2023-06-26 ENCOUNTER — OFFICE VISIT (OUTPATIENT)
Dept: FAMILY MEDICINE | Facility: CLINIC | Age: 68
End: 2023-06-26
Payer: MEDICARE

## 2023-06-26 VITALS
BODY MASS INDEX: 31.84 KG/M2 | SYSTOLIC BLOOD PRESSURE: 138 MMHG | HEART RATE: 85 BPM | HEIGHT: 69 IN | OXYGEN SATURATION: 98 % | WEIGHT: 214.94 LBS | DIASTOLIC BLOOD PRESSURE: 70 MMHG

## 2023-06-26 DIAGNOSIS — G81.14 SPASTIC HEMIPLEGIA AFFECTING LEFT NONDOMINANT SIDE, UNSPECIFIED ETIOLOGY: ICD-10-CM

## 2023-06-26 DIAGNOSIS — Z74.09 IMPAIRED MOBILITY AND ADLS: ICD-10-CM

## 2023-06-26 DIAGNOSIS — I10 PRIMARY HYPERTENSION: Primary | ICD-10-CM

## 2023-06-26 DIAGNOSIS — Z78.9 IMPAIRED MOBILITY AND ADLS: ICD-10-CM

## 2023-06-26 DIAGNOSIS — R26.89 GAIT, ANTALGIC: ICD-10-CM

## 2023-06-26 DIAGNOSIS — E11.65 TYPE 2 DIABETES MELLITUS WITH HYPERGLYCEMIA, WITHOUT LONG-TERM CURRENT USE OF INSULIN: ICD-10-CM

## 2023-06-26 PROCEDURE — 3044F HG A1C LEVEL LT 7.0%: CPT | Mod: CPTII,S$GLB,, | Performed by: FAMILY MEDICINE

## 2023-06-26 PROCEDURE — 3044F PR MOST RECENT HEMOGLOBIN A1C LEVEL <7.0%: ICD-10-PCS | Mod: CPTII,S$GLB,, | Performed by: FAMILY MEDICINE

## 2023-06-26 PROCEDURE — 3288F PR FALLS RISK ASSESSMENT DOCUMENTED: ICD-10-PCS | Mod: CPTII,S$GLB,, | Performed by: FAMILY MEDICINE

## 2023-06-26 PROCEDURE — 99999 PR PBB SHADOW E&M-EST. PATIENT-LVL III: ICD-10-PCS | Mod: PBBFAC,,, | Performed by: FAMILY MEDICINE

## 2023-06-26 PROCEDURE — 3008F PR BODY MASS INDEX (BMI) DOCUMENTED: ICD-10-PCS | Mod: CPTII,S$GLB,, | Performed by: FAMILY MEDICINE

## 2023-06-26 PROCEDURE — 1126F PR PAIN SEVERITY QUANTIFIED, NO PAIN PRESENT: ICD-10-PCS | Mod: CPTII,S$GLB,, | Performed by: FAMILY MEDICINE

## 2023-06-26 PROCEDURE — 3075F SYST BP GE 130 - 139MM HG: CPT | Mod: CPTII,S$GLB,, | Performed by: FAMILY MEDICINE

## 2023-06-26 PROCEDURE — 99215 OFFICE O/P EST HI 40 MIN: CPT | Mod: S$GLB,,, | Performed by: FAMILY MEDICINE

## 2023-06-26 PROCEDURE — 3078F DIAST BP <80 MM HG: CPT | Mod: CPTII,S$GLB,, | Performed by: FAMILY MEDICINE

## 2023-06-26 PROCEDURE — 1160F PR REVIEW ALL MEDS BY PRESCRIBER/CLIN PHARMACIST DOCUMENTED: ICD-10-PCS | Mod: CPTII,S$GLB,, | Performed by: FAMILY MEDICINE

## 2023-06-26 PROCEDURE — 1160F RVW MEDS BY RX/DR IN RCRD: CPT | Mod: CPTII,S$GLB,, | Performed by: FAMILY MEDICINE

## 2023-06-26 PROCEDURE — 99999 PR PBB SHADOW E&M-EST. PATIENT-LVL III: CPT | Mod: PBBFAC,,, | Performed by: FAMILY MEDICINE

## 2023-06-26 PROCEDURE — 1159F PR MEDICATION LIST DOCUMENTED IN MEDICAL RECORD: ICD-10-PCS | Mod: CPTII,S$GLB,, | Performed by: FAMILY MEDICINE

## 2023-06-26 PROCEDURE — 3066F PR DOCUMENTATION OF TREATMENT FOR NEPHROPATHY: ICD-10-PCS | Mod: CPTII,S$GLB,, | Performed by: FAMILY MEDICINE

## 2023-06-26 PROCEDURE — 3075F PR MOST RECENT SYSTOLIC BLOOD PRESS GE 130-139MM HG: ICD-10-PCS | Mod: CPTII,S$GLB,, | Performed by: FAMILY MEDICINE

## 2023-06-26 PROCEDURE — 1126F AMNT PAIN NOTED NONE PRSNT: CPT | Mod: CPTII,S$GLB,, | Performed by: FAMILY MEDICINE

## 2023-06-26 PROCEDURE — 3061F NEG MICROALBUMINURIA REV: CPT | Mod: CPTII,S$GLB,, | Performed by: FAMILY MEDICINE

## 2023-06-26 PROCEDURE — 99215 PR OFFICE/OUTPT VISIT, EST, LEVL V, 40-54 MIN: ICD-10-PCS | Mod: S$GLB,,, | Performed by: FAMILY MEDICINE

## 2023-06-26 PROCEDURE — 1159F MED LIST DOCD IN RCRD: CPT | Mod: CPTII,S$GLB,, | Performed by: FAMILY MEDICINE

## 2023-06-26 PROCEDURE — 3061F PR NEG MICROALBUMINURIA RESULT DOCUMENTED/REVIEW: ICD-10-PCS | Mod: CPTII,S$GLB,, | Performed by: FAMILY MEDICINE

## 2023-06-26 PROCEDURE — 1101F PT FALLS ASSESS-DOCD LE1/YR: CPT | Mod: CPTII,S$GLB,, | Performed by: FAMILY MEDICINE

## 2023-06-26 PROCEDURE — 3078F PR MOST RECENT DIASTOLIC BLOOD PRESSURE < 80 MM HG: ICD-10-PCS | Mod: CPTII,S$GLB,, | Performed by: FAMILY MEDICINE

## 2023-06-26 PROCEDURE — 3008F BODY MASS INDEX DOCD: CPT | Mod: CPTII,S$GLB,, | Performed by: FAMILY MEDICINE

## 2023-06-26 PROCEDURE — 1101F PR PT FALLS ASSESS DOC 0-1 FALLS W/OUT INJ PAST YR: ICD-10-PCS | Mod: CPTII,S$GLB,, | Performed by: FAMILY MEDICINE

## 2023-06-26 PROCEDURE — 3066F NEPHROPATHY DOC TX: CPT | Mod: CPTII,S$GLB,, | Performed by: FAMILY MEDICINE

## 2023-06-26 PROCEDURE — 3288F FALL RISK ASSESSMENT DOCD: CPT | Mod: CPTII,S$GLB,, | Performed by: FAMILY MEDICINE

## 2023-06-26 NOTE — PROGRESS NOTES
Subjective     Patient ID: Luis Wong is a 67 y.o. male.    Chief Complaint: Diabetes    67 years old male came to the clinic for blood pressure check.  Blood pressure today was stable.  He is not taking the diuretic.  No chest pain, palpitation, orthopnea or PND.  Last A1c was normal.  Patient is only using the metformin sometimes.  Patient is considering physical therapy to help with the spastic hemiplegia and impaired mobility.    Diabetes  Pertinent negatives for diabetes include no chest pain.   Review of Systems   Constitutional: Negative.    HENT: Negative.     Eyes: Negative.    Respiratory: Negative.     Cardiovascular: Negative.  Negative for chest pain, palpitations, leg swelling and claudication.   Gastrointestinal: Negative.    Genitourinary: Negative.    Musculoskeletal: Negative.    Integumentary:  Negative.   Neurological: Negative.    Psychiatric/Behavioral: Negative.          Objective     Physical Exam  Vitals and nursing note reviewed.   Constitutional:       General: He is not in acute distress.     Appearance: He is well-developed. He is not diaphoretic.   HENT:      Head: Normocephalic and atraumatic.      Right Ear: External ear normal.      Left Ear: External ear normal.      Nose: Nose normal.      Mouth/Throat:      Pharynx: No oropharyngeal exudate.   Eyes:      General: No scleral icterus.        Right eye: No discharge.         Left eye: No discharge.      Conjunctiva/sclera: Conjunctivae normal.      Pupils: Pupils are equal, round, and reactive to light.   Neck:      Thyroid: No thyromegaly.      Vascular: No JVD.      Trachea: No tracheal deviation.   Cardiovascular:      Rate and Rhythm: Normal rate and regular rhythm.      Heart sounds: Normal heart sounds. No murmur heard.    No friction rub. No gallop.   Pulmonary:      Effort: Pulmonary effort is normal. No respiratory distress.      Breath sounds: Normal breath sounds. No stridor. No wheezing or rales.   Chest:      Chest  wall: No tenderness.   Abdominal:      General: Bowel sounds are normal. There is no distension.      Palpations: Abdomen is soft. There is no mass.      Tenderness: There is no abdominal tenderness. There is no guarding or rebound.   Musculoskeletal:         General: No tenderness. Normal range of motion.      Cervical back: Normal range of motion and neck supple.   Lymphadenopathy:      Cervical: No cervical adenopathy.   Skin:     General: Skin is warm and dry.      Coloration: Skin is not pale.      Findings: No erythema or rash.   Neurological:      Mental Status: He is alert and oriented to person, place, and time.      Cranial Nerves: No cranial nerve deficit.      Motor: No abnormal muscle tone.      Coordination: Coordination normal.      Deep Tendon Reflexes: Reflexes are normal and symmetric. Reflexes normal.   Psychiatric:         Behavior: Behavior normal.         Thought Content: Thought content normal.         Judgment: Judgment normal.          Assessment and Plan     1. Primary hypertension  -     Lipid Panel; Future; Expected date: 06/26/2023  -     Comprehensive Metabolic Panel; Future; Expected date: 06/26/2023    2. Impaired mobility and ADLs  -     Acupuncture; Future    3. Gait, antalgic  -     Acupuncture; Future    4. Spastic hemiplegia affecting left nondominant side, unspecified etiology  -     Acupuncture; Future    5. Type 2 diabetes mellitus with hyperglycemia, without long-term current use of insulin  -     Microalbumin/Creatinine Ratio, Urine; Future; Expected date: 06/26/2023  -     Hemoglobin A1C; Future; Expected date: 06/26/2023  -     Comprehensive Metabolic Panel; Future; Expected date: 06/26/2023      Continue monitoring blood pressure at home, low sodium diet.            Follow up in about 3 months (around 9/26/2023), or if symptoms worsen or fail to improve.

## 2023-06-28 DIAGNOSIS — I63.9 CRYPTOGENIC STROKE: ICD-10-CM

## 2023-06-28 DIAGNOSIS — Z45.09 ENCOUNTER FOR LOOP RECORDER AT END OF BATTERY LIFE: Primary | ICD-10-CM

## 2023-06-28 DIAGNOSIS — Z86.73 HISTORY OF ISCHEMIC STROKE: ICD-10-CM

## 2023-07-05 ENCOUNTER — PATIENT MESSAGE (OUTPATIENT)
Dept: ELECTROPHYSIOLOGY | Facility: CLINIC | Age: 68
End: 2023-07-05
Payer: MEDICARE

## 2023-07-05 DIAGNOSIS — G81.14 SPASTIC HEMIPLEGIA AFFECTING LEFT NONDOMINANT SIDE, UNSPECIFIED ETIOLOGY: Primary | ICD-10-CM

## 2023-07-10 ENCOUNTER — OFFICE VISIT (OUTPATIENT)
Dept: ORTHOPEDICS | Facility: CLINIC | Age: 68
End: 2023-07-10
Payer: MEDICARE

## 2023-07-10 VITALS — BODY MASS INDEX: 31.74 KG/M2 | HEIGHT: 69 IN

## 2023-07-10 DIAGNOSIS — M75.111 NONTRAUMATIC INCOMPLETE TEAR OF RIGHT ROTATOR CUFF: ICD-10-CM

## 2023-07-10 PROCEDURE — 3044F HG A1C LEVEL LT 7.0%: CPT | Mod: CPTII,S$GLB,, | Performed by: ORTHOPAEDIC SURGERY

## 2023-07-10 PROCEDURE — 3066F PR DOCUMENTATION OF TREATMENT FOR NEPHROPATHY: ICD-10-PCS | Mod: CPTII,S$GLB,, | Performed by: ORTHOPAEDIC SURGERY

## 2023-07-10 PROCEDURE — 99999 PR PBB SHADOW E&M-EST. PATIENT-LVL II: CPT | Mod: PBBFAC,,, | Performed by: ORTHOPAEDIC SURGERY

## 2023-07-10 PROCEDURE — 20610 PR DRAIN/INJECT LARGE JOINT/BURSA: ICD-10-PCS | Mod: RT,S$GLB,, | Performed by: ORTHOPAEDIC SURGERY

## 2023-07-10 PROCEDURE — 3044F PR MOST RECENT HEMOGLOBIN A1C LEVEL <7.0%: ICD-10-PCS | Mod: CPTII,S$GLB,, | Performed by: ORTHOPAEDIC SURGERY

## 2023-07-10 PROCEDURE — 1159F MED LIST DOCD IN RCRD: CPT | Mod: CPTII,S$GLB,, | Performed by: ORTHOPAEDIC SURGERY

## 2023-07-10 PROCEDURE — 3061F NEG MICROALBUMINURIA REV: CPT | Mod: CPTII,S$GLB,, | Performed by: ORTHOPAEDIC SURGERY

## 2023-07-10 PROCEDURE — 3288F PR FALLS RISK ASSESSMENT DOCUMENTED: ICD-10-PCS | Mod: CPTII,S$GLB,, | Performed by: ORTHOPAEDIC SURGERY

## 2023-07-10 PROCEDURE — 99213 PR OFFICE/OUTPT VISIT, EST, LEVL III, 20-29 MIN: ICD-10-PCS | Mod: 25,S$GLB,, | Performed by: ORTHOPAEDIC SURGERY

## 2023-07-10 PROCEDURE — 1125F PR PAIN SEVERITY QUANTIFIED, PAIN PRESENT: ICD-10-PCS | Mod: CPTII,S$GLB,, | Performed by: ORTHOPAEDIC SURGERY

## 2023-07-10 PROCEDURE — 1159F PR MEDICATION LIST DOCUMENTED IN MEDICAL RECORD: ICD-10-PCS | Mod: CPTII,S$GLB,, | Performed by: ORTHOPAEDIC SURGERY

## 2023-07-10 PROCEDURE — 3288F FALL RISK ASSESSMENT DOCD: CPT | Mod: CPTII,S$GLB,, | Performed by: ORTHOPAEDIC SURGERY

## 2023-07-10 PROCEDURE — 3008F BODY MASS INDEX DOCD: CPT | Mod: CPTII,S$GLB,, | Performed by: ORTHOPAEDIC SURGERY

## 2023-07-10 PROCEDURE — 3066F NEPHROPATHY DOC TX: CPT | Mod: CPTII,S$GLB,, | Performed by: ORTHOPAEDIC SURGERY

## 2023-07-10 PROCEDURE — 99999 PR PBB SHADOW E&M-EST. PATIENT-LVL II: ICD-10-PCS | Mod: PBBFAC,,, | Performed by: ORTHOPAEDIC SURGERY

## 2023-07-10 PROCEDURE — 1101F PR PT FALLS ASSESS DOC 0-1 FALLS W/OUT INJ PAST YR: ICD-10-PCS | Mod: CPTII,S$GLB,, | Performed by: ORTHOPAEDIC SURGERY

## 2023-07-10 PROCEDURE — 99213 OFFICE O/P EST LOW 20 MIN: CPT | Mod: 25,S$GLB,, | Performed by: ORTHOPAEDIC SURGERY

## 2023-07-10 PROCEDURE — 3008F PR BODY MASS INDEX (BMI) DOCUMENTED: ICD-10-PCS | Mod: CPTII,S$GLB,, | Performed by: ORTHOPAEDIC SURGERY

## 2023-07-10 PROCEDURE — 20610 DRAIN/INJ JOINT/BURSA W/O US: CPT | Mod: RT,S$GLB,, | Performed by: ORTHOPAEDIC SURGERY

## 2023-07-10 PROCEDURE — 3061F PR NEG MICROALBUMINURIA RESULT DOCUMENTED/REVIEW: ICD-10-PCS | Mod: CPTII,S$GLB,, | Performed by: ORTHOPAEDIC SURGERY

## 2023-07-10 PROCEDURE — 1125F AMNT PAIN NOTED PAIN PRSNT: CPT | Mod: CPTII,S$GLB,, | Performed by: ORTHOPAEDIC SURGERY

## 2023-07-10 PROCEDURE — 1101F PT FALLS ASSESS-DOCD LE1/YR: CPT | Mod: CPTII,S$GLB,, | Performed by: ORTHOPAEDIC SURGERY

## 2023-07-10 RX ORDER — TRIAMCINOLONE ACETONIDE 40 MG/ML
40 INJECTION, SUSPENSION INTRA-ARTICULAR; INTRAMUSCULAR
Status: COMPLETED | OUTPATIENT
Start: 2023-07-10 | End: 2023-07-10

## 2023-07-10 RX ADMIN — TRIAMCINOLONE ACETONIDE 40 MG: 40 INJECTION, SUSPENSION INTRA-ARTICULAR; INTRAMUSCULAR at 01:07

## 2023-07-10 NOTE — PROGRESS NOTES
Subjective:      Patient ID: Luis Wong is a 67 y.o. male.  Chief Complaint: Shoulder Pain (right )      HPI  Luis Wong is a  67 y.o. male presenting today for follow up of shoulder pain related to partial tear of the rotator cuff.  He reports that he is still having pain in the right shoulder difficulty with elevation and some pain at night.    Review of patient's allergies indicates:  No Known Allergies      Current Outpatient Medications   Medication Sig Dispense Refill    NIFEdipine (PROCARDIA-XL) 30 MG (OSM) 24 hr tablet TAKE 1 TABLET BY MOUTH EVERY DAY 90 tablet 1    ibuprofen (ADVIL,MOTRIN) 600 MG tablet Take 1 tablet (600 mg total) by mouth every 6 (six) hours as needed for Pain. (Patient not taking: Reported on 7/10/2023) 40 tablet 1     No current facility-administered medications for this visit.       Past Medical History:   Diagnosis Date    Aneurysm 10/30/2012    Diabetes mellitus     Fever blister     Herpes infection     Hypertension     ICH (intracerebral hemorrhage)     Lateral epicondylitis of right elbow 11/28/2022    Mixed hyperlipidemia 9/5/2013    Nontraumatic thalamic hemorrhage 8/31/2016    JAQUAN (obstructive sleep apnea)     Prediabetes     Right-sided lacunar stroke 9/11/2014    SDH (subdural hematoma)     Special screening for malignant neoplasms, colon     Stroke     Type 2 diabetes mellitus with hyperglycemia, without long-term current use of insulin 1/25/2022       Past Surgical History:   Procedure Laterality Date    CARPAL TUNNEL RELEASE Right 2/21/2020    Procedure: RELEASE, CARPAL TUNNEL;  Surgeon: Barry Dutton Jr., MD;  Location: [facility] OR;  Service: Orthopedics;  Laterality: Right;    INSERTION OF IMPLANTABLE LOOP RECORDER N/A 12/12/2019    Procedure: Insertion, Implantable Loop Recorder;  Surgeon: Efren Sanford MD;  Location: [facility] CATH LAB/EP;  Service: Cardiology;  Laterality: N/A;  Crytogenic CVA, LOOP,  MDT, Local,  DM    INTRAMEDULLARY RODDING OF FEMUR Left  "5/23/2021    Procedure: INSERTION, INTRAMEDULLARY SURY, FEMUR;  Surgeon: Watson Jean-Baptiste IV, MD;  Location: [facility];  Service: Orthopedics;  Laterality: Left;    Knee arthroscopic surgery         OBJECTIVE:   PHYSICAL EXAM:  Height: 5' 9" (175.3 cm)    Vitals:    07/10/23 1316   Height: 5' 9" (1.753 m)   PainSc:   3   PainLoc: Shoulder     Ortho/SPM Exam  Examination right shoulder no tenderness no swelling  Range of motion full  Positive impingement sign  Rotator cuff strength intact  Neurologic exam intact    RADIOGRAPHS:  Previous MRI demonstrating partial tear rotator cuff right shoulder  Comments: I have personally reviewed the imaging and I agree with the above radiologist's report.    ASSESSMENT/PLAN:     IMPRESSION:  Partial tear rotator cuff right shoulder    PLAN:  I recommended a home program for stretching strengthening of the right shoulder   Also recommended injection today  After pause for time-out identified the right shoulder injected with Kenalog 40 mg 2 cc xylocaine sterile technique  Tolerated the procedure well without complication   Advil or Motrin by mouth avoid overhead lifting    FOLLOW UP:  6-8 weeks    Disclaimer: This note has been generated using voice-recognition software. There may be typographical errors that have been missed during proof-reading.     "

## 2023-07-13 ENCOUNTER — CLINICAL SUPPORT (OUTPATIENT)
Dept: REHABILITATION | Facility: HOSPITAL | Age: 68
End: 2023-07-13
Attending: FAMILY MEDICINE
Payer: MEDICARE

## 2023-07-13 DIAGNOSIS — Z78.9 IMPAIRED MOBILITY AND ADLS: ICD-10-CM

## 2023-07-13 DIAGNOSIS — G81.14 SPASTIC HEMIPLEGIA AFFECTING LEFT NONDOMINANT SIDE, UNSPECIFIED ETIOLOGY: ICD-10-CM

## 2023-07-13 DIAGNOSIS — R26.89 GAIT, ANTALGIC: ICD-10-CM

## 2023-07-13 DIAGNOSIS — Z74.09 IMPAIRED MOBILITY AND ADLS: ICD-10-CM

## 2023-07-13 PROCEDURE — 97811 ACUP 1/> W/O ESTIM EA ADD 15: CPT

## 2023-07-13 PROCEDURE — 97810 ACUP 1/> WO ESTIM 1ST 15 MIN: CPT

## 2023-07-13 NOTE — PROGRESS NOTES
Acupuncture Evaluation Note     Name: Luis Wong  Clinic Number: [id number]    Traditional Chinese Medicine (TCM) Diagnosis: Qi Stagnation, Blood Stasis, and Phlegm-cold  Medical Diagnosis:   Encounter Diagnoses   Name Primary?    Impaired mobility and ADLs     Gait, antalgic     Spastic hemiplegia affecting left nondominant side, unspecified etiology         Evaluation Date: 7/13/2023    Visit #/Visits authorized: 1/ 0 (self pay)     Precautions: Diabetes, (borderline, recently taken off metformin)    Subjective     Chief Concern:   5 strokes, last one in 2019. First one in 2012  Has done pT, has a lot of spasticity. In left shoulder, arm, wrist, elbow. Experiences it as tightness and will have fatigue in the muscles very quickly. No pain in his arm. Some pain in shoulder with stretching/moving his arm certain ways.   Broke his hip left leg, had screws and a brady put in. Has chronic pain with certain movements at his lateral left hip, and in the groin area and lower back.   Uses a cane for walking long distances.  Had botox in his arm in 2020 which slightly relieved tightness but it also made his upper arm muscles weaker.   2021 fell and broke hip, recovered well but has reduced range of motion, can no longer bend to tie his shoes.  Has ache at left groin and lateral hip  Left medial ankle and calf tightness, difficulty flexing ankle.    Recently diagnosed with partial tear at right shoulder, and just got a cortisone shot.    Retired from [employer].      Medical necessity is demonstrated by the following IMPAIRMENTS: Medical Necessity: Decreased mobility limits day to day activities, social, and emergent situations and Decreased quality of life      HEENT:  Occasionally a sharp pain that pierces in the morning then goes away    Chest:  no issues with his heart.    Digestion:    Taste/Appetite: Appetite, Digestion, no gas/bloating/cramping pain; No diarrhea or consitpation.    Sleep:   Sleep is okay, spasticity does  not wake him. Occasionally has difficulty falling asleep.  Recently his low back aches when he first wakes up.    Energy Levels:  Gets tired in late afternoon/evening.  Just started occupational therapy for tear in shoulder.    Psychological Symptoms:  not assessed      Other Symptoms:   not assessed      Objective     Observation: Limited range of motion of left arm and shoulder, can not fully extend arm, limited range of motion left ankle, uneven gait, good almanza    Tongue:  normal to red color, partial white slippery coating    Pulse:  excess wiry    Treatment     Treatment Principles:  Move Qi & Blood, transform phlegm-cold    Needle Set 1 -     Acupuncture points used:   Face up -   Bilateral:  ST36  Left:  K3, BL60  Right:  LI11, LI15, LI4, TW14, LIV3, SP6  Centerline:    Needles In: 10  Needles Out: 10  Needles W/O STIM placed:   11:35  Needles W/O STIM removed:   12:00    Other Traditional Chinese Medicine Modalities -  none    Assessment     After treatment, patient felt  no change     Patient prognosis is Fair.     Patient will continue to benefit from acupuncture treatment to address the deficits listed in the problem list box on initial evaluation, provide patient family education and to maximize pt's level of independence in the home and community environment.     Patient's spiritual, cultural and educational needs considered and pt agreeable to plan of care and goals.       Anticipated barriers to treatment:   none    Plan     Recommend 1 /week for 6 sessions then re-assess.      Recommendations:  none at this time    Education:  Patient is aware of cumulative effect of acupuncture

## 2023-07-17 ENCOUNTER — CLINICAL SUPPORT (OUTPATIENT)
Dept: REHABILITATION | Facility: HOSPITAL | Age: 68
End: 2023-07-17
Payer: MEDICARE

## 2023-07-17 DIAGNOSIS — Z78.9 IMPAIRED MOBILITY AND ADLS: Primary | ICD-10-CM

## 2023-07-17 DIAGNOSIS — R26.89 GAIT, ANTALGIC: ICD-10-CM

## 2023-07-17 DIAGNOSIS — G81.14 SPASTIC HEMIPLEGIA AFFECTING LEFT NONDOMINANT SIDE, UNSPECIFIED ETIOLOGY: ICD-10-CM

## 2023-07-17 DIAGNOSIS — Z74.09 IMPAIRED MOBILITY AND ADLS: Primary | ICD-10-CM

## 2023-07-17 PROCEDURE — 97810 ACUP 1/> WO ESTIM 1ST 15 MIN: CPT

## 2023-07-17 PROCEDURE — 97811 ACUP 1/> W/O ESTIM EA ADD 15: CPT

## 2023-07-17 NOTE — PROGRESS NOTES
Acupuncture Treatment Note     Name: Luis Wong  Clinic Number: [id number]    Traditional Chinese Medicine Diagnosis: Qi Stagnation, Blood Stasis, and Phlegm-cold  Physician: No ref. provider found    Date of Service: 7/17/2023     Medical Diagnosis:    Impaired mobility and ADLs      Gait, antalgic      Spastic hemiplegia affecting left nondominant side, unspecified etiology      Evaluation Date: 07/13/2023    Visit #/Visits authorized: 2/ 0 (cash pay)     Precautions:  Diabetes, (borderline, recently taken off metformin)      Subjective     Chief Complaint:      5 strokes, last one in 2019. First one in 2012  Has done pT, has a lot of spasticity. In left shoulder, arm, wrist, elbow. Experiences it as tightness and will have fatigue in the muscles very quickly. No pain in his arm. Some pain in shoulder with stretching/moving his arm certain ways.   Broke his hip left leg, had screws and a brady put in. Has chronic pain with certain movements at his lateral left hip, and in the groin area and lower back.   Uses a cane for walking long distances.  Had botox in his arm in 2020 which slightly relieved tightness but it also made his upper arm muscles weaker.   2021 fell and broke hip, recovered well but has reduced range of motion, can no longer bend to tie his shoes.  Has ache at left groin and lateral hip  Left medial ankle and calf tightness, difficulty flexing ankle.     Recently diagnosed with partial tear at right shoulder, and just got a cortisone shot.    Medical necessity is demonstrated by the following IMPAIRMENTS: Medical Necessity: Decreased mobility limits day to day activities, social, and emergent situations and Decreased quality of life     Response to Previous Treatment:    After treatment, patient felt  no change. Had OT this morning.    Quality of Symptoms (Better/Worse):  no change    Other Condition/Symptoms:        Objective      New Findings:    none    Pulse:    excess wiry  Tongue:    normal  to red color, partial white slippery coating    Treatment      Treatment Principles:    Move Qi & Blood, transform phlegm-cold    Needle Set 1 -     Acupuncture Points:    Face down  Bilateral:  Right:  Left:  LI15,TW14, K3, BL60, SP6, GB39  Centerline:  GV14, GV12, GV11, GV10, GV9, GV8    NEEDLES W/O STIM  AT:    3:25  NEEDLES W/O STIM REMOVED AT:    3:55  #NEEDLES IN:   12  #NEEDLES OUT:   12    Needle Set 2 -     Acupuncture Points:     Sitting - mobilize local area during needle retention  Bilateral:  Right:  Left:  jimmy upper arm +2, LI10, TW5, PC5  Centerline:    NEEDLES W/O STIM  AT:    4:00  NEEDLES W/O STIM REMOVED AT:    4:25  #NEEDLES IN:   5  #NEEDLES OUT:   5    Other Traditional Chinese Medicine Modalities:       Recommendations: none at this time    Education:  Patient is aware of cumulative effect of acupuncture      Assessment      Analysis of Treatment:    After first set of needles Luis felt a different sensation in his upper arm, kind of like a tingling, difficult to describe. After the 2nd set of needles Luis was able to extend his forearm more smoothly.   Has smoother movement, less pain.     Pt prognosis is Fair.     Patient will continue to benefit from acupuncture treatment to address the deficits listed in the problem list box on initial evaluation, provide patient family education and to maximize pt's level of independence in the home and community environment.     Patient's spiritual, cultural and educational needs considered and pt agreeable to plan of care and goals.     Anticipated barriers to treatment:   cash pay    Plan     Recommend    continue with care plan.

## 2023-07-24 ENCOUNTER — CLINICAL SUPPORT (OUTPATIENT)
Dept: CARDIOLOGY | Facility: HOSPITAL | Age: 68
End: 2023-07-24
Payer: MEDICARE

## 2023-07-24 ENCOUNTER — CLINICAL SUPPORT (OUTPATIENT)
Dept: REHABILITATION | Facility: HOSPITAL | Age: 68
End: 2023-07-24
Attending: FAMILY MEDICINE
Payer: MEDICARE

## 2023-07-24 DIAGNOSIS — G81.14 SPASTIC HEMIPLEGIA AFFECTING LEFT NONDOMINANT SIDE, UNSPECIFIED ETIOLOGY: ICD-10-CM

## 2023-07-24 DIAGNOSIS — Z95.818 PRESENCE OF OTHER CARDIAC IMPLANTS AND GRAFTS: ICD-10-CM

## 2023-07-24 DIAGNOSIS — Z78.9 IMPAIRED MOBILITY AND ADLS: Primary | ICD-10-CM

## 2023-07-24 DIAGNOSIS — Z74.09 IMPAIRED MOBILITY AND ADLS: Primary | ICD-10-CM

## 2023-07-24 DIAGNOSIS — R26.89 GAIT, ANTALGIC: ICD-10-CM

## 2023-07-24 PROCEDURE — 97811 ACUP 1/> W/O ESTIM EA ADD 15: CPT

## 2023-07-24 PROCEDURE — G2066 INTER DEVC REMOTE 30D: HCPCS | Performed by: INTERNAL MEDICINE

## 2023-07-24 PROCEDURE — 93298 REM INTERROG DEV EVAL SCRMS: CPT | Mod: ,,, | Performed by: INTERNAL MEDICINE

## 2023-07-24 PROCEDURE — 93298 CARDIAC DEVICE CHECK - REMOTE: ICD-10-PCS | Mod: ,,, | Performed by: INTERNAL MEDICINE

## 2023-07-24 PROCEDURE — 97810 ACUP 1/> WO ESTIM 1ST 15 MIN: CPT

## 2023-07-24 NOTE — PROGRESS NOTES
Acupuncture Treatment Note     Name: Luis Wong  Clinic Number: [id number]    Traditional Chinese Medicine Diagnosis: Qi Stagnation, Blood Stasis, and Phlegm-cold  Physician: No ref. provider found    Date of Service: 7/24/2023     Medical Diagnosis:    Impaired mobility and ADLs      Gait, antalgic      Spastic hemiplegia affecting left nondominant side, unspecified etiology      Evaluation Date: 07/13/2023    Visit #/Visits authorized: 3 / 0 (cash pay)     Precautions:  Diabetes, (borderline, recently taken off metformin)      Subjective     Chief Complaint:      5 strokes, last one in 2019. First one in 2012  Has done pT, has a lot of spasticity. In left shoulder, arm, wrist, elbow. Experiences it as tightness and will have fatigue in the muscles very quickly. No pain in his arm. Some pain in shoulder with stretching/moving his arm certain ways.   Broke his hip left leg, had screws and a brady put in. Has chronic pain with certain movements at his lateral left hip, and in the groin area and lower back.   Uses a cane for walking long distances.  Had botox in his arm in 2020 which slightly relieved tightness but it also made his upper arm muscles weaker.   2021 fell and broke hip, recovered well but has reduced range of motion, can no longer bend to tie his shoes.  Has ache at left groin and lateral hip  Left medial ankle and calf tightness, difficulty flexing ankle.     Recently diagnosed with partial tear at right shoulder, and just got a cortisone shot.    Medical necessity is demonstrated by the following IMPAIRMENTS: Medical Necessity: Decreased mobility limits day to day activities, social, and emergent situations and Decreased quality of life     Response to Previous Treatment:   Luis feels that moving his arm is smoother, it no longer catches. He has less pain when he moves his arm. PT said that he has slightly more range of motion in extension.    No change with left thigh or left foot.    Quality of  Symptoms (Better/Worse):  slight decrease in symptoms    Other Condition/Symptoms:        Objective      New Findings:    none    Pulse:    excess wiry  Tongue:    normal to red color, partial white slippery coating    Treatment      Treatment Principles:    Move Qi & Blood, transform phlegm-cold    Needle Set 1 -     Acupuncture Points:    Face down  Bilateral:  Right:  Left:  LI15, TW14, SI 11, BL57, SP6, GB39, K3, BL60  Centerline:  GV14, GV12, GV11, GV10, GV9, GV8    NEEDLES W/O STIM  AT:   1:40  NEEDLES W/O STIM REMOVED AT:   2:10  #NEEDLES IN:   14  #NEEDLES OUT:   14    Needle Set 2 -     Acupuncture Points:     Sitting - mobilize local area during needle retention  Bilateral:  Right:  Left:  jimmy upper arm +2, LI10, TW5, PC5  Centerline:    NEEDLES W/O STIM  AT:    2:15  NEEDLES W/O STIM REMOVED AT:    2:30  #NEEDLES IN:   5  #NEEDLES OUT:   5    Other Traditional Chinese Medicine Modalities:   none    Recommendations: none at this time    Education:  Patient is aware of cumulative effect of acupuncture      Assessment      Analysis of Treatment:    Luis is fatigued by moving arm, he will see how he feels the next few days    Pt prognosis is Fair.     Patient will continue to benefit from acupuncture treatment to address the deficits listed in the problem list box on initial evaluation, provide patient family education and to maximize pt's level of independence in the home and community environment.     Patient's spiritual, cultural and educational needs considered and pt agreeable to plan of care and goals.     Anticipated barriers to treatment:   cash pay    Plan     Recommend    continue with care plan.

## 2023-07-26 ENCOUNTER — CLINICAL SUPPORT (OUTPATIENT)
Dept: REHABILITATION | Facility: HOSPITAL | Age: 68
End: 2023-07-26
Payer: MEDICARE

## 2023-07-26 DIAGNOSIS — G81.14 SPASTIC HEMIPLEGIA AFFECTING LEFT NONDOMINANT SIDE, UNSPECIFIED ETIOLOGY: ICD-10-CM

## 2023-07-26 DIAGNOSIS — Z78.9 IMPAIRED MOBILITY AND ADLS: Primary | ICD-10-CM

## 2023-07-26 DIAGNOSIS — R26.89 GAIT, ANTALGIC: ICD-10-CM

## 2023-07-26 DIAGNOSIS — Z74.09 IMPAIRED MOBILITY AND ADLS: Primary | ICD-10-CM

## 2023-07-26 PROCEDURE — 97810 ACUP 1/> WO ESTIM 1ST 15 MIN: CPT

## 2023-07-26 PROCEDURE — 97811 ACUP 1/> W/O ESTIM EA ADD 15: CPT

## 2023-07-26 NOTE — PROGRESS NOTES
Acupuncture Treatment Note     Name: Luis Wong  Clinic Number: [id number]    Traditional Chinese Medicine Diagnosis: Qi Stagnation, Blood Stasis, and Phlegm-cold  Physician: No ref. provider found    Date of Service: 7/26/2023     Medical Diagnosis:    Impaired mobility and ADLs      Gait, antalgic      Spastic hemiplegia affecting left nondominant side, unspecified etiology      Evaluation Date: 07/13/2023    Visit #/Visits authorized: 4 / 0 (cash pay)     Precautions:  Diabetes, (borderline, recently taken off metformin)      Subjective     Chief Complaint:      5 strokes, last one in 2019. First one in 2012  Has done pT, has a lot of spasticity. In left shoulder, arm, wrist, elbow. Experiences it as tightness and will have fatigue in the muscles very quickly. No pain in his arm. Some pain in shoulder with stretching/moving his arm certain ways.   Broke his hip left leg, had screws and a brady put in. Has chronic pain with certain movements at his lateral left hip, and in the groin area and lower back.   Uses a cane for walking long distances.  Had botox in his arm in 2020 which slightly relieved tightness but it also made his upper arm muscles weaker.   2021 fell and broke hip, recovered well but has reduced range of motion, can no longer bend to tie his shoes.  Has ache at left groin and lateral hip  Left medial ankle and calf tightness, difficulty flexing ankle.     Recently diagnosed with partial tear at right shoulder, and just got a cortisone shot.    Medical necessity is demonstrated by the following IMPAIRMENTS: Medical Necessity: Decreased mobility limits day to day activities, social, and emergent situations and Decreased quality of life     Response to Previous Treatment:   Same as last time, PT more range of motion. Muscles are tired because he had PT earlier today.  Tightness at left upper chest area.    No change with left thigh or left foot.    Quality of Symptoms (Better/Worse):  slight decrease  in symptoms    Other Condition/Symptoms:        Objective      New Findings:   blood pressure relatively high    Pulse:    excess wiry  Tongue:    normal to red color, partial white slippery coating    Treatment      Treatment Principles:    Move Qi & Blood, transform phlegm-cold    Needle Set 1 -     Acupuncture Points:    Face down  Bilateral:  Right:  Left:  LI15, TW14, SI 11, BL57, SP6, GB39, K3, BL60, LI11, TW5, TW6, BL40  Centerline:  GV14, GV12, GV11, GV10, GV9, GV8    NEEDLES W/O STIM  AT:   3:15  NEEDLES W/O STIM REMOVED AT:   3:35  #NEEDLES IN:   18  #NEEDLES OUT:   18  Sitting, mobilizie area  Livia left upper arm +2    Needle Set 2 -     Acupuncture Points:     Sitting - mobilize local area during needle retention  Blood pressure 160:80, blood let apex of ear, right K6, blood pressure 150:80  Bilateral:  Right:  Left:  livia upper arm +2, forearm +2, lateral chest +3  Centerline:  GV20    NEEDLES W/O STIM  AT:    3:40  NEEDLES W/O STIM REMOVED AT:    3:55  #NEEDLES IN:   8  #NEEDLES OUT:   8    Other Traditional Chinese Medicine Modalities:   none    Recommendations: contact primary care about elevated blood pressure    Education:  Patient is aware of cumulative effect of acupuncture      Assessment      Analysis of Treatment:    Luis is fatigued by moving arm, he will see how he feels the next few days    Pt prognosis is Fair.     Patient will continue to benefit from acupuncture treatment to address the deficits listed in the problem list box on initial evaluation, provide patient family education and to maximize pt's level of independence in the home and community environment.     Patient's spiritual, cultural and educational needs considered and pt agreeable to plan of care and goals.     Anticipated barriers to treatment:   cash pay    Plan     Recommend    continue with care plan.

## 2023-08-02 ENCOUNTER — CLINICAL SUPPORT (OUTPATIENT)
Dept: REHABILITATION | Facility: HOSPITAL | Age: 68
End: 2023-08-02
Payer: MEDICARE

## 2023-08-02 DIAGNOSIS — R26.89 GAIT, ANTALGIC: ICD-10-CM

## 2023-08-02 DIAGNOSIS — G81.14 SPASTIC HEMIPLEGIA AFFECTING LEFT NONDOMINANT SIDE, UNSPECIFIED ETIOLOGY: ICD-10-CM

## 2023-08-02 DIAGNOSIS — Z78.9 IMPAIRED MOBILITY AND ADLS: Primary | ICD-10-CM

## 2023-08-02 DIAGNOSIS — Z74.09 IMPAIRED MOBILITY AND ADLS: Primary | ICD-10-CM

## 2023-08-02 PROCEDURE — 97810 ACUP 1/> WO ESTIM 1ST 15 MIN: CPT

## 2023-08-02 PROCEDURE — 97814 ACUP 1/> W/ESTIM EA ADDL 15: CPT

## 2023-08-02 NOTE — PROGRESS NOTES
Acupuncture Treatment Note     Name: Luis Wong  Clinic Number: [id number]    Traditional Chinese Medicine Diagnosis: Qi Stagnation, Blood Stasis, and Phlegm-cold  Physician: Juan Eduardo*    Date of Service: 8/2/2023     Medical Diagnosis:    Impaired mobility and ADLs      Gait, antalgic      Spastic hemiplegia affecting left nondominant side, unspecified etiology      Evaluation Date: 07/13/2023    Visit #/Visits authorized: 5 / 0 (cash pay)     Precautions:  Diabetes, (borderline, recently taken off metformin)      Subjective     Chief Complaint:      5 strokes, last one in 2019. First one in 2012  Has done pT, has a lot of spasticity. In left shoulder, arm, wrist, elbow. Experiences it as tightness and will have fatigue in the muscles very quickly. No pain in his arm. Some pain in shoulder with stretching/moving his arm certain ways.   Broke his hip left leg, had screws and a brady put in. Has chronic pain with certain movements at his lateral left hip, and in the groin area and lower back.   Uses a cane for walking long distances.  Had botox in his arm in 2020 which slightly relieved tightness but it also made his upper arm muscles weaker.   2021 fell and broke hip, recovered well but has reduced range of motion, can no longer bend to tie his shoes.  Has ache at left groin and lateral hip  Left medial ankle and calf tightness, difficulty flexing ankle.     Recently diagnosed with partial tear at right shoulder, and just got a cortisone shot.    Medical necessity is demonstrated by the following IMPAIRMENTS: Medical Necessity: Decreased mobility limits day to day activities, social, and emergent situations and Decreased quality of life     Response to Previous Treatment:   Same as last time,   Tightness at left upper chest area, above elbow, wrist      Quality of Symptoms (Better/Worse):  slight decrease in symptoms    Other Condition/Symptoms:        Objective      New Findings:  Blood pressure  134:80, spoke with dr, increased medication.    Pulse:    excess wiry  Tongue:    normal to red color, partial white slippery coating    Treatment      Treatment Principles:    Move Qi & Blood, transform phlegm-cold    Needle Set 1 -     Acupuncture Points:    Face down  Bilateral:  Right:  Left:  Centerline:  sekou alexander ji +12 total    NEEDLES W/O STIM  AT:   10:40  NEEDLES W/O STIM REMOVED AT:   11:05  #NEEDLES IN:   12  #NEEDLES OUT:   12    Needle Set 2 -     Acupuncture Points:     Sitting - mobilize local area during needle retention  Bilateral:  Right:  Left:  lateral chest +2, upper border scapula +1, proximal elbow +1, LI10, TW5, PC6  ( electro acupuncture scapula to LI10)  Centerline:      NEEDLES W/O STIM  AT:    11:10  NEEDLES W/O STIM REMOVED AT:    11:20  #NEEDLES IN:   7  #NEEDLES OUT:   7    Other Traditional Chinese Medicine Modalities:   none    Recommendations: none    Education:  Patient is aware of cumulative effect of acupuncture      Assessment      Analysis of Treatment:    no change immediately after session    Pt prognosis is Fair.     Patient will continue to benefit from acupuncture treatment to address the deficits listed in the problem list box on initial evaluation, provide patient family education and to maximize pt's level of independence in the home and community environment.     Patient's spiritual, cultural and educational needs considered and pt agreeable to plan of care and goals.     Anticipated barriers to treatment:   cash pay    Plan     Recommend    continue with care plan.

## 2023-08-10 ENCOUNTER — TELEPHONE (OUTPATIENT)
Dept: ELECTROPHYSIOLOGY | Facility: CLINIC | Age: 68
End: 2023-08-10
Payer: MEDICARE

## 2023-08-10 NOTE — TELEPHONE ENCOUNTER
Spoke to patient    CONFIRMED procedure arrival time of 930    Reiterated instructions including:  -Directions to check in desk  -NPO after midnight night prior to procedure  -High importance of HOLDING n/a  -Pre-procedure LABS reviewed no alerts noted   -Confirmed absence or presence of implanted device/stimulator none  -Confirmed no fever, cough, or shortness of breath in the past 30 days  -Bathe night prior and morning prior to procedure with Hibiclens solution or an antibacterial soap  -Reviewed current visitor policy    Patient verbalized understanding of above and appreciated the call.

## 2023-08-11 ENCOUNTER — HOSPITAL ENCOUNTER (OUTPATIENT)
Facility: HOSPITAL | Age: 68
Discharge: HOME | End: 2023-08-11
Attending: INTERNAL MEDICINE | Admitting: INTERNAL MEDICINE
Payer: MEDICARE

## 2023-08-11 VITALS
OXYGEN SATURATION: 100 % | SYSTOLIC BLOOD PRESSURE: 139 MMHG | RESPIRATION RATE: 18 BRPM | DIASTOLIC BLOOD PRESSURE: 67 MMHG | HEART RATE: 93 BPM | WEIGHT: 143 LBS | BODY MASS INDEX: 21.18 KG/M2 | TEMPERATURE: 97 F | HEIGHT: 69 IN

## 2023-08-11 DIAGNOSIS — I63.9 CRYPTOGENIC STROKE: ICD-10-CM

## 2023-08-11 DIAGNOSIS — Z45.09 ENCOUNTER FOR LOOP RECORDER AT END OF BATTERY LIFE: Primary | ICD-10-CM

## 2023-08-11 DIAGNOSIS — Z86.73 HISTORY OF ISCHEMIC STROKE: ICD-10-CM

## 2023-08-11 DIAGNOSIS — Z95.9 CARDIAC DEVICE IN SITU: ICD-10-CM

## 2023-08-11 LAB — POCT GLUCOSE: 108 MG/DL (ref 70–110)

## 2023-08-11 PROCEDURE — 99234 HOSP IP/OBS SM DT SF/LOW 45: CPT | Mod: ,,, | Performed by: INTERNAL MEDICINE

## 2023-08-11 PROCEDURE — 93005 ELECTROCARDIOGRAM TRACING: CPT | Mod: 59

## 2023-08-11 PROCEDURE — 33286 RMVL SUBQ CAR RHYTHM MNTR: CPT | Mod: ,,, | Performed by: INTERNAL MEDICINE

## 2023-08-11 PROCEDURE — 93010 EKG 12-LEAD: ICD-10-PCS | Mod: ,,, | Performed by: INTERNAL MEDICINE

## 2023-08-11 PROCEDURE — 33286 RMVL SUBQ CAR RHYTHM MNTR: CPT | Performed by: INTERNAL MEDICINE

## 2023-08-11 PROCEDURE — 99234 PR OBSERV/HOSP SAME DATE,LEVL III: ICD-10-PCS | Mod: ,,, | Performed by: INTERNAL MEDICINE

## 2023-08-11 PROCEDURE — 25000003 PHARM REV CODE 250: Performed by: INTERNAL MEDICINE

## 2023-08-11 PROCEDURE — 33286 PR REMOVAL, SUBQ CARDIAC RHYTHM MONITOR: ICD-10-PCS | Mod: ,,, | Performed by: INTERNAL MEDICINE

## 2023-08-11 PROCEDURE — 82962 GLUCOSE BLOOD TEST: CPT | Performed by: INTERNAL MEDICINE

## 2023-08-11 PROCEDURE — 93010 ELECTROCARDIOGRAM REPORT: CPT | Mod: ,,, | Performed by: INTERNAL MEDICINE

## 2023-08-11 PROCEDURE — 63600175 PHARM REV CODE 636 W HCPCS: Performed by: INTERNAL MEDICINE

## 2023-08-11 RX ORDER — VANCOMYCIN HYDROCHLORIDE 1 G/20ML
INJECTION, POWDER, LYOPHILIZED, FOR SOLUTION INTRAVENOUS
Status: DISCONTINUED | OUTPATIENT
Start: 2023-08-11 | End: 2023-08-11 | Stop reason: HOSPADM

## 2023-08-11 RX ORDER — LIDOCAINE HYDROCHLORIDE AND EPINEPHRINE 20; 10 MG/ML; UG/ML
INJECTION, SOLUTION INFILTRATION; PERINEURAL
Status: DISCONTINUED | OUTPATIENT
Start: 2023-08-11 | End: 2023-08-11 | Stop reason: HOSPADM

## 2023-08-11 NOTE — HPI
Luis Murphythi. 67 y.o. male presents for ILR removal.  Patient has history of:  HTN  DM  CVA with residual L hemiplegia (R thalamic hemorrhage 2012, and R parietal CVA 6/2018)     ILR placed 12/2019.  No AF seen. Nocturnal pauses in past. One 3.1-second pause 11/8/22. No sx (unclear wake/sleep status then; not contacted successfully then).

## 2023-08-11 NOTE — ASSESSMENT & PLAN NOTE
Patient is here for ILR removal    The indication(s), risks, benefits and alternatives of the procedure were explained to the patient, patient's family and/or surrogate decision maker. Risks include (but not limited to) bleeding, pain, infection. All questions were answered. Patient is understanding of these risks, and wishes to proceed. Consents signed.

## 2023-08-11 NOTE — PROGRESS NOTES
Pt DC'd per MD order. Discharge instructions given including activity, wound care, S&S of infections, future appointments, and when to call MD. Medications reviewed including when to take next dose. PIV DC'd, catheter tip intact. Pt left unit via wheelchair with transport and family.

## 2023-08-11 NOTE — DISCHARGE SUMMARY
[facility] - Short Stay Cardiac Unit  Cardiac Electrophysiology  Discharge Summary      Patient Name: Luis Wong  MRN: [id number]  Admission Date: 8/11/2023  Hospital Length of Stay: 0 days  Discharge Date and Time: 8/11/2023  1:46 PM  Attending Physician: ALIYAH Sanford MD   Discharging Provider: Hanna Holloway NP  Primary Care Physician: Juan Eduardo MD    HPI:   Luis Wong. 67 y.o. male presents for ILR removal.  Patient has history of:  HTN  DM  CVA with residual L hemiplegia (R thalamic hemorrhage 2012, and R parietal CVA 6/2018)     ILR placed 12/2019.  No AF seen. Nocturnal pauses in past. One 3.1-second pause 11/8/22. No sx (unclear wake/sleep status then; not contacted successfully then).    Procedure(s) (LRB):  REMOVAL, IMPLANTABLE LOOP RECORDER (N/A)     Hospital Course:  S/p ILR removal.  Tolerated procedure.  Dressing intact. Patient d/c'd home with instructions.        Goals of Care Treatment Preferences:  Code Status: Full Code    Significant Diagnostic Studies: Cardiac Graphics: ECG: NSR; 71 bpm    Pending Diagnostic Studies:     None          Final Active Diagnoses:    Diagnosis Date Noted POA    PRINCIPAL PROBLEM:  Encounter for loop recorder at end of battery life [Z45.09] 08/11/2023 Not Applicable      Problems Resolved During this Admission:     Cardiac/Vascular  * Encounter for loop recorder at end of battery life  S/p ILR removal  F/u Device clinic in 1 week for wound check if needed  F/u Dr. Sanford prn      Discharged Condition: stable    Disposition: Home or Self Care    Follow Up:   Follow-up Information     DEVICE CHECK CLINIC. Schedule an appointment as soon as possible for a visit in 1 week(s).    Why: As needed, Wound check           Efren Sanford MD. Call today.    Specialties: Electrophysiology, Cardiology  Why: As needed  Contact information:  [address] [facility]  [facility] [address]  [phone]                       Patient Instructions:      Diet Cardiac      Ice to affected area   Order Comments: X 24 - 48 hours as needed for comfort     Remove dressing in 48 hours   Order Comments: May shower after dressing removed with shower spray to back.  Do not submerge in pool or tub.     Notify your health care provider if you experience any of the following:  temperature >100.4     Notify your health care provider if you experience any of the following:  persistent nausea and vomiting or diarrhea     Notify your health care provider if you experience any of the following:  severe uncontrolled pain     Notify your health care provider if you experience any of the following:  redness, tenderness, or signs of infection (pain, swelling, redness, odor or green/yellow discharge around incision site)     Notify your health care provider if you experience any of the following:  difficulty breathing or increased cough     Notify your health care provider if you experience any of the following:  severe persistent headache     Notify your health care provider if you experience any of the following:  worsening rash     Notify your health care provider if you experience any of the following:  persistent dizziness, light-headedness, or visual disturbances     Notify your health care provider if you experience any of the following:  increased confusion or weakness     Activity as tolerated     Shower on day dressing removed (No bath)     Medications:  Reconciled Home Medications:      Medication List      CONTINUE taking these medications    NIFEdipine 30 MG (OSM) 24 hr tablet  Commonly known as: PROCARDIA-XL  Take 1 tablet (30 mg total) by mouth 2 (two) times a day.        ASK your doctor about these medications    ibuprofen 600 MG tablet  Commonly known as: ADVIL,MOTRIN  Take 1 tablet (600 mg total) by mouth every 6 (six) hours as needed for Pain.          Time spent on the discharge of patient: 20 minutes    Hanna Holloway NP  Cardiac Electrophysiology  [facility] - [facility] Cardiac  Unit

## 2023-08-11 NOTE — SUBJECTIVE & OBJECTIVE
Past Medical History:   Diagnosis Date    Aneurysm 10/30/2012    Diabetes mellitus     Fever blister     Herpes infection     Hypertension     ICH (intracerebral hemorrhage)     Lateral epicondylitis of right elbow 11/28/2022    Mixed hyperlipidemia 9/5/2013    Nontraumatic thalamic hemorrhage 8/31/2016    JAQUAN (obstructive sleep apnea)     Prediabetes     Right-sided lacunar stroke 9/11/2014    SDH (subdural hematoma)     Special screening for malignant neoplasms, colon     Stroke     Type 2 diabetes mellitus with hyperglycemia, without long-term current use of insulin 1/25/2022       Past Surgical History:   Procedure Laterality Date    CARPAL TUNNEL RELEASE Right 2/21/2020    Procedure: RELEASE, CARPAL TUNNEL;  Surgeon: Barry Dutton Jr., MD;  Location: [facility] OR;  Service: Orthopedics;  Laterality: Right;    INSERTION OF IMPLANTABLE LOOP RECORDER N/A 12/12/2019    Procedure: Insertion, Implantable Loop Recorder;  Surgeon: Efren Sanford MD;  Location: [facility] CATH LAB/EP;  Service: Cardiology;  Laterality: N/A;  Crytogenic CVA, LOOP,  MDT, Local,  DM    INTRAMEDULLARY RODDING OF FEMUR Left 5/23/2021    Procedure: INSERTION, INTRAMEDULLARY SURY, FEMUR;  Surgeon: Watson Jean-Baptiste IV, MD;  Location: [facility] OR;  Service: Orthopedics;  Laterality: Left;    Knee arthroscopic surgery         Review of patient's allergies indicates:  No Known Allergies    No current facility-administered medications on file prior to encounter.     Current Outpatient Medications on File Prior to Encounter   Medication Sig    ibuprofen (ADVIL,MOTRIN) 600 MG tablet Take 1 tablet (600 mg total) by mouth every 6 (six) hours as needed for Pain. (Patient not taking: Reported on 7/10/2023)     Family History       Problem Relation (Age of Onset)    Cancer Sister    Diabetes Father, Paternal Grandmother, Paternal Grandfather    Heart disease Mother          Tobacco Use    Smoking status: Never    Smokeless tobacco: Never   Substance and Sexual Activity     Alcohol use: No    Drug use: No    Sexual activity: Yes     Partners: Female     Review of Systems   Constitutional: Negative for fever and malaise/fatigue.   Eyes:  Negative for visual disturbance.   Cardiovascular:  Negative for chest pain, dyspnea on exertion, irregular heartbeat, leg swelling, near-syncope and syncope.   Respiratory:  Negative for cough and shortness of breath.    Skin:  Negative for rash.   Neurological:  Negative for focal weakness and light-headedness.     Objective:     Vital Signs (Most Recent):  Temp: 98.6 °F (37 °C) (08/11/23 1022)  Pulse: 81 (08/11/23 1022)  Resp: 17 (08/11/23 1022)  BP: (!) 151/74 (08/11/23 1029)  SpO2: 98 % (08/11/23 1022) Vital Signs (24h Range):  Temp:  [98.6 °F (37 °C)] 98.6 °F (37 °C)  Pulse:  [81] 81  Resp:  [17] 17  SpO2:  [98 %] 98 %  BP: (136-151)/(73-74) 151/74       Weight: 64.9 kg (143 lb)  Body mass index is 21.12 kg/m².    SpO2: 98 %        Physical Exam  Vitals and nursing note reviewed.   Constitutional:       General: He is not in acute distress.     Appearance: He is not ill-appearing.   Cardiovascular:      Pulses:           Radial pulses are 2+ on the right side and 2+ on the left side.   Pulmonary:      Effort: Pulmonary effort is normal. No respiratory distress.   Chest:          Comments: Palpable 2-3 cm device under skin  Musculoskeletal:      Right lower leg: Normal. No swelling. No edema.      Left lower leg: Normal. No swelling. No edema.   Neurological:      Mental Status: He is alert and oriented to person, place, and time.            Significant Imaging: EKG: NSR; 71

## 2023-08-11 NOTE — H&P
[facility] - Short Stay Cardiac Unit  Cardiac Electrophysiology  History and Physical     Admission Date: 8/11/2023  Code Status: Prior   Attending Provider: Efren Sanford MD   Principal Problem:<principal problem not specified>    Subjective:     Chief Complaint:  Presents for ILR removal     HPI:  Luis Wong. 67 y.o. male presents for ILR removal.  Patient has history of:  HTN  DM  CVA with residual L hemiplegia (R thalamic hemorrhage 2012, and R parietal CVA 6/2018)     ILR placed 12/2019.  No AF seen. Nocturnal pauses in past. One 3.1-second pause 11/8/22. No sx (unclear wake/sleep status then; not contacted successfully then).      Past Medical History:   Diagnosis Date    Aneurysm 10/30/2012    Diabetes mellitus     Fever blister     Herpes infection     Hypertension     ICH (intracerebral hemorrhage)     Lateral epicondylitis of right elbow 11/28/2022    Mixed hyperlipidemia 9/5/2013    Nontraumatic thalamic hemorrhage 8/31/2016    JAQUAN (obstructive sleep apnea)     Prediabetes     Right-sided lacunar stroke 9/11/2014    SDH (subdural hematoma)     Special screening for malignant neoplasms, colon     Stroke     Type 2 diabetes mellitus with hyperglycemia, without long-term current use of insulin 1/25/2022       Past Surgical History:   Procedure Laterality Date    CARPAL TUNNEL RELEASE Right 2/21/2020    Procedure: RELEASE, CARPAL TUNNEL;  Surgeon: Barry Dutton Jr., MD;  Location: [facility] OR;  Service: Orthopedics;  Laterality: Right;    INSERTION OF IMPLANTABLE LOOP RECORDER N/A 12/12/2019    Procedure: Insertion, Implantable Loop Recorder;  Surgeon: Efren Sanford MD;  Location: [facility] CATH LAB/EP;  Service: Cardiology;  Laterality: N/A;  Crytogenic CVA, LOOP,  MDT, Local,  DM    INTRAMEDULLARY RODDING OF FEMUR Left 5/23/2021    Procedure: INSERTION, INTRAMEDULLARY SURY, FEMUR;  Surgeon: Watson Jean-Baptiste IV, MD;  Location: [facility] OR;  Service: Orthopedics;  Laterality: Left;    Knee  arthroscopic surgery         Review of patient's allergies indicates:  No Known Allergies    No current facility-administered medications on file prior to encounter.     Current Outpatient Medications on File Prior to Encounter   Medication Sig    ibuprofen (ADVIL,MOTRIN) 600 MG tablet Take 1 tablet (600 mg total) by mouth every 6 (six) hours as needed for Pain. (Patient not taking: Reported on 7/10/2023)     Family History       Problem Relation (Age of Onset)    Cancer Sister    Diabetes Father, Paternal Grandmother, Paternal Grandfather    Heart disease Mother          Tobacco Use    Smoking status: Never    Smokeless tobacco: Never   Substance and Sexual Activity    Alcohol use: No    Drug use: No    Sexual activity: Yes     Partners: Female     Review of Systems   Constitutional: Negative for fever and malaise/fatigue.   Eyes:  Negative for visual disturbance.   Cardiovascular:  Negative for chest pain, dyspnea on exertion, irregular heartbeat, leg swelling, near-syncope and syncope.   Respiratory:  Negative for cough and shortness of breath.    Skin:  Negative for rash.   Neurological:  Negative for focal weakness and light-headedness.     Objective:     Vital Signs (Most Recent):  Temp: 98.6 °F (37 °C) (08/11/23 1022)  Pulse: 81 (08/11/23 1022)  Resp: 17 (08/11/23 1022)  BP: (!) 151/74 (08/11/23 1029)  SpO2: 98 % (08/11/23 1022) Vital Signs (24h Range):  Temp:  [98.6 °F (37 °C)] 98.6 °F (37 °C)  Pulse:  [81] 81  Resp:  [17] 17  SpO2:  [98 %] 98 %  BP: (136-151)/(73-74) 151/74       Weight: 64.9 kg (143 lb)  Body mass index is 21.12 kg/m².    SpO2: 98 %        Physical Exam  Vitals and nursing note reviewed.   Constitutional:       General: He is not in acute distress.     Appearance: He is not ill-appearing.   Cardiovascular:      Pulses:           Radial pulses are 2+ on the right side and 2+ on the left side.   Pulmonary:      Effort: Pulmonary effort is normal. No respiratory distress.   Chest:           Comments: Palpable 2-3 cm device under skin  Musculoskeletal:      Right lower leg: Normal. No swelling. No edema.      Left lower leg: Normal. No swelling. No edema.   Neurological:      Mental Status: He is alert and oriented to person, place, and time.            Significant Imaging: EKG: NSR; 71    Assessment and Plan:     Encounter for loop recorder at end of battery life  Patient is here for ILR removal    The indication(s), risks, benefits and alternatives of the procedure were explained to the patient, patient's family and/or surrogate decision maker. Risks include (but not limited to) bleeding, pain, infection. All questions were answered. Patient is understanding of these risks, and wishes to proceed. Consents signed.    Local anesthesia only    Hanna Holloway NP  Cardiac Electrophysiology  [facility] - Short Stay Cardiac Unit

## 2023-08-11 NOTE — DISCHARGE INSTRUCTIONS
Post-Procedure Patient Discharge Instructions  Loop Recorders     Wound Care  You are discharged with a standard dressing over the incision, you may remove the dressing after 24 hours.   There is Dermabond (clear glue) over your incision, do not scrub it off. It acts as a barrier and will eventually disappear.  Do not get the incision wet for 48 hours following the procedure. You may sponge bath during this period, working around the incision. After 48 hours, you may shower, but you should still try to keep this area as dry as possible, and avoid direct water contact to the incision (allow the water to hit back of your shoulder rather than directly on the incision). Gently pat the incision dry if it does get wet.  You may take regular showers after 2 weeks, unless otherwise indicated at your follow-up visit.  Do not submerge the incision in a tub, pool, or body of water for 6 weeks.  If you notice unusual swelling, redness, drainage, have more device site pain, chest pain, shortness of breath, or have a fever greater than 100 degrees, call our device clinic immediately: [phone]       Any need to reschedule or cancel procedures, or any questions regarding your procedures should be addressed directly with the Arrhythmia Department Nurses at the following phone number: [phone].

## 2023-08-11 NOTE — PLAN OF CARE
Received report from Yvette. Patient s/p loop recorder removal, AAOx3. VSS, no c/o pain or discomfort at this time, resp even and unlabored. Mepilex dressing to L chest is CDI. No active bleeding. No hematoma noted. Post procedure protocol reviewed with patient and patient's family. Understanding verbalized. Family members at bedside. Nurse call bell within reach. Will continue to monitor per post procedure protocol.

## 2023-09-20 ENCOUNTER — LAB VISIT (OUTPATIENT)
Dept: LAB | Facility: HOSPITAL | Age: 68
End: 2023-09-20
Attending: FAMILY MEDICINE
Payer: MEDICARE

## 2023-09-20 DIAGNOSIS — E11.65 TYPE 2 DIABETES MELLITUS WITH HYPERGLYCEMIA, WITHOUT LONG-TERM CURRENT USE OF INSULIN: ICD-10-CM

## 2023-09-20 DIAGNOSIS — I10 PRIMARY HYPERTENSION: ICD-10-CM

## 2023-09-20 LAB
ALBUMIN SERPL BCP-MCNC: 4.2 G/DL (ref 3.5–5.2)
ALP SERPL-CCNC: 98 U/L (ref 55–135)
ALT SERPL W/O P-5'-P-CCNC: 12 U/L (ref 10–44)
ANION GAP SERPL CALC-SCNC: 8 MMOL/L (ref 8–16)
AST SERPL-CCNC: 18 U/L (ref 10–40)
BILIRUB SERPL-MCNC: 0.9 MG/DL (ref 0.1–1)
BUN SERPL-MCNC: 27 MG/DL (ref 8–23)
CALCIUM SERPL-MCNC: 10.3 MG/DL (ref 8.7–10.5)
CHLORIDE SERPL-SCNC: 103 MMOL/L (ref 95–110)
CHOLEST SERPL-MCNC: 276 MG/DL (ref 120–199)
CHOLEST/HDLC SERPL: 2.8 {RATIO} (ref 2–5)
CO2 SERPL-SCNC: 29 MMOL/L (ref 23–29)
CREAT SERPL-MCNC: 1.1 MG/DL (ref 0.5–1.4)
EST. GFR  (NO RACE VARIABLE): >60 ML/MIN/1.73 M^2
ESTIMATED AVG GLUCOSE: 114 MG/DL (ref 68–131)
GLUCOSE SERPL-MCNC: 126 MG/DL (ref 70–110)
HBA1C MFR BLD: 5.6 % (ref 4–5.6)
HDLC SERPL-MCNC: 100 MG/DL (ref 40–75)
HDLC SERPL: 36.2 % (ref 20–50)
LDLC SERPL CALC-MCNC: 161 MG/DL (ref 63–159)
NONHDLC SERPL-MCNC: 176 MG/DL
POTASSIUM SERPL-SCNC: 3.7 MMOL/L (ref 3.5–5.1)
PROT SERPL-MCNC: 7.7 G/DL (ref 6–8.4)
SODIUM SERPL-SCNC: 140 MMOL/L (ref 136–145)
TRIGL SERPL-MCNC: 75 MG/DL (ref 30–150)

## 2023-09-20 PROCEDURE — 36415 COLL VENOUS BLD VENIPUNCTURE: CPT | Mod: PO | Performed by: FAMILY MEDICINE

## 2023-09-20 PROCEDURE — 80053 COMPREHEN METABOLIC PANEL: CPT | Performed by: FAMILY MEDICINE

## 2023-09-20 PROCEDURE — 80061 LIPID PANEL: CPT | Performed by: FAMILY MEDICINE

## 2023-09-20 PROCEDURE — 83036 HEMOGLOBIN GLYCOSYLATED A1C: CPT | Performed by: FAMILY MEDICINE

## 2023-09-28 ENCOUNTER — OFFICE VISIT (OUTPATIENT)
Dept: FAMILY MEDICINE | Facility: CLINIC | Age: 68
End: 2023-09-28
Payer: MEDICARE

## 2023-09-28 VITALS
HEART RATE: 87 BPM | HEIGHT: 69 IN | WEIGHT: 148.56 LBS | BODY MASS INDEX: 22 KG/M2 | OXYGEN SATURATION: 98 % | SYSTOLIC BLOOD PRESSURE: 138 MMHG | DIASTOLIC BLOOD PRESSURE: 82 MMHG

## 2023-09-28 DIAGNOSIS — E78.5 HYPERLIPIDEMIA, UNSPECIFIED HYPERLIPIDEMIA TYPE: ICD-10-CM

## 2023-09-28 DIAGNOSIS — Z12.5 SCREENING FOR PROSTATE CANCER: ICD-10-CM

## 2023-09-28 DIAGNOSIS — E11.65 TYPE 2 DIABETES MELLITUS WITH HYPERGLYCEMIA, WITHOUT LONG-TERM CURRENT USE OF INSULIN: ICD-10-CM

## 2023-09-28 DIAGNOSIS — I10 PRIMARY HYPERTENSION: Primary | ICD-10-CM

## 2023-09-28 DIAGNOSIS — Z02.79 MEDICAL CERTIFICATE ISSUANCE: ICD-10-CM

## 2023-09-28 DIAGNOSIS — D70.9 NEUTROPENIA, UNSPECIFIED TYPE: ICD-10-CM

## 2023-09-28 PROCEDURE — 3066F NEPHROPATHY DOC TX: CPT | Mod: CPTII,S$GLB,, | Performed by: FAMILY MEDICINE

## 2023-09-28 PROCEDURE — 1160F RVW MEDS BY RX/DR IN RCRD: CPT | Mod: CPTII,S$GLB,, | Performed by: FAMILY MEDICINE

## 2023-09-28 PROCEDURE — 3066F PR DOCUMENTATION OF TREATMENT FOR NEPHROPATHY: ICD-10-PCS | Mod: CPTII,S$GLB,, | Performed by: FAMILY MEDICINE

## 2023-09-28 PROCEDURE — 3075F PR MOST RECENT SYSTOLIC BLOOD PRESS GE 130-139MM HG: ICD-10-PCS | Mod: CPTII,S$GLB,, | Performed by: FAMILY MEDICINE

## 2023-09-28 PROCEDURE — 3079F PR MOST RECENT DIASTOLIC BLOOD PRESSURE 80-89 MM HG: ICD-10-PCS | Mod: CPTII,S$GLB,, | Performed by: FAMILY MEDICINE

## 2023-09-28 PROCEDURE — 1160F PR REVIEW ALL MEDS BY PRESCRIBER/CLIN PHARMACIST DOCUMENTED: ICD-10-PCS | Mod: CPTII,S$GLB,, | Performed by: FAMILY MEDICINE

## 2023-09-28 PROCEDURE — 99999 PR PBB SHADOW E&M-EST. PATIENT-LVL III: ICD-10-PCS | Mod: PBBFAC,,, | Performed by: FAMILY MEDICINE

## 2023-09-28 PROCEDURE — 3079F DIAST BP 80-89 MM HG: CPT | Mod: CPTII,S$GLB,, | Performed by: FAMILY MEDICINE

## 2023-09-28 PROCEDURE — 3044F PR MOST RECENT HEMOGLOBIN A1C LEVEL <7.0%: ICD-10-PCS | Mod: CPTII,S$GLB,, | Performed by: FAMILY MEDICINE

## 2023-09-28 PROCEDURE — 3075F SYST BP GE 130 - 139MM HG: CPT | Mod: CPTII,S$GLB,, | Performed by: FAMILY MEDICINE

## 2023-09-28 PROCEDURE — 1126F PR PAIN SEVERITY QUANTIFIED, NO PAIN PRESENT: ICD-10-PCS | Mod: CPTII,S$GLB,, | Performed by: FAMILY MEDICINE

## 2023-09-28 PROCEDURE — 99215 PR OFFICE/OUTPT VISIT, EST, LEVL V, 40-54 MIN: ICD-10-PCS | Mod: S$GLB,,, | Performed by: FAMILY MEDICINE

## 2023-09-28 PROCEDURE — 1159F MED LIST DOCD IN RCRD: CPT | Mod: CPTII,S$GLB,, | Performed by: FAMILY MEDICINE

## 2023-09-28 PROCEDURE — 3061F PR NEG MICROALBUMINURIA RESULT DOCUMENTED/REVIEW: ICD-10-PCS | Mod: CPTII,S$GLB,, | Performed by: FAMILY MEDICINE

## 2023-09-28 PROCEDURE — 3008F PR BODY MASS INDEX (BMI) DOCUMENTED: ICD-10-PCS | Mod: CPTII,S$GLB,, | Performed by: FAMILY MEDICINE

## 2023-09-28 PROCEDURE — 3061F NEG MICROALBUMINURIA REV: CPT | Mod: CPTII,S$GLB,, | Performed by: FAMILY MEDICINE

## 2023-09-28 PROCEDURE — 1126F AMNT PAIN NOTED NONE PRSNT: CPT | Mod: CPTII,S$GLB,, | Performed by: FAMILY MEDICINE

## 2023-09-28 PROCEDURE — 3008F BODY MASS INDEX DOCD: CPT | Mod: CPTII,S$GLB,, | Performed by: FAMILY MEDICINE

## 2023-09-28 PROCEDURE — 1159F PR MEDICATION LIST DOCUMENTED IN MEDICAL RECORD: ICD-10-PCS | Mod: CPTII,S$GLB,, | Performed by: FAMILY MEDICINE

## 2023-09-28 PROCEDURE — 99215 OFFICE O/P EST HI 40 MIN: CPT | Mod: S$GLB,,, | Performed by: FAMILY MEDICINE

## 2023-09-28 PROCEDURE — 3044F HG A1C LEVEL LT 7.0%: CPT | Mod: CPTII,S$GLB,, | Performed by: FAMILY MEDICINE

## 2023-09-28 PROCEDURE — 99999 PR PBB SHADOW E&M-EST. PATIENT-LVL III: CPT | Mod: PBBFAC,,, | Performed by: FAMILY MEDICINE

## 2023-09-28 RX ORDER — ROSUVASTATIN CALCIUM 5 MG/1
5 TABLET, COATED ORAL NIGHTLY
Qty: 90 TABLET | Refills: 3 | Status: SHIPPED | OUTPATIENT
Start: 2023-09-28 | End: 2024-09-27

## 2023-09-28 NOTE — PROGRESS NOTES
Subjective     Patient ID: Luis Wong is a 67 y.o. male.    Chief Complaint: Diabetes    67 years old male came the clinic for blood pressure check.  Blood pressure today was stable.  No chest pain, palpitation, orthopnea or PND.  Last LDL was elevated.  He is willing to try a small dose of cholesterol treatment.  Patient with left hemiparesis associated with previous stroke.  He is requesting handicap certification.  Patient with slightly low white blood cells before.    Diabetes  Associated symptoms include weakness. Pertinent negatives for diabetes include no chest pain.     Review of Systems   Constitutional: Negative.    HENT: Negative.     Eyes: Negative.    Respiratory: Negative.     Cardiovascular: Negative.  Negative for chest pain, palpitations, leg swelling and claudication.   Gastrointestinal: Negative.    Genitourinary: Negative.    Musculoskeletal:  Positive for gait problem.   Integumentary:  Negative.   Neurological:  Positive for weakness.   Psychiatric/Behavioral: Negative.            Objective     Physical Exam  Vitals and nursing note reviewed.   Constitutional:       General: He is not in acute distress.     Appearance: He is well-developed. He is not diaphoretic.   HENT:      Head: Normocephalic and atraumatic.      Right Ear: External ear normal.      Left Ear: External ear normal.      Nose: Nose normal.      Mouth/Throat:      Pharynx: No oropharyngeal exudate.   Eyes:      General: No scleral icterus.        Right eye: No discharge.         Left eye: No discharge.      Conjunctiva/sclera: Conjunctivae normal.      Pupils: Pupils are equal, round, and reactive to light.   Neck:      Thyroid: No thyromegaly.      Vascular: No JVD.      Trachea: No tracheal deviation.   Cardiovascular:      Rate and Rhythm: Normal rate and regular rhythm.      Heart sounds: Normal heart sounds. No murmur heard.     No friction rub. No gallop.   Pulmonary:      Effort: Pulmonary effort is normal. No  respiratory distress.      Breath sounds: Normal breath sounds. No stridor. No wheezing or rales.   Chest:      Chest wall: No tenderness.   Abdominal:      General: Bowel sounds are normal. There is no distension.      Palpations: Abdomen is soft. There is no mass.      Tenderness: There is no abdominal tenderness. There is no guarding or rebound.   Musculoskeletal:         General: No tenderness. Normal range of motion.      Cervical back: Normal range of motion and neck supple.   Feet:      Right foot:      Protective Sensation: 10 sites tested.  10 sites sensed.      Skin integrity: No ulcer, blister, skin breakdown, erythema, warmth, callus, dry skin or fissure.      Left foot:      Protective Sensation: 10 sites tested.  10 sites sensed.      Skin integrity: No ulcer, blister, skin breakdown, erythema, warmth, callus, dry skin or fissure.   Lymphadenopathy:      Cervical: No cervical adenopathy.   Skin:     General: Skin is warm and dry.      Coloration: Skin is not pale.      Findings: No erythema or rash.   Neurological:      Mental Status: He is alert and oriented to person, place, and time.      Cranial Nerves: No cranial nerve deficit.      Motor: Weakness present. No abnormal muscle tone.      Coordination: Coordination abnormal.      Gait: Gait abnormal.      Deep Tendon Reflexes: Reflexes are normal and symmetric. Reflexes normal.      Comments: Left hemiparesis.   Psychiatric:         Behavior: Behavior normal.         Thought Content: Thought content normal.         Judgment: Judgment normal.            Assessment and Plan     1. Primary hypertension  -     Lipid Panel; Future; Expected date: 09/28/2023  -     Comprehensive Metabolic Panel; Future; Expected date: 09/28/2023  -     CBC Auto Differential; Future; Expected date: 09/28/2023    2. Hyperlipidemia, unspecified hyperlipidemia type  -     rosuvastatin (CRESTOR) 5 MG tablet; Take 1 tablet (5 mg total) by mouth every evening.  Dispense: 90  tablet; Refill: 3    3. Type 2 diabetes mellitus with hyperglycemia, without long-term current use of insulin  -     Microalbumin/Creatinine Ratio, Urine; Future; Expected date: 09/28/2023  -     Hemoglobin A1C; Future; Expected date: 09/28/2023    4. Screening for prostate cancer  -     PSA, Screening; Future; Expected date: 09/28/2023    5. Medical certificate issuance    6. Neutropenia, unspecified type        Continue monitoring blood pressure at home, low sodium diet.   Continue monitoring blood sugar at home,ADA diet.        Follow up in about 6 months (around 3/28/2024), or if symptoms worsen or fail to improve.

## 2023-10-27 ENCOUNTER — PATIENT MESSAGE (OUTPATIENT)
Dept: FAMILY MEDICINE | Facility: CLINIC | Age: 68
End: 2023-10-27
Payer: MEDICARE

## 2023-10-30 DIAGNOSIS — H91.90 HEARING LOSS, UNSPECIFIED HEARING LOSS TYPE, UNSPECIFIED LATERALITY: Primary | ICD-10-CM

## 2023-11-01 RX ORDER — IBUPROFEN 600 MG/1
600 TABLET ORAL EVERY 6 HOURS PRN
Qty: 40 TABLET | Refills: 1 | Status: SHIPPED | OUTPATIENT
Start: 2023-11-01 | End: 2024-02-19 | Stop reason: SDUPTHER

## 2023-11-06 ENCOUNTER — CLINICAL SUPPORT (OUTPATIENT)
Dept: AUDIOLOGY | Facility: CLINIC | Age: 68
End: 2023-11-06
Payer: MEDICARE

## 2023-11-06 ENCOUNTER — OFFICE VISIT (OUTPATIENT)
Dept: OTOLARYNGOLOGY | Facility: CLINIC | Age: 68
End: 2023-11-06
Payer: MEDICARE

## 2023-11-06 DIAGNOSIS — Z57.0 OCCUPATIONAL EXPOSURE TO NOISE: ICD-10-CM

## 2023-11-06 DIAGNOSIS — H90.A22 SENSORINEURAL HEARING LOSS (SNHL) OF LEFT EAR WITH RESTRICTED HEARING OF RIGHT EAR: ICD-10-CM

## 2023-11-06 DIAGNOSIS — Z77.122 HISTORY OF EXPOSURE TO NOISE: ICD-10-CM

## 2023-11-06 DIAGNOSIS — H90.3 SENSORINEURAL HEARING LOSS (SNHL) OF BOTH EARS: ICD-10-CM

## 2023-11-06 DIAGNOSIS — I69.30 HISTORY OF STROKE WITH RESIDUAL DEFICIT: Primary | ICD-10-CM

## 2023-11-06 PROCEDURE — 1126F PR PAIN SEVERITY QUANTIFIED, NO PAIN PRESENT: ICD-10-PCS | Mod: CPTII,S$GLB,, | Performed by: OTOLARYNGOLOGY

## 2023-11-06 PROCEDURE — 92557 PR COMPREHENSIVE HEARING TEST: ICD-10-PCS | Mod: S$GLB,,, | Performed by: AUDIOLOGIST

## 2023-11-06 PROCEDURE — 92557 COMPREHENSIVE HEARING TEST: CPT | Mod: S$GLB,,, | Performed by: AUDIOLOGIST

## 2023-11-06 PROCEDURE — 3288F PR FALLS RISK ASSESSMENT DOCUMENTED: ICD-10-PCS | Mod: CPTII,S$GLB,, | Performed by: OTOLARYNGOLOGY

## 2023-11-06 PROCEDURE — 1160F PR REVIEW ALL MEDS BY PRESCRIBER/CLIN PHARMACIST DOCUMENTED: ICD-10-PCS | Mod: CPTII,S$GLB,, | Performed by: OTOLARYNGOLOGY

## 2023-11-06 PROCEDURE — 3044F HG A1C LEVEL LT 7.0%: CPT | Mod: CPTII,S$GLB,, | Performed by: OTOLARYNGOLOGY

## 2023-11-06 PROCEDURE — 1101F PT FALLS ASSESS-DOCD LE1/YR: CPT | Mod: CPTII,S$GLB,, | Performed by: OTOLARYNGOLOGY

## 2023-11-06 PROCEDURE — 3066F NEPHROPATHY DOC TX: CPT | Mod: CPTII,S$GLB,, | Performed by: OTOLARYNGOLOGY

## 2023-11-06 PROCEDURE — 99999 PR PBB SHADOW E&M-EST. PATIENT-LVL III: ICD-10-PCS | Mod: PBBFAC,,, | Performed by: OTOLARYNGOLOGY

## 2023-11-06 PROCEDURE — 3066F PR DOCUMENTATION OF TREATMENT FOR NEPHROPATHY: ICD-10-PCS | Mod: CPTII,S$GLB,, | Performed by: OTOLARYNGOLOGY

## 2023-11-06 PROCEDURE — 1126F AMNT PAIN NOTED NONE PRSNT: CPT | Mod: CPTII,S$GLB,, | Performed by: OTOLARYNGOLOGY

## 2023-11-06 PROCEDURE — 3044F PR MOST RECENT HEMOGLOBIN A1C LEVEL <7.0%: ICD-10-PCS | Mod: CPTII,S$GLB,, | Performed by: OTOLARYNGOLOGY

## 2023-11-06 PROCEDURE — 1159F MED LIST DOCD IN RCRD: CPT | Mod: CPTII,S$GLB,, | Performed by: OTOLARYNGOLOGY

## 2023-11-06 PROCEDURE — 92567 PR TYMPA2METRY: ICD-10-PCS | Mod: S$GLB,,, | Performed by: AUDIOLOGIST

## 2023-11-06 PROCEDURE — 1160F RVW MEDS BY RX/DR IN RCRD: CPT | Mod: CPTII,S$GLB,, | Performed by: OTOLARYNGOLOGY

## 2023-11-06 PROCEDURE — 99203 OFFICE O/P NEW LOW 30 MIN: CPT | Mod: S$GLB,,, | Performed by: OTOLARYNGOLOGY

## 2023-11-06 PROCEDURE — 99999 PR PBB SHADOW E&M-EST. PATIENT-LVL II: CPT | Mod: PBBFAC,,, | Performed by: AUDIOLOGIST

## 2023-11-06 PROCEDURE — 99999 PR PBB SHADOW E&M-EST. PATIENT-LVL III: CPT | Mod: PBBFAC,,, | Performed by: OTOLARYNGOLOGY

## 2023-11-06 PROCEDURE — 99203 PR OFFICE/OUTPT VISIT, NEW, LEVL III, 30-44 MIN: ICD-10-PCS | Mod: S$GLB,,, | Performed by: OTOLARYNGOLOGY

## 2023-11-06 PROCEDURE — 3061F NEG MICROALBUMINURIA REV: CPT | Mod: CPTII,S$GLB,, | Performed by: OTOLARYNGOLOGY

## 2023-11-06 PROCEDURE — 3061F PR NEG MICROALBUMINURIA RESULT DOCUMENTED/REVIEW: ICD-10-PCS | Mod: CPTII,S$GLB,, | Performed by: OTOLARYNGOLOGY

## 2023-11-06 PROCEDURE — 3288F FALL RISK ASSESSMENT DOCD: CPT | Mod: CPTII,S$GLB,, | Performed by: OTOLARYNGOLOGY

## 2023-11-06 PROCEDURE — 1101F PR PT FALLS ASSESS DOC 0-1 FALLS W/OUT INJ PAST YR: ICD-10-PCS | Mod: CPTII,S$GLB,, | Performed by: OTOLARYNGOLOGY

## 2023-11-06 PROCEDURE — 92567 TYMPANOMETRY: CPT | Mod: S$GLB,,, | Performed by: AUDIOLOGIST

## 2023-11-06 PROCEDURE — 99999 PR PBB SHADOW E&M-EST. PATIENT-LVL II: ICD-10-PCS | Mod: PBBFAC,,, | Performed by: AUDIOLOGIST

## 2023-11-06 PROCEDURE — 1159F PR MEDICATION LIST DOCUMENTED IN MEDICAL RECORD: ICD-10-PCS | Mod: CPTII,S$GLB,, | Performed by: OTOLARYNGOLOGY

## 2023-11-06 SDOH — SOCIAL DETERMINANTS OF HEALTH (SDOH): OCCUPATIONAL EXPOSURE TO NOISE: Z57.0

## 2023-11-06 NOTE — PROGRESS NOTES
Audiologic Evaluation 11/6/2023:       Luis Wong, a 67 y.o. male, was seen today in the clinic for an audiologic evaluation for hearing loss.  Mr. Wong reported speech sometimes sounds muffled. He denied tinnitus, otalgia, and dizziness. Mr. Wong reported a significant history of occupational noise exposure.      Tympanometry revealed Type A tympanogram in the right ear and Type A tympanogram in the left ear. Audiogram results revealed mild to moderate sensorineural hearing loss 750 Hz to 6000 Hz in the right ear and moderate sensorineural hearing loss 750 Hz- 8000 Hz in the left ear.  Speech reception thresholds were noted at 35 dB in the right ear and 40 dB in the left ear.  Speech discrimination scores were 100% in the right ear and 88% in the left ear.    Recommendations:  Otologic evaluation  Hearing aid consultation with medical clearance  Annual audiogram  Hearing protection when in noise

## 2023-11-06 NOTE — PATIENT INSTRUCTIONS
Annual audiogram recommended in either case.  This is to be sure there is no progressive hearing loss.  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    Cleared for amplification.  Hearing aid evaluation is recommended.  Patient to start with secondary insurance company to determine if any benefit as provided and if so to use that recommended company.  Patient also encouraged to price compare and get multiple quotes.  Provided Mya Little's number for hearing aid consultation here at [facility] if desired.    Return with any worsening of symptoms, failure to improve, or any other concerns for further evaluation and treatment.      Voice recognition software was used in the creation of this note/communication and any sound-alike errors which may have occurred from its use should be taken in context when interpreting.  If such errors prevent a clear understanding of the note/communication, please contact the office for clarification.

## 2023-11-06 NOTE — PROGRESS NOTES
[facility]    Subjective:      Patient: Luis Wong Patient PCP: Juan Eduardo MD         :  1955     Sex:  male      MRN:  [id number]          Date of Visit: 2023      Chief Complaint: Hearing Loss (Been a couple years, per patient. Does not wear hearing aids )      Patient ID: Luis Wong is a 67 y.o. male lifelong NON-smoker with a significant past medical history of ischemic stroke, HTN, HLD, type 2 diabetes referred to me by Dr. Juan Elam-Mo* in consultation for nonacute hearing loss without subjective asymmetry.  Hearing testing today shows normal hearing through 500 hertz with precipitous drop off to a mild-to-moderate degree with some right-sided mid/high-frequency recovery absent on the left side with a 35 dB left and 40 dB right SRT and 100% right and 88% left WRS with normal tympanometry bilaterally.  Previous brain imaging has included MRI and MRA of the brain as recently as 2019.  T2 axial images of the brain from that MRI reviewed today show no appreciable IAC or CPA lesion on these limited images.  MRI reports significant but stable areas consistent with prior hemorrhagic infarct versus amyloid deposition, chronic ischemic microvascular changes and new punctate focus of restricted diffusion consistent with more recent infarct in the left parietal white matter and no significant change in late subacute infarct of the right periventricular white matter and corona radiata.          Review of Systems     Past Medical History  He has a past medical history of Aneurysm, Diabetes mellitus, Fever blister, Herpes infection, Hypertension, ICH (intracerebral hemorrhage), Lateral epicondylitis of right elbow, Mixed hyperlipidemia, Nontraumatic thalamic hemorrhage, JAQUAN (obstructive sleep apnea), Prediabetes, Right-sided lacunar stroke, SDH (subdural hematoma), Special screening for malignant neoplasms, colon, Stroke, and Type 2 diabetes mellitus with hyperglycemia,  without long-term current use of insulin.    Family / Surgical / Social History  His family history includes Cancer in his sister; Diabetes in his father, paternal grandfather, and paternal grandmother; Heart disease in his mother.    Past Surgical History:   Procedure Laterality Date    CARPAL TUNNEL RELEASE Right 2/21/2020    Procedure: RELEASE, CARPAL TUNNEL;  Surgeon: Barry Dutton Jr., MD;  Location: [facility] OR;  Service: Orthopedics;  Laterality: Right;    INSERTION OF IMPLANTABLE LOOP RECORDER N/A 12/12/2019    Procedure: Insertion, Implantable Loop Recorder;  Surgeon: Efren Sanford MD;  Location: [facility] CATH LAB/EP;  Service: Cardiology;  Laterality: N/A;  Crytogenic CVA, LOOP,  MDT, Local,  DM    INTRAMEDULLARY RODDING OF FEMUR Left 5/23/2021    Procedure: INSERTION, INTRAMEDULLARY SURY, FEMUR;  Surgeon: Watson Jean-Baptiste IV, MD;  Location: [facility] OR;  Service: Orthopedics;  Laterality: Left;    Knee arthroscopic surgery      REMOVAL OF IMPLANTABLE LOOP RECORDER N/A 8/11/2023    Procedure: REMOVAL, IMPLANTABLE LOOP RECORDER;  Surgeon: Efren Sanford MD;  Location: [facility] EP LAB;  Service: Cardiology;  Laterality: N/A;  SHANTELLE, ILR Removal, MDT,  RN sedate, DM, 3 Prep       Social History     Tobacco Use    Smoking status: Never    Smokeless tobacco: Never   Substance and Sexual Activity    Alcohol use: No    Drug use: No    Sexual activity: Yes     Partners: Female       Medications  He has a current medication list which includes the following prescription(s): ibuprofen, nifedipine, and rosuvastatin.      Allergies  Review of patient's allergies indicates:  No Known Allergies    All medications, allergies, and past history have been reviewed.    Objective:      Vitals:      8/11/2023    10:22 AM 9/28/2023     4:09 PM 11/6/2023     3:04 PM   Vitals - 1 value per visit   SYSTOLIC 136 138    DIASTOLIC 73 82    Pulse 81 87    Temp 98.6 °F (37 °C)     Resp 17     SPO2 98 % 98 %    Weight (lb) 143 148.59    Weight (kg)  "64.864 67.4    Height 5' 9" (1.753 m) 5' 9" (1.753 m)    BMI (Calculated) 21.1 21.9    Pain Score  Zero Zero       There is no height or weight on file to calculate BSA.    Physical Exam:    GENERAL  APPEARANCE -  alert, appears stated age, and cooperative  BARRIER(S) TO COMMUNICATION -  none VOICE - appropriate for age and gender    INTEGUMENTARY  no suspicious head and neck lesions    HEENT  HEAD: Normocephalic, without obvious abnormality, atraumatic  FACE: INSPECTION - Symmetric, no signs of trauma, no suspicious lesion(s)   STRENGTH - facial symmetry    EYES  Normal occular alignment and mobility with no visible nystagmus at rest    EARS/NOSE/MOUTH/THROAT  EARS  PINNAE AND EXTERNAL EARS - no suspicious lesion OTOSCOPIC EXAM (surgical microscopy was not used for visualization/instrumentation): EAR EXAM - Normal ear canals, tympanic membranes and mobility, and middle ear spaces bilaterally.  HEARING - grossly intact to voice/finger rub    CHEST AND LUNG   INSPECTION & AUSCULTATION - normal effort, no stridor    CARDIOVASCULAR  AUSCULTATION & PERIPHERAL VASCULAR - regular rate and rhythm.        Procedure(s):  None    Labs:  WBC   Date Value Ref Range Status   08/04/2023 4.00 3.90 - 12.70 K/uL Final     Hemoglobin   Date Value Ref Range Status   08/04/2023 14.0 14.0 - 18.0 g/dL Final     Platelets   Date Value Ref Range Status   08/04/2023 179 150 - 450 K/uL Final     Creatinine   Date Value Ref Range Status   09/20/2023 1.1 0.5 - 1.4 mg/dL Final     TSH   Date Value Ref Range Status   01/26/2022 0.704 0.400 - 4.000 uIU/mL Final     Glucose   Date Value Ref Range Status   09/20/2023 126 (H) 70 - 110 mg/dL Final     Hemoglobin A1C   Date Value Ref Range Status   09/20/2023 5.6 4.0 - 5.6 % Final     Comment:     ADA Screening Guidelines:  5.7-6.4%  Consistent with prediabetes  >or=6.5%  Consistent with diabetes    High levels of fetal hemoglobin interfere with the HbA1C  assay. Heterozygous hemoglobin variants (HbS, " HgC, etc)do  not significantly interfere with this assay.   However, presence of multiple variants may affect accuracy.           Assessment:      Problem List Items Addressed This Visit          Neuro    History of stroke with residual deficit - Primary     Other Visit Diagnoses       Sensorineural hearing loss (SNHL) of left ear with restricted hearing of right ear        History of exposure to noise        Occupational exposure to noise                     Plan:      Annual audiogram recommended in either case.  This is to be sure there is no progressive hearing loss.  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    Cleared for amplification.  Hearing aid evaluation is recommended.  Patient to start with secondary insurance company to determine if any benefit as provided and if so to use that recommended company.  Patient also encouraged to price compare and get multiple quotes.  Provided Mya Little's number for hearing aid consultation here at [facility] if desired.    Return with any worsening of symptoms, failure to improve, or any other concerns for further evaluation and treatment.      Voice recognition software was used in the creation of this note/communication and any sound-alike errors which may have occurred from its use should be taken in context when interpreting.  If such errors prevent a clear understanding of the note/communication, please contact the office for clarification.

## 2023-12-16 ENCOUNTER — PATIENT MESSAGE (OUTPATIENT)
Dept: ADMINISTRATIVE | Facility: OTHER | Age: 68
End: 2023-12-16
Payer: MEDICARE

## 2023-12-16 ENCOUNTER — PATIENT MESSAGE (OUTPATIENT)
Dept: ADMINISTRATIVE | Facility: HOSPITAL | Age: 68
End: 2023-12-16
Payer: MEDICARE

## 2023-12-20 ENCOUNTER — PATIENT OUTREACH (OUTPATIENT)
Dept: ADMINISTRATIVE | Facility: HOSPITAL | Age: 68
End: 2023-12-20
Payer: MEDICARE

## 2023-12-20 DIAGNOSIS — Z12.11 SCREENING FOR COLORECTAL CANCER: Primary | ICD-10-CM

## 2023-12-20 DIAGNOSIS — Z12.12 SCREENING FOR COLORECTAL CANCER: Primary | ICD-10-CM

## 2023-12-20 NOTE — PROGRESS NOTES
12/20/2023 Gap report audit performed. Care Everywhere updates requested and reviewed  Overdue HM topic chart audit and/or requested. LINKS triggered and reconciled. Media reviewed : Patient outreach regarding questionnaire - Colorectal cancer screening options explained, patient requested Cologuard, WOG - Cologuard

## 2024-01-03 ENCOUNTER — PATIENT MESSAGE (OUTPATIENT)
Dept: ADMINISTRATIVE | Facility: OTHER | Age: 69
End: 2024-01-03
Payer: MEDICARE

## 2024-01-08 LAB — NONINV COLON CA DNA+OCC BLD SCRN STL QL: NORMAL

## 2024-01-16 DIAGNOSIS — Z12.11 SCREEN FOR COLON CANCER: Primary | ICD-10-CM

## 2024-01-26 LAB — NONINV COLON CA DNA+OCC BLD SCRN STL QL: NEGATIVE

## 2024-01-26 NOTE — ANESTHESIA PREPROCEDURE EVALUATION
02/19/2020  Luis Wong is a 64 y.o., male with history of multiple CVAs (last 8/2019) scheduled for right CTR under local/MAC on 2/21/2020.    Cardiac clearance in [facility], 1/14/2020.    Past Medical History:   Diagnosis Date    Aneurysm 10/30/2012    Diabetes mellitus     Fever blister     Herpes infection     Hypertension     ICH (intracerebral hemorrhage)     Mixed hyperlipidemia 9/5/2013    Nontraumatic thalamic hemorrhage 8/31/2016    JAQUAN (obstructive sleep apnea)     Right-sided lacunar stroke 9/11/2014    SDH (subdural hematoma)     Special screening for malignant neoplasms, colon     Stroke      Past Surgical History:   Procedure Laterality Date    INSERTION OF IMPLANTABLE LOOP RECORDER N/A 12/12/2019    Procedure: Insertion, Implantable Loop Recorder;  Surgeon: Efren Sanford MD;  Location: [facility] CATH LAB/EP;  Service: Cardiology;  Laterality: N/A;  Crytogenic CVA, LOOP,  MDT, Local,  DM    Knee arthroscopic surgery       Anesthesia Evaluation    I have reviewed the Patient Summary Reports.    I have reviewed the Nursing Notes.   I have reviewed the Medications.     Review of Systems  Anesthesia Hx:  No problems with previous Anesthesia  Denies Family Hx of Anesthesia complications.    Social:  Non-Smoker, No Alcohol Use    Hematology/Oncology:        Hematology Comments: On Plavix   Cardiovascular:   Hypertension Loop Recorder in place   Pulmonary:   Sleep Apnea    Renal/:  Renal/ Normal     Hepatic/GI:  Hepatic/GI Normal    Neurological:  CVA - Cerebrovasular Accident (residual left sided weakness) ICH 2012, ischemic CVA 2014 & 2019   Endocrine:   Diabetes, well controlled        Physical Exam  General:  Well nourished    Airway/Jaw/Neck:  Airway Findings: Mouth Opening: Normal Mallampati: IV      Dental:  Dental Findings: Upper partial dentures, Lower partial dentures    Chest/Lungs:  Chest/Lungs Findings: Clear to auscultation, Normal Respiratory Rate     Heart/Vascular:  Heart Findings: Rate: Normal  Rhythm: Regular Rhythm  Sounds: Normal      Musculoskeletal:  Musculoskeletal Findings: (LUE contracture)     Mental Status:  Mental Status Findings:  Cooperative, Alert and Oriented         Anesthesia Plan  Type of Anesthesia, risks & benefits discussed:  Anesthesia Type:  MAC  Patient's Preference:   Intra-op Monitoring Plan: standard ASA monitors  Intra-op Monitoring Plan Comments:   Post Op Pain Control Plan: multimodal analgesia  Post Op Pain Control Plan Comments:   Induction:   IV  Beta Blocker:         Informed Consent: Patient understands risks and agrees with Anesthesia plan.  Questions answered. Anesthesia consent signed with patient.  ASA Score: 3     Day of Surgery Review of History & Physical:        Anesthesia Plan Notes: Anesthesia consent to be signed prior to procedure on 2/21/2020          Ready For Surgery From Anesthesia Perspective.        Detail Level: Generalized Continue Regimen: Vanicream soaps and moisturizers. Continue Regimen: Sunscreens SPF 30 or higher and re-apply every 2 hours in the sun.

## 2024-02-19 ENCOUNTER — OFFICE VISIT (OUTPATIENT)
Dept: ORTHOPEDICS | Facility: CLINIC | Age: 69
End: 2024-02-19
Payer: MEDICARE

## 2024-02-19 VITALS — BODY MASS INDEX: 21.94 KG/M2 | HEIGHT: 69 IN

## 2024-02-19 DIAGNOSIS — M77.11 LATERAL EPICONDYLITIS OF RIGHT ELBOW: Primary | ICD-10-CM

## 2024-02-19 DIAGNOSIS — M75.111 NONTRAUMATIC INCOMPLETE TEAR OF RIGHT ROTATOR CUFF: ICD-10-CM

## 2024-02-19 PROCEDURE — 3288F FALL RISK ASSESSMENT DOCD: CPT | Mod: CPTII,S$GLB,, | Performed by: ORTHOPAEDIC SURGERY

## 2024-02-19 PROCEDURE — 99999 PR PBB SHADOW E&M-EST. PATIENT-LVL II: CPT | Mod: PBBFAC,,, | Performed by: ORTHOPAEDIC SURGERY

## 2024-02-19 PROCEDURE — 1159F MED LIST DOCD IN RCRD: CPT | Mod: CPTII,S$GLB,, | Performed by: ORTHOPAEDIC SURGERY

## 2024-02-19 PROCEDURE — 3008F BODY MASS INDEX DOCD: CPT | Mod: CPTII,S$GLB,, | Performed by: ORTHOPAEDIC SURGERY

## 2024-02-19 PROCEDURE — 99213 OFFICE O/P EST LOW 20 MIN: CPT | Mod: 25,S$GLB,, | Performed by: ORTHOPAEDIC SURGERY

## 2024-02-19 PROCEDURE — 1125F AMNT PAIN NOTED PAIN PRSNT: CPT | Mod: CPTII,S$GLB,, | Performed by: ORTHOPAEDIC SURGERY

## 2024-02-19 PROCEDURE — 1101F PT FALLS ASSESS-DOCD LE1/YR: CPT | Mod: CPTII,S$GLB,, | Performed by: ORTHOPAEDIC SURGERY

## 2024-02-19 PROCEDURE — 20610 DRAIN/INJ JOINT/BURSA W/O US: CPT | Mod: RT,S$GLB,, | Performed by: ORTHOPAEDIC SURGERY

## 2024-02-19 RX ORDER — TRIAMCINOLONE ACETONIDE 40 MG/ML
40 INJECTION, SUSPENSION INTRA-ARTICULAR; INTRAMUSCULAR
Status: COMPLETED | OUTPATIENT
Start: 2024-02-19 | End: 2024-02-19

## 2024-02-19 RX ORDER — IBUPROFEN 600 MG/1
600 TABLET ORAL EVERY 6 HOURS PRN
Qty: 40 TABLET | Refills: 1 | Status: SHIPPED | OUTPATIENT
Start: 2024-02-19 | End: 2024-06-17 | Stop reason: SDUPTHER

## 2024-02-19 RX ORDER — DICLOFENAC SODIUM 10 MG/G
2 GEL TOPICAL 3 TIMES DAILY
Qty: 100 G | Refills: 1 | Status: SHIPPED | OUTPATIENT
Start: 2024-02-19

## 2024-02-19 RX ADMIN — TRIAMCINOLONE ACETONIDE 40 MG: 40 INJECTION, SUSPENSION INTRA-ARTICULAR; INTRAMUSCULAR at 01:02

## 2024-02-19 NOTE — PROGRESS NOTES
Subjective:      Patient ID: Luis Wong is a 68 y.o. male.  Chief Complaint: Follow-up (Right shoulder and elbow pain)      HPI  Luis Wong is a  68 y.o. male presenting today for follow up of shoulder pain.  He reports that he is having a flare-up again in the right shoulder and occasionally in the right elbow   He had an injection almost a year ago with good results   Pain is minimal   But worse with use   He does take Motrin which helps.    Review of patient's allergies indicates:  No Known Allergies      Current Outpatient Medications   Medication Sig Dispense Refill    NIFEdipine (PROCARDIA-XL) 30 MG (OSM) 24 hr tablet Take 1 tablet (30 mg total) by mouth 2 (two) times a day. 90 tablet 0    diclofenac sodium (VOLTAREN) 1 % Gel Apply 2 g topically 3 (three) times daily. 100 g 1    ibuprofen (ADVIL,MOTRIN) 600 MG tablet Take 1 tablet (600 mg total) by mouth every 6 (six) hours as needed. 40 tablet 1    rosuvastatin (CRESTOR) 5 MG tablet Take 1 tablet (5 mg total) by mouth every evening. (Patient not taking: Reported on 11/6/2023) 90 tablet 3     No current facility-administered medications for this visit.       Past Medical History:   Diagnosis Date    Aneurysm 10/30/2012    Diabetes mellitus     Fever blister     Herpes infection     Hypertension     ICH (intracerebral hemorrhage)     Lateral epicondylitis of right elbow 11/28/2022    Mixed hyperlipidemia 9/5/2013    Nontraumatic thalamic hemorrhage 8/31/2016    JAQUAN (obstructive sleep apnea)     Prediabetes     Right-sided lacunar stroke 9/11/2014    SDH (subdural hematoma)     Special screening for malignant neoplasms, colon     Stroke     Type 2 diabetes mellitus with hyperglycemia, without long-term current use of insulin 1/25/2022       Past Surgical History:   Procedure Laterality Date    CARPAL TUNNEL RELEASE Right 2/21/2020    Procedure: RELEASE, CARPAL TUNNEL;  Surgeon: Barry Dutton Jr., MD;  Location: [facility];  Service: Orthopedics;   "Laterality: Right;    INSERTION OF IMPLANTABLE LOOP RECORDER N/A 12/12/2019    Procedure: Insertion, Implantable Loop Recorder;  Surgeon: Efren Sanford MD;  Location: [facility] CATH LAB/EP;  Service: Cardiology;  Laterality: N/A;  Crytogenic CVA, LOOP,  MDT, Local,  DM    INTRAMEDULLARY RODDING OF FEMUR Left 5/23/2021    Procedure: INSERTION, INTRAMEDULLARY SURY, FEMUR;  Surgeon: Watson Jean-Baptiste IV, MD;  Location: [facility] OR;  Service: Orthopedics;  Laterality: Left;    Knee arthroscopic surgery      REMOVAL OF IMPLANTABLE LOOP RECORDER N/A 8/11/2023    Procedure: REMOVAL, IMPLANTABLE LOOP RECORDER;  Surgeon: Efren Sanford MD;  Location: [facility] EP LAB;  Service: Cardiology;  Laterality: N/A;  SHANTELLE, ILR Removal, MDT,  RN sedate, DM, 3 Prep       OBJECTIVE:   PHYSICAL EXAM:  Height: 5' 9" (175.3 cm)    Vitals:    02/19/24 1305 02/19/24 1306   Height: 5' 9" (1.753 m)    PainSc:   5   5   PainLoc: Shoulder Elbow     Ortho/SPM Exam  Examination right shoulder no tenderness no swelling   Range of motion full  Mildly positive impingement sign strength intact no instability  Right elbow has full range of motion mild tenderness laterally    RADIOGRAPHS:  None  Comments: I have personally reviewed the imaging and I agree with the above radiologist's report.    ASSESSMENT/PLAN:     IMPRESSION:  1. Partial tear rotator cuff right shoulder.      2. Mild lateral epicondylitis right elbow    PLAN:  I explained the nature of the problem to the patient   He would like injection today  After pause for time-out identified the right shoulder injected with Kenalog 40 mg 2 cc xylocaine sterile technique  Tolerated the procedure well without complication   Refilled Motrin   We can also add Voltaren gel for topical use on the elbow       FOLLOW UP:  1-2 months    Disclaimer: This note has been generated using voice-recognition software. There may be typographical errors that have been missed during proof-reading.     "

## 2024-03-21 ENCOUNTER — TELEPHONE (OUTPATIENT)
Dept: FAMILY MEDICINE | Facility: CLINIC | Age: 69
End: 2024-03-21
Payer: MEDICARE

## 2024-03-21 NOTE — TELEPHONE ENCOUNTER
Spoke with patient informing him appointment will need to be rescheduled due to provider being out of the office. Patient appointment rescheduled 5/27.

## 2024-03-28 DIAGNOSIS — I10 ESSENTIAL HYPERTENSION: ICD-10-CM

## 2024-03-28 RX ORDER — NIFEDIPINE 30 MG/1
30 TABLET, EXTENDED RELEASE ORAL
Qty: 90 TABLET | Refills: 1 | Status: SHIPPED | OUTPATIENT
Start: 2024-03-28

## 2024-03-28 NOTE — TELEPHONE ENCOUNTER
No care due was identified.  [facility] Embedded Care Due Messages. Reference number: [id number].   3/28/2024 8:07:16 AM CDT

## 2024-04-22 ENCOUNTER — OFFICE VISIT (OUTPATIENT)
Dept: ORTHOPEDICS | Facility: CLINIC | Age: 69
End: 2024-04-22
Payer: MEDICARE

## 2024-04-22 VITALS — HEIGHT: 69 IN | BODY MASS INDEX: 21.94 KG/M2

## 2024-04-22 DIAGNOSIS — M75.111 NONTRAUMATIC INCOMPLETE TEAR OF RIGHT ROTATOR CUFF: Primary | ICD-10-CM

## 2024-04-22 PROCEDURE — 3044F HG A1C LEVEL LT 7.0%: CPT | Mod: CPTII,S$GLB,, | Performed by: ORTHOPAEDIC SURGERY

## 2024-04-22 PROCEDURE — 3288F FALL RISK ASSESSMENT DOCD: CPT | Mod: CPTII,S$GLB,, | Performed by: ORTHOPAEDIC SURGERY

## 2024-04-22 PROCEDURE — 99213 OFFICE O/P EST LOW 20 MIN: CPT | Mod: 25,S$GLB,, | Performed by: ORTHOPAEDIC SURGERY

## 2024-04-22 PROCEDURE — 3066F NEPHROPATHY DOC TX: CPT | Mod: CPTII,S$GLB,, | Performed by: ORTHOPAEDIC SURGERY

## 2024-04-22 PROCEDURE — 3061F NEG MICROALBUMINURIA REV: CPT | Mod: CPTII,S$GLB,, | Performed by: ORTHOPAEDIC SURGERY

## 2024-04-22 PROCEDURE — 1159F MED LIST DOCD IN RCRD: CPT | Mod: CPTII,S$GLB,, | Performed by: ORTHOPAEDIC SURGERY

## 2024-04-22 PROCEDURE — 3008F BODY MASS INDEX DOCD: CPT | Mod: CPTII,S$GLB,, | Performed by: ORTHOPAEDIC SURGERY

## 2024-04-22 PROCEDURE — 20610 DRAIN/INJ JOINT/BURSA W/O US: CPT | Mod: RT,S$GLB,, | Performed by: ORTHOPAEDIC SURGERY

## 2024-04-22 PROCEDURE — 1125F AMNT PAIN NOTED PAIN PRSNT: CPT | Mod: CPTII,S$GLB,, | Performed by: ORTHOPAEDIC SURGERY

## 2024-04-22 PROCEDURE — 99999 PR PBB SHADOW E&M-EST. PATIENT-LVL II: CPT | Mod: PBBFAC,,, | Performed by: ORTHOPAEDIC SURGERY

## 2024-04-22 PROCEDURE — 1101F PT FALLS ASSESS-DOCD LE1/YR: CPT | Mod: CPTII,S$GLB,, | Performed by: ORTHOPAEDIC SURGERY

## 2024-04-22 RX ORDER — TRIAMCINOLONE ACETONIDE 40 MG/ML
40 INJECTION, SUSPENSION INTRA-ARTICULAR; INTRAMUSCULAR
Status: COMPLETED | OUTPATIENT
Start: 2024-04-22 | End: 2024-04-22

## 2024-04-22 RX ADMIN — TRIAMCINOLONE ACETONIDE 40 MG: 40 INJECTION, SUSPENSION INTRA-ARTICULAR; INTRAMUSCULAR at 01:04

## 2024-04-22 NOTE — PROGRESS NOTES
Subjective:      Patient ID: Luis Wong is a 68 y.o. male.  Chief Complaint: Follow-up of the Right Shoulder      HPI  Luis Wong is a  68 y.o. male presenting today for follow up of right shoulder pain related to partial tear of the rotator cuff.  He reports that he is having some ongoing symptoms he did have an injection last visit and I had recommended that he consider surgery   He might like to consider surgery later in the year but would like another injection   He does use his right shoulder quite a bit because of hemiparesis on the left side.    Review of patient's allergies indicates:  No Known Allergies      Current Outpatient Medications   Medication Sig Dispense Refill    diclofenac sodium (VOLTAREN) 1 % Gel Apply 2 g topically 3 (three) times daily. 100 g 1    ibuprofen (ADVIL,MOTRIN) 600 MG tablet Take 1 tablet (600 mg total) by mouth every 6 (six) hours as needed. 40 tablet 1    NIFEdipine (PROCARDIA-XL) 30 MG (OSM) 24 hr tablet TAKE 1 TABLET BY MOUTH EVERY DAY 90 tablet 1    rosuvastatin (CRESTOR) 5 MG tablet Take 1 tablet (5 mg total) by mouth every evening. (Patient not taking: Reported on 4/22/2024) 90 tablet 3     No current facility-administered medications for this visit.       Past Medical History:   Diagnosis Date    Aneurysm 10/30/2012    Diabetes mellitus     Fever blister     Herpes infection     Hypertension     ICH (intracerebral hemorrhage)     Lateral epicondylitis of right elbow 11/28/2022    Mixed hyperlipidemia 9/5/2013    Nontraumatic thalamic hemorrhage 8/31/2016    JAQUAN (obstructive sleep apnea)     Prediabetes     Right-sided lacunar stroke 9/11/2014    SDH (subdural hematoma)     Special screening for malignant neoplasms, colon     Stroke     Type 2 diabetes mellitus with hyperglycemia, without long-term current use of insulin 1/25/2022       Past Surgical History:   Procedure Laterality Date    CARPAL TUNNEL RELEASE Right 2/21/2020    Procedure: RELEASE, CARPAL  "TUNNEL;  Surgeon: Barry Dutton Jr., MD;  Location: [facility] OR;  Service: Orthopedics;  Laterality: Right;    INSERTION OF IMPLANTABLE LOOP RECORDER N/A 12/12/2019    Procedure: Insertion, Implantable Loop Recorder;  Surgeon: Efren Sanford MD;  Location: [facility] CATH LAB/EP;  Service: Cardiology;  Laterality: N/A;  Crytogenic CVA, LOOP,  MDT, Local,  DM    INTRAMEDULLARY RODDING OF FEMUR Left 5/23/2021    Procedure: INSERTION, INTRAMEDULLARY SURY, FEMUR;  Surgeon: Watson Jean-Baptiste IV, MD;  Location: [facility] OR;  Service: Orthopedics;  Laterality: Left;    Knee arthroscopic surgery      REMOVAL OF IMPLANTABLE LOOP RECORDER N/A 8/11/2023    Procedure: REMOVAL, IMPLANTABLE LOOP RECORDER;  Surgeon: Efren Sanford MD;  Location: [facility] EP LAB;  Service: Cardiology;  Laterality: N/A;  SHANTELLE, ILR Removal, MDT,  RN sedate, DM, 3 Prep       OBJECTIVE:   PHYSICAL EXAM:  Height: 5' 9" (175.3 cm)    Vitals:    04/22/24 1313   Height: 5' 9" (1.753 m)   PainSc:   4   PainLoc: Shoulder     Ortho/SPM Exam  Examination right shoulder no tenderness no swelling  Full range of motion   Positive impingement sign   Positive supraspinatus stress test no instability    RADIOGRAPHS:  None  Comments: I have personally reviewed the imaging and I agree with the above radiologist's report.    ASSESSMENT/PLAN:     IMPRESSION:  Partial tear rotator cuff right shoulder    PLAN:  Patient would like injection today  After pause for time-out identified the right shoulder injected with Kenalog 40 mg 2 cc xylocaine sterile technique   Tolerated the procedure well without complication   Continue current medications avoid heavy lifting  Consider surgery if symptoms persist    FOLLOW UP:  1-2 months    Disclaimer: This note has been generated using voice-recognition software. There may be typographical errors that have been missed during proof-reading.     "

## 2024-05-23 ENCOUNTER — TELEPHONE (OUTPATIENT)
Dept: FAMILY MEDICINE | Facility: CLINIC | Age: 69
End: 2024-05-23

## 2024-05-28 DIAGNOSIS — Z00.00 ENCOUNTER FOR MEDICARE ANNUAL WELLNESS EXAM: ICD-10-CM

## 2024-06-17 ENCOUNTER — OFFICE VISIT (OUTPATIENT)
Dept: ORTHOPEDICS | Facility: CLINIC | Age: 69
End: 2024-06-17
Payer: MEDICARE

## 2024-06-17 VITALS — BODY MASS INDEX: 21.94 KG/M2 | HEIGHT: 69 IN

## 2024-06-17 DIAGNOSIS — M75.111 NONTRAUMATIC INCOMPLETE TEAR OF RIGHT ROTATOR CUFF: Primary | ICD-10-CM

## 2024-06-17 PROCEDURE — 99999 PR PBB SHADOW E&M-EST. PATIENT-LVL II: CPT | Mod: PBBFAC,,, | Performed by: ORTHOPAEDIC SURGERY

## 2024-06-17 RX ORDER — IBUPROFEN 600 MG/1
600 TABLET ORAL EVERY 6 HOURS PRN
Qty: 40 TABLET | Refills: 1 | Status: SHIPPED | OUTPATIENT
Start: 2024-06-17

## 2024-06-17 NOTE — PROGRESS NOTES
Subjective:      Patient ID: Luis Wong is a 68 y.o. male.  Chief Complaint: Follow-up of the Right Shoulder      HPI  Luis Wong is a  68 y.o. male presenting today for follow up of tear rotator cuff right shoulder.  He reports that he is improved since the last injection but still having some pain in the shoulder difficulty with overhead lifting.    Review of patient's allergies indicates:  No Known Allergies      Current Outpatient Medications   Medication Sig Dispense Refill    diclofenac sodium (VOLTAREN) 1 % Gel Apply 2 g topically 3 (three) times daily. 100 g 1    ibuprofen (ADVIL,MOTRIN) 600 MG tablet Take 1 tablet (600 mg total) by mouth every 6 (six) hours as needed. 40 tablet 1    NIFEdipine (PROCARDIA-XL) 30 MG (OSM) 24 hr tablet TAKE 1 TABLET BY MOUTH EVERY DAY 90 tablet 1    rosuvastatin (CRESTOR) 5 MG tablet Take 1 tablet (5 mg total) by mouth every evening. (Patient not taking: Reported on 4/22/2024) 90 tablet 3     No current facility-administered medications for this visit.       Past Medical History:   Diagnosis Date    Aneurysm 10/30/2012    Diabetes mellitus     Fever blister     Herpes infection     Hypertension     ICH (intracerebral hemorrhage)     Lateral epicondylitis of right elbow 11/28/2022    Mixed hyperlipidemia 9/5/2013    Nontraumatic thalamic hemorrhage 8/31/2016    JAQUAN (obstructive sleep apnea)     Prediabetes     Right-sided lacunar stroke 9/11/2014    SDH (subdural hematoma)     Special screening for malignant neoplasms, colon     Stroke     Type 2 diabetes mellitus with hyperglycemia, without long-term current use of insulin 1/25/2022       Past Surgical History:   Procedure Laterality Date    CARPAL TUNNEL RELEASE Right 2/21/2020    Procedure: RELEASE, CARPAL TUNNEL;  Surgeon: Barry Dutton Jr., MD;  Location: [facility];  Service: Orthopedics;  Laterality: Right;    INSERTION OF IMPLANTABLE LOOP RECORDER N/A 12/12/2019    Procedure: Insertion, Implantable Loop  "Recorder;  Surgeon: Efren Sanford MD;  Location: [facility] CATH LAB/EP;  Service: Cardiology;  Laterality: N/A;  Crytogenic CVA, LOOP,  MDT, Local,  DM    INTRAMEDULLARY RODDING OF FEMUR Left 5/23/2021    Procedure: INSERTION, INTRAMEDULLARY SURY, FEMUR;  Surgeon: Watson Jean-Baptiste IV, MD;  Location: [facility] OR;  Service: Orthopedics;  Laterality: Left;    Knee arthroscopic surgery      REMOVAL OF IMPLANTABLE LOOP RECORDER N/A 8/11/2023    Procedure: REMOVAL, IMPLANTABLE LOOP RECORDER;  Surgeon: Efren Sanford MD;  Location: [facility] EP LAB;  Service: Cardiology;  Laterality: N/A;  SHANTELLE, ILR Removal, MDT,  RN sedate, DM, 3 Prep       OBJECTIVE:   PHYSICAL EXAM:  Height: 5' 9" (175.3 cm)    Vitals:    06/17/24 1306   Height: 5' 9" (1.753 m)   PainSc:   3   PainLoc: Shoulder     Ortho/SPM Exam  Examination right shoulder no tenderness no swelling   Range of motion is full mildly positive impingement sign strength intact no instability    RADIOGRAPHS:  None  Comments: I have personally reviewed the imaging and I agree with the above radiologist's report.    ASSESSMENT/PLAN:     IMPRESSION:  Partial tear rotator cuff right shoulder    PLAN:  He is doing relatively well so I want to hold off on injections   We did talk about surgery briefly but as wife is recovering from the same type of surgery for rotator cuff repair so he definitely wants to hold off on that for now   No heavy lifting   Continue Motrin       FOLLOW UP:  Two months    Disclaimer: This note has been generated using voice-recognition software. There may be typographical errors that have been missed during proof-reading.     "

## 2024-06-22 ENCOUNTER — OFFICE VISIT (OUTPATIENT)
Dept: FAMILY MEDICINE | Facility: CLINIC | Age: 69
End: 2024-06-22
Payer: MEDICARE

## 2024-06-22 VITALS
WEIGHT: 160.19 LBS | HEIGHT: 69 IN | OXYGEN SATURATION: 98 % | SYSTOLIC BLOOD PRESSURE: 132 MMHG | BODY MASS INDEX: 23.73 KG/M2 | DIASTOLIC BLOOD PRESSURE: 74 MMHG | HEART RATE: 71 BPM

## 2024-06-22 DIAGNOSIS — G81.14 SPASTIC HEMIPLEGIA AFFECTING LEFT NONDOMINANT SIDE, UNSPECIFIED ETIOLOGY: ICD-10-CM

## 2024-06-22 DIAGNOSIS — T46.6X5A MYALGIA DUE TO STATIN: ICD-10-CM

## 2024-06-22 DIAGNOSIS — E11.65 TYPE 2 DIABETES MELLITUS WITH HYPERGLYCEMIA, WITHOUT LONG-TERM CURRENT USE OF INSULIN: ICD-10-CM

## 2024-06-22 DIAGNOSIS — M79.10 MYALGIA DUE TO STATIN: ICD-10-CM

## 2024-06-22 DIAGNOSIS — E78.2 MIXED HYPERLIPIDEMIA: ICD-10-CM

## 2024-06-22 DIAGNOSIS — I48.0 PAROXYSMAL ATRIAL FIBRILLATION: ICD-10-CM

## 2024-06-22 DIAGNOSIS — I10 PRIMARY HYPERTENSION: Primary | ICD-10-CM

## 2024-06-22 PROBLEM — D70.9 NEUTROPENIA, UNSPECIFIED TYPE: Status: RESOLVED | Noted: 2023-09-28 | Resolved: 2024-06-22

## 2024-06-22 PROBLEM — D69.6 THROMBOCYTOPENIA, UNSPECIFIED: Status: RESOLVED | Noted: 2022-01-25 | Resolved: 2024-06-22

## 2024-06-22 PROCEDURE — 99999 PR PBB SHADOW E&M-EST. PATIENT-LVL IV: CPT | Mod: PBBFAC,,, | Performed by: FAMILY MEDICINE

## 2024-06-22 NOTE — PROGRESS NOTES
Subjective     Patient ID: Luis Wong is a 68 y.o. male.    Chief Complaint: Diabetes    Diabetes  He presents for his follow-up diabetic visit. He has type 2 diabetes mellitus. No MedicAlert identification noted. His disease course has been stable. There are no hypoglycemic associated symptoms. Pertinent negatives for diabetes include no chest pain, no polydipsia, no polyphagia and no polyuria. There are no hypoglycemic complications. Symptoms are stable. Diabetic complications include a CVA. Risk factors for coronary artery disease include diabetes mellitus, hypertension and male sex. Current diabetic treatment includes diet. He is compliant with treatment all of the time. He is following a generally healthy diet. When asked about meal planning, he reported none. He never participates in exercise. An ACE inhibitor/angiotensin II receptor blocker is not being taken.   Hypertension  This is a chronic problem. The current episode started more than 1 year ago. The problem is controlled. Pertinent negatives include no chest pain, orthopnea, palpitations or peripheral edema. Risk factors for coronary artery disease include male gender and diabetes mellitus. Past treatments include calcium channel blockers. The current treatment provides significant improvement. Hypertensive end-organ damage includes CVA. There is no history of chronic renal disease, a hypertension causing med or a thyroid problem.     Review of Systems   Constitutional: Negative.    HENT: Negative.     Eyes: Negative.    Respiratory: Negative.     Cardiovascular: Negative.  Negative for chest pain, palpitations and orthopnea.   Gastrointestinal: Negative.    Endocrine: Negative for polydipsia, polyphagia and polyuria.   Genitourinary: Negative.    Musculoskeletal: Negative.    Integumentary:  Negative.   Neurological: Negative.    Psychiatric/Behavioral: Negative.            Objective     Physical Exam  Vitals and nursing note reviewed.    Constitutional:       General: He is not in acute distress.     Appearance: He is well-developed. He is not diaphoretic.   HENT:      Head: Normocephalic and atraumatic.      Right Ear: External ear normal.      Left Ear: External ear normal.      Nose: Nose normal.      Mouth/Throat:      Pharynx: No oropharyngeal exudate.   Eyes:      General: No scleral icterus.        Right eye: No discharge.         Left eye: No discharge.      Conjunctiva/sclera: Conjunctivae normal.      Pupils: Pupils are equal, round, and reactive to light.   Neck:      Thyroid: No thyromegaly.      Vascular: No JVD.      Trachea: No tracheal deviation.   Cardiovascular:      Rate and Rhythm: Normal rate and regular rhythm.      Heart sounds: Normal heart sounds. No murmur heard.     No friction rub. No gallop.   Pulmonary:      Effort: Pulmonary effort is normal. No respiratory distress.      Breath sounds: Normal breath sounds. No stridor. No wheezing or rales.   Chest:      Chest wall: No tenderness.   Abdominal:      General: Bowel sounds are normal. There is no distension.      Palpations: Abdomen is soft. There is no mass.      Tenderness: There is no abdominal tenderness. There is no guarding or rebound.   Musculoskeletal:         General: No tenderness. Normal range of motion.      Cervical back: Normal range of motion and neck supple.   Lymphadenopathy:      Cervical: No cervical adenopathy.   Skin:     General: Skin is warm and dry.      Coloration: Skin is not pale.      Findings: No erythema or rash.   Neurological:      Mental Status: He is alert and oriented to person, place, and time.      Cranial Nerves: No cranial nerve deficit.      Motor: Weakness present. No abnormal muscle tone.      Coordination: Coordination abnormal.      Gait: Gait abnormal.      Deep Tendon Reflexes: Reflexes are normal and symmetric. Reflexes normal.      Comments: Left hemiparesis.   Psychiatric:         Behavior: Behavior normal.          Thought Content: Thought content normal.         Judgment: Judgment normal.            Assessment and Plan     1. Primary hypertension  -     Comprehensive Metabolic Panel; Future; Expected date: 06/22/2024  -     Lipid Panel; Future; Expected date: 06/22/2024    2. Mixed hyperlipidemia  -     Ambulatory referral/consult to Cardiology; Future; Expected date: 06/29/2024  -     Comprehensive Metabolic Panel; Future; Expected date: 06/22/2024  -     Lipid Panel; Future; Expected date: 06/22/2024    3. Myalgia due to statin  -     Lipid Panel; Future; Expected date: 06/22/2024    4. Paroxysmal atrial fibrillation    5. Type 2 diabetes mellitus with hyperglycemia, without long-term current use of insulin  -     Microalbumin/Creatinine Ratio, Urine; Future; Expected date: 06/22/2024    6. Spastic hemiplegia affecting left nondominant side, unspecified etiology        Continue monitoring blood sugar at home,ADA diet.   Continue monitoring blood pressure at home, low sodium diet.   I spent a total of 30 minutes on the day of the visit.This includes face to face time and non-face to face time preparing to see the patient (eg, review of tests), obtaining and/or reviewing separately obtained history, documenting clinical information in the electronic or other health record, independently interpreting results and communicating results to the patient/family/caregiver, or care coordinator.          Follow up in about 6 months (around 12/22/2024), or if symptoms worsen or fail to improve.

## 2024-08-22 ENCOUNTER — PATIENT OUTREACH (OUTPATIENT)
Dept: ADMINISTRATIVE | Facility: HOSPITAL | Age: 69
End: 2024-08-22
Payer: MEDICARE

## 2024-08-22 NOTE — PROGRESS NOTES
Population Health Chart Review & Patient Outreach Details      Additional [facility] Health Notes:    Non-compliant report chart audits DIABETIC EYE EXAM.   Chart review completed for HM test overdue (mammograms, Colonoscopies, pap smears, DM labs, and/or EYE EXAMs)      Care Everywhere and media, updates requested and reviewed.               Updates Requested / Reviewed:      Care Everywhere and          Health Maintenance Topics Overdue:      VB Score: 0     Patient is not due for any topics at this time.    Pneumonia Vaccine  Shingles/Zoster Vaccine  RSV Vaccine                  Health Maintenance Topic(s) Outreach Outcomes & Actions Taken:    Eye Exam - Outreach Outcomes & Actions Taken  : External Records Requested & Care Team Updated if Applicable

## 2024-08-22 NOTE — LETTER
AUTHORIZATION FOR RELEASE OF   CONFIDENTIAL INFORMATION    Dear Doctor, [facility]  ,    We are seeing Luis Wong, date of birth 1955, in the clinic at [facility]. Juan Eduardo MD is the patient's PCP. Luis Wong has an outstanding lab/procedure at the time we reviewed his chart. In order to help keep his health information updated, he has authorized us to request the following medical record(s):                                            ( X )  EYE EXAM                Please fax records to [facility], Cortes-Moran, Rafael A., MD, [phone]     If you have any questions, please contact Ivette Lopez LPN at [phone].           Patient Name: Luis Wong  : 1955  Patient Phone #: [phone]

## 2024-09-17 ENCOUNTER — OFFICE VISIT (OUTPATIENT)
Dept: HOME HEALTH SERVICES | Facility: CLINIC | Age: 69
End: 2024-09-17
Payer: MEDICARE

## 2024-09-17 VITALS
HEART RATE: 86 BPM | TEMPERATURE: 97 F | WEIGHT: 155 LBS | HEIGHT: 69 IN | DIASTOLIC BLOOD PRESSURE: 80 MMHG | OXYGEN SATURATION: 96 % | BODY MASS INDEX: 22.96 KG/M2 | RESPIRATION RATE: 16 BRPM | SYSTOLIC BLOOD PRESSURE: 160 MMHG

## 2024-09-17 DIAGNOSIS — D64.9 NORMOCYTIC ANEMIA: ICD-10-CM

## 2024-09-17 DIAGNOSIS — E11.59 HYPERTENSION ASSOCIATED WITH DIABETES: ICD-10-CM

## 2024-09-17 DIAGNOSIS — E11.65 TYPE 2 DIABETES MELLITUS WITH HYPERGLYCEMIA, WITHOUT LONG-TERM CURRENT USE OF INSULIN: ICD-10-CM

## 2024-09-17 DIAGNOSIS — I15.2 HYPERTENSION ASSOCIATED WITH DIABETES: ICD-10-CM

## 2024-09-17 DIAGNOSIS — Z99.89 DEPENDENCE ON OTHER ENABLING MACHINES AND DEVICES: ICD-10-CM

## 2024-09-17 DIAGNOSIS — E11.69 HYPERLIPIDEMIA ASSOCIATED WITH TYPE 2 DIABETES MELLITUS: ICD-10-CM

## 2024-09-17 DIAGNOSIS — I48.0 PAROXYSMAL ATRIAL FIBRILLATION: ICD-10-CM

## 2024-09-17 DIAGNOSIS — E78.5 HYPERLIPIDEMIA ASSOCIATED WITH TYPE 2 DIABETES MELLITUS: ICD-10-CM

## 2024-09-17 DIAGNOSIS — G56.03 BILATERAL CARPAL TUNNEL SYNDROME: ICD-10-CM

## 2024-09-17 DIAGNOSIS — G81.14 SPASTIC HEMIPLEGIA AFFECTING LEFT NONDOMINANT SIDE, UNSPECIFIED ETIOLOGY: ICD-10-CM

## 2024-09-17 DIAGNOSIS — Z00.00 ENCOUNTER FOR MEDICARE ANNUAL WELLNESS EXAM: ICD-10-CM

## 2024-09-17 DIAGNOSIS — Z00.00 ENCOUNTER FOR PREVENTIVE HEALTH EXAMINATION: Primary | ICD-10-CM

## 2024-09-17 PROBLEM — M79.10 MYALGIA DUE TO STATIN: Status: RESOLVED | Noted: 2024-06-22 | Resolved: 2024-09-17

## 2024-09-17 PROBLEM — S79.912A HIP INJURY, LEFT, INITIAL ENCOUNTER: Status: RESOLVED | Noted: 2021-05-23 | Resolved: 2024-09-17

## 2024-09-17 PROBLEM — T46.6X5A MYALGIA DUE TO STATIN: Status: RESOLVED | Noted: 2024-06-22 | Resolved: 2024-09-17

## 2024-09-17 PROBLEM — S72.142A DISPLACED INTERTROCHANTERIC FRACTURE OF LEFT FEMUR, INITIAL ENCOUNTER FOR CLOSED FRACTURE: Status: RESOLVED | Noted: 2021-05-23 | Resolved: 2024-09-17

## 2024-09-17 PROCEDURE — 3044F HG A1C LEVEL LT 7.0%: CPT | Mod: CPTII,S$GLB,,

## 2024-09-17 PROCEDURE — 1126F AMNT PAIN NOTED NONE PRSNT: CPT | Mod: CPTII,S$GLB,,

## 2024-09-17 PROCEDURE — 1158F ADVNC CARE PLAN TLK DOCD: CPT | Mod: CPTII,S$GLB,,

## 2024-09-17 PROCEDURE — 3061F NEG MICROALBUMINURIA REV: CPT | Mod: CPTII,S$GLB,,

## 2024-09-17 PROCEDURE — 1160F RVW MEDS BY RX/DR IN RCRD: CPT | Mod: CPTII,S$GLB,,

## 2024-09-17 PROCEDURE — 1101F PT FALLS ASSESS-DOCD LE1/YR: CPT | Mod: CPTII,S$GLB,,

## 2024-09-17 PROCEDURE — 3288F FALL RISK ASSESSMENT DOCD: CPT | Mod: CPTII,S$GLB,,

## 2024-09-17 PROCEDURE — 3079F DIAST BP 80-89 MM HG: CPT | Mod: CPTII,S$GLB,,

## 2024-09-17 PROCEDURE — 3077F SYST BP >= 140 MM HG: CPT | Mod: CPTII,S$GLB,,

## 2024-09-17 PROCEDURE — 1159F MED LIST DOCD IN RCRD: CPT | Mod: CPTII,S$GLB,,

## 2024-09-17 PROCEDURE — G0439 PPPS, SUBSEQ VISIT: HCPCS | Mod: S$GLB,,,

## 2024-09-17 PROCEDURE — 1170F FXNL STATUS ASSESSED: CPT | Mod: CPTII,S$GLB,,

## 2024-09-17 PROCEDURE — 3066F NEPHROPATHY DOC TX: CPT | Mod: CPTII,S$GLB,,

## 2024-09-17 NOTE — PROGRESS NOTES
"Luis Wong presented for an initial Medicare AWV today. The following components were reviewed and updated:    Medical history  Family History  Social history  Allergies and Current Medications  Health Risk Assessment  Health Maintenance  Care Team    **See Completed Assessments for Annual Wellness visit with in the encounter summary    The following assessments were completed:  Depression Screening  Cognitive function Screening    Timed Get Up Test: Deferred, impaired mobility  Whisper Test: Deferred, hearing impaired      Opioid documentation:      Patient does not have a current opioid prescription.          Vitals:    09/17/24 0907   BP: (!) 160/80   BP Location: Right arm   Patient Position: Sitting   Pulse: 86   Resp: 16   Temp: 97 °F (36.1 °C)   SpO2: 96%   Weight: 70.3 kg (155 lb)   Height: 5' 9" (1.753 m)     Body mass index is 22.89 kg/m².       Physical Exam  Vitals reviewed.   Constitutional:       General: He is not in acute distress.     Appearance: Normal appearance.   HENT:      Head: Normocephalic.   Cardiovascular:      Rate and Rhythm: Normal rate and regular rhythm.      Pulses: Normal pulses.      Heart sounds: Normal heart sounds.   Pulmonary:      Effort: Pulmonary effort is normal. No respiratory distress.      Breath sounds: Normal breath sounds. No wheezing.   Abdominal:      General: Bowel sounds are normal.      Tenderness: There is no abdominal tenderness.   Musculoskeletal:         General: Normal range of motion.      Cervical back: Normal range of motion.      Right lower leg: No edema.      Left lower leg: No edema.   Skin:     General: Skin is warm and dry.      Capillary Refill: Capillary refill takes less than 2 seconds.   Neurological:      General: No focal deficit present.      Mental Status: He is alert and oriented to person, place, and time.      Motor: Abnormal muscle tone (L arm) present.      Gait: Gait abnormal.   Psychiatric:         Mood and Affect: Mood normal.    "      Behavior: Behavior normal.           Diagnoses and health risks identified today and associated recommendations/orders:    1. Encounter for preventive health examination  Assessments completed.   recommendations reviewed.  F/u with PCP as instructed.      2. Spastic hemiplegia affecting left nondominant side, unspecified etiology  Chronic, stable on current regimen. Followed by PCP.     3. Paroxysmal atrial fibrillation  Chronic, stable on current regimen. Followed by Cardiology.     4. Type 2 diabetes mellitus with hyperglycemia, without long-term current use of insulin  Chronic, stable on current diet regimen. Followed by PCP.   Lab Results   Component Value Date    HGBA1C 6.3 (H) 03/20/2024     - Hemoglobin A1C; Future  - Ambulatory referral/consult to Podiatry; Future    5. Hypertension associated with diabetes  Chronic, stable on current Procardia regimen. Followed by PCP.     6. Hyperlipidemia associated with type 2 diabetes mellitus  Chronic, stable on current regimen. Not on statin. Followed by PCP.     7. Normocytic anemia  Chronic, stable on current regimen. Followed by PCP.   Lab Results   Component Value Date    WBC 4.51 03/20/2024    HGB 14.1 03/20/2024    HCT 42.3 03/20/2024    MCV 84 03/20/2024     03/20/2024     8. Bilateral carpal tunnel syndrome  Chronic, stable on current regimen. Followed by PCP.     9. Encounter for Medicare annual wellness exam  Referred by [facility].   - Ambulatory Referral/Consult to Enhanced Annual Wellness Visit (eAWV)    10. Dependence on other enabling machines and devices  Uses cane outside the home.       Provided Luis with a 5-10 year written screening schedule and personal prevention plan. Recommendations were developed using the USPSTF age appropriate recommendations. Education, counseling, and referrals were provided as needed.  After Visit Summary printed and given to patient which includes a list of additional screenings\tests  needed.    Follow up in about 1 year (around 9/17/2025) for Medicare AWV.      Swetha Colindres NP    I offered to discuss advanced care planning, including how to pick a person who would make decisions for you if you were unable to make them for yourself, called a health care power of , and what kind of decisions you might make such as use of life sustaining treatments such as ventilators and tube feeding when faced with a life limiting illness recorded on a living will that they will need to know. (How you want to be cared for as you near the end of your natural life)     X Patient is interested in learning more about how to make advanced directives.  I provided them paperwork and offered to discuss this with them.

## 2024-09-17 NOTE — PATIENT INSTRUCTIONS
Counseling and Referral of Other Preventative  (Italic type indicates deductible and co-insurance are waived)    Patient Name: Luis Wong  Today's Date: 9/17/2024    Health Maintenance       Date Due Completion Date    Pneumococcal Vaccines (Age 65+) (1 of 2 - PCV) Never done ---    Shingles Vaccine (1 of 2) Never done ---    RSV Vaccine (Age 60+ and Pregnant patients) (1 - 1-dose 60+ series) Never done ---    Influenza Vaccine (1) 09/01/2024 10/29/2019 (Declined)    Override on 10/29/2019: Declined (Patient declines until next season)    Override on 1/9/2018: Declined    COVID-19 Vaccine (1 - 2023-24 season) Never done ---    Hemoglobin A1c 09/20/2024 3/20/2024    Foot Exam 09/28/2024 9/28/2023 (Done)    Override on 9/28/2023: Done    Override on 7/26/2022: Done    Override on 5/11/2021: Done    Override on 6/26/2019: Done    Eye Exam 11/01/2024 11/1/2023 (Done)    Override on 11/1/2023: Done (Completed at [facility] in [place])    PROSTATE-SPECIFIC ANTIGEN 03/20/2025 3/20/2024    Diabetes Urine Screening 06/25/2025 6/25/2024    Lipid Panel 06/25/2025 6/25/2024    Colorectal Cancer Screening 01/18/2027 1/18/2024    TETANUS VACCINE 01/09/2028 1/9/2018        No orders of the defined types were placed in this encounter.      The following information is provided to all patients.  This information is to help you find resources for any of the problems found today that may be affecting your health:                  Living healthy guide: www.[facility].[place].gov      Understanding Diabetes: www.diabetes.org      Eating healthy: www.cdc.gov/healthyweight      CDC home safety checklist: www.cdc.gov/steadi/patient.html      Agency on Aging: www.goea.[place].[facility]      Alcoholics anonymous (AA): www.aa.org      Physical Activity: www.lili.nih.gov/tm9mpwo      Tobacco use: www.quitwithusla.org

## 2024-09-23 ENCOUNTER — OFFICE VISIT (OUTPATIENT)
Dept: ORTHOPEDICS | Facility: CLINIC | Age: 69
End: 2024-09-23
Payer: MEDICARE

## 2024-09-23 VITALS — BODY MASS INDEX: 22.89 KG/M2 | HEIGHT: 69 IN

## 2024-09-23 DIAGNOSIS — M75.111 NONTRAUMATIC INCOMPLETE TEAR OF RIGHT ROTATOR CUFF: Primary | ICD-10-CM

## 2024-09-23 PROCEDURE — 3288F FALL RISK ASSESSMENT DOCD: CPT | Mod: CPTII,S$GLB,, | Performed by: ORTHOPAEDIC SURGERY

## 2024-09-23 PROCEDURE — 3008F BODY MASS INDEX DOCD: CPT | Mod: CPTII,S$GLB,, | Performed by: ORTHOPAEDIC SURGERY

## 2024-09-23 PROCEDURE — 1159F MED LIST DOCD IN RCRD: CPT | Mod: CPTII,S$GLB,, | Performed by: ORTHOPAEDIC SURGERY

## 2024-09-23 PROCEDURE — 99213 OFFICE O/P EST LOW 20 MIN: CPT | Mod: 25,S$GLB,, | Performed by: ORTHOPAEDIC SURGERY

## 2024-09-23 PROCEDURE — 99999 PR PBB SHADOW E&M-EST. PATIENT-LVL II: CPT | Mod: PBBFAC,,, | Performed by: ORTHOPAEDIC SURGERY

## 2024-09-23 PROCEDURE — 3061F NEG MICROALBUMINURIA REV: CPT | Mod: CPTII,S$GLB,, | Performed by: ORTHOPAEDIC SURGERY

## 2024-09-23 PROCEDURE — 20610 DRAIN/INJ JOINT/BURSA W/O US: CPT | Mod: RT,S$GLB,, | Performed by: ORTHOPAEDIC SURGERY

## 2024-09-23 PROCEDURE — 1125F AMNT PAIN NOTED PAIN PRSNT: CPT | Mod: CPTII,S$GLB,, | Performed by: ORTHOPAEDIC SURGERY

## 2024-09-23 PROCEDURE — 3066F NEPHROPATHY DOC TX: CPT | Mod: CPTII,S$GLB,, | Performed by: ORTHOPAEDIC SURGERY

## 2024-09-23 PROCEDURE — 3044F HG A1C LEVEL LT 7.0%: CPT | Mod: CPTII,S$GLB,, | Performed by: ORTHOPAEDIC SURGERY

## 2024-09-23 PROCEDURE — 1101F PT FALLS ASSESS-DOCD LE1/YR: CPT | Mod: CPTII,S$GLB,, | Performed by: ORTHOPAEDIC SURGERY

## 2024-09-23 RX ORDER — TRIAMCINOLONE ACETONIDE 40 MG/ML
40 INJECTION, SUSPENSION INTRA-ARTICULAR; INTRAMUSCULAR
Status: COMPLETED | OUTPATIENT
Start: 2024-09-23 | End: 2024-09-23

## 2024-09-23 RX ADMIN — TRIAMCINOLONE ACETONIDE 40 MG: 40 INJECTION, SUSPENSION INTRA-ARTICULAR; INTRAMUSCULAR at 10:09

## 2024-09-23 NOTE — PROGRESS NOTES
Subjective:      Patient ID: Luis Wong is a 68 y.o. male.  Chief Complaint: Follow-up (R shoulder )      HPI  Luis Wong is a  68 y.o. male presenting today for follow up of chronic tear rotator cuff right shoulder.  He reports that he is having a flare-up again in the shoulder he would like injection  Previously we recommended surgery   I have explained to the patient that if we continue to do injections then the rotator cuff we will dissolve interval become non repairable   He understands   .    Review of patient's allergies indicates:  No Known Allergies      Current Outpatient Medications   Medication Sig Dispense Refill    ibuprofen (ADVIL,MOTRIN) 600 MG tablet Take 1 tablet (600 mg total) by mouth every 6 (six) hours as needed. 40 tablet 1    NIFEdipine (PROCARDIA-XL) 30 MG (OSM) 24 hr tablet TAKE 1 TABLET BY MOUTH EVERY DAY 90 tablet 1    diclofenac sodium (VOLTAREN) 1 % Gel Apply 2 g topically 3 (three) times daily. (Patient not taking: Reported on 9/23/2024) 100 g 1     No current facility-administered medications for this visit.       Past Medical History:   Diagnosis Date    Aneurysm 10/30/2012    Diabetes mellitus     Fever blister     Herpes infection     Hypertension     ICH (intracerebral hemorrhage)     Lateral epicondylitis of right elbow 11/28/2022    Mixed hyperlipidemia 9/5/2013    Nontraumatic thalamic hemorrhage 8/31/2016    JAQUAN (obstructive sleep apnea)     Prediabetes     Right-sided lacunar stroke 9/11/2014    SDH (subdural hematoma)     Special screening for malignant neoplasms, colon     Stroke     Type 2 diabetes mellitus with hyperglycemia, without long-term current use of insulin 1/25/2022       Past Surgical History:   Procedure Laterality Date    CARPAL TUNNEL RELEASE Right 2/21/2020    Procedure: RELEASE, CARPAL TUNNEL;  Surgeon: Barry Dutton Jr., MD;  Location: [facility];  Service: Orthopedics;  Laterality: Right;    INSERTION OF IMPLANTABLE LOOP RECORDER N/A  "12/12/2019    Procedure: Insertion, Implantable Loop Recorder;  Surgeon: Efren Sanford MD;  Location: [facility] CATH LAB/EP;  Service: Cardiology;  Laterality: N/A;  Crytogenic CVA, LOOP,  MDT, Local,  DM    INTRAMEDULLARY RODDING OF FEMUR Left 5/23/2021    Procedure: INSERTION, INTRAMEDULLARY SURY, FEMUR;  Surgeon: Watson Jean-Baptiste IV, MD;  Location: [facility] OR;  Service: Orthopedics;  Laterality: Left;    Knee arthroscopic surgery      REMOVAL OF IMPLANTABLE LOOP RECORDER N/A 8/11/2023    Procedure: REMOVAL, IMPLANTABLE LOOP RECORDER;  Surgeon: Efren Sanford MD;  Location: [facility] EP LAB;  Service: Cardiology;  Laterality: N/A;  SHANTELLE, ILR Removal, MDT,  RN sedate, DM, 3 Prep       OBJECTIVE:   PHYSICAL EXAM:  Height: 5' 9" (175.3 cm)    Vitals:    09/23/24 1019   Height: 5' 9" (1.753 m)   PainSc:   2   PainLoc: Shoulder     Ortho/SPM Exam  Examination right shoulder no tenderness no swelling range of motion full positive impingement sign slight weakness no instability    RADIOGRAPHS:  None  Comments: I have personally reviewed the imaging and I agree with the above radiologist's report.    ASSESSMENT/PLAN:     IMPRESSION:  Chronic tear rotator cuff right shoulder    PLAN:  Patient would prefer injections and does not want surgery   He does understand that the injections we will cause the rotator cuff to deteriorate and dissolve and eventually become unrepairable   After pause for time-out identified the right shoulder injected with Kenalog 40 mg 2 cc xylocaine sterile technique   Tolerated the procedure well without complication   Continue home exercises and anti-inflammatory medication by mouth    FOLLOW UP:  3 or 4 months    Disclaimer: This note has been generated using voice-recognition software. There may be typographical errors that have been missed during proof-reading.     "

## 2024-10-03 RX ORDER — IBUPROFEN 600 MG/1
600 TABLET ORAL EVERY 6 HOURS PRN
Qty: 40 TABLET | Refills: 1 | Status: SHIPPED | OUTPATIENT
Start: 2024-10-03

## 2024-10-04 DIAGNOSIS — I10 ESSENTIAL HYPERTENSION: ICD-10-CM

## 2024-10-04 RX ORDER — NIFEDIPINE 30 MG/1
30 TABLET, EXTENDED RELEASE ORAL
Qty: 90 TABLET | Refills: 3 | Status: SHIPPED | OUTPATIENT
Start: 2024-10-04

## 2024-10-04 NOTE — TELEPHONE ENCOUNTER
Refill Routing Note   Medication(s) are not appropriate for processing by [facility] for the following reason(s):        Required vitals abnormal    [facility] action(s):  Defer             Appointments  past 12m or future 3m with PCP    Date Provider   Last Visit   6/22/2024 Juan Eduardo MD   Next Visit   12/27/2024 Juan Eduardo MD   ED visits in past 90 days: 0        Note composed:11:09 AM 10/04/2024

## 2024-10-04 NOTE — TELEPHONE ENCOUNTER
No care due was identified.  Health [facility] Embedded Care Due Messages. Reference number: [id number].   10/04/2024 9:57:46 AM CDT

## 2024-10-18 ENCOUNTER — TELEPHONE (OUTPATIENT)
Dept: PODIATRY | Facility: CLINIC | Age: 69
End: 2024-10-18
Payer: MEDICARE

## 2024-10-18 NOTE — TELEPHONE ENCOUNTER
Called patient, informed appointment had to be moved. Patient understood. Patient rescheduled to 10/24/24 for nail care appt. Patient understood time, date, and location.

## 2024-10-24 ENCOUNTER — OFFICE VISIT (OUTPATIENT)
Dept: PODIATRY | Facility: CLINIC | Age: 69
End: 2024-10-24
Payer: MEDICARE

## 2024-10-24 VITALS — BODY MASS INDEX: 23.41 KG/M2 | WEIGHT: 158.06 LBS | HEIGHT: 69 IN

## 2024-10-24 DIAGNOSIS — I69.30 HISTORY OF STROKE WITH RESIDUAL DEFICIT: ICD-10-CM

## 2024-10-24 DIAGNOSIS — G81.14 SPASTIC HEMIPLEGIA AFFECTING LEFT NONDOMINANT SIDE, UNSPECIFIED ETIOLOGY: ICD-10-CM

## 2024-10-24 DIAGNOSIS — B35.1 ONYCHOMYCOSIS DUE TO DERMATOPHYTE: ICD-10-CM

## 2024-10-24 DIAGNOSIS — E11.65 TYPE 2 DIABETES MELLITUS WITH HYPERGLYCEMIA, WITHOUT LONG-TERM CURRENT USE OF INSULIN: Primary | ICD-10-CM

## 2024-10-24 DIAGNOSIS — R29.898 DECREASED STRENGTH OF LOWER EXTREMITY: ICD-10-CM

## 2024-10-24 PROCEDURE — 99999 PR PBB SHADOW E&M-EST. PATIENT-LVL III: CPT | Mod: PBBFAC,,, | Performed by: PODIATRIST

## 2024-10-24 RX ORDER — CICLOPIROX 80 MG/ML
SOLUTION TOPICAL NIGHTLY
Qty: 6.6 ML | Refills: 6 | Status: SHIPPED | OUTPATIENT
Start: 2024-10-24

## 2024-10-24 NOTE — PATIENT INSTRUCTIONS
Diabetes: Inspecting Your Feet      Diabetes increases your chances of developing foot problems. So inspect your feet every day. This helps you find small skin irritations before they become serious ulcers or infections. If you have trouble seeing the bottoms of your feet, use a mirror or ask a family member or friend to help.  How to check your feet  Below are tips to help you look for foot problems. Try to check your feet at the same time each day, such as when you get out of bed in the morning:  Check the top of each foot. The tops of toes, back of the heel, and outer edge of the foot can get a lot of rubbing from poor-fitting shoes.  Check the bottom of each foot. Daily wear and tear often leads to problems at pressure spots.  Check the toes and nails. Fungal infections often occur between toes. Toenail problems can also be a sign of fungal infections or lead to breaks in the skin.  Check your shoes, too. Loose objects inside a shoe can injure the foot. Use your hand to feel inside your shoes for things like hector, loose stitching, or rough areas that could irritate your skin.  Warning signs  Look for any color changes in the foot. Redness with streaks can signal a severe infection, which needs immediate medical attention. Tell your healthcare provider right away if you have any of these problems:  Swelling, sometimes with color changes, may be a sign of poor blood flow or infection. Symptoms include tenderness and an increase in the size of your foot.  Warm or hot areas on your feet may be signs of infection. A foot that is cold may not be getting enough blood.  Sensations such as burning, tingling, or pins and needles can be signs of a problem. Also check for areas that may be numb.  Hot spots are caused by friction or pressure. Look for hot spots in areas that get a lot of rubbing. Hot spots can turn into blisters, calluses, or sores.  Cracks and sores are caused by dry or irritated skin. They are a sign  that the skin is breaking down, which can lead to infection.  Toenail problems to watch for include nails growing into the skin (ingrown toenail) and causing redness or pain. Thick, yellow, or discolored nails can signal a fungal infection.  Drainage and odor can develop from untreated sores and ulcers. Call your healthcare provider right away if you notice white or yellow drainage, bleeding, or unpleasant odor.   Date Last Reviewed: 6/1/2016 © 2000-2017 [employer]. [address] [place] [id number]. All rights reserved. This information is not intended as a substitute for professional medical care. Always follow your healthcare professional's instructions.    Long-Term Complications of Diabetes    Diabetes can cause health problems over time. These are called complications. They are more likely to happen if your blood sugar is often too high. Over time, high blood sugar can damage blood vessels in your body. It is important to keep your blood sugar in your target range. This can help prevent or delay complications from diabetes.  Possible complications  Complications of diabetes include:    Eye problems, including damage to the blood vessels in the eyes (retinopathy), pressure in the eye (glaucoma), and clouding of the eye's lens (a cataract). Eye problems can eventually lead to irreversible blindness.   Tooth and gum problems (periodontal disease), causing loss of teeth and bone  Blood vessel (vascular) disease leading to circulation problems, heart attack or stroke, or a need for amputation of a limb   Problems with sexual function leading to erectile dysfunction in men and sexual discomfort in women   Kidney disease (nephropathy) can eventually lead to kidney failure, which may require dialysis or kidney transplant   Nerve problems (neuropathy), causing pain or loss of feeling in your feet and other parts of your body, potentially leading to an amputation of a limb   High blood pressure  (hypertension), putting strain on your heart and blood vessels  Serious infections, possibly leading to loss of toes, feet, or limbs    How to avoid complications  The serious consequences of these complications may be avoidable for most people with diabetes by managing your blood glucose, blood pressure, and cholesterol levels. This can help you feel better and stay healthy. You can manage diabetes by tracking your blood sugar. You can also eat healthy and exercise to avoid gaining weight. And you should take medicine if directed by your healthcare provider.      Diabetes Foot Care Guidelines  Diabetic foot care is essential as diabetes can be dangerous to your feet--even a small cut can produce serious consequences. Diabetes may cause nerve damage that takes away the feeling in your feet. Diabetes may also reduce blood flow to the feet, making it harder to heal an injury or resist infection. Because of these problems, you may not notice a foreign object in your shoe. As a result, you could develop a blister or a sore. This could lead to an infection or a nonhealing wound that could put you at risk for an amputation.    To avoid serious foot problems that could result in losing a toe, foot or leg, follow these guidelines.    Inspect your feet daily. Check for cuts, blisters, redness, swelling or nail problems. Use a magnifying hand mirror to look at the bottom of your feet. Call your doctor if you notice anything.    Bathe feet in lukewarm, never hot, water. Keep your feet clean by washing them daily. Use only lukewarm water--the temperature you would use on a  baby.    Be gentle when bathing your feet. Wash them using a soft washcloth or sponge. Dry by blotting or patting and carefully dry between the toes.    Moisturize your feet but not between your toes. Use a moisturizer daily to keep dry skin from itching or cracking. But don't moisturize between the toes--that could encourage a fungal  infection.    Cut nails carefully. Cut them straight across and file the edges. Dont cut nails too short, as this could lead to ingrown toenails. If you have concerns about your nails, consult your doctor.    Never treat corns or calluses yourself. No bathroom surgery or medicated pads. Visit your doctor for appropriate treatment.    Wear clean, dry socks. Change them daily.    Consider socks made specifically for patients living with diabetes. These socks have extra cushioning, do not have elastic tops, are higher than the ankle and are made from fibers that wick moisture away from the skin.    Wear socks to bed. If your feet get cold at night, wear socks. Never use a heating pad or a hot water bottle.    Shake out your shoes and feel the inside before wearing. Remember, your feet may not be able to feel a pebble or other foreign object, so always inspect your shoes before putting them on.    Keep your feet warm and dry. Dont let your feet get wet in snow or rain. Wear warm socks and shoes in winter.    Consider using an antiperspirant on the soles of your feet. This is helpful if you have excessive sweating of the feet.    Never walk barefoot. Not even at home! Always wear shoes or slippers. You could step on something and get a scratch or cut.    Take care of your diabetes. Keep your blood sugar levels under control.    Do not smoke. Smoking restricts blood flow in your feet.    Get periodic foot exams. Seeing your foot and ankle surgeon on a regular basis can help prevent the foot complications of diabetes.

## 2024-10-24 NOTE — PROGRESS NOTES
[facility]  [address]  [address]  [facility] [address]  Dept: [phone]  Dept Fax: [phone]    Brain Barney Jr., DPM     Assessment:   MDM    Coding  1. Type 2 diabetes mellitus with hyperglycemia, without long-term current use of insulin  Ambulatory referral/consult to Podiatry    Foot Exam Performed    Ambulatory referral/consult to Diabetes Education      2. History of stroke with residual deficit  Foot Exam Performed      3. Spastic hemiplegia affecting left nondominant side, unspecified etiology  Foot Exam Performed      4. Decreased strength of lower extremity - Left Foot  Foot Exam Performed      5. Onychomycosis due to dermatophyte  ciclopirox (PENLAC) 8 % Soln          Plan:     Procedures  1. Type 2 diabetes mellitus with hyperglycemia, without long-term current use of insulin  -     Ambulatory referral/consult to Podiatry  -     Foot Exam Performed  -     Ambulatory referral/consult to Diabetes Education; Future; Expected date: 10/31/2024    2. History of stroke with residual deficit  -     Foot Exam Performed    3. Spastic hemiplegia affecting left nondominant side, unspecified etiology  -     Foot Exam Performed    4. Decreased strength of lower extremity - Left Foot  -     Foot Exam Performed    5. Onychomycosis due to dermatophyte  -     ciclopirox (PENLAC) 8 % Soln; Apply topically nightly.  Dispense: 6.6 mL; Refill: 6      Luis was seen today for nail care.    Diagnoses and all orders for this visit:    Type 2 diabetes mellitus with hyperglycemia, without long-term current use of insulin  -     Ambulatory referral/consult to Podiatry  -     Foot Exam Performed  -     Ambulatory referral/consult to Diabetes Education; Future    History of stroke with residual deficit  -     Foot Exam Performed    Spastic hemiplegia affecting left nondominant side, unspecified etiology  -     Foot Exam Performed    Decreased strength of lower extremity - Left Foot  -      Foot Exam Performed    Onychomycosis due to dermatophyte  -     ciclopirox (PENLAC) 8 % Soln; Apply topically nightly.      ADA Risk Classification: No LOPS,No PAD, No deformity - 0: rtc 12 months    -pt seen, evaluated, and managed  -dx discussed in detail. All questions/concerns addressed  -all tx options discussed. All alternatives, risks, benefits of all txs discussed  -comprehensive diabetic foot exam with risk assessment performed today  -the patient was educated about the diagnosis and discussed reducing caloric intake, increase physical activity  -We discussed conservative care options possible including but not limited to shoe wear and/or padding, bracing/strapping, at home ROM, formal PT, medical therapy, injection therapy  -labs reviewed by me: A1c of 6.2  -Shoe inspection. Diabetic Foot Education. Patient reminded of the importance of good nutrition and blood sugar control to help prevent podiatric complications of diabetes. Patient instructed on proper foot hygeine. We discussed wearing proper shoe gear, daily foot inspections, never walking without protective shoe gear, never putting sharp instruments to feet.  -rxs dispensed: penlac  -referrals: dm education  -WB: wbat        Follow up in 1 year (on 10/24/2025), or if symptoms worsen or fail to improve.    Subjective:      Patient ID: Luis Wong is a 68 y.o. male.    Chief Complaint:   Chief Complaint   Patient presents with    Nail Care     Nail care       Luis Wong presents to the clinic upon referral from Dr. Colindres  for evaluation and treatment of diabetic feet. Patient relates no major problem with feet. Only complaints today consist of none.      HPI    Last Podiatry Enc: Visit date not found  Last Enc w/ Me: Visit date not found    Outside reports reviewed: historical medical records.  Family hx: as below  Past Medical History:   Diagnosis Date    Aneurysm 10/30/2012    Diabetes mellitus     Fever blister     Herpes infection      Hypertension     ICH (intracerebral hemorrhage)     Lateral epicondylitis of right elbow 11/28/2022    Mixed hyperlipidemia 9/5/2013    Nontraumatic thalamic hemorrhage 8/31/2016    JAQUAN (obstructive sleep apnea)     Prediabetes     Right-sided lacunar stroke 9/11/2014    SDH (subdural hematoma)     Special screening for malignant neoplasms, colon     Stroke     Type 2 diabetes mellitus with hyperglycemia, without long-term current use of insulin 1/25/2022     Past Surgical History:   Procedure Laterality Date    CARPAL TUNNEL RELEASE Right 2/21/2020    Procedure: RELEASE, CARPAL TUNNEL;  Surgeon: Barry Dutton Jr., MD;  Location: [facility] OR;  Service: Orthopedics;  Laterality: Right;    INSERTION OF IMPLANTABLE LOOP RECORDER N/A 12/12/2019    Procedure: Insertion, Implantable Loop Recorder;  Surgeon: Efren Sanford MD;  Location: [facility] CATH LAB/EP;  Service: Cardiology;  Laterality: N/A;  Crytogenic CVA, LOOP,  MDT, Local,  DM    INTRAMEDULLARY RODDING OF FEMUR Left 5/23/2021    Procedure: INSERTION, INTRAMEDULLARY SURY, FEMUR;  Surgeon: Watson Jean-Baptiste IV, MD;  Location: [facility] OR;  Service: Orthopedics;  Laterality: Left;    Knee arthroscopic surgery      REMOVAL OF IMPLANTABLE LOOP RECORDER N/A 8/11/2023    Procedure: REMOVAL, IMPLANTABLE LOOP RECORDER;  Surgeon: Efren Sanford MD;  Location: [facility] EP LAB;  Service: Cardiology;  Laterality: N/A;  SHANTELLE, ILR Removal, MDT,  RN sedate, DM, 3 Prep     Family History   Problem Relation Name Age of Onset    Heart disease Mother      Diabetes Father      Cancer Sister 6         breast    Diabetes Paternal Grandmother      Diabetes Paternal Grandfather      Melanoma Neg Hx       Current Outpatient Medications   Medication Sig Dispense Refill    ciclopirox (PENLAC) 8 % Soln Apply topically nightly. 6.6 mL 6    diclofenac sodium (VOLTAREN) 1 % Gel Apply 2 g topically 3 (three) times daily. (Patient not taking: Reported on 9/23/2024) 100 g 1    ibuprofen (ADVIL,MOTRIN) 600  MG tablet TAKE 1 TABLET BY MOUTH EVERY 6 HOURS AS NEEDED 40 tablet 1    NIFEdipine (PROCARDIA-XL) 30 MG (OSM) 24 hr tablet TAKE 1 TABLET BY MOUTH EVERY DAY 90 tablet 3     No current facility-administered medications for this visit.     Review of patient's allergies indicates:  No Known Allergies  Social History     Socioeconomic History    Marital status:    Tobacco Use    Smoking status: Never    Smokeless tobacco: Never   Substance and Sexual Activity    Alcohol use: No    Drug use: No    Sexual activity: Yes     Partners: Female     Social Drivers of Health     Financial Resource Strain: Low Risk  (9/17/2024)    Overall Financial Resource Strain (CARDIA)     Difficulty of Paying Living Expenses: Not hard at all   Food Insecurity: No Food Insecurity (9/17/2024)    Hunger Vital Sign     Worried About Running Out of Food in the Last Year: Never true     Ran Out of Food in the Last Year: Never true   Transportation Needs: No Transportation Needs (9/17/2024)    PRAPARE - Transportation     Lack of Transportation (Medical): No     Lack of Transportation (Non-Medical): No   Physical Activity: Insufficiently Active (9/17/2024)    Exercise Vital Sign     Days of Exercise per Week: 2 days     Minutes of Exercise per Session: 20 min   Stress: No Stress Concern Present (9/17/2024)    [employer] - Occupational Stress Questionnaire     Feeling of Stress : Not at all   Housing Stability: Low Risk  (9/17/2024)    Housing Stability Vital Sign     Unable to Pay for Housing in the Last Year: No     Homeless in the Last Year: No       ROS    REVIEW OF SYSTEMS: Negative as documented below as well as positive findings in bold.       Constitutional  Respiratory  Gastrointestinal  Skin   - Fever - Cough - Heartburn - Rash   - Chills - Spit blood - Nausea - Itching   - Weight Loss - Shortness of breath - Vomiting - Nail pain   - Malaise/Fatigue - Wheezing - Abdominal Pain  Wound/Ulcer   - Weight  "Gain   - Blood in Stool  Poor wound healing       - Diarrhea          Cardiovascular  Genitourinary  Neurological  HEENT   - Chest Pain - Dysuria - Burning Sensation of feet - Headache   - Palpitations - Hematuria - Tingling / Paresthesia - Congestion   - Pain at night in legs - Flank Pain - Dizziness - Sore Throat   - Cramping   - Tremor - Blurred Vision   - Leg Swelling   - Sensory Change - Double Vision   - Dizzy when standing   - Speech Change - Eye Redness       - Focal Weakness - Dry Eyes       - Loss of Consciousness          Endocrine  Musculoskeletal  Psychiatric   - Cold intolerance - Muscle Pain - Depression   - Heat intolerance - Neck Pain - Insomnia   - Anemia - Joint Pain - Memory Loss   -  Easy bruising, bleeding - Heel pain - Anxiety      Toe Pain        Leg/Ankle/Foot Pain         Objective:     Ht 5' 9" (1.753 m)   Wt 71.7 kg (158 lb 1.1 oz)   BMI 23.34 kg/m²   Vitals:    10/24/24 1032   Weight: 71.7 kg (158 lb 1.1 oz)   Height: 5' 9" (1.753 m)   PainSc: 0-No pain       Physical Exam    General Appearance:   Patient appears well developed, well nourished  Patient appears stated age    Psychiatric:   Patient is oriented to time, place, and person.  Patient has appropriate mood and affect    Neck:  Trachea Midline  No visible masses    Respiratory/Ears:  No distress or labored breathing.  Able to differentiate between normal talking voice and whisper.  Able to follow commands    Eyes:  Visual Acuity intact  Lids and conjunctivae normal. No discoloration noted.    Physical Exam  Vitals and nursing note reviewed.   Feet:      Right foot:      Protective Sensation: 10 sites tested.  10 sites sensed.      Toenail Condition: Fungal disease present.     Left foot:      Protective Sensation: 10 sites tested.  10 sites sensed.      Toenail Condition: Fungal disease present.  Psychiatric:         Thought Content: Thought content normal.         Judgment: Judgment normal.       Ortho Exam  General    Nursing " note and vitals reviewed.  Psychiatric: Judgment and thought content normal.             Foot Exam  Foot/Ankle Musculoskeletal Exam    B/l LE exam con't:  V:  DP 1/4, PT 1/4   CRT< 3s to all digits tested   Tibial and popliteal lymph nodes are w/o abnormality    N:  Patient displays normal ankle reflexes   SILT in SP/DP/T/Crissy/Saph distributions    Ortho: R +Motor EHL/FHL/TA/GA 5/5 and L + motor EHL/FHL/TA/GA 4/5   no TTP of foot or ankles   Compartments soft/compressible. No pain on passive stretch of big toe. No calf  Pain.     Derm:  skin intact, skin warm and dry, skin without ulcers or lesions, skin without induration, nails abnormal, no erythema and no ecchymosis    Imaging / Labs:    Hemoglobin A1C   Date Value Ref Range Status   09/20/2024 6.2 (H) 4.0 - 5.6 % Final     Comment:     ADA Screening Guidelines:  5.7-6.4%  Consistent with prediabetes  >or=6.5%  Consistent with diabetes    High levels of fetal hemoglobin interfere with the HbA1C  assay. Heterozygous hemoglobin variants (HbS, HgC, etc)do  not significantly interfere with this assay.   However, presence of multiple variants may affect accuracy.     03/20/2024 6.3 (H) 4.0 - 5.6 % Final     Comment:     ADA Screening Guidelines:  5.7-6.4%  Consistent with prediabetes  >or=6.5%  Consistent with diabetes    High levels of fetal hemoglobin interfere with the HbA1C  assay. Heterozygous hemoglobin variants (HbS, HgC, etc)do  not significantly interfere with this assay.   However, presence of multiple variants may affect accuracy.     09/20/2023 5.6 4.0 - 5.6 % Final     Comment:     ADA Screening Guidelines:  5.7-6.4%  Consistent with prediabetes  >or=6.5%  Consistent with diabetes    High levels of fetal hemoglobin interfere with the HbA1C  assay. Heterozygous hemoglobin variants (HbS, HgC, etc)do  not significantly interfere with this assay.   However, presence of multiple variants may affect accuracy.         No results found.      Note: This was  dictated using a computer transcription program. Although proofread, it may contain computer transcription errors and phonetic errors. Other human proofreading errors may also exist. Corrections may be performed at a later time. Please contact us for any clarification if needed.    Brain Barney DPM  [facility]

## 2024-10-29 ENCOUNTER — TELEPHONE (OUTPATIENT)
Dept: FAMILY MEDICINE | Facility: CLINIC | Age: 69
End: 2024-10-29
Payer: MEDICARE

## 2024-10-30 ENCOUNTER — TELEPHONE (OUTPATIENT)
Dept: FAMILY MEDICINE | Facility: CLINIC | Age: 69
End: 2024-10-30
Payer: MEDICARE

## 2024-11-14 ENCOUNTER — TELEPHONE (OUTPATIENT)
Dept: OTOLARYNGOLOGY | Facility: CLINIC | Age: 69
End: 2024-11-14

## 2024-11-14 ENCOUNTER — CLINICAL SUPPORT (OUTPATIENT)
Dept: OTOLARYNGOLOGY | Facility: CLINIC | Age: 69
End: 2024-11-14
Payer: MEDICARE

## 2024-11-14 ENCOUNTER — OFFICE VISIT (OUTPATIENT)
Dept: OTOLARYNGOLOGY | Facility: CLINIC | Age: 69
End: 2024-11-14
Payer: MEDICARE

## 2024-11-14 ENCOUNTER — PATIENT MESSAGE (OUTPATIENT)
Dept: OTOLARYNGOLOGY | Facility: CLINIC | Age: 69
End: 2024-11-14

## 2024-11-14 VITALS — WEIGHT: 159.38 LBS | BODY MASS INDEX: 23.54 KG/M2

## 2024-11-14 DIAGNOSIS — H90.3 ASYMMETRICAL SENSORINEURAL HEARING LOSS: Primary | ICD-10-CM

## 2024-11-14 DIAGNOSIS — Z77.122 HISTORY OF EXPOSURE TO NOISE: ICD-10-CM

## 2024-11-14 DIAGNOSIS — H90.A22 SENSORINEURAL HEARING LOSS (SNHL) OF LEFT EAR WITH RESTRICTED HEARING OF RIGHT EAR: Primary | ICD-10-CM

## 2024-11-14 PROCEDURE — 99999 PR PBB SHADOW E&M-EST. PATIENT-LVL III: CPT | Mod: PBBFAC,,, | Performed by: NURSE PRACTITIONER

## 2024-11-14 PROCEDURE — 3008F BODY MASS INDEX DOCD: CPT | Mod: CPTII,S$GLB,, | Performed by: NURSE PRACTITIONER

## 2024-11-14 PROCEDURE — 3066F NEPHROPATHY DOC TX: CPT | Mod: CPTII,S$GLB,, | Performed by: NURSE PRACTITIONER

## 2024-11-14 PROCEDURE — 1159F MED LIST DOCD IN RCRD: CPT | Mod: CPTII,S$GLB,, | Performed by: NURSE PRACTITIONER

## 2024-11-14 PROCEDURE — 3061F NEG MICROALBUMINURIA REV: CPT | Mod: CPTII,S$GLB,, | Performed by: NURSE PRACTITIONER

## 2024-11-14 PROCEDURE — 3044F HG A1C LEVEL LT 7.0%: CPT | Mod: CPTII,S$GLB,, | Performed by: NURSE PRACTITIONER

## 2024-11-14 PROCEDURE — 99214 OFFICE O/P EST MOD 30 MIN: CPT | Mod: S$GLB,,, | Performed by: NURSE PRACTITIONER

## 2024-11-14 PROCEDURE — 1126F AMNT PAIN NOTED NONE PRSNT: CPT | Mod: CPTII,S$GLB,, | Performed by: NURSE PRACTITIONER

## 2024-11-14 PROCEDURE — 1160F RVW MEDS BY RX/DR IN RCRD: CPT | Mod: CPTII,S$GLB,, | Performed by: NURSE PRACTITIONER

## 2024-11-14 PROCEDURE — 1101F PT FALLS ASSESS-DOCD LE1/YR: CPT | Mod: CPTII,S$GLB,, | Performed by: NURSE PRACTITIONER

## 2024-11-14 PROCEDURE — 3288F FALL RISK ASSESSMENT DOCD: CPT | Mod: CPTII,S$GLB,, | Performed by: NURSE PRACTITIONER

## 2024-11-14 PROCEDURE — 99999 PR PBB SHADOW E&M-EST. PATIENT-LVL I: CPT | Mod: PBBFAC,,,

## 2024-11-14 NOTE — PROGRESS NOTES
Chief Complaint   Patient presents with    Hearing Loss     Hearing loss for over a year now        HPI:  Luis Wong is a very pleasant 68 y.o. male self-referred for evaluation of hearing loss. He has seen Dr. Mojica, ENT, in the past for asymmetric SNHL and was cleared for hearing aids. The patient states that he is ready for hearing aids now.   He reports hearing loss that has been gradually progressing over the last several years.  He has not noted any difference in hearing between the ears, with either ear being the worse hearing ear. He has not noted any tinnitus in either ear. He has not had any recent issues with ear pain or ear drainage. He denies a family history of hearing loss, and has not had any previous otologic surgery. 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 He denies issues with dizziness.      Past Medical History:   Diagnosis Date    Aneurysm 10/30/2012    Diabetes mellitus     Fever blister     Herpes infection     Hypertension     ICH (intracerebral hemorrhage)     Lateral epicondylitis of right elbow 11/28/2022    Mixed hyperlipidemia 9/5/2013    Nontraumatic thalamic hemorrhage 8/31/2016    JAQUAN (obstructive sleep apnea)     Prediabetes     Right-sided lacunar stroke 9/11/2014    SDH (subdural hematoma)     Special screening for malignant neoplasms, colon     Stroke     Type 2 diabetes mellitus with hyperglycemia, without long-term current use of insulin 1/25/2022     Social History     Socioeconomic History    Marital status:    Tobacco Use    Smoking status: Never    Smokeless tobacco: Never   Substance and Sexual Activity    Alcohol use: No    Drug use: No    Sexual activity: Yes     Partners: Female     Social Drivers of Health     Financial Resource Strain: Low Risk  (9/17/2024)    Overall Financial Resource Strain (CARDIA)     Difficulty of Paying Living Expenses: Not hard at all   Food Insecurity: No Food Insecurity  (9/17/2024)    Hunger Vital Sign     Worried About Running Out of Food in the Last Year: Never true     Ran Out of Food in the Last Year: Never true   Transportation Needs: No Transportation Needs (9/17/2024)    PRAPARE - Transportation     Lack of Transportation (Medical): No     Lack of Transportation (Non-Medical): No   Physical Activity: Insufficiently Active (9/17/2024)    Exercise Vital Sign     Days of Exercise per Week: 2 days     Minutes of Exercise per Session: 20 min   Stress: No Stress Concern Present (9/17/2024)    [employer] - Occupational Stress Questionnaire     Feeling of Stress : Not at all   Housing Stability: Low Risk  (9/17/2024)    Housing Stability Vital Sign     Unable to Pay for Housing in the Last Year: No     Homeless in the Last Year: No      Past Surgical History:   Procedure Laterality Date    CARPAL TUNNEL RELEASE Right 2/21/2020    Procedure: RELEASE, CARPAL TUNNEL;  Surgeon: Barry Dutton Jr., MD;  Location: [facility] OR;  Service: Orthopedics;  Laterality: Right;    INSERTION OF IMPLANTABLE LOOP RECORDER N/A 12/12/2019    Procedure: Insertion, Implantable Loop Recorder;  Surgeon: Efren Sanford MD;  Location: [facility] CATH LAB/EP;  Service: Cardiology;  Laterality: N/A;  Crytogenic CVA, LOOP,  MDT, Local,  DM    INTRAMEDULLARY RODDING OF FEMUR Left 5/23/2021    Procedure: INSERTION, INTRAMEDULLARY SURY, FEMUR;  Surgeon: Watson Jean-Baptiste IV, MD;  Location: [facility] OR;  Service: Orthopedics;  Laterality: Left;    Knee arthroscopic surgery      REMOVAL OF IMPLANTABLE LOOP RECORDER N/A 8/11/2023    Procedure: REMOVAL, IMPLANTABLE LOOP RECORDER;  Surgeon: Efren Sanford MD;  Location: [facility] EP LAB;  Service: Cardiology;  Laterality: N/A;  SHANTELLE, ILR Removal, MDT,  RN sedate, DM, 3 Prep     Family History   Problem Relation Name Age of Onset    Heart disease Mother      Diabetes Father      Cancer Sister 6         breast    Diabetes Paternal Grandmother      Diabetes  Paternal Grandfather      Melanoma Neg Hx           Review of Systems  General: negative for chills, fever or weight loss  Psychological: negative for mood changes or depression  Ophthalmic: negative for blurry vision, photophobia or eye pain  ENT: see HPI  Respiratory: no cough, shortness of breath, or wheezing  Cardiovascular: no chest pain or dyspnea on exertion  Gastrointestinal: no abdominal pain, change in bowel habits, or black/ bloody stools  Musculoskeletal: negative for gait disturbance or muscular weakness  Neurological: no syncope or seizures; no ataxia  Dermatological: negative for puritis,  rash and jaundice  Hematologic/lymphatic: no easy bruising, no new lumps or bumps      Physical Exam:    There were no vitals filed for this visit.    Constitutional: Well appearing / communicating without difficutly.  NAD.  Eyes: EOM I Bilaterally  Head/Face: Normocephalic.  Negative paranasal sinus pressure/tenderness.  Salivary glands WNL.  House Brackmann I Bilaterally.    Right Ear: Auricle normal appearance. External Auditory Canal within normal limits no lesions or masses,TM w/o masses/lesions/perforations. TM mobility noted.   Left Ear: Auricle normal appearance. External Auditory Canal within normal limits no lesions or masses,TM w/o masses/lesions/perforations. TM mobility noted.  Nose: No gross nasal septal deviation. Inferior Turbinates 3+ bilaterally. No septal perforation. No masses/lesions. External nasal skin appears normal without masses/lesions.  Oral Cavity: Gingiva/lips within normal limits.  Dentition/gingiva healthy appearing. Mucus membranes moist. Floor of mouth soft, no masses palpated. Oral Tongue mobile. Hard Palate appears normal.    Oropharynx: Base of tongue appears normal. No masses/lesions noted. Tonsillar fossa/pharyngeal wall without lesions. Posterior oropharynx WNL.  Soft palate without masses. Midline uvula.   Neck/Lymphatic: No LAD I-VI bilaterally.  No thyromegaly.  No masses  noted on exam.      Diagnostic studies:  Audiogram interpreted personally by me and discussed in detail with the patient today.   Pure tone testing revealed mild sensorineural hearing loss in the right ear and moderate sensorineural hearing loss in the left ear. Speech reception thresholds were obtained at 15 dBHL in the right ear and 25 dBHL in the left ear. Speech discrimination scores were 100% in the right ear and 100% in the left ear. Tympanometry revealed Type A tympanograms in both ears.         Assessment:    ICD-10-CM ICD-9-CM    1. Sensorineural hearing loss (SNHL) of left ear with restricted hearing of right ear  H90.A22 389.22       2. History of exposure to noise  Z77.122 V15.89         The primary encounter diagnosis was Sensorineural hearing loss (SNHL) of left ear with restricted hearing of right ear. A diagnosis of History of exposure to noise was also pertinent to this visit.      Plan:  No orders of the defined types were placed in this encounter.        We reviewed the patient's recent audiogram and hearing loss in detail.  We also discussed that he is a good candidate for hearing aids, if and when he the patient is motivated.    We also discussed the use hearing protection when exposed to loud noise, including lawn equipment. Recommend audiogram in 1 year.       Thank you kindly for allowing me to participate in the patient's care.       Linh Schmidt NP          Answers submitted by the patient for this visit:  Review of Symptoms Questionnaire  (Submitted on 11/9/2024)  None of these: Yes  None of these: Yes  None of these : Yes  Snoring?: Yes  None of these : Yes  None of these: Yes  None of these: Yes  None of these: Yes  None of these : Yes  None of these: Yes  None of these : Yes  None of these: Yes  None of these: Yes  None of these: Yes

## 2024-11-14 NOTE — PROGRESS NOTES
Luis Wong, a 68 y.o. male was seen today in the clinic for annual audiologic evaluation. The patient's primary complaint was decreased hearing perception. He is ready to consider amplification. Mr. Wong denies tinnitus, ear pain, drainage, and dizziness. History of  and occupational noise exposure was reported.     Pure tone testing revealed mild sensorineural hearing loss in the right ear and moderate sensorineural hearing loss in the left ear. Speech reception thresholds were obtained at 15 dBHL in the right ear and 25 dBHL in the left ear. Speech discrimination scores were 100% in the right ear and 100% in the left ear. Tympanometry revealed Type A tympanograms in both ears.    Results were reviewed with the patient and the recommendation of a hearing aid consultation was discussed. Audiology will reach out to Mr. Wong to schedule hearing aid consultation.    Recommendations:  Otologic evaluation (asymmetric hearing loss)  Annual audiologic evaluation  Hearing protection in noise  Hearing aid consultation

## 2024-11-15 ENCOUNTER — CLINICAL SUPPORT (OUTPATIENT)
Dept: DIABETES | Facility: CLINIC | Age: 69
End: 2024-11-15
Payer: MEDICARE

## 2024-11-15 VITALS — WEIGHT: 157.94 LBS | BODY MASS INDEX: 23.39 KG/M2 | HEIGHT: 69 IN

## 2024-11-15 DIAGNOSIS — E11.65 TYPE 2 DIABETES MELLITUS WITH HYPERGLYCEMIA, WITHOUT LONG-TERM CURRENT USE OF INSULIN: ICD-10-CM

## 2024-11-15 PROCEDURE — 99999 PR PBB SHADOW E&M-EST. PATIENT-LVL II: CPT | Mod: PBBFAC,,, | Performed by: DIETITIAN, REGISTERED

## 2024-11-15 NOTE — PROGRESS NOTES
"Diabetes Care Specialist Progress Note  Author: Mila Escalante RD  Date: 11/15/2024    Intake    Program Intake  Reason for Diabetes Program Visit:: Initial Diabetes Assessment  Current diabetes risk level:: low  In the last month, have you used the ER or been admitted to the hospital: No  Permission to speak with others about care:: yes (wife)    Current Diabetes Treatment: Diet/Exercise  Diet/Exercise Type/Dose: tries to eat healthy    Continuous Glucose Monitoring  Patient has CGM: No    Lab Results   Component Value Date    HGBA1C 6.2 (H) 09/20/2024       Weight: 71.6 kg (157 lb 15.4 oz)   Height: 5' 9" (175.3 cm)   Body mass index is 23.33 kg/m².    Lifestyle Coping Support & Clinical    Lifestyle/Coping/Support  Does anyone in your family have diabetes or does anyone in your family support you in your diabetes care?: positive family hx; gets good support at home  Learning Barriers:: None  Culture or Caodaism beliefs that may impact ability to access healthcare: No  Psychosocial/Coping Skills Assessment Completed: : No  Deffered due to:: Time  Area of need?: Deferred    Problem Review  Active Comorbidities: Stroke, Hypertension, Hyperlipidemia/Dyslipidemia, Cardiovascular Disease    Diabetes Self-Management Skills Assessment    Medication Skills Assessment  Patient is able to identify current diabetes medications, dosages, and appropriate timing of medications.: yes (Pt does not take meds for DM)  Medication Skills Assessment Completed:: Yes  Area of need?: No    Diabetes Disease Process/Treatment Options  Diabetes Type?: Type II  When were you diagnosed?: over 10 years ago  If previous diabetes education, when/where:: none  What are your goals for this education session?: not sure  Is patient aware of what causes diabetes?: No  Diabetes Disease Process/Treatment Options: Skills Assessment Completed: No  Deferred due to:: Time  Area of need?: Deferred    Nutrition/Healthy Eating  Meal Plan 24 Hour Recall - " Breakfast: peanut butter crackers (not premade ones), green tea; sometimes grits and eggs  Meal Plan 24 Hour Recall - Lunch: sandwich; regular lemonade  Meal Plan 24 Hour Recall - Dinner: lamb chops with baked potato and broccoli or red beans and rice, regular lemonade  Meal Plan 24 Hour Recall - Snack: popcorn  Meal Plan 24 Hour Recall - Beverage: water; hot tea; lemonace  Who shops/cooks?: wife  Patient can identify foods that impact blood sugar.: yes  Challenges to healthy eating:: other (see comments) (sugary drinks)  Nutrition/Healthy Eating Skills Assessment Completed:: Yes  Assessment indicates:: Instruction Needed  Area of need?: Yes    Physical Activity/Exercise  Patient's daily activity level:: sedentary  Patient formally exercises outside of work.: no  Reasons for not exercising:: other (see comments) (pt has impaired mobility from strokes but has a stationary bike at home that he is willing to ride)  Patient can identify forms of physical activity.: yes  Physical Activity/Exercise Skills Assessment Completed: : Yes  Assessment indicates:: Instruction Needed  Area of need?: Yes    Home Blood Glucose Monitoring  Patient states that blood sugar is checked at home daily.: no  What is your A1c Target?: 6.5% or less  Home Blood Glucose Monitoring Skills Assessment Completed: : Yes  Assessment indicates:: Instruction Needed  Area of need?: Yes    Acute Complications  Area of need?: Deferred    Chronic Complications  Area of need?: Deferred      Assessment Summary and Plan    Based on today's diabetes care assessment, the following areas of need were identified:      Identified Areas of Need      Medication/Current Diabetes Treatment: No   Lifestyle Coping/Support: Deferred   Diabetes Disease Process/Treatment Options: Deferred   Nutrition/Healthy Eating: Yes, see care plan below    Physical Activity/Exercise: Yes, reviewed effects of exercise on BG; taught duration, frequency and intensity recs for exercise     Home Blood Glucose Monitoring: Yes, reviewed target A1c and BG goals    Acute Complications: Deferred    Chronic Complications: Deferred       Today's interventions were provided through individual discussion, instruction, and written materials were provided.      Patient verbalized understanding of instruction and written materials.  Pt was able to return back demonstration of instructions today. Patient understood key points, needs reinforcement and further instruction.     Diabetes Self-Management Care Plan:    Today's Diabetes Self-Management Care Plan was developed with Luis's input. Luis has agreed to work toward the following goal(s) to improve his/her overall diabetes control.      Care Plan: Diabetes Management   Updates made since 11/16/2023 12:00 AM        Problem: Healthy Eating         Goal: Eat 3 meals daily with 45g/3 servings of Carbohydrate per meal.    Start Date: 11/15/2024   Expected End Date: 10/31/2025   Priority: Medium   Barriers: No Barriers Identified   Note:    11/15/24: Pt presented with wife. Pt eats an overall healthy diet; likes veggies; does not eat fast food or fried food. He does drink sugary beverages with meals; water the rest of the day. He is willing to try to find sugar free drinks. He was encouraged to add more veggies to his plate.       Task: Reviewed the sources and role of Carbohydrate, Protein, and Fat and how each nutrient impacts blood sugar. Completed 11/15/2024        Task: Provided visual examples using dry measuring cups, food models, and other familiar objects such as computer mouse, deck or cards, tennis ball etc. to help with visualization of portions. Completed 11/15/2024        Task: Explained how to count carbohydrates using the food label and the use of dry measuring cups for accurate carb counting. Completed 11/15/2024     Task: Recommended replacing beverages containing high sugar content with noncaloric/sugar free options and/or water. Completed  11/15/2024        Task: Review the importance of balancing carbohydrates with each meal using portion control techniques to count servings of carbohydrate and label reading to identify serving size and amount of total carbs per serving. Completed 11/15/2024         Follow Up Plan     Follow up in about 2 months (around 1/15/2025). Review A1c, intake of sugary beverages, exercise level; stress mgmt.    Today's care plan and follow up schedule was discussed with patient.  Luis verbalized understanding of the care plan, goals, and agrees to follow up plan.        The patient was encouraged to communicate with his/her health care provider/physician and care team regarding his/her condition(s) and treatment.  I provided the patient with my contact information today and encouraged to contact me via phone or [facility] Patient Portal as needed.     Length of Visit   Total Time: 60 Minutes

## 2024-11-26 ENCOUNTER — CLINICAL SUPPORT (OUTPATIENT)
Dept: AUDIOLOGY | Facility: CLINIC | Age: 69
End: 2024-11-26
Payer: MEDICARE

## 2024-11-26 DIAGNOSIS — H90.3 SENSORINEURAL HEARING LOSS, BILATERAL: Primary | ICD-10-CM

## 2024-11-26 NOTE — PROGRESS NOTES
HEARING AID CONSULTATION    Luis Wong was seen today for a hearing aid consultation; he was accompanied by his wife, Marianne. We reviewed his hearing test results and discussed different levels of technology of hearing aids. Mr. Wong was informed of all pricing and service options available through [facility] ([facility]). He was also advised to verify what, if any, discounts or benefits are available with his insurance. Mr. Wong understood that [facility] is not in network with any insurance payor.      Mr. Wong was informed of the non-refundable $250.00 deposit required to order and that the remaining balance is due the day of .     Phonak MyRolleo O97-Akamfk hearing aids were selected in graphite gray with 0M receivers; Mr. Wong is a  and will received 20% off the bundled model. He is purchasing one hearing aid now and one in January. Mr. Wong has the [facility] flex card with a $2000.00 allowance to use towards his purchase.

## 2024-12-06 ENCOUNTER — DOCUMENTATION ONLY (OUTPATIENT)
Dept: AUDIOLOGY | Facility: CLINIC | Age: 69
End: 2024-12-06
Payer: MEDICARE

## 2024-12-06 NOTE — PROGRESS NOTES
Hearing aids arrived from ; devices were assembled and charged in preparation for fitting. Patient is scheduled for his fitting    Hearing Aid Details      & Model: InPact.me H39-[id number]  Color: graphite gray  Lt SN: [id number]  Battery: LI Rechargeable  Lt  and Dome: 0M  Repair Warranty Exp: 12/26/2027  L&D Warranty Exp: 12/26/2027   SN: [id number]

## 2024-12-11 NOTE — PROGRESS NOTES
HEARING AID FITTING    Luis Wong was seen today for a hearing aid fitting.  All parts of the hearing aid, including battery, domes, wax filters, and microphones, were discussed with the patient.  Battery charging was also reviewed and practiced with the patient.  Feedback measures were taken and hearing aid was fit to patient's satisfaction.  Counseled patient on daily wear and maintenance of the hearing aid.  Mr. Wong acknowledged that he understood. The purchase agreement, 30-day trial period, and warranties were also reviewed with patient.  It is recommended Mr. Wong return to clinic in 2 weeks or as needed.    The hearing aids were connected to the patient's phone.  Bluetooth settings were tested in office to which it was determined the hearing aid stopped working. Software prompted a new ; one was placed on the hearing aid and the same error message occurred. [facility] was consulted and recommended we try to download a special version of the software which I was unable to do with out IS assisting. Theh patient was fit with loaner devices to wear in the mean time.     Phonak Servergy[employer]o iSphereTrial  [id number]  [id number]  Devices were successfully connected to the patient's phone and tested in office.     Patient is scheduled to be seen January 2nd to be fit with both of his hearing aids.       Hearing Aid Information  Service Type: Bundled  Date of Purchase: 12/12/24  Trial Period End Date: 01/11/25   and Model: Phonak Audeo L75-Gnydwg  Color: graphite gray  Left SN: [id number]  Battery: Li-Ion Rechargeable 312  Left  and Dome: 0M  Repair Warranty Expiration Date: 12/26/27  L&D Warranty Expiration Date: 12/26/27   SN: [id number]

## 2024-12-12 ENCOUNTER — CLINICAL SUPPORT (OUTPATIENT)
Dept: AUDIOLOGY | Facility: CLINIC | Age: 69
End: 2024-12-12
Payer: MEDICARE

## 2024-12-12 DIAGNOSIS — H90.3 SENSORINEURAL HEARING LOSS, BILATERAL: Primary | ICD-10-CM

## 2024-12-18 DIAGNOSIS — I10 ESSENTIAL HYPERTENSION: ICD-10-CM

## 2024-12-18 NOTE — TELEPHONE ENCOUNTER
No care due was identified.  [facility] Embedded Care Due Messages. Reference number: [id number].   12/18/2024 5:12:12 PM CST

## 2024-12-19 RX ORDER — IBUPROFEN 600 MG/1
600 TABLET ORAL EVERY 6 HOURS PRN
Qty: 40 TABLET | Refills: 1 | Status: SHIPPED | OUTPATIENT
Start: 2024-12-19

## 2024-12-19 NOTE — TELEPHONE ENCOUNTER
Refill Routing Note   Medication(s) are not appropriate for processing by [facility] for the following reason(s):        Required vitals abnormal: 160/80     [facility] action(s):  Defer             Appointments  past 12m or future 3m with PCP    Date Provider   Last Visit   6/22/2024 Juan Eduardo MD   Next Visit   1/3/2025 Juan Eduardo MD   ED visits in past 90 days: 0        Note composed:3:34 PM 12/19/2024

## 2024-12-20 RX ORDER — NIFEDIPINE 30 MG/1
30 TABLET, EXTENDED RELEASE ORAL DAILY
Qty: 90 TABLET | Refills: 3 | Status: SHIPPED | OUTPATIENT
Start: 2024-12-20

## 2024-12-24 ENCOUNTER — DOCUMENTATION ONLY (OUTPATIENT)
Dept: AUDIOLOGY | Facility: CLINIC | Age: 69
End: 2024-12-24
Payer: MEDICARE

## 2024-12-24 NOTE — PROGRESS NOTES
Hearing aid arrived from ; device was assembled and charged in preparation for fitting. Patient is scheduled for a fitting in January.    Hearing Aid Details      & Model: Phonak Signaleo X63-Kccozg  Color: graphite gray  Lt SN: [id number]  Battery: LI Rechargeable  Rt  and Dome: 1M/0M  Repair Warranty Exp: 01/15/2028  L&D Warranty Exp: 01/15/2028   SN: [id number]

## 2025-01-01 ENCOUNTER — PATIENT MESSAGE (OUTPATIENT)
Dept: ADMINISTRATIVE | Facility: OTHER | Age: 70
End: 2025-01-01
Payer: MEDICARE

## 2025-01-02 ENCOUNTER — CLINICAL SUPPORT (OUTPATIENT)
Dept: AUDIOLOGY | Facility: CLINIC | Age: 70
End: 2025-01-02
Payer: MEDICARE

## 2025-01-02 DIAGNOSIS — H90.3 SENSORINEURAL HEARING LOSS, BILATERAL: Primary | ICD-10-CM

## 2025-01-02 NOTE — PROGRESS NOTES
HEARING AID FITTING    Luis Wong was seen today for a hearing aid fitting. He has been wearing loaner hearing aids as his first fitting was affected by a malfunctioning device from [employer]. He noted that he would use the hemant to add clarity and then slightly lower the volume. Large open areas were challenging to hear in. Soft noise reduction was activated in all programs; kneepoint for spheric speech in noise was lowered. Speech in noise was strengthened to suppress more noise than default. All parts of the hearing aid, including battery, domes, wax filters, and microphones, were discussed with the patient.  Battery charging was also reviewed and practiced with the patient.  Real ear and feedback measures were taken and hearing aid was fit to patient's satisfaction.  Counseled patient on daily wear and maintenance of the hearing aid.  Mr. Wong acknowledged that he understood.  The hearing aids were connected to the patient's phone.  Bluetooth settings were tested successfully in office. The purchase agreement, 30-day trial period, and warranties were also reviewed with patient.  It is recommended Mr. Wong return to clinic in 2 weeks or as needed.      Hearing Aid Information  Service Type: Bundled  Date of Purchase: 01/02/25  Trial Period End Date: 02/01/25   and Model: Phonak Audeo G20-Ndibsc  Color: graphite gray  Right SN: [id number]  Left SN: [id number]  Battery: Li-Ion Rechargeable 312  Right  and Dome: 1M; Large open dome  Left  and Dome: 1M; Large open dome  Repair Warranty Expiration Date: 01/15/28  L&D Warranty Expiration Date: 01/15/28   SN: [id number]    Additional : [id number]

## 2025-01-03 ENCOUNTER — OFFICE VISIT (OUTPATIENT)
Dept: FAMILY MEDICINE | Facility: CLINIC | Age: 70
End: 2025-01-03
Payer: MEDICARE

## 2025-01-03 ENCOUNTER — LAB VISIT (OUTPATIENT)
Dept: LAB | Facility: HOSPITAL | Age: 70
End: 2025-01-03
Attending: FAMILY MEDICINE
Payer: MEDICARE

## 2025-01-03 VITALS
HEART RATE: 78 BPM | WEIGHT: 160.81 LBS | DIASTOLIC BLOOD PRESSURE: 86 MMHG | BODY MASS INDEX: 23.82 KG/M2 | HEIGHT: 69 IN | OXYGEN SATURATION: 99 % | SYSTOLIC BLOOD PRESSURE: 138 MMHG

## 2025-01-03 DIAGNOSIS — I48.0 PAROXYSMAL ATRIAL FIBRILLATION: ICD-10-CM

## 2025-01-03 DIAGNOSIS — E11.59 HYPERTENSION ASSOCIATED WITH DIABETES: ICD-10-CM

## 2025-01-03 DIAGNOSIS — I15.2 HYPERTENSION ASSOCIATED WITH DIABETES: ICD-10-CM

## 2025-01-03 DIAGNOSIS — G81.14 SPASTIC HEMIPLEGIA AFFECTING LEFT NONDOMINANT SIDE, UNSPECIFIED ETIOLOGY: ICD-10-CM

## 2025-01-03 LAB
ALBUMIN SERPL BCP-MCNC: 4 G/DL (ref 3.5–5.2)
ALP SERPL-CCNC: 75 U/L (ref 40–150)
ALT SERPL W/O P-5'-P-CCNC: 11 U/L (ref 10–44)
ANION GAP SERPL CALC-SCNC: 7 MMOL/L (ref 8–16)
AST SERPL-CCNC: 18 U/L (ref 10–40)
BILIRUB SERPL-MCNC: 0.7 MG/DL (ref 0.1–1)
BUN SERPL-MCNC: 21 MG/DL (ref 8–23)
CALCIUM SERPL-MCNC: 9.7 MG/DL (ref 8.7–10.5)
CHLORIDE SERPL-SCNC: 105 MMOL/L (ref 95–110)
CHOLEST SERPL-MCNC: 213 MG/DL (ref 120–199)
CHOLEST/HDLC SERPL: 2.5 {RATIO} (ref 2–5)
CO2 SERPL-SCNC: 29 MMOL/L (ref 23–29)
CREAT SERPL-MCNC: 1.1 MG/DL (ref 0.5–1.4)
EST. GFR  (NO RACE VARIABLE): >60 ML/MIN/1.73 M^2
ESTIMATED AVG GLUCOSE: 146 MG/DL (ref 68–131)
GLUCOSE SERPL-MCNC: 116 MG/DL (ref 70–110)
HBA1C MFR BLD: 6.7 % (ref 4–5.6)
HDLC SERPL-MCNC: 86 MG/DL (ref 40–75)
HDLC SERPL: 40.4 % (ref 20–50)
LDLC SERPL CALC-MCNC: 115.2 MG/DL (ref 63–159)
NONHDLC SERPL-MCNC: 127 MG/DL
POTASSIUM SERPL-SCNC: 3.8 MMOL/L (ref 3.5–5.1)
PROT SERPL-MCNC: 7.8 G/DL (ref 6–8.4)
SODIUM SERPL-SCNC: 141 MMOL/L (ref 136–145)
TRIGL SERPL-MCNC: 59 MG/DL (ref 30–150)

## 2025-01-03 PROCEDURE — 99999 PR PBB SHADOW E&M-EST. PATIENT-LVL III: CPT | Mod: PBBFAC,,, | Performed by: FAMILY MEDICINE

## 2025-01-03 PROCEDURE — 80053 COMPREHEN METABOLIC PANEL: CPT | Performed by: FAMILY MEDICINE

## 2025-01-03 PROCEDURE — 83036 HEMOGLOBIN GLYCOSYLATED A1C: CPT | Performed by: FAMILY MEDICINE

## 2025-01-03 PROCEDURE — 80061 LIPID PANEL: CPT | Performed by: FAMILY MEDICINE

## 2025-01-03 NOTE — PROGRESS NOTES
Subjective     Patient ID: Luis Wong is a 69 y.o. male.    Chief Complaint: Hypertension and Diabetes    69 years old male came to the clinic for blood pressure check.  Blood pressure today was elevated.  Blood pressure at home was normal.  Patient with hemiparesis over the left side.  Last A1c at the level of prediabetes.  No flare ups of atrial fibrillation.    Hypertension  This is a chronic problem. The current episode started more than 1 year ago. The problem is unchanged. The problem is uncontrolled. Pertinent negatives include no chest pain, orthopnea, palpitations or peripheral edema. There are no associated agents to hypertension. Risk factors for coronary artery disease include sedentary lifestyle. Past treatments include calcium channel blockers. The current treatment provides moderate improvement. Compliance problems include exercise.  There is no history of angina. There is no history of a hypertension causing med or a thyroid problem.   Diabetes  He presents for his follow-up diabetic visit. He has type 2 diabetes mellitus. His disease course has been stable. There are no hypoglycemic associated symptoms. Pertinent negatives for diabetes include no chest pain, no polydipsia, no polyphagia and no polyuria. There are no hypoglycemic complications. Symptoms are stable. There are no diabetic complications. Risk factors for coronary artery disease include hypertension and male sex. Current diabetic treatment includes diet. He is compliant with treatment all of the time. He is following a generally healthy diet. He never participates in exercise. An ACE inhibitor/angiotensin II receptor blocker is not being taken. Eye exam is current.     Review of Systems   Constitutional: Negative.    HENT: Negative.     Eyes: Negative.    Respiratory: Negative.     Cardiovascular: Negative.  Negative for chest pain, palpitations and orthopnea.   Gastrointestinal: Negative.    Endocrine: Negative for polydipsia,  polyphagia and polyuria.   Genitourinary: Negative.    Musculoskeletal: Negative.    Integumentary:  Negative.   Neurological: Negative.    Psychiatric/Behavioral: Negative.            Objective     Physical Exam  Vitals and nursing note reviewed.   Constitutional:       General: He is not in acute distress.     Appearance: He is well-developed. He is not diaphoretic.   HENT:      Head: Normocephalic and atraumatic.      Right Ear: External ear normal.      Left Ear: External ear normal.      Nose: Nose normal.      Mouth/Throat:      Pharynx: No oropharyngeal exudate.   Eyes:      General: No scleral icterus.        Right eye: No discharge.         Left eye: No discharge.      Conjunctiva/sclera: Conjunctivae normal.      Pupils: Pupils are equal, round, and reactive to light.   Neck:      Thyroid: No thyromegaly.      Vascular: No JVD.      Trachea: No tracheal deviation.   Cardiovascular:      Rate and Rhythm: Normal rate and regular rhythm.      Heart sounds: Normal heart sounds. No murmur heard.     No friction rub. No gallop.   Pulmonary:      Effort: Pulmonary effort is normal. No respiratory distress.      Breath sounds: Normal breath sounds. No stridor. No wheezing or rales.   Chest:      Chest wall: No tenderness.   Abdominal:      General: Bowel sounds are normal. There is no distension.      Palpations: Abdomen is soft. There is no mass.      Tenderness: There is no abdominal tenderness. There is no guarding or rebound.   Musculoskeletal:         General: No tenderness. Normal range of motion.      Cervical back: Normal range of motion and neck supple.   Lymphadenopathy:      Cervical: No cervical adenopathy.   Skin:     General: Skin is warm and dry.      Coloration: Skin is not pale.      Findings: No erythema or rash.   Neurological:      Mental Status: He is alert and oriented to person, place, and time.      Cranial Nerves: No cranial nerve deficit.      Motor: Weakness and atrophy present. No  abnormal muscle tone.      Coordination: Coordination abnormal.      Gait: Gait abnormal.      Deep Tendon Reflexes: Reflexes are normal and symmetric. Reflexes normal.      Comments: Left hemiparesis.   Psychiatric:         Behavior: Behavior normal.         Thought Content: Thought content normal.         Judgment: Judgment normal.            Assessment and Plan     1. Paroxysmal atrial fibrillation    2. Spastic hemiplegia affecting left nondominant side, unspecified etiology    3. Hypertension associated with diabetes  -     Microalbumin/Creatinine Ratio, Urine; Future; Expected date: 01/03/2025  -     Lipid Panel; Future; Expected date: 01/03/2025  -     Hemoglobin A1C; Future; Expected date: 01/03/2025  -     Comprehensive Metabolic Panel; Future; Expected date: 01/03/2025  -     Microalbumin/Creatinine Ratio, Urine; Future; Expected date: 01/03/2025  -     Lipid Panel; Future; Expected date: 01/03/2025  -     Hemoglobin A1C; Future; Expected date: 01/03/2025  -     Comprehensive Metabolic Panel; Future; Expected date: 01/03/2025      Continue monitoring blood pressure at home, low sodium diet.   I spent a total of 41 minutes on the day of the visit.This includes face to face time and non-face to face time preparing to see the patient (eg, review of tests), obtaining and/or reviewing separately obtained history, documenting clinical information in the electronic or other health record, independently interpreting results and communicating results to the patient/family/caregiver, or care coordinator.          Follow up in about 6 months (around 7/3/2025), or if symptoms worsen or fail to improve.

## 2025-01-16 ENCOUNTER — CLINICAL SUPPORT (OUTPATIENT)
Dept: AUDIOLOGY | Facility: CLINIC | Age: 70
End: 2025-01-16
Payer: MEDICARE

## 2025-01-16 DIAGNOSIS — H90.3 SENSORINEURAL HEARING LOSS, BILATERAL: Primary | ICD-10-CM

## 2025-01-16 PROCEDURE — 99499 UNLISTED E&M SERVICE: CPT | Mod: S$GLB,,, | Performed by: AUDIOLOGIST

## 2025-01-16 NOTE — PROGRESS NOTES
HEARING AID FOLLOW-UP    Luis Wong was seen today for a hearing aid follow-up visit; he was accompanied by his wife, Marianne.     He reported that his hearing aids were jumping from automatic to streaming back to automatic. He would hear a static like the sound of a TV losing a signal. He was advised to review his notification settings as it is likely a short notification coming through. Mr. Wong noted that it was inconsistent and has not heard it happen today.     Changed medium vented domes due to itching from large open dome going too deep into ear canal. Patient does not want to change to shorter  length. Acoustic settings were changed in software and new feedback test was completed. Data logging revealed almost 12 hours of daily use. He noted that he is hearing better as he can hear the dryer running or other sounds around the house. His wife reported that she feels all the sounds he is hearing are normal that he has not heard. Mr. Wong denied any changes to gain.     He and his wife were made aware of my upcoming maternity leave and how to contact the clinic for assistance while I am out.

## 2025-01-17 ENCOUNTER — CLINICAL SUPPORT (OUTPATIENT)
Dept: DIABETES | Facility: CLINIC | Age: 70
End: 2025-01-17
Payer: MEDICARE

## 2025-01-17 VITALS — WEIGHT: 161.19 LBS | HEIGHT: 69 IN | BODY MASS INDEX: 23.87 KG/M2

## 2025-01-17 DIAGNOSIS — E11.65 TYPE 2 DIABETES MELLITUS WITH HYPERGLYCEMIA, WITHOUT LONG-TERM CURRENT USE OF INSULIN: Primary | ICD-10-CM

## 2025-01-17 PROCEDURE — G0108 DIAB MANAGE TRN  PER INDIV: HCPCS | Mod: S$GLB,,, | Performed by: DIETITIAN, REGISTERED

## 2025-01-17 PROCEDURE — 99999 PR PBB SHADOW E&M-EST. PATIENT-LVL I: CPT | Mod: PBBFAC,,, | Performed by: DIETITIAN, REGISTERED

## 2025-01-17 NOTE — PROGRESS NOTES
"Diabetes Care Specialist Progress Note  Author: Mila Escalante RD  Date: 1/17/2025    Intake    Program Intake  Reason for Diabetes Program Visit:: Intervention  Type of Intervention:: Individual  Individual: Education  Education: Self-Management Skill Review, Nutrition and Meal Planning  Current diabetes risk level:: low  In the last month, have you used the ER or been admitted to the hospital: No  Permission to speak with others about care:: yes (wife)    Current Diabetes Treatment: Diet/Exercise  Diet/Exercise Type/Dose: tries to eat healthy/rides stationary bike 2-3x/week for 20 min    Continuous Glucose Monitoring  Patient has CGM: No    Lab Results   Component Value Date    HGBA1C 6.7 (H) 01/03/2025       Weight: 73.1 kg (161 lb 2.5 oz)   Height: 5' 9" (175.3 cm)   Body mass index is 23.8 kg/m².    Lifestyle Coping Support & Clinical    Lifestyle/Coping/Support  Does anyone in your family have diabetes or does anyone in your family support you in your diabetes care?: positive family hx; gets good support at home  List anything about Diabetes that causes you stress?: nothing  How do you deal with stress/distress?: find a solution  Learning Barriers:: None  Culture or Jew beliefs that may impact ability to access healthcare: No  Psychosocial/Coping Skills Assessment Completed: : Yes  Assessment indicates:: Adequate understanding  Area of need?: No    Problem Review  Active Comorbidities: Stroke, Hypertension, Hyperlipidemia/Dyslipidemia, Cardiovascular Disease    Diabetes Self-Management Skills Assessment    Medication Skills Assessment  Patient is able to identify current diabetes medications, dosages, and appropriate timing of medications.: yes  Patient reports problems or concerns with current medication regimen.: no  Patient is  aware that some diabetes medications can cause low blood sugar?: No  Medication Skills Assessment Completed:: Yes  Assessment indicates:: Adequate understanding  Area of need?: " No    Diabetes Disease Process/Treatment Options  Diabetes Type?: Type II  Deferred due to:: Time  Area of need?: Deferred    Nutrition/Healthy Eating  Meal Plan 24 Hour Recall - Breakfast: peanut butter crackers (not premade ones), green tea; sometimes grits and eggs  Meal Plan 24 Hour Recall - Lunch: sandwich; regular lemonade  Meal Plan 24 Hour Recall - Dinner: lamb chops with baked potato and broccoli or red beans and rice, regular lemonade  Meal Plan 24 Hour Recall - Snack: popcorn  Patient can identify foods that impact blood sugar.: yes  Challenges to healthy eating:: other (see comments) (sugary drinks)  Assessment indicates:: Instruction Needed  Area of need?: Yes    Physical Activity/Exercise  Patient's daily activity level:: sedentary  Patient formally exercises outside of work.: no  Reasons for not exercising:: other (see comments) (pt has impaired mobility from strokes but has a stationary bike at home that he is willing to ride)  Patient can identify forms of physical activity.: yes  Assessment indicates:: Instruction Needed  Area of need?: Yes    Home Blood Glucose Monitoring  Patient states that blood sugar is checked at home daily.: no  What is your A1c Target?: 6.5% or less  Assessment indicates:: Instruction Needed  Area of need?: Yes    Acute Complications  Area of need?: Deferred    Chronic Complications  Area of need?: Deferred      Assessment Summary and Plan    Based on today's diabetes care assessment, the following areas of need were identified:      Identified Areas of Need      Medication/Current Diabetes Treatment: No   Lifestyle Coping/Support: No   Diabetes Disease Process/Treatment Options: Deferred   Nutrition/Healthy Eating: Yes, see care plan below    Physical Activity/Exercise: Yes, pt is exercising 2-3x/week for 20 min; plans to increase to 4x over time   Home Blood Glucose Monitoring: Yes, reviewed previously    Acute Complications: Deferred    Chronic Complications: Deferred        Today's interventions were provided through individual discussion, instruction, and written materials were provided.      Patient verbalized understanding of instruction and written materials.  Pt was able to return back demonstration of instructions today. Patient understood key points, needs reinforcement and further instruction.     Diabetes Self-Management Care Plan:    Today's Diabetes Self-Management Care Plan was developed with Luis's input. Luis has agreed to work toward the following goal(s) to improve his/her overall diabetes control.      Care Plan: Diabetes Management   Updates made since 1/18/2024 12:00 AM        Problem: Healthy Eating         Goal: Eat 3 meals daily with 45g/3 servings of Carbohydrate per meal.    Start Date: 11/15/2024   Expected End Date: 10/31/2025   Priority: Medium   Barriers: No Barriers Identified   Note:    1/17/25: Pt A1c has increased to 6.7%. He attributes it to eating sweets during the holidays. He also drinks sugary beverages most of the time. Had a long discussion about impact of sugary drinks on blood sugars and he will try to wean himself off. He does not want to take meds but he will need to if he does not stop drinking these beverages. Reviewed how to count carbs when eating dessert; dessert should not be eaten every day.     11/15/24: Pt presented with wife. Pt eats an overall healthy diet; likes veggies; does not eat fast food or fried food. He does drink sugary beverages with meals; water the rest of the day. He is willing to try to find sugar free drinks. He was encouraged to add more veggies to his plate.       Task: Reviewed the sources and role of Carbohydrate, Protein, and Fat and how each nutrient impacts blood sugar. Completed 11/15/2024        Task: Provided visual examples using dry measuring cups, food models, and other familiar objects such as computer mouse, deck or cards, tennis ball etc. to help with visualization of portions. Completed  11/15/2024        Task: Explained how to count carbohydrates using the food label and the use of dry measuring cups for accurate carb counting. Completed 11/15/2024     Task: Recommended replacing beverages containing high sugar content with noncaloric/sugar free options and/or water. Completed 11/15/2024        Task: Review the importance of balancing carbohydrates with each meal using portion control techniques to count servings of carbohydrate and label reading to identify serving size and amount of total carbs per serving. Completed 11/15/2024         Follow Up Plan     Follow up in about 6 months (around 7/17/2025). Review A1c, dietary changes, intake of sugary beverages, exercise, long-term mgmt, complications    Today's care plan and follow up schedule was discussed with patient.  Luis verbalized understanding of the care plan, goals, and agrees to follow up plan.        The patient was encouraged to communicate with his/her health care provider/physician and care team regarding his/her condition(s) and treatment.  I provided the patient with my contact information today and encouraged to contact me via phone or [facility] Patient Portal as needed.     Length of Visit   Total Time: 60 Minutes

## 2025-01-30 DIAGNOSIS — Z00.00 ENCOUNTER FOR MEDICARE ANNUAL WELLNESS EXAM: ICD-10-CM

## 2025-03-19 ENCOUNTER — OFFICE VISIT (OUTPATIENT)
Dept: ORTHOPEDICS | Facility: CLINIC | Age: 70
End: 2025-03-19
Payer: MEDICARE

## 2025-03-19 VITALS — BODY MASS INDEX: 23.8 KG/M2 | HEIGHT: 69 IN

## 2025-03-19 DIAGNOSIS — M75.111 NONTRAUMATIC INCOMPLETE TEAR OF RIGHT ROTATOR CUFF: Primary | ICD-10-CM

## 2025-03-19 PROCEDURE — 99999 PR PBB SHADOW E&M-EST. PATIENT-LVL II: CPT | Mod: PBBFAC,,, | Performed by: ORTHOPAEDIC SURGERY

## 2025-03-19 RX ORDER — TRIAMCINOLONE ACETONIDE 40 MG/ML
40 INJECTION, SUSPENSION INTRA-ARTICULAR; INTRAMUSCULAR
Status: COMPLETED | OUTPATIENT
Start: 2025-03-19 | End: 2025-03-19

## 2025-03-19 RX ADMIN — TRIAMCINOLONE ACETONIDE 40 MG: 40 INJECTION, SUSPENSION INTRA-ARTICULAR; INTRAMUSCULAR at 03:03

## 2025-03-19 NOTE — PROGRESS NOTES
Subjective:      Patient ID: Luis Wong is a 69 y.o. male.  Chief Complaint: Follow-up and Pain of the Right Shoulder      HPI  Luis Wong is a  69 y.o. male presenting today for follow up of chronic tear rotator cuff right shoulder.  He reports that he is having a flare-up again he would like another injection he understands the risk with continued an injection.    Review of patient's allergies indicates:  No Known Allergies      Current Medications[1]    Past Medical History:   Diagnosis Date    Aneurysm 10/30/2012    Diabetes mellitus     Fever blister     Herpes infection     Hypertension     ICH (intracerebral hemorrhage)     Lateral epicondylitis of right elbow 11/28/2022    Mixed hyperlipidemia 9/5/2013    Nontraumatic thalamic hemorrhage 8/31/2016    JAQUAN (obstructive sleep apnea)     Prediabetes     Right-sided lacunar stroke 9/11/2014    SDH (subdural hematoma)     Special screening for malignant neoplasms, colon     Stroke     Type 2 diabetes mellitus with hyperglycemia, without long-term current use of insulin 1/25/2022       Past Surgical History:   Procedure Laterality Date    CARPAL TUNNEL RELEASE Right 2/21/2020    Procedure: RELEASE, CARPAL TUNNEL;  Surgeon: Barry Dutton Jr., MD;  Location: [facility] OR;  Service: Orthopedics;  Laterality: Right;    INSERTION OF IMPLANTABLE LOOP RECORDER N/A 12/12/2019    Procedure: Insertion, Implantable Loop Recorder;  Surgeon: Efren Sanford MD;  Location: [facility] CATH LAB/EP;  Service: Cardiology;  Laterality: N/A;  Crytogenic CVA, LOOP,  MDT, Local,  DM    INTRAMEDULLARY RODDING OF FEMUR Left 5/23/2021    Procedure: INSERTION, INTRAMEDULLARY SURY, FEMUR;  Surgeon: Watson Jean-Baptiste IV, MD;  Location: [facility] OR;  Service: Orthopedics;  Laterality: Left;    Knee arthroscopic surgery      REMOVAL OF IMPLANTABLE LOOP RECORDER N/A 8/11/2023    Procedure: REMOVAL, IMPLANTABLE LOOP RECORDER;  Surgeon: Efren Sanford MD;  Location: [facility] EP LAB;  Service:  "Cardiology;  Laterality: N/A;  SHANTELLE, ILR Removal, MDT,  RN sedate, DM, 3 Prep       OBJECTIVE:   PHYSICAL EXAM:  Height: 5' 9" (175.3 cm)    Vitals:    03/19/25 1533   Height: 5' 9" (1.753 m)   PainSc:   3   PainLoc: Shoulder     Ortho/SPM Exam  Examination right shoulder mild tenderness   Range of motion full   Slight weakness no instability    RADIOGRAPHS:  None  Comments: I have personally reviewed the imaging and I agree with the above radiologist's report.    ASSESSMENT/PLAN:     IMPRESSION:  Chronic tear rotator cuff right shoulder    PLAN:  Patient would like injection today   After pause for time-out identified the right shoulder injected with Kenalog 40 mg 2 cc xylocaine sterile technique  Tolerated the procedure well without complication   Continue anti-inflammatory medication by mouth    FOLLOW UP:  4-6 months or as needed    Disclaimer: This note has been generated using voice-recognition software. There may be typographical errors that have been missed during proof-reading.          [1]   Current Outpatient Medications   Medication Sig Dispense Refill    ibuprofen (ADVIL,MOTRIN) 600 MG tablet Take 1 tablet (600 mg total) by mouth every 6 (six) hours as needed. 40 tablet 1    NIFEdipine (PROCARDIA-XL) 30 MG (OSM) 24 hr tablet Take 1 tablet (30 mg total) by mouth once daily. 90 tablet 3     No current facility-administered medications for this visit.     "

## 2025-04-15 ENCOUNTER — OFFICE VISIT (OUTPATIENT)
Dept: FAMILY MEDICINE | Facility: CLINIC | Age: 70
End: 2025-04-15
Payer: MEDICARE

## 2025-04-15 VITALS
TEMPERATURE: 100 F | HEART RATE: 116 BPM | BODY MASS INDEX: 22.92 KG/M2 | WEIGHT: 154.75 LBS | SYSTOLIC BLOOD PRESSURE: 142 MMHG | OXYGEN SATURATION: 97 % | DIASTOLIC BLOOD PRESSURE: 80 MMHG | HEIGHT: 69 IN

## 2025-04-15 DIAGNOSIS — J10.1 INFLUENZA A: Primary | ICD-10-CM

## 2025-04-15 LAB
CTP QC/QA: YES
CTP QC/QA: YES
POC MOLECULAR INFLUENZA A AGN: POSITIVE
POC MOLECULAR INFLUENZA B AGN: NEGATIVE
SARS-COV-2 RDRP RESP QL NAA+PROBE: NEGATIVE

## 2025-04-15 PROCEDURE — 99999 PR PBB SHADOW E&M-EST. PATIENT-LVL IV: CPT | Mod: PBBFAC,,,

## 2025-04-15 RX ORDER — OSELTAMIVIR PHOSPHATE 75 MG/1
75 CAPSULE ORAL 2 TIMES DAILY
Qty: 10 CAPSULE | Refills: 0 | Status: SHIPPED | OUTPATIENT
Start: 2025-04-15 | End: 2025-04-20

## 2025-04-15 NOTE — PATIENT INSTRUCTIONS
FLU TREATMENT    Treat with Tamiflu 75  mg twice daily as soon as possible after symptom onset.  Duration of therapy: 5 days.    Treat symptoms -- Treating the symptoms of influenza can help you to feel better but will not make the flu go away faster.    ?Rest until the flu is fully resolved, especially if the illness has been severe.    ?Fluids - Drink enough fluids so that you do not become dehydrated. One way to  if you are drinking enough is to look at the color of your urine. Normally, urine should be light yellow to nearly colorless. If you are drinking enough, you should pass urine every three to five hours.    ?Acetaminophen (sample brand name: Tylenol) can relieve fever, headache, and muscle aches. Aspirin and medicines that include aspirin (eg, bismuth subsalicylate [sample brand name: Pepto-Bismol]) are not recommended for children under 18 because aspirin can lead to a serious disease called Reye syndrome.    ? Sore Throat Relief: Gargling with saltwater, using throat lozenges, or drinking warm tea with honey may provide relief.    ?Congestion and Cough Relief:  Cough suppressants (e.g., dextromethorphan) or expectorants (e.g., guaifenesin) can ease coughing, but they should be used carefully.  Humidifiers or a hot shower may also help relieve nasal congestion and throat irritation.      Use of face masks and hand hygiene may reduce household transmission. Individuals with influenza should remain home from work, school, and other public places until at least 24 hours after resolution of fever (without use of antipyretics) and improvement in symptoms.      WHEN TO SEEK HELP ADDITIONAL HELP    You feel short of breath or have trouble breathing  You have pain or pressure in your chest or stomach  You have signs of being dehydrated, such as dizziness when standing or not passing urine  You feel confused  You cannot stop vomiting or you cannot drink enough fluids

## 2025-04-15 NOTE — PROGRESS NOTES
[facility]    4/15/2025      Subjective:       Patient ID:  Luis is a 69 y.o. male .  has a past medical history of Aneurysm, Diabetes mellitus, Fever blister, Herpes infection, Hypertension, ICH (intracerebral hemorrhage), Lateral epicondylitis of right elbow, Mixed hyperlipidemia, Nontraumatic thalamic hemorrhage, JAQUAN (obstructive sleep apnea), Prediabetes, Right-sided lacunar stroke, SDH (subdural hematoma), Special screening for malignant neoplasms, colon, Stroke, and Type 2 diabetes mellitus with hyperglycemia, without long-term current use of insulin.    History of Present Illness    CHIEF COMPLAINT:  Mr. Wong presents today with  flu-like symptoms    He tested positive for influenza and negative for COVID-19. Symptoms began yesterday during daytime with progressive worsening throughout the night. He reports congestion, chills, productive cough, and decreased appetite, though has managed to eat some saltine crackers. He is attempting to stay hydrated and denies shortness of breath or chest pain.      MEDICATIONS:  He is taking ibuprofen for symptom management.      ROS:  General: -fever, +chills, -fatigue, -weight gain, -weight loss  Eyes: -vision changes, -redness, -discharge  ENT: -ear pain, -nasal congestion, -sore throat  Cardiovascular: -chest pain, -palpitations, -lower extremity edema  Respiratory: -cough, -shortness of breath, +chest congestion, +productive cough  Gastrointestinal: -abdominal pain, -nausea, -vomiting, -diarrhea, -constipation, -blood in stool, +loss of appetite  Genitourinary: -dysuria, -hematuria, -frequency  Musculoskeletal: -joint pain, -muscle pain  Skin: -rash, -lesion  Neurological: -headache, -dizziness, -numbness, -tingling  Psychiatric: -anxiety, -depression, -sleep difficulty           Problem List[1]      Last HgbA1C:    Lab Results   Component Value Date    HGBA1C 6.7 (H) 01/03/2025    HGBA1C 6.2 (H) 09/20/2024    HGBA1C 6.3 (H)  "03/20/2024         Last Lipid Panel:    Lab Results   Component Value Date    HDL 86 (H) 01/03/2025    HDL 99 (H) 06/25/2024    HDL 98 (H) 03/20/2024       Lab Results   Component Value Date    LDLCALC 115.2 01/03/2025    LDLCALC 101.4 06/25/2024    LDLCALC 116.4 03/20/2024       Lab Results   Component Value Date    TRIG 59 01/03/2025    TRIG 43 06/25/2024    TRIG 58 03/20/2024       Lab Results   Component Value Date    CHOLHDL 40.4 01/03/2025    CHOLHDL 47.4 06/25/2024    CHOLHDL 43.4 03/20/2024         Review of patient's allergies indicates:  No Known Allergies     Medication List with Changes/Refills   New Medications    OSELTAMIVIR (TAMIFLU) 75 MG CAPSULE    Take 1 capsule (75 mg total) by mouth 2 (two) times daily. for 5 days   Current Medications    IBUPROFEN (ADVIL,MOTRIN) 600 MG TABLET    Take 1 tablet (600 mg total) by mouth every 6 (six) hours as needed.    NIFEDIPINE (PROCARDIA-XL) 30 MG (OSM) 24 HR TABLET    Take 1 tablet (30 mg total) by mouth once daily.               Objective:      BP (!) 142/80 (BP Location: Left arm, Patient Position: Sitting)   Pulse (!) 116   Temp 99.7 °F (37.6 °C) (Oral)   Ht 5' 9" (1.753 m)   Wt 70.2 kg (154 lb 12.2 oz)   SpO2 97%   BMI 22.85 kg/m²   Estimated body mass index is 22.85 kg/m² as calculated from the following:    Height as of this encounter: 5' 9" (1.753 m).    Weight as of this encounter: 70.2 kg (154 lb 12.2 oz).    Physical Exam  Vitals reviewed.   Constitutional:       Appearance: Normal appearance. He is ill-appearing.      Comments:  Patient in wheelchair  during visit   HENT:      Head: Normocephalic.   Eyes:      General: No scleral icterus.     Conjunctiva/sclera: Conjunctivae normal.   Cardiovascular:      Rate and Rhythm: Normal rate.   Pulmonary:      Effort: Pulmonary effort is normal. No respiratory distress.   Skin:     Coloration: Skin is not pale.   Neurological:      Mental Status: He is oriented to person, place, and time. Mental status " is at baseline.   Psychiatric:         Mood and Affect: Mood normal.         Behavior: Behavior normal.             Assessment and Plan:   1. Influenza A  - POCT Influenza A/B Molecular  - POCT COVID-19 Rapid Screening  - oseltamivir (TAMIFLU) 75 MG capsule; Take 1 capsule (75 mg total) by mouth 2 (two) times daily. for 5 days  Dispense: 10 capsule; Refill: 0     Assessment & Plan    - Diagnosed influenza based on positive flu test and presenting symptoms.  - Initiated antiviral treatment (75 mg twice daily for 5 days) despite symptom onset being slightly beyond the typical 48-hour window for optimal efficacy.  - Considered ability to tolerate oral intake and importance of hydration in management.    INFLUENZA:   Educated the patient on the typical course of influenza and potential worsening of symptoms.   Instructed the patient to monitor for worsening symptoms, especially dyspnea or pyrexia.   Initiated antiviral treatment with 75 mg twice daily for 5 days,   Noted patient's symptoms of nasal congestion, productive cough, and chills, which started yesterday and progressively worsened.    HYDRATION AND NUTRITION:   Advised the patient to maintain adequate hydration and nutrition as tolerated.   Educated on the importance of hydration  and rest   Mr. Wong to stay hydrated and eat small amounts as tolerated (e.g., saltine crackers or smoothies).   Education gave an AVS and supplemental documentation given as well on flu    FOLLOW-UP AND EMERGENCY CARE:   Instructed the patient to follow up in clinic if symptoms worsen, especially if experiencing dyspnea or high-grade fever.   Informed the patient about the option to visit the Emergency Department if symptoms significantly deteriorate.         The patient was informed of the following statements     Emergency Care:Seek immediate medical attention in the emergency room if you experience any new or worsening symptoms, or if your current condition significantly  changes or becomes more severe.  Patient Acknowledgment: Patient verbalizes understanding of the plan and agrees to proceed with the recommended care.        Follow Up:  6/6/2025 Juan Eduardo MD                  No follow-ups on file.    Other Orders Placed This Visit:  Orders Placed This Encounter   Procedures    POCT Influenza A/B Molecular    POCT COVID-19 Rapid Screening         This note was generated with the assistance of ambient listening technology. Verbal consent was obtained by the patient and accompanying visitor(s) for the recording of patient appointment to facilitate this note. I attest to having reviewed and edited the generated note for accuracy, though some syntax or spelling errors may persist. Please contact the author of this note for any clarification.        Eliezer Lepe PA-C             [1]   Patient Active Problem List  Diagnosis    HTN (hypertension)    Nuclear sclerosis    Hyperglycemia    Mixed hyperlipidemia    Cataracts, bilateral    History of ischemic stroke    History of cerebral parenchymal hemorrhage    Pre-diabetes    Dysarthria    Normocytic anemia    Bilateral carpal tunnel syndrome    Cryptogenic stroke    Decreased strength of lower extremity    Impaired functional mobility, balance, gait, and endurance    Carpal tunnel syndrome of right wrist    Drug-induced constipation    Acute postoperative anemia due to expected blood loss    Debility    History of stroke with residual deficit    Gait, antalgic    Decreased strength, endurance, and mobility    Impaired functional mobility, balance, and endurance    Type 2 diabetes mellitus with hyperglycemia, without long-term current use of insulin    Spastic hemiplegia affecting left nondominant side, unspecified etiology    Lateral epicondylitis of right elbow    Impaired mobility and ADLs    Nontraumatic incomplete tear of right rotator cuff    Encounter for loop recorder at end of battery life    Paroxysmal  atrial fibrillation

## 2025-05-16 ENCOUNTER — PATIENT OUTREACH (OUTPATIENT)
Dept: ADMINISTRATIVE | Facility: HOSPITAL | Age: 70
End: 2025-05-16
Payer: MEDICARE

## 2025-05-16 DIAGNOSIS — Z12.5 ENCOUNTER FOR SCREENING PROSTATE SPECIFIC ANTIGEN (PSA) MEASUREMENT: Primary | ICD-10-CM

## 2025-05-16 NOTE — PROGRESS NOTES
Health Maintenance Topic(s) Outreach Outcomes & Actions Taken:    Eye Exam - Outreach Outcomes & Actions Taken  : External Records Requested & Care Team Updated if Applicable             
The wound was explored to base in bloodless field./No foreign body

## 2025-05-16 NOTE — LETTER
AUTHORIZATION FOR RELEASE OF   CONFIDENTIAL INFORMATION    Dear [facility],    We are seeing Luis Wong, date of birth 1955, in the clinic at [facility]. Juan Eduardo MD is the patient's PCP. Luis Wong has an outstanding lab/procedure at the time we reviewed his chart. In order to help keep his health information updated, he has authorized us to request the following medical record(s):                                               ( xx )  EYE EXAM                    Please fax records to Juan Eduardo MD, [phone]               Patient Name: Luis Wong  : 1955  Patient Phone #: [phone]

## 2025-06-01 ENCOUNTER — HOSPITAL ENCOUNTER (EMERGENCY)
Facility: HOSPITAL | Age: 70
Discharge: HOME | End: 2025-06-01
Attending: STUDENT IN AN ORGANIZED HEALTH CARE EDUCATION/TRAINING PROGRAM
Payer: MEDICARE

## 2025-06-01 VITALS
OXYGEN SATURATION: 97 % | WEIGHT: 150 LBS | BODY MASS INDEX: 22.22 KG/M2 | SYSTOLIC BLOOD PRESSURE: 146 MMHG | TEMPERATURE: 97 F | DIASTOLIC BLOOD PRESSURE: 67 MMHG | HEIGHT: 69 IN | RESPIRATION RATE: 22 BRPM | HEART RATE: 74 BPM

## 2025-06-01 DIAGNOSIS — R00.0 TACHYCARDIA: ICD-10-CM

## 2025-06-01 DIAGNOSIS — N28.1 RENAL CYST, RIGHT: ICD-10-CM

## 2025-06-01 DIAGNOSIS — N30.01 ACUTE CYSTITIS WITH HEMATURIA: Primary | ICD-10-CM

## 2025-06-01 LAB
ABSOLUTE EOSINOPHIL (OHS): 0.01 K/UL
ABSOLUTE MONOCYTE (OHS): 0.35 K/UL (ref 0.3–1)
ABSOLUTE NEUTROPHIL COUNT (OHS): 3.82 K/UL (ref 1.8–7.7)
ALBUMIN SERPL BCP-MCNC: 4 G/DL (ref 3.5–5.2)
ALP SERPL-CCNC: 71 UNIT/L (ref 40–150)
ALT SERPL W/O P-5'-P-CCNC: 8 UNIT/L (ref 10–44)
ANION GAP (OHS): 10 MMOL/L (ref 8–16)
AST SERPL-CCNC: 15 UNIT/L (ref 11–45)
BASOPHILS # BLD AUTO: 0.02 K/UL
BASOPHILS NFR BLD AUTO: 0.4 %
BILIRUB SERPL-MCNC: 0.6 MG/DL (ref 0.1–1)
BILIRUB UR QL STRIP.AUTO: NEGATIVE
BUN SERPL-MCNC: 17 MG/DL (ref 8–23)
CALCIUM SERPL-MCNC: 9.1 MG/DL (ref 8.7–10.5)
CHLORIDE SERPL-SCNC: 108 MMOL/L (ref 95–110)
CLARITY UR: CLEAR
CO2 SERPL-SCNC: 23 MMOL/L (ref 23–29)
COLOR UR AUTO: YELLOW
CREAT SERPL-MCNC: 1.1 MG/DL (ref 0.5–1.4)
ERYTHROCYTE [DISTWIDTH] IN BLOOD BY AUTOMATED COUNT: 13.1 % (ref 11.5–14.5)
GFR SERPLBLD CREATININE-BSD FMLA CKD-EPI: >60 ML/MIN/1.73/M2
GLUCOSE SERPL-MCNC: 133 MG/DL (ref 70–110)
GLUCOSE UR QL STRIP: ABNORMAL
HCT VFR BLD AUTO: 37.4 % (ref 40–54)
HGB BLD-MCNC: 12.5 GM/DL (ref 14–18)
HGB UR QL STRIP: ABNORMAL
HOLD SPECIMEN: NORMAL
IMM GRANULOCYTES # BLD AUTO: 0.01 K/UL (ref 0–0.04)
IMM GRANULOCYTES NFR BLD AUTO: 0.2 % (ref 0–0.5)
KETONES UR QL STRIP: NEGATIVE
LEUKOCYTE ESTERASE UR QL STRIP: NEGATIVE
LYMPHOCYTES # BLD AUTO: 0.36 K/UL (ref 1–4.8)
MCH RBC QN AUTO: 27.5 PG (ref 27–31)
MCHC RBC AUTO-ENTMCNC: 33.4 G/DL (ref 32–36)
MCV RBC AUTO: 82 FL (ref 82–98)
MICROSCOPIC COMMENT: ABNORMAL
NITRITE UR QL STRIP: NEGATIVE
NUCLEATED RBC (/100WBC) (OHS): 0 /100 WBC
PH UR STRIP: 7 [PH]
PLATELET # BLD AUTO: 178 K/UL (ref 150–450)
PLATELET BLD QL SMEAR: NORMAL
PMV BLD AUTO: 13.5 FL (ref 9.2–12.9)
POTASSIUM SERPL-SCNC: 3.4 MMOL/L (ref 3.5–5.1)
PROT SERPL-MCNC: 7.2 GM/DL (ref 6–8.4)
PROT UR QL STRIP: ABNORMAL
RBC # BLD AUTO: 4.55 M/UL (ref 4.6–6.2)
RBC #/AREA URNS AUTO: >100 /HPF (ref 0–4)
RELATIVE EOSINOPHIL (OHS): 0.2 %
RELATIVE LYMPHOCYTE (OHS): 7.9 % (ref 18–48)
RELATIVE MONOCYTE (OHS): 7.7 % (ref 4–15)
RELATIVE NEUTROPHIL (OHS): 83.6 % (ref 38–73)
SODIUM SERPL-SCNC: 141 MMOL/L (ref 136–145)
SP GR UR STRIP: 1.01
SQUAMOUS #/AREA URNS AUTO: <1 /HPF
UROBILINOGEN UR STRIP-ACNC: NEGATIVE EU/DL
WBC # BLD AUTO: 4.57 K/UL (ref 3.9–12.7)
WBC #/AREA URNS AUTO: 2 /HPF (ref 0–5)

## 2025-06-01 PROCEDURE — 96374 THER/PROPH/DIAG INJ IV PUSH: CPT

## 2025-06-01 PROCEDURE — 93010 ELECTROCARDIOGRAM REPORT: CPT | Mod: ,,, | Performed by: STUDENT IN AN ORGANIZED HEALTH CARE EDUCATION/TRAINING PROGRAM

## 2025-06-01 PROCEDURE — 85025 COMPLETE CBC W/AUTO DIFF WBC: CPT | Performed by: STUDENT IN AN ORGANIZED HEALTH CARE EDUCATION/TRAINING PROGRAM

## 2025-06-01 PROCEDURE — 51798 US URINE CAPACITY MEASURE: CPT

## 2025-06-01 PROCEDURE — 99285 EMERGENCY DEPT VISIT HI MDM: CPT | Mod: 25

## 2025-06-01 PROCEDURE — 63600175 PHARM REV CODE 636 W HCPCS: Performed by: STUDENT IN AN ORGANIZED HEALTH CARE EDUCATION/TRAINING PROGRAM

## 2025-06-01 PROCEDURE — 93005 ELECTROCARDIOGRAM TRACING: CPT

## 2025-06-01 PROCEDURE — 25000003 PHARM REV CODE 250: Performed by: STUDENT IN AN ORGANIZED HEALTH CARE EDUCATION/TRAINING PROGRAM

## 2025-06-01 PROCEDURE — 80053 COMPREHEN METABOLIC PANEL: CPT | Performed by: STUDENT IN AN ORGANIZED HEALTH CARE EDUCATION/TRAINING PROGRAM

## 2025-06-01 PROCEDURE — 81001 URINALYSIS AUTO W/SCOPE: CPT | Performed by: STUDENT IN AN ORGANIZED HEALTH CARE EDUCATION/TRAINING PROGRAM

## 2025-06-01 RX ORDER — TAMSULOSIN HYDROCHLORIDE 0.4 MG/1
0.4 CAPSULE ORAL DAILY
Qty: 10 CAPSULE | Refills: 0 | Status: SHIPPED | OUTPATIENT
Start: 2025-06-01 | End: 2026-06-01

## 2025-06-01 RX ORDER — SODIUM CHLORIDE 9 MG/ML
500 INJECTION, SOLUTION INTRAVENOUS
Status: COMPLETED | OUTPATIENT
Start: 2025-06-01 | End: 2025-06-01

## 2025-06-01 RX ORDER — CEPHALEXIN 500 MG/1
500 CAPSULE ORAL 4 TIMES DAILY
Qty: 28 CAPSULE | Refills: 0 | Status: SHIPPED | OUTPATIENT
Start: 2025-06-01 | End: 2025-06-08

## 2025-06-01 RX ORDER — LIDOCAINE HYDROCHLORIDE 20 MG/ML
JELLY TOPICAL
Status: DISCONTINUED | OUTPATIENT
Start: 2025-06-01 | End: 2025-06-01 | Stop reason: HOSPADM

## 2025-06-01 RX ORDER — CEFTRIAXONE 1 G/1
1 INJECTION, POWDER, FOR SOLUTION INTRAMUSCULAR; INTRAVENOUS
Status: COMPLETED | OUTPATIENT
Start: 2025-06-01 | End: 2025-06-01

## 2025-06-01 RX ORDER — TAMSULOSIN HYDROCHLORIDE 0.4 MG/1
0.4 CAPSULE ORAL
Status: COMPLETED | OUTPATIENT
Start: 2025-06-01 | End: 2025-06-01

## 2025-06-01 RX ADMIN — SODIUM CHLORIDE 500 ML: 9 INJECTION, SOLUTION INTRAVENOUS at 03:06

## 2025-06-01 RX ADMIN — CEFTRIAXONE SODIUM 1 G: 1 INJECTION, POWDER, FOR SOLUTION INTRAMUSCULAR; INTRAVENOUS at 02:06

## 2025-06-01 RX ADMIN — TAMSULOSIN HYDROCHLORIDE 0.4 MG: 0.4 CAPSULE ORAL at 03:06

## 2025-06-02 LAB
OHS QRS DURATION: 94 MS
OHS QTC CALCULATION: 440 MS

## 2025-06-02 RX ORDER — IBUPROFEN 600 MG/1
600 TABLET, FILM COATED ORAL EVERY 6 HOURS PRN
Qty: 40 TABLET | Refills: 1 | Status: SHIPPED | OUTPATIENT
Start: 2025-06-02

## 2025-06-06 ENCOUNTER — OFFICE VISIT (OUTPATIENT)
Dept: FAMILY MEDICINE | Facility: CLINIC | Age: 70
End: 2025-06-06
Payer: MEDICARE

## 2025-06-06 VITALS
HEIGHT: 69 IN | HEART RATE: 74 BPM | OXYGEN SATURATION: 98 % | WEIGHT: 153.19 LBS | DIASTOLIC BLOOD PRESSURE: 88 MMHG | SYSTOLIC BLOOD PRESSURE: 138 MMHG | BODY MASS INDEX: 22.69 KG/M2

## 2025-06-06 DIAGNOSIS — E78.2 MIXED HYPERLIPIDEMIA: ICD-10-CM

## 2025-06-06 DIAGNOSIS — I10 PRIMARY HYPERTENSION: Primary | ICD-10-CM

## 2025-06-06 DIAGNOSIS — Z12.5 SCREENING FOR PROSTATE CANCER: ICD-10-CM

## 2025-06-06 DIAGNOSIS — E11.65 TYPE 2 DIABETES MELLITUS WITH HYPERGLYCEMIA, WITHOUT LONG-TERM CURRENT USE OF INSULIN: ICD-10-CM

## 2025-06-06 PROCEDURE — 99999 PR PBB SHADOW E&M-EST. PATIENT-LVL IV: CPT | Mod: PBBFAC,,, | Performed by: FAMILY MEDICINE

## 2025-06-06 RX ORDER — LOSARTAN POTASSIUM 25 MG/1
25 TABLET ORAL NIGHTLY
Qty: 90 TABLET | Refills: 3 | Status: SHIPPED | OUTPATIENT
Start: 2025-06-06 | End: 2026-06-06

## 2025-07-30 PROCEDURE — 82043 UR ALBUMIN QUANTITATIVE: CPT | Performed by: FAMILY MEDICINE

## 2025-08-28 ENCOUNTER — OFFICE VISIT (OUTPATIENT)
Dept: FAMILY MEDICINE | Facility: CLINIC | Age: 70
End: 2025-08-28
Payer: MEDICARE

## 2025-08-28 ENCOUNTER — LAB VISIT (OUTPATIENT)
Dept: LAB | Facility: HOSPITAL | Age: 70
End: 2025-08-28
Attending: FAMILY MEDICINE
Payer: MEDICARE

## 2025-08-28 VITALS
WEIGHT: 145.06 LBS | SYSTOLIC BLOOD PRESSURE: 132 MMHG | DIASTOLIC BLOOD PRESSURE: 64 MMHG | BODY MASS INDEX: 21.49 KG/M2 | HEIGHT: 69 IN | OXYGEN SATURATION: 98 % | HEART RATE: 59 BPM

## 2025-08-28 DIAGNOSIS — R53.83 LOW ENERGY: Primary | ICD-10-CM

## 2025-08-28 DIAGNOSIS — R41.9 UNSPECIFIED SYMPTOMS AND SIGNS INVOLVING COGNITIVE FUNCTIONS AND AWARENESS: ICD-10-CM

## 2025-08-28 DIAGNOSIS — T46.6X5A MYALGIA DUE TO STATIN: ICD-10-CM

## 2025-08-28 DIAGNOSIS — R53.1 WEAKNESS: ICD-10-CM

## 2025-08-28 DIAGNOSIS — R53.81 PHYSICAL DECONDITIONING: ICD-10-CM

## 2025-08-28 DIAGNOSIS — E78.49 OTHER HYPERLIPIDEMIA: ICD-10-CM

## 2025-08-28 DIAGNOSIS — D53.9 NUTRITIONAL ANEMIA: ICD-10-CM

## 2025-08-28 DIAGNOSIS — Z13.6 SCREENING FOR CARDIOVASCULAR CONDITION: ICD-10-CM

## 2025-08-28 DIAGNOSIS — G81.94 LEFT HEMIPARESIS: ICD-10-CM

## 2025-08-28 DIAGNOSIS — M79.10 MYALGIA DUE TO STATIN: ICD-10-CM

## 2025-08-28 DIAGNOSIS — I10 PRIMARY HYPERTENSION: ICD-10-CM

## 2025-08-28 DIAGNOSIS — I69.359 HEMIPARESIS DUE TO OLD STROKE: ICD-10-CM

## 2025-08-28 LAB
(HCYS)2 SERPL-MCNC: 12.4 UMOL/L (ref 4–16.5)
ABSOLUTE EOSINOPHIL (OHS): 0.11 K/UL
ABSOLUTE MONOCYTE (OHS): 0.37 K/UL (ref 0.3–1)
ABSOLUTE NEUTROPHIL COUNT (OHS): 1.68 K/UL (ref 1.8–7.7)
BASOPHILS # BLD AUTO: 0.03 K/UL
BASOPHILS NFR BLD AUTO: 0.9 %
ERYTHROCYTE [DISTWIDTH] IN BLOOD BY AUTOMATED COUNT: 12.9 % (ref 11.5–14.5)
FERRITIN SERPL-MCNC: 68 NG/ML (ref 20–300)
FOLATE SERPL-MCNC: 7.9 NG/ML (ref 4–24)
HCT VFR BLD AUTO: 36.9 % (ref 40–54)
HGB BLD-MCNC: 12.2 GM/DL (ref 14–18)
IMM GRANULOCYTES # BLD AUTO: 0 K/UL (ref 0–0.04)
IMM GRANULOCYTES NFR BLD AUTO: 0 % (ref 0–0.5)
IRON SATN MFR SERPL: 29 % (ref 20–50)
IRON SERPL-MCNC: 109 UG/DL (ref 45–160)
LYMPHOCYTES # BLD AUTO: 1.03 K/UL (ref 1–4.8)
MCH RBC QN AUTO: 27.5 PG (ref 27–31)
MCHC RBC AUTO-ENTMCNC: 33.1 G/DL (ref 32–36)
MCV RBC AUTO: 83 FL (ref 82–98)
NUCLEATED RBC (/100WBC) (OHS): 0 /100 WBC
PLATELET # BLD AUTO: 162 K/UL (ref 150–450)
PMV BLD AUTO: 14.4 FL (ref 9.2–12.9)
RBC # BLD AUTO: 4.43 M/UL (ref 4.6–6.2)
RELATIVE EOSINOPHIL (OHS): 3.4 %
RELATIVE LYMPHOCYTE (OHS): 32 % (ref 18–48)
RELATIVE MONOCYTE (OHS): 11.5 % (ref 4–15)
RELATIVE NEUTROPHIL (OHS): 52.2 % (ref 38–73)
RETICS/RBC NFR AUTO: 1.2 % (ref 0.4–2)
TIBC SERPL-MCNC: 379 UG/DL (ref 250–450)
TRANSFERRIN SERPL-MCNC: 256 MG/DL (ref 200–375)
VIT B12 SERPL-MCNC: 1960 PG/ML (ref 210–950)
WBC # BLD AUTO: 3.22 K/UL (ref 3.9–12.7)

## 2025-08-28 PROCEDURE — 36415 COLL VENOUS BLD VENIPUNCTURE: CPT | Mod: PO

## 2025-08-28 PROCEDURE — 84425 ASSAY OF VITAMIN B-1: CPT

## 2025-08-28 PROCEDURE — 1101F PT FALLS ASSESS-DOCD LE1/YR: CPT | Mod: CPTII,S$GLB,, | Performed by: FAMILY MEDICINE

## 2025-08-28 PROCEDURE — 99999 PR PBB SHADOW E&M-EST. PATIENT-LVL IV: CPT | Mod: PBBFAC,,, | Performed by: FAMILY MEDICINE

## 2025-08-28 PROCEDURE — 3075F SYST BP GE 130 - 139MM HG: CPT | Mod: CPTII,S$GLB,, | Performed by: FAMILY MEDICINE

## 2025-08-28 PROCEDURE — 82607 VITAMIN B-12: CPT

## 2025-08-28 PROCEDURE — 1159F MED LIST DOCD IN RCRD: CPT | Mod: CPTII,S$GLB,, | Performed by: FAMILY MEDICINE

## 2025-08-28 PROCEDURE — 3288F FALL RISK ASSESSMENT DOCD: CPT | Mod: CPTII,S$GLB,, | Performed by: FAMILY MEDICINE

## 2025-08-28 PROCEDURE — 4010F ACE/ARB THERAPY RXD/TAKEN: CPT | Mod: CPTII,S$GLB,, | Performed by: FAMILY MEDICINE

## 2025-08-28 PROCEDURE — 1160F RVW MEDS BY RX/DR IN RCRD: CPT | Mod: CPTII,S$GLB,, | Performed by: FAMILY MEDICINE

## 2025-08-28 PROCEDURE — 1126F AMNT PAIN NOTED NONE PRSNT: CPT | Mod: CPTII,S$GLB,, | Performed by: FAMILY MEDICINE

## 2025-08-28 PROCEDURE — 3008F BODY MASS INDEX DOCD: CPT | Mod: CPTII,S$GLB,, | Performed by: FAMILY MEDICINE

## 2025-08-28 PROCEDURE — 82746 ASSAY OF FOLIC ACID SERUM: CPT

## 2025-08-28 PROCEDURE — 85045 AUTOMATED RETICULOCYTE COUNT: CPT

## 2025-08-28 PROCEDURE — 3078F DIAST BP <80 MM HG: CPT | Mod: CPTII,S$GLB,, | Performed by: FAMILY MEDICINE

## 2025-08-28 PROCEDURE — 99215 OFFICE O/P EST HI 40 MIN: CPT | Mod: S$GLB,,, | Performed by: FAMILY MEDICINE

## 2025-08-28 PROCEDURE — 85025 COMPLETE CBC W/AUTO DIFF WBC: CPT

## 2025-08-28 PROCEDURE — 82728 ASSAY OF FERRITIN: CPT

## 2025-08-28 PROCEDURE — 83090 ASSAY OF HOMOCYSTEINE: CPT

## 2025-08-28 PROCEDURE — 3061F NEG MICROALBUMINURIA REV: CPT | Mod: CPTII,S$GLB,, | Performed by: FAMILY MEDICINE

## 2025-08-28 PROCEDURE — 3044F HG A1C LEVEL LT 7.0%: CPT | Mod: CPTII,S$GLB,, | Performed by: FAMILY MEDICINE

## 2025-08-28 PROCEDURE — 3066F NEPHROPATHY DOC TX: CPT | Mod: CPTII,S$GLB,, | Performed by: FAMILY MEDICINE

## 2025-08-28 PROCEDURE — 84466 ASSAY OF TRANSFERRIN: CPT

## 2025-09-04 LAB — W VITAMIN B1: 43 UG/L

## (undated) DEVICE — ADHESIVE DERMABOND ADVANCED

## (undated) DEVICE — DRAPE STERI-DRAPE 83X125 IOBAN

## (undated) DEVICE — SEE MEDLINE ITEM 152522

## (undated) DEVICE — TOURNIQUET SB QC DP 18X4IN

## (undated) DEVICE — BLADE SURG #15 CARBON STEEL

## (undated) DEVICE — DRESSING TRANS 4X4 3/4

## (undated) DEVICE — SOL BSS IRRIGATION 500ML

## (undated) DEVICE — APPLICATOR CHLORAPREP ORN 26ML

## (undated) DEVICE — ELECTRODE REM PLYHSV RETURN 9

## (undated) DEVICE — PAD CAST SPECIALIST STRL 6

## (undated) DEVICE — PAD CAST SPECIALIST 2X4

## (undated) DEVICE — BLADE SURG CARBON STEEL #10

## (undated) DEVICE — SEE MEDLINE ITEM 146313

## (undated) DEVICE — ALCOHOL 70% ISOP W/GREEN 16OZ

## (undated) DEVICE — BLADE SCALP OPHTL BEVEL STR

## (undated) DEVICE — INSTRUMENT SUCTION FRAZIER 12F

## (undated) DEVICE — SEE MEDLINE ITEM 156955

## (undated) DEVICE — SEE L#120831

## (undated) DEVICE — SOL WATER STERILE IRR 500ML

## (undated) DEVICE — SEE MEDLINE ITEM 146345

## (undated) DEVICE — GAUZE SPONGE 4X4 12PLY

## (undated) DEVICE — SUT CTD VICRYL 2.0

## (undated) DEVICE — ELECTRODE PENCIL W/ROCKER NDL

## (undated) DEVICE — SEE MEDLINE ITEM 157116

## (undated) DEVICE — COVER OVERHEAD SURG LT BLUE

## (undated) DEVICE — PACK BASIC

## (undated) DEVICE — DRESSING ABSRBNT ISLAND 3.6X8

## (undated) DEVICE — PACK PACER PERMANENT OMC

## (undated) DEVICE — SEE MEDLINE ITEM 157117

## (undated) DEVICE — TAPE ADH MEDIPORE 4 X 10YDS

## (undated) DEVICE — DRAPE PED LAP SURG 108X77IN

## (undated) DEVICE — PAD PREP 50/CA

## (undated) DEVICE — GOWN SURGICAL XX LARGE X LONG

## (undated) DEVICE — SEE MEDLINE ITEM 152530

## (undated) DEVICE — GLOVE BIOGEL PIMICRO INDIC 8.5

## (undated) DEVICE — COVER SURG LIGHT HANDLE

## (undated) DEVICE — SUPPORT ULNA NERVE PROTECTOR

## (undated) DEVICE — SPLINT WRIST FOAM 8.5IN LG/RT

## (undated) DEVICE — PAD CAST 2 IN X 4YDS STERILE

## (undated) DEVICE — DRAPE C-ARM/MOBILE XRAY 44X80

## (undated) DEVICE — DRAPE STERI INSTRUMENT 1018

## (undated) DEVICE — SEE MEDLINE ITEM 146231

## (undated) DEVICE — REAMER MOD BIXCUT 8X48MM STER.

## (undated) DEVICE — SYR 10CC LUER LOCK

## (undated) DEVICE — SPONGE LAP 18X18 PREWASHED

## (undated) DEVICE — GLOVE SURG BIOGEL LATEX SZ 7.5

## (undated) DEVICE — NDL HYPO REG 25G X 1 1/2

## (undated) DEVICE — SEE MEDLINE ITEM 152622

## (undated) DEVICE — MANIFOLD 4 PORT

## (undated) DEVICE — SUT MONOCRYL 4-0 PS-2

## (undated) DEVICE — SEE MEDLINE ITEM 157173

## (undated) DEVICE — STOCKINET 4INX48

## (undated) DEVICE — DRESSING AQUACEL FOAM 3 X 3

## (undated) DEVICE — BIT DRILL BN GAMMA 3 4.2MM DIA

## (undated) DEVICE — BANDAGE ELASTIC 2X5 VELCRO ST

## (undated) DEVICE — DRESSING XEROFORM FOIL PK 1X8

## (undated) DEVICE — SUT CTD VICRYL 1 UND BR CT

## (undated) DEVICE — DRAPE INCISE IOBAN 2 23X23IN

## (undated) DEVICE — Device

## (undated) DEVICE — SEE MEDLINE ITEM 146292

## (undated) DEVICE — GLOVE BIOGEL SKINSENSE PI 8.5

## (undated) DEVICE — STAPLER SKIN ROTATING HEAD

## (undated) DEVICE — TAPE SILK 3IN

## (undated) DEVICE — DRESSING AQUACEL SACRAL 9 X 9